# Patient Record
Sex: FEMALE | Race: WHITE | NOT HISPANIC OR LATINO | Employment: OTHER | URBAN - METROPOLITAN AREA
[De-identification: names, ages, dates, MRNs, and addresses within clinical notes are randomized per-mention and may not be internally consistent; named-entity substitution may affect disease eponyms.]

---

## 2017-01-23 DIAGNOSIS — E11.65 TYPE 2 DIABETES MELLITUS WITH HYPERGLYCEMIA, WITH LONG-TERM CURRENT USE OF INSULIN: Primary | ICD-10-CM

## 2017-01-23 DIAGNOSIS — Z79.4 TYPE 2 DIABETES MELLITUS WITH HYPERGLYCEMIA, WITH LONG-TERM CURRENT USE OF INSULIN: Primary | ICD-10-CM

## 2017-01-24 ENCOUNTER — CLINICAL SUPPORT (OUTPATIENT)
Dept: DIABETES | Facility: CLINIC | Age: 82
End: 2017-01-24
Payer: MEDICARE

## 2017-01-24 DIAGNOSIS — Z79.4 TYPE 2 DIABETES MELLITUS WITHOUT COMPLICATION, WITH LONG-TERM CURRENT USE OF INSULIN: ICD-10-CM

## 2017-01-24 DIAGNOSIS — E11.9 TYPE 2 DIABETES MELLITUS WITHOUT COMPLICATION, WITH LONG-TERM CURRENT USE OF INSULIN: ICD-10-CM

## 2017-01-24 PROCEDURE — G0108 DIAB MANAGE TRN  PER INDIV: HCPCS | Mod: S$GLB,,, | Performed by: DIETITIAN, REGISTERED

## 2017-01-24 RX ORDER — INSULIN PUMP SYRINGE, 3 ML
EACH MISCELLANEOUS
Qty: 1 EACH | Refills: 0 | Status: SHIPPED | OUTPATIENT
Start: 2017-01-24 | End: 2018-01-24

## 2017-01-24 RX ORDER — ESCITALOPRAM OXALATE 10 MG/1
TABLET ORAL
Qty: 30 TABLET | Refills: 0 | Status: SHIPPED | OUTPATIENT
Start: 2017-01-24 | End: 2017-01-24 | Stop reason: SDUPTHER

## 2017-01-25 RX ORDER — ESCITALOPRAM OXALATE 10 MG/1
TABLET ORAL
Qty: 90 TABLET | Refills: 0 | Status: SHIPPED | OUTPATIENT
Start: 2017-01-25 | End: 2017-04-02 | Stop reason: SDUPTHER

## 2017-01-25 RX ORDER — BLOOD SUGAR DIAGNOSTIC
STRIP MISCELLANEOUS
Qty: 300 STRIP | Refills: 0 | Status: SHIPPED | OUTPATIENT
Start: 2017-01-25 | End: 2017-11-28 | Stop reason: SDUPTHER

## 2017-01-25 NOTE — PROGRESS NOTES
01/24/17 0000   Diabetes Type   Diabetes Type  Type II   Diabetes History   Diabetes Diagnosis >10 years   Nutrition   Meal Planning skipping meals;diet drinks;water  (2 meals daily, appetite not what it used to be)   Monitoring    Monitoring (One Touch Ultra)   Self Monitoring  SMBG before each meal - reports BG remaining in 200s and sometimes increases into 300s   Blood Glucose Logs No   Exercise    Exercise Type (limited d/t knee)   Current Diabetes Treatment    Current Treatment Insulin  (Novolog 18 units before each meal and Lantus 30 units every evening)   Social History   Preferred Learning Method Face to Face;Reading Materials   Primary Support Self   Smoking Status Never a Smoker   Barriers to Change   Barriers to Change None   Learning Challenges  None   Readiness to Learn    Readiness to Learn  Acceptance   Diabetes Education Visit   Diabetes Education Record Assessment/Progress Initial/Comprehensive   Diabetes Education Assessment/Progress   Acute Complications (preventing, detecting, and treating acute complications) DC;5;W;IS  (Reviewed s/s of hyperglycemia and s/s and appropriate treatment of hypoglycemia. )   Chronic Complications (preventing, detecting, and treating chronic complications) DC;5;W;IS  (Reviewed care schedule.)   Diabetes Disease Process (diabetes disease process and treatment options) DC;5;W;IS   Nutrition (Incorporating nutritional management into one's lifestyle) DC;5;W;IS  (Provided education on sources of CHO, serving sizes, timing of meals, reading labels, and recommended eating pattern with 30-45g CHO/meal, 3 meals daily. )   Physical Activity (incorporating physical activity into one's lifestyle) DC;5;W;IS  (Discussed benefits and goals. Limited d/t knee. Pt also has osteoporosis - discussed benefits of resistance or weight bearing exercises as tolerated to support bone health. )   Medications (states correct name, dose, onset, peak, duration, side effects & timing of meds)  DC;5;W;IS  (Reviewed timing, dosage, site selection and rotation of MDI. Pt verbalized appropriately self-administering doses and reports rarely missing doses. Reports using sliding scale with Novolog - adds 2 units when above 150.    Discussed would benefit from empowerment d/t reported BG readings staying in 200s. Pt verbalized willing to go to appt as long as she does not have to take DM pills. She does not want to take any pills for DM d/t  had a heart attack shortly after starting a glucose lowering pill. Explained there are many DM medications that work differently to lower BG.)   Monitoring (monitoring blood glucose/other parameters &using results) DC;5;W;IS  (Reviewed SMBG 4 times daily AC/HS, BG goals, keeping log and bringing log to appt. Provided logs and discussed importance of log for insulin titration.)   Goal Setting and Problem Solving (verbalizes behavior change strategies & sets realistic goals) DC;IS   Behavior Change (developing personal strategies to health & behavior change) DC;IS   Goals   Other Set  (Keep empowerment appt.)   Start Date 01/24/17   Diabetes Care Plan/Intervention   Education Plan/Intervention Diabetes Empowerment Program  (Scheduled empowerment appt 2/16.)   Diabetes Self-Management Support Plan F/U Prov;F/U DE   Diabetes Meal Plan   Restrictions Restricted Carbohydrate   Carbohydrate Per Meal 30-45g   Education Units of Time    Time Spent 60 min

## 2017-01-26 ENCOUNTER — LAB VISIT (OUTPATIENT)
Dept: LAB | Facility: HOSPITAL | Age: 82
End: 2017-01-26
Attending: INTERNAL MEDICINE
Payer: MEDICARE

## 2017-01-26 LAB
ALBUMIN SERPL BCP-MCNC: 3.3 G/DL
ALP SERPL-CCNC: 81 U/L
ALT SERPL W/O P-5'-P-CCNC: 9 U/L
ANION GAP SERPL CALC-SCNC: 7 MMOL/L
AST SERPL-CCNC: 18 U/L
BILIRUB SERPL-MCNC: 0.4 MG/DL
BUN SERPL-MCNC: 13 MG/DL
CALCIUM SERPL-MCNC: 8.9 MG/DL
CHLORIDE SERPL-SCNC: 103 MMOL/L
CHOLEST/HDLC SERPL: 5.1 {RATIO}
CO2 SERPL-SCNC: 28 MMOL/L
CREAT SERPL-MCNC: 1 MG/DL
EST. GFR  (AFRICAN AMERICAN): >60 ML/MIN/1.73 M^2
EST. GFR  (NON AFRICAN AMERICAN): 53.7 ML/MIN/1.73 M^2
ESTIMATED AVG GLUCOSE: 186 MG/DL
GLUCOSE SERPL-MCNC: 187 MG/DL
HBA1C MFR BLD HPLC: 8.1 %
HDL/CHOLESTEROL RATIO: 19.7 %
HDLC SERPL-MCNC: 259 MG/DL
HDLC SERPL-MCNC: 51 MG/DL
LDLC SERPL CALC-MCNC: 176.2 MG/DL
NONHDLC SERPL-MCNC: 208 MG/DL
POTASSIUM SERPL-SCNC: 4.4 MMOL/L
PROT SERPL-MCNC: 7.2 G/DL
SODIUM SERPL-SCNC: 138 MMOL/L
TRIGL SERPL-MCNC: 159 MG/DL

## 2017-01-26 PROCEDURE — 36415 COLL VENOUS BLD VENIPUNCTURE: CPT

## 2017-01-26 PROCEDURE — 80053 COMPREHEN METABOLIC PANEL: CPT

## 2017-01-26 PROCEDURE — 80061 LIPID PANEL: CPT

## 2017-01-26 PROCEDURE — 83036 HEMOGLOBIN GLYCOSYLATED A1C: CPT

## 2017-02-16 ENCOUNTER — CLINICAL SUPPORT (OUTPATIENT)
Dept: DIABETES | Facility: CLINIC | Age: 82
End: 2017-02-16
Payer: MEDICARE

## 2017-02-16 VITALS
DIASTOLIC BLOOD PRESSURE: 72 MMHG | HEIGHT: 58 IN | HEART RATE: 74 BPM | WEIGHT: 218.25 LBS | BODY MASS INDEX: 45.81 KG/M2 | SYSTOLIC BLOOD PRESSURE: 136 MMHG

## 2017-02-16 DIAGNOSIS — I10 ESSENTIAL HYPERTENSION: Chronic | ICD-10-CM

## 2017-02-16 DIAGNOSIS — N39.46 MIXED STRESS AND URGE URINARY INCONTINENCE: Chronic | ICD-10-CM

## 2017-02-16 DIAGNOSIS — N18.30 CKD STAGE 3 DUE TO TYPE 2 DIABETES MELLITUS: Chronic | ICD-10-CM

## 2017-02-16 DIAGNOSIS — I77.9 BILATERAL CAROTID ARTERY DISEASE: ICD-10-CM

## 2017-02-16 DIAGNOSIS — E78.5 HYPERLIPIDEMIA LDL GOAL <70: Chronic | ICD-10-CM

## 2017-02-16 DIAGNOSIS — E11.65 TYPE 2 DIABETES MELLITUS WITH HYPERGLYCEMIA, WITH LONG-TERM CURRENT USE OF INSULIN: Primary | Chronic | ICD-10-CM

## 2017-02-16 DIAGNOSIS — R13.12 OROPHARYNGEAL DYSPHAGIA: ICD-10-CM

## 2017-02-16 DIAGNOSIS — E11.22 CKD STAGE 3 DUE TO TYPE 2 DIABETES MELLITUS: Chronic | ICD-10-CM

## 2017-02-16 DIAGNOSIS — Z79.4 TYPE 2 DIABETES MELLITUS WITH HYPERGLYCEMIA, WITH LONG-TERM CURRENT USE OF INSULIN: Primary | Chronic | ICD-10-CM

## 2017-02-16 DIAGNOSIS — E66.01 MORBID OBESITY WITH BMI OF 40.0-44.9, ADULT: Chronic | ICD-10-CM

## 2017-02-16 DIAGNOSIS — E03.9 ACQUIRED HYPOTHYROIDISM: Chronic | ICD-10-CM

## 2017-02-16 LAB
CREAT UR-MCNC: 299 MG/DL
MICROALBUMIN UR DL<=1MG/L-MCNC: 27 UG/ML
MICROALBUMIN/CREATININE RATIO: 9 UG/MG

## 2017-02-16 PROCEDURE — 99999 PR PBB SHADOW E&M-EST. PATIENT-LVL V: CPT | Mod: PBBFAC,,,

## 2017-02-16 PROCEDURE — 99204 OFFICE O/P NEW MOD 45 MIN: CPT | Mod: S$GLB,,, | Performed by: NURSE PRACTITIONER

## 2017-02-16 PROCEDURE — 99499 UNLISTED E&M SERVICE: CPT | Mod: S$GLB,,, | Performed by: NURSE PRACTITIONER

## 2017-02-16 PROCEDURE — 82570 ASSAY OF URINE CREATININE: CPT

## 2017-02-16 RX ORDER — ATORVASTATIN CALCIUM 10 MG/1
10 TABLET, FILM COATED ORAL DAILY
Qty: 90 TABLET | Refills: 3 | Status: SHIPPED | OUTPATIENT
Start: 2017-02-16 | End: 2017-04-21

## 2017-02-16 RX ORDER — ASPIRIN 81 MG/1
81 TABLET ORAL DAILY
Qty: 30 TABLET | Refills: 12
Start: 2017-02-16 | End: 2022-01-01

## 2017-02-16 RX ORDER — INSULIN GLARGINE 100 [IU]/ML
30 INJECTION, SOLUTION SUBCUTANEOUS NIGHTLY
Qty: 10 ML | Refills: 12
Start: 2017-02-16 | End: 2018-01-19

## 2017-02-16 RX ORDER — INSULIN ASPART 100 [IU]/ML
INJECTION, SOLUTION INTRAVENOUS; SUBCUTANEOUS
Qty: 2 VIAL | Refills: 3 | Status: SHIPPED | OUTPATIENT
Start: 2017-02-16 | End: 2019-08-15

## 2017-02-16 NOTE — MR AVS SNAPSHOT
[facility]  [address]  [facility] [address]  Phone: [phone]                  Courtney Engle   2017 1:00 PM   Clinical Support    Description:  Female : 1935   Provider:  DIABETES EMPOWERMENT PROGRAM   Department:  [facility]           Reason for Visit     Diabetes           Diagnoses this Visit        Comments    Type 2 diabetes mellitus with hyperglycemia, with long-term current use of insulin    -  Primary     CKD stage 3 due to type 2 diabetes mellitus         Mixed stress and urge urinary incontinence         Bilateral carotid artery disease         Essential hypertension         Morbid obesity with BMI of 40.0-44.9, adult         Acquired hypothyroidism                To Do List           Future Appointments        Provider Department Dept Phone    2017 1:30 PM Vishal Ulloa MD [facility] Internal Medicine [phone]    3/2/2017 2:30 PM Carmina Salmon MD [facility] - Ophthalmology [phone]    4/3/2017 2:40 PM Jessica Rome MD [facility] Urology 4th Floor [phone]    2017 1:00 PM DIABETES EDUCATOR, INT MED 1 [facility] [phone]      Goals (5 Years of Data)     None      Follow-Up and Disposition     Return in about 3 months (around 2017).       These Medications        Disp Refills Start End    aspirin (ECOTRIN) 81 MG EC tablet 30 tablet 12 2017    Take 1 tablet (81 mg total) by mouth once daily. - Oral    Pharmacy: [facility] [address] [facility] [address] AT [facility] Ph #: [phone]       atorvastatin (LIPITOR) 10 MG tablet 90 tablet 3 2017    Take 1 tablet (10 mg total) by mouth once daily. - Oral    [facility] [employer] [facility] [id number] [facility] [place] [facility] [address] [facility] AT [facility] Ph #: [phone]       sub-q insulin device, 20 unit (VGO 20) Harriett 30  Device 3 2017     1 Device by Misc.(Non-Drug; Combo Route) route once daily. - Misc.(Non-Drug; Combo Route)    Pharmacy: [facility] - [place], [place] - [address] [facility] Ph #: [phone]       insulin aspart (NOVOLOG) 100 unit/mL injection 2 vial 3 2017     To use with Vgo 20  with 4 clicks with meals, max TDD 38 units daily    Pharmacy: [facility] - [place] - V[place] [facility] Ph #: [phone]       insulin glargine (LANTUS) 100 unit/mL injection 10 mL 12 2017     Inject 30 Units into the skin every evening. STOP WHEN VGO STARTED - Subcutaneous    Pharmacy: [facility] [place] [place] - [place] [facility] Ph #: [phone]         [facility]     [facility] -  Assistance  Registered nurses in the [facility] provide clinical advisement, health education, appointment booking, and other advisory services.  Call for this free service at [phone].             Medications           Message regarding Medications     Verify the changes and/or additions to your medication regime listed below are the same as discussed with your clinician today.  If any of these changes or additions are incorrect, please notify your healthcare provider.        START taking these NEW medications        Refills    aspirin (ECOTRIN) 81 MG EC tablet 12    Sig: Take 1 tablet (81 mg total) by mouth once daily.    Class: No Print    Route: Oral    atorvastatin (LIPITOR) 10 MG tablet 3    Sig: Take 1 tablet (10 mg total) by mouth once daily.    Class: Normal    Route: Oral    sub-q insulin device, 20 unit (VGO 20) Harriett 3    Si Device by Misc.(Non-Drug; Combo Route) route once daily.    Class: Normal    Route: Misc.(Non-Drug; Combo Route)      CHANGE how you are taking these medications     Start Taking Instead of    insulin aspart (NOVOLOG) 100 unit/mL injection insulin aspart (NOVOLOG) 100 unit/mL injection     "Dosage:  To use with Vgo 20  with 4 clicks with meals, max TDD 38 units daily Dosage:  Inject 18 units under skin before meals.    Reason for Change:  Reorder     insulin glargine (LANTUS) 100 unit/mL injection insulin glargine (LANTUS) 100 unit/mL injection    Dosage:  Inject 30 Units into the skin every evening. STOP WHEN VGO STARTED Dosage:  Inject 30 Units into the skin every evening.    Reason for Change:  Reorder            Verify that the below list of medications is an accurate representation of the medications you are currently taking.  If none reported, the list may be blank. If incorrect, please contact your healthcare provider. Carry this list with you in case of emergency.           Current Medications     alendronate (FOSAMAX) 70 MG tablet Take 1 tablet (70 mg total) by mouth every 7 days.    alprazolam (XANAX) 0.5 MG tablet Take 1 tablet (0.5 mg total) by mouth nightly.    aspirin (ECOTRIN) 81 MG EC tablet Take 1 tablet (81 mg total) by mouth once daily.    atenolol (TENORMIN) 25 MG tablet Take 1 tablet (25 mg total) by mouth once daily.    atorvastatin (LIPITOR) 10 MG tablet Take 1 tablet (10 mg total) by mouth once daily.    BD INSULIN SYRINGE ULT-FINE II 1/2 mL 31 x 5/16" Syrg USE THREE TIMES DAILY AS DIRECTED    benzonatate (TESSALON) 100 MG capsule Take 1 capsule (100 mg total) by mouth 3 (three) times daily as needed for Cough.    blood-glucose meter kit Use as instructed    butalbital-acetaminophen-caffeine -40 mg (FIORICET, ESGIC) -40 mg per tablet Take 1 tablet by mouth every 8 (eight) hours as needed.    escitalopram oxalate (LEXAPRO) 10 MG tablet TAKE 1 TABLET BY MOUTH EVERY DAY    fluticasone (FLONASE) 50 mcg/actuation nasal spray SPRAY TWICE IN EACH NOSTRIL ONCE DAILY    insulin aspart (NOVOLOG) 100 unit/mL injection To use with Vgo 20  with 4 clicks with meals, max TDD 38 units daily    insulin glargine (LANTUS) 100 unit/mL injection Inject 30 Units into the skin every " "evening. STOP WHEN VGO STARTED    insulin syringe-needle U-100 0.5 mL 31 gauge x 5/16 Syrg USE AS DIRECTED FOUR TIMES DAILY    insulin syringe-needle U-100 1/2 mL 31 x 5/16" Syrg USE THREE TIMES DAILY    levothyroxine (SYNTHROID) 75 MCG tablet Take 1 tablet (75 mcg total) by mouth once daily.    lisinopril (PRINIVIL,ZESTRIL) 20 MG tablet Take 1 tablet (20 mg total) by mouth once daily.    omeprazole (PRILOSEC) 40 MG capsule Take 1 capsule (40 mg total) by mouth 2 (two) times daily before meals.    ONETOUCH ULTRA TEST Strp TEST FOUR TIMES DAILY    sub-q insulin device, 20 unit (VGO 20) Harriett 1 Device by Misc.(Non-Drug; Combo Route) route once daily.           Clinical Reference Information           Your Vitals Were     BP Pulse Height Weight BMI    136/72 74 4' 10" (1.473 m) 99 kg (218 lb 4.1 oz) 45.62 kg/m2      Blood Pressure          Most Recent Value    BP  136/72      Allergies as of 2/16/2017     Benadryl [Diphenhydramine Hcl]    Contac 12 Hour Allergy    Ciprofloxacin (Bulk)    Anti-hyst    Antihistamines - Alkylamine    Codeine    Glucotrol [Glipizide]    Hydroxyzine    Iodinated Contrast Media - Iv Dye    Nitrofuran Analogues    Propoxyphene    Doxycycline    Penicillins    Sulfa (Sulfonamide Antibiotics)      Immunizations Administered on Date of Encounter - 2/16/2017     None      Orders Placed During Today's Visit      Normal Orders This Visit    Ambulatory consult to Ophthalmology     Ambulatory consult to Urology     Microalbumin/creatinine urine ratio     Future Labs/Procedures Expected by Expires    Comprehensive metabolic panel  2/16/2017 2/16/2018    Hemoglobin A1c  2/16/2017 2/16/2018    Lipid panel  2/16/2017 2/16/2018    GLUCOSE MONITORING CONTINUOUS MIN 72 HOURS  As directed 2/16/2018      Instructions    Start aspirin 81 mg daily     Start Lipitor 10 mg daily - for high cholesterol            Language Assistance Services     ATTENTION: Language assistance services are available, free of charge. " Please call [phone].      ATENCIÓN: Si habla español, tiene a kilgore disposición servicios gratuitos de asistencia lingüística. Llame al [phone].     CHÚ Ý: N?u b?n nói Ti?ng Vi?t, có các d?ch v? h? tr? ngôn ng? mi?n phí dành cho b?n. G?i s? [phone].         [facility] complies with applicable Federal civil rights laws and does not discriminate on the basis of race, color, national origin, age, disability, or sex.

## 2017-02-16 NOTE — PROGRESS NOTES
CC:  Diabetes.     HPI: Courtney Engle is a 81 y.o. female presents for visit in [facility]ment [facility]. The patient has had diabetes along with associated comorbidities indicated in the Visit Diagnosis section of this encounter. The patient was diagnosed with Type 2 diabetes in ~2003.  Diagnosed based on routine labs, does not recall any symptomatic.     VISIT #: 1    1st visit - Patient presents alone with no logs or meter.  Varies timing of basal insulin and varying dose and timing of mealtime insulin and snacking without administering insulin - also holding Novolog for bG <100, often resulting in markedly elevated BG readings later in the day (300s). Has fear of all potential side effects related to medications, which is the reason that she is not currently on a statin and she thinks it may have caused nausea, only attempted pravastatin in last 5 years per chart review, pt unsure     DIABETES COMPLICATIONS: nephropathy and cerebrovascular disease (bilateral carotid artery stenosis)     HOSPITALIZATIONS FOR DIABETES OR RELATED COMPLICATIONS:  No    CURRENT A1C:    Hemoglobin A1C   Date Value Ref Range Status   01/26/2017 8.1 (H) 4.5 - 6.2 % Final     Comment:     According to ADA guidelines, hemoglobin A1C <7.0% represents  optimal control in non-pregnant diabetic patients.  Different  metrics may apply to specific populations.   Standards of Medical Care in Diabetes - 2016.  For the purpose of screening for the presence of diabetes:  <5.7%     Consistent with the absence of diabetes  5.7-6.4%  Consistent with increasing risk for diabetes   (prediabetes)  >or=6.5%  Consistent with diabetes  Currently no consensus exists for use of hemoglobin A1C  for diagnosis of diabetes for children.     12/08/2016 8.1 (H) 4.5 - 6.2 % Final     Comment:     According to ADA guidelines, hemoglobin A1C <7.0% represents  optimal control in non-pregnant diabetic patients.  Different  metrics may apply to specific  populations.   Standards of Medical Care in Diabetes - 2016.  For the purpose of screening for the presence of diabetes:  <5.7%     Consistent with the absence of diabetes  5.7-6.4%  Consistent with increasing risk for diabetes   (prediabetes)  >or=6.5%  Consistent with diabetes  Currently no consensus exists for use of hemoglobin A1C  for diagnosis of diabetes for children.     03/14/2016 7.8 (H) 4.5 - 6.2 % Final       DM MEDICATIONS USED IN THE PAST: Lantus, Novolog     MEDICATIONS UPON START OF PROGRAM: Lantus 30 units nightly, Novolog 18 units AC + self created correction scale - vials   Skips Lantus intermittently   She self adjusts Novolog AC and creates own sliding scale     CURRENT DIABETES MEDICATIONS: Lantus 30 units nightly, Novolog 18 units AC + self created correction scale - vials   Skips Lantus intermittently   She self adjusts Novolog AC and creates own sliding scale   Holds Novolog if BG <100    ANY DIFFICULTY AFFORDING YOUR CURRENT MEDICATION REGIMEN?  No    CGMS interpretation:  To be performed before next visit     DO YOU USE THE [facility] EMAIL SYSTEM? No      STANDARDS of CARE:  Statin:  No - lipitor 10 mg started   ACEI or ARB:  Yes - lisinopril   ASA:  No - start 81 mg daily   Last eye exam - not UTD  Microalbumin/Creatinine ratio:  Lab Results   Component Value Date    MICALBCREAT 5.1 01/13/2015       BLOOD GLUCOSE MONITORING:  Checking BG 3 times daily, vague reporting of BG, no logs     HYPOGLYCEMIA:  No    CURRENT DAILY ROUTINE (meals/meds):   Eating 2-3 times daily  - sleeps late and wakes up late   Snacks throughout the day on chips    CURRENT EXERCISE:  No    OCCUPATION: none     MEDICATIONS, ALLERGIES, LABS, VS's, MEDICAL, SURGICAL, SOCIAL, AND FAMILY HISTORY reviewed and updated in the appropriate sections during this encounter    ROS:     Constitutional: Negative for weight change  Endocrine:  + polyuria, polydipsia, +2 nocturia.  HENT: Denies neck swelling, lumps, horseness, +  "trouble swallowing pills (x6 months).  Denies any personal or family history of thyroid cancer.    Eyes: Negative for visual disturbance.   Respiratory: Negative for cough or shortness or breath.  Cardiovascular: Negative for chest pain or claudication.   Gastrointestinal: Negative for nausea, diarrhea, vomiting, bloating.  No history of pancreatitis or gastroparesis.  Genitourinary: + incontinence, urgency, denies  frequency, frequent urinary tract infections.  Skin: Denies prolonged wound healing.  Neurological: Negative for syncope, + numbness/burning of extremities.  Psychiatric/Behavioral: Negative for depression or anxiety      All other systems reviewed and are negative.    PE:  Constitutional:   Visit Vitals    /72    Pulse 74    Ht 4' 10" (1.473 m)    Wt 99 kg (218 lb 4.1 oz)    BMI 45.62 kg/m2      Well developed, well nourished, NAD.    HENT:   External ears, nose: no masses. No significant findings.   Hearing: normal     Neck:  No trachial deviation present, No neck masses noticed   Thyroid:  No thyromegaly present.  No thyroid tenderness.      Cardiovascular:    Auscultation:  No murmurs or abnormal sounds   Lower extremities:  No edema or cyanosis.     Respiratory:    Effort:  Normal, no use of accessory muscles.   Auscultation: breath sounds normal, no adventitious sounds.  Abdomen:     Exam:  Soft, non-tender, no masses.      No hernia noted.  Skin:    Inspection: no rashes, lesion or ulcers, + acanthosis nigracans.   Palpation: no induration or nodules.   Insulin injection sites: no lipoatrophy or lipohypertrophy.  Psychiatric:  Judgement and insight good with normal mood and affect.  Musculoskeletal:  Gait steady. No gross abnormalities.      FOOT EXAMINATION:   Protective Sensation (w/ 10 gram monofilament):  Right: Intact  Left: Intact    Visual Inspection:  Callus -  bilateral heels    Pedal Pulses:   Right: Present  Left: Present    Posterior tibialis:   Right:Present  Left: " "Present    Normal vibratory response to 128 Hz tuning fork bilaterally.   ______________________________________________________________________     ASSESSMENT and PLAN:    1- Type 2 diabetes with CKD 3 and hyperglycemia - A1c elevated. High risk of hypo/hyper and variable BG readings due to self adjustment and skipping insulin doses. Reviewed A1c and BG goals.  Discussed diagnosis of DM, progression of disease, long term complications and tx options, including changing to Vgo device. Pt has fear of "all medications that have side effects", so wishes to avoid all PO medications for DM.     Long discussion about need to ELIMINATE daily regular soft drink consumption. Patient does not wish to change to diet soft drinks, so needs to significantly decrease/eliminate soft drinks completely in  Order to improve BG control     For now, continue Lantus and Novolog at current doses, as no logs or information to titration. After diabetes education, start Vgo20 with 4 clicks with meals.        Instructed to monitor BG ac/hs, document on BG logs, and bring complete BG logs to all visits - to evaluate weekly via telephone after Vgo start. Pt given paper logs to mail in 2 weeks after today's appt for titration until Vgo started      Follow up with diabetes education first available for Vgo start   Empowerment in 3 months with CMP, lipid, A1c and CGMS before   GOAL A1C: <8% given age and CKD  optho f/u, saw Dr Salmon in past   Start ASA 81 mg daily     2- CKD stage 3 - discussed presence of CKD 3, need for improved BG control with decrease variability.     3- Hypertension - goal < 140/90 mmHg -  At goal, on ACE-I, continue current medications   BP Readings from Last 3 Encounters:   02/16/17 136/72   12/08/16 138/60   12/08/16 (!) 144/80       4- Hyperlipidemia, bilateral carotid artery disease -  LFT's WNL. Long discussion about markedly elevated LDL and importance of statin medication, has tried Pravastatin in the past with " ""sick feeling", vague symptoms. Will attempt low dose Lipitor 10 mg daily -- needs 40 mg daily per ADA recs but will start with low dose and titrate     Lab Results   Component Value Date    LDLCALC 176.2 (H) 01/26/2017       5- Body mass index is 45.62 kg/(m^2). = Morbid obesity. Modest weight loss (5-10%) may greatly improve BG control. Would benefit from referral to [facility] Cano, bariatric medicine, in the future     6 - Stress and urge Urinary incontinence - urology referral     7 - Difficulty swallowing - message sent to Dr. Ulloa     "

## 2017-02-16 NOTE — Clinical Note
Courtney CARRENO Oniel attended Diabetes Empowerment today and started on Vgo after education visit . Courtney Engle will follow up with a diabetes educator for Vgo start, then 3 months with me with labs and CGMS.  Please let me know if I can be of any assistance. Thank you for allowing me to participate in Courtney Engle 's care.   Thanks ADAM Black Endocrinology Nurse Practitioner

## 2017-02-16 NOTE — PATIENT INSTRUCTIONS
Start aspirin 81 mg daily     Start Lipitor 10 mg daily - for high cholesterol     Until your diabetes education appt for your Vgo start, continue your current Lantus and Novolog doses    The night before your diabetes education visit, do NOT give your lantus    After your diabetes education visit, you will use the Vgo device only for your insulin and will NOT use your lantus or other insulin injections again

## 2017-02-17 ENCOUNTER — TELEPHONE (OUTPATIENT)
Dept: ENDOCRINOLOGY | Facility: CLINIC | Age: 82
End: 2017-02-17

## 2017-02-17 NOTE — TELEPHONE ENCOUNTER
----- Message from Heavenly Erica sent at 2/17/2017  1:36 PM CST -----  Contact: Courtney   [phone]  Asks for the nurse to call her about being changed to V-go.   When is the education for this?   The [facility] filled the perscription.   Does she pick this up or are you getting it cheaper for her.   Has questions.    Pls call.

## 2017-02-17 NOTE — TELEPHONE ENCOUNTER
Please call patient and notify her that tier exception paperwork signed and faxed today to [employer]. Typically takes up to 2 weeks for [employer] to make a decision. After the decision, she can check with [facility] to see what the copay is.     She should bring the Vgo devices and a Novolog bottle to her Vgo start for education in April.    Thanks

## 2017-02-20 NOTE — TELEPHONE ENCOUNTER
Left a message for the  patient and notify her that tier exception paperwork signed and faxed today to [facility]. Typically takes up to 2 weeks for [employer] to make a decision. After the decision, she can check with [facility] pharmacy to see what the copay is.   She should bring the Vgo devices and a Novolog bottle to her Vgo start for education in April. If she has any questions she can give our office a call. Phone number provided.

## 2017-02-21 ENCOUNTER — OFFICE VISIT (OUTPATIENT)
Dept: INTERNAL MEDICINE | Facility: CLINIC | Age: 82
End: 2017-02-21
Payer: MEDICARE

## 2017-02-21 VITALS
BODY MASS INDEX: 46.23 KG/M2 | DIASTOLIC BLOOD PRESSURE: 74 MMHG | HEIGHT: 58 IN | SYSTOLIC BLOOD PRESSURE: 130 MMHG | WEIGHT: 220.25 LBS | HEART RATE: 68 BPM

## 2017-02-21 DIAGNOSIS — E11.65 TYPE 2 DIABETES MELLITUS WITH HYPERGLYCEMIA, WITH LONG-TERM CURRENT USE OF INSULIN: Primary | Chronic | ICD-10-CM

## 2017-02-21 DIAGNOSIS — R07.0 THROAT PAIN: ICD-10-CM

## 2017-02-21 DIAGNOSIS — I10 ESSENTIAL HYPERTENSION: Chronic | ICD-10-CM

## 2017-02-21 DIAGNOSIS — Z79.4 TYPE 2 DIABETES MELLITUS WITH HYPERGLYCEMIA, WITH LONG-TERM CURRENT USE OF INSULIN: Primary | Chronic | ICD-10-CM

## 2017-02-21 DIAGNOSIS — R07.9 CHEST PAIN, UNSPECIFIED TYPE: ICD-10-CM

## 2017-02-21 DIAGNOSIS — R21 RASH: ICD-10-CM

## 2017-02-21 DIAGNOSIS — E66.01 MORBID OBESITY WITH BMI OF 40.0-44.9, ADULT: Chronic | ICD-10-CM

## 2017-02-21 DIAGNOSIS — E03.9 ACQUIRED HYPOTHYROIDISM: Chronic | ICD-10-CM

## 2017-02-21 DIAGNOSIS — R13.10 ODYNOPHAGIA: ICD-10-CM

## 2017-02-21 DIAGNOSIS — F32.5 MAJOR DEPRESSIVE DISORDER WITH SINGLE EPISODE, IN REMISSION: ICD-10-CM

## 2017-02-21 PROCEDURE — 99215 OFFICE O/P EST HI 40 MIN: CPT | Mod: S$GLB,,, | Performed by: INTERNAL MEDICINE

## 2017-02-21 PROCEDURE — 1126F AMNT PAIN NOTED NONE PRSNT: CPT | Mod: S$GLB,,, | Performed by: INTERNAL MEDICINE

## 2017-02-21 PROCEDURE — 93005 ELECTROCARDIOGRAM TRACING: CPT | Mod: S$GLB,,, | Performed by: INTERNAL MEDICINE

## 2017-02-21 PROCEDURE — 1159F MED LIST DOCD IN RCRD: CPT | Mod: S$GLB,,, | Performed by: INTERNAL MEDICINE

## 2017-02-21 PROCEDURE — 93010 ELECTROCARDIOGRAM REPORT: CPT | Mod: S$GLB,,, | Performed by: INTERNAL MEDICINE

## 2017-02-21 PROCEDURE — 99999 PR PBB SHADOW E&M-EST. PATIENT-LVL III: CPT | Mod: PBBFAC,,, | Performed by: INTERNAL MEDICINE

## 2017-02-21 PROCEDURE — 3075F SYST BP GE 130 - 139MM HG: CPT | Mod: S$GLB,,, | Performed by: INTERNAL MEDICINE

## 2017-02-21 PROCEDURE — 1157F ADVNC CARE PLAN IN RCRD: CPT | Mod: S$GLB,,, | Performed by: INTERNAL MEDICINE

## 2017-02-21 PROCEDURE — 3078F DIAST BP <80 MM HG: CPT | Mod: S$GLB,,, | Performed by: INTERNAL MEDICINE

## 2017-02-21 PROCEDURE — 99499 UNLISTED E&M SERVICE: CPT | Mod: S$GLB,,, | Performed by: INTERNAL MEDICINE

## 2017-02-21 PROCEDURE — 1160F RVW MEDS BY RX/DR IN RCRD: CPT | Mod: S$GLB,,, | Performed by: INTERNAL MEDICINE

## 2017-02-21 NOTE — PROGRESS NOTES
Subjective:       Patient ID: Courtney Engle is a 81 y.o. female.    Chief Complaint: Hypertension (f/u)    HPI Comments: History of present illness: Patient comes in to follow-up hypertension.  She has numerous problems including a chronic rash, diabetes, hypertension, statin side effects and incontinence of urine.  She is seeing urology for that and sees endocrine for diabetes and elevated cholesterol.  I strongly urged her to try to continue the statin because it is not musculoskeletal pain but she feels woozy from the statin.  She did have an episode of chest discomfort about a day or 2 ago.  She says it followed a meal but it lasted 30 minutes.  Eventually it went away with an antacid but she was concerned about her heart.  Given her increased weight, hypertension, diabetes poorly controlled and dyslipidemia we decided to do an EKG.  It did show sinus bradycardia but appears similar to many low voltage EKGs in the past.  I will wait and see what cardiology determines on the reading.  The rash is been present she says for years.  Does not itch.  It is primarily on the inner thighs the inner arms and abdomen but she does not believe it relates to heat.  She also states she periodically gets arthritic pain and occasionally she may cough or choke swallowing pills and sometimes food.  She doesn't believe his congestion and she wants her throat checked.    Hypertension   Associated symptoms include chest pain. Pertinent negatives include no headaches, neck pain or shortness of breath.     Review of Systems   Constitutional: Negative for chills, fatigue, fever and unexpected weight change.   HENT: Positive for sore throat.    Eyes: Negative for pain and visual disturbance.   Respiratory: Negative for apnea, cough, chest tightness and shortness of breath.    Cardiovascular: Positive for chest pain. Negative for leg swelling.   Gastrointestinal: Negative for abdominal pain, constipation, diarrhea, nausea and vomiting.    Genitourinary: Positive for urgency. Negative for frequency, hematuria and menstrual problem.   Musculoskeletal: Negative for arthralgias, joint swelling, neck pain and neck stiffness.   Skin: Positive for rash. Negative for wound.   Neurological: Negative for dizziness and headaches.   Hematological: Negative for adenopathy.   Psychiatric/Behavioral: Negative for dysphoric mood. The patient is not nervous/anxious.        Objective:      Physical Exam   Constitutional: She is oriented to person, place, and time. She appears well-developed and well-nourished. No distress.   Overweight female no acute distress   HENT:   Head: Normocephalic and atraumatic.   Right Ear: External ear normal.   Left Ear: External ear normal.   Mouth/Throat: Oropharynx is clear and moist. No oropharyngeal exudate.   Eyes: Conjunctivae are normal. Pupils are equal, round, and reactive to light. No scleral icterus.   Neck: Normal range of motion. Neck supple. No thyromegaly present.   Cardiovascular: Normal rate and regular rhythm.    No murmur heard.  Pulmonary/Chest: Effort normal and breath sounds normal. She has no wheezes.   Abdominal: Soft. Bowel sounds are normal. She exhibits no distension. There is no tenderness.   Musculoskeletal: She exhibits no tenderness.   Lymphadenopathy:     She has no cervical adenopathy.   Neurological: She is alert and oriented to person, place, and time.   Skin: Rash (Diffuse patchy rash.  Mostly involving the abdomen and inner thighs and the inner biceps) noted.   Psychiatric: She has a normal mood and affect.       Assessment:       1. Type 2 diabetes mellitus with hyperglycemia, with long-term current use of insulin    2. Acquired hypothyroidism    3. Major depressive disorder with single episode, in remission    4. Essential hypertension    5. Morbid obesity with BMI of 40.0-44.9, adult    6. Chest pain, unspecified type    7. Rash    8. Throat pain    9. Odynophagia        Plan:       Courtney was  seen today for hypertension.    Diagnoses and all orders for this visit:    Type 2 diabetes mellitus with hyperglycemia, with long-term current use of insulin  -     EKG 12-lead    Acquired hypothyroidism    Major depressive disorder with single episode, in remission    Essential hypertension  -     EKG 12-lead    Morbid obesity with BMI of 40.0-44.9, adult    Chest pain, unspecified type  -     EKG 12-lead    Rash  -     Ambulatory referral to Dermatology    Throat pain  -     Ambulatory referral to ENT    Odynophagia  -     Ambulatory referral to ENT        Labs reviewed.  Lipids and A1c were elevated.  Patient working with endocrine for this.  Office EKG showed sinus bradycardia with low voltage.  It appears similar to old EKGs but I'll review final reading

## 2017-02-21 NOTE — MEDICAL/APP STUDENT
"Subjective:       Patient ID: Courtney Engle is a 81 y.o. female.    Chief Complaint: Hypertension (f/u)    HPI     Ms. Engle is a 82 yo F who presents today with multiple medical issues.     Rash  Patient states that she has had this rash for over 12 years ("as long as I had diabetes"). The rash is located on her proximal anterior thighs, lower anterior abdomen and antecubital fossa. The rash is non-pruritic. No welts or warmth. The rash comes and goes. Patient fears it may be related to her diabetes medications.     Nausea/chest pain  Patient had an episode of substernal chest pain last night which lasted approximately 30 minutes. The pain was non-radiating, no associated diaphoresis. She thinks it may be related to food intake. She ate 1 hour before bed and laid flat when she began to experience the chest pain. The pain was relieved with Zanax.     Urinary incontinence/Increased bowel movements  Patient states that she has had increased bowel movements (~5x a day). The stool is formed most times, non-bloody. Denies fecal incontinence, must increased sensation/need to have a bowel movement when she urinates. Patient suffers from urinary incontinence and wears a diaper 24/7. She has a urology appointment in April.     Sensation that food/pills get stuck in throat  Patient states that she has "thyroid pain." Feels pangs of pain in her neck that last a few seconds. No associated events. Separate from these pain episodes, she has had an increased sensation of pills getting stuck in her throat. She had an EGD in 2015 which mild hiatal hernia. Patient states that this sensation is alleviated with drinking milk.     Diabetes  Patient complains of having to take atorvastatin 10mg because she feels "sicky" and nauseated from them. Her sugars remain uncontrolled and lipids and cholesterol elevated. She fears having a heart attack though. Her diabetes is being managed by Nurse Scroggs in endocrinology who will place her " "on V-Go. Patient does not adhere to a healthy diet and continues to consume sugary drinks and snacks.     Review of Systems   Constitutional: Negative.    HENT: Positive for postnasal drip, rhinorrhea, sinus pressure and trouble swallowing.    Eyes: Negative.    Respiratory: Negative.    Cardiovascular: Positive for chest pain.   Gastrointestinal: Positive for nausea. Negative for abdominal pain, blood in stool, constipation, diarrhea, rectal pain and vomiting.   Endocrine: Negative.    Genitourinary: Positive for enuresis, frequency and urgency.   Musculoskeletal: Negative.    Skin: Positive for rash.   Allergic/Immunologic: Negative.    Neurological: Negative.    Hematological: Negative.    Psychiatric/Behavioral: Negative.        Objective:       Visit Vitals    /74    Pulse 68    Ht 4' 10" (1.473 m)    Wt 99.9 kg (220 lb 3.8 oz)    BMI 46.03 kg/m2         Physical Exam   Constitutional: She is oriented to person, place, and time. She appears well-developed and well-nourished. No distress.   Obese     HENT:   Head: Normocephalic and atraumatic.   Eyes: EOM are normal. Pupils are equal, round, and reactive to light.   Neck: Normal range of motion. Neck supple.   Cardiovascular: Normal rate, regular rhythm, normal heart sounds and intact distal pulses.         Pulmonary/Chest: Effort normal and breath sounds normal. No respiratory distress. She has no wheezes. She has no rales. She exhibits no tenderness.   Abdominal: Soft. Bowel sounds are normal. She exhibits no distension and no mass. There is no tenderness. There is no rebound and no guarding. No hernia.   Musculoskeletal: Normal range of motion.   Lymphadenopathy:     She has no cervical adenopathy.   Neurological: She is alert and oriented to person, place, and time.   Skin: Skin is warm and dry. Rash (maculopapular rash ) noted. She is not diaphoretic.   Psychiatric: She has a normal mood and affect. Her behavior is normal. Judgment and thought " "content normal.       Assessment:       Ms. Engle is a 80 yo F with h/o T2DM, HLD, HTN, and morbid obesity who presents today with multiple complaints.       Plan:       Rash  - Referral to Dermatology     Nausea/chest pain  - ECG, abnormal findings on computer readings, but looks consistent with past ECGs  - Waiting to hear Cardiology's final read   - Follow-up with cardiology if needed   - Lifestyle modifications to control GERD symptoms     Urinary incontinence/Increased bowel movements  - Follow-up with Urology   - Educated the patient on Diabetes control and how it may alleviate some of her issues with incontinence and frequency     Sensation that food/pills get stuck in throat  - Referral to ENT for evaluation of "thyroid pain" and the sensation that food is getting stuck     Diabetes  - V-Go is ok to use   - Stressed the importance of lifestyle modifications in controlling her diabetes, HLD, GERD and HTN     "

## 2017-02-21 NOTE — MR AVS SNAPSHOT
[facility]  [address]  [facility] [address]  Phone: [phone]  Fax: [phone]                  Courtney Engle   2017 1:30 PM   Office Visit    Description:  Female : 1935   Provider:  Vishal Ulloa MD   Department:  [facility]           Reason for Visit     Hypertension           Diagnoses this Visit        Comments    Type 2 diabetes mellitus with hyperglycemia, with long-term current use of insulin    -  Primary     Acquired hypothyroidism         Major depressive disorder with single episode, in remission         Essential hypertension         Morbid obesity with BMI of 40.0-44.9, adult         Chest pain, unspecified type         Rash         Throat pain         Odynophagia                To Do List           Future Appointments        Provider Department Dept Phone    3/2/2017 2:30 PM Carmina Salmon MD [facility] - Ophthalmology [phone]    4/3/2017 2:40 PM Jessica Rome MD [facility] - Urology 4th Floor [phone]    2017 1:00 PM DIABETES EDUCATOR, INT MED 1 [facility] -  Diabetes Program [phone]      Goals (5 Years of Data)     None      [facility]     [facility] -  Assistance  Registered nurses in the [facility] provide clinical advisement, health education, appointment booking, and other advisory services.  Call for this free service at [phone].             Medications           Message regarding Medications     Verify the changes and/or additions to your medication regime listed below are the same as discussed with your clinician today.  If any of these changes or additions are incorrect, please notify your healthcare provider.             Verify that the below list of medications is an accurate representation of the medications you are currently taking.  If none reported, the list may be blank. If incorrect, please contact your healthcare provider. Carry  "this list with you in case of emergency.           Current Medications     alendronate (FOSAMAX) 70 MG tablet Take 1 tablet (70 mg total) by mouth every 7 days.    alprazolam (XANAX) 0.5 MG tablet Take 1 tablet (0.5 mg total) by mouth nightly.    aspirin (ECOTRIN) 81 MG EC tablet Take 1 tablet (81 mg total) by mouth once daily.    atenolol (TENORMIN) 25 MG tablet Take 1 tablet (25 mg total) by mouth once daily.    atorvastatin (LIPITOR) 10 MG tablet Take 1 tablet (10 mg total) by mouth once daily.    BD INSULIN SYRINGE ULT-FINE II 1/2 mL 31 x 5/16" Syrg USE THREE TIMES DAILY AS DIRECTED    benzonatate (TESSALON) 100 MG capsule Take 1 capsule (100 mg total) by mouth 3 (three) times daily as needed for Cough.    blood-glucose meter kit Use as instructed    butalbital-acetaminophen-caffeine -40 mg (FIORICET, ESGIC) -40 mg per tablet Take 1 tablet by mouth every 8 (eight) hours as needed.    escitalopram oxalate (LEXAPRO) 10 MG tablet TAKE 1 TABLET BY MOUTH EVERY DAY    fluticasone (FLONASE) 50 mcg/actuation nasal spray SPRAY TWICE IN EACH NOSTRIL ONCE DAILY    insulin aspart (NOVOLOG) 100 unit/mL injection To use with Vgo 20  with 4 clicks with meals, max TDD 38 units daily    insulin glargine (LANTUS) 100 unit/mL injection Inject 30 Units into the skin every evening. STOP WHEN VGO STARTED    insulin syringe-needle U-100 0.5 mL 31 gauge x 5/16 Syrg USE AS DIRECTED FOUR TIMES DAILY    insulin syringe-needle U-100 1/2 mL 31 x 5/16" Syrg USE THREE TIMES DAILY    levothyroxine (SYNTHROID) 75 MCG tablet Take 1 tablet (75 mcg total) by mouth once daily.    lisinopril (PRINIVIL,ZESTRIL) 20 MG tablet Take 1 tablet (20 mg total) by mouth once daily.    omeprazole (PRILOSEC) 40 MG capsule Take 1 capsule (40 mg total) by mouth 2 (two) times daily before meals.    ONETOUCH ULTRA TEST Strp TEST FOUR TIMES DAILY    sub-q insulin device, 20 unit (VGO 20) Harriett 1 Device by Misc.(Non-Drug; Combo Route) route once daily.    " "       Clinical Reference Information           Your Vitals Were     BP Pulse Height Weight BMI    130/74 68 4' 10" (1.473 m) 99.9 kg (220 lb 3.8 oz) 46.03 kg/m2      Blood Pressure          Most Recent Value    BP  130/74      Allergies as of 2/21/2017     Benadryl [Diphenhydramine Hcl]    Contac 12 Hour Allergy    Ciprofloxacin (Bulk)    Anti-hyst    Antihistamines - Alkylamine    Codeine    Glucotrol [Glipizide]    Hydroxyzine    Iodinated Contrast Media - Iv Dye    Nitrofuran Analogues    Propoxyphene    Doxycycline    Penicillins    Sulfa (Sulfonamide Antibiotics)      Immunizations Administered on Date of Encounter - 2/21/2017     None      Orders Placed During Today's Visit      Normal Orders This Visit    Ambulatory referral to Dermatology     Ambulatory referral to ENT     EKG 12-lead       Language Assistance Services     ATTENTION: Language assistance services are available, free of charge. Please call [phone].      ATENCIÓN: Si habla español, tiene a kilgore disposición servicios gratuitos de asistencia lingüística. Llame al [phone].     JONES Ý: N?u b?n nói Ti?ng Vi?t, có các d?ch v? h? tr? ngôn ng? mi?n phí dành cho b?n. G?i s? [phone].         [facility] complies with applicable Federal civil rights laws and does not discriminate on the basis of race, color, national origin, age, disability, or sex.        "

## 2017-02-22 ENCOUNTER — TELEPHONE (OUTPATIENT)
Dept: INTERNAL MEDICINE | Facility: CLINIC | Age: 82
End: 2017-02-22

## 2017-02-22 ENCOUNTER — TELEPHONE (OUTPATIENT)
Dept: ENDOCRINOLOGY | Facility: CLINIC | Age: 82
End: 2017-02-22

## 2017-02-22 DIAGNOSIS — E11.65 TYPE 2 DIABETES MELLITUS WITH HYPERGLYCEMIA, WITH LONG-TERM CURRENT USE OF INSULIN: Chronic | ICD-10-CM

## 2017-02-22 DIAGNOSIS — R94.31 ABNORMAL EKG: ICD-10-CM

## 2017-02-22 DIAGNOSIS — R07.9 CHEST PAIN, UNSPECIFIED TYPE: Primary | ICD-10-CM

## 2017-02-22 DIAGNOSIS — Z79.4 TYPE 2 DIABETES MELLITUS WITH HYPERGLYCEMIA, WITH LONG-TERM CURRENT USE OF INSULIN: Chronic | ICD-10-CM

## 2017-02-22 NOTE — TELEPHONE ENCOUNTER
Spoke with a Rep from [employer]. She stated that the pt is covered for her Vgo and she will have a $40. 00 co pay. The pt is ready for her Vgo training.

## 2017-02-22 NOTE — TELEPHONE ENCOUNTER
Attempted tiering exception via [employer] for Vgo, request denied, pt aware of copay cost and wishes to proceed, Vgo teaching scheduled 4/4

## 2017-02-22 NOTE — TELEPHONE ENCOUNTER
There are some changes on EKG that appear new and with chest pain symptom I would like for the pt to get a stress test. Can she do a walk test (preferred) or if not we can do the Dobutamine like 3 years ago. Let me know and I can place the correct order.

## 2017-02-22 NOTE — TELEPHONE ENCOUNTER
----- Message from Dorcas Bro sent at 2/22/2017  9:45 AM CST -----  Contact: [place]  [phone]  [place]   -   Rep calling from [facility] Customer care stating that the  patient  V-G disposable insulin delivery device is covered  - call back number [phone]  Thanks,

## 2017-02-24 ENCOUNTER — INITIAL CONSULT (OUTPATIENT)
Dept: DERMATOLOGY | Facility: CLINIC | Age: 82
End: 2017-02-24
Payer: MEDICARE

## 2017-02-24 ENCOUNTER — TELEPHONE (OUTPATIENT)
Dept: INTERNAL MEDICINE | Facility: CLINIC | Age: 82
End: 2017-02-24

## 2017-02-24 DIAGNOSIS — L82.1 SEBORRHEIC KERATOSES: ICD-10-CM

## 2017-02-24 DIAGNOSIS — R21 RASH AND NONSPECIFIC SKIN ERUPTION: Primary | ICD-10-CM

## 2017-02-24 PROCEDURE — 99999 PR PBB SHADOW E&M-EST. PATIENT-LVL III: CPT | Mod: PBBFAC,,, | Performed by: DERMATOLOGY

## 2017-02-24 PROCEDURE — 1159F MED LIST DOCD IN RCRD: CPT | Mod: S$GLB,,, | Performed by: DERMATOLOGY

## 2017-02-24 PROCEDURE — 1157F ADVNC CARE PLAN IN RCRD: CPT | Mod: S$GLB,,, | Performed by: DERMATOLOGY

## 2017-02-24 PROCEDURE — 88305 TISSUE EXAM BY PATHOLOGIST: CPT | Performed by: PATHOLOGY

## 2017-02-24 PROCEDURE — 99203 OFFICE O/P NEW LOW 30 MIN: CPT | Mod: 25,S$GLB,, | Performed by: DERMATOLOGY

## 2017-02-24 PROCEDURE — 1160F RVW MEDS BY RX/DR IN RCRD: CPT | Mod: S$GLB,,, | Performed by: DERMATOLOGY

## 2017-02-24 PROCEDURE — 11100 PR BIOPSY OF SKIN LESION: CPT | Mod: S$GLB,,, | Performed by: DERMATOLOGY

## 2017-02-24 NOTE — PROGRESS NOTES
Subjective:       Patient ID:  Courtney Engle is a 81 y.o. female who presents for   Chief Complaint   Patient presents with    Rash     HPI  82 yo F presents for evaluation of rash on abdomen and legs.  Rash x 12 years, no itching.  Worsening with increasing dose of Novolog.  No prior treatments tried in the past.  No other changes in medications     Denies personal h/o skin cancer.  Family h/o skin cancer    Past Medical History:   Diagnosis Date    Acquired hypothyroidism 4/25/2014    Anxiety     Aortic calcification 12/8/2016    X-Ray Chest-5/08/2014    Arthritis     Bilateral carotid artery disease 12/8/2016    US Carotid Bilateral-1/24/2013    CKD stage 3 due to type 2 diabetes mellitus 2/16/2017    Depression     Diabetes mellitus type II     Essential hypertension     Fever blister     Glaucoma 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 robledo)     Hyperlipidemia LDL goal <70 2/16/2017    Hypertension     Hypothyroidism     Keloid cicatrix     Mixed stress and urge urinary incontinence 2/16/2017    Morbid obesity with BMI of 40.0-44.9, adult 12/8/2016    Obesity     PCO (posterior capsular opacification) - Both Eyes 5/31/2013    Thyroid disease     Type 2 diabetes mellitus     Type 2 diabetes mellitus with hyperglycemia, with long-term current use of insulin      Review of Systems   Constitutional: Negative for fever, chills, fatigue and malaise.   Skin: Positive for activity-related sunscreen use. Negative for daily sunscreen use and recent sunburn.   Hematologic/Lymphatic: Does not bruise/bleed easily.        Objective:    Physical Exam   Constitutional: She appears well-developed and well-nourished. No distress.   Neurological: She is alert and oriented to person, place, and time. She is not disoriented.   Psychiatric: She has a normal mood and affect.   Skin:   Areas Examined (abnormalities noted in diagram):   Head / Face Inspection Performed  Neck Inspection Performed  Chest  / Axilla Inspection Performed  Abdomen Inspection Performed  Genitals / Buttocks / Groin Inspection Performed  Back Inspection Performed  RUE Inspected  LUE Inspection Performed  RLE Inspected  LLE Inspection Performed  Nails and Digits Inspection Performed              Diagram Legend     Erythematous scaling macule/papule c/w actinic keratosis       Vascular papule c/w angioma      Pigmented verrucoid papule/plaque c/w seborrheic keratosis      Yellow umbilicated papule c/w sebaceous hyperplasia      Irregularly shaped tan macule c/w lentigo     1-2 mm smooth white papules consistent with Milia      Movable subcutaneous cyst with punctum c/w epidermal inclusion cyst      Subcutaneous movable cyst c/w pilar cyst      Firm pink to brown papule c/w dermatofibroma      Pedunculated fleshy papule(s) c/w skin tag(s)      Evenly pigmented macule c/w junctional nevus     Mildly variegated pigmented, slightly irregular-bordered macule c/w mildly atypical nevus      Flesh colored to evenly pigmented papule c/w intradermal nevus       Pink pearly papule/plaque c/w basal cell carcinoma      Erythematous hyperkeratotic cursted plaque c/w SCC      Surgical scar with no sign of skin cancer recurrence      Open and closed comedones      Inflammatory papules and pustules      Verrucoid papule consistent consistent with wart     Erythematous eczematous patches and plaques     Dystrophic onycholytic nail with subungual debris c/w onychomycosis     Umbilicated papule    Erythematous-base heme-crusted tan verrucoid plaque consistent with inflamed seborrheic keratosis     Erythematous Silvery Scaling Plaque c/w Psoriasis     See annotation      Assessment / Plan:      Pathology Orders:      Normal Orders This Visit    Tissue Specimen To Pathology, Dermatology     Questions:    Directional Terms:  Other(comment)    Clinical information:  r/o GA vs other    Specific Site:  R thigh        Rash and nonspecific skin eruption  -     Tissue  Specimen To Pathology, Dermatology  Punch biopsy procedure note:  Punch biopsy performed after verbal consent obtained. Area marked and prepped with alcohol. Approximately 1cc of 1% lidocaine with epinephrine injected. 4 mm disposable punch used to remove lesion. Hemostasis obtained and biopsy site closed with 2 Prolene sutures. Wound care instructions reviewed with patient and handout given.    Seborrheic Keratosis  These are benign inherited growths without a malignant potential. Reassurance given to patient. No treatment is necessary.   [employer] brochure provided           Return in about 2 weeks (around 3/10/2017).

## 2017-02-24 NOTE — TELEPHONE ENCOUNTER
----- Message from Kevin Andrade sent at 2/23/2017  2:55 PM CST -----  Contact: self / [phone] home  Type: Returning a call    Who left a message? Eun    When did the practice call? 02/22-2/23    Comments: Pt returned call and is waiting on a call back.  Please call and advise.    Thank you

## 2017-02-27 ENCOUNTER — TELEPHONE (OUTPATIENT)
Dept: INTERNAL MEDICINE | Facility: CLINIC | Age: 82
End: 2017-02-27

## 2017-02-27 DIAGNOSIS — R07.9 CHEST PAIN, UNSPECIFIED TYPE: Primary | ICD-10-CM

## 2017-02-27 DIAGNOSIS — R94.31 ABNORMAL EKG: ICD-10-CM

## 2017-02-27 NOTE — TELEPHONE ENCOUNTER
----- Message from Jr Gilliland MA sent at 2/27/2017 10:01 AM CST -----  Contact: self - [phone]  Type: Returning a call    Who left a message? Eun     When did the practice call? 2/27/17    Comments: patient stated she's upset and need to know what the call is in regard to. Please call. Thanks!

## 2017-03-01 ENCOUNTER — TELEPHONE (OUTPATIENT)
Dept: DIABETES | Facility: CLINIC | Age: 82
End: 2017-03-01

## 2017-03-01 NOTE — TELEPHONE ENCOUNTER
Called and left voicemail for pt regarding VGo is covered for $40/month. Requested call back and left direct phone number.

## 2017-03-10 ENCOUNTER — CLINICAL SUPPORT (OUTPATIENT)
Dept: DERMATOLOGY | Facility: CLINIC | Age: 82
End: 2017-03-10
Payer: MEDICARE

## 2017-03-10 DIAGNOSIS — Z48.02 VISIT FOR SUTURE REMOVAL: Primary | ICD-10-CM

## 2017-03-10 DIAGNOSIS — L92.0 GRANULOMA ANNULARE: Primary | ICD-10-CM

## 2017-03-10 PROCEDURE — 99024 POSTOP FOLLOW-UP VISIT: CPT | Mod: S$GLB,,, | Performed by: DERMATOLOGY

## 2017-03-10 RX ORDER — TRIAMCINOLONE ACETONIDE 1 MG/G
CREAM TOPICAL
Qty: 454 G | Refills: 3 | Status: ON HOLD | OUTPATIENT
Start: 2017-03-10 | End: 2022-01-01 | Stop reason: HOSPADM

## 2017-03-10 NOTE — PROGRESS NOTES
Suture Removal note:  CC: 81 y.o. female patient is here for suture removal.         HPI: Patient is s/p excision of Rule out GA vs. other from the Right thigh on 2/24/2017.  Patient reports no problems.    WOUND PE:  Sutures intact.  Wound healing well.  Good approximation of skin edges.  No signs or symptoms of infection.    IMPRESSION:      FINAL PATHOLOGIC DIAGNOSIS     Skin, right thigh, punch biopsy:  - GRANULOMA ANNULARE.  MICROSCOPIC DESCRIPTION: Sections show foci of histiocytes arranged in a palisaded array within the dermis in  association with focal mucin deposition in the center of the palisades and lymphocytes around blood vessels.    PLAN:  Sutures removed today.  Continue wound care.    RTC: In 6 months or sooner if concern arise.

## 2017-03-16 ENCOUNTER — OFFICE VISIT (OUTPATIENT)
Dept: OPHTHALMOLOGY | Facility: CLINIC | Age: 82
End: 2017-03-16
Payer: MEDICARE

## 2017-03-16 DIAGNOSIS — H01.00B BLEPHARITIS OF UPPER AND LOWER EYELIDS OF BOTH EYES, UNSPECIFIED TYPE: ICD-10-CM

## 2017-03-16 DIAGNOSIS — H40.013 OPEN ANGLE WITH BORDERLINE FINDINGS AND LOW GLAUCOMA RISK IN BOTH EYES: Primary | ICD-10-CM

## 2017-03-16 DIAGNOSIS — G43.109 OCULAR MIGRAINE: ICD-10-CM

## 2017-03-16 DIAGNOSIS — Z86.69 H/O IRITIS: ICD-10-CM

## 2017-03-16 DIAGNOSIS — E11.9 TYPE 2 DIABETES MELLITUS WITHOUT OPHTHALMIC MANIFESTATIONS: ICD-10-CM

## 2017-03-16 DIAGNOSIS — H26.493 PCO (POSTERIOR CAPSULAR OPACIFICATION), BILATERAL: ICD-10-CM

## 2017-03-16 DIAGNOSIS — H01.00A BLEPHARITIS OF UPPER AND LOWER EYELIDS OF BOTH EYES, UNSPECIFIED TYPE: ICD-10-CM

## 2017-03-16 PROCEDURE — 99999 PR PBB SHADOW E&M-EST. PATIENT-LVL III: CPT | Mod: PBBFAC,,, | Performed by: OPHTHALMOLOGY

## 2017-03-16 PROCEDURE — 99499 UNLISTED E&M SERVICE: CPT | Mod: S$GLB,,, | Performed by: OPHTHALMOLOGY

## 2017-03-16 PROCEDURE — 92250 FUNDUS PHOTOGRAPHY W/I&R: CPT | Mod: S$GLB,,, | Performed by: OPHTHALMOLOGY

## 2017-03-16 PROCEDURE — 92014 COMPRE OPH EXAM EST PT 1/>: CPT | Mod: S$GLB,,, | Performed by: OPHTHALMOLOGY

## 2017-03-16 RX ORDER — NEOMYCIN SULFATE, POLYMYXIN B SULFATE, AND DEXAMETHASONE 3.5; 10000; 1 MG/G; [USP'U]/G; MG/G
OINTMENT OPHTHALMIC NIGHTLY
Qty: 1 TUBE | Refills: 3 | Status: SHIPPED | OUTPATIENT
Start: 2017-03-16 | End: 2017-04-21

## 2017-03-16 NOTE — PROGRESS NOTES
HPI     Glaucoma    Additional comments: overdue IOP ck            Eye Problem    Additional comments: Pt c/o bumps on upper lids that are very itchy           Diabetic Eye Exam    Additional comments: Pt also states that her BSL are always high           Comments   DLS: 5/31/13    1. Glaucoma Suspect  2.PCIOL OU  3. IDDM   4. PVD  5. Blepharitis  6. Hx Ocular Migraines   7. Hx TVO OS x 2  8. Hx Iritis OD       Last edited by Princess Joseph MA on 3/16/2017  4:01 PM. (History)            Assessment /Plan     For exam results, see Encounter Report.    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   Glaucoma Suspect - + Fm Hx - low risk   First HVF   2008   First photos   2005   Treatment / Drops started   none           Family history    + father        Glaucoma meds    none        H/O adverse rxn to glaucoma drops    none        LASERS    none        GLAUCOMA SURGERIES    none        OTHER EYE SURGERIES    PC IOL ou - OS 5/26/2010        CDR    0.65/0.55        Tbase    9=16 od / 10-16 os          Tmax    16/16            Ttarget    ?             HVF    3 test 2008 to  2013 - full ou        Gonio    ?        CCT    534/537        OCT    3 test 2006 to 2010 - RNFL - nl od // nl os        HRT    1 test 2014 to 2014  - MR - bord sup OD // nl OS        Disc photos    2005 - slides // 2011 - OIS    - Ttoday    15/15  - Test done today    HRT/gonio     2.    TVO  - Left Eye x 2 (1/2013)   - exam unremarkable except for refractive error  - pt. also with h/o ocular migraines - may be a different manifestation of this  - observe, RD precautions  - no episodes since last visit  -No HA, No diplopia, No jaw claudication, No diplopia, No scalp tenderness  - Sig hx of neck and knee  pain and also occassional weakness when getting up in the AM.  - unremarkable carotid ultrasound  - nl MRI / MRA  - none x > 4 yrs     3.   PCO ou - very mild   Monitor    4.   Posterior vitreous detachment     5.   Ocular migraine    - observe    6.   Eyelid inflammation - Both Eyes    - Lid hygiene WC/LH/AT's    7.   PC IOL OU    8.   H/O iritis - OD - see note 7/6/2012    9. NO BDR     Plan    Glaucoma suspect - low risk  IOP ok  Cont obs off gtts   Disc photos today     No BDR     Blepharitis - symptomatic   - WC / LH / AT's / maxitrol ointment at night prn (EES is on back order)     F/U 9 months with HVF/DFE / OCT

## 2017-03-20 ENCOUNTER — NURSE TRIAGE (OUTPATIENT)
Dept: ADMINISTRATIVE | Facility: CLINIC | Age: 82
End: 2017-03-20

## 2017-03-20 NOTE — TELEPHONE ENCOUNTER
Pt states that she is scheduled for an echo today and is wanting to know if she will be getting dye. States that she is allergic to dye. Dobutamine is to be given and maybe atropine. Pt advised that those are medications not dye. Pt verbalizes concerns that she has a lot of allergies. Advised to request to speak with MD in the dept prior to test

## 2017-03-20 NOTE — TELEPHONE ENCOUNTER
Reason for Disposition   Question about upcoming scheduled test, no triage required and triager able to answer question    Protocols used: ST INFORMATION ONLY CALL-A-AH

## 2017-03-20 NOTE — TELEPHONE ENCOUNTER
Reason for Disposition   Second attempt to contact family AND no contact made.  Answering service notified.    Protocols used: ST NO CONTACT OR DUPLICATE CONTACT CALL-A-AH

## 2017-03-27 DIAGNOSIS — K21.9 LPRD (LARYNGOPHARYNGEAL REFLUX DISEASE): ICD-10-CM

## 2017-03-27 RX ORDER — OMEPRAZOLE 40 MG/1
40 CAPSULE, DELAYED RELEASE ORAL
Qty: 60 CAPSULE | Refills: 6 | Status: SHIPPED | OUTPATIENT
Start: 2017-03-27 | End: 2018-04-30 | Stop reason: SDUPTHER

## 2017-03-27 NOTE — TELEPHONE ENCOUNTER
----- Message from Ewelina Lucia sent at 3/27/2017  3:06 PM CDT -----  Contact: [facility] at [phone]  RX request - refill or new RX.  Is this a refill or new RX:  refill  RX name and strength: Omeprazole 40 mg  Directions: 1 tablet 2x daily  Is this a 30 day or 90 day RX:  30 day  Pharmacy name and phone #: [facility]  [phone] (Phone)  [phone] (Fax)  Comments:

## 2017-03-28 RX ORDER — LEVOTHYROXINE SODIUM 75 UG/1
TABLET ORAL
Qty: 90 TABLET | Refills: 0 | Status: SHIPPED | OUTPATIENT
Start: 2017-03-28 | End: 2017-08-25 | Stop reason: SDUPTHER

## 2017-03-28 RX ORDER — LISINOPRIL 20 MG/1
TABLET ORAL
Qty: 90 TABLET | Refills: 0 | Status: SHIPPED | OUTPATIENT
Start: 2017-03-28 | End: 2017-05-09 | Stop reason: DRUGHIGH

## 2017-03-28 RX ORDER — ATENOLOL 25 MG/1
TABLET ORAL
Qty: 90 TABLET | Refills: 0 | Status: SHIPPED | OUTPATIENT
Start: 2017-03-28 | End: 2017-08-25 | Stop reason: SDUPTHER

## 2017-04-02 RX ORDER — ESCITALOPRAM OXALATE 10 MG/1
TABLET ORAL
Qty: 90 TABLET | Refills: 0 | Status: SHIPPED | OUTPATIENT
Start: 2017-04-02 | End: 2017-08-25 | Stop reason: SDUPTHER

## 2017-04-03 ENCOUNTER — OFFICE VISIT (OUTPATIENT)
Dept: UROLOGY | Facility: CLINIC | Age: 82
End: 2017-04-03
Payer: MEDICARE

## 2017-04-03 VITALS
HEART RATE: 72 BPM | HEIGHT: 58 IN | WEIGHT: 218.25 LBS | DIASTOLIC BLOOD PRESSURE: 77 MMHG | BODY MASS INDEX: 45.81 KG/M2 | SYSTOLIC BLOOD PRESSURE: 132 MMHG

## 2017-04-03 DIAGNOSIS — N39.44 NOCTURNAL ENURESIS: ICD-10-CM

## 2017-04-03 DIAGNOSIS — N39.41 URGE INCONTINENCE: Primary | ICD-10-CM

## 2017-04-03 PROCEDURE — 99215 OFFICE O/P EST HI 40 MIN: CPT | Mod: 25,S$GLB,, | Performed by: UROLOGY

## 2017-04-03 PROCEDURE — 3078F DIAST BP <80 MM HG: CPT | Mod: S$GLB,,, | Performed by: UROLOGY

## 2017-04-03 PROCEDURE — 1160F RVW MEDS BY RX/DR IN RCRD: CPT | Mod: S$GLB,,, | Performed by: UROLOGY

## 2017-04-03 PROCEDURE — 99499 UNLISTED E&M SERVICE: CPT | Mod: S$GLB,,, | Performed by: UROLOGY

## 2017-04-03 PROCEDURE — 87086 URINE CULTURE/COLONY COUNT: CPT

## 2017-04-03 PROCEDURE — 51701 INSERT BLADDER CATHETER: CPT | Mod: S$GLB,,, | Performed by: UROLOGY

## 2017-04-03 PROCEDURE — 99999 PR PBB SHADOW E&M-EST. PATIENT-LVL V: CPT | Mod: PBBFAC,,, | Performed by: UROLOGY

## 2017-04-03 PROCEDURE — 87186 SC STD MICRODIL/AGAR DIL: CPT

## 2017-04-03 PROCEDURE — 87077 CULTURE AEROBIC IDENTIFY: CPT

## 2017-04-03 PROCEDURE — 3075F SYST BP GE 130 - 139MM HG: CPT | Mod: S$GLB,,, | Performed by: UROLOGY

## 2017-04-03 PROCEDURE — 1126F AMNT PAIN NOTED NONE PRSNT: CPT | Mod: S$GLB,,, | Performed by: UROLOGY

## 2017-04-03 PROCEDURE — 1159F MED LIST DOCD IN RCRD: CPT | Mod: S$GLB,,, | Performed by: UROLOGY

## 2017-04-03 PROCEDURE — 87088 URINE BACTERIA CULTURE: CPT

## 2017-04-03 PROCEDURE — 1157F ADVNC CARE PLAN IN RCRD: CPT | Mod: S$GLB,,, | Performed by: UROLOGY

## 2017-04-03 NOTE — PROGRESS NOTES
CHIEF COMPLAINT:    Mrs. Engle is a 82 y.o. female presenting for a follow up on urinary urgency, frequency, urge incontinence.      PRESENTING ILLNESS:    Courtney Engle is a 82 y.o. female who returns for follow up.  She states she has nocturnal enuresis, wears a diaper and sometimes this gets soaked.  She has frequency > 8, nocturia x 2.  She denies a history of gross hematuria or recurrent UTI.  She states that when she was young she had to have her urethra stretched and symptoms oftentimes improved.  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  She states that she was worried at one point because she was told she had stage III CKD.      , hysterectomy for heavy menses, not sexually active as she is a , stools are formed and soft.      REVIEW OF SYSTEMS:    Review of Systems   Constitutional: Negative.    HENT: Negative.    Eyes: Negative.    Respiratory: Negative.    Cardiovascular: Negative.    Gastrointestinal:        Fecal frequency, x 5.  Seeing GI regarding this problem   Genitourinary: Positive for frequency and urgency.        Nocturnal enuresis   Musculoskeletal: Positive for back pain and myalgias.   Skin: Negative.    Neurological: Negative.    Endo/Heme/Allergies:        Diabetic with poor control   Psychiatric/Behavioral: Negative.      PATIENT HISTORY:    Past Medical History:   Diagnosis Date    Acquired hypothyroidism 2014    Anxiety     Aortic calcification 2016    X-Ray Chest-2014    Arthritis     Bilateral carotid artery disease 2016    US Carotid Bilateral-2013    CKD stage 3 due to type 2 diabetes mellitus 2017    Depression     Diabetes mellitus type II     Essential hypertension     Fever blister     Glaucoma 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 robledo)     Hyperlipidemia LDL goal <70 2017    Hypertension     Hypothyroidism     Keloid cicatrix      Mixed stress and urge urinary incontinence 2/16/2017    Morbid obesity with BMI of 40.0-44.9, adult 12/8/2016    Obesity     Thyroid disease     Type 2 diabetes mellitus     Type 2 diabetes mellitus with hyperglycemia, with long-term current use of insulin        Past Surgical History:   Procedure Laterality Date    CATARACT EXTRACTION W/  INTRAOCULAR LENS IMPLANT Right 05/30/2012        CATARACT EXTRACTION W/  INTRAOCULAR LENS IMPLANT Left 05/26/2010        CHOLECYSTECTOMY      COLONOSCOPY W/ POLYPECTOMY  04/20/2015    ESOPHAGOGASTRODUODENOSCOPY  04/20/2015    HYSTERECTOMY      Partial    JOINT REPLACEMENT      knee replacement right      TONSILLECTOMY, ADENOIDECTOMY      tonsillectomy only       Family History   Problem Relation Age of Onset    Glaucoma Father     Stroke Father     Heart attack Father     Nephrolithiasis Father     Colon cancer Mother     Cancer Mother      colon ca    Heart disease Mother     Uterine cancer Daughter      uterine sarcoma    Hematuria Brother     Heart disease Maternal Grandmother     Hypertension Maternal Grandmother     Heart attack Maternal Grandmother     No Known Problems Daughter      Social History    Marital status:      Social History Main Topics    Smoking status: Never Smoker    Smokeless tobacco: Never Used    Alcohol use No    Drug use: No    Sexual activity: No       Allergies:  Benadryl [diphenhydramine hcl]; Contac 12 hour allergy; Ciprofloxacin (bulk); Anti-hyst; Antihistamines - alkylamine; Codeine; Glucotrol [glipizide]; Hydroxyzine; Iodinated contrast media - iv dye; Nitrofuran analogues; Propoxyphene; Doxycycline; Penicillins; and Sulfa (sulfonamide antibiotics)    Medications:  Outpatient Encounter Prescriptions as of 4/3/2017   Medication Sig Dispense Refill    alendronate (FOSAMAX) 70 MG tablet Take 1 tablet (70 mg total) by mouth every 7 days. 4 tablet 11    alprazolam (XANAX) 0.5 MG  "tablet Take 1 tablet (0.5 mg total) by mouth nightly. 30 tablet 5    aspirin (ECOTRIN) 81 MG EC tablet Take 1 tablet (81 mg total) by mouth once daily. 30 tablet 12    atenolol (TENORMIN) 25 MG tablet TAKE 1 TABLET BY MOUTH EVERY DAY 90 tablet 0    atorvastatin (LIPITOR) 10 MG tablet Take 1 tablet (10 mg total) by mouth once daily. 90 tablet 3    BD INSULIN SYRINGE ULT-FINE II 1/2 mL 31 x 5/16" Syrg USE THREE TIMES DAILY AS DIRECTED 100 each 0    blood-glucose meter kit Use as instructed 1 each 0    butalbital-acetaminophen-caffeine -40 mg (FIORICET, ESGIC) -40 mg per tablet Take 1 tablet by mouth every 8 (eight) hours as needed. 50 tablet 2    escitalopram oxalate (LEXAPRO) 10 MG tablet TAKE 1 TABLET BY MOUTH EVERY DAY 90 tablet 0    fluticasone (FLONASE) 50 mcg/actuation nasal spray SPRAY TWICE IN EACH NOSTRIL ONCE DAILY 32 g 6    insulin glargine (LANTUS) 100 unit/mL injection Inject 30 Units into the skin every evening. STOP WHEN VGO STARTED 10 mL 12    insulin syringe-needle U-100 0.5 mL 31 gauge x 5/16 Syrg USE AS DIRECTED FOUR TIMES DAILY 500 each 0    insulin syringe-needle U-100 1/2 mL 31 x 5/16" Syrg USE THREE TIMES DAILY 300 each 0    levothyroxine (SYNTHROID) 75 MCG tablet TAKE 1 TABLET BY MOUTH EVERY DAY 90 tablet 0    lisinopril (PRINIVIL,ZESTRIL) 20 MG tablet TAKE 1 TABLET BY MOUTH EVERY DAY 90 tablet 0    omeprazole (PRILOSEC) 40 MG capsule Take 1 capsule (40 mg total) by mouth 2 (two) times daily before meals. 60 capsule 6    ONETOUCH ULTRA TEST Strp TEST FOUR TIMES DAILY 300 strip 0    sub-q insulin device, 20 unit (VGO 20) Harriett 1 Device by Misc.(Non-Drug; Combo Route) route once daily. 30 Device 3    triamcinolone acetonide 0.1% (KENALOG) 0.1 % cream AAA bid on rash 454 g 3    insulin aspart (NOVOLOG) 100 unit/mL injection To use with Vgo 20  with 4 clicks with meals, max TDD 38 units daily 2 vial 3    neomycin-polymyxin-dexamethasone (MAXITROL) 3.5 mg/g-10,000 " unit/g-0.1 % Oint Place into both eyes every evening. apply to clean lids and lashes both eyes at bedtime  As needed for itchy lids 1 Tube 3    [DISCONTINUED] escitalopram oxalate (LEXAPRO) 10 MG tablet TAKE 1 TABLET BY MOUTH EVERY DAY 90 tablet 0     No facility-administered encounter medications on file as of 4/3/2017.          PHYSICAL EXAMINATION:    The patient generally appears in good health, is appropriately interactive, and is in no apparent distress.    Skin: No lesions.    Mental: Cooperative with normal affect.    Neuro: Grossly intact.    HEENT: Normal. No evidence of lymphadenopathy.    Chest:  normal inspiratory effort.    Abdomen:  Soft, non-tender. No masses or organomegaly. Bladder is not palpable. No evidence of flank discomfort. No evidence of inguinal hernia.    Extremities: No clubbing, cyanosis, or edema    Normal external female genitalia  Urethral meatus is normal  Urethra and bladder are nontender to bimanual exam  Well supported anteriorly and posteriorly   Uterus and cervix are surgically absent  No adnexal masses  PVR by catheterization was 20 ml    IMPRESSION:    Encounter Diagnoses   Name Primary?    Urge incontinence Yes    Nocturnal enuresis        PLAN:    1.  The catheterized specimen was sent for culture  2.  Discussed behavioral changes.  Bladder Matters book provided.  Discussed conservative therapy, especially given the number of medications this patient takes  3.  Follow up in 4-6 weeks.

## 2017-04-03 NOTE — LETTER
April 3, 2017      Jojo Enamorado, NP  [address]  [place] [facility] [address]           [facility] 4th [facility]  [address]  [facility] [address]  Phone: [phone]          Patient: Courtney Engle   MR Number: [id number]   YOB: 1935   Date of Visit: 4/3/2017       Dear Jojo Enamorado:    Thank you for referring Courtney Engle to me for evaluation. Attached you will find relevant portions of my assessment and plan of care.    If you have questions, please do not hesitate to call me. I look forward to following Courtney Engle along with you.    Sincerely,    Jessica Rome MD    Enclosure  CC:  No Recipients    If you would like to receive this communication electronically, please contact [email or web address] or [phone] to request more information on [employer] Link access.    For providers and/or their staff who would like to refer a patient to [facility], please contact us through our one-stop-shop provider referral line, [facility], at [phone].    If you feel you have received this communication in error or would no longer like to receive these types of communications, please e-mail [email or web address]

## 2017-04-05 ENCOUNTER — TELEPHONE (OUTPATIENT)
Dept: INTERNAL MEDICINE | Facility: CLINIC | Age: 82
End: 2017-04-05

## 2017-04-05 NOTE — TELEPHONE ENCOUNTER
----- Message from Samantha Tang sent at 4/4/2017  4:46 PM CDT -----  Contact: Self/[phone]  Pt said that she is calling in regards to needing to speak with the nurse about a test that she was supposed to have. Please call and advise            Thank you

## 2017-04-05 NOTE — TELEPHONE ENCOUNTER
She absolutely received the Dobutamine in 2013 and no problems were listed. The atropine may or may not be needed but I see no obvious problems up front with proceeding. There was no indication she had a problem with this process last time and it will be the same.

## 2017-04-05 NOTE — TELEPHONE ENCOUNTER
Spoke to pt, she states she cancelled her stress test because the Cardio dept is going to have to administer dobutamine and atropine through an IV for the test. She states she is concerned she may be allergic to these medications, I tried to explain to pt that she had the exact same test done in July 2013 and was OK, but pt stated she had never had this done, that this test is different than before. Pt states she wants Dr Ulloa's opinion on what he thinks about the IV administered medications. Please advise.     Pt ask that I call her back tomorrow as she is out of the house for the rest of the day.

## 2017-04-06 LAB — BACTERIA UR CULT: NORMAL

## 2017-04-06 NOTE — TELEPHONE ENCOUNTER
Spoke to pt- adv below mess- pt voiced understanding. She will sched stress test. Connected her with cardio to sched her Stress Test.

## 2017-04-06 NOTE — TELEPHONE ENCOUNTER
----- Message from Jr Gilliland MA sent at 4/6/2017 12:25 PM CDT -----  Contact: self - [phone]  Type: Returning a call    Who left a message? Eun     When did the practice call? 4/6/17     Comments: Please call. Thanks!

## 2017-04-07 ENCOUNTER — TELEPHONE (OUTPATIENT)
Dept: UROLOGY | Facility: CLINIC | Age: 82
End: 2017-04-07

## 2017-04-07 DIAGNOSIS — N30.90 BLADDER INFECTION: Primary | ICD-10-CM

## 2017-04-07 RX ORDER — GRANULES FOR ORAL 3 G/1
3 POWDER ORAL ONCE
Qty: 3 G | Refills: 0 | Status: SHIPPED | OUTPATIENT
Start: 2017-04-07 | End: 2017-04-07

## 2017-04-20 ENCOUNTER — NURSE TRIAGE (OUTPATIENT)
Dept: ADMINISTRATIVE | Facility: CLINIC | Age: 82
End: 2017-04-20

## 2017-04-20 ENCOUNTER — HOSPITAL ENCOUNTER (INPATIENT)
Facility: HOSPITAL | Age: 82
LOS: 5 days | Discharge: HOME | DRG: 291 | End: 2017-04-25
Attending: EMERGENCY MEDICINE | Admitting: INTERNAL MEDICINE
Payer: MEDICARE

## 2017-04-20 ENCOUNTER — TELEPHONE (OUTPATIENT)
Dept: INTERNAL MEDICINE | Facility: CLINIC | Age: 82
End: 2017-04-20

## 2017-04-20 DIAGNOSIS — R06.02 SHORTNESS OF BREATH: ICD-10-CM

## 2017-04-20 DIAGNOSIS — I50.9 ACUTE HEART FAILURE, UNSPECIFIED HEART FAILURE TYPE: Primary | ICD-10-CM

## 2017-04-20 DIAGNOSIS — I16.1 HYPERTENSIVE EMERGENCY: ICD-10-CM

## 2017-04-20 DIAGNOSIS — R06.02 SOB (SHORTNESS OF BREATH): ICD-10-CM

## 2017-04-20 DIAGNOSIS — I35.9 NONRHEUMATIC AORTIC VALVE DISORDER: ICD-10-CM

## 2017-04-20 LAB
ALBUMIN SERPL BCP-MCNC: 3.4 G/DL
ALP SERPL-CCNC: 94 U/L
ALT SERPL W/O P-5'-P-CCNC: 7 U/L
AMORPH CRY UR QL COMP ASSIST: ABNORMAL
ANION GAP SERPL CALC-SCNC: 7 MMOL/L
AST SERPL-CCNC: 14 U/L
BACTERIA #/AREA URNS AUTO: ABNORMAL /HPF
BASOPHILS # BLD AUTO: 0.03 K/UL
BASOPHILS NFR BLD: 0.3 %
BILIRUB SERPL-MCNC: 0.3 MG/DL
BILIRUB UR QL STRIP: NEGATIVE
BNP SERPL-MCNC: 187 PG/ML
BUN SERPL-MCNC: 11 MG/DL
CALCIUM SERPL-MCNC: 9.2 MG/DL
CHLORIDE SERPL-SCNC: 104 MMOL/L
CLARITY UR REFRACT.AUTO: ABNORMAL
CO2 SERPL-SCNC: 29 MMOL/L
COLOR UR AUTO: YELLOW
CREAT SERPL-MCNC: 0.9 MG/DL
DIFFERENTIAL METHOD: ABNORMAL
EOSINOPHIL # BLD AUTO: 0.2 K/UL
EOSINOPHIL NFR BLD: 2.5 %
ERYTHROCYTE [DISTWIDTH] IN BLOOD BY AUTOMATED COUNT: 13.4 %
EST. GFR  (AFRICAN AMERICAN): >60 ML/MIN/1.73 M^2
EST. GFR  (NON AFRICAN AMERICAN): >60 ML/MIN/1.73 M^2
FLUAV AG SPEC QL IA: NEGATIVE
FLUBV AG SPEC QL IA: NEGATIVE
GLUCOSE SERPL-MCNC: 157 MG/DL
GLUCOSE UR QL STRIP: NEGATIVE
HCT VFR BLD AUTO: 36.1 %
HGB BLD-MCNC: 12.1 G/DL
HGB UR QL STRIP: ABNORMAL
KETONES UR QL STRIP: NEGATIVE
LEUKOCYTE ESTERASE UR QL STRIP: NEGATIVE
LYMPHOCYTES # BLD AUTO: 2.2 K/UL
LYMPHOCYTES NFR BLD: 23.1 %
MCH RBC QN AUTO: 29.2 PG
MCHC RBC AUTO-ENTMCNC: 33.5 %
MCV RBC AUTO: 87 FL
MICROSCOPIC COMMENT: ABNORMAL
MONOCYTES # BLD AUTO: 0.6 K/UL
MONOCYTES NFR BLD: 6.1 %
NEUTROPHILS # BLD AUTO: 6.6 K/UL
NEUTROPHILS NFR BLD: 67.9 %
NITRITE UR QL STRIP: POSITIVE
PH UR STRIP: 5 [PH] (ref 5–8)
PLATELET # BLD AUTO: 322 K/UL
PMV BLD AUTO: 9.4 FL
POCT GLUCOSE: 169 MG/DL (ref 70–110)
POTASSIUM SERPL-SCNC: 4.2 MMOL/L
PROT SERPL-MCNC: 7.5 G/DL
PROT UR QL STRIP: NEGATIVE
RBC # BLD AUTO: 4.14 M/UL
RBC #/AREA URNS AUTO: 1 /HPF (ref 0–4)
SODIUM SERPL-SCNC: 140 MMOL/L
SP GR UR STRIP: 1.02 (ref 1–1.03)
SPECIMEN SOURCE: NORMAL
SQUAMOUS #/AREA URNS AUTO: 4 /HPF
TROPONIN I SERPL DL<=0.01 NG/ML-MCNC: 0.01 NG/ML
URN SPEC COLLECT METH UR: ABNORMAL
UROBILINOGEN UR STRIP-ACNC: NEGATIVE EU/DL
WBC # BLD AUTO: 9.71 K/UL
WBC #/AREA URNS AUTO: 3 /HPF (ref 0–5)

## 2017-04-20 PROCEDURE — 99285 EMERGENCY DEPT VISIT HI MDM: CPT | Mod: ,,, | Performed by: EMERGENCY MEDICINE

## 2017-04-20 PROCEDURE — 96374 THER/PROPH/DIAG INJ IV PUSH: CPT

## 2017-04-20 PROCEDURE — 12000002 HC ACUTE/MED SURGE SEMI-PRIVATE ROOM

## 2017-04-20 PROCEDURE — 85025 COMPLETE CBC W/AUTO DIFF WBC: CPT

## 2017-04-20 PROCEDURE — 63600175 PHARM REV CODE 636 W HCPCS: Performed by: STUDENT IN AN ORGANIZED HEALTH CARE EDUCATION/TRAINING PROGRAM

## 2017-04-20 PROCEDURE — 82962 GLUCOSE BLOOD TEST: CPT

## 2017-04-20 PROCEDURE — 93005 ELECTROCARDIOGRAM TRACING: CPT

## 2017-04-20 PROCEDURE — 93010 ELECTROCARDIOGRAM REPORT: CPT | Mod: 76,,, | Performed by: INTERNAL MEDICINE

## 2017-04-20 PROCEDURE — 80061 LIPID PANEL: CPT

## 2017-04-20 PROCEDURE — 87400 INFLUENZA A/B EACH AG IA: CPT

## 2017-04-20 PROCEDURE — 25000003 PHARM REV CODE 250: Performed by: EMERGENCY MEDICINE

## 2017-04-20 PROCEDURE — 80053 COMPREHEN METABOLIC PANEL: CPT

## 2017-04-20 PROCEDURE — 83880 ASSAY OF NATRIURETIC PEPTIDE: CPT

## 2017-04-20 PROCEDURE — 63600175 PHARM REV CODE 636 W HCPCS: Performed by: EMERGENCY MEDICINE

## 2017-04-20 PROCEDURE — 99285 EMERGENCY DEPT VISIT HI MDM: CPT | Mod: 25

## 2017-04-20 PROCEDURE — 84443 ASSAY THYROID STIM HORMONE: CPT

## 2017-04-20 PROCEDURE — 96375 TX/PRO/DX INJ NEW DRUG ADDON: CPT

## 2017-04-20 PROCEDURE — 93010 ELECTROCARDIOGRAM REPORT: CPT | Mod: ,,, | Performed by: INTERNAL MEDICINE

## 2017-04-20 PROCEDURE — 81001 URINALYSIS AUTO W/SCOPE: CPT

## 2017-04-20 PROCEDURE — 94761 N-INVAS EAR/PLS OXIMETRY MLT: CPT

## 2017-04-20 PROCEDURE — 84484 ASSAY OF TROPONIN QUANT: CPT

## 2017-04-20 RX ORDER — FUROSEMIDE 10 MG/ML
60 INJECTION INTRAMUSCULAR; INTRAVENOUS
Status: COMPLETED | OUTPATIENT
Start: 2017-04-20 | End: 2017-04-20

## 2017-04-20 RX ORDER — FAMOTIDINE 20 MG/1
20 TABLET, FILM COATED ORAL 2 TIMES DAILY
Status: DISCONTINUED | OUTPATIENT
Start: 2017-04-20 | End: 2017-04-22

## 2017-04-20 RX ORDER — ASPIRIN 325 MG
325 TABLET ORAL
Status: COMPLETED | OUTPATIENT
Start: 2017-04-20 | End: 2017-04-20

## 2017-04-20 RX ORDER — HYDRALAZINE HYDROCHLORIDE 20 MG/ML
10 INJECTION INTRAMUSCULAR; INTRAVENOUS
Status: COMPLETED | OUTPATIENT
Start: 2017-04-20 | End: 2017-04-20

## 2017-04-20 RX ADMIN — HYDRALAZINE HYDROCHLORIDE 10 MG: 20 INJECTION INTRAMUSCULAR; INTRAVENOUS at 11:04

## 2017-04-20 RX ADMIN — FAMOTIDINE 20 MG: 20 TABLET, FILM COATED ORAL at 09:04

## 2017-04-20 RX ADMIN — ALUMINUM HYDROXIDE, MAGNESIUM HYDROXIDE, AND SIMETHICONE 50 ML: 200; 200; 20 SUSPENSION ORAL at 09:04

## 2017-04-20 RX ADMIN — FUROSEMIDE 60 MG: 10 INJECTION, SOLUTION INTRAMUSCULAR; INTRAVENOUS at 10:04

## 2017-04-20 RX ADMIN — ASPIRIN 325 MG ORAL TABLET 325 MG: 325 PILL ORAL at 09:04

## 2017-04-20 NOTE — IP AVS SNAPSHOT
[facility]  [address]  [facility] [address]  Phone: [phone]           Patient Discharge Instructions   Our goal is to set you up for success. This packet includes information on your condition, medications, and your home care.  It will help you care for yourself to prevent having to return to the hospital.     Please ask your nurse if you have any questions.      There are many details to remember when preparing to leave the hospital. Here is what you will need to do:    1. Take your medicine. If you are prescribed medications, review your Medication List on the following pages. You may have new medications to  at the pharmacy and others that you'll need to stop taking. Review the instructions for how and when to take your medications. Talk with your doctor or nurses if you are unsure of what to do.     2. Go to your follow-up appointments. Specific follow-up information is listed in the following pages. Your may be contacted by a nurse or clinical provider about future appointments. Be sure we have all of the phone numbers to reach you. Please contact your provider's office if you are unable to make an appointment.     3. Watch for warning signs. Your doctor or nurse will give you detailed warning signs to watch for and when to call for assistance. These instructions may also include educational information about your condition. If you experience any of warning signs to your health, call your doctor.           [facility]  Unless otherwise directed by your provider, please   contact [facility], our nurse care line   that is available for 24/7 assistance.     [phone] (toll-free)     Registered nurses in the [facility]   provide: appointment scheduling, clinical advisement, health education, and other advisory services.                  ** Verify the list of medication(s) below is accurate and up to date. Carry this with you in case of  emergency. If your medications have changed, please notify your healthcare provider.             Medication List      CONTINUE taking these medications        Additional Info    Begin Date AM Noon PM Bedtime    alendronate 70 MG tablet   Commonly known as:  FOSAMAX   Quantity:  4 tablet   Refills:  11   Dose:  70 mg    Instructions:  Take 1 tablet (70 mg total) by mouth every 7 days.     Every 7 days                          alprazolam 0.5 MG tablet   Commonly known as:  XANAX   Quantity:  30 tablet   Refills:  5   Dose:  0.5 mg    Last time this was given:  0.25 mg on 4/21/2017  9:00 PM   Instructions:  Take 1 tablet (0.5 mg total) by mouth nightly.                               aspirin 81 MG EC tablet   Commonly known as:  ECOTRIN   Quantity:  30 tablet   Refills:  12   Dose:  81 mg    Last time this was given:  81 mg on 4/25/2017  8:14 AM   Instructions:  Take 1 tablet (81 mg total) by mouth once daily.                               atenolol 25 MG tablet   Commonly known as:  TENORMIN   Quantity:  90 tablet   Refills:  0    Instructions:  TAKE 1 TABLET BY MOUTH EVERY DAY                               * BD INSULIN SYRINGE ULT-FINE II 0.5 mL 31 gauge x 5/16 Syrg   Quantity:  100 each   Refills:  0   Generic drug:  insulin syringe-needle U-100    Instructions:  USE THREE TIMES DAILY AS DIRECTED                            * insulin syringe-needle U-100 0.5 mL 31 gauge x 5/16 Syrg   Quantity:  300 each   Refills:  0    Instructions:  USE THREE TIMES DAILY                            * insulin syringe-needle U-100 0.5 mL 31 gauge x 5/16 Syrg   Quantity:  500 each   Refills:  0    Instructions:  USE AS DIRECTED FOUR TIMES DAILY                            blood-glucose meter kit   Quantity:  1 each   Refills:  0    Instructions:  Use as instructed                            butalbital-acetaminophen-caffeine -40 mg -40 mg per tablet   Commonly known as:  FIORICET, ESGIC   Quantity:  50 tablet   Refills:  2    Dose:  1 tablet    Instructions:  Take 1 tablet by mouth every 8 (eight) hours as needed.     Every 8 hours as needed                       escitalopram oxalate 10 MG tablet   Commonly known as:  LEXAPRO   Quantity:  90 tablet   Refills:  0    Last time this was given:  10 mg on 4/25/2017  8:14 AM   Instructions:  TAKE 1 TABLET BY MOUTH EVERY DAY                               fluticasone 50 mcg/actuation nasal spray   Commonly known as:  FLONASE   Quantity:  32 g   Refills:  6    Last time this was given:  1 spray on 4/24/2017  9:24 AM   Instructions:  SPRAY TWICE IN EACH NOSTRIL ONCE DAILY                               insulin aspart 100 unit/mL injection   Commonly known as:  NOVOLOG   Quantity:  2 vial   Refills:  3    Instructions:  To use with Vgo 20  with 4 clicks with meals, max TDD 38 units daily                            insulin glargine 100 unit/mL injection   Commonly known as:  LANTUS   Quantity:  10 mL   Refills:  12   Dose:  30 Units    Instructions:  Inject 30 Units into the skin every evening. STOP WHEN VGO STARTED                            levothyroxine 75 MCG tablet   Commonly known as:  SYNTHROID   Quantity:  90 tablet   Refills:  0    Last time this was given:  75 mcg on 4/25/2017  6:00 AM   Instructions:  TAKE 1 TABLET BY MOUTH EVERY DAY                               lisinopril 20 MG tablet   Commonly known as:  PRINIVIL,ZESTRIL   Quantity:  90 tablet   Refills:  0    Last time this was given:  10 mg on 4/25/2017  8:14 AM   Instructions:  TAKE 1 TABLET BY MOUTH EVERY DAY                               omeprazole 40 MG capsule   Commonly known as:  PRILOSEC   Quantity:  60 capsule   Refills:  6   Dose:  40 mg    Instructions:  Take 1 capsule (40 mg total) by mouth 2 (two) times daily before meals.     Use before meals                             ONETOUCH ULTRA TEST Strp   Quantity:  300 strip   Refills:  0   Generic drug:  blood sugar diagnostic    Instructions:  TEST FOUR TIMES DAILY                             sub-q insulin device, 20 unit Harriett   Commonly known as:  VGO 20   Quantity:  30 Device   Refills:  3   Dose:  1 Device    Instructions:  1 Device by Misc.(Non-Drug; Combo Route) route once daily.                            triamcinolone acetonide 0.1% 0.1 % cream   Commonly known as:  KENALOG   Quantity:  454 g   Refills:  3    Last time this was given:  4/24/2017 10:11 PM   Instructions:  AAA bid on rash                            * Notice:  This list has 3 medication(s) that are the same as other medications prescribed for you. Read the directions carefully, and ask your doctor or other care provider to review them with you.      STOP taking these medications     atorvastatin 10 MG tablet   Commonly known as:  LIPITOR         ASK your doctor about these medications        Additional Info    Begin Date AM Noon PM Bedtime    neomycin-polymyxin-dexamethasone 3.5 mg/g-10,000 unit/g-0.1 % Oint   Commonly known as:  MAXITROL   Quantity:  1 Tube   Refills:  3    Instructions:  Place into both eyes every evening. apply to clean lids and lashes both eyes at bedtime  As needed for itchy lids                                       Please bring to all follow up appointments:    1. A copy of your discharge instructions.  2. All medicines you are currently taking in their original bottles.  3. Identification and insurance card.    Please arrive 15 minutes ahead of scheduled appointment time.    Please call 24 hours in advance if you must reschedule your appointment and/or time.        Your Scheduled Appointments     May 09, 2017  8:00 AM [facility]   Hospital Follow Up with PHYSICIAN, [facility]   [facility] ([facility])    [address]  [facility] [address]   [phone]            May 15, 2017  1:00 PM [facility]   Established Patient Visit with MD Chaz Mckeon - Urology 4th Floor ([facility] )    [address]  [place] [address]   [phone]              Follow-up Information     Follow up with SNEHA Eduardo On 5/9/2017.    Specialty:  Internal Medicine    Why:  appointmnet [id number]    Contact information:    [address]  [place] [address]  [phone]            Primary Diagnosis     Your primary diagnosis was:  Severe Uncontrolled High Blood Pressure      Admission Information     Date & Time Provider Department CSN    4/20/2017  7:15 PM Ryan Fiore MD [facility] [id number]      Care Providers     Provider Role Specialty Primary office phone    Ryan Fiore MD Attending Provider Hospitalist [phone]    Ryan Fiore MD Team Attending  Hospitalist [phone]      Your Vitals Were     BP Pulse Temp Resp Height Weight    145/77 (BP Method: cNIBP) 60 98.1 °F (36.7 °C) (Oral) 23 4' 10" (1.473 m) 97.4 kg (214 lb 11.7 oz)    SpO2 BMI             94% 44.88 kg/m2         Recent Lab Values        11/5/2012 4/22/2013 4/25/2014 1/15/2015 3/14/2016 12/8/2016 1/26/2017 4/21/2017      1:23 PM 11:30 AM  2:18 PM  7:38 AM  8:24 AM  1:50 PM  8:10 AM  2:05 AM    A1C 7.5 (H) 7.7 (H) 8.1 (H) 7.8 (H) 7.8 (H) 8.1 (H) 8.1 (H) 8.2 (H)    Comment for A1C at  1:50 PM on 12/8/2016:  According to ADA guidelines, hemoglobin A1C <7.0% represents  optimal control in non-pregnant diabetic patients.  Different  metrics may apply to specific populations.   Standards of Medical Care in Diabetes - 2016.  For the purpose of screening for the presence of diabetes:  <5.7%     Consistent with the absence of diabetes  5.7-6.4%  Consistent with increasing risk for diabetes   (prediabetes)  >or=6.5%  Consistent with diabetes  Currently no consensus exists for use of hemoglobin A1C  for diagnosis of diabetes for children.      Comment for A1C at  8:10 AM on 1/26/2017:  According to ADA guidelines, hemoglobin A1C <7.0% represents  optimal control in non-pregnant diabetic patients.  Different  metrics may " apply to specific populations.   Standards of Medical Care in Diabetes - 2016.  For the purpose of screening for the presence of diabetes:  <5.7%     Consistent with the absence of diabetes  5.7-6.4%  Consistent with increasing risk for diabetes   (prediabetes)  >or=6.5%  Consistent with diabetes  Currently no consensus exists for use of hemoglobin A1C  for diagnosis of diabetes for children.      Comment for A1C at  2:05 AM on 4/21/2017:  According to ADA guidelines, hemoglobin A1C <7.0% represents  optimal control in non-pregnant diabetic patients.  Different  metrics may apply to specific populations.   Standards of Medical Care in Diabetes - 2016.  For the purpose of screening for the presence of diabetes:  <5.7%     Consistent with the absence of diabetes  5.7-6.4%  Consistent with increasing risk for diabetes   (prediabetes)  >or=6.5%  Consistent with diabetes  Currently no consensus exists for use of hemoglobin A1C  for diagnosis of diabetes for children.        Allergies as of 4/25/2017        Reactions    Benadryl [Diphenhydramine Hcl] Shortness Of Breath    Contac 12 Hour Allergy Anaphylaxis, Hives    Ciprofloxacin (Bulk) Nausea And Vomiting    Anti-hyst Other (See Comments)    Antihistamines - Alkylamine Hives    Codeine     bad reaction    Glucotrol [Glipizide]     Hydroxyzine     Unknown    Iodinated Contrast Media - Oral And Iv Dye Other (See Comments)    Nitrofuran Analogues     Propoxyphene Itching    Doxycycline Rash    Penicillins Rash    Sulfa (Sulfonamide Antibiotics) Rash      Advance Directives     An advance directive is a document which, in the event you are no longer able to make decisions for yourself, tells your healthcare team what kind of treatment you do or do not want to receive, or who you would like to make those decisions for you.  If you do not currently have an advance directive, [facility] encourages you to create one.  For more information call:  [phone]),  [phone] ([phone]),  or log on to [email or web address].        Language Assistance Services     ATTENTION: Language assistance services are available, free of charge. Please call [phone].      ATENCIÓN: Si jarrod osorio, tiene a kilgore disposición servicios gratuitos de asistencia lingüística. Llame al [phone].     CHÚ Ý: N?u b?n nói Ti?ng Vi?t, có các d?ch v? h? tr? ngôn ng? mi?n phí dành cho b?n. G?i s? [phone].        Heart Failure Education       Heart Failure: Being Active  You have a condition called heart failure. Being active doesnt mean that you have to wear yourself out. Even a little movement each day helps to strengthen your heart. If you cant get out to exercise, you can do simple stretching and strengthening exercises at home. These are good ways to keep you well-conditioned and prevent you and your heart from becoming excessively weak.    Ideas to get you started  · Add a little movement to things you do now. Walk to mail letters. Park your car at the far end of the parking lot and walk to the store. Walk up a flight of stairs instead of taking the elevator.  · Choose activities you enjoy. You might walk, swim, or ride an exercise bike. Things like gardening and washing the car count, too. Other possibilities include: washing dishes, walking the dog, walking around the mall, and doing aerobic activities with friends.  · Join a group exercise program at a [facility], a senior center, or a community center. Or look into a hospital cardiac rehabilitation program. Ask your doctor if you qualify.  Tips to keep you going  · Get up and get dressed each day. Go to a coffee shop and read a newspaper or go somewhere that you'll be in the presence of other active people. Youll feel more like being active.  · Make a plan. Choose one or more activities that you enjoy and that you can easily do. Then plan to do at least one each day. You might write your plan on a  calendar.  · Go with a friend or a group if you like company. This can help you feel supported and stay motivated, too.  · Plan social events that you enjoy. This will keep you mentally engaged as well as physically motivated to do things you find pleasure in.  For your safety  · Talk with your healthcare provider before starting an exercise program.  · Exercise indoors when its too hot or too cold outside, or when the air quality is poor. Try walking at a shopping mall.  · Wear socks and sturdy shoes to maintain your balance and prevent falls.  · Start slowly. Do a few minutes several times a day at first. Increase your time and speed little by little.  · Stop and rest whenever you feel tired or get short of breath.  · Dont push yourself on days when you dont feel well.  Date Last Reviewed: 3/20/2016  © 7928-4365 [employer]. [address] [place] [address]. All rights reserved. This information is not intended as a substitute for professional medical care. Always follow your healthcare professional's instructions.              Heart Failure: Evaluating Your Heart  You have a condition called heart failure. To evaluate your condition, your doctor will examine you, ask questions, and do some tests. Along with looking for signs of heart failure, the doctor looks for any other health problems that may have led to heart failure. The results of your evaluation will help your doctor form a treatment plan.  Health history and physical exam  Your visit will start with a health history. Tell the doctor about any symptoms youve noticed and about all medicines you take. Then youll have a physical exam. This includes listening to your heartbeat and breathing. Youll also be checked for swelling (edema) in your legs and neck. When you have fluid buildup or fluid in the lungs, it may be called congestive heart failure.  Diagnosing heart failure     During an echocardiogram, sound waves bounce off the  heart. These are converted into a picture on the screen.   The following may be done to help your doctor form a diagnosis:  · X-rays show the size and shape of your heart. These pictures can also show fluid in your lungs.  · An electrocardiogram (ECG or EKG) shows the pattern of your heartbeat. Small pads (electrodes) are placed on your chest, arms, and legs. Wires connect the pads to the ECG machine, which records your hearts electrical signals. This can give the doctor information about heart function.  · An echocardiogram uses ultrasound waves to show the structure and movement of your heart muscle. This shows how well the heart pumps. It also shows the thickness of the heart walls, and if the heart is enlarged. It is one of the most useful, non-invasive tests as it provides information about the heart's general function. This helps your doctor make treatment decisions.  · Lab tests evaluate small amounts of blood or urine for signs of problems. A BNP lab test can help diagnose and evaluate heart failure. BNP stands for B-type natriuretic peptide. The ventricles secrete more BNP when heart failure worsens. Lab tests can also provide information about metabolic dysfunction or heart dysfunction.  Your treatment plan  Based on the results of your evaluation and tests, your doctor will develop a treatment plan. This plan is designed to relieve some of your heart failure symptoms and help make you more comfortable. Your treatment plan may include:  · Medicine to help your heart work better and improve your quality of life  · Changes in what you eat and drink to help prevent fluid from backing up in your body  · Daily monitoring of your weight and heart failure symptoms to see how well your treatment plan is working  · Exercise to help you stay healthy  · Help with quitting smoking  · Emotional and psychological support to help adjust to the changes  · Referrals to other specialists to make sure you are being treated  comprehensively  Date Last Reviewed: 3/21/2016  © 6694-2256 [employer]. [address] [place] [address], [place] [address]. All rights reserved. This information is not intended as a substitute for professional medical care. Always follow your healthcare professional's instructions.              Heart Failure: Making Changes to Your Diet  You have a condition called heart failure. When you have heart failure, excess fluid is more likely to build up in your body because your heart isn't working well. This makes the heart work harder to pump blood. Fluid buildup causes symptoms such as shortness of breath and swelling (edema). This is often referred to as congestive heart failure or CHF. Controlling the amount of salt (sodium) you eat may help stop fluid from building up. Your doctor may also tell you to reduce the amount of fluid you drink.  Reading food labels    Your healthcare provider will tell you how much sodium you can eat each day. Read food labels to keep track. Keep in mind that certain foods are high in salt. These include canned, frozen, and processed foods. Check the amount of sodium in each serving. Watch out for high-sodium ingredients. These include MSG (monosodium glutamate), baking soda, and sodium phosphate.   Eating less salt  Give yourself time to get used to eating less salt. It may take a little while. Here are some tips to help:  · Take the saltshaker off the table. Replace it with salt-free herb mixes and spices.  · Eat fresh or plain frozen vegetables. These have much less salt than canned vegetables.  · Choose low-sodium snacks like sodium-free pretzels, crackers, or air-popped popcorn.  · Dont add salt to your food when youre cooking. Instead, season your foods with pepper, lemon, garlic, or onion.  · When you eat out, ask that your food be cooked without added salt.  · Avoid eating fried foods as these often have a great deal of salt.  If youre told to limit fluids  You may need  to limit how much fluid you have to help prevent swelling. This includes anything that is liquid at room temperature, such as ice cream and soup. If your doctor tells you to limit fluid, try these tips:  · Measure drinks in a measuring cup before you drink them. This will help you meet daily goals.  · Chill drinks to make them more refreshing.  · Suck on frozen lemon wedges to quench thirst.  · Only drink when youre thirsty.  · Chew sugarless gum or suck on hard candy to keep your mouth moist.  · Weigh yourself daily to know if your body's fluid content is rising.  My sodium goal  Your healthcare provider may give you a sodium goal to meet each day. This includes sodium found in food as well as salt that you add. My goal is to eat no more than ___________ mg of sodium per day.     When to call your doctor  Call your doctor right away if you have any symptoms of worsening heart failure. These can include:  · Sudden weight gain  · Increased swelling of your legs or ankles  · Trouble breathing when youre resting or at night  · Increase in the number of pillows you have to sleep on  · Chest pain, pressure, discomfort, or pain in the jaw, neck, or back   Date Last Reviewed: 3/21/2016  © 4323-7126 [employer]. [address] [place] [address], [place] [address]. All rights reserved. This information is not intended as a substitute for professional medical care. Always follow your healthcare professional's instructions.              Heart Failure: Medicines to Help Your Heart    You have a condition called heart failure (also known as congestive heart failure, or CHF). Your doctor will likely prescribe medicines for heart failure and any underlying health problems you have. Most heart failure patients take one or more types of medicinen. Your healthcare provider will work to find the combination of medicines that works best for you.  Heart failure medicines  Here are the most common heart failure medicines:  · ACE  inhibitors lower blood pressure and decrease strain on the heart. This makes it easier for the heart to pump. Angiotensin receptor blockers have similar effects. These are prescribed for some patients instead of ACE inhibitors.  · Beta-blockers relieve stress on the heart. They also improve symptoms. They may also improve the heart's pumping action over time.  · Diuretics (also called water pills) help rid your body of excess water. This can help rid your body of swelling (edema). Having less fluid to pump means your heart doesnt have to work as hard. Some diuretics make your body lose a mineral called potassium. Your doctor will tell you if you need to take supplements or eat more foods high in potassium.  · Digoxin helps your heart pump with more strength. This helps your heart pump more blood with each beat. So, more oxygen-rich blood travels to the rest of the body.  · Aldosterone antagonists help alter hormones and decrease strain on the heart.  · Hydralazine and nitrates are two separate medicines used together to treat heart failure. They may come in one combination pill. They lower blood pressure and decrease how hard the heart has to pump.  Medicines for related conditions  Controlling other heart problems helps keep heart failure under control, too. Depending on other heart problems you have, medicines may be prescribed to:  · Lower blood pressure (antihypertensives).  · Lower cholesterol levels (statins).  · Prevent blood clots (anticoagulants or aspirin).  · Keep the heartbeat steady (antiarrhythmics).  Date Last Reviewed: 3/5/2016  © 0272-4648 [employer]. [address] [place] [address], [place] [address]. All rights reserved. This information is not intended as a substitute for professional medical care. Always follow your healthcare professional's instructions.              Heart Failure: Procedures That May Help    The heart is a muscle that pumps oxygen-rich blood to all parts of the  body. When you have heart failure, the heart is not able to pump as well as it should. Blood and fluid may back up into the lungs (congestive heart failure), and some parts of the body dont get enough oxygen-rich blood to work normally. These problems lead to the symptoms of heart failure.     Certain procedures may help the heart pump better in some cases of heart failure. Some procedures are done to treat health problems that may have caused the heart failure such as coronary artery disease or heart rhythm problems. For more serious heart failure, other options are available.  Treating artery and valve problems  If you have coronary artery disease or valve disease, procedures may be done to improve blood flow. This helps the heart pump better, which can improve heart failure symptoms. First, your doctor may do a cardiac catheterization to help detect clogged blood vessels or valve damage. During this procedure, a  thin tube (catheter) in inserted into a blood vessel and guided to the heart. There a dye is injected and a special type of X-ray (angiogram) is taken of the blood vessels. Procedures to open a blocked artery or fix damaged valves can also be done using catheterization.  · Angioplasty uses a balloon-tipped instrument at the end of the catheter. The balloon is inflated to widen the narrowed artery. In many cases, a stent is expanded to further support the narrowed artery. A stent is a metal mesh tube.  · Valve surgery repairs or replacement of faulty valves can also be done during catheterization so blood can flow properly through the chambers of the heart.  Bypass surgery is another option to help treat blocked arteries. It uses a healthy blood vessel from elsewhere in the body. The healthy blood vessel is attached above and below the blocked area so that blood can flow around the blocked artery.  Treating heart rhythm problems  A device may be placed in the chest to help a weak heart maintain a  healthy, heartbeat so the heart can pump more effectively:  · Pacemaker. A pacemaker is an implanted device that regulates your heartbeat electronically. It monitors your heart's rhythm and generates a painless electric impulse that helps the heart beat in a regular rhythm. A pacemaker is programmed to meet your specific heart rhythm needs.  · Biventricular pacing/cardiac resynchronization therapy. A type of pacemaker that paces both pumping chambers of the heart at the same time to coordinate contractions and to improve the heart's function. Some people with heart failure are candidates for this therapy.  · Implantable cardioverter defibrillator. A device similar to a pacemaker that senses when the heart is beating too fast and delivers an electrical shock to convert the fast rhythm to a normal rhythm. This can be a life saving device.  In severe cases  In more serious cases of heart failure when other treatments no longer work, other options may include:  · Ventricular assist devices (VADs). These are mechanical devices used to take over the pumping function for one or both of the heart's ventricles, or pumping chambers. A VAD may be necessary when heart failure progresses to the point that medicines and other treatments no longer help. In some cases, a VAD may be used as a bridge to transplant.  · Heart transplant. This is replacing the diseased heart with a healthy one from a donor. This is an option for a few people who are very sick. A heart transplant is very serious and not an option for all patients. Your doctor can tell you more.  Date Last Reviewed: 3/20/2016  © 6387-8540 [employer]. [address] [place] [address], [place] [address]. All rights reserved. This information is not intended as a substitute for professional medical care. Always follow your healthcare professional's instructions.              Heart Failure: Tracking Your Weight  You have a condition called heart failure. When you have  heart failure, a sudden weight gain or a steady rise in weight is a warning sign that your body is retaining too much water and salt. This could mean your heart failure is getting worse. If left untreated, it can cause problems for your lungs and result in shortness of breath. Weighing yourself each day is the best way to know if youre retaining water. If your weight goes up quickly, call your doctor. You will be given instructions on how to get rid of the excess water. You will likely need medicines and to avoid salt. This will help your heart work better.  Call your doctor if you gain more than 2 pounds in 1 day, more than 5 pounds in 1 week, or whatever weight gain you were told to report by your doctor. This is often a sign of worsening heart failure and needs to be evaluated and treated. Your doctor will tell you what to do next.   Tips for weighing yourself    · Weigh yourself at the same time each morning, wearing the same clothes. Weigh yourself after urinating and before eating.  · Use the same scale each day. Make sure the numbers are easy to read. Put the scale on a flat, hard surface -- not on a rug or carpet.  · Do not stop weighing yourself. If you forget one day, weigh again the next morning.  How to use your weight chart  · Keep your weight chart near the scale. Write your weight on the chart as soon as you get off the scale.  · Fill in the month and the start date on the chart. Then write down your weight each day. Your chart will look like this:    · If you miss a day, leave the space blank. Weigh yourself the next day and write your weight in the next space.  · Take your weight chart with you when you go to see your doctor.  Date Last Reviewed: 3/20/2016  © 5372-4596 [employer]. [address] [place] [address], [place] [address]. All rights reserved. This information is not intended as a substitute for professional medical care. Always follow your healthcare professional's  instructions.              Heart Failure: Warning Signs of a Flare-Up  You have a condition called heart failure. Once you have heart failure, flare-ups can happen. Below are signs that can mean your heart failure is getting worse. If you notice any of these warning signs, call your healthcare provider.  Swelling    · Your feet, ankles, or lower legs get puffier.  · You notice skin changes on your lower legs.  · Your shoes feel too tight.  · Your clothes are tighter in the waist.  · You have trouble getting rings on or off your fingers.  Shortness of breath  · You have to breathe harder even when youre doing your normal activities or when youre resting.  · You are short of breath walking up stairs or even short distances.  · You wake up at night short of breath or coughing.  · You need to use more pillows or sit up to sleep.  · You wake up tired or restless.  Other warning signs  · You feel weaker, dizzy, or more tired.  · You have chest pain or changes in your heartbeat.  · You have a cough that wont go away.  · You cant remember things or dont feel like eating.  Tracking your weight  Gaining weight is often the first warning sign that heart failure is getting worse. Gaining even a few pounds can be a sign that your body is retaining excess water and salt. Weighing yourself each day in the morning after you urinate and before you eat, is the best way to know if you're retaining water. Get a scale that is easy to read and make sure you wear the same clothes and use the same scale every time you weigh. Your healthcare provider will show you how to track your weight. Call your doctor if you gain more than 2 pounds in 1 day, 5 pounds in 1 week, or whatever weight gain you were told to report by your doctor. This is often a sign of worsening heart failure and needs to be evaluated and treated before it compromises your breathing. Your doctor will tell you what to do next.    Date Last Reviewed: 3/15/2016  ©  6930-4483 [employer]. [address] [place] [address], [place] [address]. All rights reserved. This information is not intended as a substitute for professional medical care. Always follow your healthcare professional's instructions.              Chronic Kindey Disease Education             Diabetes Discharge Instructions                                    [facility] complies with applicable Federal civil rights laws and does not discriminate on the basis of race, color, national origin, age, disability, or sex.

## 2017-04-20 NOTE — PROVIDER PROGRESS NOTES - EMERGENCY DEPT.
Encounter Date: 4/20/2017    ED Physician Progress Notes       SCRIBE NOTE: I, Mile Sanchez, am scribing for, and in the presence of,  Dr. Mcmanus.  Physician Statement: I, Dr. Mcmanus, personally performed the services described in this documentation as scribed by Mile Sanchez in my presence, and it is both accurate and complete.      EKG - STEMI Decision  Initial Reading: No STEMI present.

## 2017-04-20 NOTE — TELEPHONE ENCOUNTER
If she is short of breath and legs swelling, this would be an acute change. She needs to get to ER because this could be a very significant medical problem like heart failure or even blood clots. Those can be much more efficiently evaluated in the ER.

## 2017-04-20 NOTE — TELEPHONE ENCOUNTER
Spoke to pt, adv below message. She understood the importance of going to the ED, and agreed to go. She will come to [facility] so Dr Ulloa can view the notes.

## 2017-04-20 NOTE — TELEPHONE ENCOUNTER
----- Message from Jr Gilliland MA sent at 4/20/2017  1:16 PM CDT -----  Contact: self - patient hung up  Patient called requesting an appt with  Dr Ulloa today. Was told by [facility] to go to ED for SOB. Patient refused. Please call. Thanks!

## 2017-04-20 NOTE — TELEPHONE ENCOUNTER
Reason for Disposition   MODERATE difficulty breathing (e.g., speaks in phrases, SOB even at rest, pulse 100-120) of new onset or worse than normal    Protocols used: ST BREATHING DIFFICULTY-A-OH    Pt calling with complaints of trouble breathing and leg swelling.  Pt states it is hard for her to take a few steps due to difficulty breathing.  Advised ED, pt refuses.  Pt wants to see MD.  Advised ED again, pt refuses.  Will message MD.

## 2017-04-21 PROBLEM — I16.1 HYPERTENSIVE EMERGENCY: Status: ACTIVE | Noted: 2017-04-21

## 2017-04-21 LAB
ALBUMIN SERPL BCP-MCNC: 3.2 G/DL
ALP SERPL-CCNC: 86 U/L
ALT SERPL W/O P-5'-P-CCNC: 7 U/L
ANION GAP SERPL CALC-SCNC: 11 MMOL/L
AST SERPL-CCNC: 10 U/L
BASOPHILS # BLD AUTO: 0.03 K/UL
BASOPHILS NFR BLD: 0.3 %
BILIRUB SERPL-MCNC: 0.4 MG/DL
BILIRUB UR QL STRIP: NEGATIVE
BUN SERPL-MCNC: 12 MG/DL
CALCIUM SERPL-MCNC: 8.9 MG/DL
CHLORIDE SERPL-SCNC: 101 MMOL/L
CHOLEST/HDLC SERPL: 5.1 {RATIO}
CLARITY UR REFRACT.AUTO: CLEAR
CO2 SERPL-SCNC: 31 MMOL/L
COLOR UR AUTO: YELLOW
CREAT SERPL-MCNC: 0.9 MG/DL
DIASTOLIC DYSFUNCTION: YES
DIFFERENTIAL METHOD: ABNORMAL
EOSINOPHIL # BLD AUTO: 0.3 K/UL
EOSINOPHIL NFR BLD: 2.7 %
ERYTHROCYTE [DISTWIDTH] IN BLOOD BY AUTOMATED COUNT: 13.4 %
EST. GFR  (AFRICAN AMERICAN): >60 ML/MIN/1.73 M^2
EST. GFR  (NON AFRICAN AMERICAN): >60 ML/MIN/1.73 M^2
ESTIMATED AVG GLUCOSE: 189 MG/DL
GLUCOSE SERPL-MCNC: 240 MG/DL
GLUCOSE UR QL STRIP: NEGATIVE
HBA1C MFR BLD HPLC: 8.2 %
HCT VFR BLD AUTO: 35.7 %
HDL/CHOLESTEROL RATIO: 19.5 %
HDLC SERPL-MCNC: 266 MG/DL
HDLC SERPL-MCNC: 52 MG/DL
HGB BLD-MCNC: 12.1 G/DL
HGB UR QL STRIP: NEGATIVE
KETONES UR QL STRIP: ABNORMAL
LDLC SERPL CALC-MCNC: 175.4 MG/DL
LEUKOCYTE ESTERASE UR QL STRIP: NEGATIVE
LYMPHOCYTES # BLD AUTO: 1.9 K/UL
LYMPHOCYTES NFR BLD: 19.5 %
MAGNESIUM SERPL-MCNC: 1.6 MG/DL
MCH RBC QN AUTO: 29.3 PG
MCHC RBC AUTO-ENTMCNC: 33.9 %
MCV RBC AUTO: 86 FL
MONOCYTES # BLD AUTO: 0.7 K/UL
MONOCYTES NFR BLD: 7.5 %
NEUTROPHILS # BLD AUTO: 6.6 K/UL
NEUTROPHILS NFR BLD: 69.6 %
NITRITE UR QL STRIP: NEGATIVE
NONHDLC SERPL-MCNC: 214 MG/DL
PH UR STRIP: 5 [PH] (ref 5–8)
PLATELET # BLD AUTO: 301 K/UL
PMV BLD AUTO: 9.1 FL
POCT GLUCOSE: 154 MG/DL (ref 70–110)
POCT GLUCOSE: 157 MG/DL (ref 70–110)
POCT GLUCOSE: 217 MG/DL (ref 70–110)
POCT GLUCOSE: 282 MG/DL (ref 70–110)
POCT GLUCOSE: 308 MG/DL (ref 70–110)
POTASSIUM SERPL-SCNC: 3.4 MMOL/L
PROT SERPL-MCNC: 7.1 G/DL
PROT UR QL STRIP: NEGATIVE
RBC # BLD AUTO: 4.13 M/UL
RETIRED EF AND QEF - SEE NOTES: 60 (ref 55–65)
SODIUM SERPL-SCNC: 143 MMOL/L
SP GR UR STRIP: 1.01 (ref 1–1.03)
TRIGL SERPL-MCNC: 193 MG/DL
TSH SERPL DL<=0.005 MIU/L-ACNC: 1.58 UIU/ML
URN SPEC COLLECT METH UR: ABNORMAL
UROBILINOGEN UR STRIP-ACNC: NEGATIVE EU/DL
WBC # BLD AUTO: 9.51 K/UL

## 2017-04-21 PROCEDURE — 63600175 PHARM REV CODE 636 W HCPCS: Performed by: INTERNAL MEDICINE

## 2017-04-21 PROCEDURE — 97535 SELF CARE MNGMENT TRAINING: CPT

## 2017-04-21 PROCEDURE — 82962 GLUCOSE BLOOD TEST: CPT

## 2017-04-21 PROCEDURE — 20600001 HC STEP DOWN PRIVATE ROOM

## 2017-04-21 PROCEDURE — 87088 URINE BACTERIA CULTURE: CPT

## 2017-04-21 PROCEDURE — G8988 SELF CARE GOAL STATUS: HCPCS | Mod: CI

## 2017-04-21 PROCEDURE — 81003 URINALYSIS AUTO W/O SCOPE: CPT

## 2017-04-21 PROCEDURE — 96376 TX/PRO/DX INJ SAME DRUG ADON: CPT

## 2017-04-21 PROCEDURE — 87186 SC STD MICRODIL/AGAR DIL: CPT

## 2017-04-21 PROCEDURE — 97161 PT EVAL LOW COMPLEX 20 MIN: CPT

## 2017-04-21 PROCEDURE — 97165 OT EVAL LOW COMPLEX 30 MIN: CPT

## 2017-04-21 PROCEDURE — 99223 1ST HOSP IP/OBS HIGH 75: CPT | Mod: AI,,, | Performed by: INTERNAL MEDICINE

## 2017-04-21 PROCEDURE — 93306 TTE W/DOPPLER COMPLETE: CPT

## 2017-04-21 PROCEDURE — 87077 CULTURE AEROBIC IDENTIFY: CPT

## 2017-04-21 PROCEDURE — 25000003 PHARM REV CODE 250: Performed by: EMERGENCY MEDICINE

## 2017-04-21 PROCEDURE — 83735 ASSAY OF MAGNESIUM: CPT

## 2017-04-21 PROCEDURE — 83036 HEMOGLOBIN GLYCOSYLATED A1C: CPT

## 2017-04-21 PROCEDURE — 97530 THERAPEUTIC ACTIVITIES: CPT

## 2017-04-21 PROCEDURE — 25000003 PHARM REV CODE 250: Performed by: INTERNAL MEDICINE

## 2017-04-21 PROCEDURE — 87086 URINE CULTURE/COLONY COUNT: CPT

## 2017-04-21 PROCEDURE — 99233 SBSQ HOSP IP/OBS HIGH 50: CPT | Mod: ,,, | Performed by: HOSPITALIST

## 2017-04-21 PROCEDURE — G8987 SELF CARE CURRENT STATUS: HCPCS | Mod: CJ

## 2017-04-21 PROCEDURE — 93306 TTE W/DOPPLER COMPLETE: CPT | Mod: 26,,, | Performed by: INTERNAL MEDICINE

## 2017-04-21 PROCEDURE — 85025 COMPLETE CBC W/AUTO DIFF WBC: CPT

## 2017-04-21 PROCEDURE — 63600175 PHARM REV CODE 636 W HCPCS: Performed by: HOSPITALIST

## 2017-04-21 PROCEDURE — 80053 COMPREHEN METABOLIC PANEL: CPT

## 2017-04-21 PROCEDURE — 25000003 PHARM REV CODE 250: Performed by: HOSPITALIST

## 2017-04-21 PROCEDURE — 96372 THER/PROPH/DIAG INJ SC/IM: CPT

## 2017-04-21 PROCEDURE — 94761 N-INVAS EAR/PLS OXIMETRY MLT: CPT

## 2017-04-21 RX ORDER — SODIUM CHLORIDE 0.9 % (FLUSH) 0.9 %
3 SYRINGE (ML) INJECTION EVERY 8 HOURS
Status: DISCONTINUED | OUTPATIENT
Start: 2017-04-21 | End: 2017-04-25 | Stop reason: HOSPADM

## 2017-04-21 RX ORDER — TRIAMCINOLONE ACETONIDE 1 MG/G
CREAM TOPICAL 2 TIMES DAILY
Status: DISCONTINUED | OUTPATIENT
Start: 2017-04-21 | End: 2017-04-25 | Stop reason: HOSPADM

## 2017-04-21 RX ORDER — ATENOLOL 25 MG/1
25 TABLET ORAL DAILY
Status: DISCONTINUED | OUTPATIENT
Start: 2017-04-21 | End: 2017-04-21

## 2017-04-21 RX ORDER — ASPIRIN 81 MG/1
81 TABLET ORAL DAILY
Status: DISCONTINUED | OUTPATIENT
Start: 2017-04-21 | End: 2017-04-25 | Stop reason: HOSPADM

## 2017-04-21 RX ORDER — HEPARIN SODIUM 5000 [USP'U]/ML
5000 INJECTION, SOLUTION INTRAVENOUS; SUBCUTANEOUS EVERY 8 HOURS
Status: DISCONTINUED | OUTPATIENT
Start: 2017-04-21 | End: 2017-04-25 | Stop reason: HOSPADM

## 2017-04-21 RX ORDER — FLUTICASONE PROPIONATE 50 MCG
1 SPRAY, SUSPENSION (ML) NASAL DAILY
Status: DISCONTINUED | OUTPATIENT
Start: 2017-04-21 | End: 2017-04-25 | Stop reason: HOSPADM

## 2017-04-21 RX ORDER — LISINOPRIL 20 MG/1
40 TABLET ORAL DAILY
Status: DISCONTINUED | OUTPATIENT
Start: 2017-04-21 | End: 2017-04-21

## 2017-04-21 RX ORDER — LISINOPRIL 5 MG/1
5 TABLET ORAL DAILY
Status: DISCONTINUED | OUTPATIENT
Start: 2017-04-22 | End: 2017-04-22

## 2017-04-21 RX ORDER — PANTOPRAZOLE SODIUM 40 MG/1
40 TABLET, DELAYED RELEASE ORAL DAILY
Status: DISCONTINUED | OUTPATIENT
Start: 2017-04-21 | End: 2017-04-25 | Stop reason: HOSPADM

## 2017-04-21 RX ORDER — BUTALBITAL, ACETAMINOPHEN AND CAFFEINE 50; 325; 40 MG/1; MG/1; MG/1
1 TABLET ORAL EVERY 8 HOURS PRN
Status: DISCONTINUED | OUTPATIENT
Start: 2017-04-21 | End: 2017-04-22

## 2017-04-21 RX ORDER — ESCITALOPRAM OXALATE 10 MG/1
10 TABLET ORAL DAILY
Status: DISCONTINUED | OUTPATIENT
Start: 2017-04-21 | End: 2017-04-25 | Stop reason: HOSPADM

## 2017-04-21 RX ORDER — HYDRALAZINE HYDROCHLORIDE 20 MG/ML
15 INJECTION INTRAMUSCULAR; INTRAVENOUS
Status: COMPLETED | OUTPATIENT
Start: 2017-04-21 | End: 2017-04-21

## 2017-04-21 RX ORDER — FUROSEMIDE 10 MG/ML
40 INJECTION INTRAMUSCULAR; INTRAVENOUS 2 TIMES DAILY
Status: DISCONTINUED | OUTPATIENT
Start: 2017-04-21 | End: 2017-04-22

## 2017-04-21 RX ORDER — INSULIN ASPART 100 [IU]/ML
3 INJECTION, SOLUTION INTRAVENOUS; SUBCUTANEOUS
Status: DISCONTINUED | OUTPATIENT
Start: 2017-04-21 | End: 2017-04-22

## 2017-04-21 RX ORDER — FUROSEMIDE 20 MG/1
20 TABLET ORAL DAILY
Status: DISCONTINUED | OUTPATIENT
Start: 2017-04-21 | End: 2017-04-25 | Stop reason: HOSPADM

## 2017-04-21 RX ORDER — IBUPROFEN 200 MG
24 TABLET ORAL
Status: DISCONTINUED | OUTPATIENT
Start: 2017-04-21 | End: 2017-04-25 | Stop reason: HOSPADM

## 2017-04-21 RX ORDER — HYDRALAZINE HYDROCHLORIDE 20 MG/ML
10 INJECTION INTRAMUSCULAR; INTRAVENOUS EVERY 6 HOURS PRN
Status: DISCONTINUED | OUTPATIENT
Start: 2017-04-21 | End: 2017-04-25 | Stop reason: HOSPADM

## 2017-04-21 RX ORDER — INSULIN ASPART 100 [IU]/ML
1-10 INJECTION, SOLUTION INTRAVENOUS; SUBCUTANEOUS
Status: DISCONTINUED | OUTPATIENT
Start: 2017-04-21 | End: 2017-04-25 | Stop reason: HOSPADM

## 2017-04-21 RX ORDER — ALPRAZOLAM 0.5 MG/1
0.5 TABLET ORAL NIGHTLY
Status: DISCONTINUED | OUTPATIENT
Start: 2017-04-21 | End: 2017-04-21

## 2017-04-21 RX ORDER — IBUPROFEN 200 MG
16 TABLET ORAL
Status: DISCONTINUED | OUTPATIENT
Start: 2017-04-21 | End: 2017-04-25 | Stop reason: HOSPADM

## 2017-04-21 RX ORDER — LEVOTHYROXINE SODIUM 75 UG/1
75 TABLET ORAL
Status: DISCONTINUED | OUTPATIENT
Start: 2017-04-21 | End: 2017-04-25 | Stop reason: HOSPADM

## 2017-04-21 RX ORDER — ALPRAZOLAM 0.25 MG/1
0.25 TABLET ORAL NIGHTLY
Status: DISCONTINUED | OUTPATIENT
Start: 2017-04-21 | End: 2017-04-22

## 2017-04-21 RX ORDER — GLUCAGON 1 MG
1 KIT INJECTION
Status: DISCONTINUED | OUTPATIENT
Start: 2017-04-21 | End: 2017-04-25 | Stop reason: HOSPADM

## 2017-04-21 RX ORDER — CARVEDILOL 6.25 MG/1
6.25 TABLET ORAL 2 TIMES DAILY
Status: DISCONTINUED | OUTPATIENT
Start: 2017-04-21 | End: 2017-04-22

## 2017-04-21 RX ADMIN — LEVOTHYROXINE SODIUM 75 MCG: 75 TABLET ORAL at 08:04

## 2017-04-21 RX ADMIN — HEPARIN SODIUM 5000 UNITS: 5000 INJECTION, SOLUTION INTRAVENOUS; SUBCUTANEOUS at 01:04

## 2017-04-21 RX ADMIN — Medication 3 ML: at 10:04

## 2017-04-21 RX ADMIN — LISINOPRIL 40 MG: 20 TABLET ORAL at 08:04

## 2017-04-21 RX ADMIN — PANTOPRAZOLE SODIUM 40 MG: 40 TABLET, DELAYED RELEASE ORAL at 08:04

## 2017-04-21 RX ADMIN — FAMOTIDINE 20 MG: 20 TABLET, FILM COATED ORAL at 09:04

## 2017-04-21 RX ADMIN — FUROSEMIDE 40 MG: 10 INJECTION, SOLUTION INTRAMUSCULAR; INTRAVENOUS at 05:04

## 2017-04-21 RX ADMIN — FUROSEMIDE 40 MG: 10 INJECTION, SOLUTION INTRAMUSCULAR; INTRAVENOUS at 08:04

## 2017-04-21 RX ADMIN — INSULIN ASPART 2 UNITS: 100 INJECTION, SOLUTION INTRAVENOUS; SUBCUTANEOUS at 11:04

## 2017-04-21 RX ADMIN — INSULIN DETEMIR 25 UNITS: 100 INJECTION, SOLUTION SUBCUTANEOUS at 11:04

## 2017-04-21 RX ADMIN — ALPRAZOLAM 0.25 MG: 0.25 TABLET ORAL at 09:04

## 2017-04-21 RX ADMIN — CARVEDILOL 6.25 MG: 6.25 TABLET, FILM COATED ORAL at 09:04

## 2017-04-21 RX ADMIN — ALPRAZOLAM 0.5 MG: 0.5 TABLET ORAL at 01:04

## 2017-04-21 RX ADMIN — ASPIRIN 81 MG: 81 TABLET, COATED ORAL at 08:04

## 2017-04-21 RX ADMIN — FAMOTIDINE 20 MG: 20 TABLET, FILM COATED ORAL at 08:04

## 2017-04-21 RX ADMIN — INSULIN ASPART 3 UNITS: 100 INJECTION, SOLUTION INTRAVENOUS; SUBCUTANEOUS at 03:04

## 2017-04-21 RX ADMIN — INSULIN DETEMIR 25 UNITS: 100 INJECTION, SOLUTION SUBCUTANEOUS at 02:04

## 2017-04-21 RX ADMIN — INSULIN ASPART 8 UNITS: 100 INJECTION, SOLUTION INTRAVENOUS; SUBCUTANEOUS at 09:04

## 2017-04-21 RX ADMIN — HYDRALAZINE HYDROCHLORIDE 15 MG: 20 INJECTION INTRAMUSCULAR; INTRAVENOUS at 01:04

## 2017-04-21 RX ADMIN — INSULIN ASPART 6 UNITS: 100 INJECTION, SOLUTION INTRAVENOUS; SUBCUTANEOUS at 03:04

## 2017-04-21 RX ADMIN — ESCITALOPRAM OXALATE 10 MG: 10 TABLET ORAL at 08:04

## 2017-04-21 RX ADMIN — CARVEDILOL 6.25 MG: 6.25 TABLET, FILM COATED ORAL at 08:04

## 2017-04-21 RX ADMIN — HEPARIN SODIUM 5000 UNITS: 5000 INJECTION, SOLUTION INTRAVENOUS; SUBCUTANEOUS at 08:04

## 2017-04-21 RX ADMIN — Medication 3 ML: at 02:04

## 2017-04-21 RX ADMIN — Medication 3 ML: at 06:04

## 2017-04-21 RX ADMIN — INSULIN ASPART 3 UNITS: 100 INJECTION, SOLUTION INTRAVENOUS; SUBCUTANEOUS at 08:04

## 2017-04-21 RX ADMIN — HEPARIN SODIUM 5000 UNITS: 5000 INJECTION, SOLUTION INTRAVENOUS; SUBCUTANEOUS at 09:04

## 2017-04-21 NOTE — PLAN OF CARE
04/21/17 1057   Discharge Assessment   Assessment Type Discharge Planning Assessment   Confirmed/corrected address and phone number on facesheet? Yes   Assessment information obtained from? Patient   Expected Length of Stay (days) 2   Communicated expected length of stay with patient/caregiver yes   Prior to hospitilization cognitive status: Alert/Oriented   Prior to hospitalization functional status: Independent   Current cognitive status: Alert/Oriented   Current Functional Status: Independent   Arrived From home or self-care   Lives With alone   Able to Return to Prior Arrangements yes   Is patient able to care for self after discharge? Yes   Patient's perception of discharge disposition home or selfcare   Readmission Within The Last 30 Days no previous admission in last 30 days   Patient currently being followed by outpatient case management? No   Patient currently receives home health services? No   Does the patient currently use HME? No   Equipment Currently Used at Home none   Is the patient taking medications as prescribed? yes   Does the patient have transportation to healthcare appointments? Yes   Discharge Plan A Home   Discharge Plan B Home Health   Patient/Family In Agreement With Plan yes   Met with patient and daughter at bedside. Pt lives alone and is independent with ADL's. She is not current with home health and does not feel that she needs services. She drives.

## 2017-04-21 NOTE — ED NOTES
Spoke with cardiology about pt BP change and cause dizziness and MD states OK and pt can go to floor as well as lay down if need be.

## 2017-04-21 NOTE — H&P
History & Physical  Hospital Medicine      SUBJECTIVE:     History of Present Illness:  Patient is a 82 y.o. female presents with 1 week of dyspnea on exertion and leg swelling.  She notes that this past Sunday she went grocery shopping.  She came back home and while taking her groceries into her house she had to stop several times due to shortness of breath.  She states that at the same time she noticed her legs began to swell.  The swelling would recede with leg elevation.  Since Sunday her symptoms have been progressively worsening where now even on walking 20 ft to the bathroom i witnessed her getting SOB.  She had no SOB prior to this.  She does recall some chest pain but it is mostly related to food intake and she states she has a history of hiatal hernia.  She does not check her blood pressure or weight herself daily at home.  She notes no PND/orthopnea.  When she presented to the ED today her bp was around 240 systolic.  She has a very poor understanding of lifestyle effects on her blood pressure and eats a high salt, high sugar diet with no control over her iddm.      Her PMH is sig for htn, hld intolerant to statins due to gi upset, iddm2, morbid obesity, GERD/hiatal hernia, HFpEF 65%      Review of patient's allergies indicates:   Allergen Reactions    Benadryl [diphenhydramine hcl] Shortness Of Breath    Contac 12 hour allergy Anaphylaxis and Hives    Ciprofloxacin (bulk) Nausea And Vomiting    Anti-hyst Other (See Comments)    Antihistamines - alkylamine Hives    Codeine      bad reaction    Glucotrol [glipizide]     Hydroxyzine      Unknown    Iodinated contrast media - oral and iv dye Other (See Comments)    Nitrofuran analogues     Propoxyphene Itching    Doxycycline Rash    Penicillins Rash    Sulfa (sulfonamide antibiotics) Rash       Past Medical History:   Diagnosis Date    Acquired hypothyroidism 4/25/2014    Anxiety     Aortic calcification 12/8/2016    X-Ray Chest-5/08/2014     Arthritis     Bilateral carotid artery disease 12/8/2016     Carotid Bilateral-1/24/2013    CKD stage 3 due to type 2 diabetes mellitus 2/16/2017    Depression     Diabetes mellitus type II     Essential hypertension     Fever blister     Glaucoma 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 robledo)     Hyperlipidemia LDL goal <70 2/16/2017    Hypertension     Hypothyroidism     Keloid cicatrix     Mixed stress and urge urinary incontinence 2/16/2017    Morbid obesity with BMI of 40.0-44.9, adult 12/8/2016    Obesity     Thyroid disease     Type 2 diabetes mellitus     Type 2 diabetes mellitus with hyperglycemia, with long-term current use of insulin      Past Surgical History:   Procedure Laterality Date    CATARACT EXTRACTION W/  INTRAOCULAR LENS IMPLANT Right 05/30/2012        CATARACT EXTRACTION W/  INTRAOCULAR LENS IMPLANT Left 05/26/2010        CHOLECYSTECTOMY      COLONOSCOPY W/ POLYPECTOMY  04/20/2015    ESOPHAGOGASTRODUODENOSCOPY  04/20/2015    HYSTERECTOMY      Partial    JOINT REPLACEMENT      knee replacement right      TONSILLECTOMY, ADENOIDECTOMY      tonsillectomy only     Family History   Problem Relation Age of Onset    Glaucoma Father     Stroke Father     Heart attack Father     Nephrolithiasis Father     Colon cancer Mother     Cancer Mother      colon ca    Heart disease Mother     Uterine cancer Daughter      uterine sarcoma    Hematuria Brother     Heart disease Maternal Grandmother     Hypertension Maternal Grandmother     Heart attack Maternal Grandmother     No Known Problems Daughter     Amblyopia Neg Hx     Blindness Neg Hx     Cataracts Neg Hx     Macular degeneration Neg Hx     Retinal detachment Neg Hx     Strabismus Neg Hx      Social History   Substance Use Topics    Smoking status: Never Smoker    Smokeless tobacco: Never Used    Alcohol use No        Review of Systems:  GENERAL: WNL  HENT: WNL  NEURO:  WNL  CARDIAC: per hpi  PULMONARY: WNL  GI: per hpi  : WNL  MSK: WNL  INTEGUMENTARY: WNL  HEMATOLOGIC: WNL    OBJECTIVE:     Vital Signs (Most Recent)  Temp: 97.9 °F (36.6 °C) (04/20/17 2122)  Pulse: (!) 59 (04/21/17 0101)  Resp: 20 (04/21/17 0101)  BP: (!) 220/90 (04/21/17 0058)  SpO2: 95 % (04/21/17 0101)    Vital Signs Range (Last 24H):  Temp:  [97.9 °F (36.6 °C)-98.2 °F (36.8 °C)]   Pulse:  [58-63]   Resp:  [18-54]   BP: (203-235)/()   SpO2:  [95 %-98 %]     Physical Exam:  General: resting comfortably  HEENT: perrl, eomi  Neck: no jvd  Heart: nl s1/2, no m/h/t, darrell, reg rhythm  Lungs: bibasilar crackles  Abdomen: s/nt/nd, no organomegaly, no fluid wave  Ext: 2+ pitting edema bilateral le, good cap refill, moves all  Pulses: 2+ pulses b/l ue  Skin: no tears, wounds, bleeding, color changes  Neuro: sensations/motor intact b/l    Laboratory:    Recent Labs  Lab 04/20/17 1942   WBC 9.71   RBC 4.14   HGB 12.1   HCT 36.1*      MCV 87   MCH 29.2   MCHC 33.5      Sodium 136 - 145 mmol/L 140 138    Potassium 3.5 - 5.1 mmol/L 4.2 4.4CM    Chloride 95 - 110 mmol/L 104 103    CO2 23 - 29 mmol/L 29 28    Glucose 70 - 110 mg/dL 157 (H) 187 (H)    BUN, Bld 8 - 23 mg/dL 11 13    Creatinine 0.5 - 1.4 mg/dL 0.9 1.0    Calcium 8.7 - 10.5 mg/dL 9.2 8.9    Total Protein 6.0 - 8.4 g/dL 7.5 7.2    Albumin 3.5 - 5.2 g/dL 3.4 (L) 3.3 (L)    Total Bilirubin 0.1 - 1.0 mg/dL 0.3 0.4CM   Comments: For infants and newborns, interpretation of results should be based   on gestational age, weight and in agreement with clinical   observations.   Premature Infant recommended reference ranges:   Up to 24 hours.............<8.0 mg/dL   Up to 48 hours............<12.0 mg/dL   3-5 days..................<15.0 mg/dL   6-29 days.................<15.0 mg/dL       Alkaline Phosphatase 55 - 135 U/L 94 81    AST 10 - 40 U/L 14 18    ALT 10 - 44 U/L 7 (L) 9 (L)    Anion Gap 8 - 16 mmol/L 7 (L) 7 (L)    eGFR if  >60  mL/min/1.73 m^2 >60.0 >60.0    eGFR if non African American >60 mL/min/1.73 m^2 >60.0 53.7 (A)CM         Recent Labs  Lab 04/20/17 1942   TROPONINI 0.011       Diagnostic Results:  Stress echo 2013  CONCLUSIONS     1 - Normal left ventricular function (EF 65%).     2 - Moderate ( grade II) diastolic dysfunction.     3 - Normal right ventricular function .     4 - Mild left atrial enlargement.   No ischemia    CXR: chf  Ecg: sinus with low voltage      ASSESSMENT/PLAN:   82F with PMH sig for htn, hld intolerant to statins due to gi upset, iddm2, morbid obesity, GERD/hiatal hernia, HFpEF 65% presenting with hypertensive emergency with pulmonary edema.    Patient Active Problem List    Diagnosis Date Noted    Hypertensive emergency 04/21/2017    Acute heart failure 04/20/2017    CKD stage 3 due to type 2 diabetes mellitus 02/16/2017    Mixed stress and urge urinary incontinence 02/16/2017    Hyperlipidemia LDL goal <70 02/16/2017    Oropharyngeal dysphagia 02/16/2017    Anxiety 12/08/2016    Depression 12/08/2016    Morbid obesity with BMI of 40.0-44.9, adult 12/08/2016    Aortic calcification 12/08/2016    Bilateral carotid artery disease 12/08/2016    Family history of malignant neoplasm of gastrointestinal tract 04/20/2015    Acquired hypothyroidism 04/25/2014    MGD (meibomian gland dysfunction) - Both Eyes 03/27/2014    Cystitis cystica 11/25/2013    Open angle with borderline findings and low glaucoma risk in both eyes - Both Eyes 05/31/2013    PCO (posterior capsular opacification) - Both Eyes 05/31/2013    Transient vision disturbance - Left Eye 04/22/2013    Pseudophakia - Both Eyes 01/24/2013    Posterior vitreous detachment 01/24/2013    Ocular migraine 01/24/2013    Eyelid inflammation - Both Eyes 01/24/2013    Type 2 diabetes mellitus with hyperglycemia, with long-term current use of insulin     Arthritis     Essential hypertension          Plan:  1) Hypertensive Emergency  with resultant pulmonary edema dn acute on chronic HFpEF  Recheck echo  lasix 40mg iv bid  Increase lisinopril to 40mg po daily  Change atenolol to coreg 6.25mg po bid and assess response  May add ccb if needed for added control  Hydralazine 10mg iv q6h prn sbp > 200  No need for troponin trend as she reocunts no cardic chest pain  I/o/dw/2gm na/1500cc  Lifestyle counseling completed    2) IDDM2  Continue sc insulin  fs ac hs  Hypoglycemic protocol    3) HLD intolerant to statins  Check panel  If elevated she may be a candidate for PCSK-9 inhibitors  assess as o/p    4) Hiatal hernia  Continue pepcid    5) hypothyroid  Check tsh  Continue synthroid    6) dvt gi prophy    Sam Fowler MD  [id number]

## 2017-04-21 NOTE — ED TRIAGE NOTES
Patient reports feeling SOB x 1 week - worse with ambulation.     Reports chronic cough, and patient state's she noticed wheezing since last week.

## 2017-04-21 NOTE — PLAN OF CARE
Problem: Physical Therapy Goal  Goal: Physical Therapy Goal  Goals to be met by: 17     Patient will increase functional independence with mobility by performin. Supine to sit with Supervision  2. Sit to stand transfer with Supervision  3. Bed to chair transfer with Supervision using AD as needed.   4. Gait x 150 feet with Stand-by Assistance using AD as needed.   5. Lower extremity exercise program x15 reps, with assistance as needed, in order to increase LE strength and (I) with functional mobility.   Outcome: Ongoing (interventions implemented as appropriate)  PT evaluation complete and appropriate goals established.     Eunice Mcgrath, PT, DPT   2017  [phone]

## 2017-04-21 NOTE — PT/OT/SLP EVAL
Occupational Therapy  Evaluation    Courtney Engle   MRN: [id number]   Admitting Diagnosis: Hypertensive emergency    OT Date of Treatment: 04/21/17   OT Start Time: 1041  OT Stop Time: 1107  OT Total Time (min): 26 min    Billable Minutes:  Evaluation 16  Self Care/Home Management 10    Diagnosis: Hypertensive emergency     Past Medical History:   Diagnosis Date    Acquired hypothyroidism 4/25/2014    Anxiety     Aortic calcification 12/8/2016    X-Ray Chest-5/08/2014    Arthritis     Bilateral carotid artery disease 12/8/2016    US Carotid Bilateral-1/24/2013    CKD stage 3 due to type 2 diabetes mellitus 2/16/2017    Depression     Diabetes mellitus type II     Essential hypertension     Fever blister     Glaucoma 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 robledo)     Hyperlipidemia LDL goal <70 2/16/2017    Hypertension     Hypothyroidism     Keloid cicatrix     Mixed stress and urge urinary incontinence 2/16/2017    Morbid obesity with BMI of 40.0-44.9, adult 12/8/2016    Obesity     Thyroid disease     Type 2 diabetes mellitus     Type 2 diabetes mellitus with hyperglycemia, with long-term current use of insulin       Past Surgical History:   Procedure Laterality Date    CATARACT EXTRACTION W/  INTRAOCULAR LENS IMPLANT Right 05/30/2012        CATARACT EXTRACTION W/  INTRAOCULAR LENS IMPLANT Left 05/26/2010        CHOLECYSTECTOMY      COLONOSCOPY W/ POLYPECTOMY  04/20/2015    ESOPHAGOGASTRODUODENOSCOPY  04/20/2015    HYSTERECTOMY      Partial    JOINT REPLACEMENT      knee replacement right      TONSILLECTOMY, ADENOIDECTOMY      tonsillectomy only       Referring physician: Dr. Fowler  Date referred to OT: 4/21/17    General Precautions: Standard, fall  Orthopedic Precautions: N/A  Braces: N/A    Do you have any cultural, spiritual, Restoration conflicts, given your current situation?: None     Patient History:  Living Environment  Lives With: alone  Living  "Arrangements: house  Home Layout: Able to live on 1st floor  Living Environment Comment: Pt lives alone in 2-story house with bedroom on 1st floor, prefers to use bathroom on 2nd floor but able to stay downstairs if needed. PTA, pt was (I) with ADLs and using RW or cane as needed. Pt does not have nearby assist upon D/C, but can temporarily stay with daughter if needed.  Equipment Currently Used at Home: cane, straight, shower chair, walker, rolling    Prior level of function:   Bed Mobility/Transfers: independent  Grooming: independent  Bathing: independent  Upper Body Dressing: independent  Lower Body Dressing: independent  Toileting: independent  Home Management Skills: independent      Dominant hand: right    Subjective:  Communicated with RN prior to session.  "I think they need to slow down on the blood pressure medicine. I'm feeling dizzy."    Chief Complaint: Dizziness, SOB with activity  Patient/Family stated goals: Return to PLOF    Pain Ratin/10  Pain Rating Post-Intervention: 0/10    Objective:  Patient found with: telemetry, oxygen, pulse ox (continuous)    Cognitive Exam:  Oriented to: Person, Place, Time and Situation  Follows Commands/attention: Follows multistep commands  Communication: clear/fluent  Memory:  No Deficits noted  Safety awareness/insight to disability: impaired    Visual/perceptual:  Intact    Physical Exam:  Postural examination/scapula alignment: No postural abnormalities identified  Skin integrity: Visible skin intact  Edema: Mild B LE    Sensation:   Intact B UE    Upper Extremity Range of Motion:  Right Upper Extremity: WFL  Left Upper Extremity: WFL    Upper Extremity Strength:  Right Upper Extremity: 3+/5 throughout  Left Upper Extremity: 3+/5 throughout   Strength: WFL    Fine motor coordination:   Intact    Functional Mobility:  Bed Mobility:  Sit to Supine: Supervision    Transfers:  Sit <> Stand Assistance: Stand By Assistance from EOB and toilet  Sit <> Stand " "Assistive Device: No Assistive Device  Toilet Transfer Assistance: Stand By Assistance  Toilet Transfer Assistive Device: No Assistive Device    Functional Ambulation: To and from bathroom with SBA-CGA no AD    Activities of Daily Living:  LE Dressing Level of Assistance: Stand by assistance to don strappy shoes  Grooming Position: Standing at sink  Grooming Level of Assistance: Stand by assistance to wash hands  Toileting Where Assessed: Toilet  Toileting Level of Assistance: Stand by assistance for hygiene and clothing management    Balance:   Static Sit: NORMAL: No deviations seen in posture held statically  Dynamic Sit: NORMAL: No deviations seen in posture held dynamically  Static Stand: GOOD-: Takes MODERATE challenges from all directions inconsistently  Dynamic stand: GOOD-: Needs SUPERVISION only during gait and able to self right with moderate     Therapeutic Activities and Exercises:  OT/PT eval; white board updated; educated on OT role and POC; performed LB dressing EOB, functional mobility to bathroom for toileting/grooming; pt educated on safety with mobility as pt c/o dizziness when initially sitting EOB or standing; bp monitored via Vizi throughout treatment    AM-PAC 6 CLICK ADL  How much help from another person does this patient currently need?  1 = Unable, Total/Dependent Assistance  2 = A lot, Maximum/Moderate Assistance  3 = A little, Minimum/Contact Guard/Supervision  4 = None, Modified [place]/Independent    Putting on and taking off regular lower body clothing? : 3  Bathing (including washing, rinsing, drying)?: 3  Toileting, which includes using toilet, bedpan, or urinal? : 3  Putting on and taking off regular upper body clothing?: 4  Taking care of personal grooming such as brushing teeth?: 4  Eating meals?: 4  Total Score: 21    AM-PAC Raw Score CMS "G-Code Modifier Level of Impairment Assistance   6 % Total / Unable   7 - 9 CM 80 - 100% Maximal Assist   10 - 14 CL 60 - 80% " Moderate Assist   15 - 19 CK 40 - 60% Moderate Assist   20 - 22 CJ 20 - 40% Minimal Assist   23 CI 1-20% SBA / CGA   24 CH 0% Independent/ Mod I       Patient left supine with all lines intact, call button in reach, RN notified and daughter present    Assessment:  Courtney Engle is a 82 y.o. female with a medical diagnosis of Hypertensive emergency and presents with decreased strength, endurance, and safety awareness needed for ADLs and functional mobility. Pt would continue to benefit from OT to increase independence. Recommend HH upon D/C.    Rehab identified problem list/impairments: Rehab identified problem list/impairments: weakness, impaired endurance, impaired self care skills, impaired functional mobilty, gait instability, decreased safety awareness, impaired cardiopulmonary response to activity, edema    Rehab potential is good.    Activity tolerance: Good    Discharge recommendations: Discharge Facility/Level Of Care Needs: home with home health     Barriers to discharge: Barriers to Discharge: Decreased caregiver support (lives alone)    Equipment recommendations: none     GOALS:   Occupational Therapy Goals        Problem: Occupational Therapy Goal    Goal Priority Disciplines Outcome Interventions   Occupational Therapy Goal     OT, PT/OT Ongoing (interventions implemented as appropriate)    Description:  Goals to be met by: 7 days (4/28/17)     Patient will increase functional independence with ADLs by performing:    UE Dressing with Supervision.  LE Dressing with Supervision.  Grooming while standing at sink with Supervision.  Toileting from toilet with Supervision for hygiene and clothing management.   Supine to sit with Supervision.  Toilet transfer to toilet with Supervision.  Increased functional strength to WFL for ADLs.                PLAN:  Patient to be seen 4 x/week to address the above listed problems via self-care/home management, therapeutic activities, therapeutic exercises  Plan of  Care expires: 05/21/17  Plan of Care reviewed with: patient    OT G-codes  Functional Assessment Tool Used: [facility]  Score: 21  Functional Limitation: Self care  Self Care Current Status (): MANASA  Self Care Goal Status (): CANDY Ayala  04/21/2017

## 2017-04-21 NOTE — ASSESSMENT & PLAN NOTE
4/21 - resolved,  With pulmonary edema  Echo - pending  Lasix 20 qd, lisinopri l5 qd, coreg 6.25 qd.  Received iv hydralazine  And iv lasix which worked well  Low salt diet

## 2017-04-21 NOTE — ED NOTES
"RN was attempting to get vitals signs and patient screamed, "TAKE THAT OFF OF ME RIGHT NOW. YOU ARE HURTING ME." Blood pressure reading was 235/100 and was pumping tight on her left arm and wanted me to stop checking her blood pressure.  "

## 2017-04-21 NOTE — ASSESSMENT & PLAN NOTE
Recent Labs      04/20/17   1946  04/20/17 2058  04/21/17   0012  04/21/17   0204  04/21/17   0903   POCTGLUCOSE  169*  154*  157*  217*  308*     A1c 8.2  levemir 25 q hs  aspart 3 tid

## 2017-04-21 NOTE — SUBJECTIVE & OBJECTIVE
Interval History: +VERA, worried about drug allergies    Review of Systems   Constitutional: Positive for activity change and appetite change (poor ). Negative for chills and fatigue.        Obese  VERA with walking to BR   HENT: Negative for congestion, sore throat and trouble swallowing.    Respiratory: Negative for cough, chest tightness and shortness of breath.    Cardiovascular: Negative for chest pain and leg swelling.   Gastrointestinal: Negative for abdominal distention, abdominal pain, constipation, diarrhea, nausea and vomiting.        + GERD and HH   Genitourinary: Positive for frequency (after lasix). Negative for difficulty urinating, dysuria, flank pain, hematuria and pelvic pain.   Musculoskeletal: Negative for arthralgias and back pain.   Skin: Negative for pallor, rash and wound.   Neurological: Negative for speech difficulty and weakness.   Hematological: Does not bruise/bleed easily.   Psychiatric/Behavioral: Negative for agitation, behavioral problems and sleep disturbance.     Objective:     Vital Signs (Most Recent):  Temp: 98.4 °F (36.9 °C) (04/21/17 0800)  Pulse: 61 (04/21/17 1200)  Resp: (!) 24 (04/21/17 1200)  BP: 136/60 (04/21/17 1200)  SpO2: 95 % (04/21/17 1200) Vital Signs (24h Range):  Temp:  [97.9 °F (36.6 °C)-98.4 °F (36.9 °C)] 98.4 °F (36.9 °C)  Pulse:  [58-69] 61  Resp:  [18-54] 24  SpO2:  [93 %-98 %] 95 %  BP: (130-235)/() 136/60     Weight: 98.6 kg (217 lb 6 oz)  Body mass index is 45.43 kg/(m^2).  No intake or output data in the 24 hours ending 04/21/17 1326   Physical Exam   Constitutional: She is oriented to person, place, and time. She appears well-developed and well-nourished.   HENT:   Head: Normocephalic.   Mouth/Throat: Oropharynx is clear and moist.   Eyes: Conjunctivae are normal. Pupils are equal, round, and reactive to light. No scleral icterus.   Neck: Normal range of motion. Neck supple.   Cardiovascular: Normal rate, regular rhythm, normal heart sounds and  intact distal pulses.  Exam reveals no gallop and no friction rub.    No murmur heard.  Pulmonary/Chest: Effort normal and breath sounds normal. No stridor. No respiratory distress. She has no wheezes. She has no rales. She exhibits no tenderness.   Abdominal: Soft. Bowel sounds are normal. She exhibits no distension and no mass. There is no tenderness. There is no rebound and no guarding.   Musculoskeletal: Normal range of motion. She exhibits edema. She exhibits no tenderness.   Lymphadenopathy:     She has no cervical adenopathy.   Neurological: She is alert and oriented to person, place, and time. She has normal strength.   Skin: Skin is warm and dry. No rash noted.   Psychiatric: She has a normal mood and affect. Her speech is normal and behavior is normal. Judgment and thought content normal. Cognition and memory are normal.   Nursing note and vitals reviewed.      Significant Labs:   CBC:   Recent Labs  Lab 04/20/17 1942 04/21/17  0435   WBC 9.71 9.51   HGB 12.1 12.1   HCT 36.1* 35.7*    301     CMP:   Recent Labs  Lab 04/20/17 1942 04/21/17  0435    143   K 4.2 3.4*    101   CO2 29 31*   * 240*   BUN 11 12   CREATININE 0.9 0.9   CALCIUM 9.2 8.9   PROT 7.5 7.1   ALBUMIN 3.4* 3.2*   BILITOT 0.3 0.4   ALKPHOS 94 86   AST 14 10   ALT 7* 7*   ANIONGAP 7* 11   EGFRNONAA >60.0 >60.0     Recent Labs      04/20/17 1946 04/20/17 2058  04/21/17   0012  04/21/17   0204  04/21/17   0903   POCTGLUCOSE  169*  154*  157*  217*  308*         Significant Imaging:  cxr pulm edema

## 2017-04-21 NOTE — ED PROVIDER NOTES
Encounter Date: 4/20/2017    SCRIBE #1 NOTE: I, Gulshan Moyer, am scribing for, and in the presence of,  Dr. Mcgowan. I have scribed the following portions of the note - the Resident attestation and the EKG reading.       History     Chief Complaint   Patient presents with    Shortness of Breath     + Wheezing, BLE swelling.      Review of patient's allergies indicates:   Allergen Reactions    Benadryl [diphenhydramine hcl] Shortness Of Breath    Contac 12 hour allergy Anaphylaxis and Hives    Ciprofloxacin (bulk) Nausea And Vomiting    Anti-hyst Other (See Comments)    Antihistamines - alkylamine Hives    Codeine      bad reaction    Glucotrol [glipizide]     Hydroxyzine      Unknown    Iodinated contrast media - oral and iv dye Other (See Comments)    Nitrofuran analogues     Propoxyphene Itching    Doxycycline Rash    Penicillins Rash    Sulfa (sulfonamide antibiotics) Rash     HPI Comments: Pt is 82F with h/o IDDM, HTN, hypothyroidism, CKD stage 3, HLD who presents with SOB over the past 5 days. Pt states this is exertional in nature. Associated with b/l LE edema. Only able to walk 10 feet without becoming short of breath. SOB not associated with CP but pt does have intermittent burning CP associated with meals. Negative pharmacologic stress test in 2013. No h/o ACS, no stents or bipasses. Pt denies any pleuritic CP, hemoptysis, swelling of unilateral lower extremity, surgery in the past 4 wks, exogenous hormone use, or recent travel. No fevers at home. Pt denies an relieving factors.     Past Medical History:   Diagnosis Date    Acquired hypothyroidism 4/25/2014    Anxiety     Aortic calcification 12/8/2016    X-Ray Chest-5/08/2014    Arthritis     Bilateral carotid artery disease 12/8/2016    US Carotid Bilateral-1/24/2013    CKD stage 3 due to type 2 diabetes mellitus 2/16/2017    Depression     Diabetes mellitus type II     Essential hypertension     Fever blister     Glaucoma suspect  with open angle 2010    [facility] (dr. robledo)     Hyperlipidemia LDL goal <70 2/16/2017    Hypertension     Hypothyroidism     Keloid cicatrix     Mixed stress and urge urinary incontinence 2/16/2017    Morbid obesity with BMI of 40.0-44.9, adult 12/8/2016    Obesity     Thyroid disease     Type 2 diabetes mellitus     Type 2 diabetes mellitus with hyperglycemia, with long-term current use of insulin      Past Surgical History:   Procedure Laterality Date    CATARACT EXTRACTION W/  INTRAOCULAR LENS IMPLANT Right 05/30/2012        CATARACT EXTRACTION W/  INTRAOCULAR LENS IMPLANT Left 05/26/2010        CHOLECYSTECTOMY      COLONOSCOPY W/ POLYPECTOMY  04/20/2015    ESOPHAGOGASTRODUODENOSCOPY  04/20/2015    HYSTERECTOMY      Partial    JOINT REPLACEMENT      knee replacement right      TONSILLECTOMY, ADENOIDECTOMY      tonsillectomy only     Family History   Problem Relation Age of Onset    Glaucoma Father     Stroke Father     Heart attack Father     Nephrolithiasis Father     Colon cancer Mother     Cancer Mother      colon ca    Heart disease Mother     Uterine cancer Daughter      uterine sarcoma    Hematuria Brother     Heart disease Maternal Grandmother     Hypertension Maternal Grandmother     Heart attack Maternal Grandmother     No Known Problems Daughter     Amblyopia Neg Hx     Blindness Neg Hx     Cataracts Neg Hx     Macular degeneration Neg Hx     Retinal detachment Neg Hx     Strabismus Neg Hx      Social History   Substance Use Topics    Smoking status: Never Smoker    Smokeless tobacco: Never Used    Alcohol use No     Review of Systems   Constitutional: Negative for chills and fever.   HENT: Negative for congestion.    Eyes: Negative for visual disturbance.   Respiratory: Positive for shortness of breath. Negative for cough and wheezing.    Cardiovascular: Negative for chest pain.   Gastrointestinal: Negative for abdominal distention,  abdominal pain, blood in stool, constipation, diarrhea, nausea and vomiting.   Genitourinary: Negative for dysuria and hematuria.   Musculoskeletal: Negative for myalgias.   Skin: Negative for rash.   Neurological: Negative for headaches.       Physical Exam   Initial Vitals   BP Pulse Resp Temp SpO2   04/20/17 1626 04/20/17 1626 04/20/17 1626 04/20/17 1626 04/20/17 1626   203/78 63 20 98.2 °F (36.8 °C) 96 %     Physical Exam    Nursing note and vitals reviewed.  Constitutional: She appears well-developed and well-nourished. No distress.   HENT:   Head: Normocephalic and atraumatic.   Eyes: EOM are normal. Pupils are equal, round, and reactive to light.   Neck: Normal range of motion. Neck supple.   Cardiovascular: Normal rate, regular rhythm and normal heart sounds. Exam reveals no gallop and no friction rub.    No murmur heard.  2+ lower extremity edema pitting BTK   Pulmonary/Chest: Breath sounds normal. No respiratory distress. She has no wheezes. She has no rhonchi. She has no rales.   Abdominal: Soft. She exhibits no distension. There is no tenderness. There is no rebound and no guarding.   Musculoskeletal: She exhibits no edema.   Neurological: She is alert and oriented to person, place, and time. No cranial nerve deficit.   Skin: Skin is warm and dry. No rash noted.   Psychiatric: She has a normal mood and affect. Her behavior is normal. Thought content normal.         ED Course   Procedures  Labs Reviewed   CBC W/ AUTO DIFFERENTIAL - Abnormal; Notable for the following:        Result Value    Hematocrit 36.1 (*)     All other components within normal limits   COMPREHENSIVE METABOLIC PANEL - Abnormal; Notable for the following:     Glucose 157 (*)     Albumin 3.4 (*)     ALT 7 (*)     Anion Gap 7 (*)     All other components within normal limits   URINALYSIS - Abnormal; Notable for the following:     Appearance, UA Hazy (*)     Occult Blood UA 1+ (*)     Nitrite, UA Positive (*)     All other components  within normal limits   B-TYPE NATRIURETIC PEPTIDE - Abnormal; Notable for the following:      (*)     All other components within normal limits   URINALYSIS MICROSCOPIC - Abnormal; Notable for the following:     Bacteria, UA Many (*)     All other components within normal limits   CBC W/ AUTO DIFFERENTIAL - Abnormal; Notable for the following:     Hematocrit 35.7 (*)     MPV 9.1 (*)     All other components within normal limits   COMPREHENSIVE METABOLIC PANEL - Abnormal; Notable for the following:     Potassium 3.4 (*)     CO2 31 (*)     Glucose 240 (*)     Albumin 3.2 (*)     ALT 7 (*)     All other components within normal limits   LIPID PANEL - Abnormal; Notable for the following:     Cholesterol 266 (*)     Triglycerides 193 (*)     LDL Cholesterol 175.4 (*)     HDL/Chol Ratio 19.5 (*)     Total Cholesterol/HDL Ratio 5.1 (*)     All other components within normal limits    Narrative:     add on lipid tsh orders [id number] per Dr. Sam Fowler    04/21/2017  02:58    POCT GLUCOSE - Abnormal; Notable for the following:     POCT Glucose 169 (*)     All other components within normal limits   POCT GLUCOSE - Abnormal; Notable for the following:     POCT Glucose 157 (*)     All other components within normal limits   POCT GLUCOSE - Abnormal; Notable for the following:     POCT Glucose 217 (*)     All other components within normal limits   TROPONIN I   INFLUENZA A AND B ANTIGEN   LIPID PANEL   TSH   MAGNESIUM   TSH    Narrative:     add on lipid tsh orders [id number] per Dr. Sam Fowler    04/21/2017  02:58      EKG Readings: (Independently Interpreted)   Sinus bradycardia with normal axis, mild ST segment flattening noted throughout at 51 beats/min     HOII MDM  This is an emergent evaluation of 82 y.o. female who presents with SOB.    VS show hypertension to 203/78. Ddx includes but is not limited to ACS, PE, pneumonia, ptx, CHF exacerbation. CP work up in progress. Will continue to  evaluate as labs and imaging result.    Joaquin Temple MD  PGY-2, [facility]  8:35 PM  4/20/2017     Medical Decision Making:   Independently Interpreted Test(s):   I have ordered and independently interpreted EKG Reading(s) - see prior notes  Clinical Tests:   Lab Tests: Ordered and Reviewed  Medical Tests: Ordered and Reviewed  Other:   I have discussed this case with another health care provider.       APC / Resident Notes:   11:51 PM  Case discussed with internal medicine. Will give 10mg hydralazine and be admitted to internal medicine.    Jeffery Alexander MD  PGY-1         Scribe Attestation:   Scribe #1: I performed the above scribed service and the documentation accurately describes the services I performed. I attest to the accuracy of the note.    Attending Attestation:   Physician Attestation Statement for Resident:  As the supervising MD   Physician Attestation Statement: I have personally seen and examined this patient.   I agree with the above history. -: 82 y.o. woman presents for evaluation of exertional dyspnea and lower extremity swelling noted for a few days duration    As the supervising MD I agree with the above PE.    As the supervising MD I agree with the above treatment, course, plan, and disposition.          Physician Attestation for Scribe:  Physician Attestation Statement for Scribe #1: I, Dr. Mcgowan, reviewed documentation, as scribed by Gulshan Moyer in my presence, and it is both accurate and complete.                 ED Course     Clinical Impression:   The primary encounter diagnosis was Acute heart failure, unspecified heart failure type. Diagnoses of SOB (shortness of breath) and Shortness of breath were also pertinent to this visit.    Disposition:   Disposition: Admitted  Condition: Stable  IM       Jeffery Alexander MD  Resident  04/20/17 8546       Doroteo Mcgowan MD  04/25/17 5127

## 2017-04-21 NOTE — PT/OT/SLP EVAL
Physical Therapy  Evaluation    Courtney Engle   MRN: [id number]   Admitting Diagnosis: Hypertensive emergency    PT Received On: 04/21/17  PT Start Time: 1040     PT Stop Time: 1107    PT Total Time (min): 27 min       Billable Minutes:  Evaluation 17 and Therapeutic Activity 10 (co-tx with OT)    Diagnosis: Hypertensive emergency    Past Medical History:   Diagnosis Date    Acquired hypothyroidism 4/25/2014    Anxiety     Aortic calcification 12/8/2016    X-Ray Chest-5/08/2014    Arthritis     Bilateral carotid artery disease 12/8/2016    US Carotid Bilateral-1/24/2013    CKD stage 3 due to type 2 diabetes mellitus 2/16/2017    Depression     Diabetes mellitus type II     Essential hypertension     Fever blister     Glaucoma 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 robledo)     Hyperlipidemia LDL goal <70 2/16/2017    Hypertension     Hypothyroidism     Keloid cicatrix     Mixed stress and urge urinary incontinence 2/16/2017    Morbid obesity with BMI of 40.0-44.9, adult 12/8/2016    Obesity     Thyroid disease     Type 2 diabetes mellitus     Type 2 diabetes mellitus with hyperglycemia, with long-term current use of insulin       Past Surgical History:   Procedure Laterality Date    CATARACT EXTRACTION W/  INTRAOCULAR LENS IMPLANT Right 05/30/2012        CATARACT EXTRACTION W/  INTRAOCULAR LENS IMPLANT Left 05/26/2010        CHOLECYSTECTOMY      COLONOSCOPY W/ POLYPECTOMY  04/20/2015    ESOPHAGOGASTRODUODENOSCOPY  04/20/2015    HYSTERECTOMY      Partial    JOINT REPLACEMENT      knee replacement right      TONSILLECTOMY, ADENOIDECTOMY      tonsillectomy only       Referring physician: MD Malcolm   Date referred to PT: 4/21/17    General Precautions: Standard, fall  Orthopedic Precautions: N/A   Braces: N/A       Do you have any cultural, spiritual, Advent conflicts, given your current situation?: none noted     Patient History:  Lives With: alone  Living  "Arrangements: house  Home Layout: Able to live on 1st floor  Transportation Available: family or friend will provide  Living Environment Comment: Pt lives alone in 2SH with bedroom on 1st floor; pt prefers to use bathroom on 2nd floor but able to live on 1st floor if needed. PTA, pt was (I) with ADLs and used RW or SPC for mobility. Pt does not have assist nearby but can temporarily stay with her daughter upon d/c if needed.    Equipment Currently Used at Home: cane, straight, walker, rolling, shower chair ([employer])  DME owned (not currently used): none    Previous Level of Function:  Ambulation Skills: needs device  Transfer Skills: independent  ADL Skills: independent    Subjective:  Communicated with RN prior to session.  "I've fallen but I've never hurt myself." re: fall history   Chief Complaint: dizziness  Patient goals: return home     Pain Ratin/10     Objective:   Patient found with: telemetry, pulse ox (continuous), oxygen     Cognitive Exam:  Oriented to: Person, Place, Time and Situation    Follows Commands/attention: Follows two-step commands  Communication: clear/fluent  Safety awareness/insight to disability: impaired    Physical Exam:  Postural examination/scapula alignment: Rounded shoulder and Posterior pelvic tilt    Skin integrity: Visible skin intact  Edema: Mild BLE     Sensation:   Intact    Lower Extremity Range of Motion:  Right Lower Extremity: WFL  Left Lower Extremity: WFL    Lower Extremity Strength:  Right Lower Extremity: WFL  Left Lower Extremity: WFL    Functional Mobility:  Bed Mobility:  Sit to Supine: Stand by Assistance    Transfers:  Sit <> Stand Assistance: Contact Guard Assistance  Sit <> Stand Assistive Device: No Assistive Device  Toilet Transfer Technique: Stand Pivot  Toilet Transfer Assistance: Contact Guard Assistance  Toilet Transfer Assistive Device: No Assistive Device    Gait:   Gait Distance: ~15 ft. + ~15 ft. T/F bathroom   Assistance 1: Contact Guard " Assistance  Gait Assistive Device: Hand held assist  Gait Pattern: 2-point gait  Gait Deviation(s): decreased thaddeus, decreased step length, decreased weight-shifting ability, increased time in double stance, decreased velocity of limb motion    Ambulated without O2. SpO2 89% following. Returned to NC, and SpO2 quickly improved to >90%.     Balance:   Static Sit: supervision  Dynamic Sit: SBA  Static Stand: SBA-CGA  Dynamic stand: CGA    Therapeutic Activities and Exercises:  Pt educated on role of PT and PT POC. Pt verbalized understanding.   Pt educated on d/c recs for using downstairs bathroom and for HH therapy. Pt verbalized understanding. Pt's daughter reports that pt can temporarily live with her if needed.   Increased time required sitting EOB prior to progressing mobility 2* dizziness. BP and SpO2 monitored on Visi throughout session. RN to bedside to assess prior to ambulation. Pt educated on taking increased time with transitional movements 2* blood pressure changes and subsequent dizziness. Pt verbalized understanding.   Pt voided while seated on toilet. CGA for toilet transfer. Performed handwashing following with SBA for standing balance.     AM-PAC 6 CLICK MOBILITY  How much help from another person does this patient currently need?   1 = Unable, Total/Dependent Assistance  2 = A lot, Maximum/Moderate Assistance  3 = A little, Minimum/Contact Guard/Supervision  4 = None, Modified Byars/Independent    Turning over in bed (including adjusting bedclothes, sheets and blankets)?: 3  Sitting down on and standing up from a chair with arms (e.g., wheelchair, bedside commode, etc.): 3  Moving from lying on back to sitting on the side of the bed?: 3  Moving to and from a bed to a chair (including a wheelchair)?: 3  Need to walk in hospital room?: 3  Climbing 3-5 steps with a railing?: 2  Total Score: 17     AM-PAC Raw Score CMS G-Code Modifier Level of Impairment Assistance   6 % Total / Unable    7 - 9 CM 80 - 100% Maximal Assist   10 - 14 CL 60 - 80% Moderate Assist   15 - 19 CK 40 - 60% Moderate Assist   20 - 22 CJ 20 - 40% Minimal Assist   23 CI 1-20% SBA / CGA   24 CH 0% Independent/ Mod I     Patient left supine with all lines intact, call button in reach, RN notified and pt's daughter present.    Assessment:   Courtney Engle is a 82 y.o. female with a medical diagnosis of Hypertensive emergency and presents with limited functional mobility 2* impaired endurance, decreased balance, and generalized weakness. Pt was able to perform functional mobility mostly without physical assist but required CGA for all standing tasks for improved safety and stability. Ambulation limited this session 2* SOB and dizziness. Pt would benefit from skilled acute PT in order to address current deficits and progress functional mobility.     Rehab identified problem list/impairments: Rehab identified problem list/impairments: weakness, impaired self care skills, impaired balance, decreased safety awareness, impaired endurance, impaired functional mobilty, gait instability, impaired cardiopulmonary response to activity, edema    Rehab potential is good.    Activity tolerance: Good    Discharge recommendations: Discharge Facility/Level Of Care Needs: home health PT, home health OT     Barriers to discharge: Barriers to Discharge: Decreased caregiver support (lives alone)    Equipment recommendations: Equipment Needed After Discharge: none     GOALS:   Physical Therapy Goals        Problem: Physical Therapy Goal    Goal Priority Disciplines Outcome Goal Variances Interventions   Physical Therapy Goal     PT/OT, PT Ongoing (interventions implemented as appropriate)     Description:  Goals to be met by: 17     Patient will increase functional independence with mobility by performin. Supine to sit with Supervision  2. Sit to stand transfer with Supervision  3. Bed to chair transfer with Supervision using AD as needed.    4. Gait  x 150 feet with Stand-by Assistance using AD as needed.   5. Lower extremity exercise program x15 reps, with assistance as needed, in order to increase LE strength and (I) with functional mobility.                 PLAN:    Patient to be seen 4 x/week to address the above listed problems via gait training, therapeutic activities, therapeutic exercises  Plan of Care expires: 05/20/17  Plan of Care reviewed with: patient, daughter        Eunice Mcgrath, PT, DPT   4/21/2017  [phone]

## 2017-04-21 NOTE — PROGRESS NOTES
[facility] Heart Failure Program consult complete. DMF program enrollment attempted. Pt asleep in bed, daughter at bedside, pt sister on speaker phone.  Explained program details including home monitoring expectations, scale + router setup, weekly PharmD calls, and follow up appointment with labs. Addressed pt's questions and concerns to his/her satisfaction. Reviewed blue educational folder (Welcome letter, PharmD contact number, Heart Failure Zones, and program booklet). Pt declined enrollment in 30-day monitoring program.     Pt declined program enrollment due to Discussed program enrollment with family.  They do not feel comfortable making a decision on enrolling in home monitoring program at this time as they state that no one has discussed a diagnosis of HF with them.  Program education material given for family to review. Will hold off on enrolling in program at this time.      Removed from suspect list.

## 2017-04-21 NOTE — PLAN OF CARE
Problem: Patient Care Overview  Goal: Plan of Care Review  Outcome: Ongoing (interventions implemented as appropriate)  POC reviewed with patient and family with all verbalizing understanding. VSS and patient denies presence of pain. Patient diuresing well on Lasix. BS checked q4 with sliding insulin administered. Will continue to monitor.

## 2017-04-21 NOTE — PLAN OF CARE
Problem: Occupational Therapy Goal  Goal: Occupational Therapy Goal  Goals to be met by: 7 days (4/28/17)     Patient will increase functional independence with ADLs by performing:    UE Dressing with Supervision.  LE Dressing with Supervision.  Grooming while standing at sink with Supervision.  Toileting from toilet with Supervision for hygiene and clothing management.   Supine to sit with Supervision.  Toilet transfer to toilet with Supervision.  Increased functional strength to WFL for ADLs.  Outcome: Ongoing (interventions implemented as appropriate)  Eval and POC set 4/21/17

## 2017-04-21 NOTE — PROVIDER PROGRESS NOTES - EMERGENCY DEPT.
"Encounter Date: 4/20/2017    ED Physician Progress Notes        Physician Note:   82-year-old white female past medical history of diabetes, hypertension, CKD, hypothyroidism, glaucoma presents to the ED complaining of shortness of breath for the past week.  About 4-5 days before this shortness of breath began, she noticed wheezing which she attributed to bronchitis.  She's been having dyspnea on exertion, lower extremity edema (L>R), and chest "soreness" for the past week.  She reports generalized myalgias, abdominal cramping, cough, sore throat, rhinorrhea, postnasal drip and had one day of emesis and diarrhea.  She denies any sick contacts and she did not had a flu vaccine this year.    Pharmacological stress in 2013 shows ejection fraction of 65%.  She was supposed to have a repeat pharmacological stress in February but canceled the appointment because she was afraid that she was ALLERGIC to the medication.    Lungs are clear to auscultation bilaterally.  There is sternal chest wall tenderness to palpation.  Abdomen is soft and nontender.  Trace lower extremity edema.  Hypertensive at 203/78.    Labs, influenza swab, chest x-ray ordered.  I initially evaluated this patient and ordered workup while in intake.  The patient will receive a full evaluation in an ED pod when space is available.  All results from tests ordered in intake will not be followed by the intake team, including myself. All results will be followed by the ED Pod team.       "

## 2017-04-21 NOTE — PROGRESS NOTES
[facility] Hospital Medicine  Progress Note    Patient Name: Courtney Engle  MRN: [id number]  Patient Class: IP- Inpatient   Admission Date: 4/20/2017  Length of Stay: 1 days  Attending Physician: Agnes Kay MD  Primary Care Provider: Vishal Ulloa MD    [facility] Medicine Team: [facility]OSP MED B Agnes Kay MD    Subjective:     Principal Problem:Hypertensive emergency    HPI:  Patient is a 82 y.o. female with htn, hld intolerant to statins due to gi upset, iddm2, morbid obesity, GERD/hiatal hernia, HFpEF 65% presenting with hypertensive emergency with pulmonary edema, VERA and LE swelling, found to have hypertensive crisis in the ED.     Per ED: presents with 1 week of dyspnea on exertion and leg swelling. She notes that this past Sunday she went grocery shopping. She came back home and while taking her groceries into her house she had to stop several times due to shortness of breath. She states that at the same time she noticed her legs began to swell. The swelling would recede with leg elevation. Since Sunday her symptoms have been progressively worsening where now even on walking 20 ft to the bathroom i witnessed her getting SOB. She had no SOB prior to this. She does recall some chest pain but it is mostly related to food intake and she states she has a history of hiatal hernia. She does not check her blood pressure or weight herself daily at home. She notes no PND/orthopnea. When she presented to the ED today her bp was around 240 systolic. She has a very poor understanding of lifestyle effects on her blood pressure and eats a high salt, high sugar diet with no control over her iddm.      Her PMH is sig for htn, hld intolerant to statins due to gi upset, iddm2, morbid obesity, GERD/hiatal hernia, HFpEF 65%         Hospital Course:  4/21 -   She was gven lasix and diuresed over night. Atenolol changed to coreg.  BP 130s this am and pt feels dizzy,  Held more BP meds this am to  bring BP down slower,          Interval History: +VERA, worried about drug allergies    Review of Systems   Constitutional: Positive for activity change and appetite change (poor ). Negative for chills and fatigue.        Obese  VERA with walking to BR   HENT: Negative for congestion, sore throat and trouble swallowing.    Respiratory: Negative for cough, chest tightness and shortness of breath.    Cardiovascular: Negative for chest pain and leg swelling.   Gastrointestinal: Negative for abdominal distention, abdominal pain, constipation, diarrhea, nausea and vomiting.        + GERD and HH   Genitourinary: Positive for frequency (after lasix). Negative for difficulty urinating, dysuria, flank pain, hematuria and pelvic pain.   Musculoskeletal: Negative for arthralgias and back pain.   Skin: Negative for pallor, rash and wound.   Neurological: Negative for speech difficulty and weakness.   Hematological: Does not bruise/bleed easily.   Psychiatric/Behavioral: Negative for agitation, behavioral problems and sleep disturbance.     Objective:     Vital Signs (Most Recent):  Temp: 98.4 °F (36.9 °C) (04/21/17 0800)  Pulse: 61 (04/21/17 1200)  Resp: (!) 24 (04/21/17 1200)  BP: 136/60 (04/21/17 1200)  SpO2: 95 % (04/21/17 1200) Vital Signs (24h Range):  Temp:  [97.9 °F (36.6 °C)-98.4 °F (36.9 °C)] 98.4 °F (36.9 °C)  Pulse:  [58-69] 61  Resp:  [18-54] 24  SpO2:  [93 %-98 %] 95 %  BP: (130-235)/() 136/60     Weight: 98.6 kg (217 lb 6 oz)  Body mass index is 45.43 kg/(m^2).  No intake or output data in the 24 hours ending 04/21/17 1326   Physical Exam   Constitutional: She is oriented to person, place, and time. She appears well-developed and well-nourished.   HENT:   Head: Normocephalic.   Mouth/Throat: Oropharynx is clear and moist.   Eyes: Conjunctivae are normal. Pupils are equal, round, and reactive to light. No scleral icterus.   Neck: Normal range of motion. Neck supple.   Cardiovascular: Normal rate, regular  rhythm, normal heart sounds and intact distal pulses.  Exam reveals no gallop and no friction rub.    No murmur heard.  Pulmonary/Chest: Effort normal and breath sounds normal. No stridor. No respiratory distress. She has no wheezes. She has no rales. She exhibits no tenderness.   Abdominal: Soft. Bowel sounds are normal. She exhibits no distension and no mass. There is no tenderness. There is no rebound and no guarding.   Musculoskeletal: Normal range of motion. She exhibits edema. She exhibits no tenderness.   Lymphadenopathy:     She has no cervical adenopathy.   Neurological: She is alert and oriented to person, place, and time. She has normal strength.   Skin: Skin is warm and dry. No rash noted.   Psychiatric: She has a normal mood and affect. Her speech is normal and behavior is normal. Judgment and thought content normal. Cognition and memory are normal.   Nursing note and vitals reviewed.      Significant Labs:   CBC:   Recent Labs  Lab 04/20/17 1942 04/21/17  0435   WBC 9.71 9.51   HGB 12.1 12.1   HCT 36.1* 35.7*    301     CMP:   Recent Labs  Lab 04/20/17 1942 04/21/17  0435    143   K 4.2 3.4*    101   CO2 29 31*   * 240*   BUN 11 12   CREATININE 0.9 0.9   CALCIUM 9.2 8.9   PROT 7.5 7.1   ALBUMIN 3.4* 3.2*   BILITOT 0.3 0.4   ALKPHOS 94 86   AST 14 10   ALT 7* 7*   ANIONGAP 7* 11   EGFRNONAA >60.0 >60.0     Recent Labs      04/20/17 1946 04/20/17 2058 04/21/17   0012  04/21/17   0204  04/21/17   0903   POCTGLUCOSE  169*  154*  157*  217*  308*         Significant Imaging:  cxr pulm edema    Assessment/Plan:      * Hypertensive emergency  4/21 - resolved,  With pulmonary edema  Echo - pending  Lasix 20 qd, lisinopri l5 qd, coreg 6.25 qd.  Received iv hydralazine  And iv lasix which worked well  Low salt diet      Type 2 diabetes mellitus with hyperglycemia, with long-term current use of insulin  Recent Labs      04/20/17 1946 04/20/17 2058 04/21/17   0012   04/21/17   0204  04/21/17   0903   POCTGLUCOSE  169*  154*  157*  217*  308*       levemir 25 q hs  aspart 3 tid    CKD stage 3 due to type 2 diabetes mellitus  Cr 0.9    Essential hypertension  Adjust meds  discussed compliance  She does not have regular sleep hours      Acquired hypothyroidism  Continue synthroid  tsh ok    Morbid obesity with BMI of 40.0-44.9, adult        Acute heart failure  Echo pending      VTE Risk Mitigation         Ordered     heparin (porcine) injection 5,000 Units  Every 8 hours     Route:  Subcutaneous        04/21/17 0110     Medium Risk of VTE  Once      04/20/17 7576          Agnes Kay MD  Department of Hospital Medicine   [facility]

## 2017-04-22 LAB
ALBUMIN SERPL BCP-MCNC: 3.3 G/DL
ALP SERPL-CCNC: 86 U/L
ALT SERPL W/O P-5'-P-CCNC: 8 U/L
ANION GAP SERPL CALC-SCNC: 9 MMOL/L
AST SERPL-CCNC: 11 U/L
BASOPHILS # BLD AUTO: 0.03 K/UL
BASOPHILS NFR BLD: 0.4 %
BILIRUB SERPL-MCNC: 0.5 MG/DL
BUN SERPL-MCNC: 23 MG/DL
CALCIUM SERPL-MCNC: 9.2 MG/DL
CHLORIDE SERPL-SCNC: 96 MMOL/L
CO2 SERPL-SCNC: 36 MMOL/L
CREAT SERPL-MCNC: 1.1 MG/DL
DIFFERENTIAL METHOD: ABNORMAL
EOSINOPHIL # BLD AUTO: 0.3 K/UL
EOSINOPHIL NFR BLD: 4.2 %
ERYTHROCYTE [DISTWIDTH] IN BLOOD BY AUTOMATED COUNT: 13.7 %
EST. GFR  (AFRICAN AMERICAN): 54.8 ML/MIN/1.73 M^2
EST. GFR  (NON AFRICAN AMERICAN): 47.5 ML/MIN/1.73 M^2
GLUCOSE SERPL-MCNC: 227 MG/DL
HCT VFR BLD AUTO: 36.9 %
HGB BLD-MCNC: 12.2 G/DL
LYMPHOCYTES # BLD AUTO: 2.3 K/UL
LYMPHOCYTES NFR BLD: 33.6 %
MAGNESIUM SERPL-MCNC: 1.8 MG/DL
MCH RBC QN AUTO: 29.3 PG
MCHC RBC AUTO-ENTMCNC: 33.1 %
MCV RBC AUTO: 89 FL
MONOCYTES # BLD AUTO: 0.6 K/UL
MONOCYTES NFR BLD: 8 %
NEUTROPHILS # BLD AUTO: 3.7 K/UL
NEUTROPHILS NFR BLD: 53.7 %
PLATELET # BLD AUTO: 328 K/UL
PMV BLD AUTO: 9.6 FL
POCT GLUCOSE: 210 MG/DL (ref 70–110)
POCT GLUCOSE: 213 MG/DL (ref 70–110)
POCT GLUCOSE: 240 MG/DL (ref 70–110)
POCT GLUCOSE: 250 MG/DL (ref 70–110)
POCT GLUCOSE: 304 MG/DL (ref 70–110)
POCT GLUCOSE: 356 MG/DL (ref 70–110)
POTASSIUM SERPL-SCNC: 4 MMOL/L
PROT SERPL-MCNC: 7.2 G/DL
RBC # BLD AUTO: 4.17 M/UL
SODIUM SERPL-SCNC: 141 MMOL/L
WBC # BLD AUTO: 6.9 K/UL

## 2017-04-22 PROCEDURE — 99233 SBSQ HOSP IP/OBS HIGH 50: CPT | Mod: ,,, | Performed by: HOSPITALIST

## 2017-04-22 PROCEDURE — 25000003 PHARM REV CODE 250: Performed by: INTERNAL MEDICINE

## 2017-04-22 PROCEDURE — 36415 COLL VENOUS BLD VENIPUNCTURE: CPT

## 2017-04-22 PROCEDURE — 85025 COMPLETE CBC W/AUTO DIFF WBC: CPT

## 2017-04-22 PROCEDURE — 25000003 PHARM REV CODE 250: Performed by: HOSPITALIST

## 2017-04-22 PROCEDURE — 80053 COMPREHEN METABOLIC PANEL: CPT

## 2017-04-22 PROCEDURE — 83735 ASSAY OF MAGNESIUM: CPT

## 2017-04-22 PROCEDURE — 25000003 PHARM REV CODE 250: Performed by: EMERGENCY MEDICINE

## 2017-04-22 PROCEDURE — 20600001 HC STEP DOWN PRIVATE ROOM

## 2017-04-22 RX ORDER — INSULIN ASPART 100 [IU]/ML
6 INJECTION, SOLUTION INTRAVENOUS; SUBCUTANEOUS
Status: DISCONTINUED | OUTPATIENT
Start: 2017-04-22 | End: 2017-04-23

## 2017-04-22 RX ORDER — LISINOPRIL 10 MG/1
10 TABLET ORAL DAILY
Status: DISCONTINUED | OUTPATIENT
Start: 2017-04-22 | End: 2017-04-25 | Stop reason: HOSPADM

## 2017-04-22 RX ORDER — GABAPENTIN 100 MG/1
100 CAPSULE ORAL NIGHTLY
Status: DISCONTINUED | OUTPATIENT
Start: 2017-04-22 | End: 2017-04-25 | Stop reason: HOSPADM

## 2017-04-22 RX ORDER — CARVEDILOL 12.5 MG/1
12.5 TABLET ORAL 2 TIMES DAILY
Status: DISCONTINUED | OUTPATIENT
Start: 2017-04-22 | End: 2017-04-23

## 2017-04-22 RX ORDER — BUTALBITAL, ACETAMINOPHEN AND CAFFEINE 50; 325; 40 MG/1; MG/1; MG/1
1 TABLET ORAL
Status: DISCONTINUED | OUTPATIENT
Start: 2017-04-22 | End: 2017-04-22

## 2017-04-22 RX ADMIN — Medication 3 ML: at 02:04

## 2017-04-22 RX ADMIN — FUROSEMIDE 20 MG: 20 TABLET ORAL at 09:04

## 2017-04-22 RX ADMIN — HEPARIN SODIUM 5000 UNITS: 5000 INJECTION, SOLUTION INTRAVENOUS; SUBCUTANEOUS at 09:04

## 2017-04-22 RX ADMIN — ESCITALOPRAM OXALATE 10 MG: 10 TABLET ORAL at 09:04

## 2017-04-22 RX ADMIN — LEVOTHYROXINE SODIUM 75 MCG: 75 TABLET ORAL at 06:04

## 2017-04-22 RX ADMIN — TRIAMCINOLONE ACETONIDE: 1 CREAM TOPICAL at 09:04

## 2017-04-22 RX ADMIN — CARVEDILOL 12.5 MG: 12.5 TABLET, FILM COATED ORAL at 09:04

## 2017-04-22 RX ADMIN — LISINOPRIL 10 MG: 10 TABLET ORAL at 09:04

## 2017-04-22 RX ADMIN — INSULIN ASPART 4 UNITS: 100 INJECTION, SOLUTION INTRAVENOUS; SUBCUTANEOUS at 09:04

## 2017-04-22 RX ADMIN — INSULIN ASPART 8 UNITS: 100 INJECTION, SOLUTION INTRAVENOUS; SUBCUTANEOUS at 01:04

## 2017-04-22 RX ADMIN — INSULIN ASPART 4 UNITS: 100 INJECTION, SOLUTION INTRAVENOUS; SUBCUTANEOUS at 07:04

## 2017-04-22 RX ADMIN — INSULIN ASPART 3 UNITS: 100 INJECTION, SOLUTION INTRAVENOUS; SUBCUTANEOUS at 01:04

## 2017-04-22 RX ADMIN — ASPIRIN 81 MG: 81 TABLET, COATED ORAL at 09:04

## 2017-04-22 RX ADMIN — HEPARIN SODIUM 5000 UNITS: 5000 INJECTION, SOLUTION INTRAVENOUS; SUBCUTANEOUS at 06:04

## 2017-04-22 RX ADMIN — FAMOTIDINE 20 MG: 20 TABLET, FILM COATED ORAL at 09:04

## 2017-04-22 RX ADMIN — INSULIN DETEMIR 25 UNITS: 100 INJECTION, SOLUTION SUBCUTANEOUS at 09:04

## 2017-04-22 RX ADMIN — GABAPENTIN 100 MG: 100 CAPSULE ORAL at 09:04

## 2017-04-22 RX ADMIN — HEPARIN SODIUM 5000 UNITS: 5000 INJECTION, SOLUTION INTRAVENOUS; SUBCUTANEOUS at 02:04

## 2017-04-22 RX ADMIN — FLUTICASONE PROPIONATE 1 SPRAY: 50 SPRAY, METERED NASAL at 09:04

## 2017-04-22 RX ADMIN — INSULIN ASPART 6 UNITS: 100 INJECTION, SOLUTION INTRAVENOUS; SUBCUTANEOUS at 07:04

## 2017-04-22 RX ADMIN — INSULIN ASPART 3 UNITS: 100 INJECTION, SOLUTION INTRAVENOUS; SUBCUTANEOUS at 09:04

## 2017-04-22 RX ADMIN — PANTOPRAZOLE SODIUM 40 MG: 40 TABLET, DELAYED RELEASE ORAL at 09:04

## 2017-04-22 NOTE — PROGRESS NOTES
[facility] Hospital Medicine  Progress Note    Patient Name: Courtney Engle  MRN: [id number]  Patient Class: IP- Inpatient   Admission Date: 4/20/2017  Length of Stay: 2 days  Attending Physician: Agnes Kay MD  Primary Care Provider: Vishal Ulloa MD    [facility] Medicine Team: [facility] MED B Agnes Kay MD    Subjective:     Principal Problem:Hypertensive emergency    HPI:  Patient is a 82 y.o. female with htn, hld intolerant to statins due to gi upset, iddm2, morbid obesity, GERD/hiatal hernia, HFpEF 65% presenting with hypertensive emergency with pulmonary edema, VERA and LE swelling, found to have hypertensive crisis in the ED.     Per ED: presents with 1 week of dyspnea on exertion and leg swelling. She notes that this past Sunday she went grocery shopping. She came back home and while taking her groceries into her house she had to stop several times due to shortness of breath. She states that at the same time she noticed her legs began to swell. The swelling would recede with leg elevation. Since Sunday her symptoms have been progressively worsening where now even on walking 20 ft to the bathroom i witnessed her getting SOB. She had no SOB prior to this. She does recall some chest pain but it is mostly related to food intake and she states she has a history of hiatal hernia. She does not check her blood pressure or weight herself daily at home. She notes no PND/orthopnea. When she presented to the ED today her bp was around 240 systolic. She has a very poor understanding of lifestyle effects on her blood pressure and eats a high salt, high sugar diet with no control over her iddm.      Her PMH is sig for htn, hld intolerant to statins due to gi upset, iddm2, morbid obesity, GERD/hiatal hernia, HFpEF 65%         Hospital Course:  4/21 -   She was gven lasix and diuresed over night. Atenolol changed to coreg.  BP 130s this am and pt feels dizzy,  Held more BP meds this am to  bring BP down slower,  4/22 - feeling better, dyspnea improved, [id number], discussed polypharmacy- she c/o dry mouth.           No new subjective & objective note has been filed under this hospital service since the last note was generated.    Assessment/Plan:      Acute heart failure  Echo:  + diastolic dysfunction, EF 60%  Consistent with hypertensive heart diseases      VTE Risk Mitigation         Ordered     heparin (porcine) injection 5,000 Units  Every 8 hours     Route:  Subcutaneous        04/21/17 0110     Medium Risk of VTE  Once      04/20/17 2346        Attach this note aand to refer to other note written on 4/22.      Agnes Kay MD  Department of Hospital Medicine   [facility]

## 2017-04-22 NOTE — ASSESSMENT & PLAN NOTE
4/23 - 116/76  4/22 - 166/99  4/21 - resolved,  With pulmonary edema  Echo - pending  Lasix 20 qd, lisinopri l0 qd, coreg 6.25 qd.  Received iv hydralazine  And iv lasix which worked well  Low salt diet

## 2017-04-22 NOTE — ASSESSMENT & PLAN NOTE
Recent Labs      04/21/17   0012  04/21/17   0204  04/21/17   0903  04/21/17   1555  04/21/17   2049  04/21/17   2338   POCTGLUCOSE  157*  217*  308*  282*  210*  250*     A1c 8.2  levemir 25 q hs  Increase aspart 8 tid

## 2017-04-22 NOTE — PROGRESS NOTES
Pt complained of not urinating all morning, bladder scanned pt , pt had 281 mL of urine in bladder. Pt stated she will try to void before RN do any nursing interventions. Pt voided spontaneously 270 mL in toilet. Will continue to monitor.

## 2017-04-22 NOTE — SUBJECTIVE & OBJECTIVE
Interval History: SOB and VERA has improved     Review of Systems   Constitutional: Negative for appetite change and fatigue.        Obese  VERA with walking to BR   HENT: Positive for rhinorrhea. Negative for congestion, sore throat and trouble swallowing.         Dry moth  rhinorhea   Respiratory: Negative for cough and shortness of breath.    Cardiovascular: Negative for chest pain and leg swelling.   Gastrointestinal: Negative for abdominal distention, abdominal pain, constipation, diarrhea, nausea and vomiting.        + GERD and HH   Genitourinary: Negative for difficulty urinating, dysuria, flank pain, frequency (after lasix), hematuria and pelvic pain.   Musculoskeletal: Negative for arthralgias and back pain.   Neurological: Negative for speech difficulty and weakness.   Psychiatric/Behavioral: Negative for agitation, behavioral problems and sleep disturbance.     Objective:     Vital Signs (Most Recent):  Temp: 97.8 °F (36.6 °C) (04/22/17 0800)  Pulse: 66 (04/22/17 1400)  Resp: 19 (04/22/17 1200)  BP: (!) 166/99 (04/22/17 1200)  SpO2: (!) 91 % (04/22/17 1200) Vital Signs (24h Range):  Temp:  [97.8 °F (36.6 °C)-98.8 °F (37.1 °C)] 97.8 °F (36.6 °C)  Pulse:  [56-92] 66  Resp:  [18-24] 19  SpO2:  [90 %-95 %] 91 %  BP: (149-178)/() 166/99     Weight: 96.4 kg (212 lb 8.4 oz)  Body mass index is 44.42 kg/(m^2).    Intake/Output Summary (Last 24 hours) at 04/22/17 1429  Last data filed at 04/22/17 0600   Gross per 24 hour   Intake              280 ml   Output             1100 ml   Net             -820 ml      Physical Exam   Constitutional: She is oriented to person, place, and time. She appears well-developed and well-nourished.   HENT:   Head: Normocephalic.   Mouth/Throat: Oropharynx is clear and moist.   Eyes: Conjunctivae are normal. Pupils are equal, round, and reactive to light. No scleral icterus.   Neck: Normal range of motion. Neck supple.   Cardiovascular: Normal rate, regular rhythm, normal heart  sounds and intact distal pulses.  Exam reveals no gallop and no friction rub.    No murmur heard.  Pulmonary/Chest: Effort normal and breath sounds normal. No stridor.   Abdominal: Soft. Bowel sounds are normal. She exhibits no distension. There is no tenderness.   Musculoskeletal: Normal range of motion. She exhibits no edema or tenderness.   Lymphadenopathy:     She has no cervical adenopathy.   Neurological: She is alert and oriented to person, place, and time. She has normal strength.   Skin: Skin is warm and dry. No rash noted.   Psychiatric: She has a normal mood and affect. Her speech is normal and behavior is normal. Judgment and thought content normal. Cognition and memory are normal.   Nursing note and vitals reviewed.       Significant Labs:   CBC:   Recent Labs  Lab 04/20/17 1942 04/21/17 0435 04/22/17  0646   WBC 9.71 9.51 6.90   HGB 12.1 12.1 12.2   HCT 36.1* 35.7* 36.9*    301 328     CMP:   Recent Labs  Lab 04/20/17 1942 04/21/17 0435 04/22/17  0646    143 141   K 4.2 3.4* 4.0    101 96   CO2 29 31* 36*   * 240* 227*   BUN 11 12 23   CREATININE 0.9 0.9 1.1   CALCIUM 9.2 8.9 9.2   PROT 7.5 7.1 7.2   ALBUMIN 3.4* 3.2* 3.3*   BILITOT 0.3 0.4 0.5   ALKPHOS 94 86 86   AST 14 10 11   ALT 7* 7* 8*   ANIONGAP 7* 11 9   EGFRNONAA >60.0 >60.0 47.5*

## 2017-04-23 PROBLEM — N30.00 ACUTE CYSTITIS WITHOUT HEMATURIA: Status: ACTIVE | Noted: 2017-04-23

## 2017-04-23 LAB
ALBUMIN SERPL BCP-MCNC: 3 G/DL
ALP SERPL-CCNC: 80 U/L
ALT SERPL W/O P-5'-P-CCNC: 8 U/L
ANION GAP SERPL CALC-SCNC: 9 MMOL/L
AST SERPL-CCNC: 11 U/L
BACTERIA UR CULT: NORMAL
BASOPHILS # BLD AUTO: 0.03 K/UL
BASOPHILS NFR BLD: 0.4 %
BILIRUB SERPL-MCNC: 0.4 MG/DL
BUN SERPL-MCNC: 28 MG/DL
CALCIUM SERPL-MCNC: 8.9 MG/DL
CHLORIDE SERPL-SCNC: 96 MMOL/L
CO2 SERPL-SCNC: 31 MMOL/L
CREAT SERPL-MCNC: 1 MG/DL
DIFFERENTIAL METHOD: ABNORMAL
EOSINOPHIL # BLD AUTO: 0.3 K/UL
EOSINOPHIL NFR BLD: 3.5 %
ERYTHROCYTE [DISTWIDTH] IN BLOOD BY AUTOMATED COUNT: 13.4 %
EST. GFR  (AFRICAN AMERICAN): >60 ML/MIN/1.73 M^2
EST. GFR  (NON AFRICAN AMERICAN): 53.3 ML/MIN/1.73 M^2
GENTAMICIN PEAK SERPL-MCNC: 8.4 UG/ML
GLUCOSE SERPL-MCNC: 208 MG/DL
HCT VFR BLD AUTO: 35.6 %
HGB BLD-MCNC: 11.8 G/DL
LYMPHOCYTES # BLD AUTO: 2.3 K/UL
LYMPHOCYTES NFR BLD: 32.1 %
MAGNESIUM SERPL-MCNC: 1.9 MG/DL
MCH RBC QN AUTO: 29.2 PG
MCHC RBC AUTO-ENTMCNC: 33.1 %
MCV RBC AUTO: 88 FL
MONOCYTES # BLD AUTO: 0.7 K/UL
MONOCYTES NFR BLD: 9 %
NEUTROPHILS # BLD AUTO: 4 K/UL
NEUTROPHILS NFR BLD: 54.9 %
PLATELET # BLD AUTO: 295 K/UL
PMV BLD AUTO: 9.7 FL
POTASSIUM SERPL-SCNC: 3.9 MMOL/L
PROT SERPL-MCNC: 6.7 G/DL
RBC # BLD AUTO: 4.04 M/UL
SODIUM SERPL-SCNC: 136 MMOL/L
WBC # BLD AUTO: 7.2 K/UL

## 2017-04-23 PROCEDURE — 36415 COLL VENOUS BLD VENIPUNCTURE: CPT

## 2017-04-23 PROCEDURE — 80053 COMPREHEN METABOLIC PANEL: CPT

## 2017-04-23 PROCEDURE — 63600175 PHARM REV CODE 636 W HCPCS: Performed by: HOSPITALIST

## 2017-04-23 PROCEDURE — 80170 ASSAY OF GENTAMICIN: CPT

## 2017-04-23 PROCEDURE — 83735 ASSAY OF MAGNESIUM: CPT

## 2017-04-23 PROCEDURE — 20600001 HC STEP DOWN PRIVATE ROOM

## 2017-04-23 PROCEDURE — 25000003 PHARM REV CODE 250: Performed by: HOSPITALIST

## 2017-04-23 PROCEDURE — 85025 COMPLETE CBC W/AUTO DIFF WBC: CPT

## 2017-04-23 PROCEDURE — 25000003 PHARM REV CODE 250: Performed by: INTERNAL MEDICINE

## 2017-04-23 PROCEDURE — 99233 SBSQ HOSP IP/OBS HIGH 50: CPT | Mod: ,,, | Performed by: HOSPITALIST

## 2017-04-23 RX ORDER — INSULIN ASPART 100 [IU]/ML
8 INJECTION, SOLUTION INTRAVENOUS; SUBCUTANEOUS
Status: DISCONTINUED | OUTPATIENT
Start: 2017-04-23 | End: 2017-04-24

## 2017-04-23 RX ORDER — CARVEDILOL 6.25 MG/1
6.25 TABLET ORAL 2 TIMES DAILY
Status: DISCONTINUED | OUTPATIENT
Start: 2017-04-23 | End: 2017-04-25 | Stop reason: HOSPADM

## 2017-04-23 RX ADMIN — LEVOTHYROXINE SODIUM 75 MCG: 75 TABLET ORAL at 06:04

## 2017-04-23 RX ADMIN — FUROSEMIDE 20 MG: 20 TABLET ORAL at 09:04

## 2017-04-23 RX ADMIN — INSULIN DETEMIR 25 UNITS: 100 INJECTION, SOLUTION SUBCUTANEOUS at 10:04

## 2017-04-23 RX ADMIN — FLUTICASONE PROPIONATE 1 SPRAY: 50 SPRAY, METERED NASAL at 09:04

## 2017-04-23 RX ADMIN — Medication 3 ML: at 03:04

## 2017-04-23 RX ADMIN — GENTAMICIN SULFATE 192 MG: 40 INJECTION, SOLUTION INTRAMUSCULAR; INTRAVENOUS at 01:04

## 2017-04-23 RX ADMIN — INSULIN ASPART 6 UNITS: 100 INJECTION, SOLUTION INTRAVENOUS; SUBCUTANEOUS at 10:04

## 2017-04-23 RX ADMIN — Medication 3 ML: at 08:04

## 2017-04-23 RX ADMIN — HEPARIN SODIUM 5000 UNITS: 5000 INJECTION, SOLUTION INTRAVENOUS; SUBCUTANEOUS at 03:04

## 2017-04-23 RX ADMIN — INSULIN ASPART 8 UNITS: 100 INJECTION, SOLUTION INTRAVENOUS; SUBCUTANEOUS at 07:04

## 2017-04-23 RX ADMIN — TRIAMCINOLONE ACETONIDE: 1 CREAM TOPICAL at 09:04

## 2017-04-23 RX ADMIN — INSULIN ASPART 6 UNITS: 100 INJECTION, SOLUTION INTRAVENOUS; SUBCUTANEOUS at 11:04

## 2017-04-23 RX ADMIN — INSULIN ASPART 4 UNITS: 100 INJECTION, SOLUTION INTRAVENOUS; SUBCUTANEOUS at 03:04

## 2017-04-23 RX ADMIN — GABAPENTIN 100 MG: 100 CAPSULE ORAL at 08:04

## 2017-04-23 RX ADMIN — INSULIN ASPART 4 UNITS: 100 INJECTION, SOLUTION INTRAVENOUS; SUBCUTANEOUS at 10:04

## 2017-04-23 RX ADMIN — LISINOPRIL 10 MG: 10 TABLET ORAL at 09:04

## 2017-04-23 RX ADMIN — ESCITALOPRAM OXALATE 10 MG: 10 TABLET ORAL at 09:04

## 2017-04-23 RX ADMIN — HEPARIN SODIUM 5000 UNITS: 5000 INJECTION, SOLUTION INTRAVENOUS; SUBCUTANEOUS at 08:04

## 2017-04-23 RX ADMIN — PANTOPRAZOLE SODIUM 40 MG: 40 TABLET, DELAYED RELEASE ORAL at 09:04

## 2017-04-23 RX ADMIN — CARVEDILOL 12.5 MG: 12.5 TABLET, FILM COATED ORAL at 09:04

## 2017-04-23 RX ADMIN — INSULIN ASPART 4 UNITS: 100 INJECTION, SOLUTION INTRAVENOUS; SUBCUTANEOUS at 07:04

## 2017-04-23 RX ADMIN — HEPARIN SODIUM 5000 UNITS: 5000 INJECTION, SOLUTION INTRAVENOUS; SUBCUTANEOUS at 06:04

## 2017-04-23 RX ADMIN — CARVEDILOL 6.25 MG: 6.25 TABLET, FILM COATED ORAL at 08:04

## 2017-04-23 RX ADMIN — ASPIRIN 81 MG: 81 TABLET, COATED ORAL at 09:04

## 2017-04-23 NOTE — PLAN OF CARE
Problem: Patient Care Overview  Goal: Plan of Care Review  Outcome: Ongoing (interventions implemented as appropriate)  Patient remains free of falls or injury. Patient denies pain. Continued on PO lasix. Maintained on 1500 ml fluid restriction. Plan for continued diuresis and monitor BP. Plan of care reviewed with patient and daughter.

## 2017-04-23 NOTE — PROGRESS NOTES
[facility] Hospital Medicine  Progress Note    Patient Name: Courtney Engle  MRN: [id number]  Patient Class: IP- Inpatient   Admission Date: 4/20/2017  Length of Stay: 3 days  Attending Physician: Agnes Kay MD  Primary Care Provider: Vishal Ulloa MD    [facility] Medicine Team: [facility] [place] Agnes Kay MD    Subjective:     Principal Problem:Hypertensive emergency    HPI:  Patient is a 82 y.o. female with htn, hld intolerant to statins due to gi upset, iddm2, morbid obesity, GERD/hiatal hernia, HFpEF 65% presenting with hypertensive emergency with pulmonary edema, VERA and LE swelling, found to have hypertensive crisis in the ED.     Per ED: presents with 1 week of dyspnea on exertion and leg swelling. She notes that this past Sunday she went grocery shopping. She came back home and while taking her groceries into her house she had to stop several times due to shortness of breath. She states that at the same time she noticed her legs began to swell. The swelling would recede with leg elevation. Since Sunday her symptoms have been progressively worsening where now even on walking 20 ft to the bathroom i witnessed her getting SOB. She had no SOB prior to this. She does recall some chest pain but it is mostly related to food intake and she states she has a history of hiatal hernia. She does not check her blood pressure or weight herself daily at home. She notes no PND/orthopnea. When she presented to the ED today her bp was around 240 systolic. She has a very poor understanding of lifestyle effects on her blood pressure and eats a high salt, high sugar diet with no control over her iddm.      Her PMH is sig for htn, hld intolerant to statins due to gi upset, iddm2, morbid obesity, GERD/hiatal hernia, HFpEF 65%         Hospital Course:  4/21 -   She was gven lasix and diuresed over night. Atenolol changed to coreg.  BP 130s this am and pt feels dizzy,  Held more BP meds this am to  bring BP down slower,  4/22 - feeling better, dyspnea improved, [id number], discussed polypharmacy- she c/o dry mouth.   4/23 - GNR growing in urine, Gent x one dose given,  states she cannot feel it when she urinates - will check a post-void residual urine, cr 1.1,           Interval History: SOB and VERA has improved     Review of Systems   Constitutional: Negative for appetite change and fatigue.        Obese  VERA with walking to BR   HENT: Positive for rhinorrhea. Negative for congestion, sore throat and trouble swallowing.         Dry moth  rhinorhea   Respiratory: Negative for cough and shortness of breath.    Cardiovascular: Negative for chest pain and leg swelling.   Gastrointestinal: Negative for abdominal distention, abdominal pain, constipation, diarrhea, nausea and vomiting.        + GERD and HH   Genitourinary: Negative for difficulty urinating, dysuria, flank pain, frequency (after lasix), hematuria and pelvic pain.   Musculoskeletal: Negative for arthralgias and back pain.   Neurological: Negative for speech difficulty and weakness.   Psychiatric/Behavioral: Negative for agitation, behavioral problems and sleep disturbance.     Objective:     Vital Signs (Most Recent):  Temp: 97.8 °F (36.6 °C) (04/22/17 0800)  Pulse: 66 (04/22/17 1400)  Resp: 19 (04/22/17 1200)  BP: (!) 166/99 (04/22/17 1200)  SpO2: (!) 91 % (04/22/17 1200) Vital Signs (24h Range):  Temp:  [97.8 °F (36.6 °C)-98.8 °F (37.1 °C)] 97.8 °F (36.6 °C)  Pulse:  [56-92] 66  Resp:  [18-24] 19  SpO2:  [90 %-95 %] 91 %  BP: (149-178)/() 166/99     Weight: 96.4 kg (212 lb 8.4 oz)  Body mass index is 44.42 kg/(m^2).    Intake/Output Summary (Last 24 hours) at 04/22/17 1429  Last data filed at 04/22/17 0600   Gross per 24 hour   Intake              280 ml   Output             1100 ml   Net             -820 ml      Physical Exam   Constitutional: She is oriented to person, place, and time. She appears well-developed and well-nourished.    HENT:   Head: Normocephalic.   Mouth/Throat: Oropharynx is clear and moist.   Eyes: Conjunctivae are normal. Pupils are equal, round, and reactive to light. No scleral icterus.   Neck: Normal range of motion. Neck supple.   Cardiovascular: Normal rate, regular rhythm, normal heart sounds and intact distal pulses.  Exam reveals no gallop and no friction rub.    No murmur heard.  Pulmonary/Chest: Effort normal and breath sounds normal. No stridor.   Abdominal: Soft. Bowel sounds are normal. She exhibits no distension. There is no tenderness.   Musculoskeletal: Normal range of motion. She exhibits no edema or tenderness.   Lymphadenopathy:     She has no cervical adenopathy.   Neurological: She is alert and oriented to person, place, and time. She has normal strength.   Skin: Skin is warm and dry. No rash noted.   Psychiatric: She has a normal mood and affect. Her speech is normal and behavior is normal. Judgment and thought content normal. Cognition and memory are normal.   Nursing note and vitals reviewed.       Significant Labs:   CBC:   Recent Labs  Lab 04/20/17 1942 04/21/17  0435 04/22/17  0646   WBC 9.71 9.51 6.90   HGB 12.1 12.1 12.2   HCT 36.1* 35.7* 36.9*    301 328     CMP:   Recent Labs  Lab 04/20/17 1942 04/21/17 0435 04/22/17  0646    143 141   K 4.2 3.4* 4.0    101 96   CO2 29 31* 36*   * 240* 227*   BUN 11 12 23   CREATININE 0.9 0.9 1.1   CALCIUM 9.2 8.9 9.2   PROT 7.5 7.1 7.2   ALBUMIN 3.4* 3.2* 3.3*   BILITOT 0.3 0.4 0.5   ALKPHOS 94 86 86   AST 14 10 11   ALT 7* 7* 8*   ANIONGAP 7* 11 9   EGFRNONAA >60.0 >60.0 47.5*         Assessment/Plan:      * Hypertensive emergency  4/23 - 116/76  4/22 - 166/99  4/21 - resolved,  With pulmonary edema  Echo - pending  Lasix 20 qd, lisinopri l0 qd, coreg 6.25 qd.  Received iv hydralazine  And iv lasix which worked well  Low salt diet      Acute heart failure  Echo:  + diastolic dysfunction, EF 60%  Consistent with hypertensive heart  diseases      Acute cystitis without hematuria  Multiple drug allergies  Gent q 24  Culture pending      Type 2 diabetes mellitus with hyperglycemia, with long-term current use of insulin  Recent Labs      04/21/17   0012  04/21/17   0204  04/21/17   0903  04/21/17   1555  04/21/17   2049  04/21/17   2338   POCTGLUCOSE  157*  217*  308*  282*  210*  250*     A1c 8.2  levemir 25 q hs  Increase aspart 8 tid    CKD stage 3 due to type 2 diabetes mellitus  Cr 0.9 -> 1.0  4/23 - good output, check post void residual urine  Reduced urine output on 4/22    Essential hypertension  4/23- Adjust meds  discussed compliance  She does not have regular sleep hours      Acquired hypothyroidism  Continue synthroid  tsh ok    Morbid obesity with BMI of 40.0-44.9, adult        VTE Risk Mitigation         Ordered     heparin (porcine) injection 5,000 Units  Every 8 hours     Route:  Subcutaneous        04/21/17 0110     Medium Risk of VTE  Once      04/20/17 7258          Agnes Kay MD  Department of Hospital Medicine   [facility]

## 2017-04-24 LAB
ALBUMIN SERPL BCP-MCNC: 3 G/DL
ALP SERPL-CCNC: 79 U/L
ALT SERPL W/O P-5'-P-CCNC: 9 U/L
ANION GAP SERPL CALC-SCNC: 8 MMOL/L
AST SERPL-CCNC: 12 U/L
BASOPHILS # BLD AUTO: 0.03 K/UL
BASOPHILS NFR BLD: 0.4 %
BILIRUB SERPL-MCNC: 0.4 MG/DL
BUN SERPL-MCNC: 30 MG/DL
CALCIUM SERPL-MCNC: 8.8 MG/DL
CHLORIDE SERPL-SCNC: 99 MMOL/L
CO2 SERPL-SCNC: 31 MMOL/L
CREAT SERPL-MCNC: 1 MG/DL
DIFFERENTIAL METHOD: ABNORMAL
EOSINOPHIL # BLD AUTO: 0.3 K/UL
EOSINOPHIL NFR BLD: 3.8 %
ERYTHROCYTE [DISTWIDTH] IN BLOOD BY AUTOMATED COUNT: 13.4 %
EST. GFR  (AFRICAN AMERICAN): >60 ML/MIN/1.73 M^2
EST. GFR  (NON AFRICAN AMERICAN): 53.3 ML/MIN/1.73 M^2
GLUCOSE SERPL-MCNC: 168 MG/DL
HCT VFR BLD AUTO: 36.5 %
HGB BLD-MCNC: 11.8 G/DL
LYMPHOCYTES # BLD AUTO: 1.9 K/UL
LYMPHOCYTES NFR BLD: 26 %
MAGNESIUM SERPL-MCNC: 1.7 MG/DL
MCH RBC QN AUTO: 29.4 PG
MCHC RBC AUTO-ENTMCNC: 32.3 %
MCV RBC AUTO: 91 FL
MONOCYTES # BLD AUTO: 0.7 K/UL
MONOCYTES NFR BLD: 9.1 %
NEUTROPHILS # BLD AUTO: 4.5 K/UL
NEUTROPHILS NFR BLD: 60.6 %
PLATELET # BLD AUTO: 293 K/UL
PMV BLD AUTO: 9.6 FL
POCT GLUCOSE: 121 MG/DL (ref 70–110)
POCT GLUCOSE: 132 MG/DL (ref 70–110)
POCT GLUCOSE: 148 MG/DL (ref 70–110)
POCT GLUCOSE: 188 MG/DL (ref 70–110)
POCT GLUCOSE: 198 MG/DL (ref 70–110)
POCT GLUCOSE: 215 MG/DL (ref 70–110)
POCT GLUCOSE: 228 MG/DL (ref 70–110)
POCT GLUCOSE: 232 MG/DL (ref 70–110)
POTASSIUM SERPL-SCNC: 4.4 MMOL/L
PROT SERPL-MCNC: 6.7 G/DL
RBC # BLD AUTO: 4.02 M/UL
SODIUM SERPL-SCNC: 138 MMOL/L
WBC # BLD AUTO: 7.45 K/UL

## 2017-04-24 PROCEDURE — 99232 SBSQ HOSP IP/OBS MODERATE 35: CPT | Mod: ,,, | Performed by: HOSPITALIST

## 2017-04-24 PROCEDURE — 20600001 HC STEP DOWN PRIVATE ROOM

## 2017-04-24 PROCEDURE — 97802 MEDICAL NUTRITION INDIV IN: CPT

## 2017-04-24 PROCEDURE — 83735 ASSAY OF MAGNESIUM: CPT

## 2017-04-24 PROCEDURE — 36415 COLL VENOUS BLD VENIPUNCTURE: CPT

## 2017-04-24 PROCEDURE — 80053 COMPREHEN METABOLIC PANEL: CPT

## 2017-04-24 PROCEDURE — 63600175 PHARM REV CODE 636 W HCPCS: Performed by: HOSPITALIST

## 2017-04-24 PROCEDURE — 25000003 PHARM REV CODE 250: Performed by: HOSPITALIST

## 2017-04-24 PROCEDURE — 85025 COMPLETE CBC W/AUTO DIFF WBC: CPT

## 2017-04-24 PROCEDURE — 25000003 PHARM REV CODE 250: Performed by: INTERNAL MEDICINE

## 2017-04-24 RX ORDER — INSULIN ASPART 100 [IU]/ML
10 INJECTION, SOLUTION INTRAVENOUS; SUBCUTANEOUS
Status: DISCONTINUED | OUTPATIENT
Start: 2017-04-24 | End: 2017-04-25 | Stop reason: HOSPADM

## 2017-04-24 RX ADMIN — HEPARIN SODIUM 5000 UNITS: 5000 INJECTION, SOLUTION INTRAVENOUS; SUBCUTANEOUS at 02:04

## 2017-04-24 RX ADMIN — INSULIN ASPART 2 UNITS: 100 INJECTION, SOLUTION INTRAVENOUS; SUBCUTANEOUS at 09:04

## 2017-04-24 RX ADMIN — CARVEDILOL 6.25 MG: 6.25 TABLET, FILM COATED ORAL at 10:04

## 2017-04-24 RX ADMIN — Medication 3 ML: at 02:04

## 2017-04-24 RX ADMIN — TRIAMCINOLONE ACETONIDE: 1 CREAM TOPICAL at 10:04

## 2017-04-24 RX ADMIN — GENTAMICIN SULFATE 192 MG: 40 INJECTION, SOLUTION INTRAMUSCULAR; INTRAVENOUS at 02:04

## 2017-04-24 RX ADMIN — FUROSEMIDE 20 MG: 20 TABLET ORAL at 09:04

## 2017-04-24 RX ADMIN — LISINOPRIL 10 MG: 10 TABLET ORAL at 09:04

## 2017-04-24 RX ADMIN — HEPARIN SODIUM 5000 UNITS: 5000 INJECTION, SOLUTION INTRAVENOUS; SUBCUTANEOUS at 05:04

## 2017-04-24 RX ADMIN — INSULIN ASPART 10 UNITS: 100 INJECTION, SOLUTION INTRAVENOUS; SUBCUTANEOUS at 02:04

## 2017-04-24 RX ADMIN — INSULIN ASPART 2 UNITS: 100 INJECTION, SOLUTION INTRAVENOUS; SUBCUTANEOUS at 02:04

## 2017-04-24 RX ADMIN — ASPIRIN 81 MG: 81 TABLET, COATED ORAL at 09:04

## 2017-04-24 RX ADMIN — HEPARIN SODIUM 5000 UNITS: 5000 INJECTION, SOLUTION INTRAVENOUS; SUBCUTANEOUS at 10:04

## 2017-04-24 RX ADMIN — ESCITALOPRAM OXALATE 10 MG: 10 TABLET ORAL at 09:04

## 2017-04-24 RX ADMIN — INSULIN ASPART 10 UNITS: 100 INJECTION, SOLUTION INTRAVENOUS; SUBCUTANEOUS at 07:04

## 2017-04-24 RX ADMIN — FLUTICASONE PROPIONATE 1 SPRAY: 50 SPRAY, METERED NASAL at 09:04

## 2017-04-24 RX ADMIN — GABAPENTIN 100 MG: 100 CAPSULE ORAL at 10:04

## 2017-04-24 RX ADMIN — CARVEDILOL 6.25 MG: 6.25 TABLET, FILM COATED ORAL at 09:04

## 2017-04-24 RX ADMIN — LEVOTHYROXINE SODIUM 75 MCG: 75 TABLET ORAL at 05:04

## 2017-04-24 RX ADMIN — INSULIN ASPART 8 UNITS: 100 INJECTION, SOLUTION INTRAVENOUS; SUBCUTANEOUS at 09:04

## 2017-04-24 RX ADMIN — Medication 3 ML: at 10:04

## 2017-04-24 RX ADMIN — PANTOPRAZOLE SODIUM 40 MG: 40 TABLET, DELAYED RELEASE ORAL at 09:04

## 2017-04-24 NOTE — PLAN OF CARE
Problem: Patient Care Overview  Goal: Plan of Care Review  Recommendations     Recommendation/Intervention: 1. schedule out Pt. diabetes education 2. continue current diet 3. RD will continue to follow  Nutrition Goal Status: new  Communication of RD Recs: reviewed with physician     Michael Gordon RD

## 2017-04-24 NOTE — ASSESSMENT & PLAN NOTE
Cr 0.9 -> 1.0  4/24 - good output spontaneously 270 cc, post void residual urine - retained 280 cc,  Monitor urine output - has early neurorgenic bladder and Consider in and out caths to reduce infections if they become recurrent.    Reduced urine output on 4/22

## 2017-04-24 NOTE — ASSESSMENT & PLAN NOTE
4/24 - 124/64  4/23 - 116/76  4/22 - 166/99  4/21 - resolved,  With pulmonary edema  Echo - pending  Lasix 20 qd, lisinopri l0 qd, coreg 6.25 qd.  Received iv hydralazine  And iv lasix which worked well  Low salt diet

## 2017-04-24 NOTE — SUBJECTIVE & OBJECTIVE
Interval History: talking to dietician about diabetic diet now    Review of Systems   Constitutional: Negative for appetite change and fatigue.        Obese  VERA with walking to BR   HENT: Positive for rhinorrhea. Negative for congestion, sore throat and trouble swallowing.         Dry moth  rhinorhea   Respiratory: Negative for cough and shortness of breath.    Cardiovascular: Negative for chest pain and leg swelling.   Gastrointestinal: Negative for abdominal distention, abdominal pain, constipation, diarrhea, nausea and vomiting.        + GERD and HH   Genitourinary: Negative for difficulty urinating, dysuria, flank pain, frequency (after lasix), hematuria and pelvic pain.   Musculoskeletal: Negative for arthralgias and back pain.   Neurological: Negative for speech difficulty and weakness.   Psychiatric/Behavioral: Negative for agitation, behavioral problems and sleep disturbance.     Objective:     Vital Signs (Most Recent):  Temp: 98.1 °F (36.7 °C) (04/24/17 0815)  Pulse: 63 (04/24/17 1100)  Resp: 18 (04/24/17 0815)  BP: 124/64 (04/24/17 0815)  SpO2: (!) 90 % (04/24/17 0815) Vital Signs (24h Range):  Temp:  [98.1 °F (36.7 °C)-99.8 °F (37.7 °C)] 98.1 °F (36.7 °C)  Pulse:  [] 63  Resp:  [18-26] 18  SpO2:  [90 %-96 %] 90 %  BP: (111-136)/(62-89) 124/64     Weight: 97.7 kg (215 lb 6.2 oz)  Body mass index is 45.02 kg/(m^2).    Intake/Output Summary (Last 24 hours) at 04/24/17 1111  Last data filed at 04/24/17 0600   Gross per 24 hour   Intake              125 ml   Output              210 ml   Net              -85 ml      Physical Exam   Constitutional: She is oriented to person, place, and time. She appears well-developed and well-nourished.   HENT:   Head: Normocephalic.   Mouth/Throat: Oropharynx is clear and moist.   Eyes: Conjunctivae are normal. Pupils are equal, round, and reactive to light. No scleral icterus.   Neck: Normal range of motion. Neck supple.   Cardiovascular: Normal rate, regular rhythm,  normal heart sounds and intact distal pulses.  Exam reveals no gallop and no friction rub.    No murmur heard.  Pulmonary/Chest: Effort normal and breath sounds normal. No stridor.   Abdominal: Soft. Bowel sounds are normal. She exhibits no distension. There is no tenderness.   Musculoskeletal: Normal range of motion. She exhibits no edema or tenderness.   Lymphadenopathy:     She has no cervical adenopathy.   Neurological: She is alert and oriented to person, place, and time. She has normal strength.   Skin: Skin is warm and dry. No rash noted.   Psychiatric: She has a normal mood and affect. Her speech is normal and behavior is normal. Judgment and thought content normal. Cognition and memory are normal.   Nursing note and vitals reviewed.      Significant Labs:   CBC:   Recent Labs  Lab 04/23/17  0653 04/24/17  0639   WBC 7.20 7.45   HGB 11.8* 11.8*   HCT 35.6* 36.5*    293     CMP:   Recent Labs  Lab 04/23/17  0653 04/24/17  0639    138   K 3.9 4.4   CL 96 99   CO2 31* 31*   * 168*   BUN 28* 30*   CREATININE 1.0 1.0   CALCIUM 8.9 8.8   PROT 6.7 6.7   ALBUMIN 3.0* 3.0*   BILITOT 0.4 0.4   ALKPHOS 80 79   AST 11 12   ALT 8* 9*   ANIONGAP 9 8   EGFRNONAA 53.3* 53.3*

## 2017-04-24 NOTE — CONSULTS
[facility]  Adult Nutrition  Consult Note    SUMMARY     Recommendations    Recommendation/Intervention: 1. schedule out Pt. diabetes education 2. continue current diet 3. RD will continue to follow  Nutrition Goal Status: new  Communication of RD Recs: reviewed with physician    Continuum of Care Plan    Referral to Outpatient Services: diabetes education, weight management (Pt has been diabetic for 12 years, still does not understand carb counting. reviewed this with her today, but more enforcement is nessessary.)    Reason for Assessment    Reason for Assessment: nurse/nurse practitioner consult  Diagnosis:  (HTN)  Interdisciplinary Rounds: did not attend  Nutrition Discharge Planning: Pt need reinforced education on diabetic diet and was reluctent to accept heart healthy education. Recommend  out pt education    Nutrition Prescription Ordered    Current Diet Order: low sodium 2g cardiac, diabetic      Nutrition Risk Screen     Nutrition Risk Screen: no indicators present    Nutrition/Diet History    Patient Reported Diet/Restrictions/Preferences: diabetic diet     Labs/Tests/Procedures/Meds       Pertinent Labs Reviewed: reviewed  Pertinent Labs Comments: Glu 168  Pertinent Medications Reviewed: reviewed  Pertinent Medications Comments: lasix, heparin, insulin    Physical Findings  Oral/Mouth Cavity: WDL  Skin: intact    Anthropometrics    Height Method: Stated  Height (inches): 57.99 in  Weight Method: Standard Scale  Weight (kg): 97.7 kg  Ideal Body Weight (IBW), Female: 89.95 lb  % Ideal Body Weight, Female (lb): 239.46 lb  BMI (kg/m2): 45.03  BMI Grade: greater than 40 - morbid obesity     Estimated/Assessed Needs    Weight Used For Calorie Calculations: 63.5 kg (140 lb 1.3 oz)   Height (cm): 147.3 cm  Energy Need Method: [place]-St Jeor (x1.25 (PAL) 1670kcal)  RMR ([place]-St. Jeor Equation): 1342.05  Weight Used For Protein Calculations: 40.9 kg (90 lb 2.7 oz)  Protein Requirements:  "41-51g/day (1.0-1.25g/kg IBW)  Fluid Need Method: RDA Method (1ml/kcal or per MD)  CHO Requirement: -     Assessment and Plan    Not ready for diet/lifestyle change r/t unsupported beliefs about food, AEB pt stating food w/o salt is nasty, "15g CHO from cookies and 15g CHO from vegetables are the same"?      Monitor and Evaluation    Food and Nutrient Intake: energy intake, food and beverage intake  Food and Nutrient Adminstration: diet order  Knowledge/Beliefs/Attitudes: food and nutrition knowledge/skill, beliefs and attitudes (Pt states food taste so nasty with no salt. puts salt on everything. Pt usually eats sitting in front of the television)  Anthropometric Measurements: weight, weight change  Biochemical Data, Medical Tests and Procedures: electrolyte and renal panel, gastrointestinal profile, glucose/endocrine profile  Nutrition-Focused Physical Findings: overall appearance, skin  % Intake of Estimated Energy Needs: 100  % Meal Intake: 100    Nutrition Risk    Level of Risk:  (f/u 1x week)    Nutrition Follow-Up    RD Follow-up?: Yes     Michael Gordon RD      "

## 2017-04-24 NOTE — PLAN OF CARE
Problem: Patient Care Overview  Goal: Plan of Care Review  Outcome: Ongoing (interventions implemented as appropriate)  Pt free of falls/traumas/injuries. Skin remains clean, dry, and intact. Pt continued on IV antibiotics. Pt's gentamycin trough due before 3rd dose 4/25/2016 at 1330.  Pt re-educated on importance of measuring accurate intake and out put; pt verbalized and demonstrates understanding. Reviewed plan of care with pt; and pt verbalized understanding.  Pt VSS in no distress will continue to monitor.

## 2017-04-24 NOTE — ASSESSMENT & PLAN NOTE
4/24 - Stable  Echo:  + diastolic dysfunction, EF 60%  Consistent with hypertensive heart diseases

## 2017-04-24 NOTE — ASSESSMENT & PLAN NOTE
Recent Labs      04/22/17   2128  04/23/17   1034  04/23/17   1451  04/23/17   1916  04/23/17   2223  04/24/17   0919   POCTGLUCOSE  356*  215*  228*  232*  148*  188*     A1c 8.2  Adjust : levemir 30q hs  Increase aspart 10 tid  dietary instruction today, seems more motivated, in light of kidney disease

## 2017-04-24 NOTE — ASSESSMENT & PLAN NOTE
Multiple drug allergies   Gent q 24 x 2, tomorrw can go home after dose  Culture - pan sensitive e coli

## 2017-04-24 NOTE — PROGRESS NOTES
[facility] Hospital Medicine  Progress Note    Patient Name: Courtney Engle  MRN: [id number]  Patient Class: IP- Inpatient   Admission Date: 4/20/2017  Length of Stay: 4 days  Attending Physician: Agnes Kay MD  Primary Care Provider: Vishal Ulloa MD    [facility] Medicine Team: [facility] Agnes Kay MD    Subjective:     Principal Problem:Hypertensive emergency    HPI:  Patient is a 82 y.o. female with htn, hld intolerant to statins due to gi upset, iddm2, morbid obesity, GERD/hiatal hernia, HFpEF 65% presenting with hypertensive emergency with pulmonary edema, VERA and LE swelling, found to have hypertensive crisis in the ED.     Per ED: presents with 1 week of dyspnea on exertion and leg swelling. She notes that this past Sunday she went grocery shopping. She came back home and while taking her groceries into her house she had to stop several times due to shortness of breath. She states that at the same time she noticed her legs began to swell. The swelling would recede with leg elevation. Since Sunday her symptoms have been progressively worsening where now even on walking 20 ft to the bathroom i witnessed her getting SOB. She had no SOB prior to this. She does recall some chest pain but it is mostly related to food intake and she states she has a history of hiatal hernia. She does not check her blood pressure or weight herself daily at home. She notes no PND/orthopnea. When she presented to the ED today her bp was around 240 systolic. She has a very poor understanding of lifestyle effects on her blood pressure and eats a high salt, high sugar diet with no control over her iddm.      Her PMH is sig for htn, hld intolerant to statins due to gi upset, iddm2, morbid obesity, GERD/hiatal hernia, HFpEF 65%         Hospital Course:  4/21 -   She was gven lasix and diuresed over night. Atenolol changed to coreg.  BP 130s this am and pt feels dizzy,  Held more BP meds this am to  bring BP down slower,  4/22 - feeling better, dyspnea improved, [id number], discussed polypharmacy- she c/o dry mouth.   4/23 - GNR growing in urine, Gent x one dose given,  states she cannot feel it when she urinates - will check a post-void residual urine, cr 1.1,   4/24 - pt doing well, multilpl drug allergies,  Gent day 2,  BP better          Interval History: talking to dietician about diabetic diet now    Review of Systems   Constitutional: Negative for appetite change and fatigue.        Obese  VERA with walking to BR   HENT: Positive for rhinorrhea. Negative for congestion, sore throat and trouble swallowing.         Dry moth  rhinorhea   Respiratory: Negative for cough and shortness of breath.    Cardiovascular: Negative for chest pain and leg swelling.   Gastrointestinal: Negative for abdominal distention, abdominal pain, constipation, diarrhea, nausea and vomiting.        + GERD and HH   Genitourinary: Negative for difficulty urinating, dysuria, flank pain, frequency (after lasix), hematuria and pelvic pain.   Musculoskeletal: Negative for arthralgias and back pain.   Neurological: Negative for speech difficulty and weakness.   Psychiatric/Behavioral: Negative for agitation, behavioral problems and sleep disturbance.     Objective:     Vital Signs (Most Recent):  Temp: 98.1 °F (36.7 °C) (04/24/17 0815)  Pulse: 63 (04/24/17 1100)  Resp: 18 (04/24/17 0815)  BP: 124/64 (04/24/17 0815)  SpO2: (!) 90 % (04/24/17 0815) Vital Signs (24h Range):  Temp:  [98.1 °F (36.7 °C)-99.8 °F (37.7 °C)] 98.1 °F (36.7 °C)  Pulse:  [] 63  Resp:  [18-26] 18  SpO2:  [90 %-96 %] 90 %  BP: (111-136)/(62-89) 124/64     Weight: 97.7 kg (215 lb 6.2 oz)  Body mass index is 45.02 kg/(m^2).    Intake/Output Summary (Last 24 hours) at 04/24/17 1111  Last data filed at 04/24/17 0600   Gross per 24 hour   Intake              125 ml   Output              210 ml   Net              -85 ml      Physical Exam   Constitutional: She is  oriented to person, place, and time. She appears well-developed and well-nourished.   HENT:   Head: Normocephalic.   Mouth/Throat: Oropharynx is clear and moist.   Eyes: Conjunctivae are normal. Pupils are equal, round, and reactive to light. No scleral icterus.   Neck: Normal range of motion. Neck supple.   Cardiovascular: Normal rate, regular rhythm, normal heart sounds and intact distal pulses.  Exam reveals no gallop and no friction rub.    No murmur heard.  Pulmonary/Chest: Effort normal and breath sounds normal. No stridor.   Abdominal: Soft. Bowel sounds are normal. She exhibits no distension. There is no tenderness.   Musculoskeletal: Normal range of motion. She exhibits no edema or tenderness.   Lymphadenopathy:     She has no cervical adenopathy.   Neurological: She is alert and oriented to person, place, and time. She has normal strength.   Skin: Skin is warm and dry. No rash noted.   Psychiatric: She has a normal mood and affect. Her speech is normal and behavior is normal. Judgment and thought content normal. Cognition and memory are normal.   Nursing note and vitals reviewed.      Significant Labs:   CBC:   Recent Labs  Lab 04/23/17  0653 04/24/17  0639   WBC 7.20 7.45   HGB 11.8* 11.8*   HCT 35.6* 36.5*    293     CMP:   Recent Labs  Lab 04/23/17  0653 04/24/17  0639    138   K 3.9 4.4   CL 96 99   CO2 31* 31*   * 168*   BUN 28* 30*   CREATININE 1.0 1.0   CALCIUM 8.9 8.8   PROT 6.7 6.7   ALBUMIN 3.0* 3.0*   BILITOT 0.4 0.4   ALKPHOS 80 79   AST 11 12   ALT 8* 9*   ANIONGAP 9 8   EGFRNONAA 53.3* 53.3*           Assessment/Plan:      * Hypertensive emergency  4/24 - 124/64  4/23 - 116/76  4/22 - 166/99  4/21 - resolved,  With pulmonary edema  Echo - pending  Lasix 20 qd, lisinopri l0 qd, coreg 6.25 qd.  Received iv hydralazine  And iv lasix which worked well  Low salt diet      Acute heart failure  4/24 - Stable  Echo:  + diastolic dysfunction, EF 60%  Consistent with hypertensive  heart diseases      Acute cystitis without hematuria  Multiple drug allergies   Gent q 24 x 2, tomorrw can go home after dose  Culture - pan sensitive e coli      Type 2 diabetes mellitus with hyperglycemia, with long-term current use of insulin  Recent Labs      04/22/17   2128  04/23/17   1034  04/23/17   1451  04/23/17   1916  04/23/17   2223  04/24/17   0919   POCTGLUCOSE  356*  215*  228*  232*  148*  188*     A1c 8.2  Adjust : levemir 30q hs  Increase aspart 10 tid  dietary instruction today, seems more motivated, in light of kidney disease    CKD stage 3 due to type 2 diabetes mellitus  Cr 0.9 -> 1.0  4/24 - good output spontaneously 270 cc, post void residual urine - retained 280 cc,  Monitor urine output - has early neurorgenic bladder and Consider in and out caths to reduce infections if they become recurrent.    Reduced urine output on 4/22    Essential hypertension  4/24 - stable  4/23- Adjust meds  discussed compliance  She does not have regular sleep hours      Acquired hypothyroidism  Continue synthroid  tsh ok    Morbid obesity with BMI of 40.0-44.9, adult        Mixed stress and urge urinary incontinence        VTE Risk Mitigation         Ordered     heparin (porcine) injection 5,000 Units  Every 8 hours     Route:  Subcutaneous        04/21/17 0110     Medium Risk of VTE  Once      04/20/17 6499          Agnes Kay MD  Department of Hospital Medicine   [facility]

## 2017-04-25 VITALS
OXYGEN SATURATION: 94 % | RESPIRATION RATE: 23 BRPM | TEMPERATURE: 98 F | DIASTOLIC BLOOD PRESSURE: 77 MMHG | HEIGHT: 58 IN | BODY MASS INDEX: 45.08 KG/M2 | HEART RATE: 60 BPM | WEIGHT: 214.75 LBS | SYSTOLIC BLOOD PRESSURE: 145 MMHG

## 2017-04-25 LAB
ALBUMIN SERPL BCP-MCNC: 3 G/DL
ALP SERPL-CCNC: 79 U/L
ALT SERPL W/O P-5'-P-CCNC: 9 U/L
ANION GAP SERPL CALC-SCNC: 11 MMOL/L
AST SERPL-CCNC: 12 U/L
BASOPHILS # BLD AUTO: 0.02 K/UL
BASOPHILS NFR BLD: 0.3 %
BILIRUB SERPL-MCNC: 0.5 MG/DL
BUN SERPL-MCNC: 34 MG/DL
CALCIUM SERPL-MCNC: 9 MG/DL
CHLORIDE SERPL-SCNC: 99 MMOL/L
CO2 SERPL-SCNC: 30 MMOL/L
CREAT SERPL-MCNC: 1.1 MG/DL
DIFFERENTIAL METHOD: ABNORMAL
EOSINOPHIL # BLD AUTO: 0.3 K/UL
EOSINOPHIL NFR BLD: 3.4 %
ERYTHROCYTE [DISTWIDTH] IN BLOOD BY AUTOMATED COUNT: 13.5 %
EST. GFR  (AFRICAN AMERICAN): 54.8 ML/MIN/1.73 M^2
EST. GFR  (NON AFRICAN AMERICAN): 47.5 ML/MIN/1.73 M^2
GENTAMICIN TROUGH SERPL-MCNC: 1.2 UG/ML
GLUCOSE SERPL-MCNC: 158 MG/DL
HCT VFR BLD AUTO: 35 %
HGB BLD-MCNC: 11.5 G/DL
LYMPHOCYTES # BLD AUTO: 2.4 K/UL
LYMPHOCYTES NFR BLD: 30.9 %
MAGNESIUM SERPL-MCNC: 1.7 MG/DL
MCH RBC QN AUTO: 29.4 PG
MCHC RBC AUTO-ENTMCNC: 32.9 %
MCV RBC AUTO: 90 FL
MONOCYTES # BLD AUTO: 0.7 K/UL
MONOCYTES NFR BLD: 9.4 %
NEUTROPHILS # BLD AUTO: 4.3 K/UL
NEUTROPHILS NFR BLD: 55.7 %
PLATELET # BLD AUTO: 299 K/UL
PMV BLD AUTO: 9.6 FL
POCT GLUCOSE: 170 MG/DL (ref 70–110)
POTASSIUM SERPL-SCNC: 4.5 MMOL/L
PROT SERPL-MCNC: 6.8 G/DL
RBC # BLD AUTO: 3.91 M/UL
SODIUM SERPL-SCNC: 140 MMOL/L
WBC # BLD AUTO: 7.67 K/UL

## 2017-04-25 PROCEDURE — 80170 ASSAY OF GENTAMICIN: CPT

## 2017-04-25 PROCEDURE — 80053 COMPREHEN METABOLIC PANEL: CPT

## 2017-04-25 PROCEDURE — 85025 COMPLETE CBC W/AUTO DIFF WBC: CPT

## 2017-04-25 PROCEDURE — 25000003 PHARM REV CODE 250: Performed by: HOSPITALIST

## 2017-04-25 PROCEDURE — 99238 HOSP IP/OBS DSCHRG MGMT 30/<: CPT | Mod: ,,, | Performed by: INTERNAL MEDICINE

## 2017-04-25 PROCEDURE — 63600175 PHARM REV CODE 636 W HCPCS: Performed by: HOSPITALIST

## 2017-04-25 PROCEDURE — 25000003 PHARM REV CODE 250: Performed by: INTERNAL MEDICINE

## 2017-04-25 PROCEDURE — 83735 ASSAY OF MAGNESIUM: CPT

## 2017-04-25 PROCEDURE — 36415 COLL VENOUS BLD VENIPUNCTURE: CPT

## 2017-04-25 RX ADMIN — INSULIN ASPART 10 UNITS: 100 INJECTION, SOLUTION INTRAVENOUS; SUBCUTANEOUS at 08:04

## 2017-04-25 RX ADMIN — FUROSEMIDE 20 MG: 20 TABLET ORAL at 08:04

## 2017-04-25 RX ADMIN — Medication 3 ML: at 06:04

## 2017-04-25 RX ADMIN — PANTOPRAZOLE SODIUM 40 MG: 40 TABLET, DELAYED RELEASE ORAL at 08:04

## 2017-04-25 RX ADMIN — LEVOTHYROXINE SODIUM 75 MCG: 75 TABLET ORAL at 06:04

## 2017-04-25 RX ADMIN — HEPARIN SODIUM 5000 UNITS: 5000 INJECTION, SOLUTION INTRAVENOUS; SUBCUTANEOUS at 05:04

## 2017-04-25 RX ADMIN — LISINOPRIL 10 MG: 10 TABLET ORAL at 08:04

## 2017-04-25 RX ADMIN — GENTAMICIN SULFATE 192 MG: 40 INJECTION, SOLUTION INTRAMUSCULAR; INTRAVENOUS at 12:04

## 2017-04-25 RX ADMIN — ESCITALOPRAM OXALATE 10 MG: 10 TABLET ORAL at 08:04

## 2017-04-25 RX ADMIN — CARVEDILOL 6.25 MG: 6.25 TABLET, FILM COATED ORAL at 08:04

## 2017-04-25 RX ADMIN — ASPIRIN 81 MG: 81 TABLET, COATED ORAL at 08:04

## 2017-04-25 NOTE — PLAN OF CARE
Discharge planning: Discussed home health services with patient. She declines home health at this time.

## 2017-04-25 NOTE — PT/OT/SLP DISCHARGE
Physical Therapy Discharge Summary    Courtney Engle  MRN: [id number]   Hypertensive emergency   Patient Discharged from acute Physical Therapy on 17.  Please refer to prior PT noted date on 17 for functional status.     Assessment:   Patient was discharge unexpectedly.  Information required to complete and accurate discharge summary is unknown.  Refer to therapy initial evaluation and last progress note for initial and most recent functional status and goal achievement.  Recommendations made may be found in medical record.  GOALS:   Physical Therapy Goals     Not on file      Multidisciplinary Problems (Resolved)        Problem: Physical Therapy Goal    Goal Priority Disciplines Outcome Goal Variances Interventions   Physical Therapy Goal   (Resolved)     PT/OT, PT Outcome(s) achieved     Description:  Goals to be met by: 17     Patient will increase functional independence with mobility by performin. Supine to sit with Supervision  2. Sit to stand transfer with Supervision  3. Bed to chair transfer with Supervision using AD as needed.   4. Gait  x 150 feet with Stand-by Assistance using AD as needed.   5. Lower extremity exercise program x15 reps, with assistance as needed, in order to increase LE strength and (I) with functional mobility.               Reasons for Discontinuation of Therapy Services  Transfer to alternate level of care.      Plan:  Patient Discharged to: Home.  PT/OT recommended but pt declined.      Eunice Mcgrath, PT, DPT   2017  [phone]

## 2017-04-25 NOTE — PLAN OF CARE
Problem: Patient Care Overview  Goal: Plan of Care Review  Outcome: Ongoing (interventions implemented as appropriate)  Pt to be D/c'ed after third dose of abx on day shift. VSS. O2 sats stable on 2L NC. Pt denies CP, SOB, palpitations, lightheadedness and dizziness. Fall precautions maintained this shift, pt remains free from falls, trauma and injury. POC reviewed with pt, verbalized understanding. NADN. Will continue to monitor.

## 2017-04-26 ENCOUNTER — PATIENT OUTREACH (OUTPATIENT)
Dept: ADMINISTRATIVE | Facility: CLINIC | Age: 82
End: 2017-04-26
Payer: MEDICARE

## 2017-04-26 ENCOUNTER — TELEPHONE (OUTPATIENT)
Dept: INTERNAL MEDICINE | Facility: CLINIC | Age: 82
End: 2017-04-26

## 2017-04-26 NOTE — TELEPHONE ENCOUNTER
It has been requested that the pt be seen in [facility] by 5/2/17. Pt has an exisiting appt for 5/9/17. Unfortunately, PC is fully booked for the rest of the week and short staffed the week of 5/1/17-5/5/17. Pt is advised to see PCP or keep the existing PC appt.

## 2017-04-26 NOTE — PATIENT INSTRUCTIONS

## 2017-04-28 RX ORDER — LISINOPRIL 10 MG/1
10 TABLET ORAL DAILY
Qty: 90 TABLET | Refills: 3 | Status: SHIPPED | OUTPATIENT
Start: 2017-04-28 | End: 2018-04-28

## 2017-04-28 RX ORDER — CARVEDILOL 6.25 MG/1
6.25 TABLET ORAL 2 TIMES DAILY WITH MEALS
Qty: 60 TABLET | Refills: 11 | Status: SHIPPED | OUTPATIENT
Start: 2017-04-28 | End: 2017-05-09 | Stop reason: SINTOL

## 2017-05-03 ENCOUNTER — NURSE TRIAGE (OUTPATIENT)
Dept: ADMINISTRATIVE | Facility: CLINIC | Age: 82
End: 2017-05-03

## 2017-05-03 NOTE — TELEPHONE ENCOUNTER
"  Reason for Disposition   Caller has NON-URGENT medication question about med that PCP prescribed and triager unable to answer question    Answer Assessment - Initial Assessment Questions  1. SYMPTOMS: "Do you have any symptoms?"      no  2. SEVERITY: If symptoms are present, ask "Are they mild, moderate or severe?"      No on yesterday.    Protocols used: ST MEDICATION QUESTION CALL-A-AH    "
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
## 2017-05-03 NOTE — TELEPHONE ENCOUNTER
Spoke to pt, she states she spoke to Dr Kay in the Hospital. She is a cardiologist who has treated pt a couple different times in the ER. She advised pt to stop coreg. I adv pt to call if her symptoms do not improve or if she has worsening symptoms. She agreed

## 2017-05-05 NOTE — DISCHARGE SUMMARY
[facility] Hospital Medicine  Discharge Summary      Patient Name: Courtney Engle  MRN: [id number]  Admission Date: 4/20/2017  Hospital Length of Stay: 5 days  Discharge Date and Time: 4/25/2017     Attending Physician: No att. providers found   Discharging Provider: Ryan Fiore MD  Primary Care Provider: Vishal Ulloa MD  [facility] Medicine Team: [facility] B Ryan Fiore MD    HPI:   Patient is a 82 y.o. female with htn, hld intolerant to statins due to gi upset, iddm2, morbid obesity, GERD/hiatal hernia, HFpEF 65% presenting with hypertensive emergency with pulmonary edema, VERA and LE swelling, found to have hypertensive crisis in the ED.     Per ED: presents with 1 week of dyspnea on exertion and leg swelling. She notes that this past Sunday she went grocery shopping. She came back home and while taking her groceries into her house she had to stop several times due to shortness of breath. She states that at the same time she noticed her legs began to swell. The swelling would recede with leg elevation. Since Sunday her symptoms have been progressively worsening where now even on walking 20 ft to the bathroom i witnessed her getting SOB. She had no SOB prior to this. She does recall some chest pain but it is mostly related to food intake and she states she has a history of hiatal hernia. She does not check her blood pressure or weight herself daily at home. She notes no PND/orthopnea. When she presented to the ED today her bp was around 240 systolic. She has a very poor understanding of lifestyle effects on her blood pressure and eats a high salt, high sugar diet with no control over her iddm.      Her PMH is sig for htn, hld intolerant to statins due to gi upset, iddm2, morbid obesity, GERD/hiatal hernia, HFpEF 65%         * No surgery found *      Indwelling Lines/Drains at time of discharge:   Lines/Drains/Airways          No matching active lines, drains, or airways         Hospital Course:   4/21 -   She was gven lasix and diuresed over night. Atenolol changed to coreg.  BP 130s this am and pt feels dizzy,  Held more BP meds this am to bring BP down slower,  4/22 - feeling better, dyspnea improved, [id number], discussed polypharmacy- she c/o dry mouth.   4/23 - GNR growing in urine, Gent x one dose given,  states she cannot feel it when she urinates - will check a post-void residual urine, cr 1.1,   4/24 - pt doing well, multilpl drug allergies,  Gent day 2,  BP better  4/25 - completed course of abx, will dc home with med changes including incrasing diuretic.           Consults:   Consults         Status Ordering Provider     Inpatient consult to Dietary  Once     Provider:  (Not yet assigned)    Completed DIAMANTE BONNER     IP consult to dietary  Once     Provider:  (Not yet assigned)    Completed DIAMANTE BONNER          Significant Diagnostic Studies: Labs: BMP: No results for input(s): GLU, NA, K, CL, CO2, BUN, CREATININE, CALCIUM, MG in the last 48 hours.    Pending Diagnostic Studies:     None        Final Active Diagnoses:    Diagnosis Date Noted POA    PRINCIPAL PROBLEM:  Hypertensive emergency [I16.1] 04/21/2017 Yes    Acute cystitis without hematuria [N30.00] 04/23/2017 Yes    Acute heart failure [I50.9] 04/20/2017 Yes    CKD stage 3 due to type 2 diabetes mellitus [E11.22, N18.3] 02/16/2017 Yes     Chronic    Mixed stress and urge urinary incontinence [N39.46] 02/16/2017 Yes     Chronic    Morbid obesity with BMI of 40.0-44.9, adult [E66.01, Z68.41] 12/08/2016 Not Applicable     Chronic    Acquired hypothyroidism [E03.9] 04/25/2014 Yes     Chronic    Type 2 diabetes mellitus with hyperglycemia, with long-term current use of insulin [E11.65, Z79.4]  Not Applicable     Chronic    Essential hypertension [I10]  Yes     Chronic      Problems Resolved During this Admission:    Diagnosis Date Noted Date Resolved POA      No new Assessment & Plan notes have  "been filed under this hospital service since the last note was generated.  Service: Hospital Medicine      Discharged Condition: good    Disposition: Home or Self Care    Follow Up:  Follow-up Information     Follow up with SNEHA Eduardo On 5/9/2017.    Specialty:  Internal Medicine    Why:  appointmnet [id number]    Contact information:    [address]  [place] [address]  [phone]          Patient Instructions:   No discharge procedures on file.  Medications:  Reconciled Home Medications:   Discharge Medication List as of 4/25/2017  2:37 PM      CONTINUE these medications which have NOT CHANGED    Details   alendronate (FOSAMAX) 70 MG tablet Take 1 tablet (70 mg total) by mouth every 7 days., Starting 12/20/2016, Until Wed 12/20/17, Normal      alprazolam (XANAX) 0.5 MG tablet Take 1 tablet (0.5 mg total) by mouth nightly., Starting 12/8/2016, Until Discontinued, Normal      aspirin (ECOTRIN) 81 MG EC tablet Take 1 tablet (81 mg total) by mouth once daily., Starting 2/16/2017, Until Fri 2/16/18, No Print      atenolol (TENORMIN) 25 MG tablet TAKE 1 TABLET BY MOUTH EVERY DAY, Normal      !! BD INSULIN SYRINGE ULT-FINE II 1/2 mL 31 x 5/16" Syrg USE THREE TIMES DAILY AS DIRECTED, Normal      blood-glucose meter kit Use as instructed, Normal      butalbital-acetaminophen-caffeine -40 mg (FIORICET, ESGIC) -40 mg per tablet Take 1 tablet by mouth every 8 (eight) hours as needed., Starting 12/8/2016, Until Discontinued, Normal      escitalopram oxalate (LEXAPRO) 10 MG tablet TAKE 1 TABLET BY MOUTH EVERY DAY, Normal      fluticasone (FLONASE) 50 mcg/actuation nasal spray SPRAY TWICE IN EACH NOSTRIL ONCE DAILY, Normal      insulin aspart (NOVOLOG) 100 unit/mL injection To use with Vgo 20  with 4 clicks with meals, max TDD 38 units daily, Normal      insulin glargine (LANTUS) 100 unit/mL injection Inject 30 Units into the skin every evening. STOP WHEN VGO STARTED, Starting 2/16/2017, Until " "Discontinued, No Print      !! insulin syringe-needle U-100 0.5 mL 31 gauge x 5/16 Syrg USE AS DIRECTED FOUR TIMES DAILY, Normal      !! insulin syringe-needle U-100 1/2 mL 31 x 5/16" Syrg USE THREE TIMES DAILY, Normal      levothyroxine (SYNTHROID) 75 MCG tablet TAKE 1 TABLET BY MOUTH EVERY DAY, Normal      lisinopril (PRINIVIL,ZESTRIL) 20 MG tablet TAKE 1 TABLET BY MOUTH EVERY DAY, Normal      omeprazole (PRILOSEC) 40 MG capsule Take 1 capsule (40 mg total) by mouth 2 (two) times daily before meals., Starting 3/27/2017, Until Tue 3/27/18, Normal      ONETOUCH ULTRA TEST Strp TEST FOUR TIMES DAILY, Normal      triamcinolone acetonide 0.1% (KENALOG) 0.1 % cream AAA bid on rash, Normal      sub-q insulin device, 20 unit (VGO 20) Harriett 1 Device by Misc.(Non-Drug; Combo Route) route once daily., Starting 2/16/2017, Until Discontinued, Normal       !! - Potential duplicate medications found. Please discuss with provider.      STOP taking these medications       atorvastatin (LIPITOR) 10 MG tablet Comments:   Reason for Stopping:         neomycin-polymyxin-dexamethasone (MAXITROL) 3.5 mg/g-10,000 unit/g-0.1 % Oint Comments:   Reason for Stopping:             Time spent on the discharge of patient: 30 minutes    Ryan Fiore MD  Department of Hospital Medicine  [facility]  "

## 2017-05-09 ENCOUNTER — OFFICE VISIT (OUTPATIENT)
Dept: PRIMARY CARE CLINIC | Facility: CLINIC | Age: 82
End: 2017-05-09
Payer: MEDICARE

## 2017-05-09 VITALS
SYSTOLIC BLOOD PRESSURE: 114 MMHG | HEIGHT: 58 IN | HEART RATE: 59 BPM | OXYGEN SATURATION: 96 % | DIASTOLIC BLOOD PRESSURE: 60 MMHG | WEIGHT: 209.69 LBS | BODY MASS INDEX: 44.01 KG/M2

## 2017-05-09 DIAGNOSIS — E11.65 TYPE 2 DIABETES MELLITUS WITH HYPERGLYCEMIA, WITH LONG-TERM CURRENT USE OF INSULIN: Chronic | ICD-10-CM

## 2017-05-09 DIAGNOSIS — E66.01 MORBID OBESITY WITH BMI OF 40.0-44.9, ADULT: Chronic | ICD-10-CM

## 2017-05-09 DIAGNOSIS — Z79.4 TYPE 2 DIABETES MELLITUS WITH HYPERGLYCEMIA, WITH LONG-TERM CURRENT USE OF INSULIN: Chronic | ICD-10-CM

## 2017-05-09 DIAGNOSIS — N18.30 CKD STAGE 3 DUE TO TYPE 2 DIABETES MELLITUS: Chronic | ICD-10-CM

## 2017-05-09 DIAGNOSIS — E78.5 HYPERLIPIDEMIA LDL GOAL <70: Chronic | ICD-10-CM

## 2017-05-09 DIAGNOSIS — I10 ESSENTIAL HYPERTENSION: Primary | Chronic | ICD-10-CM

## 2017-05-09 DIAGNOSIS — G89.29 CHRONIC PAIN OF LEFT KNEE: ICD-10-CM

## 2017-05-09 DIAGNOSIS — E11.22 CKD STAGE 3 DUE TO TYPE 2 DIABETES MELLITUS: Chronic | ICD-10-CM

## 2017-05-09 DIAGNOSIS — M25.562 CHRONIC PAIN OF LEFT KNEE: ICD-10-CM

## 2017-05-09 DIAGNOSIS — G47.01 INSOMNIA DUE TO MEDICAL CONDITION: ICD-10-CM

## 2017-05-09 PROBLEM — I16.1 HYPERTENSIVE EMERGENCY: Status: RESOLVED | Noted: 2017-04-21 | Resolved: 2017-05-09

## 2017-05-09 PROBLEM — I50.9 ACUTE HEART FAILURE: Status: RESOLVED | Noted: 2017-04-20 | Resolved: 2017-05-09

## 2017-05-09 PROBLEM — N30.00 ACUTE CYSTITIS WITHOUT HEMATURIA: Status: RESOLVED | Noted: 2017-04-23 | Resolved: 2017-05-09

## 2017-05-09 PROCEDURE — 99499 UNLISTED E&M SERVICE: CPT | Mod: S$GLB,,, | Performed by: INTERNAL MEDICINE

## 2017-05-09 PROCEDURE — 99999 PR PBB SHADOW E&M-EST. PATIENT-LVL III: CPT | Mod: PBBFAC,,,

## 2017-05-09 RX ORDER — ACETAMINOPHEN 500 MG
500 TABLET ORAL EVERY 6 HOURS PRN
Refills: 0 | COMMUNITY
Start: 2017-05-09

## 2017-05-09 RX ORDER — TALC
3 POWDER (GRAM) TOPICAL NIGHTLY
Refills: 0 | COMMUNITY
Start: 2017-05-09 | End: 2018-01-09

## 2017-05-09 NOTE — MR AVS SNAPSHOT
[facility]  [address]  [facility] [address]  Phone: [phone]                  Courtney Engle   2017 8:00 AM   Office Visit    Description:  Female : 1935   Provider:  [facility]IC[facility]   Department:  [facility]           Reason for Visit     Hospital Follow Up           Diagnoses this Visit        Comments    Essential hypertension    -  Primary     Morbid obesity with BMI of 40.0-44.9, adult         Hyperlipidemia LDL goal <70         Type 2 diabetes mellitus with hyperglycemia, with long-term current use of insulin         CKD stage 3 due to type 2 diabetes mellitus         Insomnia due to medical condition         Chronic pain of left knee                To Do List           Future Appointments        Provider Department Dept Phone    5/15/2017 1:00 PM Jessica Rome MD [facility] Urology 4th Floor [phone]    2017 8:00 AM Vishal Ulloa MD [facility] Internal Medicine [phone]      Goals (5 Years of Data)     None      Follow-Up and Disposition     Discussed this visit Return if symptoms worsen or fail to improve.      PURCHASE these Medications (No prescription required)        Start End    melatonin 3 mg Tab 2017     Sig - Route: Take 1 tablet (3 mg total) by mouth every evening. - Oral    Class: OTC    acetaminophen (TYLENOL) 500 MG tablet 2017     Sig - Route: Take 1 tablet (500 mg total) by mouth every 6 (six) hours as needed for Pain. - Oral    Class: OTC      [facility] On [facility]     [facility] On Call Nurse Care Line -  Assistance  Unless otherwise directed by your provider, please contact [facility], our nurse care line that is available for  assistance.     Registered nurses in the [facility] On Call Center provide: appointment scheduling, clinical advisement, health education, and other advisory services.  Call: [phone] (toll free)               Medications           Message regarding  "Medications     Verify the changes and/or additions to your medication regime listed below are the same as discussed with your clinician today.  If any of these changes or additions are incorrect, please notify your healthcare provider.        START taking these NEW medications        Refills    melatonin 3 mg Tab 0    Sig: Take 1 tablet (3 mg total) by mouth every evening.    Class: OTC    Route: Oral    acetaminophen (TYLENOL) 500 MG tablet 0    Sig: Take 1 tablet (500 mg total) by mouth every 6 (six) hours as needed for Pain.    Class: OTC    Route: Oral      STOP taking these medications     carvedilol (COREG) 6.25 MG tablet Take 1 tablet (6.25 mg total) by mouth 2 (two) times daily with meals.    butalbital-acetaminophen-caffeine -40 mg (FIORICET, ESGIC) -40 mg per tablet Take 1 tablet by mouth every 8 (eight) hours as needed.           Verify that the below list of medications is an accurate representation of the medications you are currently taking.  If none reported, the list may be blank. If incorrect, please contact your healthcare provider. Carry this list with you in case of emergency.           Current Medications     alprazolam (XANAX) 0.5 MG tablet Take 1 tablet (0.5 mg total) by mouth nightly.    aspirin (ECOTRIN) 81 MG EC tablet Take 1 tablet (81 mg total) by mouth once daily.    atenolol (TENORMIN) 25 MG tablet TAKE 1 TABLET BY MOUTH EVERY DAY    BD INSULIN SYRINGE ULT-FINE II 1/2 mL 31 x 5/16" Syrg USE THREE TIMES DAILY AS DIRECTED    blood-glucose meter kit Use as instructed    escitalopram oxalate (LEXAPRO) 10 MG tablet TAKE 1 TABLET BY MOUTH EVERY DAY    fluticasone (FLONASE) 50 mcg/actuation nasal spray SPRAY TWICE IN EACH NOSTRIL ONCE DAILY    insulin aspart (NOVOLOG) 100 unit/mL injection To use with Vgo 20  with 4 clicks with meals, max TDD 38 units daily    insulin glargine (LANTUS) 100 unit/mL injection Inject 30 Units into the skin every evening. STOP WHEN VGO STARTED    " "insulin syringe-needle U-100 0.5 mL 31 gauge x 5/16 Syrg USE AS DIRECTED FOUR TIMES DAILY    insulin syringe-needle U-100 1/2 mL 31 x 5/16" Syrg USE THREE TIMES DAILY    lisinopril 10 MG tablet Take 1 tablet (10 mg total) by mouth once daily.    omeprazole (PRILOSEC) 40 MG capsule Take 1 capsule (40 mg total) by mouth 2 (two) times daily before meals.    ONETOUCH ULTRA TEST Strp TEST FOUR TIMES DAILY    triamcinolone acetonide 0.1% (KENALOG) 0.1 % cream AAA bid on rash    acetaminophen (TYLENOL) 500 MG tablet Take 1 tablet (500 mg total) by mouth every 6 (six) hours as needed for Pain.    alendronate (FOSAMAX) 70 MG tablet Take 1 tablet (70 mg total) by mouth every 7 days.    levothyroxine (SYNTHROID) 75 MCG tablet TAKE 1 TABLET BY MOUTH EVERY DAY    melatonin 3 mg Tab Take 1 tablet (3 mg total) by mouth every evening.           Clinical Reference Information           Your Vitals Were     BP Pulse Height Weight SpO2 BMI    114/60 (BP Location: Right arm, Patient Position: Sitting, BP Method: Manual) 59 4' 10" (1.473 m) 95.1 kg (209 lb 10.5 oz) 96% 43.82 kg/m2      Blood Pressure          Most Recent Value    BP  114/60      Allergies as of 5/9/2017     Benadryl [Diphenhydramine Hcl]    Contac 12 Hour Allergy    Ciprofloxacin (Bulk)    Anti-hyst    Antihistamines - Alkylamine    Codeine    Glucotrol [Glipizide]    Hydroxyzine    Iodinated Contrast Media - Oral And Iv Dye    Nitrofuran Analogues    Propoxyphene    Doxycycline    Penicillins    Sulfa (Sulfonamide Antibiotics)      Immunizations Administered on Date of Encounter - 5/9/2017     None      Orders Placed During Today's Visit      Normal Orders This Visit    Ambulatory Referral to Medical Fitness     Assign [facility] Onboarding Questionnaire Series     Hypertension Digital Medicine (HDMP) Enrollment Order     NURSING COMMUNICATION: Create [facility] Account       Instructions    Dear Courtney Engle,     Thank you for choosing [facility] for your " healthcare needs.     STOP Taking Coreg (Carvedilol), since you did not tolerate this.   Continue taking the Atenolol 25mg daily and the Lisinopril 10mg daily.     Go to the [facility] to enroll in the digital blood pressure monitoring program.     Continue avoiding salt and sugar in your diet.     The [facility] program will call you to schedule an appointment.     Please schedule your stress test. Also follow up with cardiology.     It was a pleasure taking care of you, and we wish you the best health!     Sincerely,     Mali Persaud MD  [facility]  [facility]     Discharge Instructions: Taking Your Blood Pressure  Blood pressure is the force of blood as it moves from the heart through the blood vessels. You can take your own blood pressure reading using a digital monitor. Take readings as often as your healthcare provider instructs. Take your readings each time in the same way, using the same arm. Here are guidelines for taking your blood pressure.  The American Heart Association (AHA) recommends purchasing a blood pressure monitor that is validated and approved by the Association for the Advancement of Medical Instrumentation, the [place] Hypertension Society, and the International Protocol for the Validation of Automated BP Measuring Devices. If the blood pressure monitor is for a senior adult, a pregnant woman, or a child, make certain it is validated for use with such a population. For the most reliable readings, the AHA recommends an automatic, cuff-style, upper arm (bicep) monitor. The readings from finger and wrist monitors are not as reliable as the upper arm monitor.        Step 1. Relax    · Wait at least a half hour after smoking, eating, or exercising. Do not drink coffee, tea, soda, or other caffeinated beverages before checking your blood pressure.   · Sit comfortably at a table. Place the monitor near you.  · Rest for a few minutes before you begin.        Step 2.  Wrap the cuff    · Place your arm on the table, palm up. Put your arm in a position that is level with your heart. Wrap the cuff around your upper arm, about an inch above your elbow. Its best to wrap the cuff on bare skin, not over clothing.  · Make sure your cuff fits. If it doesnt wrap around your upper arm, order a larger cuff. A cuff that is too large or too small can result in an inaccurate blood pressure reading.           Step 3. Inflate the cuff    · Pump the cuff until the scale reads 200. If you have a self-inflating cuff, push the button that starts the pump.  · The cuff will tighten, then loosen.  · The numbers will change. When they stop changing, your blood pressure reading will appear.  · If you get a reading that is too high or too low for you, relax for a few minutes. Then do the test again.    Step 4. Write down the results  · Write down your blood pressure numbers. Nitin the date and time. Keep your results in one place, such as a notebook.  · Remove the cuff from your arm. Turn off the machine.  · Take the readings with you to your medical appointments.  · If you start a new blood pressure medicine, or change a blood pressure medicine dose, note the day you started the new drug or dosage on your blood pressure recording sheet. This will help your healthcare provider monitor the effect of medication changes.     Date Last Reviewed: 4/27/2016  © 4903-3063 [employer]. [address] [place] [address], [place] [address]. All rights reserved. This information is not intended as a substitute for professional medical care. Always follow your healthcare professional's instructions.        Orthostatic Low Blood Pressure (Hypotension)  A blood pressure reading is made up of 2 numbers There is a top number over a bottom number. The top number is the systolic pressure. The bottom number is the diastolic pressure. A normal blood pressure is less than 120 over less than 80. Low blood pressure  (hypotension) is a blood pressure that is less than what is normal for you.  Orthostatic hypotension is a type of low blood pressure that occurs only when you change position from lying to standing. It can cause dizziness, lightheadedness, or fainting.  Medicines can cause orthostatic hypotension. These include:  · High blood pressure medicines  · Water pills (diuretics)  · Some heart medicines  · Some antidepressants  · Pain, anxiety, sedative, and sleeping medicines  Other causes include:  · Dehydration from vomiting, diarrhea, or not getting enough fluids  · Severe infection  · High fever  · Blood loss. This could be bleeding from the stomach or intestines.  Treatment will depend on what is causing your low blood pressure.  Home care  Follow these guidelines when caring for yourself at home:  · Rest until your symptoms get better.  · Change positions slowly from lying to standing. When getting out of bed, sit on the side of the bed with your legs down for at least 30 seconds before standing. This gives your body time to adjust to the position change.  · Follow the treatment plan described by your health care provider.  Follow-up care  Follow up with your health care provider, or as advised.  When to seek medical advice  Call your health care provider right away if any of these occur:  · Dizziness, lightheadedness, or fainting  · Black or red color in your stools or vomit  · Diarrhea or vomiting that doesnt go away  · You arent able to eat or drink  · Fever of 100.4°F (38°C) or higher, or as directed by your health care provider  · Burning when you urinate  · Foul-smelling urine  Date Last Reviewed: 11/25/2014  © 9140-2889 [employer]. [address] [place] [address], [place] [address]. All rights reserved. This information is not intended as a substitute for professional medical care. Always follow your healthcare professional's instructions.        Low-Salt Choices  Eating salt (sodium) can make your body  retain too much water. Excess water makes your heart work harder. Canned, packaged, and frozen foods are easy to prepare, but they are often high in sodium. Here are some ideas for low-salt foods you can easily prepare yourself.    For breakfast  · Fruit or 100% fruit juice  · Whole-wheat bread or an English muffin. Compare sodium content on labels.  · Low-fat milk or yogurt  · Unsalted eggs  · Shredded wheat  · Corn tortillas  · Unsalted steamed rice  · Regular (not instant) hot cereal, made without salt  Stay away from:  · Sausage, lino, and ham  · Flour tortillas  · Packaged muffins, pancakes, and biscuits  · Instant hot cereals  · Cottage cheese  For lunch and dinner  · Fresh fish, chicken, turkey, or meat--baked, broiled, or roasted without salt  · Dry beans, cooked without salt  · Tofu, stir-fried without salt  · Unsalted fresh fruit and vegetables, or frozen or canned fruit and vegetables with no added salt  Stay away from:  · Lunch or deli meat that is cured or smoked  · Cheese  · Tomato juice and catsup  · Canned vegetables, soups, and fish not labeled as no-salt-added or reduced sodium  · Packaged gravies and sauces  · Olives, pickles, and relish  · Bottled salad dressings  For snacks and desserts  · Yogurt  · Unsalted, air popped popcorn  · Unsalted nuts or seeds  Stay away from:  · Pies and cakes  · Packaged dessert mixes  · Pizza  · Canned and packaged puddings  · Pretzels, chips, crackers, and nuts--unless the label says unsalted  Date Last Reviewed: 6/17/2015  © 7627-1533 [employer]. [address] [place] [address], [place] [address]. All rights reserved. This information is not intended as a substitute for professional medical care. Always follow your healthcare professional's instructions.             Hypertension Digital Medicine Program Information              As discussed, you could benefit from enrolling in the Hypertension Digital Medicine Program. The goal of the program is to help you  effectively manage your high blood pressure through an appropriate balance of medication and lifestyle changes, all from the comfort of your own home. Effectively managed blood pressure reduces your risk of having a heart attack or a stroke in the future, so you can enjoy a long and healthy life. We want to make blood pressure control your goal.        What is the Hypertension Digital Medicine Program?  Finding the right balance in managing high blood pressure is different for every person, and we will tailor our treatment approach based on your individual needs using the most current evidence-based guidelines.  As a participant, you will be able to send home blood pressure readings, on your schedule, directly into your medical record at [facility]. I, along with a team of pharmacists, will monitor this data, help you adjust your medication(s) and/or make lifestyle recommendations to better manage your hypertension.       What are the requirements of the program?   Participating in the program is as easy as 1 - 2 - 3.   1. Smartphone - You must have your own smartphone to participate (either an iPhone or an Android phone such as [employer]ering, [employer], [employer], [employer], [employer], or [employer]).    2. [facility] account - [facility] offers a great way to connect through the online patient portal, [facility], which is free and provides you access [facility].  3. Digital blood pressure cuff - Using this blood pressure cuff that hooks up to your smartphone, you will be able to send in your home blood pressure readings to the Hypertension Digital Medicine Team. We have made arrangements to offer blood pressure cuffs at a discount for our programs participants.             What can I do to get started?   Follow the instructions in the [facility] Sign-Up section above for activating your [facility] account. Once your account is active, complete the Hypertension Digital Medicine Patient Consent questionnaire already available  "in your [facility] account. To access and complete this questionnaire, either  - Use the [facility] website on a computer and select My Medical Record, then Questionnaires.  - Use the [employer] kwan on your smartphone and select "Questionnaires".    Once you have given consent, additional onboarding questionnaires will be assigned to you to complete prior to starting the program. You must return to the "Questionnaires" page of your [facility] account to start the additional questionnaires.       How do I purchase a digital blood pressure cuff and obtain a discount?  [facility] has negotiated a discounted price from industry leading healthcare technology companies. While supplies last patients using an Apple iPhone can purchase an [employer] Global Ease Blood Pressure cuff for $31.99 (originally $39.99) and Android users can purchase the [employer] Blood Pressure Monitor for $79.99 (originally $99.95).  Financial assistance plans are also available for increased discounting. Purchase locations will be sent once all onboarding questionnaires have been completed (see above).    If you have any questions regarding this program or would like more information, please visit our Hypertension Digital Medicine website ([email or web address]) or call [facility] Medicine Patient Support at [phone].          Language Assistance Services     ATTENTION: Language assistance services are available, free of charge. Please call [phone].      ATENCIÓN: Si habla español, tiene a kilgore disposición servicios gratuitos de asistencia lingüística. Llame al [phone].     JONES Ý: N?u b?n nói Ti?ng Vi?t, có các d?ch v? h? tr? ngôn ng? mi?n phí dành cho b?n. G?i s? [phone].         [facility] complies with applicable Federal civil rights laws and does not discriminate on the basis of race, color, national origin, age, disability, or sex.        "

## 2017-05-09 NOTE — PROGRESS NOTES
[facility]  New Visit Progress Note   Recent Hospital Discharge     PRESENTING HISTORY     Chief Complaint/Reason for Visit:  Follow up Hospital Discharge   No chief complaint on file.    PCP: Vishal Ulloa MD    History of Present Illness: Ms. Courtney Engle is a 82 y.o. female who was recently admitted to the hospital.    Admission Date: 4/20/2017  Discharge Date and Time: 4/25/2017      HPI: Patient is a 82 y.o. female with htn, hld intolerant to statins due to gi upset, iddm2, morbid obesity, GERD/hiatal hernia, HFpEF 65% presenting with hypertensive emergency with pulmonary edema, VERA and LE swelling, found to have hypertensive crisis in the ED.      Per ED: presents with 1 week of dyspnea on exertion and leg swelling. She notes that this past Sunday she went grocery shopping. She came back home and while taking her groceries into her house she had to stop several times due to shortness of breath. She states that at the same time she noticed her legs began to swell. The swelling would recede with leg elevation. Since Sunday her symptoms have been progressively worsening where now even on walking 20 ft to the bathroom i witnessed her getting SOB. She had no SOB prior to this. She does recall some chest pain but it is mostly related to food intake and she states she has a history of hiatal hernia. She does not check her blood pressure or weight herself daily at home. She notes no PND/orthopnea. When she presented to the ED today her bp was around 240 systolic. She has a very poor understanding of lifestyle effects on her blood pressure and eats a high salt, high sugar diet with no control over her iddm.       Her PMH is sig for htn, hld intolerant to statins due to gi upset, iddm2, morbid obesity, GERD/hiatal hernia, HFpEF 65%      Hospital Course:   4/21 - She was gven lasix and diuresed over night. Atenolol changed to coreg. BP 130s this am and pt feels dizzy, Held more BP meds this am to bring BP  down slower,  4/22 - feeling better, dyspnea improved, [id number], discussed polypharmacy- she c/o dry mouth.   4/23 - GNR growing in urine, Gent x one dose given, states she cannot feel it when she urinates - will check a post-void residual urine, cr 1.1,   4/24 - pt doing well, multilpl drug allergies, Gent day 2, BP better  4/25 - completed course of abx, will dc home with med changes including incrasing diuretic.    ___________________________________________________________________    Today:  Reports feeling tired since hospitalization. When walking short distances, gets VERA, which is improved from previous. Has lost 9 lbs since hospitalization. Denies LE edema.  Reports Coreg from the hospital made her dizzy, with chest pain, and arm pain. Started taking her atenolol again due to this and has stopped coreg.   Avoiding eating salt and sugar. Took menu home from hospital for ideas.   Hasn't been taking BP at home due to not having a machine, just bought one though.   Patient doesn't have a scale yet either.   FSBG has been down trending, under 200s lately. Lowest 95 to 230 this past week. Takes Aspart on a sliding scale before meals. Takes Lantus 30U QHS.  Patient notes fall 9 months ago. Endorses L knee pain, 8/10 pain scale when hurting, and currently it's 0/10. Worse with movement. Improved with OTC pain medications.   Hasn't been taking her synthroid appropriately, usually takes it mid morning, with all of her other medications at the same time.     Review of Systems:  Review of Systems   Constitutional: Positive for fatigue. Negative for fever and unexpected weight change.   HENT: Negative for hearing loss and sore throat.    Respiratory: Positive for shortness of breath (improving) and wheezing (chronic).    Cardiovascular: Negative for chest pain, palpitations and leg swelling.   Gastrointestinal: Negative for abdominal pain, blood in stool, nausea and vomiting.   Genitourinary: Negative for dysuria  and hematuria.   Musculoskeletal: Positive for arthralgias (in knee) and gait problem (due to knee pain).   Skin: Positive for rash (seen by dermatology). Negative for pallor.   Neurological: Positive for dizziness (only with coreg) and numbness (in bilateral feet).   Psychiatric/Behavioral: Positive for sleep disturbance. Negative for confusion.   All other systems reviewed and are negative.    PAST HISTORY:     Past Medical History:   Diagnosis Date    Acquired hypothyroidism 4/25/2014    Anxiety     Aortic calcification 12/8/2016    X-Ray Chest-5/08/2014    Arthritis     Bilateral carotid artery disease 12/8/2016    US Carotid Bilateral-1/24/2013    CKD stage 3 due to type 2 diabetes mellitus 2/16/2017    Depression     Essential hypertension     Fever blister     Glaucoma 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 robledo)     Hyperlipidemia LDL goal <70 2/16/2017    Keloid cicatrix     Mixed stress and urge urinary incontinence 2/16/2017    Morbid obesity with BMI of 40.0-44.9, adult 12/8/2016    Ocular migraine 1/24/2013    Type 2 diabetes mellitus with hyperglycemia, with long-term current use of insulin      Past Surgical History:   Procedure Laterality Date    CATARACT EXTRACTION W/  INTRAOCULAR LENS IMPLANT Right 05/30/2012        CATARACT EXTRACTION W/  INTRAOCULAR LENS IMPLANT Left 05/26/2010        CHOLECYSTECTOMY      COLONOSCOPY W/ POLYPECTOMY  04/20/2015    ESOPHAGOGASTRODUODENOSCOPY  04/20/2015    HYSTERECTOMY      Partial    JOINT REPLACEMENT      knee replacement right      TONSILLECTOMY, ADENOIDECTOMY      tonsillectomy only     Family History   Problem Relation Age of Onset    Glaucoma Father     Stroke Father     Heart attack Father     Nephrolithiasis Father     Colon cancer Mother     Cancer Mother      colon ca    Heart disease Mother     Uterine cancer Daughter      uterine sarcoma    Hematuria Brother     Heart disease Maternal  "Grandmother     Hypertension Maternal Grandmother     Heart attack Maternal Grandmother     No Known Problems Daughter     Amblyopia Neg Hx     Blindness Neg Hx     Cataracts Neg Hx     Macular degeneration Neg Hx     Retinal detachment Neg Hx     Strabismus Neg Hx        Social History     Social History    Marital status:      Spouse name: N/A    Number of children: N/A    Years of education: N/A     Social History Main Topics    Smoking status: Never Smoker    Smokeless tobacco: Never Used    Alcohol use No    Drug use: No    Sexual activity: No     Other Topics Concern    Not on file     Social History Narrative   Lives in [facility], by herself, with her 12 cats. Patient has family in [place], [place], and [facility]. Patient has 2 daughters, and 3 granddaughters, and 4 great grandchildren.     Patient ambulates with wheeled walker.    MEDICATIONS & ALLERGIES:     Current Outpatient Prescriptions on File Prior to Visit   Medication Sig Dispense Refill    alprazolam (XANAX) 0.5 MG tablet Take 1 tablet (0.5 mg total) by mouth nightly. (Patient taking differently: Take 0.25 mg by mouth nightly. ) 30 tablet 5    aspirin (ECOTRIN) 81 MG EC tablet Take 1 tablet (81 mg total) by mouth once daily. 30 tablet 12    atenolol (TENORMIN) 25 MG tablet TAKE 1 TABLET BY MOUTH EVERY DAY 90 tablet 0    BD INSULIN SYRINGE ULT-FINE II 1/2 mL 31 x 5/16" Syrg USE THREE TIMES DAILY AS DIRECTED 100 each 0    blood-glucose meter kit Use as instructed 1 each 0    escitalopram oxalate (LEXAPRO) 10 MG tablet TAKE 1 TABLET BY MOUTH EVERY DAY 90 tablet 0    fluticasone (FLONASE) 50 mcg/actuation nasal spray SPRAY TWICE IN EACH NOSTRIL ONCE DAILY 32 g 6    insulin aspart (NOVOLOG) 100 unit/mL injection To use with Vgo 20  with 4 clicks with meals, max TDD 38 units daily 2 vial 3    insulin glargine (LANTUS) 100 unit/mL injection Inject 30 Units into the skin every evening. STOP WHEN VGO STARTED 10 mL 12    " "insulin syringe-needle U-100 0.5 mL 31 gauge x 5/16 Syrg USE AS DIRECTED FOUR TIMES DAILY 500 each 0    insulin syringe-needle U-100 1/2 mL 31 x 5/16" Syrg USE THREE TIMES DAILY 300 each 0    lisinopril 10 MG tablet Take 1 tablet (10 mg total) by mouth once daily. 90 tablet 3    omeprazole (PRILOSEC) 40 MG capsule Take 1 capsule (40 mg total) by mouth 2 (two) times daily before meals. 60 capsule 6    ONETOUCH ULTRA TEST Strp TEST FOUR TIMES DAILY 300 strip 0    triamcinolone acetonide 0.1% (KENALOG) 0.1 % cream AAA bid on rash 454 g 3    [DISCONTINUED] butalbital-acetaminophen-caffeine -40 mg (FIORICET, ESGIC) -40 mg per tablet Take 1 tablet by mouth every 8 (eight) hours as needed. 50 tablet 2    alendronate (FOSAMAX) 70 MG tablet Take 1 tablet (70 mg total) by mouth every 7 days. 4 tablet 11    levothyroxine (SYNTHROID) 75 MCG tablet TAKE 1 TABLET BY MOUTH EVERY DAY 90 tablet 0    [DISCONTINUED] carvedilol (COREG) 6.25 MG tablet Take 1 tablet (6.25 mg total) by mouth 2 (two) times daily with meals. 60 tablet 11    [DISCONTINUED] lisinopril (PRINIVIL,ZESTRIL) 20 MG tablet TAKE 1 TABLET BY MOUTH EVERY DAY 90 tablet 0     No current facility-administered medications on file prior to visit.       Medications have been reviewed and reconciled.    Review of patient's allergies indicates:   Allergen Reactions    Benadryl [diphenhydramine hcl] Shortness Of Breath    Contac 12 hour allergy Anaphylaxis and Hives    Ciprofloxacin (bulk) Nausea And Vomiting    Anti-hyst Other (See Comments)    Antihistamines - alkylamine Hives    Codeine      bad reaction    Glucotrol [glipizide]     Hydroxyzine      Unknown    Iodinated contrast media - oral and iv dye Other (See Comments)    Nitrofuran analogues     Propoxyphene Itching    Doxycycline Rash    Penicillins Rash    Sulfa (sulfonamide antibiotics) Rash     OBJECTIVE:     Vital Signs:  Vitals:    05/09/17 0810   BP: 114/60   BP Location: Right " "arm   Patient Position: Sitting   BP Method: Manual   Pulse: (!) 59   SpO2: 96%   Weight: 95.1 kg (209 lb 10.5 oz)   Height: 4' 10" (1.473 m)     Wt Readings from Last 1 Encounters:   04/25/17 0500 97.4 kg (214 lb 11.7 oz)   04/24/17 0501 97.7 kg (215 lb 6.2 oz)   04/23/17 1054 97.5 kg (214 lb 15.2 oz)   04/23/17 1053 97.5 kg (214 lb 15.2 oz)   04/23/17 1052 97.5 kg (214 lb 15.2 oz)   04/23/17 1010 97.5 kg (214 lb 15.2 oz)   04/23/17 1006 97.5 kg (214 lb 15.2 oz)   04/23/17 0501 97.5 kg (214 lb 15.2 oz)   04/22/17 0600 96.4 kg (212 lb 8.4 oz)   04/21/17 0700 98.6 kg (217 lb 6 oz)   04/21/17 0123 98.9 kg (218 lb)   04/20/17 1626 98.9 kg (218 lb)     Body mass index is 43.82 kg/(m^2).     Physical Exam:  Physical Exam   Constitutional: She is oriented to person, place, and time. She appears well-developed and well-nourished. No distress.   HENT:   Head: Normocephalic and atraumatic.   Eyes: EOM are normal.   Neck: Neck supple. No JVD present. No thyromegaly present.   Cardiovascular: Normal rate, regular rhythm and normal heart sounds.  Exam reveals no gallop and no friction rub.    No murmur heard.  Pulmonary/Chest: Breath sounds normal. No respiratory distress. She has no wheezes. She has no rales.   Abdominal: Soft. Bowel sounds are normal. There is no tenderness. There is no rebound and no guarding.   Musculoskeletal: She exhibits no edema.   Neurological: She is alert and oriented to person, place, and time.   Skin: No rash noted. No erythema.   Vitals reviewed.    Laboratory  Lab Results   Component Value Date    WBC 7.67 04/25/2017    HGB 11.5 (L) 04/25/2017    HCT 35.0 (L) 04/25/2017    MCV 90 04/25/2017     04/25/2017     BMP  Lab Results   Component Value Date     04/25/2017    K 4.5 04/25/2017    CL 99 04/25/2017    CO2 30 (H) 04/25/2017    BUN 34 (H) 04/25/2017    CREATININE 1.1 04/25/2017    CALCIUM 9.0 04/25/2017    ANIONGAP 11 04/25/2017    ESTGFRAFRICA 54.8 (A) 04/25/2017    EGFRNONAA 47.5 " (A) 04/25/2017     Lab Results   Component Value Date    ALT 9 (L) 04/25/2017    AST 12 04/25/2017    ALKPHOS 79 04/25/2017    BILITOT 0.5 04/25/2017     Lab Results   Component Value Date    INR 1.1 12/10/2009    INR 1.7 (H) 11/21/2009    INR 1.2 11/20/2009     Lab Results   Component Value Date    HGBA1C 8.2 (H) 04/21/2017     Diagnostic Results:  ECHO 4/21/2017    1 - Normal left ventricular systolic function (EF 60-65%).     2 - Normal right ventricular systolic function .     3 - Impaired LV relaxation, elevated LAP (grade 2 diastolic dysfunction).     4 - Mild left atrial enlargement.     CXR 4/20/2017  Bilateral pleural effusions with bilateral dependent edema/atelectasis.  Mildly prominent interstitial attenuation may reflect superimposed minimal interstitial edema.  No large focal consolidation at this time.      TRANSITION OF CARE:     [facility] On Call Contact Note: 4/26/2017    Family and/or Caretaker present at visit?  No.  Diagnostic tests reviewed/disposition: I have reviewed all completed as well as pending diagnostic tests at the time of discharge.  Disease/illness education: Hypertension, Medications, Low Blood Pressure, Diastolic Dysfunction, Heart Disease  Home health/community services discussion/referrals: Patient does not have home health established from hospital visit.  They do need home health.  If needed, we will set up home health for the patient. HH PT/OT recommended, however patient declined.   Establishment or re-establishment of referral orders for community resources: Pharm Stress Test, Cardiology Follow Up.   Discussion with other health care providers: Will route this note to patient's providers. .     Medications Reconciliation:   I have reconciled the patient's home medications and discharge medications with the patient/family. I have updated all changes.  Refer to After-Visit Medication List.    ASSESSMENT & PLAN:     Diastolic CHF - Stable, WT down 9lbs since hospitalization.  Patient not on diuretics.   Essential hypertension - well controlled currently. Patient did not tolerate Coreg.    -     Continue Atenolol 25mg daily, would recommend trial of coreg 3.125mg dosing in future instead of atenolol due to cardioprotective effects, however right now patient is not amenable to this due to her profound response to the 6.25 dose  - Continue lisinopril 10mg QD  -  Continue avoiding added salt to diet, this seems to have helped patient a lot with her hypertension control  -  Encouraged patient to check BP daily and record it in a log to bring with her to her F/U with PCP and cardiology  - Patient needs to get a scale so she can check her WT at home  - Pharm stress test needs to be scheduled  - Enrolled patient in digital hypertension program, to be followed by Dr. Ulloa  - NURSING COMMUNICATION: Create [facility] Account  -     Hypertension Digital Medicine (HDMP) Enrollment Order  -     Assign HDMP Onboarding Questionnaire Series  -     Ambulatory Referral to [facility]    Morbid obesity with BMI of 40.0-44.9, adult - persists.   -     Counseled patient on healthy lifestyle changes  - Ambulatory Referral to [facility]    Hyperlipidemia - persists.         -     Patient does not tolerate statin in past, may benefit from lowest dose pravastatin or rosuvastatin trial as these are typically better tolerated vs new PCSK9 inhibitor in future  - Ambulatory Referral to [facility]    Type 2 diabetes mellitus with hyperglycemia, with long-term current use of insulin  -     Continue Insulin as prescribed  -  Follow up with Endocrine  - Continue DM teaching   - Ambulatory Referral to [facility]    Insomnia due to medical condition  -     melatonin 3 mg Tab; Take 1 tablet (3 mg total) by mouth every evening.; Refill: 0  - Counseled patient on recommendation to taper off of Xanax    Chronic pain of left knee - worsening. 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   -      acetaminophen (TYLENOL) 500 MG tablet; Take 1 tablet (500 mg total) by mouth every 6 (six) hours as needed for Pain.; Refill: 0  -     Ambulatory Referral to [facility]  -  Patient not amenable to PT/OT referral at this time  - Patient may benefit from steroid injection, however likely needs knee replacement  - Patient would like to have a L TKR, will defer ortho referral to PCP     Hypothyroidism - worsening symptoms in setting of taking synthroid with other medications.         - Counseled patient on proper administration of synthroid, first thing in the morning, 30 minutes before meals and other medications        -  Patient expressed understanding    Instructions for the patient:  STOP Taking Coreg (Carvedilol), since you did not tolerate this.   Continue taking the Atenolol 25mg daily and the Lisinopril 10mg daily.   Go to the [facility] to enroll in the digital blood pressure monitoring program.   Continue avoiding salt and sugar in your diet.   The [facility] program will call you to schedule an appointment.   Please schedule your stress test. Also follow up with cardiology depending on the results.   (Please see AVS for complete patient instructions and educational materials provided to patient at this visit)    Scheduled Follow-up :  Future Appointments  Date Time Provider Department Center   5/15/2017 1:00 PM Jessica Rome MD [facility] UROLOGY [facility]   5/23/2017 3:00 PM Vishal Ulloa MD [facility] IM [facility]     After Visit Medication List :     Medication List          This list is accurate as of: 5/9/17  5:55 PM.  Always use your most recent med list.                     acetaminophen 500 MG tablet   Commonly known as:  TYLENOL   Take 1 tablet (500 mg total) by mouth every 6 (six) hours as needed for Pain.       alendronate 70 MG tablet   Commonly known as:  FOSAMAX   Take 1 tablet (70 mg total) by mouth every 7 days.       alprazolam 0.5 MG tablet   Commonly known as:  XANAX   Take 1  tablet (0.5 mg total) by mouth nightly.       aspirin 81 MG EC tablet   Commonly known as:  ECOTRIN   Take 1 tablet (81 mg total) by mouth once daily.       atenolol 25 MG tablet   Commonly known as:  TENORMIN   TAKE 1 TABLET BY MOUTH EVERY DAY       * BD INSULIN SYRINGE ULT-FINE II 0.5 mL 31 gauge x 5/16 Syrg   Generic drug:  insulin syringe-needle U-100   USE THREE TIMES DAILY AS DIRECTED       * insulin syringe-needle U-100 0.5 mL 31 gauge x 5/16 Syrg   USE THREE TIMES DAILY       * insulin syringe-needle U-100 0.5 mL 31 gauge x 5/16 Syrg   USE AS DIRECTED FOUR TIMES DAILY       blood-glucose meter kit   Use as instructed       escitalopram oxalate 10 MG tablet   Commonly known as:  LEXAPRO   TAKE 1 TABLET BY MOUTH EVERY DAY       fluticasone 50 mcg/actuation nasal spray   Commonly known as:  FLONASE   SPRAY TWICE IN EACH NOSTRIL ONCE DAILY       insulin aspart 100 unit/mL injection   Commonly known as:  NOVOLOG   To use with Vgo 20  with 4 clicks with meals, max TDD 38 units daily       insulin glargine 100 unit/mL injection   Commonly known as:  LANTUS   Inject 30 Units into the skin every evening. STOP WHEN VGO STARTED       levothyroxine 75 MCG tablet   Commonly known as:  SYNTHROID   TAKE 1 TABLET BY MOUTH EVERY DAY       lisinopril 10 MG tablet   Take 1 tablet (10 mg total) by mouth once daily.       melatonin 3 mg Tab   Take 1 tablet (3 mg total) by mouth every evening.       omeprazole 40 MG capsule   Commonly known as:  PRILOSEC   Take 1 capsule (40 mg total) by mouth 2 (two) times daily before meals.       ONETOUCH ULTRA TEST Strp   Generic drug:  blood sugar diagnostic   TEST FOUR TIMES DAILY       triamcinolone acetonide 0.1% 0.1 % cream   Commonly known as:  KENALOG   AAA bid on rash       * Notice:  This list has 3 medication(s) that are the same as other medications prescribed for you. Read the directions carefully, and ask your doctor or other care provider to review them with you.         Where  to Get Your Medications      You can get these medications from any pharmacy     You don't need a prescription for these medications     acetaminophen 500 MG tablet    melatonin 3 mg Tab           Signing Physician:  Mali Persaud MD

## 2017-05-09 NOTE — Clinical Note
[facility] Visit (Post Discharge Follow-up) Today:  - Our clinic physicians and nurses plan to follow the patient up for any medical issues in the [facility] for 30 days post discharge. - Please schedule patient for pharmacologic stress test. - Enrolled patient in digital hypertension program, to be followed by Dr. Ulloa  Future Appointments: 5/15/2017  1:00 PM    Jessica Rome MD       [facility] UROLOGY   [facility] 5/23/2017  3:00 PM    Vishal Ulloa MD      [facility]        [facility]

## 2017-05-09 NOTE — PATIENT INSTRUCTIONS
Dear Courtney Engle,     Thank you for choosing [facility] for your healthcare needs.     STOP Taking Coreg (Carvedilol), since you did not tolerate this.   Continue taking the Atenolol 25mg daily and the Lisinopril 10mg daily.     Go to the [facility] to enroll in the digital blood pressure monitoring program.     Continue avoiding salt and sugar in your diet.     The [facility] program will call you to schedule an appointment.     Please schedule your stress test. Also follow up with cardiology depending on the results.     It was a pleasure taking care of you, and we wish you the best health!     Sincerely,     Mali Persaud MD  [facility]  [facility]     Discharge Instructions: Taking Your Blood Pressure  Blood pressure is the force of blood as it moves from the heart through the blood vessels. You can take your own blood pressure reading using a digital monitor. Take readings as often as your healthcare provider instructs. Take your readings each time in the same way, using the same arm. Here are guidelines for taking your blood pressure.  The American Heart Association (AHA) recommends purchasing a blood pressure monitor that is validated and approved by the Association for the Advancement of Medical Instrumentation, the [place] [employer]pert[employer], and the International Protocol for the Validation of Automated BP Measuring Devices. If the blood pressure monitor is for a senior adult, a pregnant woman, or a child, make certain it is validated for use with such a population. For the most reliable readings, the AHA recommends an automatic, cuff-style, upper arm (bicep) monitor. The readings from finger and wrist monitors are not as reliable as the upper arm monitor.        Step 1. Relax    · Wait at least a half hour after smoking, eating, or exercising. Do not drink coffee, tea, soda, or other caffeinated beverages before checking your blood pressure.   · Sit  comfortably at a table. Place the monitor near you.  · Rest for a few minutes before you begin.        Step 2. Wrap the cuff    · Place your arm on the table, palm up. Put your arm in a position that is level with your heart. Wrap the cuff around your upper arm, about an inch above your elbow. Its best to wrap the cuff on bare skin, not over clothing.  · Make sure your cuff fits. If it doesnt wrap around your upper arm, order a larger cuff. A cuff that is too large or too small can result in an inaccurate blood pressure reading.           Step 3. Inflate the cuff    · Pump the cuff until the scale reads 200. If you have a self-inflating cuff, push the button that starts the pump.  · The cuff will tighten, then loosen.  · The numbers will change. When they stop changing, your blood pressure reading will appear.  · If you get a reading that is too high or too low for you, relax for a few minutes. Then do the test again.    Step 4. Write down the results  · Write down your blood pressure numbers. Nitin the date and time. Keep your results in one place, such as a notebook.  · Remove the cuff from your arm. Turn off the machine.  · Take the readings with you to your medical appointments.  · If you start a new blood pressure medicine, or change a blood pressure medicine dose, note the day you started the new drug or dosage on your blood pressure recording sheet. This will help your healthcare provider monitor the effect of medication changes.     Date Last Reviewed: 4/27/2016  © 6555-5347 [employer]. [address] [place] [address], [place] [address]. All rights reserved. This information is not intended as a substitute for professional medical care. Always follow your healthcare professional's instructions.        Orthostatic Low Blood Pressure (Hypotension)  A blood pressure reading is made up of 2 numbers There is a top number over a bottom number. The top number is the systolic pressure. The bottom number is  the diastolic pressure. A normal blood pressure is less than 120 over less than 80. Low blood pressure (hypotension) is a blood pressure that is less than what is normal for you.  Orthostatic hypotension is a type of low blood pressure that occurs only when you change position from lying to standing. It can cause dizziness, lightheadedness, or fainting.  Medicines can cause orthostatic hypotension. These include:  · High blood pressure medicines  · Water pills (diuretics)  · Some heart medicines  · Some antidepressants  · Pain, anxiety, sedative, and sleeping medicines  Other causes include:  · Dehydration from vomiting, diarrhea, or not getting enough fluids  · Severe infection  · High fever  · Blood loss. This could be bleeding from the stomach or intestines.  Treatment will depend on what is causing your low blood pressure.  Home care  Follow these guidelines when caring for yourself at home:  · Rest until your symptoms get better.  · Change positions slowly from lying to standing. When getting out of bed, sit on the side of the bed with your legs down for at least 30 seconds before standing. This gives your body time to adjust to the position change.  · Follow the treatment plan described by your health care provider.  Follow-up care  Follow up with your health care provider, or as advised.  When to seek medical advice  Call your health care provider right away if any of these occur:  · Dizziness, lightheadedness, or fainting  · Black or red color in your stools or vomit  · Diarrhea or vomiting that doesnt go away  · You arent able to eat or drink  · Fever of 100.4°F (38°C) or higher, or as directed by your health care provider  · Burning when you urinate  · Foul-smelling urine  Date Last Reviewed: 11/25/2014  © 0081-5245 [employer]. [address] [place] [address]. All rights reserved. This information is not intended as a substitute for professional medical care. Always follow your  healthcare professional's instructions.        Low-Salt Choices  Eating salt (sodium) can make your body retain too much water. Excess water makes your heart work harder. Canned, packaged, and frozen foods are easy to prepare, but they are often high in sodium. Here are some ideas for low-salt foods you can easily prepare yourself.    For breakfast  · Fruit or 100% fruit juice  · Whole-wheat bread or an English muffin. Compare sodium content on labels.  · Low-fat milk or yogurt  · Unsalted eggs  · Shredded wheat  · Corn tortillas  · Unsalted steamed rice  · Regular (not instant) hot cereal, made without salt  Stay away from:  · Sausage, lino, and ham  · Flour tortillas  · Packaged muffins, pancakes, and biscuits  · Instant hot cereals  · Cottage cheese  For lunch and dinner  · Fresh fish, chicken, turkey, or meat--baked, broiled, or roasted without salt  · Dry beans, cooked without salt  · Tofu, stir-fried without salt  · Unsalted fresh fruit and vegetables, or frozen or canned fruit and vegetables with no added salt  Stay away from:  · Lunch or deli meat that is cured or smoked  · Cheese  · Tomato juice and catsup  · Canned vegetables, soups, and fish not labeled as no-salt-added or reduced sodium  · Packaged gravies and sauces  · Olives, pickles, and relish  · Bottled salad dressings  For snacks and desserts  · Yogurt  · Unsalted, air popped popcorn  · Unsalted nuts or seeds  Stay away from:  · Pies and cakes  · Packaged dessert mixes  · Pizza  · Canned and packaged puddings  · Pretzels, chips, crackers, and nuts--unless the label says unsalted  Date Last Reviewed: 6/17/2015  © 2151-7940 [employer]. [address] [place] [address], [place] [address]. All rights reserved. This information is not intended as a substitute for professional medical care. Always follow your healthcare professional's instructions.

## 2017-05-23 ENCOUNTER — OFFICE VISIT (OUTPATIENT)
Dept: INTERNAL MEDICINE | Facility: CLINIC | Age: 82
End: 2017-05-23
Payer: MEDICARE

## 2017-05-23 ENCOUNTER — HOSPITAL ENCOUNTER (OUTPATIENT)
Dept: RADIOLOGY | Facility: HOSPITAL | Age: 82
Discharge: HOME | End: 2017-05-23
Attending: INTERNAL MEDICINE
Payer: MEDICARE

## 2017-05-23 VITALS
SYSTOLIC BLOOD PRESSURE: 128 MMHG | BODY MASS INDEX: 44.06 KG/M2 | HEART RATE: 52 BPM | WEIGHT: 209.88 LBS | OXYGEN SATURATION: 96 % | HEIGHT: 58 IN | DIASTOLIC BLOOD PRESSURE: 70 MMHG

## 2017-05-23 DIAGNOSIS — I50.9 ACUTE CONGESTIVE HEART FAILURE, UNSPECIFIED CONGESTIVE HEART FAILURE TYPE: ICD-10-CM

## 2017-05-23 DIAGNOSIS — I10 ESSENTIAL HYPERTENSION: Chronic | ICD-10-CM

## 2017-05-23 DIAGNOSIS — I50.30 (HFPEF) HEART FAILURE WITH PRESERVED EJECTION FRACTION: ICD-10-CM

## 2017-05-23 DIAGNOSIS — F32.5 MAJOR DEPRESSIVE DISORDER WITH SINGLE EPISODE, IN REMISSION: ICD-10-CM

## 2017-05-23 DIAGNOSIS — J90 PLEURAL EFFUSION, BILATERAL: ICD-10-CM

## 2017-05-23 DIAGNOSIS — J90 PLEURAL EFFUSION, BILATERAL: Primary | ICD-10-CM

## 2017-05-23 PROCEDURE — 99495 TRANSJ CARE MGMT MOD F2F 14D: CPT | Mod: S$GLB,,, | Performed by: INTERNAL MEDICINE

## 2017-05-23 PROCEDURE — 99499 UNLISTED E&M SERVICE: CPT | Mod: S$GLB,,, | Performed by: INTERNAL MEDICINE

## 2017-05-23 PROCEDURE — 71020 XR CHEST PA AND LATERAL: CPT | Mod: 26,,, | Performed by: RADIOLOGY

## 2017-05-23 PROCEDURE — 1126F AMNT PAIN NOTED NONE PRSNT: CPT | Mod: S$GLB,,, | Performed by: INTERNAL MEDICINE

## 2017-05-23 PROCEDURE — 99214 OFFICE O/P EST MOD 30 MIN: CPT | Mod: S$GLB,,, | Performed by: INTERNAL MEDICINE

## 2017-05-23 PROCEDURE — 71020 XR CHEST PA AND LATERAL: CPT | Mod: TC

## 2017-05-23 PROCEDURE — 3074F SYST BP LT 130 MM HG: CPT | Mod: S$GLB,,, | Performed by: INTERNAL MEDICINE

## 2017-05-23 PROCEDURE — 99999 PR PBB SHADOW E&M-EST. PATIENT-LVL III: CPT | Mod: PBBFAC,,, | Performed by: INTERNAL MEDICINE

## 2017-05-23 PROCEDURE — 1159F MED LIST DOCD IN RCRD: CPT | Mod: S$GLB,,, | Performed by: INTERNAL MEDICINE

## 2017-05-23 PROCEDURE — 3078F DIAST BP <80 MM HG: CPT | Mod: S$GLB,,, | Performed by: INTERNAL MEDICINE

## 2017-05-23 PROCEDURE — 1160F RVW MEDS BY RX/DR IN RCRD: CPT | Mod: S$GLB,,, | Performed by: INTERNAL MEDICINE

## 2017-05-23 PROCEDURE — 1157F ADVNC CARE PLAN IN RCRD: CPT | Mod: S$GLB,,, | Performed by: INTERNAL MEDICINE

## 2017-05-24 NOTE — PROGRESS NOTES
Subjective:       Patient ID: Courtney Engle is a 82 y.o. female.    Chief Complaint: Hospital Follow Up (SOB)    History of present illness: Patient here for hospital follow-up for heart failure with preserved ejection fraction.  She had significantly elevated blood pressure, increased weight with fluid retention and was hospitalized treated and discharged.  There was mention of furosemide in the summary but I do not see where she was sent home with this for discharge.  Her weight is down about 9 pounds since before admit but she says she feels feels like she has some chest congestion.  Mild fatigue bowel shortness of breath.  No lower extremity edema.  She admits overall she is improved and she is trying to limit her water and salt intake.  Blood pressure and vitals are stable.  I would like to reassess labs and chest x-ray.  Chest x-ray on admit showed bilateral pleural effusions.  I reviewed her echo.      Review of Systems   Constitutional: Positive for fatigue. Negative for appetite change, chills and fever.   HENT: Negative for sore throat.    Respiratory: Positive for shortness of breath (mild). Negative for cough and wheezing.    Cardiovascular: Negative for chest pain and leg swelling.   Gastrointestinal: Negative for abdominal pain, constipation and diarrhea.   Genitourinary: Negative for difficulty urinating.   Musculoskeletal: Negative for neck pain and neck stiffness.   Skin: Negative for rash.       Objective:      Physical Exam   Constitutional: She is oriented to person, place, and time. She appears well-developed and well-nourished. No distress.   Overweight female no acute distress   HENT:   Head: Normocephalic and atraumatic.   Right Ear: External ear normal.   Left Ear: External ear normal.   Mouth/Throat: Oropharynx is clear and moist. No oropharyngeal exudate.   Eyes: Conjunctivae are normal. Pupils are equal, round, and reactive to light. No scleral icterus.   Neck: Normal range of motion.  Neck supple. No thyromegaly present.   Cardiovascular: Normal rate and regular rhythm.    No murmur heard.  Pulmonary/Chest: Effort normal and breath sounds normal. She has no wheezes.   Abdominal: Soft. Bowel sounds are normal. She exhibits no distension. There is no tenderness.   Musculoskeletal: She exhibits no tenderness.   Lymphadenopathy:     She has no cervical adenopathy.   Neurological: She is alert and oriented to person, place, and time.   Skin: No rash noted.   Psychiatric: She has a normal mood and affect.       Assessment:       1. Pleural effusion, bilateral    2. Acute congestive heart failure, unspecified congestive heart failure type    3. (HFpEF) heart failure with preserved ejection fraction    4. Essential hypertension    5. Major depressive disorder with single episode, in remission        Plan:       Courtney was seen today for hospital follow up.    Diagnoses and all orders for this visit:    Pleural effusion, bilateral  -     X-Ray Chest PA And Lateral; Future  -     Basic metabolic panel; Future  -     Brain natriuretic peptide; Future    Acute congestive heart failure, unspecified congestive heart failure type  -     Basic metabolic panel; Future  -     Brain natriuretic peptide; Future    (HFpEF) heart failure with preserved ejection fraction  -     Basic metabolic panel; Future  -     Brain natriuretic peptide; Future    Essential hypertension  -     Basic metabolic panel; Future  -     Brain natriuretic peptide; Future    Major depressive disorder with single episode, in remission        Review chest x-ray and labs.  Follow-up with weight and reassessment in a few weeks.  Review studies

## 2017-05-26 ENCOUNTER — TELEPHONE (OUTPATIENT)
Dept: INTERNAL MEDICINE | Facility: CLINIC | Age: 82
End: 2017-05-26

## 2017-05-26 DIAGNOSIS — N28.9 RENAL INSUFFICIENCY: Primary | ICD-10-CM

## 2017-05-26 NOTE — TELEPHONE ENCOUNTER
Xray was clear. THe kidney function is decreased. I will want her to increase water by 1-2 glasses daily and repeat lab in 2-3 weeks.

## 2017-05-26 NOTE — TELEPHONE ENCOUNTER
Spoke to pt- adv below mess- pt voiced understanding. Pt was instructed to watch swelling, weigh herself twice daily and watch her salt intake. sched lab for 6/12/17 2pm. Pt confirmed appt date and time.

## 2017-05-26 NOTE — TELEPHONE ENCOUNTER
----- Message from Antonieta Jauregui sent at 5/26/2017  2:17 PM CDT -----  Contact: self/[phone]  Patient is calling in regards needing to know the results from test had on Tuesday 05/23/17, patient want to know if she still have fluids on her chest. Please call and advise.             Thank you!!!

## 2017-06-12 ENCOUNTER — LAB VISIT (OUTPATIENT)
Dept: LAB | Facility: HOSPITAL | Age: 82
End: 2017-06-12
Attending: INTERNAL MEDICINE
Payer: MEDICARE

## 2017-06-12 DIAGNOSIS — N28.9 RENAL INSUFFICIENCY: ICD-10-CM

## 2017-06-12 LAB
ANION GAP SERPL CALC-SCNC: 9 MMOL/L
BUN SERPL-MCNC: 16 MG/DL
CALCIUM SERPL-MCNC: 9.5 MG/DL
CHLORIDE SERPL-SCNC: 104 MMOL/L
CO2 SERPL-SCNC: 29 MMOL/L
CREAT SERPL-MCNC: 1.1 MG/DL
EST. GFR  (AFRICAN AMERICAN): 55 ML/MIN/1.73 M^2
EST. GFR  (NON AFRICAN AMERICAN): 48 ML/MIN/1.73 M^2
GLUCOSE SERPL-MCNC: 66 MG/DL
POTASSIUM SERPL-SCNC: 4.9 MMOL/L
SODIUM SERPL-SCNC: 142 MMOL/L

## 2017-06-12 PROCEDURE — 36415 COLL VENOUS BLD VENIPUNCTURE: CPT

## 2017-06-12 PROCEDURE — 80048 BASIC METABOLIC PNL TOTAL CA: CPT

## 2017-06-13 ENCOUNTER — OFFICE VISIT (OUTPATIENT)
Dept: INTERNAL MEDICINE | Facility: CLINIC | Age: 82
End: 2017-06-13
Payer: MEDICARE

## 2017-06-13 VITALS
HEART RATE: 53 BPM | SYSTOLIC BLOOD PRESSURE: 112 MMHG | WEIGHT: 213.88 LBS | HEIGHT: 58 IN | DIASTOLIC BLOOD PRESSURE: 76 MMHG | BODY MASS INDEX: 44.89 KG/M2 | OXYGEN SATURATION: 95 %

## 2017-06-13 DIAGNOSIS — E11.22 CKD STAGE 3 DUE TO TYPE 2 DIABETES MELLITUS: Chronic | ICD-10-CM

## 2017-06-13 DIAGNOSIS — N18.30 CKD STAGE 3 DUE TO TYPE 2 DIABETES MELLITUS: Chronic | ICD-10-CM

## 2017-06-13 DIAGNOSIS — E03.9 ACQUIRED HYPOTHYROIDISM: Chronic | ICD-10-CM

## 2017-06-13 DIAGNOSIS — I10 ESSENTIAL HYPERTENSION: Primary | Chronic | ICD-10-CM

## 2017-06-13 DIAGNOSIS — E11.65 TYPE 2 DIABETES MELLITUS WITH HYPERGLYCEMIA, WITH LONG-TERM CURRENT USE OF INSULIN: Chronic | ICD-10-CM

## 2017-06-13 DIAGNOSIS — E66.01 MORBID OBESITY WITH BMI OF 40.0-44.9, ADULT: Chronic | ICD-10-CM

## 2017-06-13 DIAGNOSIS — M17.12 ARTHRITIS OF LEFT KNEE: ICD-10-CM

## 2017-06-13 DIAGNOSIS — I50.9 CONGESTIVE HEART FAILURE, UNSPECIFIED CONGESTIVE HEART FAILURE CHRONICITY, UNSPECIFIED CONGESTIVE HEART FAILURE TYPE: ICD-10-CM

## 2017-06-13 DIAGNOSIS — Z79.4 TYPE 2 DIABETES MELLITUS WITH HYPERGLYCEMIA, WITH LONG-TERM CURRENT USE OF INSULIN: Chronic | ICD-10-CM

## 2017-06-13 DIAGNOSIS — F41.9 ANXIETY: ICD-10-CM

## 2017-06-13 PROCEDURE — 1159F MED LIST DOCD IN RCRD: CPT | Mod: S$GLB,,, | Performed by: INTERNAL MEDICINE

## 2017-06-13 PROCEDURE — 99999 PR PBB SHADOW E&M-EST. PATIENT-LVL V: CPT | Mod: PBBFAC,,, | Performed by: INTERNAL MEDICINE

## 2017-06-13 PROCEDURE — 99214 OFFICE O/P EST MOD 30 MIN: CPT | Mod: S$GLB,,, | Performed by: INTERNAL MEDICINE

## 2017-06-13 PROCEDURE — 99499 UNLISTED E&M SERVICE: CPT | Mod: S$GLB,,, | Performed by: INTERNAL MEDICINE

## 2017-06-13 PROCEDURE — 1125F AMNT PAIN NOTED PAIN PRSNT: CPT | Mod: S$GLB,,, | Performed by: INTERNAL MEDICINE

## 2017-06-13 NOTE — PROGRESS NOTES
Subjective:       Patient ID: Courtney Engle is a 82 y.o. female.    Chief Complaint: Obesity (weight check)    Patient here follow-up hypertension and renal insufficiency and prior episode of CHF with preserved ejection fraction.  She states taking less diuretic watching her diet.  Her edema has improved, her weight and symptoms have remained stable and her labs are improved with improved BUN and creatinine.  She is still having significant knee pain and believes she would like to be reevaluated for left knee surgery.  She is worried that she is 82 years old but figures if she can get it now and will help.  She denies any GI complaints.  No chest pain.  No PND or orthopnea      Review of Systems   Constitutional: Negative for appetite change, chills and fever.   HENT: Negative for sore throat.    Respiratory: Negative for cough, shortness of breath and wheezing.    Cardiovascular: Negative for chest pain and leg swelling.   Gastrointestinal: Negative for abdominal pain, constipation and diarrhea.   Genitourinary: Negative for difficulty urinating.   Musculoskeletal: Positive for arthralgias (left knee) and gait problem. Negative for neck pain and neck stiffness.   Skin: Negative for rash.       Objective:      Physical Exam   Constitutional: She is oriented to person, place, and time. She appears well-developed and well-nourished. No distress.   HENT:   Head: Normocephalic and atraumatic.   Mouth/Throat: Oropharynx is clear and moist. No oropharyngeal exudate.   Eyes: Conjunctivae are normal. Pupils are equal, round, and reactive to light. No scleral icterus.   Neck: Normal range of motion. Neck supple. No thyromegaly present.   Cardiovascular: Normal rate and regular rhythm.    No murmur heard.  Pulmonary/Chest: Effort normal and breath sounds normal. She has no wheezes.   Abdominal: Soft. Bowel sounds are normal. She exhibits no distension. There is no tenderness.   Musculoskeletal: She exhibits tenderness  (left knee).   Lymphadenopathy:     She has no cervical adenopathy.   Neurological: She is alert and oriented to person, place, and time.   Skin: No rash noted.   Psychiatric: She has a normal mood and affect.       Assessment:       1. Essential hypertension    2. Anxiety    3. CKD stage 3 due to type 2 diabetes mellitus    4. Morbid obesity with BMI of 40.0-44.9, adult    5. Type 2 diabetes mellitus with hyperglycemia, with long-term current use of insulin    6. Congestive heart failure, unspecified congestive heart failure chronicity, unspecified congestive heart failure type    7. Acquired hypothyroidism    8. Arthritis of left knee        Plan:       Courtney was seen today for obesity.    Diagnoses and all orders for this visit:    Essential hypertension    Anxiety    CKD stage 3 due to type 2 diabetes mellitus    Morbid obesity with BMI of 40.0-44.9, adult    Type 2 diabetes mellitus with hyperglycemia, with long-term current use of insulin    Congestive heart failure, unspecified congestive heart failure chronicity, unspecified congestive heart failure type    Acquired hypothyroidism    Arthritis of left knee  -     Ambulatory referral to Orthopedics        Continue meds.  Reschedule chemical stress echo.  Continue to monitor blood pressure and work on weight reduction.

## 2017-06-21 ENCOUNTER — HOSPITAL ENCOUNTER (OUTPATIENT)
Dept: CARDIOLOGY | Facility: CLINIC | Age: 82
Discharge: HOME | End: 2017-06-21
Payer: MEDICARE

## 2017-06-21 ENCOUNTER — TELEPHONE (OUTPATIENT)
Dept: INTERNAL MEDICINE | Facility: CLINIC | Age: 82
End: 2017-06-21

## 2017-06-21 DIAGNOSIS — R07.9 CHEST PAIN, UNSPECIFIED TYPE: ICD-10-CM

## 2017-06-21 DIAGNOSIS — R94.31 ABNORMAL EKG: ICD-10-CM

## 2017-06-21 LAB — RETIRED EF AND QEF - SEE NOTES: 65 (ref 55–65)

## 2017-06-21 PROCEDURE — 93321 DOPPLER ECHO F-UP/LMTD STD: CPT | Mod: S$GLB,,, | Performed by: INTERNAL MEDICINE

## 2017-06-21 PROCEDURE — 93351 STRESS TTE COMPLETE: CPT | Mod: S$GLB,,, | Performed by: INTERNAL MEDICINE

## 2017-06-21 PROCEDURE — 93352 ADMIN ECG CONTRAST AGENT: CPT | Mod: S$GLB,,, | Performed by: INTERNAL MEDICINE

## 2017-06-21 PROCEDURE — 93000 ELECTROCARDIOGRAM COMPLETE: CPT | Mod: S$GLB,,, | Performed by: INTERNAL MEDICINE

## 2017-06-22 NOTE — TELEPHONE ENCOUNTER
----- Message from Jignesh Pollack sent at 6/22/2017  2:11 PM CDT -----  Contact: Self   Type: Returning a call    Who left a message? Eun    When did the practice call? Today    Comments: Please advice    Thanks

## 2017-06-22 NOTE — TELEPHONE ENCOUNTER
Please let pt know that her stress test was negative for ischemia changes or signs of blockages. Let me know of any questions.

## 2017-07-24 RX ORDER — INSULIN GLARGINE 100 [IU]/ML
INJECTION, SOLUTION SUBCUTANEOUS
Qty: 10 ML | Refills: 0 | Status: SHIPPED | OUTPATIENT
Start: 2017-07-24 | End: 2017-08-25 | Stop reason: SDUPTHER

## 2017-07-25 ENCOUNTER — OFFICE VISIT (OUTPATIENT)
Dept: ORTHOPEDICS | Facility: CLINIC | Age: 82
End: 2017-07-25
Payer: MEDICARE

## 2017-07-25 ENCOUNTER — HOSPITAL ENCOUNTER (OUTPATIENT)
Dept: RADIOLOGY | Facility: HOSPITAL | Age: 82
Discharge: HOME | End: 2017-07-25
Attending: ORTHOPAEDIC SURGERY
Payer: MEDICARE

## 2017-07-25 VITALS — WEIGHT: 207.88 LBS | BODY MASS INDEX: 43.63 KG/M2 | HEIGHT: 58 IN

## 2017-07-25 DIAGNOSIS — M25.562 CHRONIC PAIN OF LEFT KNEE: Primary | ICD-10-CM

## 2017-07-25 DIAGNOSIS — M25.562 CHRONIC PAIN OF LEFT KNEE: ICD-10-CM

## 2017-07-25 DIAGNOSIS — M17.12 PRIMARY OSTEOARTHRITIS OF LEFT KNEE: ICD-10-CM

## 2017-07-25 DIAGNOSIS — G89.29 CHRONIC PAIN OF LEFT KNEE: ICD-10-CM

## 2017-07-25 DIAGNOSIS — G89.29 CHRONIC PAIN OF LEFT KNEE: Primary | ICD-10-CM

## 2017-07-25 PROCEDURE — 73560 X-RAY EXAM OF KNEE 1 OR 2: CPT | Mod: TC,LT

## 2017-07-25 PROCEDURE — 73565 X-RAY EXAM OF KNEES: CPT | Mod: 26,,, | Performed by: RADIOLOGY

## 2017-07-25 PROCEDURE — 73565 X-RAY EXAM OF KNEES: CPT | Mod: TC

## 2017-07-25 PROCEDURE — 99204 OFFICE O/P NEW MOD 45 MIN: CPT | Mod: S$GLB,,, | Performed by: ORTHOPAEDIC SURGERY

## 2017-07-25 PROCEDURE — 1125F AMNT PAIN NOTED PAIN PRSNT: CPT | Mod: S$GLB,,, | Performed by: ORTHOPAEDIC SURGERY

## 2017-07-25 PROCEDURE — 99999 PR PBB SHADOW E&M-EST. PATIENT-LVL III: CPT | Mod: PBBFAC,,, | Performed by: ORTHOPAEDIC SURGERY

## 2017-07-25 PROCEDURE — 73560 X-RAY EXAM OF KNEE 1 OR 2: CPT | Mod: 26,LT,, | Performed by: RADIOLOGY

## 2017-07-25 PROCEDURE — 1159F MED LIST DOCD IN RCRD: CPT | Mod: S$GLB,,, | Performed by: ORTHOPAEDIC SURGERY

## 2017-07-25 NOTE — LETTER
July 25, 2017      Vishal Ulloa MD  [address]  [facility] [address]           [facility]  [address], [address]  [facility] [address]  Phone: [phone]          Patient: Courtney Engle   MR Number: [id number]   YOB: 1935   Date of Visit: 7/25/2017       Dear Dr. Vishal Ulloa:    Thank you for referring Courtney Engle to me for evaluation. Attached you will find relevant portions of my assessment and plan of care.    If you have questions, please do not hesitate to call me. I look forward to following Courtney Engle along with you.    Sincerely,    [facility]    Enclosure  CC:  No Recipients    If you would like to receive this communication electronically, please contact [email or web address] or [phone] to request more information on [employer] access.    For providers and/or their staff who would like to refer a patient to [facility], please contact us through our one-stop-shop provider referral line, [facility] [place], at [phone].    If you feel you have received this communication in error or would no longer like to receive these types of communications, please e-mail [email or web address]

## 2017-07-25 NOTE — PROGRESS NOTES
HPI:    Courtney Engle is a 82 y.o. female who is here today for   Chief Complaint   Patient presents with    Left Knee - Pain    Right Knee - Pain   .  Patient is ready for a left total knee arthroplasty.    Duration: 9 months  Intensity: severe  Character of pain: sharp  Location: She reports that the pain is predominately  medial  Patient's pain increases with activity.  Pain is increased with weightbearing and interferes with activities of daily living.    She has tried conservative management including NSAIDS, injections, and activity modification without relief.    She has discussed options with her family and wishes to schedule TKA.     PMSFSH reviewed per clinic record       Past Medical History:   Diagnosis Date    Acquired hypothyroidism 4/25/2014    Anxiety     Aortic calcification 12/8/2016    X-Ray Chest-5/08/2014    Arthritis     Bilateral carotid artery disease 12/8/2016    US Carotid Bilateral-1/24/2013    CKD stage 3 due to type 2 diabetes mellitus 2/16/2017    Depression     Essential hypertension     Fever blister     Glaucoma 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 robledo)     Hyperlipidemia LDL goal <70 2/16/2017    Keloid cicatrix     Mixed stress and urge urinary incontinence 2/16/2017    Morbid obesity with BMI of 40.0-44.9, adult 12/8/2016    Ocular migraine 1/24/2013    Type 2 diabetes mellitus with hyperglycemia, with long-term current use of insulin           Current Outpatient Prescriptions:     alendronate (FOSAMAX) 70 MG tablet, Take 1 tablet (70 mg total) by mouth every 7 days., Disp: 4 tablet, Rfl: 11    alprazolam (XANAX) 0.5 MG tablet, Take 1 tablet (0.5 mg total) by mouth nightly. (Patient taking differently: Take 0.25 mg by mouth nightly. ), Disp: 30 tablet, Rfl: 5    aspirin (ECOTRIN) 81 MG EC tablet, Take 1 tablet (81 mg total) by mouth once daily., Disp: 30 tablet, Rfl: 12    atenolol (TENORMIN) 25 MG tablet, TAKE 1 TABLET BY MOUTH EVERY DAY, Disp:  "90 tablet, Rfl: 0    BD INSULIN SYRINGE ULT-FINE II 1/2 mL 31 x 5/16" Syrg, USE THREE TIMES DAILY AS DIRECTED, Disp: 100 each, Rfl: 0    blood-glucose meter kit, Use as instructed, Disp: 1 each, Rfl: 0    butalbital-acetaminophen  mg Tab, Take by mouth., Disp: , Rfl:     escitalopram oxalate (LEXAPRO) 10 MG tablet, TAKE 1 TABLET BY MOUTH EVERY DAY, Disp: 90 tablet, Rfl: 0    fluticasone (FLONASE) 50 mcg/actuation nasal spray, SPRAY TWICE IN EACH NOSTRIL ONCE DAILY, Disp: 32 g, Rfl: 6    insulin aspart (NOVOLOG) 100 unit/mL injection, To use with Vgo 20  with 4 clicks with meals, max TDD 38 units daily, Disp: 2 vial, Rfl: 3    insulin glargine (LANTUS) 100 unit/mL injection, Inject 30 Units into the skin every evening. STOP WHEN VGO STARTED, Disp: 10 mL, Rfl: 12    insulin syringe-needle U-100 0.5 mL 31 gauge x 5/16 Syrg, USE AS DIRECTED FOUR TIMES DAILY, Disp: 500 each, Rfl: 0    insulin syringe-needle U-100 1/2 mL 31 x 5/16" Syrg, USE THREE TIMES DAILY, Disp: 300 each, Rfl: 0    LANTUS 100 unit/mL injection, ADMINISTER 30 UNITS UNDER THE SKIN SKIN EVERY EVENING, Disp: 10 mL, Rfl: 0    levothyroxine (SYNTHROID) 75 MCG tablet, TAKE 1 TABLET BY MOUTH EVERY DAY, Disp: 90 tablet, Rfl: 0    lisinopril 10 MG tablet, Take 1 tablet (10 mg total) by mouth once daily., Disp: 90 tablet, Rfl: 3    melatonin 3 mg Tab, Take 1 tablet (3 mg total) by mouth every evening., Disp: , Rfl: 0    omeprazole (PRILOSEC) 40 MG capsule, Take 1 capsule (40 mg total) by mouth 2 (two) times daily before meals., Disp: 60 capsule, Rfl: 6    ONETOUCH ULTRA TEST Strp, TEST FOUR TIMES DAILY, Disp: 300 strip, Rfl: 0    triamcinolone acetonide 0.1% (KENALOG) 0.1 % cream, AAA bid on rash, Disp: 454 g, Rfl: 3    acetaminophen (TYLENOL) 500 MG tablet, Take 1 tablet (500 mg total) by mouth every 6 (six) hours as needed for Pain., Disp: , Rfl: 0     Review of patient's allergies indicates:   Allergen Reactions    Benadryl " "[diphenhydramine hcl] Shortness Of Breath    Contac 12 hour allergy Anaphylaxis and Hives    Ciprofloxacin (bulk) Nausea And Vomiting    Anti-hyst Other (See Comments)    Antihistamines - alkylamine Hives    Codeine      bad reaction    Glucotrol [glipizide]     Hydroxyzine      Unknown    Iodinated contrast- oral and iv dye Other (See Comments)    Nitrofuran analogues     Propoxyphene Itching    Doxycycline Rash    Penicillins Rash    Sulfa (sulfonamide antibiotics) Rash        ROS  Constitutional: Negative for fever, Negative for weight loss  HENT Negative for congestion  Cardiovascular: Negative chest pain  Respiratory: Negative Shortness of breath  Heme: Negative excessive bleeding  Skin:NegativeItching, Negative breakdown  Musculoskeletal:Negative for back pain, Negative for joint pain, Negative muscle pain, Negative muscle weakness  Neurological: Negative for numbness and paresthesias   Psychiatric/Behavioral: Negative altered mental status, Negative for depression    Physical Exam:   Ht 4' 10" (1.473 m)   Wt 94.3 kg (207 lb 14.3 oz)   BMI 43.45 kg/m²   General appearance: This is a well-developed, Well nourished female No obvious acute distress.  Psychiatric: normal mood and affect;  pleasant and conversant; judgment and thought content normal  Gait is coordinated. Patient has antalgic gait to the left  Cardiovascular: There are no swelling or varicosities present.   Respiratory: normal respiratory effort   Lymphatic: no adenopathy   Neurologic: alert and oriented to person, place, and time   Deep Tendon Reflexes are normal;  Coordination and Balance: Normal   Musculoskeletal   Neck    ROM shows normal flexion and extension and lateral rotation    Palpation: Non-tender    Stability is normal    Strength is normal    Skin is normal without masses and lesions    Sensation is intact to light touch   Back    ROM showsnormal flexion, extension and     rotation    Palpation shows no " masses    Stability is normal    Strength to flexion and extension well maintained    Core strength is diminished    Skin shows no rashes or cafe au lait spots;     Sensation is intact to light touch  Right hip   Range of motion normal    Left Hip  Range of motionnormal    Right Knee  Swelling None  TendernessNone  Range of Motion: 120  Motion is nonpainful  Crepitance presentNo    Right Leg  Neurologic Intact  Pulses Intact    Left Knee: Swelling Mild  TendernessMedial joint line  Range of Motion:    Motion is painful Yes    Left Leg   Neurologic Intact  Pulses Intact    Radiograph: Show severe degenerative arthritis with subchondral sclerosis, periarticular osteophytes and narrowing of joint space.  Angle: significant varus    Physical therapy is contraindicated due to potential bone loss on this severe arthritic joint.    Assessment:  Knee arthritis left      She will need to be cleared in our PreOp center.         .    She  has a past medical history of Acquired hypothyroidism (4/25/2014); Anxiety; Aortic calcification (12/8/2016); Arthritis; Bilateral carotid artery disease (12/8/2016); CKD stage 3 due to type 2 diabetes mellitus (2/16/2017); Depression; Essential hypertension; Fever blister; Glaucoma suspect with open angle (2010); Hyperlipidemia LDL goal <70 (2/16/2017); Keloid cicatrix; Mixed stress and urge urinary incontinence (2/16/2017); Morbid obesity with BMI of 40.0-44.9, adult (12/8/2016); Ocular migraine (1/24/2013); and Type 2 diabetes mellitus with hyperglycemia, with long-term current use of insulin. . We will have to take this into account. She  will be followed by the hospitalist service while in the hospital.       We have gone over the hospitalization and recovery with her as well.  This is typically around 2 weeks on a walker and transition to a cane after that.  She will have home health likely for 3-4 weeks and then transition to outpatient if necessary.  She is agreement with this  plan of care and is ready to proceed.  I will see her back for clinical recheck at the 2-week postop silke.  I will see her back for clinical recheck for any other questions or problems as needed and certainly for any other issues in the interim.    We have discussed risks of total knee replacement which include but are not limited to blood clots in the legs that can travel to the lungs (pulmonary embolism). Pulmonary embolism can cause shortness of breath, chest pain, and even shock. Other risks include urinary tract infection, nausea and vomiting (usually related to pain medication), chronic knee pain and stiffness, bleeding into the knee joint, nerve damage, blood vessel injury, and infection of the knee which can require re-operation. Furthermore, the risks of anesthesia include potential heart, lung, kidney, and liver damage.

## 2017-08-07 DIAGNOSIS — M17.9 OSTEOARTHRITIS OF KNEE, UNSPECIFIED: Primary | ICD-10-CM

## 2017-08-08 RX ORDER — NAPROXEN SODIUM 220 MG
TABLET ORAL
Qty: 500 EACH | Refills: 0 | Status: ON HOLD | OUTPATIENT
Start: 2017-08-08 | End: 2018-01-15 | Stop reason: HOSPADM

## 2017-08-09 ENCOUNTER — NURSE TRIAGE (OUTPATIENT)
Dept: ADMINISTRATIVE | Facility: CLINIC | Age: 82
End: 2017-08-09

## 2017-08-09 NOTE — TELEPHONE ENCOUNTER
Spoke to pt about Raccoon scratch or bite. It did break the skin with some blood. I reviewed literature with her and recommended urgent eval to check wound and eval for need for Post Exposure Prophylaxis Vaccine vs other. May need official ID consult. She will proceed to the ER as originally recommended.

## 2017-08-09 NOTE — TELEPHONE ENCOUNTER
Reason for Disposition   Any break in skin (e.g., cut, puncture, or scratch) and wild animal at RISK FOR RABIES (e.g., bat, raccoon, bojorquez, skunk, coyote, other carnivores)    Protocols used: ST ANIMAL BITE-A-[place]

## 2017-08-09 NOTE — TELEPHONE ENCOUNTER
Spoke to pt states she got scratch by to a raccoon that was fighting her cats on leg skin was broken and bleed out pt is unsure of what her next step should be she has cleaned the area and has put an antibiotic cream pt is afraid of rosi rabies states she does not want to sit in ER but would like to know  PCP opinion

## 2017-08-16 ENCOUNTER — TELEPHONE (OUTPATIENT)
Dept: ORTHOPEDICS | Facility: CLINIC | Age: 82
End: 2017-08-16

## 2017-08-16 DIAGNOSIS — M79.605 PAIN OF LEFT LOWER EXTREMITY: Primary | ICD-10-CM

## 2017-08-16 DIAGNOSIS — E11.9 DM TYPE 2, GOAL A1C TO BE DETERMINED: ICD-10-CM

## 2017-08-17 ENCOUNTER — TELEPHONE (OUTPATIENT)
Dept: ORTHOPEDICS | Facility: CLINIC | Age: 82
End: 2017-08-17

## 2017-08-17 NOTE — TELEPHONE ENCOUNTER
----- Message from Marilynn Steel sent at 8/16/2017  4:39 PM CDT -----  Contact: self   Pt is returning a call from Gabriella and can be reached at [phone]

## 2017-08-17 NOTE — TELEPHONE ENCOUNTER
----- Message from Isamar Degroot MA sent at 8/17/2017 11:37 AM CDT -----  Contact: self   Pt is returning your call you  told her to call in regards to sx. Pt can be reached at [phone]

## 2017-08-22 ENCOUNTER — OFFICE VISIT (OUTPATIENT)
Dept: ORTHOPEDICS | Facility: CLINIC | Age: 82
End: 2017-08-22
Payer: MEDICARE

## 2017-08-22 ENCOUNTER — LAB VISIT (OUTPATIENT)
Dept: LAB | Facility: HOSPITAL | Age: 82
End: 2017-08-22
Attending: ORTHOPAEDIC SURGERY
Payer: MEDICARE

## 2017-08-22 VITALS — WEIGHT: 205 LBS | HEIGHT: 58 IN | BODY MASS INDEX: 43.03 KG/M2

## 2017-08-22 DIAGNOSIS — M17.9 OSTEOARTHRITIS OF KNEE, UNSPECIFIED: ICD-10-CM

## 2017-08-22 DIAGNOSIS — M79.605 PAIN OF LEFT LOWER EXTREMITY: ICD-10-CM

## 2017-08-22 DIAGNOSIS — M17.12 PRIMARY OSTEOARTHRITIS OF LEFT KNEE: Primary | ICD-10-CM

## 2017-08-22 LAB
CRP SERPL-MCNC: 13.2 MG/L
ERYTHROCYTE [DISTWIDTH] IN BLOOD BY AUTOMATED COUNT: 13.7 %
ERYTHROCYTE [SEDIMENTATION RATE] IN BLOOD BY WESTERGREN METHOD: 40 MM/HR
HCT VFR BLD AUTO: 37.6 %
HGB BLD-MCNC: 12.4 G/DL
INR PPP: 1
MCH RBC QN AUTO: 28.9 PG
MCHC RBC AUTO-ENTMCNC: 33 G/DL
MCV RBC AUTO: 88 FL
PLATELET # BLD AUTO: 318 K/UL
PMV BLD AUTO: 9.5 FL
PROTHROMBIN TIME: 10.3 SEC
RBC # BLD AUTO: 4.29 M/UL
WBC # BLD AUTO: 8.21 K/UL

## 2017-08-22 PROCEDURE — 36415 COLL VENOUS BLD VENIPUNCTURE: CPT

## 2017-08-22 PROCEDURE — 85651 RBC SED RATE NONAUTOMATED: CPT

## 2017-08-22 PROCEDURE — 99499 UNLISTED E&M SERVICE: CPT | Mod: S$GLB,,, | Performed by: PHYSICIAN ASSISTANT

## 2017-08-22 PROCEDURE — 83036 HEMOGLOBIN GLYCOSYLATED A1C: CPT

## 2017-08-22 PROCEDURE — 85027 COMPLETE CBC AUTOMATED: CPT

## 2017-08-22 PROCEDURE — 86140 C-REACTIVE PROTEIN: CPT

## 2017-08-22 PROCEDURE — 85610 PROTHROMBIN TIME: CPT

## 2017-08-22 PROCEDURE — 99999 PR PBB SHADOW E&M-EST. PATIENT-LVL IV: CPT | Mod: PBBFAC,,, | Performed by: PHYSICIAN ASSISTANT

## 2017-08-24 ENCOUNTER — TELEPHONE (OUTPATIENT)
Dept: ORTHOPEDICS | Facility: CLINIC | Age: 82
End: 2017-08-24

## 2017-08-24 RX ORDER — FENTANYL CITRATE 50 UG/ML
25 INJECTION, SOLUTION INTRAMUSCULAR; INTRAVENOUS EVERY 5 MIN PRN
Status: CANCELLED | OUTPATIENT
Start: 2017-08-24

## 2017-08-24 RX ORDER — MIDAZOLAM HYDROCHLORIDE 5 MG/ML
1 INJECTION INTRAMUSCULAR; INTRAVENOUS EVERY 5 MIN PRN
Status: CANCELLED | OUTPATIENT
Start: 2017-08-24

## 2017-08-24 RX ORDER — ROPIVACAINE HYDROCHLORIDE 2 MG/ML
8 INJECTION, SOLUTION EPIDURAL; INFILTRATION; PERINEURAL CONTINUOUS
Status: CANCELLED | OUTPATIENT
Start: 2017-08-24

## 2017-08-24 RX ORDER — SODIUM CHLORIDE 0.9 % (FLUSH) 0.9 %
3 SYRINGE (ML) INJECTION EVERY 8 HOURS PRN
Status: CANCELLED | OUTPATIENT
Start: 2017-08-24

## 2017-08-24 RX ORDER — ACETAMINOPHEN 10 MG/ML
1000 INJECTION, SOLUTION INTRAVENOUS ONCE
Status: CANCELLED | OUTPATIENT
Start: 2017-08-24 | End: 2017-08-24

## 2017-08-24 RX ORDER — NALOXONE HCL 0.4 MG/ML
0.02 VIAL (ML) INJECTION
Status: CANCELLED | OUTPATIENT
Start: 2017-08-24

## 2017-08-24 RX ORDER — OXYCODONE HYDROCHLORIDE 5 MG/1
10 TABLET ORAL
Status: CANCELLED | OUTPATIENT
Start: 2017-08-24

## 2017-08-24 RX ORDER — MUPIROCIN 20 MG/G
1 OINTMENT TOPICAL
Status: CANCELLED | OUTPATIENT
Start: 2017-08-24

## 2017-08-24 RX ORDER — RAMELTEON 8 MG/1
8 TABLET ORAL NIGHTLY PRN
Status: CANCELLED | OUTPATIENT
Start: 2017-08-24

## 2017-08-24 RX ORDER — PREGABALIN 25 MG/1
75 CAPSULE ORAL NIGHTLY
Status: CANCELLED | OUTPATIENT
Start: 2017-08-24

## 2017-08-24 RX ORDER — MORPHINE SULFATE 10 MG/ML
2 INJECTION, SOLUTION INTRAMUSCULAR; INTRAVENOUS
Status: CANCELLED | OUTPATIENT
Start: 2017-08-24

## 2017-08-24 RX ORDER — ASPIRIN 325 MG
325 TABLET, DELAYED RELEASE (ENTERIC COATED) ORAL 2 TIMES DAILY
Status: CANCELLED | OUTPATIENT
Start: 2017-08-24

## 2017-08-24 RX ORDER — SODIUM CHLORIDE 9 MG/ML
INJECTION, SOLUTION INTRAVENOUS
Status: CANCELLED | OUTPATIENT
Start: 2017-08-24

## 2017-08-24 RX ORDER — OXYCODONE HYDROCHLORIDE 5 MG/1
15 TABLET ORAL
Status: CANCELLED | OUTPATIENT
Start: 2017-08-24

## 2017-08-24 RX ORDER — OXYCODONE HYDROCHLORIDE 5 MG/1
5 TABLET ORAL
Status: CANCELLED | OUTPATIENT
Start: 2017-08-24

## 2017-08-24 RX ORDER — FAMOTIDINE 20 MG/1
20 TABLET, FILM COATED ORAL DAILY
Status: CANCELLED | OUTPATIENT
Start: 2017-08-24

## 2017-08-24 RX ORDER — POLYETHYLENE GLYCOL 3350 17 G/17G
17 POWDER, FOR SOLUTION ORAL DAILY
Status: CANCELLED | OUTPATIENT
Start: 2017-08-24

## 2017-08-24 RX ORDER — PREGABALIN 25 MG/1
75 CAPSULE ORAL
Status: CANCELLED | OUTPATIENT
Start: 2017-08-24

## 2017-08-24 RX ORDER — ACETAMINOPHEN 500 MG
1000 TABLET ORAL EVERY 6 HOURS
Status: CANCELLED | OUTPATIENT
Start: 2017-08-24 | End: 2017-08-26

## 2017-08-24 RX ORDER — SODIUM CHLORIDE 9 MG/ML
INJECTION, SOLUTION INTRAVENOUS CONTINUOUS
Status: CANCELLED | OUTPATIENT
Start: 2017-08-24 | End: 2017-08-25

## 2017-08-24 RX ORDER — AMOXICILLIN 250 MG
1 CAPSULE ORAL 2 TIMES DAILY
Status: CANCELLED | OUTPATIENT
Start: 2017-08-24

## 2017-08-24 RX ORDER — BISACODYL 10 MG
10 SUPPOSITORY, RECTAL RECTAL EVERY 12 HOURS PRN
Status: CANCELLED | OUTPATIENT
Start: 2017-08-24

## 2017-08-24 RX ORDER — LIDOCAINE HYDROCHLORIDE 10 MG/ML
1 INJECTION, SOLUTION EPIDURAL; INFILTRATION; INTRACAUDAL; PERINEURAL
Status: CANCELLED | OUTPATIENT
Start: 2017-08-24

## 2017-08-24 RX ORDER — ONDANSETRON 2 MG/ML
4 INJECTION INTRAMUSCULAR; INTRAVENOUS EVERY 12 HOURS PRN
Status: CANCELLED | OUTPATIENT
Start: 2017-08-24

## 2017-08-24 NOTE — H&P
CC: Left knee pain    Courtney Engle is a 82 y.o. female with 5 year history of Left knee pain. Pain is worse with activity and weight bearing.  Patient has experienced interference of activities of daily living due to decreased range of motion and an increase in joint pain and swelling.  Patient has failed non-operative treatment including NSAIDs, corticosteroid injections, viscosupplement injections, and activity modification.  Courtney Engle currently ambulates using a walker.     Past Medical History:   Diagnosis Date    Acquired hypothyroidism 4/25/2014    Anxiety     Aortic calcification 12/8/2016    X-Ray Chest-5/08/2014    Arthritis     Bilateral carotid artery disease 12/8/2016    US Carotid Bilateral-1/24/2013    CKD stage 3 due to type 2 diabetes mellitus 2/16/2017    Depression     Essential hypertension     Fever blister     Glaucoma 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 robledo)     Hyperlipidemia LDL goal <70 2/16/2017    Keloid cicatrix     Mixed stress and urge urinary incontinence 2/16/2017    Morbid obesity with BMI of 40.0-44.9, adult 12/8/2016    Ocular migraine 1/24/2013    Type 2 diabetes mellitus with hyperglycemia, with long-term current use of insulin        Past Surgical History:   Procedure Laterality Date    CATARACT EXTRACTION W/  INTRAOCULAR LENS IMPLANT Right 05/30/2012        CATARACT EXTRACTION W/  INTRAOCULAR LENS IMPLANT Left 05/26/2010        CHOLECYSTECTOMY      COLONOSCOPY W/ POLYPECTOMY  04/20/2015    ESOPHAGOGASTRODUODENOSCOPY  04/20/2015    HYSTERECTOMY      Partial    JOINT REPLACEMENT      knee replacement right      TONSILLECTOMY, ADENOIDECTOMY      tonsillectomy only       Family History   Problem Relation Age of Onset    Glaucoma Father     Stroke Father     Heart attack Father     Nephrolithiasis Father     Colon cancer Mother     Cancer Mother      colon ca    Heart disease Mother     Uterine cancer  "Daughter      uterine sarcoma    Hematuria Brother     Heart disease Maternal Grandmother     Hypertension Maternal Grandmother     Heart attack Maternal Grandmother     No Known Problems Daughter     Amblyopia Neg Hx     Blindness Neg Hx     Cataracts Neg Hx     Macular degeneration Neg Hx     Retinal detachment Neg Hx     Strabismus Neg Hx        Review of patient's allergies indicates:   Allergen Reactions    Benadryl [diphenhydramine hcl] Shortness Of Breath    Contac 12 hour allergy Anaphylaxis and Hives    Ciprofloxacin (bulk) Nausea And Vomiting    Anti-hyst Other (See Comments)    Antihistamines - alkylamine Hives    Codeine      bad reaction    Glucotrol [glipizide]     Hydroxyzine      Unknown    Iodinated contrast- oral and iv dye Other (See Comments)    Nitrofuran analogues     Propoxyphene Itching    Doxycycline Rash    Penicillins Rash    Sulfa (sulfonamide antibiotics) Rash         Current Outpatient Prescriptions:     acetaminophen (TYLENOL) 500 MG tablet, Take 1 tablet (500 mg total) by mouth every 6 (six) hours as needed for Pain., Disp: , Rfl: 0    alendronate (FOSAMAX) 70 MG tablet, Take 1 tablet (70 mg total) by mouth every 7 days., Disp: 4 tablet, Rfl: 11    alprazolam (XANAX) 0.5 MG tablet, Take 1 tablet (0.5 mg total) by mouth nightly. (Patient taking differently: Take 0.25 mg by mouth nightly. ), Disp: 30 tablet, Rfl: 5    aspirin (ECOTRIN) 81 MG EC tablet, Take 1 tablet (81 mg total) by mouth once daily., Disp: 30 tablet, Rfl: 12    atenolol (TENORMIN) 25 MG tablet, TAKE 1 TABLET BY MOUTH EVERY DAY, Disp: 90 tablet, Rfl: 0    BD INSULIN SYRINGE ULT-FINE II 1/2 mL 31 x 5/16" Syrg, USE THREE TIMES DAILY AS DIRECTED, Disp: 100 each, Rfl: 0    blood-glucose meter kit, Use as instructed, Disp: 1 each, Rfl: 0    butalbital-acetaminophen  mg Tab, Take by mouth., Disp: , Rfl:     escitalopram oxalate (LEXAPRO) 10 MG tablet, TAKE 1 TABLET BY MOUTH EVERY DAY, " "Disp: 90 tablet, Rfl: 0    fluticasone (FLONASE) 50 mcg/actuation nasal spray, SPRAY TWICE IN EACH NOSTRIL ONCE DAILY, Disp: 32 g, Rfl: 6    insulin aspart (NOVOLOG) 100 unit/mL injection, To use with Vgo 20  with 4 clicks with meals, max TDD 38 units daily, Disp: 2 vial, Rfl: 3    insulin glargine (LANTUS) 100 unit/mL injection, Inject 30 Units into the skin every evening. STOP WHEN VGO STARTED, Disp: 10 mL, Rfl: 12    insulin syringe-needle U-100 0.5 mL 31 gauge x 5/16 Syrg, USE AS DIRECTED FOUR TIMES DAILY, Disp: 500 each, Rfl: 0    insulin syringe-needle U-100 1/2 mL 31 x 5/16" Syrg, USE THREE TIMES DAILY, Disp: 300 each, Rfl: 0    LANTUS 100 unit/mL injection, ADMINISTER 30 UNITS UNDER THE SKIN SKIN EVERY EVENING, Disp: 10 mL, Rfl: 0    levothyroxine (SYNTHROID) 75 MCG tablet, TAKE 1 TABLET BY MOUTH EVERY DAY, Disp: 90 tablet, Rfl: 0    lisinopril 10 MG tablet, Take 1 tablet (10 mg total) by mouth once daily., Disp: 90 tablet, Rfl: 3    melatonin 3 mg Tab, Take 1 tablet (3 mg total) by mouth every evening., Disp: , Rfl: 0    omeprazole (PRILOSEC) 40 MG capsule, Take 1 capsule (40 mg total) by mouth 2 (two) times daily before meals., Disp: 60 capsule, Rfl: 6    ONETOUCH ULTRA TEST Strp, TEST FOUR TIMES DAILY, Disp: 300 strip, Rfl: 0    triamcinolone acetonide 0.1% (KENALOG) 0.1 % cream, AAA bid on rash, Disp: 454 g, Rfl: 3    Review of Systems:  Constitutional: no fever or chills  Eyes: no visual changes  ENT: no nasal congestion or sore throat  Respiratory: no cough or shortness of breath  Cardiovascular: no chest pain or palpitations  Gastrointestinal: no nausea or vomiting, tolerating diet  Genitourinary: no hematuria or dysuria  Integument/Breast: no rash or pruritis  Hematologic/Lymphatic: no easy bruising or lymphadenopathy  Musculoskeletal: positive for severe knee pain  Neurological: no seizures or tremors  Behavioral/Psych: no auditory or visual hallucinations  Endocrine: no heat or cold " "intolerance    PE:  Ht 4' 10" (1.473 m)   Wt 93 kg (205 lb 0.4 oz)   BMI 42.85 kg/m²   General: Pleasant, cooperative, NAD   Gait: antalgic  HEENT: NCAT, sclera nonicteric   Lungs: Respirations clear bilaterally; equal and unlabored.   CV: S1S2; 2+ bilateral upper and lower extremity pulses.   Skin: Intact throughout with no rashes, erythema, or lesions  Extremities: No LE edema,  no erythema or warmth of the skin in either lower extremity.    Left knee exam:  Knee Range of Motion: active, pain with passive range of motion  Effusion:none  Condition of skin:intact  Location of tenderness:Medial joint line   Strength:5 of 5 quadriceps strength and 5 of 5 hamstring strength  Stability: stable to testing    Hip Examination:knee pain    Radiographs: Radiographs reveal advanced degenerative changes including subchondral cyst formation, subchondral sclerosis, osteophyte formation, joint space narrowing.     Knee Alignment:  Moderate varus    Diagnosis: osteoarthritis Left knee    Plan: Left total knee arthroplasty    Due to the serious nature of total joint infection and the high prevalence of community acquired MRSA, vancomycin will be used perioperatively.            "

## 2017-08-24 NOTE — TELEPHONE ENCOUNTER
----- Message from Mi Allen sent at 8/24/2017 11:20 AM CDT -----  Contact: self/home  Pt would like to speak with you regarding her upcoming sx.

## 2017-08-24 NOTE — PROGRESS NOTES
Courtney Engle is a 82 y.o. year old here today for a pre-operative visit in preparation for a Left total knee arthroplasty to be performed by  Dr. [facility] on 8/30/2017.  she was last seen and treated in the clinic on 7/25/2017. she will be medically optimized by the pre op center. There has been no significant change in medical status since last visit. No fever, chills, malaise, or unexplained weight change.      Allergies, Medications, past medical and surgical history reviewed.    Focused examination performed.    Dr. [facility] saw this patient today in clinic. All questions answered. Patient encouraged to call with questions. Contact information given.     Pre, molly, and post operative procedures and expectations discussed. Questions were answered. Courtney Engle has been educated and is ready to proceed with surgery. Approximately 30 minutes was spent discussing surgical outcomes, plans, procedures pre, molly, and post operative expections and care.  Surgical consent signed.    Courtney Engle will contact us if there are any questions, concerns, or changes in medical status prior to surgery.

## 2017-08-25 ENCOUNTER — HOSPITAL ENCOUNTER (OUTPATIENT)
Dept: PREADMISSION TESTING | Facility: HOSPITAL | Age: 82
Discharge: HOME | End: 2017-08-25
Attending: ANESTHESIOLOGY
Payer: MEDICARE

## 2017-08-25 ENCOUNTER — INITIAL CONSULT (OUTPATIENT)
Dept: INTERNAL MEDICINE | Facility: CLINIC | Age: 82
End: 2017-08-25
Payer: MEDICARE

## 2017-08-25 VITALS
BODY MASS INDEX: 43.62 KG/M2 | OXYGEN SATURATION: 95 % | WEIGHT: 207.81 LBS | HEIGHT: 58 IN | DIASTOLIC BLOOD PRESSURE: 72 MMHG | HEART RATE: 61 BPM | TEMPERATURE: 99 F | SYSTOLIC BLOOD PRESSURE: 130 MMHG

## 2017-08-25 DIAGNOSIS — E11.65 TYPE 2 DIABETES MELLITUS WITH HYPERGLYCEMIA, WITH LONG-TERM CURRENT USE OF INSULIN: Chronic | ICD-10-CM

## 2017-08-25 DIAGNOSIS — N18.30 CKD STAGE 3 DUE TO TYPE 2 DIABETES MELLITUS: Chronic | ICD-10-CM

## 2017-08-25 DIAGNOSIS — K21.9 GASTROESOPHAGEAL REFLUX DISEASE, ESOPHAGITIS PRESENCE NOT SPECIFIED: ICD-10-CM

## 2017-08-25 DIAGNOSIS — R13.12 OROPHARYNGEAL DYSPHAGIA: ICD-10-CM

## 2017-08-25 DIAGNOSIS — Z79.4 TYPE 2 DIABETES MELLITUS WITH HYPERGLYCEMIA, WITH LONG-TERM CURRENT USE OF INSULIN: Chronic | ICD-10-CM

## 2017-08-25 DIAGNOSIS — F32.5 MAJOR DEPRESSIVE DISORDER WITH SINGLE EPISODE, IN REMISSION: ICD-10-CM

## 2017-08-25 DIAGNOSIS — Z01.818 PREOP EXAMINATION: Primary | ICD-10-CM

## 2017-08-25 DIAGNOSIS — I10 ESSENTIAL HYPERTENSION: Chronic | ICD-10-CM

## 2017-08-25 DIAGNOSIS — I51.89 DIASTOLIC DYSFUNCTION: ICD-10-CM

## 2017-08-25 DIAGNOSIS — E66.01 MORBID OBESITY WITH BMI OF 40.0-44.9, ADULT: Chronic | ICD-10-CM

## 2017-08-25 DIAGNOSIS — K76.0 FATTY LIVER: ICD-10-CM

## 2017-08-25 DIAGNOSIS — E11.22 CKD STAGE 3 DUE TO TYPE 2 DIABETES MELLITUS: Chronic | ICD-10-CM

## 2017-08-25 DIAGNOSIS — R60.9 EDEMA, UNSPECIFIED TYPE: ICD-10-CM

## 2017-08-25 DIAGNOSIS — E03.9 ACQUIRED HYPOTHYROIDISM: Chronic | ICD-10-CM

## 2017-08-25 DIAGNOSIS — G62.9 NEUROPATHY: ICD-10-CM

## 2017-08-25 DIAGNOSIS — I77.9 BILATERAL CAROTID ARTERY DISEASE: ICD-10-CM

## 2017-08-25 LAB
ESTIMATED AVG GLUCOSE: 174 MG/DL
HBA1C MFR BLD HPLC: 7.7 %

## 2017-08-25 PROCEDURE — 1159F MED LIST DOCD IN RCRD: CPT | Mod: S$GLB,,, | Performed by: HOSPITALIST

## 2017-08-25 PROCEDURE — 3075F SYST BP GE 130 - 139MM HG: CPT | Mod: S$GLB,,, | Performed by: HOSPITALIST

## 2017-08-25 PROCEDURE — 99215 OFFICE O/P EST HI 40 MIN: CPT | Mod: S$GLB,,, | Performed by: HOSPITALIST

## 2017-08-25 PROCEDURE — 3078F DIAST BP <80 MM HG: CPT | Mod: S$GLB,,, | Performed by: HOSPITALIST

## 2017-08-25 PROCEDURE — 1125F AMNT PAIN NOTED PAIN PRSNT: CPT | Mod: S$GLB,,, | Performed by: HOSPITALIST

## 2017-08-25 PROCEDURE — 99999 PR PBB SHADOW E&M-EST. PATIENT-LVL III: CPT | Mod: PBBFAC,,, | Performed by: HOSPITALIST

## 2017-08-25 PROCEDURE — 3008F BODY MASS INDEX DOCD: CPT | Mod: S$GLB,,, | Performed by: HOSPITALIST

## 2017-08-25 PROCEDURE — 99499 UNLISTED E&M SERVICE: CPT | Mod: S$GLB,,, | Performed by: HOSPITALIST

## 2017-08-25 RX ORDER — BENZONATATE 100 MG/1
100 CAPSULE ORAL
COMMUNITY
End: 2018-06-29 | Stop reason: SDUPTHER

## 2017-08-25 RX ORDER — INSULIN GLARGINE 100 [IU]/ML
INJECTION, SOLUTION SUBCUTANEOUS
Qty: 10 ML | Refills: 0 | Status: SHIPPED | OUTPATIENT
Start: 2017-08-25 | End: 2017-08-25 | Stop reason: SDUPTHER

## 2017-08-25 RX ORDER — ATENOLOL 25 MG/1
TABLET ORAL
Qty: 90 TABLET | Refills: 0 | Status: SHIPPED | OUTPATIENT
Start: 2017-08-25 | End: 2017-11-24 | Stop reason: SDUPTHER

## 2017-08-25 RX ORDER — ESCITALOPRAM OXALATE 10 MG/1
TABLET ORAL
Qty: 90 TABLET | Refills: 0 | Status: SHIPPED | OUTPATIENT
Start: 2017-08-25 | End: 2017-11-24 | Stop reason: SDUPTHER

## 2017-08-25 RX ORDER — ALPRAZOLAM 0.5 MG/1
TABLET ORAL
Qty: 30 TABLET | Refills: 0 | Status: SHIPPED | OUTPATIENT
Start: 2017-08-25 | End: 2017-10-22 | Stop reason: SDUPTHER

## 2017-08-25 RX ORDER — LEVOTHYROXINE SODIUM 75 UG/1
TABLET ORAL
Qty: 90 TABLET | Refills: 0 | Status: SHIPPED | OUTPATIENT
Start: 2017-08-25 | End: 2018-01-26 | Stop reason: SDUPTHER

## 2017-08-25 RX ORDER — FLUTICASONE PROPIONATE 50 MCG
SPRAY, SUSPENSION (ML) NASAL
Qty: 16 G | Refills: 3 | Status: SHIPPED | OUTPATIENT
Start: 2017-08-25 | End: 2017-11-28 | Stop reason: SDUPTHER

## 2017-08-25 NOTE — ASSESSMENT & PLAN NOTE
Hypertension-  Blood pressure is acceptable . I suggest continuation of Atenolol  during the entire perioperative period. I suggest holding -Lisinopril - on the morning of the surgery and can continue that  post operatively under blood pressure, electrolyte and renal function monitoring as long as they are acceptable.I suggest addressing pain control as uncontrolled pain can increased blood pressure

## 2017-08-25 NOTE — LETTER
August 25, 2017      Rhonda G Leopold, MD  [address]  [place] [address]           [facility] - Pre Op Consult  [address] [facility] [address]  Phone: [phone]          Patient: Courtney Egnle   MR Number: [id number]   YOB: 1935   Date of Visit: 8/25/2017       Dear Dr. Rhonda G Leopold:    Thank you for referring Courtney Engle to me for evaluation. Attached you will find relevant portions of my assessment and plan of care.    If you have questions, please do not hesitate to call me. I look forward to following Courtney Engle along with you.    Sincerely,    Sandrita Girard MD    Enclosure  CC:  No Recipients    If you would like to receive this communication electronically, please contact [email or web address] or [phone] to request more information on [employer] access.    For providers and/or their staff who would like to refer a patient to [facility], please contact us through our one-stop-shop provider referral line, [facility], at [phone].    If you feel you have received this communication in error or would no longer like to receive these types of communications, please e-mail [email or web address]

## 2017-08-25 NOTE — ASSESSMENT & PLAN NOTE
Diabetes Mellitus-I suggest monitoring the glucose in the perioperative period ( Before meals and bed time,if the patient is on oral feeds or every 6 hourly ,if the patient is NPO )  Blood glucose targets in hospitalized patients are ( Critical care patients 140-180 mg/dl, Med/Surg patients ( non critical care) 100-140 mg/dl, patients on continuous enteral or parenteral nutrition 140-180 mg/dl )  .Oral Hypoglycemic agents are generally avoided during the hospital stay . If glucose is consistently elevated ,I suggest using basal ,prandial Insulin regimen to control the glucose , as elevated glucose can be associated with adverse surgical out comes. Please consider involving Hospital Medicine or Endocrinology ,if any help is needed with Glucose control. Patient will be instructed based on the pre op clinic guidelines  about adjustment of diabetic treatment  considering the NPO status for Surgery     I had educated that uncontrolled DM can cause post op complications,risk of infection, wound healing problem,increased length of stay in hospital and its associated complications.I suggest exercise as much as possible and follow diabetic diet

## 2017-08-25 NOTE — PROGRESS NOTES
"[facility] - Pre Op Consult  Progress Note    Patient Name: Courtney Engle  MRN: [id number]  Date of Evaluation- 08/25/2017  PCP- Vishal Ulloa MD    Future cases for Courtney Engle [[id number]]     Case ID Status Date Time Jourdan Procedure Provider Location    [id number] [facility] 8/30/2017 10:40  REPLACEMENT-KNEE-TOTAL [facility] [[id number]] [facility] OR 2ND FLR      Left     HPI:  History of present illness- I had the pleasure of meeting this pleasant 82 y.o. lady in the pre op clinic prior to her elective Orthopedic surgery. The patient is new to me .   Goes by "Arlene"    I have obtained the history by speaking to the patient and by reviewing the electronic health records.    Events leading up to surgery / History of presenting illness -    She has been troubled with severe  Left knee  pain for 6 months, more so for the past 2 months . Pain increases with walking and activity and relieves with resting.    Relevant health conditions of significance for the perioperative period/ History of presenting illness -    Had contralateral knee replacement in the past and did good    Type 2  Diabetes Mellitus  On treatment with oral Novolog, Lantus Insulin    Hemoglobin A1c- April 2017- 8.2   Capillary glucose check-yes  Pre breakfast -150-200- does not sleep until 5 AM, night time snacking   Pre lunch -150  Pre [id number]  Her bed time 4-5 AM    CKD stage 3 due to type 2 diabetes mellitus   Stages of CKD discussed  Occasional Aleve use- -renal effects discussed    Essential hypertension   Usual BP- 120-130/70's    Morbid obesity with BMI of 40.0-44.9, adult   Suggested weight loss    Bilateral carotid artery disease - 2013 -  1 - 39% stenosis of the right internal carotid artery with homogeneous plaque unchanged since previous examination.  30 to 50% stenosis of the left internal carotid artery with  homogeneous plaque which has slightly increased since previous examination.  No CVA/TIA      Oropharyngeal dysphagia - with " capsules  on regular consistency diet and on thin liquids    History of heart failure , when had sob , edema feet hospitalized for 3 days   Was using more salt back them   Diastolic dysfunction  June 2017- No evidence of stress induced myocardial ischemia  Occasionally feels heart palpitations- no dizziness, passing out , chest pain- deep breathing , relaxation helps   No history of CAD        Subjective/ Objective:          Chief complaint-Preoperative evaluation, Perioperative Medical management, complication reduction plan     Relevant health conditions of significance for the perioperative period/ History of presenting illness -    Active cardiac conditions- none    Revised cardiac risk index predictors- history of heart failure and insulin requiring diabetes mellitus  Base line creat 1.1    Functional capacity -Examples of physical activity, lives in an elevated house , alone  can take a flight of stairs holding on to the railing----- She can undertake all the above activities without  chest pain,chest tightness, Shortness of breath ,dizziness,lightheadedness making her exercise tolerance more, than 4 Mets.       Review of Systems   Constitutional: Negative for chills and fever.        No unusual weight changes   HENT:        STOPBANG score     Snoring - unknown, as lives alone  Witnessed stopping of breathing -unknown, as lives alone  Elevated BP  BMI> 35   Age over 50   Neck size over 40 CM     Eyes:        Occasional  visual changes   Respiratory:        Cough dry   No hemoptysis     Cardiovascular:        As noted   Gastrointestinal:        No overt GI/ blood losses  Bowel movements- Regular- since gall bladder removal, has loose / Soft bm's   Endocrine:        Prednisone use > 20 mg daily for 3 weeks- none   Genitourinary: Negative for dysuria.        No hesitancy    Musculoskeletal:        As above      Skin: Negative for rash.   Neurological: Negative for syncope.        No unilateral weakness    Hematological:        Current use of Anticoagulants  none   Psychiatric/Behavioral:        Some Depression  No SI/HI       Past Medical History:   Diagnosis Date    Acquired hypothyroidism 4/25/2014    Anxiety     Aortic calcification 12/8/2016    X-Ray Chest-5/08/2014    Arthritis     Bilateral carotid artery disease 12/8/2016    US Carotid Bilateral-1/24/2013    CKD stage 3 due to type 2 diabetes mellitus 2/16/2017    Depression     Essential hypertension     Fever blister     Glaucoma 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 robledo)     Hyperlipidemia LDL goal <70 2/16/2017    Keloid cicatrix     Mixed stress and urge urinary incontinence 2/16/2017    Morbid obesity with BMI of 40.0-44.9, adult 12/8/2016    Ocular migraine 1/24/2013    Type 2 diabetes mellitus with hyperglycemia, with long-term current use of insulin    no vascular stenting   Family History   Problem Relation Age of Onset    Glaucoma Father     Stroke Father     Heart attack Father     Nephrolithiasis Father     Colon cancer Mother     Cancer Mother      colon ca    Heart disease Mother     Uterine cancer Daughter      uterine sarcoma    Hematuria Brother     Heart disease Maternal Grandmother     Hypertension Maternal Grandmother     Heart attack Maternal Grandmother     No Known Problems Daughter     Amblyopia Neg Hx     Blindness Neg Hx     Cataracts Neg Hx     Macular degeneration Neg Hx     Retinal detachment Neg Hx     Strabismus Neg Hx      Past Surgical History:   Procedure Laterality Date    CATARACT EXTRACTION W/  INTRAOCULAR LENS IMPLANT Right 05/30/2012        CATARACT EXTRACTION W/  INTRAOCULAR LENS IMPLANT Left 05/26/2010        CHOLECYSTECTOMY      COLONOSCOPY W/ POLYPECTOMY  04/20/2015    ESOPHAGOGASTRODUODENOSCOPY  04/20/2015    HYSTERECTOMY      Partial    JOINT REPLACEMENT      knee replacement right      TONSILLECTOMY, ADENOIDECTOMY      tonsillectomy only    No anesthesia, bleeding , cardiac problems , PONV with previous surgeries/ procedures   Medications and Allergies reviewed in epic.   FH- No anesthesia, thrombosis ,in family   Help available post op  Care with Benzo suggested  ASA daily     Review of Medicine tests    EKG- I had independently reviewed the EKG from--June 2017   It was reported to be showing     Normal sinus rhythm  Low voltage QRS  Borderline Abnormal ECG  When compared with ECG of 20-APR-2017 21:06,  Vent. rate has increased BY  30 BPM  Nonspecific T wave abnormality no longer evident in Lateral leads    Stress test June 2017       1 - Normal left ventricular systolic function (EF 60-65%).     2 - Indeterminate LV diastolic function.     3 - Mild left atrial enlargement.     4 - Normal right ventricular systolic function .     No evidence of stress induced myocardial ischemia.     Review of clinical lab tests-Date--- 6/12/2017 Creatinine-1.1  Date-8/22/2017  Hemoglobin--N  Platelet count--N      Physical Exam   HENT:   Head: Normocephalic.       Physical Exam   HENT:   Head: Normocephalic.     Constitutional- Vitals - Body mass index is 43.43 kg/m².,   Vitals:    08/25/17 1300   BP: 130/72   Pulse: 61   Temp: 98.6 °F (37 °C)     General appearance-Conscious,Coherent  Eyes- No conjunctival icterus,pupils  round , reactive to light  and  bilateral intra ocular lenses  ENT-Oral cavity- moist  , Hearing grossly normal   Neck- No thyromegaly ,Trachea -central, No jugular venous distension,   No Carotid Bruit   Cardiovascular -Heart Sounds- Normal  and  no murmur   , No gallop rhythm   Respiratory - Normal Respiratory Effort, Normal breath sounds and  no wheeze    Peripheral pitting pedal edema-- mild, no calf pain   Gastrointestinal -Soft abdomen, No palpable masses, Non Tender,Liver,Spleen not palpable. No-- free fluid and shifting dullness  Musculoskeletal- No finger Clubbing. Strength grossly normal   Lymphatic-No Palpable cervical,  axillary,Inguinal lymphadenopathy   Psychiatric - normal effect,Orientation  Rt Dorsalis pedis pulses-palpable    Lt Dorsalis pedis pulses- palpable   Rt Posterior tibial pulses -palpable   Left posterior tibial pulses -palpable   Miscellaneous -  no asterixis,  no dupuytren's contracture,  no suggestion of bacterial feet infection and  no renal bruit      Investigations  Lab and Imaging have been reviewed in epic.      Review of old records- Was done and information gathered regards to events leading to surgery and health conditions of significance in the perioperative period.        Assessment/Plan:     Depression  I suggest monitoring the sodium as SIADH from Lexapro   use and hypersecretion of ADH associated with surgery can reduce sodium in the perioperative period    Essential hypertension  Hypertension-  Blood pressure is acceptable . I suggest continuation of Atenolol  during the entire perioperative period. I suggest holding -Lisinopril - on the morning of the surgery and can continue that  post operatively under blood pressure, electrolyte and renal function monitoring as long as they are acceptable.I suggest addressing pain control as uncontrolled pain can increased blood pressure     Bilateral carotid artery disease  Statin intolerance     Type 2 diabetes mellitus with hyperglycemia, with long-term current use of insulin    Diabetes Mellitus-I suggest monitoring the glucose in the perioperative period ( Before meals and bed time,if the patient is on oral feeds or every 6 hourly ,if the patient is NPO )  Blood glucose targets in hospitalized patients are ( Critical care patients 140-180 mg/dl, Med/Surg patients ( non critical care) 100-140 mg/dl, patients on continuous enteral or parenteral nutrition 140-180 mg/dl )  .Oral Hypoglycemic agents are generally avoided during the hospital stay . If glucose is consistently elevated ,I suggest using basal ,prandial Insulin regimen to control the glucose , as  elevated glucose can be associated with adverse surgical out comes. Please consider involving Hospital Medicine or Endocrinology ,if any help is needed with Glucose control. Patient will be instructed based on the pre op clinic guidelines  about adjustment of diabetic treatment  considering the NPO status for Surgery     I had educated that uncontrolled DM can cause post op complications,risk of infection, wound healing problem,increased length of stay in hospital and its associated complications.I suggest exercise as much as possible and follow diabetic diet      Morbid obesity with BMI of 40.0-44.9, adult  Suggested weight loss    CKD stage 3 due to type 2 diabetes mellitus   I  suggest monitoring renal function, in put and out put status molly-operatively. I  suggest avoiding nephrotoxic medication including NSAIDs, COX2 inhibitors, intravenous contrast agent,avoiding hypotension to prevent further renal impairment.     Acquired hypothyroidism  Hypothyroidism- I suggest continuation of synthroid replacement in the perioperative period        Oropharyngeal dysphagia    Dysphagia- I suggest providing soft diet in view of the preexisting dysphagia as medication used in the perioperative period can possibly increase the dysphagia. I suggest aspiration precautions       GERD (gastroesophageal reflux disease)  GERD-  I suggest continuation of the Proton pump inhibitor in the perioperative period . I suggest aspiration precautions    Fatty liver  No suggestion of liver decompensation    Diastolic dysfunction  Diastolic Dysfunction:  I  suggest watching her fluid status perioperatively and to watch out for fluid overload in view of her Diastolic Dysfunction.  I suggest avoidance of the ordering of continuous IV fluids and if IV fluids are required ,to order for a specific duration of time and consider ordering lower IV fluid rate     Neuropathy  Feet care suggested   DM control suggested     Edema  Edema- I suggested  avoidance of added salt,avoidance of NSAID's ( except ASA 81 mg )  and suggested Limb elevation and juan diego hose use      Preoperative cardiac risk assessment-  The patient does not have any active cardiac conditions . Revised cardiac risk index predictors- 2---.Functional capacity is more than 4 Mets. She will be undergoing a Orthopedic procedure that carries a intermediate risk     The estimated risk of the rate of adverse cardiac outcomes  6.6%    No further cardiac work up is indicated prior to proceeding with the surgery        Preventive perioperative care    Thromboembolic prophylaxis:  Her risk factors for thrombosis include morbid obesity, surgical procedure and age.I suggest  thromboembolic prophylaxis ( mechanical/pharmacological, weighing the risk benefits of pharmacological agent use considering molly procedural bleeding )  during the perioperative period.I suggested being active in the post operative period.The patient is a candidate for extended DVT prophylaxis      Postoperative pulmonary complication prophylaxis-Risk factors for post operative pulmonary complications include morbid obesity, possible sleep apnea age over 65 years and ASA class >2- I suggest incentive spirometry use, early ambulation and end tidal carbon dioxide monitoring  , oral care , head end of bed elevation     Renal complication prophylaxis-Risk factors for renal complications include pre-existing renal disease, diabetes mellitus and hypertension . I suggest keeping her well hydrated and avoidance/ minimizing the use of  NSAID's,SHAIKH 2 Inhibitors ,IV contrast if possible in the perioperative period.I suggested drinking 2 litre's of water a day      Surgical site Infection Prophylaxis-I  suggest appropriate antibiotic for Prophylaxis against Surgical site infections     Delirium prophylaxis-Risk factors - benzodiazepine use - I suggest avoidance / minimizing the use of  Benzodiazepines ( unless the patient has been taking it on a  regular basis ),Anticholinergic medication,Antihistamines ( like  Benadryl).I suggest minimizing the use of opioid medication and use of IV tylenol,if it is appropriate. I suggest using the lowest possible dose of opioids for the shortest duration possible in the perioperative period. I suggest to Keep shades/blinds open during the day, lights off and shades closed at night to encourage normal sleep/wake cycle.I encourage the presence of the family member with the patient at all times, if at all possible as mental status changes can be picked up early by the family members and they help with reorientation. I encouraged the presence of family to help with orientation in the perioperative period. Benadryl avoidance suggested      In view of regionala anesthesia, joint replacement  the patient  is at risk of postoperative urinary retention.  I suggest avoidance / minimizing the of  Benzodiazepines,Anticholinergic medication,antihistamines ( Benadryl) , if possible in the perioperative period. I suggest using the minimum possible use of opioids for the minimum period of time in the perioperative period. Benadryl avoidance suggested      This visit was focused on Preoperative evaluation, Perioperative Medical management, complication reduction plans. I suggest that the patient follows up with primary care or relevant sub specialists for ongoing health care.    I appreciate the opportunity to be involved in this patients care. Please feel free to contact me if there were any questions about this consultation.    Patient is optimized    Sandrita Girard MD  Perioperative Medicine  [facility]   Pager [phone]  ---------    8/26--- 18 28     Had symptomatic hypoglycemia from inadequate food intake and with insulin use   Hypoglycemia corrected  A1c 7.7  ----    8/28- 18 44    Called to follow up   Left a message- to call and update the office  -----    12/7- 17 41     Surgery did not take place , as  planned   She has seen her PCP since  Called to follow up   Unable to speak/ leave a message

## 2017-08-25 NOTE — HPI
"History of present illness- I had the pleasure of meeting this pleasant 82 y.o. lady in the pre op clinic prior to her elective Orthopedic surgery. The patient is new to me .   Goes by "Arlene"    I have obtained the history by speaking to the patient and by reviewing the electronic health records.    Events leading up to surgery / History of presenting illness -    She has been troubled with severe  Left knee  pain for 6 months, more so for the past 2 months . Pain increases with walking and activity and relieves with resting.    Relevant health conditions of significance for the perioperative period/ History of presenting illness -    Had contralateral knee replacement in the past and did good    Type 2  Diabetes Mellitus  On treatment with oral Novolog, Lantus Insulin    Hemoglobin A1c- April 2017- 8.2   Capillary glucose check-yes  Pre breakfast -150-200- does not sleep until 5 AM, night time snacking   Pre lunch -150  Pre [id number]  Her bed time 4-5 AM    CKD stage 3 due to type 2 diabetes mellitus   Stages of CKD discussed  Occasional Aleve use- -renal effects discussed    Essential hypertension   Usual BP- 120-130/70's    Morbid obesity with BMI of 40.0-44.9, adult   Suggested weight loss    Bilateral carotid artery disease - 2013 -  1 - 39% stenosis of the right internal carotid artery with homogeneous plaque unchanged since previous examination.  30 to 50% stenosis of the left internal carotid artery with  homogeneous plaque which has slightly increased since previous examination.  No CVA/TIA      Oropharyngeal dysphagia - with capsules  on regular consistency diet and on thin liquids    History of heart failure , when had sob , edema feet hospitalized for 3 days   Was using more salt back them   Diastolic dysfunction  June 2017- No evidence of stress induced myocardial ischemia  Occasionally feels heart palpitations- no dizziness, passing out , chest pain- deep breathing , relaxation helps   No " history of CAD

## 2017-08-25 NOTE — OUTPATIENT SUBJECTIVE & OBJECTIVE
Outpatient Subjective & Objective     Chief complaint-Preoperative evaluation, Perioperative Medical management, complication reduction plan     Relevant health conditions of significance for the perioperative period/ History of presenting illness -    Active cardiac conditions- none    Revised cardiac risk index predictors- history of heart failure and insulin requiring diabetes mellitus  Base line creat 1.1    Functional capacity -Examples of physical activity, lives in an elevated house , alone  can take a flight of stairs holding on to the railing----- She can undertake all the above activities without  chest pain,chest tightness, Shortness of breath ,dizziness,lightheadedness making her exercise tolerance more, than 4 Mets.       Review of Systems   Constitutional: Negative for chills and fever.        No unusual weight changes   HENT:        STOPBANG score     Snoring - unknown, as lives alone  Witnessed stopping of breathing -unknown, as lives alone  Elevated BP  BMI> 35   Age over 50   Neck size over 40 CM     Eyes:        Occasional  visual changes   Respiratory:        Cough dry   No hemoptysis     Cardiovascular:        As noted   Gastrointestinal:        No overt GI/ blood losses  Bowel movements- Regular- since gall bladder removal, has loose / Soft bm's   Endocrine:        Prednisone use > 20 mg daily for 3 weeks- none   Genitourinary: Negative for dysuria.        No hesitancy    Musculoskeletal:        As above      Skin: Negative for rash.   Neurological: Negative for syncope.        No unilateral weakness   Hematological:        Current use of Anticoagulants  none   Psychiatric/Behavioral:        Some Depression  No SI/HI       Past Medical History:   Diagnosis Date    Acquired hypothyroidism 4/25/2014    Anxiety     Aortic calcification 12/8/2016    X-Ray Chest-5/08/2014    Arthritis     Bilateral carotid artery disease 12/8/2016     Carotid Bilateral-1/24/2013    CKD stage 3 due to type  2 diabetes mellitus 2/16/2017    Depression     Essential hypertension     Fever blister     Glaucoma 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 robledo)     Hyperlipidemia LDL goal <70 2/16/2017    Keloid cicatrix     Mixed stress and urge urinary incontinence 2/16/2017    Morbid obesity with BMI of 40.0-44.9, adult 12/8/2016    Ocular migraine 1/24/2013    Type 2 diabetes mellitus with hyperglycemia, with long-term current use of insulin    no vascular stenting   Family History   Problem Relation Age of Onset    Glaucoma Father     Stroke Father     Heart attack Father     Nephrolithiasis Father     Colon cancer Mother     Cancer Mother      colon ca    Heart disease Mother     Uterine cancer Daughter      uterine sarcoma    Hematuria Brother     Heart disease Maternal Grandmother     Hypertension Maternal Grandmother     Heart attack Maternal Grandmother     No Known Problems Daughter     Amblyopia Neg Hx     Blindness Neg Hx     Cataracts Neg Hx     Macular degeneration Neg Hx     Retinal detachment Neg Hx     Strabismus Neg Hx      Past Surgical History:   Procedure Laterality Date    CATARACT EXTRACTION W/  INTRAOCULAR LENS IMPLANT Right 05/30/2012        CATARACT EXTRACTION W/  INTRAOCULAR LENS IMPLANT Left 05/26/2010        CHOLECYSTECTOMY      COLONOSCOPY W/ POLYPECTOMY  04/20/2015    ESOPHAGOGASTRODUODENOSCOPY  04/20/2015    HYSTERECTOMY      Partial    JOINT REPLACEMENT      knee replacement right      TONSILLECTOMY, ADENOIDECTOMY      tonsillectomy only   No anesthesia, bleeding , cardiac problems , PONV with previous surgeries/ procedures   Medications and Allergies reviewed in epic.   FH- No anesthesia, thrombosis ,in family   Help available post op  Care with Benzo suggested  ASA daily     Review of Medicine tests    EKG- I had independently reviewed the EKG from--June 2017   It was reported to be showing     Normal sinus rhythm  Low voltage  QRS  Borderline Abnormal ECG  When compared with ECG of 20-APR-2017 21:06,  Vent. rate has increased BY  30 BPM  Nonspecific T wave abnormality no longer evident in Lateral leads    Stress test June 2017       1 - Normal left ventricular systolic function (EF 60-65%).     2 - Indeterminate LV diastolic function.     3 - Mild left atrial enlargement.     4 - Normal right ventricular systolic function .     No evidence of stress induced myocardial ischemia.     Review of clinical lab tests-Date--- 6/12/2017 Creatinine-1.1  Date-8/22/2017  Hemoglobin--N  Platelet count--N      Physical Exam   HENT:   Head: Normocephalic.       Physical Exam   HENT:   Head: Normocephalic.     Constitutional- Vitals - Body mass index is 43.43 kg/m².,   Vitals:    08/25/17 1300   BP: 130/72   Pulse: 61   Temp: 98.6 °F (37 °C)     General appearance-Conscious,Coherent  Eyes- No conjunctival icterus,pupils  round , reactive to light  and  bilateral intra ocular lenses  ENT-Oral cavity- moist  , Hearing grossly normal   Neck- No thyromegaly ,Trachea -central, No jugular venous distension,   No Carotid Bruit   Cardiovascular -Heart Sounds- Normal  and  no murmur   , No gallop rhythm   Respiratory - Normal Respiratory Effort, Normal breath sounds and  no wheeze    Peripheral pitting pedal edema-- mild, no calf pain   Gastrointestinal -Soft abdomen, No palpable masses, Non Tender,Liver,Spleen not palpable. No-- free fluid and shifting dullness  Musculoskeletal- No finger Clubbing. Strength grossly normal   Lymphatic-No Palpable cervical, axillary,Inguinal lymphadenopathy   Psychiatric - normal effect,Orientation  Rt Dorsalis pedis pulses-palpable    Lt Dorsalis pedis pulses- palpable   Rt Posterior tibial pulses -palpable   Left posterior tibial pulses -palpable   Miscellaneous -  no asterixis,  no dupuytren's contracture,  no suggestion of bacterial feet infection and  no renal bruit      Investigations  Lab and Imaging have been reviewed in  epic.      Review of old records- Was done and information gathered regards to events leading to surgery and health conditions of significance in the perioperative period.    Outpatient Subjective & Objective

## 2017-08-25 NOTE — ASSESSMENT & PLAN NOTE
Edema- I suggested avoidance of added salt,avoidance of NSAID's ( except ASA 81 mg )  and suggested Limb elevation and juan diego hose use

## 2017-08-25 NOTE — ASSESSMENT & PLAN NOTE
I suggest monitoring the sodium as SIADH from Lexapro   use and hypersecretion of ADH associated with surgery can reduce sodium in the perioperative period

## 2017-08-25 NOTE — ASSESSMENT & PLAN NOTE
Diastolic Dysfunction:  I  suggest watching her fluid status perioperatively and to watch out for fluid overload in view of her Diastolic Dysfunction.  I suggest avoidance of the ordering of continuous IV fluids and if IV fluids are required ,to order for a specific duration of time and consider ordering lower IV fluid rate

## 2017-08-25 NOTE — ASSESSMENT & PLAN NOTE
Dysphagia- I suggest providing soft diet in view of the preexisting dysphagia as medication used in the perioperative period can possibly increase the dysphagia. I suggest aspiration precautions

## 2017-08-25 NOTE — ASSESSMENT & PLAN NOTE
I  suggest monitoring renal function, in put and out put status molly-operatively. I  suggest avoiding nephrotoxic medication including NSAIDs, COX2 inhibitors, intravenous contrast agent,avoiding hypotension to prevent further renal impairment.

## 2017-08-25 NOTE — DISCHARGE INSTRUCTIONS
Your surgery has been scheduled for:__________________________________________    You should report to:  ____[facility], located on the [place] side of the first floor of the           [facility] ([phone])  ____[facility], located on the [facility] side of the            Second floor of the [facility] [phone])  ____3rd Floor [facility] located on the [facility] side of the [facility] [phone]  Please Note   - Tell your doctor if you take Aspirin, products containing Aspirin, herbal medications  or blood thinners, such as Coumadin, Ticlid, or Plavix.  (Consult your provider regarding holding or stopping before surgery).  - Arrange for someone to drive you home following surgery.  You will not be allowed to leave the surgical facility alone or drive yourself home following sedation and anesthesia.  Before Surgery  - Stop taking all herbal medications 14days prior to surgery  - No Motrin/Advil (Ibuprofen) 7 days before surgery  - No Aleve (Naproxen) 7 days before surgery  - Stop Taking Asprin, products containing Asprin _____days before surgery  - Stop taking blood thinners_______days before surgery  - Refrain from drinking alcoholic beverages for 24hours before and after surgery  - Stop or limit smoking _________days before surgery  Night before Surgery  - DO NOT EAT OR DRINK ANYTHING AFTER MIDNIGHT, INCLUDING GUM, HARD CANDY, MINTS, OR CHEWING TOBACCO.  - Take a shower or bath (shower is recommended).  Bathe with Hibiclens soap or an antibacterial soap from the neck down.  If not supplied by your surgeon, hibiclens soap will need to be purchased over the counter in pharmacy.  Rinse soap off thoroughly.  - Shampoo your hair with your regular shampoo  The Day of Surgery  - Take another bath or shower with hibiclens or any antibacterial soap, to reduce the chance of infection.  - Take heart and blood  pressure medications with a small sip of water, as advised by the perioperative team.  - Do not take fluid pills  - You may brush your teeth and rinse your mouth, but do not swall any additional water.   - Do not apply perfumes, powder, body lotions or deodorant on the day of surgery.  - Nail polish should be removed.  - Do not wear makeup or moisturizer  - Wear comfortable clothes, such as a button front shirt and loose fitting pants.  - Leave all jewelry, including body piercings, and valuables at home.    - Bring any devices you will neeed after surgery such as crutches or canes.  - If you have sleep apnea, please bring your CPAP machine  In the event that your physical condition changes including the onset of a cold or respiratory illness, or if you have to delay or cancel your surgery, please notify your surgeon.  Anesthesia: Regional Anesthesia  Youre scheduled for surgery. During surgery, youll receive medication called anesthesia to keep you comfortable and pain-free. Your surgeon has decided that youll receive regional anesthesia. This sheet tells you what to expect with this type of anesthesia.  What Is Regional Anesthesia?  Regional anesthesia numbs one region of your body. The anesthesia may be given around nerves or into veins in your arms, neck, or legs (nerve block or [place] block). Or it may be sent into the spinal fluid (spinal anesthesia) or into the space just outside the spinal fluid (epidural anesthesia). Sedatives may also be given to relax you.  Nerve Block or [place] Block  A small area of the body, such as an arm or leg, can be numbed using a nerve block or [place] block.  · Nerve block: During a nerve block, your skin is numbed. A needle is then inserted near nerves that serve the area to be numbed. Anesthetic is sent through the needle.  · IV regional or [place] block: For this type of block, an IV line is put into a vein. The blood flow to the area to be numbed is blocked for a short time.  Anesthetic is sent through the IV.  Spinal Anesthesia  Spinal anesthesia numbs your body from about the waist down.  · Anesthetic is injected into the spinal fluid. This is a substance that surrounds the spinal cord in your spinal column. The anesthetic blocks pain traveling from the body to the brain.  · To receive the anesthetic, your skin is numbed at the injection site.  · A needle is then inserted into the spinal fluid. Anesthetic is sent through the needle.  Anesthesia Tools and Medications  · Local anesthetic given through a needle numbs one region of your body.  · Electrocardiography leads (electrodes) record your heart rate and rhythm.  · A blood pressure cuff monitors your blood pressure.  · A pulse oximeter on the end of the finger measures your blood oxygen level.  · Sedatives may be given through an IV to relax you and keep you comfortable. You may stay awake or sleep slightly.  · Oxygen may be given to you through a facemask.  Risks and Possible Complications  Regional anesthesia carries some risks. These include:  · Nausea and vomiting  · Headache  · Backache  · Decreased blood pressure  · Allergic reaction to the anesthetic  · Ongoing numbness (rare)  · Irregular heartbeat (rare)  · Cardiac arrest (rare)   © 9462-6574 [facility], [address] [place] [address], [place] [address]. All rights reserved. This information is not intended as a substitute for professional medical care. Always follow your healthcare professional's instructions.

## 2017-10-22 RX ORDER — INSULIN GLARGINE 100 [IU]/ML
INJECTION, SOLUTION SUBCUTANEOUS
Qty: 10 ML | Refills: 0 | Status: SHIPPED | OUTPATIENT
Start: 2017-10-22 | End: 2017-11-24 | Stop reason: SDUPTHER

## 2017-10-22 RX ORDER — ALPRAZOLAM 0.5 MG/1
TABLET ORAL
Qty: 30 TABLET | Refills: 0 | Status: SHIPPED | OUTPATIENT
Start: 2017-10-22 | End: 2017-11-24 | Stop reason: SDUPTHER

## 2017-10-24 ENCOUNTER — NURSE TRIAGE (OUTPATIENT)
Dept: ADMINISTRATIVE | Facility: CLINIC | Age: 82
End: 2017-10-24

## 2017-10-24 NOTE — TELEPHONE ENCOUNTER
Reason for Disposition   Caller has NON-URGENT medication question about med that PCP prescribed and triager unable to answer question    Protocols used: ST MEDICATION QUESTION CALL-A-EDUARDO    Courtney is calling to report that her hair is falling.  She thinks it is due to one of the medications she is on.   Feels that she has lost at least 2/3 of her hair.  Please contact Courtney to advise at [phone]

## 2017-11-01 ENCOUNTER — OFFICE VISIT (OUTPATIENT)
Dept: INTERNAL MEDICINE | Facility: CLINIC | Age: 82
End: 2017-11-01
Payer: MEDICARE

## 2017-11-01 ENCOUNTER — LAB VISIT (OUTPATIENT)
Dept: LAB | Facility: HOSPITAL | Age: 82
End: 2017-11-01
Attending: INTERNAL MEDICINE
Payer: MEDICARE

## 2017-11-01 ENCOUNTER — TELEPHONE (OUTPATIENT)
Dept: INTERNAL MEDICINE | Facility: CLINIC | Age: 82
End: 2017-11-01

## 2017-11-01 VITALS
BODY MASS INDEX: 42.85 KG/M2 | SYSTOLIC BLOOD PRESSURE: 122 MMHG | OXYGEN SATURATION: 95 % | HEART RATE: 65 BPM | DIASTOLIC BLOOD PRESSURE: 78 MMHG | WEIGHT: 205 LBS

## 2017-11-01 DIAGNOSIS — M25.562 CHRONIC PAIN OF LEFT KNEE: ICD-10-CM

## 2017-11-01 DIAGNOSIS — E66.01 MORBID OBESITY WITH BMI OF 40.0-44.9, ADULT: Chronic | ICD-10-CM

## 2017-11-01 DIAGNOSIS — G89.29 CHRONIC PAIN OF LEFT KNEE: ICD-10-CM

## 2017-11-01 DIAGNOSIS — R79.89 LOW VITAMIN D LEVEL: ICD-10-CM

## 2017-11-01 DIAGNOSIS — E03.9 HYPOTHYROIDISM, UNSPECIFIED TYPE: ICD-10-CM

## 2017-11-01 DIAGNOSIS — L67.9 HAIR PROBLEM: Primary | ICD-10-CM

## 2017-11-01 DIAGNOSIS — K21.9 GASTROESOPHAGEAL REFLUX DISEASE, ESOPHAGITIS PRESENCE NOT SPECIFIED: ICD-10-CM

## 2017-11-01 DIAGNOSIS — L67.9 HAIR PROBLEM: ICD-10-CM

## 2017-11-01 LAB
25(OH)D3+25(OH)D2 SERPL-MCNC: 27 NG/ML
TSH SERPL DL<=0.005 MIU/L-ACNC: 0.87 UIU/ML

## 2017-11-01 PROCEDURE — 84443 ASSAY THYROID STIM HORMONE: CPT

## 2017-11-01 PROCEDURE — 99999 PR PBB SHADOW E&M-EST. PATIENT-LVL IV: CPT | Mod: PBBFAC,,, | Performed by: INTERNAL MEDICINE

## 2017-11-01 PROCEDURE — 82306 VITAMIN D 25 HYDROXY: CPT

## 2017-11-01 PROCEDURE — 99499 UNLISTED E&M SERVICE: CPT | Mod: S$GLB,,, | Performed by: INTERNAL MEDICINE

## 2017-11-01 PROCEDURE — 36415 COLL VENOUS BLD VENIPUNCTURE: CPT

## 2017-11-01 PROCEDURE — 99214 OFFICE O/P EST MOD 30 MIN: CPT | Mod: S$GLB,,, | Performed by: INTERNAL MEDICINE

## 2017-11-01 RX ORDER — ERGOCALCIFEROL 1.25 MG/1
50000 CAPSULE ORAL WEEKLY
Qty: 4 CAPSULE | Refills: 11
Start: 2017-11-01 | End: 2017-12-01

## 2017-11-01 NOTE — PROGRESS NOTES
Subjective:       Patient ID: Courtney Engle is a 82 y.o. female.    Chief Complaint: Hair/Scalp Problem and Knee Pain    History of present illness: With history of knee arthritis to talk about brittle hair.  She says it's been brittle for months or years and wanted to know if it could be her medication.  She has low vitamin D, nails or sometimes been brittle.  She says the hair breaks rather than falling out by the root.  No new medication changes.  I reviewed medications.  Thyroid list alopecia but it would seem to be more hair falling out rather than breaking.  She did get a chemical treatment or perm to her hair several months ago and may be that he has had some affect on it.  She says she has seen a dermatologist for a special shampoo for scalp irritation but doesn't think that is related.  Does not want to go back to dermatology at this time.  Knee surgery got postponed because she was ill.      Hair/Scalp Problem   Pertinent negatives include no abdominal pain, chest pain, chills, coughing, fever, neck pain, rash or sore throat.   Knee Pain        Review of Systems   Constitutional: Negative for appetite change, chills and fever.   HENT: Negative for sore throat.    Respiratory: Negative for cough, shortness of breath and wheezing.    Cardiovascular: Negative for chest pain and leg swelling.   Gastrointestinal: Negative for abdominal pain, constipation and diarrhea.   Genitourinary: Negative for difficulty urinating.   Musculoskeletal: Positive for back pain and gait problem. Negative for neck pain and neck stiffness.   Skin: Negative for rash.        Hair is breaking         Objective:      Physical Exam   Constitutional: She is oriented to person, place, and time. She appears well-developed and well-nourished. No distress.   Overweight female     HENT:   Head: Normocephalic and atraumatic.   Mouth/Throat: Oropharynx is clear and moist. No oropharyngeal exudate.   Eyes: Conjunctivae are normal. Pupils are  equal, round, and reactive to light. No scleral icterus.   Neck: Normal range of motion. Neck supple. No thyromegaly present.   Cardiovascular: Normal rate and regular rhythm.    No murmur heard.  Pulmonary/Chest: Effort normal and breath sounds normal. She has no wheezes.   Abdominal: Soft. Bowel sounds are normal. She exhibits no distension. There is no tenderness.   Musculoskeletal: She exhibits tenderness (knees).   Lymphadenopathy:     She has no cervical adenopathy.   Neurological: She is alert and oriented to person, place, and time.   Skin: No rash noted.   Few hair strands break with gentle tug of her hair   Psychiatric: She has a normal mood and affect.       Assessment:       1. Hair problem    2. Low vitamin D level    3. Hypothyroidism, unspecified type    4. Gastroesophageal reflux disease, esophagitis presence not specified    5. Chronic pain of left knee    6. Morbid obesity with BMI of 40.0-44.9, adult        Plan:       Courtney was seen today for hair/scalp problem and knee pain.    Diagnoses and all orders for this visit:    Hair problem  -     Vitamin D; Future  -     TSH; Future    Low vitamin D level  -     Vitamin D; Future    Hypothyroidism, unspecified type  -     TSH; Future    Gastroesophageal reflux disease, esophagitis presence not specified    Chronic pain of left knee    Morbid obesity with BMI of 40.0-44.9, adult    Other orders  -     ergocalciferol (ERGOCALCIFEROL) 50,000 unit Cap; Take 1 capsule (50,000 Units total) by mouth once a week.        Trial of Biotin

## 2017-11-02 NOTE — TELEPHONE ENCOUNTER
Please let pt know that her labs show low but improved Vitamin D and thyroid was normal. Make sure she continues the Vitamin D supplement.

## 2017-11-26 RX ORDER — ATENOLOL 25 MG/1
TABLET ORAL
Qty: 90 TABLET | Refills: 0 | Status: SHIPPED | OUTPATIENT
Start: 2017-11-26 | End: 2018-04-05 | Stop reason: SDUPTHER

## 2017-11-26 RX ORDER — ESCITALOPRAM OXALATE 10 MG/1
TABLET ORAL
Qty: 90 TABLET | Refills: 0 | Status: SHIPPED | OUTPATIENT
Start: 2017-11-26 | End: 2018-03-01 | Stop reason: SDUPTHER

## 2017-11-26 RX ORDER — ALPRAZOLAM 0.5 MG/1
TABLET ORAL
Qty: 30 TABLET | Refills: 0 | Status: SHIPPED | OUTPATIENT
Start: 2017-11-26 | End: 2018-01-26 | Stop reason: SDUPTHER

## 2017-11-26 RX ORDER — INSULIN GLARGINE 100 [IU]/ML
INJECTION, SOLUTION SUBCUTANEOUS
Qty: 10 ML | Refills: 0 | Status: ON HOLD | OUTPATIENT
Start: 2017-11-26 | End: 2018-01-15 | Stop reason: HOSPADM

## 2017-11-28 RX ORDER — FLUTICASONE PROPIONATE 50 MCG
SPRAY, SUSPENSION (ML) NASAL
Qty: 16 G | Refills: 0 | Status: SHIPPED | OUTPATIENT
Start: 2017-11-28 | End: 2017-11-29 | Stop reason: SDUPTHER

## 2017-11-28 RX ORDER — BLOOD SUGAR DIAGNOSTIC
STRIP MISCELLANEOUS
Qty: 300 STRIP | Refills: 0 | Status: SHIPPED | OUTPATIENT
Start: 2017-11-28 | End: 2018-02-14 | Stop reason: SDUPTHER

## 2017-11-29 RX ORDER — FLUTICASONE PROPIONATE 50 MCG
SPRAY, SUSPENSION (ML) NASAL
Qty: 48 G | Refills: 0 | Status: SHIPPED | OUTPATIENT
Start: 2017-11-29 | End: 2018-04-05 | Stop reason: SDUPTHER

## 2018-01-09 ENCOUNTER — OFFICE VISIT (OUTPATIENT)
Dept: INTERNAL MEDICINE | Facility: CLINIC | Age: 83
End: 2018-01-09
Payer: MEDICARE

## 2018-01-09 ENCOUNTER — HOSPITAL ENCOUNTER (INPATIENT)
Facility: HOSPITAL | Age: 83
LOS: 4 days | Discharge: HOME HEALTH | DRG: 291 | End: 2018-01-15
Attending: EMERGENCY MEDICINE | Admitting: INTERNAL MEDICINE
Payer: MEDICARE

## 2018-01-09 VITALS
DIASTOLIC BLOOD PRESSURE: 70 MMHG | HEART RATE: 66 BPM | WEIGHT: 207.69 LBS | HEIGHT: 58 IN | SYSTOLIC BLOOD PRESSURE: 162 MMHG | BODY MASS INDEX: 43.6 KG/M2 | TEMPERATURE: 99 F | OXYGEN SATURATION: 95 %

## 2018-01-09 DIAGNOSIS — B34.9 VIRAL ILLNESS: ICD-10-CM

## 2018-01-09 DIAGNOSIS — R06.02 SHORTNESS OF BREATH: ICD-10-CM

## 2018-01-09 DIAGNOSIS — I50.33 ACUTE ON CHRONIC DIASTOLIC HEART FAILURE: ICD-10-CM

## 2018-01-09 DIAGNOSIS — R06.09 DYSPNEA ON EXERTION: Primary | ICD-10-CM

## 2018-01-09 DIAGNOSIS — R09.02 HYPOXIA: ICD-10-CM

## 2018-01-09 DIAGNOSIS — I50.33 ACUTE ON CHRONIC DIASTOLIC CONGESTIVE HEART FAILURE: Primary | ICD-10-CM

## 2018-01-09 DIAGNOSIS — I50.30 DIASTOLIC HEART FAILURE: ICD-10-CM

## 2018-01-09 LAB
ALBUMIN SERPL BCP-MCNC: 3.1 G/DL
ALP SERPL-CCNC: 98 U/L
ALT SERPL W/O P-5'-P-CCNC: 14 U/L
ANION GAP SERPL CALC-SCNC: 7 MMOL/L
AST SERPL-CCNC: 15 U/L
BASOPHILS # BLD AUTO: 0.06 K/UL
BASOPHILS NFR BLD: 0.6 %
BILIRUB SERPL-MCNC: 0.3 MG/DL
BNP SERPL-MCNC: 273 PG/ML
BUN SERPL-MCNC: 11 MG/DL
CALCIUM SERPL-MCNC: 9 MG/DL
CHLORIDE SERPL-SCNC: 106 MMOL/L
CO2 SERPL-SCNC: 28 MMOL/L
CREAT SERPL-MCNC: 0.8 MG/DL
DIFFERENTIAL METHOD: ABNORMAL
EOSINOPHIL # BLD AUTO: 0.3 K/UL
EOSINOPHIL NFR BLD: 2.6 %
ERYTHROCYTE [DISTWIDTH] IN BLOOD BY AUTOMATED COUNT: 13.3 %
EST. GFR  (AFRICAN AMERICAN): >60 ML/MIN/1.73 M^2
EST. GFR  (NON AFRICAN AMERICAN): >60 ML/MIN/1.73 M^2
GLUCOSE SERPL-MCNC: 80 MG/DL
HCT VFR BLD AUTO: 35.4 %
HGB BLD-MCNC: 11.5 G/DL
IMM GRANULOCYTES # BLD AUTO: 0.02 K/UL
IMM GRANULOCYTES NFR BLD AUTO: 0.2 %
INR PPP: 1
LYMPHOCYTES # BLD AUTO: 2.6 K/UL
LYMPHOCYTES NFR BLD: 27 %
MCH RBC QN AUTO: 28.3 PG
MCHC RBC AUTO-ENTMCNC: 32.5 G/DL
MCV RBC AUTO: 87 FL
MONOCYTES # BLD AUTO: 0.7 K/UL
MONOCYTES NFR BLD: 7.2 %
NEUTROPHILS # BLD AUTO: 6 K/UL
NEUTROPHILS NFR BLD: 62.4 %
NRBC BLD-RTO: 0 /100 WBC
PLATELET # BLD AUTO: 301 K/UL
PMV BLD AUTO: 9.3 FL
POCT GLUCOSE: 108 MG/DL (ref 70–110)
POCT GLUCOSE: 81 MG/DL (ref 70–110)
POTASSIUM SERPL-SCNC: 3.8 MMOL/L
PROT SERPL-MCNC: 7.3 G/DL
PROTHROMBIN TIME: 10.5 SEC
RBC # BLD AUTO: 4.07 M/UL
SODIUM SERPL-SCNC: 141 MMOL/L
TROPONIN I SERPL DL<=0.01 NG/ML-MCNC: 0.06 NG/ML
WBC # BLD AUTO: 9.55 K/UL

## 2018-01-09 PROCEDURE — 99285 EMERGENCY DEPT VISIT HI MDM: CPT | Mod: 25

## 2018-01-09 PROCEDURE — 93010 ELECTROCARDIOGRAM REPORT: CPT | Mod: ,,, | Performed by: INTERNAL MEDICINE

## 2018-01-09 PROCEDURE — 93005 ELECTROCARDIOGRAM TRACING: CPT

## 2018-01-09 PROCEDURE — 96372 THER/PROPH/DIAG INJ SC/IM: CPT | Mod: 59

## 2018-01-09 PROCEDURE — 99999 PR PBB SHADOW E&M-EST. PATIENT-LVL IV: CPT | Mod: PBBFAC,GC,, | Performed by: STUDENT IN AN ORGANIZED HEALTH CARE EDUCATION/TRAINING PROGRAM

## 2018-01-09 PROCEDURE — 99285 EMERGENCY DEPT VISIT HI MDM: CPT | Mod: ,,, | Performed by: EMERGENCY MEDICINE

## 2018-01-09 PROCEDURE — 80053 COMPREHEN METABOLIC PANEL: CPT

## 2018-01-09 PROCEDURE — G0378 HOSPITAL OBSERVATION PER HR: HCPCS

## 2018-01-09 PROCEDURE — 96374 THER/PROPH/DIAG INJ IV PUSH: CPT

## 2018-01-09 PROCEDURE — 84484 ASSAY OF TROPONIN QUANT: CPT

## 2018-01-09 PROCEDURE — 85610 PROTHROMBIN TIME: CPT

## 2018-01-09 PROCEDURE — 99499 UNLISTED E&M SERVICE: CPT | Mod: S$GLB,,, | Performed by: INTERNAL MEDICINE

## 2018-01-09 PROCEDURE — 83880 ASSAY OF NATRIURETIC PEPTIDE: CPT

## 2018-01-09 PROCEDURE — 85025 COMPLETE CBC W/AUTO DIFF WBC: CPT

## 2018-01-09 PROCEDURE — 11000001 HC ACUTE MED/SURG PRIVATE ROOM

## 2018-01-09 PROCEDURE — 63600175 PHARM REV CODE 636 W HCPCS: Performed by: EMERGENCY MEDICINE

## 2018-01-09 PROCEDURE — 99215 OFFICE O/P EST HI 40 MIN: CPT | Mod: GC,S$GLB,, | Performed by: STUDENT IN AN ORGANIZED HEALTH CARE EDUCATION/TRAINING PROGRAM

## 2018-01-09 PROCEDURE — 82962 GLUCOSE BLOOD TEST: CPT

## 2018-01-09 RX ORDER — ACETAMINOPHEN 500 MG
500 TABLET ORAL EVERY 6 HOURS PRN
Status: DISCONTINUED | OUTPATIENT
Start: 2018-01-10 | End: 2018-01-15 | Stop reason: HOSPADM

## 2018-01-09 RX ORDER — ENOXAPARIN SODIUM 100 MG/ML
40 INJECTION SUBCUTANEOUS EVERY 24 HOURS
Status: DISCONTINUED | OUTPATIENT
Start: 2018-01-10 | End: 2018-01-15 | Stop reason: HOSPADM

## 2018-01-09 RX ORDER — BENZONATATE 100 MG/1
100 CAPSULE ORAL
Status: DISCONTINUED | OUTPATIENT
Start: 2018-01-10 | End: 2018-01-09

## 2018-01-09 RX ORDER — INSULIN ASPART 100 [IU]/ML
15 INJECTION, SOLUTION INTRAVENOUS; SUBCUTANEOUS
Status: DISCONTINUED | OUTPATIENT
Start: 2018-01-10 | End: 2018-01-15 | Stop reason: HOSPADM

## 2018-01-09 RX ORDER — TRIAMCINOLONE ACETONIDE 1 MG/G
CREAM TOPICAL 2 TIMES DAILY
Status: DISCONTINUED | OUTPATIENT
Start: 2018-01-10 | End: 2018-01-13

## 2018-01-09 RX ORDER — ALPRAZOLAM 0.5 MG/1
0.5 TABLET ORAL NIGHTLY
Status: DISCONTINUED | OUTPATIENT
Start: 2018-01-10 | End: 2018-01-15 | Stop reason: HOSPADM

## 2018-01-09 RX ORDER — INSULIN ASPART 100 [IU]/ML
1-10 INJECTION, SOLUTION INTRAVENOUS; SUBCUTANEOUS
Status: DISCONTINUED | OUTPATIENT
Start: 2018-01-10 | End: 2018-01-15 | Stop reason: HOSPADM

## 2018-01-09 RX ORDER — GLUCAGON 1 MG
1 KIT INJECTION
Status: DISCONTINUED | OUTPATIENT
Start: 2018-01-10 | End: 2018-01-15 | Stop reason: HOSPADM

## 2018-01-09 RX ORDER — FUROSEMIDE 10 MG/ML
40 INJECTION INTRAMUSCULAR; INTRAVENOUS 2 TIMES DAILY
Status: DISCONTINUED | OUTPATIENT
Start: 2018-01-10 | End: 2018-01-12

## 2018-01-09 RX ORDER — FLUTICASONE PROPIONATE 50 MCG
2 SPRAY, SUSPENSION (ML) NASAL DAILY
Status: DISCONTINUED | OUTPATIENT
Start: 2018-01-10 | End: 2018-01-15 | Stop reason: HOSPADM

## 2018-01-09 RX ORDER — IBUPROFEN 200 MG
16 TABLET ORAL
Status: DISCONTINUED | OUTPATIENT
Start: 2018-01-10 | End: 2018-01-15 | Stop reason: HOSPADM

## 2018-01-09 RX ORDER — BENZONATATE 100 MG/1
100 CAPSULE ORAL 3 TIMES DAILY PRN
Status: DISCONTINUED | OUTPATIENT
Start: 2018-01-10 | End: 2018-01-15 | Stop reason: HOSPADM

## 2018-01-09 RX ORDER — LISINOPRIL 20 MG/1
20 TABLET ORAL
Status: DISCONTINUED | OUTPATIENT
Start: 2018-01-09 | End: 2018-01-10

## 2018-01-09 RX ORDER — IBUPROFEN 200 MG
24 TABLET ORAL
Status: DISCONTINUED | OUTPATIENT
Start: 2018-01-10 | End: 2018-01-15 | Stop reason: HOSPADM

## 2018-01-09 RX ORDER — ASPIRIN 81 MG/1
81 TABLET ORAL DAILY
Status: DISCONTINUED | OUTPATIENT
Start: 2018-01-10 | End: 2018-01-15 | Stop reason: HOSPADM

## 2018-01-09 RX ORDER — FUROSEMIDE 10 MG/ML
40 INJECTION INTRAMUSCULAR; INTRAVENOUS
Status: COMPLETED | OUTPATIENT
Start: 2018-01-09 | End: 2018-01-09

## 2018-01-09 RX ORDER — ATENOLOL 25 MG/1
25 TABLET ORAL DAILY
Status: DISCONTINUED | OUTPATIENT
Start: 2018-01-10 | End: 2018-01-15 | Stop reason: HOSPADM

## 2018-01-09 RX ORDER — ESCITALOPRAM OXALATE 10 MG/1
10 TABLET ORAL DAILY
Status: DISCONTINUED | OUTPATIENT
Start: 2018-01-10 | End: 2018-01-15 | Stop reason: HOSPADM

## 2018-01-09 RX ORDER — SODIUM CHLORIDE 0.9 % (FLUSH) 0.9 %
3 SYRINGE (ML) INJECTION EVERY 8 HOURS
Status: DISCONTINUED | OUTPATIENT
Start: 2018-01-10 | End: 2018-01-15 | Stop reason: HOSPADM

## 2018-01-09 RX ORDER — PANTOPRAZOLE SODIUM 40 MG/1
40 TABLET, DELAYED RELEASE ORAL DAILY
Status: DISCONTINUED | OUTPATIENT
Start: 2018-01-10 | End: 2018-01-15 | Stop reason: HOSPADM

## 2018-01-09 RX ORDER — LEVOTHYROXINE SODIUM 75 UG/1
75 TABLET ORAL
Status: DISCONTINUED | OUTPATIENT
Start: 2018-01-10 | End: 2018-01-15 | Stop reason: HOSPADM

## 2018-01-09 RX ADMIN — FUROSEMIDE 40 MG: 10 INJECTION, SOLUTION INTRAMUSCULAR; INTRAVENOUS at 10:01

## 2018-01-09 NOTE — PROGRESS NOTES
I have reviewed and concur with the resident's history, physical, assessment, and plan.  I have personally interviewed and examined the patient  Mrs. Courtney Engle at bedside. See below addendum for my evaluation and additional findings.    Acute worsening dyspnea with hypoxia (SaO2 to mid-80s on exertion, 92-95% at rest, improves on 2L O2). Not on supplemental O2 on home. Reporting symptoms consistent with possible viral illness - congestion, sore throat, myalgias / arthralgias, sweats and subjective fever/chills in past 2 days. Developed chest pain at end of visit with trying to walk in room. Concern for acute on chronic CHF exacerbation; recommend testing for influenza as flu or other viral illness could precipitate a CHF exacerbation. Patient initially reluctant to go and concerned re: cost / insurance coverage, but after discussion with patient and two of her daughters re: risks of going home with this degree of dyspnea and hypoxia she agrees to go via ambulance to ER for further evaluation. Sent via ambulance from clinic - medically necessary given her symptoms especially with chest pain during visit.    >50 minutes spent with patient with >50% of visit spent counseling patient regarding conditions documented above and planning for care coordination. All questions answered.     Delroy Evangelista MD

## 2018-01-09 NOTE — PROGRESS NOTES
Pt c/o SOB SpO2 93%. Pt breathing heavy. Pt placed on 2L O2 via nasal cannula. SpO2 improved to 97%.

## 2018-01-09 NOTE — PROGRESS NOTES
INTERNAL MEDICINE RESIDENT CLINIC  CLINIC NOTE    Patient Name: Courtney Engle  YOB: 1935    PRESENTING HISTORY       History of Present Illness:  Ms. Courtney Engle is a 82 y.o. female w/ significant PMHx of HFpEF grade II DD, HTN, HLD, DM II, hypothyroidism, morbid obesity who presents for urgent care visit for SOB and nasal congestion. Patient states her symptoms began Sunday or Monday with SOB, nasal congestion, and chest heaviness. Since this time she has also has rhinorrhea, sinus congestion, headaches, myalgias, palpitations. She is unsure of sick contacts, and denies orthopnea (sleep on 1 pillow), abd pain, pleurisy, nausea, ?diarrhea.    Patient presented to clinic with O2 sat of 88% and was placed on 2 L O2 with increase O2 sat to 95%. Patient walked on RA then assessed w/ drop to 86% on Ra. Patient then up to 92% on RA after being rested for some time.    Of note patient had stress test performed 6/2017 which demonstrated no evidence of stress induced myocardial ischemia.     Review of Systems:  Constitutional: no fever or chills  ENT: positive for nasal congestion  sore throat  Respiratory: no cough or shortness of breath  Cardiovascular: no chest pain or palpitations  Gastrointestinal: no nausea or vomiting, no abdominal pain, no diarrhea  Genitourinary: no dysuria  Musculoskeletal: positive for arthralgias and myalgias    PAST HISTORY:     Past Medical History:   Diagnosis Date    Acquired hypothyroidism 4/25/2014    Anxiety     Aortic calcification 12/8/2016    X-Ray Chest-5/08/2014    Arthritis     Bilateral carotid artery disease 12/8/2016    US Carotid Bilateral-1/24/2013    CKD stage 3 due to type 2 diabetes mellitus 2/16/2017    Depression     Essential hypertension     Fever blister     GERD (gastroesophageal reflux disease)     Glaucoma 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 robledo)     Hyperlipidemia LDL goal <70 2/16/2017    Keloid cicatrix     Mixed  stress and urge urinary incontinence 2/16/2017    Morbid obesity with BMI of 40.0-44.9, adult 12/8/2016    Ocular migraine 1/24/2013    Type 2 diabetes mellitus with hyperglycemia, with long-term current use of insulin        Past Surgical History:   Procedure Laterality Date    CATARACT EXTRACTION W/  INTRAOCULAR LENS IMPLANT Right 05/30/2012        CATARACT EXTRACTION W/  INTRAOCULAR LENS IMPLANT Left 05/26/2010        CHOLECYSTECTOMY      COLONOSCOPY W/ POLYPECTOMY  04/20/2015    ESOPHAGOGASTRODUODENOSCOPY  04/20/2015    HYSTERECTOMY      Partial    JOINT REPLACEMENT      knee replacement right      TONSILLECTOMY, ADENOIDECTOMY      tonsillectomy only       Family History   Problem Relation Age of Onset    Glaucoma Father     Stroke Father     Heart attack Father 93    Nephrolithiasis Father     Colon cancer Mother     Cancer Mother      colon ca    Heart disease Mother     Uterine cancer Daughter      uterine sarcoma    Hematuria Brother     Heart disease Maternal Grandmother     Hypertension Maternal Grandmother     Heart attack Maternal Grandmother     No Known Problems Daughter     Amblyopia Neg Hx     Blindness Neg Hx     Cataracts Neg Hx     Macular degeneration Neg Hx     Retinal detachment Neg Hx     Strabismus Neg Hx        Social History     Social History    Marital status:      Spouse name: N/A    Number of children: N/A    Years of education: N/A     Social History Main Topics    Smoking status: Never Smoker    Smokeless tobacco: Never Used    Alcohol use No    Drug use: No    Sexual activity: No     Other Topics Concern    Not on file     Social History Narrative    Lives in [place], by herself, with her 7 cats. Patient has family in [place], [place], and [facility]. Patient has 2 daughters, and 3 granddaughters, and 4 great grandchildren. Patient ambulates with wheeled walker.       MEDICATIONS & ALLERGIES:     Current  "Outpatient Prescriptions on File Prior to Visit   Medication Sig    acetaminophen (TYLENOL) 500 MG tablet Take 1 tablet (500 mg total) by mouth every 6 (six) hours as needed for Pain.    alendronate (FOSAMAX) 70 MG tablet Take 1 tablet (70 mg total) by mouth every 7 days.    ALPRAZolam (XANAX) 0.5 MG tablet TAKE 1 TABLET BY MOUTH EVERY NIGHT    aspirin (ECOTRIN) 81 MG EC tablet Take 1 tablet (81 mg total) by mouth once daily.    atenolol (TENORMIN) 25 MG tablet TAKE 1 TABLET BY MOUTH EVERY DAY    BD INSULIN SYRINGE ULT-FINE II 1/2 mL 31 x 5/16" Syrg USE THREE TIMES DAILY AS DIRECTED    benzonatate (TESSALON) 100 MG capsule Take 100 mg by mouth as needed for Cough.    blood-glucose meter kit Use as instructed    butalbital-acetaminophen  mg Tab Take by mouth as needed (pain). caffeine    escitalopram oxalate (LEXAPRO) 10 MG tablet TAKE 1 TABLET BY MOUTH EVERY DAY    fluticasone (FLONASE) 50 mcg/actuation nasal spray SHAKE LIQUID WELL AND SPRAY TWO SPRAYS IN EACH NOSTRIL EVERY DAY    insulin aspart (NOVOLOG) 100 unit/mL injection To use with Vgo 20  with 4 clicks with meals, max TDD 38 units daily    insulin glargine (LANTUS) 100 unit/mL injection Inject 30 Units into the skin every evening. STOP WHEN VGO STARTED (Patient taking differently: Inject 30 Units into the skin once daily. )    insulin syringe-needle U-100 0.5 mL 31 gauge x 5/16 Syrg USE AS DIRECTED FOUR TIMES DAILY    insulin syringe-needle U-100 1/2 mL 31 x 5/16" Syrg USE THREE TIMES DAILY    LANTUS 100 unit/mL injection ADMINISTER 30 UNITS UNDER THE SKIN SKIN EVERY EVENING    levothyroxine (SYNTHROID) 75 MCG tablet TAKE 1 TABLET BY MOUTH EVERY DAY    lisinopril 10 MG tablet Take 1 tablet (10 mg total) by mouth once daily. (Patient taking differently: Take 20 mg by mouth every morning. )    omeprazole (PRILOSEC) 40 MG capsule Take 1 capsule (40 mg total) by mouth 2 (two) times daily before meals.    ONETOUCH ULTRA TEST Strp TEST " "FOUR TIMES DAILY    triamcinolone acetonide 0.1% (KENALOG) 0.1 % cream AAA bid on rash    [DISCONTINUED] melatonin 3 mg Tab Take 1 tablet (3 mg total) by mouth every evening. (Patient taking differently: Take 3 mg by mouth as needed. )     No current facility-administered medications on file prior to visit.        Review of patient's allergies indicates:   Allergen Reactions    Benadryl [diphenhydramine hcl] Shortness Of Breath    Contac 12 hour allergy Anaphylaxis and Hives    Ciprofloxacin (bulk) Nausea And Vomiting    Anti-hyst Other (See Comments)    Antihistamines - alkylamine Hives    Codeine      bad reaction    Glucotrol [glipizide]     Hydroxyzine      Unknown    Iodinated contrast- oral and iv dye Other (See Comments)    Nitrofuran analogues     Propoxyphene Itching    Doxycycline Rash    Penicillins Rash    Sulfa (sulfonamide antibiotics) Rash       OBJECTIVE:   Vital Signs:  Vitals:    01/09/18 1533   BP: (!) 162/70   Pulse: 66   Temp: 99.1 °F (37.3 °C)   TempSrc: Oral   SpO2: 95%   Weight: 94.2 kg (207 lb 10.8 oz)   Height: 4' 10" (1.473 m)       No results found for this or any previous visit (from the past 24 hour(s)).      Physical Exam:   General:  Obese, sitting up in chair, expiratory wheezes heard  HEENT:  Normocephalic, atraumatic, EOMI, clear sclera, no sinus tenderness  CVS:  RRR, S1 and S2 normal, no murmurs  Resp:  Lungs clear to auscultation, no crackles  GI:  Abdomen soft, non-tender, non-distended, normoactive bowel sounds  MSK:  + 1 LE edema b/l  Skin:  No rashes  Neuro:  CNII-XII grossly intact  Psych:  Alert and oriented to person, place, and time    ASSESSMENT & PLAN:     Courtney was seen today for shortness of breath.    Diagnoses and all orders for this visit:    Shortness of breath  Patient states she has difficulty breathing  Patient w/ symptoms of rhinorrhea, sinus congestion, headaches, myalgias  In addition states she broke out in sweat when at the grocery store " either yesterday or Sunday; has not checked her temp but likely had fever at that time  Symptoms concerning for flu which may be cause of SOB  Patient also w/ hx of HFpEF not on diuretics; patient w/o crackles on exam, has +1 b/l LE edema; unlikely cause of symptoms however may be contributing  Advised patient to go to ED, checked wait times for her as she was concerned about going to ED and having to deal with a long wait; patient adamantly against going   Team spoke with patient's daughter at bedside and patient's daughter over phone to speak with daughter   Patient finally agreeable to go to ED via ambulance as she will be transported w/ O2 via nasal cannula

## 2018-01-09 NOTE — Clinical Note
I saw Courtney H Oniel today in resident clinic for urgent visit for dyspnea and URI symptoms; patient sent to ER for dyspnea with hypoxia as well as chest pain during visit - see note for details, please contact me if any questions.  Thanks, Delroy Evangelista MD

## 2018-01-10 LAB
ANION GAP SERPL CALC-SCNC: 10 MMOL/L
BASOPHILS # BLD AUTO: 0.05 K/UL
BASOPHILS NFR BLD: 0.5 %
BUN SERPL-MCNC: 12 MG/DL
CALCIUM SERPL-MCNC: 9.1 MG/DL
CHLORIDE SERPL-SCNC: 102 MMOL/L
CO2 SERPL-SCNC: 29 MMOL/L
CREAT SERPL-MCNC: 0.9 MG/DL
DIASTOLIC DYSFUNCTION: NO
DIFFERENTIAL METHOD: ABNORMAL
EOSINOPHIL # BLD AUTO: 0.3 K/UL
EOSINOPHIL NFR BLD: 3.2 %
ERYTHROCYTE [DISTWIDTH] IN BLOOD BY AUTOMATED COUNT: 13.3 %
EST. GFR  (AFRICAN AMERICAN): >60 ML/MIN/1.73 M^2
EST. GFR  (NON AFRICAN AMERICAN): >60 ML/MIN/1.73 M^2
ESTIMATED AVG GLUCOSE: 174 MG/DL
ESTIMATED PA SYSTOLIC PRESSURE: 28.2
FLUAV AG SPEC QL IA: NEGATIVE
FLUBV AG SPEC QL IA: NEGATIVE
GLUCOSE SERPL-MCNC: 136 MG/DL
HBA1C MFR BLD HPLC: 7.7 %
HCT VFR BLD AUTO: 35.6 %
HGB BLD-MCNC: 11.6 G/DL
IMM GRANULOCYTES # BLD AUTO: 0.04 K/UL
IMM GRANULOCYTES NFR BLD AUTO: 0.4 %
LYMPHOCYTES # BLD AUTO: 2.5 K/UL
LYMPHOCYTES NFR BLD: 25.8 %
MCH RBC QN AUTO: 28.6 PG
MCHC RBC AUTO-ENTMCNC: 32.6 G/DL
MCV RBC AUTO: 88 FL
MONOCYTES # BLD AUTO: 0.8 K/UL
MONOCYTES NFR BLD: 8 %
NEUTROPHILS # BLD AUTO: 6 K/UL
NEUTROPHILS NFR BLD: 62.1 %
NRBC BLD-RTO: 0 /100 WBC
PLATELET # BLD AUTO: 306 K/UL
PMV BLD AUTO: 9.5 FL
POCT GLUCOSE: 113 MG/DL (ref 70–110)
POCT GLUCOSE: 138 MG/DL (ref 70–110)
POCT GLUCOSE: 159 MG/DL (ref 70–110)
POCT GLUCOSE: 234 MG/DL (ref 70–110)
POCT GLUCOSE: 312 MG/DL (ref 70–110)
POCT GLUCOSE: 323 MG/DL (ref 70–110)
POCT GLUCOSE: 66 MG/DL (ref 70–110)
POTASSIUM SERPL-SCNC: 3.6 MMOL/L
RBC # BLD AUTO: 4.06 M/UL
RETIRED EF AND QEF - SEE NOTES: 60 (ref 55–65)
SODIUM SERPL-SCNC: 141 MMOL/L
SPECIMEN SOURCE: NORMAL
TRICUSPID VALVE REGURGITATION: NORMAL
TROPONIN I SERPL DL<=0.01 NG/ML-MCNC: 0.04 NG/ML
TSH SERPL DL<=0.005 MIU/L-ACNC: 1.38 UIU/ML
WBC # BLD AUTO: 9.7 K/UL

## 2018-01-10 PROCEDURE — 63600175 PHARM REV CODE 636 W HCPCS: Performed by: INTERNAL MEDICINE

## 2018-01-10 PROCEDURE — G0378 HOSPITAL OBSERVATION PER HR: HCPCS

## 2018-01-10 PROCEDURE — 84484 ASSAY OF TROPONIN QUANT: CPT

## 2018-01-10 PROCEDURE — 84443 ASSAY THYROID STIM HORMONE: CPT

## 2018-01-10 PROCEDURE — 99223 1ST HOSP IP/OBS HIGH 75: CPT | Mod: AI,,, | Performed by: INTERNAL MEDICINE

## 2018-01-10 PROCEDURE — 83036 HEMOGLOBIN GLYCOSYLATED A1C: CPT

## 2018-01-10 PROCEDURE — 85025 COMPLETE CBC W/AUTO DIFF WBC: CPT

## 2018-01-10 PROCEDURE — 11000001 HC ACUTE MED/SURG PRIVATE ROOM

## 2018-01-10 PROCEDURE — 80048 BASIC METABOLIC PNL TOTAL CA: CPT

## 2018-01-10 PROCEDURE — 82962 GLUCOSE BLOOD TEST: CPT

## 2018-01-10 PROCEDURE — 93306 TTE W/DOPPLER COMPLETE: CPT | Mod: 26,,, | Performed by: INTERNAL MEDICINE

## 2018-01-10 PROCEDURE — A4216 STERILE WATER/SALINE, 10 ML: HCPCS | Performed by: INTERNAL MEDICINE

## 2018-01-10 PROCEDURE — 87400 INFLUENZA A/B EACH AG IA: CPT | Mod: 59

## 2018-01-10 PROCEDURE — 36415 COLL VENOUS BLD VENIPUNCTURE: CPT

## 2018-01-10 PROCEDURE — 25000003 PHARM REV CODE 250: Performed by: INTERNAL MEDICINE

## 2018-01-10 PROCEDURE — 93306 TTE W/DOPPLER COMPLETE: CPT

## 2018-01-10 PROCEDURE — 25000003 PHARM REV CODE 250: Performed by: PHYSICIAN ASSISTANT

## 2018-01-10 PROCEDURE — 25000242 PHARM REV CODE 250 ALT 637 W/ HCPCS: Performed by: INTERNAL MEDICINE

## 2018-01-10 RX ORDER — LISINOPRIL 20 MG/1
20 TABLET ORAL DAILY
Status: DISCONTINUED | OUTPATIENT
Start: 2018-01-10 | End: 2018-01-15 | Stop reason: HOSPADM

## 2018-01-10 RX ADMIN — INSULIN DETEMIR 25 UNITS: 100 INJECTION, SOLUTION SUBCUTANEOUS at 09:01

## 2018-01-10 RX ADMIN — LEVOTHYROXINE SODIUM 75 MCG: 75 TABLET ORAL at 05:01

## 2018-01-10 RX ADMIN — Medication 16 G: at 12:01

## 2018-01-10 RX ADMIN — ALPRAZOLAM 0.5 MG: 0.5 TABLET ORAL at 09:01

## 2018-01-10 RX ADMIN — ESCITALOPRAM OXALATE 10 MG: 10 TABLET ORAL at 09:01

## 2018-01-10 RX ADMIN — FUROSEMIDE 40 MG: 10 INJECTION, SOLUTION INTRAMUSCULAR; INTRAVENOUS at 09:01

## 2018-01-10 RX ADMIN — LISINOPRIL 20 MG: 20 TABLET ORAL at 09:01

## 2018-01-10 RX ADMIN — Medication 10 ML: at 02:01

## 2018-01-10 RX ADMIN — INSULIN ASPART 15 UNITS: 100 INJECTION, SOLUTION INTRAVENOUS; SUBCUTANEOUS at 09:01

## 2018-01-10 RX ADMIN — INSULIN ASPART 4 UNITS: 100 INJECTION, SOLUTION INTRAVENOUS; SUBCUTANEOUS at 09:01

## 2018-01-10 RX ADMIN — ATENOLOL 25 MG: 25 TABLET ORAL at 09:01

## 2018-01-10 RX ADMIN — FUROSEMIDE 40 MG: 10 INJECTION, SOLUTION INTRAMUSCULAR; INTRAVENOUS at 05:01

## 2018-01-10 RX ADMIN — FLUTICASONE PROPIONATE 100 MCG: 50 SPRAY, METERED NASAL at 11:01

## 2018-01-10 RX ADMIN — PANTOPRAZOLE SODIUM 40 MG: 40 TABLET, DELAYED RELEASE ORAL at 09:01

## 2018-01-10 RX ADMIN — INSULIN ASPART 15 UNITS: 100 INJECTION, SOLUTION INTRAVENOUS; SUBCUTANEOUS at 05:01

## 2018-01-10 RX ADMIN — ENOXAPARIN SODIUM 40 MG: 100 INJECTION SUBCUTANEOUS at 05:01

## 2018-01-10 RX ADMIN — Medication 3 ML: at 05:01

## 2018-01-10 RX ADMIN — Medication 3 ML: at 09:01

## 2018-01-10 RX ADMIN — INSULIN DETEMIR 25 UNITS: 100 INJECTION, SOLUTION SUBCUTANEOUS at 12:01

## 2018-01-10 RX ADMIN — ASPIRIN 81 MG: 81 TABLET, COATED ORAL at 09:01

## 2018-01-10 NOTE — PLAN OF CARE
Problem: Patient Care Overview  Goal: Plan of Care Review  Outcome: Ongoing (interventions implemented as appropriate)  Plan of care reviewed, pt verbalizes understanding. AAOx4. VSS. Questions and concerns addressed. Pt c/o SOB with simply sitting up in bed. 2L O2 continuous, sats 96%. Denies chest pain, diaphoresis, chills, LOC. Afebrile. See flowsheet for assessment and I/Os. Will continue to monitor and reassess.     Problem: Fall Risk (Adult)  Goal: Identify Related Risk Factors and Signs and Symptoms  Related risk factors and signs and symptoms are identified upon initiation of Human Response Clinical Practice Guideline (CPG)   Outcome: Ongoing (interventions implemented as appropriate)  Pt remains free of injury/falls/trauma. Bed lowest to floor, call bell and personal belongings within reach.

## 2018-01-10 NOTE — NURSING
AM assessment complete. Denies complaints. No SOB noted. O2 in place @ 2L/NC. 2+ edema noted on bilateral extremities. Bedside commode in place. Safety precautions in place. CB within reach. Denies needs at this time.

## 2018-01-10 NOTE — ED NOTES
Patient identifiers verified and correct for Courtney  Oniel.    LOC: The patient is awake, alert and aware of environment with an appropriate affect, the patient is oriented x 3 and speaking appropriately.  APPEARANCE: Patient resting comfortably and in no acute distress, patient is clean and well groomed, patient's clothing is properly fastened.  SKIN: The skin is warm and dry, color consistent with ethnicity, patient has normal skin turgor and moist mucus membranes, skin intact, no breakdown or bruising noted.  MUSCULOSKELETAL: Patient moving all extremities spontaneously, no obvious swelling or deformities noted.  RESPIRATORY: Airway is open and patent, respirations are spontaneous, patient has a normal effort and rate, no accessory muscle use noted  CARDIAC: Patient has a normal rate and regular rhythm, capillary refill < 3 seconds. +1 peripheral edema BLE.   ABDOMEN: Soft and non tender to palpation, no distention noted, normoactive bowel sounds present in all four quadrants.  NEUROLOGIC: PERRL, 3mm bilaterally, eyes open spontaneously, behavior appropriate to situation, follows commands, facial expression symmetrical, bilateral hand grasp equal and even, purposeful motor response noted, normal sensation in all extremities when touched with a finger.

## 2018-01-10 NOTE — ASSESSMENT & PLAN NOTE
Patient presenting with dyspnea on exertion, orthopnea, LE swelling, elevated BNP - most likely acute on chronic diastolic CHF 2/2 dietary non-compliance  -Lasix IV 40mg BID  -Strict I/O  -Continue atenolol 25mg daily, ASA, lisinopril  -TTE  -Low salt / cardiac diet  -1.5L fluid restriction

## 2018-01-10 NOTE — PLAN OF CARE
Vishal Ulloa MD     Payor: [employer] MEDICARE / Plan: [employer] MEDICARE HMO / Product Type: Capitation /      Extended Emergency Contact Information  Primary Emergency Contact: Patito Britton  Address: [address] [place] [address]  Home Phone: [phone]  Relation: Daughter        01/10/18 1735   Discharge Assessment   Assessment Type Discharge Planning Assessment   Confirmed/corrected address and phone number on facesheet? Yes   Assessment information obtained from? Patient   Expected Length of Stay (days) 2   Communicated expected length of stay with patient/caregiver yes   Prior to hospitilization cognitive status: Alert/Oriented   Prior to hospitalization functional status: Independent   Current cognitive status: Alert/Oriented   Current Functional Status: Needs Assistance   Lives With alone   Able to Return to Prior Arrangements unable to determine at this time (comments)   Is patient able to care for self after discharge? Unable to determine at this time (comments)   Patient's perception of discharge disposition home or selfcare;home health   Readmission Within The Last 30 Days no previous admission in last 30 days   Patient currently being followed by outpatient case management? No   Patient currently receives any other outside agency services? No   Equipment Currently Used at Home walker, rolling;cane, straight;shower chair   Do you have any problems affording any of your prescribed medications? No   Is the patient taking medications as prescribed? yes   Does the patient have transportation home? Yes   Transportation Available family or friend will provide   Discharge Plan A Home   Discharge Plan B Home with family;Home Health   Patient/Family In Agreement With Plan yes

## 2018-01-10 NOTE — SUBJECTIVE & OBJECTIVE
Past Medical History:   Diagnosis Date    Acquired hypothyroidism 4/25/2014    Anxiety     Aortic calcification 12/8/2016    X-Ray Chest-5/08/2014    Arthritis     Bilateral carotid artery disease 12/8/2016    US Carotid Bilateral-1/24/2013    CKD stage 3 due to type 2 diabetes mellitus 2/16/2017    Depression     Essential hypertension     Fever blister     GERD (gastroesophageal reflux disease)     Glaucoma 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 robledo)     Hyperlipidemia LDL goal <70 2/16/2017    Keloid cicatrix     Mixed stress and urge urinary incontinence 2/16/2017    Morbid obesity with BMI of 40.0-44.9, adult 12/8/2016    Ocular migraine 1/24/2013    Type 2 diabetes mellitus with hyperglycemia, with long-term current use of insulin        Past Surgical History:   Procedure Laterality Date    CATARACT EXTRACTION W/  INTRAOCULAR LENS IMPLANT Right 05/30/2012        CATARACT EXTRACTION W/  INTRAOCULAR LENS IMPLANT Left 05/26/2010        CHOLECYSTECTOMY      COLONOSCOPY W/ POLYPECTOMY  04/20/2015    ESOPHAGOGASTRODUODENOSCOPY  04/20/2015    HYSTERECTOMY      Partial    JOINT REPLACEMENT      knee replacement right      TONSILLECTOMY, ADENOIDECTOMY      tonsillectomy only       Review of patient's allergies indicates:   Allergen Reactions    Benadryl [diphenhydramine hcl] Shortness Of Breath    Contac 12 hour allergy Anaphylaxis and Hives    Ciprofloxacin (bulk) Nausea And Vomiting    Anti-hyst Other (See Comments)    Antihistamines - alkylamine Hives    Codeine      bad reaction    Glucotrol [glipizide]     Hydroxyzine      Unknown    Iodinated contrast- oral and iv dye Other (See Comments)    Nitrofuran analogues     Propoxyphene Itching    Doxycycline Rash    Penicillins Rash    Sulfa (sulfonamide antibiotics) Rash       No current facility-administered medications on file prior to encounter.      Current Outpatient Prescriptions on File Prior  "to Encounter   Medication Sig    acetaminophen (TYLENOL) 500 MG tablet Take 1 tablet (500 mg total) by mouth every 6 (six) hours as needed for Pain.    alendronate (FOSAMAX) 70 MG tablet Take 1 tablet (70 mg total) by mouth every 7 days.    ALPRAZolam (XANAX) 0.5 MG tablet TAKE 1 TABLET BY MOUTH EVERY NIGHT    aspirin (ECOTRIN) 81 MG EC tablet Take 1 tablet (81 mg total) by mouth once daily.    atenolol (TENORMIN) 25 MG tablet TAKE 1 TABLET BY MOUTH EVERY DAY    BD INSULIN SYRINGE ULT-FINE II 1/2 mL 31 x 5/16" Syrg USE THREE TIMES DAILY AS DIRECTED    benzonatate (TESSALON) 100 MG capsule Take 100 mg by mouth as needed for Cough.    blood-glucose meter kit Use as instructed    butalbital-acetaminophen  mg Tab Take by mouth as needed (pain). caffeine    escitalopram oxalate (LEXAPRO) 10 MG tablet TAKE 1 TABLET BY MOUTH EVERY DAY    fluticasone (FLONASE) 50 mcg/actuation nasal spray SHAKE LIQUID WELL AND SPRAY TWO SPRAYS IN EACH NOSTRIL EVERY DAY    insulin aspart (NOVOLOG) 100 unit/mL injection To use with Vgo 20  with 4 clicks with meals, max TDD 38 units daily    insulin glargine (LANTUS) 100 unit/mL injection Inject 30 Units into the skin every evening. STOP WHEN VGO STARTED (Patient taking differently: Inject 30 Units into the skin once daily. )    insulin syringe-needle U-100 0.5 mL 31 gauge x 5/16 Syrg USE AS DIRECTED FOUR TIMES DAILY    insulin syringe-needle U-100 1/2 mL 31 x 5/16" Syrg USE THREE TIMES DAILY    LANTUS 100 unit/mL injection ADMINISTER 30 UNITS UNDER THE SKIN SKIN EVERY EVENING    levothyroxine (SYNTHROID) 75 MCG tablet TAKE 1 TABLET BY MOUTH EVERY DAY    lisinopril 10 MG tablet Take 1 tablet (10 mg total) by mouth once daily. (Patient taking differently: Take 20 mg by mouth every morning. )    omeprazole (PRILOSEC) 40 MG capsule Take 1 capsule (40 mg total) by mouth 2 (two) times daily before meals.    ONETOUCH ULTRA TEST Strp TEST FOUR TIMES DAILY    triamcinolone " acetonide 0.1% (KENALOG) 0.1 % cream AAA bid on rash    [DISCONTINUED] melatonin 3 mg Tab Take 1 tablet (3 mg total) by mouth every evening. (Patient taking differently: Take 3 mg by mouth as needed. )     Family History     Problem Relation (Age of Onset)    Cancer Mother    Colon cancer Mother    Glaucoma Father    Heart attack Father (93), Maternal Grandmother    Heart disease Mother, Maternal Grandmother    Hematuria Brother    Hypertension Maternal Grandmother    Nephrolithiasis Father    No Known Problems Daughter    Stroke Father    Uterine cancer Daughter        Social History Main Topics    Smoking status: Never Smoker    Smokeless tobacco: Never Used    Alcohol use No    Drug use: No    Sexual activity: No     Review of Systems   Constitutional: Positive for fatigue and fever. Negative for appetite change and chills.   HENT: Positive for congestion. Negative for hearing loss and trouble swallowing.    Respiratory: Positive for shortness of breath. Negative for chest tightness and wheezing.    Cardiovascular: Negative for chest pain, palpitations and leg swelling.   Gastrointestinal: Negative for abdominal distention, abdominal pain, constipation and diarrhea.   Genitourinary: Negative for difficulty urinating and dysuria.   Musculoskeletal: Negative for arthralgias and myalgias.   Skin: Negative for rash.   Neurological: Negative for dizziness and headaches.   Psychiatric/Behavioral: Negative for agitation and behavioral problems.     Objective:     Vital Signs (Most Recent):  Temp: 98 °F (36.7 °C) (01/09/18 1815)  Pulse: (!) 53 (01/09/18 2233)  Resp: 20 (01/09/18 2233)  BP: (!) 170/71 (01/09/18 2233)  SpO2: 96 % (01/09/18 2233) Vital Signs (24h Range):  Temp:  [98 °F (36.7 °C)-99.1 °F (37.3 °C)] 98 °F (36.7 °C)  Pulse:  [50-66] 53  Resp:  [16-20] 20  SpO2:  [95 %-98 %] 96 %  BP: (162-204)/(70-90) 170/71     Weight: 93.9 kg (207 lb)  Body mass index is 43.26 kg/m².    Physical Exam        Significant  Labs:   CBC:   Recent Labs  Lab 01/09/18 2129   WBC 9.55   HGB 11.5*   HCT 35.4*        CMP:   Recent Labs  Lab 01/09/18 2129      K 3.8      CO2 28   GLU 80   BUN 11   CREATININE 0.8   CALCIUM 9.0   PROT 7.3   ALBUMIN 3.1*   BILITOT 0.3   ALKPHOS 98   AST 15   ALT 14   ANIONGAP 7*   EGFRNONAA >60.0     Cardiac Markers:   Recent Labs  Lab 01/09/18 2129   *     Troponin:   Recent Labs  Lab 01/09/18 2129   TROPONINI 0.059*       Significant Imaging: I have reviewed all pertinent imaging results/findings within the past 24 hours.

## 2018-01-10 NOTE — ED NOTES
Pt updated on plan of care, pt aware moving to semi-private room and waiting for transport.    Patient is a 29y old  Male who presents with a chief complaint of Nausea, Vomiting and Diarrhea on 06 Mar 2017 23:40      HPI:  29 years old male with PMH of IBS comes with complaints of nausea, vomiting and diarrhea since Thursday. As per patient, he was diagnosed with IBS 2 years ago after he had EGD and Colonoscopy with biopsies (which were negative). Since then he gets episodes of vomiting and diarrhea - mild and self resolving. But this time, he had multiple episodes of vomiting (non bloody and non bilious) and diarrhea (non bloody and non mucoid). Denies any fever, change in diet or sick contacts. Denies any recent travel. He is also complaining of mild abdominal cramping. Denies any urinary symptoms.  Last episode of vomiting and diarrhea were in ER yesterday morning. He was unable to eat anything for last few days.     Currently pt. denies any c/o at present.    Vital Signs Last 24 Hrs  T(C): 36.6, Max: 36.8 (03-07 @ 03:00)  T(F): 97.8, Max: 98.3 (03-07 @ 07:00)  HR: 50 (47 - 102)  BP: 104/56 (95/53 - 119/59)  BP(mean): --  RR: 16 (14 - 18)  SpO2: 99% (96% - 100%)    ROS: negative for all systems    PE: pleasant, non-labored breathing, smiling  PHYSICAL EXAM:    GENERAL: NAD, well-groomed, well-developed  SKIN: No rashes or lesions  HEAD:  Atraumatic, Normocephalic  EYES: EOMI, PERRLA, conjunctiva and sclera clear  ENMT: No tonsillar erythema, exudates, or enlargement; Moist mucous membranes, Good dentition, No lesions  NECK: Supple, No JVD, Normal thyroid  LYMPH: No lymphadenopathy noted  CHEST/LUNG: Clear to percussion bilaterally; No rales, rhonchi, wheezing, or rubs  HEART: Regular rate and rhythm; No murmurs, rubs, or gallops  ABDOMEN: Soft, Nontender, Nondistended; Bowel sounds present  EXTREMITIES:  2+ Peripheral Pulses, No clubbing, cyanosis, or edema  NERVOUS SYSTEM:  Alert & Oriented X3, Good concentration; Motor Strength 5/5 B/L upper and lower extremities; DTRs 2+ intact and symmetric      Phosphorus Level, Serum: 2.9 mg/dL    Magnesium, Serum: 1.8 mg/dL    Basic Metabolic Panel in AM (03.07.17 @ 06:34)    Sodium, Serum: 141 mmol/L    Potassium, Serum: 3.6 mmol/L    Chloride, Serum: 106 mmol/L    Carbon Dioxide, Serum: 24.0 mmol/L    Anion Gap, Serum: 11 mmol/L    Blood Urea Nitrogen, Serum: 11.0 mg/dL    Creatinine, Serum: 0.85 mg/dL    Glucose, Serum: 94 mg/dL    Calcium, Total Serum: 8.4 mg/dL     Complete Blood Count + Automated Diff in AM (03.07.17 @ 06:34)    WBC Count: 4.5 K/uL    RBC Count: 4.24 M/uL    Hemoglobin: 12.9 g/dL    Hematocrit: 36.4 %      Platelet Count - Automated: 138 K/uL       Pt. was given K-phos supplements and Phosphorous normalized.

## 2018-01-10 NOTE — PLAN OF CARE
Problem: Patient Care Overview  Goal: Plan of Care Review  Outcome: Ongoing (interventions implemented as appropriate)  Gained consent from patient for nursing care.  On O2 2 LPM/NC. On telemetry. No complaint of pain. Afebrile. Using commode on bedside. Put bed on lowest position. Will continue to monitor.

## 2018-01-10 NOTE — H&P
"[facility] Hospital Medicine  History & Physical    Patient Name: Courtney Engle  MRN: [id number]  Admission Date: 1/9/2018  Attending Physician: Reagan Marin MD   Primary Care Provider: Vishal Ulloa MD    [facility] Medicine Team: [facility] Reagan Marin MD     Patient information was obtained from patient, relative(s) and ER records.     Subjective:     Principal Problem:Acute on chronic diastolic congestive heart failure    Chief Complaint:   Chief Complaint   Patient presents with    Shortness of Breath     +fever x several days.         HPI: Ms. Engle is a 82F h/o HFpEF, DM, Hypothyroidism, HLD, presenting with subacute worsening dyspnea on exertion, sent over from clinic today. Patient reports Sunday started having worsening dyspnea on exertion, has also endorsed worsening orthopnea and LE swelling over last few weeks. Patient has been non-compliant with low salt diet as daughter reports, not currently on any lasix. Pt denies chest pain, reports occasional chest heaviness, occasional "skipped beats" but no palpitations. Endorsing some upper respiratory congestion for last few days, subjective fever, cough.     Past Medical History:   Diagnosis Date    Acquired hypothyroidism 4/25/2014    Anxiety     Aortic calcification 12/8/2016    X-Ray Chest-5/08/2014    Arthritis     Bilateral carotid artery disease 12/8/2016    US Carotid Bilateral-1/24/2013    CKD stage 3 due to type 2 diabetes mellitus 2/16/2017    Depression     Essential hypertension     Fever blister     GERD (gastroesophageal reflux disease)     Glaucoma 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 robledo)     Hyperlipidemia LDL goal <70 2/16/2017    Keloid cicatrix     Mixed stress and urge urinary incontinence 2/16/2017    Morbid obesity with BMI of 40.0-44.9, adult 12/8/2016    Ocular migraine 1/24/2013    Type 2 diabetes mellitus with hyperglycemia, with long-term current use of insulin  " "      Past Surgical History:   Procedure Laterality Date    CATARACT EXTRACTION W/  INTRAOCULAR LENS IMPLANT Right 05/30/2012        CATARACT EXTRACTION W/  INTRAOCULAR LENS IMPLANT Left 05/26/2010        CHOLECYSTECTOMY      COLONOSCOPY W/ POLYPECTOMY  04/20/2015    ESOPHAGOGASTRODUODENOSCOPY  04/20/2015    HYSTERECTOMY      Partial    JOINT REPLACEMENT      knee replacement right      TONSILLECTOMY, ADENOIDECTOMY      tonsillectomy only       Review of patient's allergies indicates:   Allergen Reactions    Benadryl [diphenhydramine hcl] Shortness Of Breath    Contac 12 hour allergy Anaphylaxis and Hives    Ciprofloxacin (bulk) Nausea And Vomiting    Anti-hyst Other (See Comments)    Antihistamines - alkylamine Hives    Codeine      bad reaction    Glucotrol [glipizide]     Hydroxyzine      Unknown    Iodinated contrast- oral and iv dye Other (See Comments)    Nitrofuran analogues     Propoxyphene Itching    Doxycycline Rash    Penicillins Rash    Sulfa (sulfonamide antibiotics) Rash       No current facility-administered medications on file prior to encounter.      Current Outpatient Prescriptions on File Prior to Encounter   Medication Sig    acetaminophen (TYLENOL) 500 MG tablet Take 1 tablet (500 mg total) by mouth every 6 (six) hours as needed for Pain.    alendronate (FOSAMAX) 70 MG tablet Take 1 tablet (70 mg total) by mouth every 7 days.    ALPRAZolam (XANAX) 0.5 MG tablet TAKE 1 TABLET BY MOUTH EVERY NIGHT    aspirin (ECOTRIN) 81 MG EC tablet Take 1 tablet (81 mg total) by mouth once daily.    atenolol (TENORMIN) 25 MG tablet TAKE 1 TABLET BY MOUTH EVERY DAY    BD INSULIN SYRINGE ULT-FINE II 1/2 mL 31 x 5/16" Syrg USE THREE TIMES DAILY AS DIRECTED    benzonatate (TESSALON) 100 MG capsule Take 100 mg by mouth as needed for Cough.    blood-glucose meter kit Use as instructed    butalbital-acetaminophen  mg Tab Take by mouth as needed (pain). " "caffeine    escitalopram oxalate (LEXAPRO) 10 MG tablet TAKE 1 TABLET BY MOUTH EVERY DAY    fluticasone (FLONASE) 50 mcg/actuation nasal spray SHAKE LIQUID WELL AND SPRAY TWO SPRAYS IN EACH NOSTRIL EVERY DAY    insulin aspart (NOVOLOG) 100 unit/mL injection To use with Vgo 20  with 4 clicks with meals, max TDD 38 units daily    insulin glargine (LANTUS) 100 unit/mL injection Inject 30 Units into the skin every evening. STOP WHEN VGO STARTED (Patient taking differently: Inject 30 Units into the skin once daily. )    insulin syringe-needle U-100 0.5 mL 31 gauge x 5/16 Syrg USE AS DIRECTED FOUR TIMES DAILY    insulin syringe-needle U-100 1/2 mL 31 x 5/16" Syrg USE THREE TIMES DAILY    LANTUS 100 unit/mL injection ADMINISTER 30 UNITS UNDER THE SKIN SKIN EVERY EVENING    levothyroxine (SYNTHROID) 75 MCG tablet TAKE 1 TABLET BY MOUTH EVERY DAY    lisinopril 10 MG tablet Take 1 tablet (10 mg total) by mouth once daily. (Patient taking differently: Take 20 mg by mouth every morning. )    omeprazole (PRILOSEC) 40 MG capsule Take 1 capsule (40 mg total) by mouth 2 (two) times daily before meals.    ONETOUCH ULTRA TEST Strp TEST FOUR TIMES DAILY    triamcinolone acetonide 0.1% (KENALOG) 0.1 % cream AAA bid on rash    [DISCONTINUED] melatonin 3 mg Tab Take 1 tablet (3 mg total) by mouth every evening. (Patient taking differently: Take 3 mg by mouth as needed. )     Family History     Problem Relation (Age of Onset)    Cancer Mother    Colon cancer Mother    Glaucoma Father    Heart attack Father (93), Maternal Grandmother    Heart disease Mother, Maternal Grandmother    Hematuria Brother    Hypertension Maternal Grandmother    Nephrolithiasis Father    No Known Problems Daughter    Stroke Father    Uterine cancer Daughter        Social History Main Topics    Smoking status: Never Smoker    Smokeless tobacco: Never Used    Alcohol use No    Drug use: No    Sexual activity: No     Review of Systems "   Constitutional: Positive for fatigue and fever. Negative for appetite change and chills.   HENT: Positive for congestion. Negative for hearing loss and trouble swallowing.    Respiratory: Positive for shortness of breath. Negative for chest tightness and wheezing.    Cardiovascular: Negative for chest pain, palpitations and leg swelling.   Gastrointestinal: Negative for abdominal distention, abdominal pain, constipation and diarrhea.   Genitourinary: Negative for difficulty urinating and dysuria.   Musculoskeletal: Negative for arthralgias and myalgias.   Skin: Negative for rash.   Neurological: Negative for dizziness and headaches.   Psychiatric/Behavioral: Negative for agitation and behavioral problems.     Objective:     Vital Signs (Most Recent):  Temp: 98 °F (36.7 °C) (01/09/18 1815)  Pulse: (!) 53 (01/09/18 2233)  Resp: 20 (01/09/18 2233)  BP: (!) 170/71 (01/09/18 2233)  SpO2: 96 % (01/09/18 2233) Vital Signs (24h Range):  Temp:  [98 °F (36.7 °C)-99.1 °F (37.3 °C)] 98 °F (36.7 °C)  Pulse:  [50-66] 53  Resp:  [16-20] 20  SpO2:  [95 %-98 %] 96 %  BP: (162-204)/(70-90) 170/71     Weight: 93.9 kg (207 lb)  Body mass index is 43.26 kg/m².    Physical Exam        Significant Labs:   CBC:   Recent Labs  Lab 01/09/18 2129   WBC 9.55   HGB 11.5*   HCT 35.4*        CMP:   Recent Labs  Lab 01/09/18 2129      K 3.8      CO2 28   GLU 80   BUN 11   CREATININE 0.8   CALCIUM 9.0   PROT 7.3   ALBUMIN 3.1*   BILITOT 0.3   ALKPHOS 98   AST 15   ALT 14   ANIONGAP 7*   EGFRNONAA >60.0     Cardiac Markers:   Recent Labs  Lab 01/09/18 2129   *     Troponin:   Recent Labs  Lab 01/09/18 2129   TROPONINI 0.059*       Significant Imaging: I have reviewed all pertinent imaging results/findings within the past 24 hours.    Assessment/Plan:     * Acute on chronic diastolic congestive heart failure    Patient presenting with dyspnea on exertion, orthopnea, LE swelling, elevated BNP - most likely acute on  chronic diastolic CHF 2/2 dietary non-compliance  -Lasix IV 40mg BID  -Strict I/O  -Continue atenolol 25mg daily, ASA, lisinopril  -TTE  -Low salt / cardiac diet  -1.5L fluid restriction          GERD (gastroesophageal reflux disease)    Stable substitute protonix for omeprazole        Depression    Stable continue escitalopram           Anxiety    Stable, continue xanex 0.5mg QHS PRN          Acquired hypothyroidism    Stable, check TSH, continue synthroid 75mcg daily          Essential hypertension    Stable, continue lisinopril, atenolol          Arthritis    Stable, continue tylenol PRN        Type 2 diabetes mellitus with hyperglycemia, with long-term current use of insulin    Patient type 2 DM  -Glargine 25u QHS  -Aspart 15u TID AC  -SSI  -Accuchecks ACHS          VTE Risk Mitigation         Ordered     enoxaparin injection 40 mg  Daily     Route:  Subcutaneous        01/09/18 2347     Medium Risk of VTE  Once      01/09/18 2347     Place sequential compression device  Until discontinued      01/09/18 2347     Place LUCY hose  Until discontinued      01/09/18 2347     Medium Risk of VTE  Once      01/09/18 2347             Reagan Marin MD  Department of Hospital Medicine   [facility]

## 2018-01-10 NOTE — ED NOTES
Pt placed tissue in bedside commode. Urine unable to be measured. Pt reminded not to put tissue in commode so it can be measured.

## 2018-01-10 NOTE — ED NOTES
"Pt arrives via EMS on 2L NC. Pt was sent from clinic today. Pt's visitor states "every time they took the oxygen off her oxygen sat drop to 83". Pt states "every time I walk around my oxygen drops but if I am sitting here I am fine". Pt states "i think I had fever because yesterday I got home and my clothes were soaking wet". Pt reports "not feeling good on Monday". Pt denies O2 use at home.   "

## 2018-01-10 NOTE — HPI
"Ms. Engle is a 82F h/o HFpEF, DM, Hypothyroidism, HLD, presenting with subacute worsening dyspnea on exertion, sent over from clinic today. Patient reports Sunday started having worsening dyspnea on exertion, has also endorsed worsening orthopnea and LE swelling over last few weeks. Patient has been non-compliant with low salt diet as daughter reports, not currently on any lasix. Pt denies chest pain, reports occasional chest heaviness, occasional "skipped beats" but no palpitations. Endorsing some upper respiratory congestion for last few days, subjective fever, cough.   "

## 2018-01-11 PROBLEM — R53.1 WEAKNESS: Status: ACTIVE | Noted: 2018-01-11

## 2018-01-11 LAB
ANION GAP SERPL CALC-SCNC: 10 MMOL/L
BASOPHILS # BLD AUTO: 0.05 K/UL
BASOPHILS NFR BLD: 0.6 %
BUN SERPL-MCNC: 22 MG/DL
CALCIUM SERPL-MCNC: 9 MG/DL
CHLORIDE SERPL-SCNC: 98 MMOL/L
CO2 SERPL-SCNC: 33 MMOL/L
CREAT SERPL-MCNC: 1 MG/DL
DIFFERENTIAL METHOD: ABNORMAL
EOSINOPHIL # BLD AUTO: 0.2 K/UL
EOSINOPHIL NFR BLD: 2.6 %
ERYTHROCYTE [DISTWIDTH] IN BLOOD BY AUTOMATED COUNT: 13.3 %
EST. GFR  (AFRICAN AMERICAN): >60 ML/MIN/1.73 M^2
EST. GFR  (NON AFRICAN AMERICAN): 53.3 ML/MIN/1.73 M^2
GLUCOSE SERPL-MCNC: 145 MG/DL
HCT VFR BLD AUTO: 36.4 %
HGB BLD-MCNC: 11.7 G/DL
IMM GRANULOCYTES # BLD AUTO: 0.02 K/UL
IMM GRANULOCYTES NFR BLD AUTO: 0.2 %
LYMPHOCYTES # BLD AUTO: 2.4 K/UL
LYMPHOCYTES NFR BLD: 27.6 %
MCH RBC QN AUTO: 27.9 PG
MCHC RBC AUTO-ENTMCNC: 32.1 G/DL
MCV RBC AUTO: 87 FL
MONOCYTES # BLD AUTO: 0.8 K/UL
MONOCYTES NFR BLD: 9.7 %
NEUTROPHILS # BLD AUTO: 5.1 K/UL
NEUTROPHILS NFR BLD: 59.3 %
NRBC BLD-RTO: 0 /100 WBC
PLATELET # BLD AUTO: 310 K/UL
PMV BLD AUTO: 9.5 FL
POCT GLUCOSE: 106 MG/DL (ref 70–110)
POCT GLUCOSE: 163 MG/DL (ref 70–110)
POCT GLUCOSE: 169 MG/DL (ref 70–110)
POCT GLUCOSE: 173 MG/DL (ref 70–110)
POCT GLUCOSE: 95 MG/DL (ref 70–110)
POTASSIUM SERPL-SCNC: 3.6 MMOL/L
RBC # BLD AUTO: 4.19 M/UL
SODIUM SERPL-SCNC: 141 MMOL/L
WBC # BLD AUTO: 8.59 K/UL

## 2018-01-11 PROCEDURE — 36415 COLL VENOUS BLD VENIPUNCTURE: CPT

## 2018-01-11 PROCEDURE — 25000003 PHARM REV CODE 250: Performed by: INTERNAL MEDICINE

## 2018-01-11 PROCEDURE — A4216 STERILE WATER/SALINE, 10 ML: HCPCS | Performed by: INTERNAL MEDICINE

## 2018-01-11 PROCEDURE — 63600175 PHARM REV CODE 636 W HCPCS: Performed by: INTERNAL MEDICINE

## 2018-01-11 PROCEDURE — 25000003 PHARM REV CODE 250: Performed by: PHYSICIAN ASSISTANT

## 2018-01-11 PROCEDURE — 85025 COMPLETE CBC W/AUTO DIFF WBC: CPT

## 2018-01-11 PROCEDURE — 99232 SBSQ HOSP IP/OBS MODERATE 35: CPT | Mod: ,,, | Performed by: INTERNAL MEDICINE

## 2018-01-11 PROCEDURE — 63600175 PHARM REV CODE 636 W HCPCS: Performed by: PHYSICIAN ASSISTANT

## 2018-01-11 PROCEDURE — 11000001 HC ACUTE MED/SURG PRIVATE ROOM

## 2018-01-11 PROCEDURE — 80048 BASIC METABOLIC PNL TOTAL CA: CPT

## 2018-01-11 RX ORDER — ALUMINUM HYDROXIDE, MAGNESIUM HYDROXIDE, AND SIMETHICONE 2400; 240; 2400 MG/30ML; MG/30ML; MG/30ML
15 SUSPENSION ORAL EVERY 6 HOURS PRN
Status: DISCONTINUED | OUTPATIENT
Start: 2018-01-11 | End: 2018-01-15 | Stop reason: HOSPADM

## 2018-01-11 RX ADMIN — FLUTICASONE PROPIONATE 100 MCG: 50 SPRAY, METERED NASAL at 08:01

## 2018-01-11 RX ADMIN — Medication 3 ML: at 08:01

## 2018-01-11 RX ADMIN — TRIAMCINOLONE ACETONIDE: 1 CREAM TOPICAL at 08:01

## 2018-01-11 RX ADMIN — FUROSEMIDE 40 MG: 10 INJECTION, SOLUTION INTRAMUSCULAR; INTRAVENOUS at 08:01

## 2018-01-11 RX ADMIN — ASPIRIN 81 MG: 81 TABLET, COATED ORAL at 08:01

## 2018-01-11 RX ADMIN — ESCITALOPRAM OXALATE 10 MG: 10 TABLET ORAL at 08:01

## 2018-01-11 RX ADMIN — ATENOLOL 25 MG: 25 TABLET ORAL at 08:01

## 2018-01-11 RX ADMIN — LEVOTHYROXINE SODIUM 75 MCG: 75 TABLET ORAL at 05:01

## 2018-01-11 RX ADMIN — Medication 3 ML: at 01:01

## 2018-01-11 RX ADMIN — INSULIN ASPART 15 UNITS: 100 INJECTION, SOLUTION INTRAVENOUS; SUBCUTANEOUS at 01:01

## 2018-01-11 RX ADMIN — INSULIN ASPART 15 UNITS: 100 INJECTION, SOLUTION INTRAVENOUS; SUBCUTANEOUS at 09:01

## 2018-01-11 RX ADMIN — ALPRAZOLAM 0.5 MG: 0.5 TABLET ORAL at 11:01

## 2018-01-11 RX ADMIN — INSULIN ASPART 2 UNITS: 100 INJECTION, SOLUTION INTRAVENOUS; SUBCUTANEOUS at 01:01

## 2018-01-11 RX ADMIN — FUROSEMIDE 40 MG: 10 INJECTION, SOLUTION INTRAMUSCULAR; INTRAVENOUS at 05:01

## 2018-01-11 RX ADMIN — LISINOPRIL 20 MG: 20 TABLET ORAL at 08:01

## 2018-01-11 RX ADMIN — PANTOPRAZOLE SODIUM 40 MG: 40 TABLET, DELAYED RELEASE ORAL at 08:01

## 2018-01-11 RX ADMIN — ENOXAPARIN SODIUM 40 MG: 100 INJECTION SUBCUTANEOUS at 05:01

## 2018-01-11 RX ADMIN — INSULIN DETEMIR 12 UNITS: 100 INJECTION, SOLUTION SUBCUTANEOUS at 08:01

## 2018-01-11 RX ADMIN — INSULIN ASPART 15 UNITS: 100 INJECTION, SOLUTION INTRAVENOUS; SUBCUTANEOUS at 06:01

## 2018-01-11 RX ADMIN — Medication 3 ML: at 05:01

## 2018-01-11 NOTE — HOSPITAL COURSE
1/11: Overnight mild heartburn relieved by home antacid, this morning reports shortness of breath improved but remains dyspneic with minimal exertion, also reporting weakness / fatigue.  1/12: Reports shortness of breath with exertion unchanged vs slightly worse, reporting some chest tightness at times, otherwise no F/C/N/V  1/13: Unchanged SOB, patient reports continued fatigue, still with O2 requirements, reports edema is improving at this time, no F/C, reporting some congestion  1/14: Weaned off oxygen, tolerated walking without desaturation, patient with concern re transportation this evening, will plan for AM discharge 1/15  1/15: Patient remains off oxygen, is ambulatory, no events overnight

## 2018-01-11 NOTE — SUBJECTIVE & OBJECTIVE
Interval History: See above    Review of Systems  Objective:     Vital Signs (Most Recent):  Temp: 98.6 °F (37 °C) (01/11/18 0723)  Pulse: (!) 56 (01/11/18 0724)  Resp: 16 (01/11/18 0723)  BP: (!) 140/63 (01/11/18 0723)  SpO2: 96 % (01/11/18 0723) Vital Signs (24h Range):  Temp:  [96.8 °F (36 °C)-98.6 °F (37 °C)] 98.6 °F (37 °C)  Pulse:  [50-74] 56  Resp:  [16-20] 16  SpO2:  [94 %-96 %] 96 %  BP: (134-178)/(61-82) 140/63     Weight: 93.4 kg (205 lb 12.8 oz)  Body mass index is 43.01 kg/m².    Intake/Output Summary (Last 24 hours) at 01/11/18 1103  Last data filed at 01/11/18 0503   Gross per 24 hour   Intake              509 ml   Output             1680 ml   Net            -1171 ml      Physical Exam   Constitutional: She is oriented to person, place, and time. She appears well-developed and well-nourished. No distress.   HENT:   Head: Normocephalic and atraumatic.   Nose: Nose normal.   Mouth/Throat: No oropharyngeal exudate.   Eyes: Conjunctivae and EOM are normal. Pupils are equal, round, and reactive to light. Right eye exhibits no discharge. Left eye exhibits no discharge. No scleral icterus.   Neck: Normal range of motion. Neck supple. No JVD present.   Cardiovascular: Normal rate, regular rhythm, normal heart sounds and intact distal pulses.  Exam reveals no friction rub.    No murmur heard.  Pulmonary/Chest: Effort normal and breath sounds normal. No respiratory distress. She has no wheezes. She has no rales. She exhibits no tenderness.   Abdominal: Soft. Bowel sounds are normal. She exhibits no distension and no mass. There is no tenderness. There is no rebound and no guarding.   Musculoskeletal: Normal range of motion. She exhibits edema. She exhibits no tenderness or deformity.   Lymphadenopathy:     She has no cervical adenopathy.   Neurological: She is alert and oriented to person, place, and time. She has normal reflexes. No cranial nerve deficit.   Skin: Skin is warm and dry. No rash noted. She is not  diaphoretic. No erythema.   Psychiatric: She has a normal mood and affect. Her behavior is normal.       Significant Labs:   BMP:   Recent Labs  Lab 01/11/18  0357   *      K 3.6   CL 98   CO2 33*   BUN 22   CREATININE 1.0   CALCIUM 9.0     CBC:   Recent Labs  Lab 01/09/18 2129 01/10/18  0621 01/11/18  0358   WBC 9.55 9.70 8.59   HGB 11.5* 11.6* 11.7*   HCT 35.4* 35.6* 36.4*    306 310       Significant Imaging: I have reviewed all pertinent imaging results/findings within the past 24 hours.

## 2018-01-11 NOTE — PROGRESS NOTES
"[facility] Hospital Medicine  Progress Note    Patient Name: Courtney Engle  MRN: [id number]  Patient Class: OP- Observation   Admission Date: 1/9/2018  Length of Stay: 0 days  Attending Physician: Reagan Marin MD  Primary Care Provider: Vishal Ulloa MD    [facility] Medicine Team: [facility] [place] Reagan Marin MD    Subjective:     Principal Problem:Acute on chronic diastolic congestive heart failure    HPI:  Ms. Engle is a 82F h/o HFpEF, DM, Hypothyroidism, HLD, presenting with subacute worsening dyspnea on exertion, sent over from clinic today. Patient reports Sunday started having worsening dyspnea on exertion, has also endorsed worsening orthopnea and LE swelling over last few weeks. Patient has been non-compliant with low salt diet as daughter reports, not currently on any lasix. Pt denies chest pain, reports occasional chest heaviness, occasional "skipped beats" but no palpitations. Endorsing some upper respiratory congestion for last few days, subjective fever, cough.     Hospital Course:  1/11: Overnight mild heartburn relieved by home antacid, this morning reports shortness of breath improved but remains dyspneic with minimal exertion, also reporting weakness / fatigue.    Interval History: See above    Review of Systems  Objective:     Vital Signs (Most Recent):  Temp: 98.6 °F (37 °C) (01/11/18 0723)  Pulse: (!) 56 (01/11/18 0724)  Resp: 16 (01/11/18 0723)  BP: (!) 140/63 (01/11/18 0723)  SpO2: 96 % (01/11/18 0723) Vital Signs (24h Range):  Temp:  [96.8 °F (36 °C)-98.6 °F (37 °C)] 98.6 °F (37 °C)  Pulse:  [50-74] 56  Resp:  [16-20] 16  SpO2:  [94 %-96 %] 96 %  BP: (134-178)/(61-82) 140/63     Weight: 93.4 kg (205 lb 12.8 oz)  Body mass index is 43.01 kg/m².    Intake/Output Summary (Last 24 hours) at 01/11/18 1103  Last data filed at 01/11/18 0503   Gross per 24 hour   Intake              509 ml   Output             1680 ml   Net            -1171 ml      Physical Exam "   Constitutional: She is oriented to person, place, and time. She appears well-developed and well-nourished. No distress.   HENT:   Head: Normocephalic and atraumatic.   Nose: Nose normal.   Mouth/Throat: No oropharyngeal exudate.   Eyes: Conjunctivae and EOM are normal. Pupils are equal, round, and reactive to light. Right eye exhibits no discharge. Left eye exhibits no discharge. No scleral icterus.   Neck: Normal range of motion. Neck supple. No JVD present.   Cardiovascular: Normal rate, regular rhythm, normal heart sounds and intact distal pulses.  Exam reveals no friction rub.    No murmur heard.  Pulmonary/Chest: Effort normal and breath sounds normal. No respiratory distress. She has no wheezes. She has no rales. She exhibits no tenderness.   Abdominal: Soft. Bowel sounds are normal. She exhibits no distension and no mass. There is no tenderness. There is no rebound and no guarding.   Musculoskeletal: Normal range of motion. She exhibits edema. She exhibits no tenderness or deformity.   Lymphadenopathy:     She has no cervical adenopathy.   Neurological: She is alert and oriented to person, place, and time. She has normal reflexes. No cranial nerve deficit.   Skin: Skin is warm and dry. No rash noted. She is not diaphoretic. No erythema.   Psychiatric: She has a normal mood and affect. Her behavior is normal.       Significant Labs:   BMP:   Recent Labs  Lab 01/11/18  0357   *      K 3.6   CL 98   CO2 33*   BUN 22   CREATININE 1.0   CALCIUM 9.0     CBC:   Recent Labs  Lab 01/09/18  2129 01/10/18  0621 01/11/18  0358   WBC 9.55 9.70 8.59   HGB 11.5* 11.6* 11.7*   HCT 35.4* 35.6* 36.4*    306 310       Significant Imaging: I have reviewed all pertinent imaging results/findings within the past 24 hours.    Assessment/Plan:      * Acute on chronic diastolic congestive heart failure    Patient presenting with dyspnea on exertion, orthopnea, LE swelling, elevated BNP - most likely acute on  chronic diastolic CHF 2/2 dietary non-compliance  1/10: Improvement in symptoms however remains SOB with minimal exertion, not yet at baseline, will reassess in PM  -Lasix IV 40mg BID  -Strict I/O  -Continue atenolol 25mg daily, ASA, lisinopril  -TTE  -Low salt / cardiac diet  -1.5L fluid restriction          Weakness    Patient reporting mild weakness, likely related to deconditioning, will consult PT for assessment        GERD (gastroesophageal reflux disease)    Stable substitute protonix for omeprazole  -Adding mylantia for breakthrough symptoms        Depression    Stable continue escitalopram           Anxiety    Stable, continue xanex 0.5mg QHS PRN          Acquired hypothyroidism    Stable, check TSH, continue synthroid 75mcg daily          Essential hypertension    Stable, continue lisinopril, atenolol          Arthritis    Stable, continue tylenol PRN        Type 2 diabetes mellitus with hyperglycemia, with long-term current use of insulin    Patient type 2 DM  -Glargine 25u QHS  -Aspart 15u TID AC  -SSI  -Accuchecks ACHS          VTE Risk Mitigation         Ordered     enoxaparin injection 40 mg  Daily     Route:  Subcutaneous        01/09/18 2347     Medium Risk of VTE  Once      01/09/18 2347     Place sequential compression device  Until discontinued      01/09/18 2347     Place LUCY hose  Until discontinued      01/09/18 2347     Medium Risk of VTE  Once      01/09/18 2347              Reagan Marin MD  Department of Hospital Medicine   [facility]

## 2018-01-11 NOTE — PLAN OF CARE
Problem: Patient Care Overview  Goal: Plan of Care Review  Outcome: Ongoing (interventions implemented as appropriate)  Greeted patient and gained consent for nursing care. Commode on bedside. Bed on lowest position, locked. Regularly checked blood sugar. Assisted in patient's needs. Will continue to monitor.

## 2018-01-11 NOTE — ASSESSMENT & PLAN NOTE
Patient presenting with dyspnea on exertion, orthopnea, LE swelling, elevated BNP - most likely acute on chronic diastolic CHF 2/2 dietary non-compliance  1/10: Improvement in symptoms however remains SOB with minimal exertion, not yet at baseline, will reassess in PM  -Lasix IV 40mg BID  -Strict I/O  -Continue atenolol 25mg daily, ASA, lisinopril  -TTE  -Low salt / cardiac diet  -1.5L fluid restriction

## 2018-01-11 NOTE — NURSING
Patient on metoprolol. HR 57, /63 mm Hg. Verified with IMS Doctor if metroprolol can be given, doctor gave phone order to give metoprolol.

## 2018-01-11 NOTE — NURSING
Pt c/o of heartburn, nausea. MD notified GI cocktail ordered, pt refused. Pt administered own OTC med Gaviscon, which I firmly told her not to do. Alert, stable. Will continue to monitor and reassess.

## 2018-01-11 NOTE — NURSING
Pt BP increased 178/73 (automatic), 170/82 (manual). IM S physician Vishal notified of change. No distress noted. No new orders, pt has scheduled morning BP medication . Will continue to monitor and reassess.

## 2018-01-11 NOTE — PLAN OF CARE
Problem: Patient Care Overview  Goal: Plan of Care Review  Outcome: Ongoing (interventions implemented as appropriate)  Gained consent for nursing care. Checked on patient regularly. Bed on lowest position, locked. Commode on bed side.

## 2018-01-12 LAB
ANION GAP SERPL CALC-SCNC: 10 MMOL/L
BASOPHILS # BLD AUTO: 0.04 K/UL
BASOPHILS NFR BLD: 0.5 %
BUN SERPL-MCNC: 31 MG/DL
CALCIUM SERPL-MCNC: 8.5 MG/DL
CHLORIDE SERPL-SCNC: 98 MMOL/L
CO2 SERPL-SCNC: 32 MMOL/L
CREAT SERPL-MCNC: 1 MG/DL
DIFFERENTIAL METHOD: ABNORMAL
EOSINOPHIL # BLD AUTO: 0.3 K/UL
EOSINOPHIL NFR BLD: 3.3 %
ERYTHROCYTE [DISTWIDTH] IN BLOOD BY AUTOMATED COUNT: 13.5 %
EST. GFR  (AFRICAN AMERICAN): >60 ML/MIN/1.73 M^2
EST. GFR  (NON AFRICAN AMERICAN): 53.3 ML/MIN/1.73 M^2
GLUCOSE SERPL-MCNC: 123 MG/DL
HCT VFR BLD AUTO: 35.9 %
HGB BLD-MCNC: 11.6 G/DL
IMM GRANULOCYTES # BLD AUTO: 0.01 K/UL
IMM GRANULOCYTES NFR BLD AUTO: 0.1 %
LYMPHOCYTES # BLD AUTO: 2.4 K/UL
LYMPHOCYTES NFR BLD: 28.2 %
MCH RBC QN AUTO: 28.6 PG
MCHC RBC AUTO-ENTMCNC: 32.3 G/DL
MCV RBC AUTO: 88 FL
MONOCYTES # BLD AUTO: 0.8 K/UL
MONOCYTES NFR BLD: 9.2 %
NEUTROPHILS # BLD AUTO: 5 K/UL
NEUTROPHILS NFR BLD: 58.7 %
NRBC BLD-RTO: 0 /100 WBC
PLATELET # BLD AUTO: 286 K/UL
PMV BLD AUTO: 9.5 FL
POCT GLUCOSE: 166 MG/DL (ref 70–110)
POCT GLUCOSE: 188 MG/DL (ref 70–110)
POCT GLUCOSE: 274 MG/DL (ref 70–110)
POTASSIUM SERPL-SCNC: 3.5 MMOL/L
RBC # BLD AUTO: 4.06 M/UL
SODIUM SERPL-SCNC: 140 MMOL/L
WBC # BLD AUTO: 8.56 K/UL

## 2018-01-12 PROCEDURE — 11000001 HC ACUTE MED/SURG PRIVATE ROOM

## 2018-01-12 PROCEDURE — 85025 COMPLETE CBC W/AUTO DIFF WBC: CPT

## 2018-01-12 PROCEDURE — 80048 BASIC METABOLIC PNL TOTAL CA: CPT

## 2018-01-12 PROCEDURE — 93010 ELECTROCARDIOGRAM REPORT: CPT | Mod: ,,, | Performed by: INTERNAL MEDICINE

## 2018-01-12 PROCEDURE — 25000242 PHARM REV CODE 250 ALT 637 W/ HCPCS: Performed by: INTERNAL MEDICINE

## 2018-01-12 PROCEDURE — 94761 N-INVAS EAR/PLS OXIMETRY MLT: CPT

## 2018-01-12 PROCEDURE — 97530 THERAPEUTIC ACTIVITIES: CPT

## 2018-01-12 PROCEDURE — 25000003 PHARM REV CODE 250: Performed by: INTERNAL MEDICINE

## 2018-01-12 PROCEDURE — 97161 PT EVAL LOW COMPLEX 20 MIN: CPT

## 2018-01-12 PROCEDURE — 93005 ELECTROCARDIOGRAM TRACING: CPT

## 2018-01-12 PROCEDURE — 27000221 HC OXYGEN, UP TO 24 HOURS

## 2018-01-12 PROCEDURE — 36415 COLL VENOUS BLD VENIPUNCTURE: CPT

## 2018-01-12 PROCEDURE — A4216 STERILE WATER/SALINE, 10 ML: HCPCS | Performed by: INTERNAL MEDICINE

## 2018-01-12 PROCEDURE — 99232 SBSQ HOSP IP/OBS MODERATE 35: CPT | Mod: ,,, | Performed by: INTERNAL MEDICINE

## 2018-01-12 PROCEDURE — 25000003 PHARM REV CODE 250: Performed by: PHYSICIAN ASSISTANT

## 2018-01-12 PROCEDURE — 94640 AIRWAY INHALATION TREATMENT: CPT

## 2018-01-12 PROCEDURE — 63600175 PHARM REV CODE 636 W HCPCS: Performed by: INTERNAL MEDICINE

## 2018-01-12 RX ORDER — FUROSEMIDE 10 MG/ML
60 INJECTION INTRAMUSCULAR; INTRAVENOUS 2 TIMES DAILY
Status: DISCONTINUED | OUTPATIENT
Start: 2018-01-12 | End: 2018-01-14

## 2018-01-12 RX ORDER — IPRATROPIUM BROMIDE AND ALBUTEROL SULFATE 2.5; .5 MG/3ML; MG/3ML
3 SOLUTION RESPIRATORY (INHALATION)
Status: DISCONTINUED | OUTPATIENT
Start: 2018-01-12 | End: 2018-01-13

## 2018-01-12 RX ADMIN — INSULIN DETEMIR 25 UNITS: 100 INJECTION, SOLUTION SUBCUTANEOUS at 08:01

## 2018-01-12 RX ADMIN — ALPRAZOLAM 0.5 MG: 0.5 TABLET ORAL at 08:01

## 2018-01-12 RX ADMIN — INSULIN ASPART 15 UNITS: 100 INJECTION, SOLUTION INTRAVENOUS; SUBCUTANEOUS at 08:01

## 2018-01-12 RX ADMIN — IPRATROPIUM BROMIDE AND ALBUTEROL SULFATE 3 ML: .5; 3 SOLUTION RESPIRATORY (INHALATION) at 08:01

## 2018-01-12 RX ADMIN — FUROSEMIDE 40 MG: 10 INJECTION, SOLUTION INTRAMUSCULAR; INTRAVENOUS at 08:01

## 2018-01-12 RX ADMIN — ASPIRIN 81 MG: 81 TABLET, COATED ORAL at 08:01

## 2018-01-12 RX ADMIN — IPRATROPIUM BROMIDE AND ALBUTEROL SULFATE 3 ML: .5; 3 SOLUTION RESPIRATORY (INHALATION) at 02:01

## 2018-01-12 RX ADMIN — INSULIN ASPART 6 UNITS: 100 INJECTION, SOLUTION INTRAVENOUS; SUBCUTANEOUS at 01:01

## 2018-01-12 RX ADMIN — ESCITALOPRAM OXALATE 10 MG: 10 TABLET ORAL at 08:01

## 2018-01-12 RX ADMIN — ENOXAPARIN SODIUM 40 MG: 100 INJECTION SUBCUTANEOUS at 05:01

## 2018-01-12 RX ADMIN — INSULIN ASPART 15 UNITS: 100 INJECTION, SOLUTION INTRAVENOUS; SUBCUTANEOUS at 05:01

## 2018-01-12 RX ADMIN — FLUTICASONE PROPIONATE 100 MCG: 50 SPRAY, METERED NASAL at 08:01

## 2018-01-12 RX ADMIN — Medication 3 ML: at 01:01

## 2018-01-12 RX ADMIN — PANTOPRAZOLE SODIUM 40 MG: 40 TABLET, DELAYED RELEASE ORAL at 08:01

## 2018-01-12 RX ADMIN — FUROSEMIDE 60 MG: 10 INJECTION, SOLUTION INTRAVENOUS at 05:01

## 2018-01-12 RX ADMIN — INSULIN ASPART 2 UNITS: 100 INJECTION, SOLUTION INTRAVENOUS; SUBCUTANEOUS at 08:01

## 2018-01-12 RX ADMIN — INSULIN ASPART 2 UNITS: 100 INJECTION, SOLUTION INTRAVENOUS; SUBCUTANEOUS at 05:01

## 2018-01-12 RX ADMIN — LISINOPRIL 20 MG: 20 TABLET ORAL at 08:01

## 2018-01-12 RX ADMIN — LEVOTHYROXINE SODIUM 75 MCG: 75 TABLET ORAL at 05:01

## 2018-01-12 RX ADMIN — INSULIN ASPART 15 UNITS: 100 INJECTION, SOLUTION INTRAVENOUS; SUBCUTANEOUS at 01:01

## 2018-01-12 RX ADMIN — Medication 3 ML: at 05:01

## 2018-01-12 NOTE — PROGRESS NOTES
"[facility] Hospital Medicine  Progress Note    Patient Name: Courtney Engle  MRN: [id number]  Patient Class: IP- Inpatient   Admission Date: 1/9/2018  Length of Stay: 1 days  Attending Physician: Reagan Marin MD  Primary Care Provider: Vishal Ulloa MD    [facility] Medicine Team: [facility] [place] [facility] Reagan Marin MD    Subjective:     Principal Problem:Acute on chronic diastolic congestive heart failure    HPI:  Ms. Engle is a 82F h/o HFpEF, DM, Hypothyroidism, HLD, presenting with subacute worsening dyspnea on exertion, sent over from clinic today. Patient reports Sunday started having worsening dyspnea on exertion, has also endorsed worsening orthopnea and LE swelling over last few weeks. Patient has been non-compliant with low salt diet as daughter reports, not currently on any lasix. Pt denies chest pain, reports occasional chest heaviness, occasional "skipped beats" but no palpitations. Endorsing some upper respiratory congestion for last few days, subjective fever, cough.     Hospital Course:  1/11: Overnight mild heartburn relieved by home antacid, this morning reports shortness of breath improved but remains dyspneic with minimal exertion, also reporting weakness / fatigue.  1/12: Reports shortness of breath with exertion unchanged vs slightly worse, reporting some chest tightness at times, otherwise no F/C/N/V    Interval History: See above    Review of Systems  Objective:     Vital Signs (Most Recent):  Temp: 98.3 °F (36.8 °C) (01/12/18 0717)  Pulse: 66 (01/12/18 1048)  Resp: 17 (01/12/18 0717)  BP: (!) 100/50 (01/12/18 0717)  SpO2: (!) 94 % (01/12/18 0717) Vital Signs (24h Range):  Temp:  [97.8 °F (36.6 °C)-98.5 °F (36.9 °C)] 98.3 °F (36.8 °C)  Pulse:  [46-70] 66  Resp:  [17-20] 17  SpO2:  [80 %-96 %] 94 %  BP: (100-133)/(50-62) 100/50     Weight: 93.9 kg (207 lb)  Body mass index is 43.26 kg/m².    Intake/Output Summary (Last 24 hours) at 01/12/18 1129  Last data filed at " 01/12/18 0429   Gross per 24 hour   Intake              620 ml   Output             1450 ml   Net             -830 ml      Physical Exam   Constitutional: She is oriented to person, place, and time. She appears well-developed and well-nourished. No distress.   HENT:   Head: Normocephalic and atraumatic.   Nose: Nose normal.   Mouth/Throat: No oropharyngeal exudate.   Eyes: Conjunctivae and EOM are normal. Pupils are equal, round, and reactive to light. Right eye exhibits no discharge. Left eye exhibits no discharge. No scleral icterus.   Neck: Normal range of motion. Neck supple. No JVD present.   Cardiovascular: Normal rate, regular rhythm, normal heart sounds and intact distal pulses.  Exam reveals no friction rub.    No murmur heard.  Pulmonary/Chest: Effort normal and breath sounds normal. No respiratory distress. She has no wheezes. She has no rales. She exhibits no tenderness.   Abdominal: Soft. Bowel sounds are normal. She exhibits no distension and no mass. There is no tenderness. There is no rebound and no guarding.   Musculoskeletal: Normal range of motion. She exhibits edema. She exhibits no tenderness or deformity.   Lymphadenopathy:     She has no cervical adenopathy.   Neurological: She is alert and oriented to person, place, and time. She has normal reflexes. No cranial nerve deficit.   Skin: Skin is warm and dry. No rash noted. She is not diaphoretic. No erythema.   Psychiatric: She has a normal mood and affect. Her behavior is normal.       Significant Labs:   BMP:   Recent Labs  Lab 01/12/18  0541   *      K 3.5   CL 98   CO2 32*   BUN 31*   CREATININE 1.0   CALCIUM 8.5*     CBC:   Recent Labs  Lab 01/11/18  0358 01/12/18  0541   WBC 8.59 8.56   HGB 11.7* 11.6*   HCT 36.4* 35.9*    286       Significant Imaging: I have reviewed all pertinent imaging results/findings within the past 24 hours.    Assessment/Plan:      * Acute on chronic diastolic congestive heart failure     Patient presenting with dyspnea on exertion, orthopnea, LE swelling, elevated BNP - most likely acute on chronic diastolic CHF 2/2 dietary non-compliance  1/10: Improvement in symptoms however remains SOB with minimal exertion  1/12: Patient somewhat worse this Am, on 2L oxygen, reports dyspnea with mild exertion, rechecking CXR, increase diuretics, wean O2 as tolerated  -Lasix IV 40mg BID  -Strict I/O  -Continue atenolol 25mg daily, ASA, lisinopril  -TTE  -Low salt / cardiac diet  -1.5L fluid restriction          Weakness    Patient reporting mild weakness, likely related to deconditioning, will consult PT for assessment        GERD (gastroesophageal reflux disease)    Stable substitute protonix for omeprazole  -Adding mylantia for breakthrough symptoms        Depression    Stable continue escitalopram           Anxiety    Stable, continue xanex 0.5mg QHS PRN          Acquired hypothyroidism    Stable, check TSH, continue synthroid 75mcg daily          Essential hypertension    Stable, continue lisinopril, held atenolol 1/12 for low/nl BP          Arthritis    Stable, continue tylenol PRN  -PT assessment        Type 2 diabetes mellitus with hyperglycemia, with long-term current use of insulin    Patient type 2 DM  -Glargine 25u QHS  -Aspart 15u TID AC  -SSI  -Accuchecks ACHS          VTE Risk Mitigation         Ordered     enoxaparin injection 40 mg  Daily     Route:  Subcutaneous        01/09/18 2347     Medium Risk of VTE  Once      01/09/18 2347     Place sequential compression device  Until discontinued      01/09/18 2347     Place LUCY hose  Until discontinued      01/09/18 2347     Medium Risk of VTE  Once      01/09/18 2347              Reagan Marin MD  Department of Hospital Medicine   [facility]

## 2018-01-12 NOTE — SUBJECTIVE & OBJECTIVE
Interval History: See above    Review of Systems  Objective:     Vital Signs (Most Recent):  Temp: 98.3 °F (36.8 °C) (01/12/18 0717)  Pulse: 66 (01/12/18 1048)  Resp: 17 (01/12/18 0717)  BP: (!) 100/50 (01/12/18 0717)  SpO2: (!) 94 % (01/12/18 0717) Vital Signs (24h Range):  Temp:  [97.8 °F (36.6 °C)-98.5 °F (36.9 °C)] 98.3 °F (36.8 °C)  Pulse:  [46-70] 66  Resp:  [17-20] 17  SpO2:  [80 %-96 %] 94 %  BP: (100-133)/(50-62) 100/50     Weight: 93.9 kg (207 lb)  Body mass index is 43.26 kg/m².    Intake/Output Summary (Last 24 hours) at 01/12/18 1123  Last data filed at 01/12/18 0429   Gross per 24 hour   Intake              620 ml   Output             1450 ml   Net             -830 ml      Physical Exam   Constitutional: She is oriented to person, place, and time. She appears well-developed and well-nourished. No distress.   HENT:   Head: Normocephalic and atraumatic.   Nose: Nose normal.   Mouth/Throat: No oropharyngeal exudate.   Eyes: Conjunctivae and EOM are normal. Pupils are equal, round, and reactive to light. Right eye exhibits no discharge. Left eye exhibits no discharge. No scleral icterus.   Neck: Normal range of motion. Neck supple. No JVD present.   Cardiovascular: Normal rate, regular rhythm, normal heart sounds and intact distal pulses.  Exam reveals no friction rub.    No murmur heard.  Pulmonary/Chest: Effort normal and breath sounds normal. No respiratory distress. She has no wheezes. She has no rales. She exhibits no tenderness.   Abdominal: Soft. Bowel sounds are normal. She exhibits no distension and no mass. There is no tenderness. There is no rebound and no guarding.   Musculoskeletal: Normal range of motion. She exhibits edema. She exhibits no tenderness or deformity.   Lymphadenopathy:     She has no cervical adenopathy.   Neurological: She is alert and oriented to person, place, and time. She has normal reflexes. No cranial nerve deficit.   Skin: Skin is warm and dry. No rash noted. She is not  diaphoretic. No erythema.   Psychiatric: She has a normal mood and affect. Her behavior is normal.       Significant Labs:   BMP:   Recent Labs  Lab 01/12/18  0541   *      K 3.5   CL 98   CO2 32*   BUN 31*   CREATININE 1.0   CALCIUM 8.5*     CBC:   Recent Labs  Lab 01/11/18  0358 01/12/18  0541   WBC 8.59 8.56   HGB 11.7* 11.6*   HCT 36.4* 35.9*    286       Significant Imaging: I have reviewed all pertinent imaging results/findings within the past 24 hours.

## 2018-01-12 NOTE — PLAN OF CARE
Problem: Physical Therapy Goal  Goal: Physical Therapy Goal  Goals to be met by: 18     Patient will increase functional independence with mobility by performin. Supine to sit with Modified [place]  2. Sit to supine with Modified [place]  3. Sit to stand transfer with Supervision  4. Gait  x 70 feet with Supervision using Rolling Walker.   5. Ascend/descend 4 stair with right/left Handrails Contact Guard Assistance using most appropriate and safe technique.           PT evaluation completed. POC and goals established.    Marilynn Mello, PT, DPT  2018

## 2018-01-12 NOTE — PLAN OF CARE
Problem: Patient Care Overview  Goal: Plan of Care Review  Outcome: Ongoing (interventions implemented as appropriate)  Greeted patient and gained consent for nursing care. On O2 2 LPM/NC. No shortness of breath. Patient denies pain. Sleepy in the morning. Complains of using the commode often. Explained the reason for her using the commode more often, she is on diuretic. Assisted on her needs. Safety of patient maintained. Regularly checked on her. Will continue to mnitor.

## 2018-01-12 NOTE — PLAN OF CARE
Problem: Patient Care Overview  Goal: Plan of Care Review  Outcome: Ongoing (interventions implemented as appropriate)  POC reviewed,answered questions,no fall or injury when oob to bsc,urine measured and recorded,received half of levemir dosage at hs blood sugar 95,no cp or sob reported.

## 2018-01-12 NOTE — PROGRESS NOTES
[facility] Heart Failure Program consult complete. Pt alone in room sitting up in bed. Pleasant, cooperative, had many questions specifically regarding diet.  Explained program details including home monitoring expectations, scale + router setup, weekly PharmD calls, and follow up appointment with labs. Addressed pt's questions and concerns to his/her satisfaction. Reviewed blue educational folder (Welcome letter, PharmD contact number, Heart Failure Zones, and program booklet). Pt declined enrollment in 30-day monitoring program.     Pt declined program enrollment due to pt does not feel that she needs / Is able to do program at this time. This is the 2nd time in 1 year she has declined enrollment in DMHF program.  Reviewed HF education (low sodium diet, HF zones, 7 day sample menu, etc.) at length and patient still had many dietary concerns. Advised her that I would place dietary consult for further educational information.  Pt does state that she is concerned about not having follow up in place with Cardiology or Nephrology for after she leaves.  She will address this with her MD or CM next time they see her.     Removed from Heart Failure list.

## 2018-01-12 NOTE — PT/OT/SLP EVAL
Physical Therapy Evaluation    Patient Name:  Courtney Engle   MRN:  [id number]    Recommendations:     Discharge Recommendations:  home health PT   Discharge Equipment Recommendations: none   Barriers to discharge: Decreased caregiver support lives alone; 4 ROSALIO    Assessment:     Courtney Engle is a 82 y.o. female admitted with a medical diagnosis of Acute on chronic diastolic congestive heart failure.  She presents with the following impairments/functional limitations:  weakness, impaired endurance, pain, decreased lower extremity function, gait instability, impaired balance, impaired cardiopulmonary response to activity.  Pt tolerated session well reporting less difficulty with breathing compared to initially upon admission. Pt demonstrated good mobility and balance with gait this visit.  O2 sats decreased with ambulation, but improved with rest and pursed lip breathing. Pt's overall gait distance limited due to L knee pain/discomfort which was present prior to admit.   Pt would benefit from continued skilled physical therapy for the listed impairments to improve functional independence and overall safety with mobility prior to d/c. PT recommends d/c disposition to home with HHPT for continued strengthening and endurance upon d/c.       Education:  Education provided to pt regarding: PT role/POC; pursed lip breathing; safe stairs technique. Verbalized understanding.     Whiteboard updated with correct mobility information. RN/PCT notified.  Appropriate to walk with 1 person assist using Rolling Walker.       Please encourage pt to sit in bedside chair throughout the day to promote OOB mobility and decrease risk of deconditioning while in acute care.       Rehab Prognosis:  GOOD; patient would benefit from acute skilled PT services to address these deficits and reach maximum level of function.      Recent Surgery: * No surgery found *      Plan:     During this hospitalization, patient to be seen 3 x/week to  "address the above listed problems via gait training, therapeutic activities, therapeutic exercises  · Plan of Care Expires:  02/11/18   Plan of Care Reviewed with: patient    Subjective     Communicated with RN prior to session.  Patient found supine, upon PT entry to room, agreeable to evaluation.      "Oh good. I'm suppose to walk with you without the oxygen to see how I do."   Patient comments/goals: To go home and return to PLOF  Pain/Comfort:  No pain reported    Patients cultural, spiritual, Denominational conflicts given the current situation: none stated    Living Environment:  Lives with: alone  Lives in: 2SH; 4 ROSALIO with B HR. Bed/bath located on 1st floor.   PLOF/Level of assist: Mod (I) using SPC mostly, but has RW for community/long distance ambulation. (I) with all ADLs.   Bath: tub/shower with shower chair  DME: RW; SPC; shower chair    Hobbies/Interests: watching tv    Assist available upon d/c: no assist      Objective:     Patient found with: telemetry, oxygen     General Precautions: Standard, fall   Orthopedic Precautions:N/A   Braces: N/A     Exams:  Pt oriented x Person, Place, Time .     Communication: clear/fluent    Follows Commands: Follows multistep  commands    Posture: Rounded shoulder, Head forward and Posterior pelvic tilt  Skin Integrity: Visible skin intact  Edema:  None noted      Range of Motion and Strength Examination    Right Upper Extremity: WFL as demonstrated with functional mobility    Left Upper Extremity: WFL as demonstrated with functional mobility    Right Lower Extremity: WFL; demonstrated at least 3+/5 with functional mobility    Left Lower Extremity: WFL; demonstrated at least 3+/5 with functional mobility      Fine Motor Coordination:   intact      Gross Motor Coordination:  WFL      Functional Mobility:  Bed Mobility:  Supine to Sit:  Stand-by Assistance HOB elevated  Sit to supine: Activity did not occur remained sitting EOB  Scooting: Independent     Transfers:   Sit " to stand:Stand-by Assistance with No Assistive Device from EOB         Gait:   Patient ambulated ~45 feet with Rolling Walker with Stand-by Assistance.  Pt demonstrated step-through, antalgic gait with decrease stance time on L LE due to L knee pain/discomfort.  No LOB or instability noted. Distance limited by tolerance.     Pt ambulated on RA. O2 sats prior to mobility measured ~93%. 1/2 way through gait trial O2 measured 90%. Upon return to room, O2 sats decreased to 86%, then immediately increased to 88-89% with rest break. Pt educated on pursed lip breathing and O2 sats rapidly increased to 94-95% after a minute while on RA.  Pt denied any difficulty breathing; just SOB and fatigue.       Stairs:   Not assessed this visit due to L knee pain/discomfort and fatigue      Balance:  Static Sitting: Supervision or Set-up Assistance   Dynamic Sitting: Supervision or Set-up Assistance   Static Standing: Supervision or Set-up Assistance   Dynamic Standing: Supervision or Set-up Assistance       Therapeutic Activities/Exercises:  Educated pt on pursed lip breathing and benefits to improving endurance and recovery with exertion. Pt verbalized understanding.       AM-PAC 6 CLICK MOBILITY  Total Score:23       Patient left sitting on EOB with all lines intact and call button in reach.    GOALS:    Physical Therapy Goals        Problem: Physical Therapy Goal    Goal Priority Disciplines Outcome Goal Variances Interventions   Physical Therapy Goal     PT/OT, PT      Description:  Goals to be met by: 18     Patient will increase functional independence with mobility by performin. Supine to sit with Modified Nelson  2. Sit to supine with Modified Nelson  3. Sit to stand transfer with Supervision  4. Gait  x 70 feet with Supervision using Rolling Walker.   5. Ascend/descend 4 stair with right/left Handrails Contact Guard Assistance using most appropriate and safe technique.                       History:      Past Medical History:   Diagnosis Date    Acquired hypothyroidism 4/25/2014    Anxiety     Aortic calcification 12/8/2016    X-Ray Chest-5/08/2014    Arthritis     Bilateral carotid artery disease 12/8/2016    US Carotid Bilateral-1/24/2013    CKD stage 3 due to type 2 diabetes mellitus 2/16/2017    Depression     Essential hypertension     Fever blister     GERD (gastroesophageal reflux disease)     Glaucoma 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 robledo)     Hyperlipidemia LDL goal <70 2/16/2017    Keloid cicatrix     Mixed stress and urge urinary incontinence 2/16/2017    Morbid obesity with BMI of 40.0-44.9, adult 12/8/2016    Ocular migraine 1/24/2013    Type 2 diabetes mellitus with hyperglycemia, with long-term current use of insulin        Past Surgical History:   Procedure Laterality Date    CATARACT EXTRACTION W/  INTRAOCULAR LENS IMPLANT Right 05/30/2012        CATARACT EXTRACTION W/  INTRAOCULAR LENS IMPLANT Left 05/26/2010        CHOLECYSTECTOMY      COLONOSCOPY W/ POLYPECTOMY  04/20/2015    ESOPHAGOGASTRODUODENOSCOPY  04/20/2015    HYSTERECTOMY      Partial    JOINT REPLACEMENT      knee replacement right      TONSILLECTOMY, ADENOIDECTOMY      tonsillectomy only       Clinical Decision Making:     Personal factors/comorbidities: DM2; HLD; arthritis; CAD; CKD. The listed co-morbidities and personal factors impact pt's current level and progress with functional mobility and independence.   Body systems elements affected: lower extremities, musculoskeletal system, cardiovascular, pulmonary system  Impairments: see assessment below  Clinical Presentation: stable  Functional Outcome Tools: AMPAC, MMT, ROM  Evaluation Complexity Level: low    Time Tracking:     PT Received On: 01/12/18  PT Start Time: 1318     PT Stop Time: 1343  PT Total Time (min): 25 min     Billable Minutes: Evaluation 15 and Therapeutic Activity 8      Marilynn Mello, PT,  ALLIE  Pager: [phone]  1/12/2018

## 2018-01-12 NOTE — ASSESSMENT & PLAN NOTE
Patient presenting with dyspnea on exertion, orthopnea, LE swelling, elevated BNP - most likely acute on chronic diastolic CHF 2/2 dietary non-compliance  1/10: Improvement in symptoms however remains SOB with minimal exertion  1/12: Patient somewhat worse this Am, on 2L oxygen, reports dyspnea with mild exertion, rechecking CXR, increase diuretics, wean O2 as tolerated  -Lasix IV 40mg BID  -Strict I/O  -Continue atenolol 25mg daily, ASA, lisinopril  -TTE  -Low salt / cardiac diet  -1.5L fluid restriction

## 2018-01-12 NOTE — NURSING
HR 55, /50, doctor informed. Patient is due Atenolol, Lasix, Lisinopril. Doctor advised to administer Lasix and lisinopril but withold atenolol.

## 2018-01-13 LAB
ANION GAP SERPL CALC-SCNC: 11 MMOL/L
BASOPHILS # BLD AUTO: 0.05 K/UL
BASOPHILS NFR BLD: 0.5 %
BUN SERPL-MCNC: 36 MG/DL
CALCIUM SERPL-MCNC: 8.7 MG/DL
CHLORIDE SERPL-SCNC: 97 MMOL/L
CO2 SERPL-SCNC: 32 MMOL/L
CREAT SERPL-MCNC: 1.1 MG/DL
DIFFERENTIAL METHOD: ABNORMAL
EOSINOPHIL # BLD AUTO: 0.3 K/UL
EOSINOPHIL NFR BLD: 2.7 %
ERYTHROCYTE [DISTWIDTH] IN BLOOD BY AUTOMATED COUNT: 13.3 %
EST. GFR  (AFRICAN AMERICAN): 54.8 ML/MIN/1.73 M^2
EST. GFR  (NON AFRICAN AMERICAN): 47.5 ML/MIN/1.73 M^2
GLUCOSE SERPL-MCNC: 130 MG/DL
HCT VFR BLD AUTO: 36.1 %
HGB BLD-MCNC: 11.6 G/DL
IMM GRANULOCYTES # BLD AUTO: 0.04 K/UL
IMM GRANULOCYTES NFR BLD AUTO: 0.4 %
LYMPHOCYTES # BLD AUTO: 2.7 K/UL
LYMPHOCYTES NFR BLD: 29.2 %
MCH RBC QN AUTO: 28.6 PG
MCHC RBC AUTO-ENTMCNC: 32.1 G/DL
MCV RBC AUTO: 89 FL
MONOCYTES # BLD AUTO: 0.8 K/UL
MONOCYTES NFR BLD: 8.7 %
NEUTROPHILS # BLD AUTO: 5.4 K/UL
NEUTROPHILS NFR BLD: 58.5 %
NRBC BLD-RTO: 0 /100 WBC
PLATELET # BLD AUTO: 315 K/UL
PMV BLD AUTO: 9.7 FL
POCT GLUCOSE: 153 MG/DL (ref 70–110)
POCT GLUCOSE: 153 MG/DL (ref 70–110)
POCT GLUCOSE: 168 MG/DL (ref 70–110)
POCT GLUCOSE: 215 MG/DL (ref 70–110)
POTASSIUM SERPL-SCNC: 3.5 MMOL/L
RBC # BLD AUTO: 4.06 M/UL
SODIUM SERPL-SCNC: 140 MMOL/L
WBC # BLD AUTO: 9.21 K/UL

## 2018-01-13 PROCEDURE — 25000003 PHARM REV CODE 250: Performed by: INTERNAL MEDICINE

## 2018-01-13 PROCEDURE — 25000003 PHARM REV CODE 250: Performed by: PHYSICIAN ASSISTANT

## 2018-01-13 PROCEDURE — 80048 BASIC METABOLIC PNL TOTAL CA: CPT

## 2018-01-13 PROCEDURE — 36415 COLL VENOUS BLD VENIPUNCTURE: CPT

## 2018-01-13 PROCEDURE — 63600175 PHARM REV CODE 636 W HCPCS: Performed by: INTERNAL MEDICINE

## 2018-01-13 PROCEDURE — 97803 MED NUTRITION INDIV SUBSEQ: CPT

## 2018-01-13 PROCEDURE — 11000001 HC ACUTE MED/SURG PRIVATE ROOM

## 2018-01-13 PROCEDURE — 85025 COMPLETE CBC W/AUTO DIFF WBC: CPT

## 2018-01-13 PROCEDURE — 99232 SBSQ HOSP IP/OBS MODERATE 35: CPT | Mod: ,,, | Performed by: INTERNAL MEDICINE

## 2018-01-13 RX ORDER — POTASSIUM CHLORIDE 20 MEQ/1
40 TABLET, EXTENDED RELEASE ORAL ONCE
Status: COMPLETED | OUTPATIENT
Start: 2018-01-13 | End: 2018-01-13

## 2018-01-13 RX ORDER — IPRATROPIUM BROMIDE AND ALBUTEROL SULFATE 2.5; .5 MG/3ML; MG/3ML
3 SOLUTION RESPIRATORY (INHALATION) EVERY 6 HOURS PRN
Status: DISCONTINUED | OUTPATIENT
Start: 2018-01-13 | End: 2018-01-15 | Stop reason: HOSPADM

## 2018-01-13 RX ADMIN — INSULIN ASPART 2 UNITS: 100 INJECTION, SOLUTION INTRAVENOUS; SUBCUTANEOUS at 10:01

## 2018-01-13 RX ADMIN — ALPRAZOLAM 0.5 MG: 0.5 TABLET ORAL at 10:01

## 2018-01-13 RX ADMIN — ATENOLOL 25 MG: 25 TABLET ORAL at 09:01

## 2018-01-13 RX ADMIN — INSULIN ASPART 2 UNITS: 100 INJECTION, SOLUTION INTRAVENOUS; SUBCUTANEOUS at 09:01

## 2018-01-13 RX ADMIN — POTASSIUM CHLORIDE 40 MEQ: 1500 TABLET, EXTENDED RELEASE ORAL at 12:01

## 2018-01-13 RX ADMIN — FLUTICASONE PROPIONATE 100 MCG: 50 SPRAY, METERED NASAL at 09:01

## 2018-01-13 RX ADMIN — ESCITALOPRAM OXALATE 10 MG: 10 TABLET ORAL at 09:01

## 2018-01-13 RX ADMIN — FUROSEMIDE 60 MG: 10 INJECTION, SOLUTION INTRAVENOUS at 05:01

## 2018-01-13 RX ADMIN — INSULIN ASPART 15 UNITS: 100 INJECTION, SOLUTION INTRAVENOUS; SUBCUTANEOUS at 06:01

## 2018-01-13 RX ADMIN — INSULIN DETEMIR 25 UNITS: 100 INJECTION, SOLUTION SUBCUTANEOUS at 10:01

## 2018-01-13 RX ADMIN — ENOXAPARIN SODIUM 40 MG: 100 INJECTION SUBCUTANEOUS at 05:01

## 2018-01-13 RX ADMIN — LEVOTHYROXINE SODIUM 75 MCG: 75 TABLET ORAL at 05:01

## 2018-01-13 RX ADMIN — PANTOPRAZOLE SODIUM 40 MG: 40 TABLET, DELAYED RELEASE ORAL at 09:01

## 2018-01-13 RX ADMIN — LISINOPRIL 20 MG: 20 TABLET ORAL at 09:01

## 2018-01-13 RX ADMIN — INSULIN ASPART 15 UNITS: 100 INJECTION, SOLUTION INTRAVENOUS; SUBCUTANEOUS at 12:01

## 2018-01-13 RX ADMIN — INSULIN ASPART 15 UNITS: 100 INJECTION, SOLUTION INTRAVENOUS; SUBCUTANEOUS at 09:01

## 2018-01-13 RX ADMIN — FUROSEMIDE 60 MG: 10 INJECTION, SOLUTION INTRAVENOUS at 09:01

## 2018-01-13 RX ADMIN — ASPIRIN 81 MG: 81 TABLET, COATED ORAL at 09:01

## 2018-01-13 NOTE — PLAN OF CARE
[facility]    HOME HEALTH ORDERS  FACE TO FACE ENCOUNTER    Patient Name: Courtney Engle  YOB: 1935    PCP: Vishal Ulloa MD   PCP Address: [address] / [place] [address]  PCP Phone Number: [phone]  PCP Fax: [phone]    Encounter Date: 01/12/2018    Admit to Home Health    Diagnoses:  Active Hospital Problems    Diagnosis  POA    *Acute on chronic diastolic congestive heart failure [I50.33]  Yes     Priority: 1 - High    Weakness [R53.1]  Yes    GERD (gastroesophageal reflux disease) [K21.9]  Yes    Anxiety [F41.9]  Yes    Depression [F32.9]  Yes    Acquired hypothyroidism [E03.9]  Yes     Chronic    Type 2 diabetes mellitus with hyperglycemia, with long-term current use of insulin [E11.65, Z79.4]  Not Applicable     Chronic    Essential hypertension [I10]  Yes     Chronic    Arthritis [M19.90]  Yes      Resolved Hospital Problems    Diagnosis Date Resolved POA   No resolved problems to display.       Future Appointments  Date Time Provider Department Center   1/22/2018 2:30 PM Vishal Ulloa MD [facility] PC[facility]           I have seen and examined this patient face to face today. My clinical findings that support the need for the home health skilled services and home bound status are the following:  Weakness/numbness causing balance and gait disturbance due to Weakness/Debility making it taxing to leave home. As well as L knee pain / discomfort.    Allergies:  Review of patient's allergies indicates:   Allergen Reactions    Benadryl [diphenhydramine hcl] Shortness Of Breath    Contac 12 hour allergy Anaphylaxis and Hives    Ciprofloxacin (bulk) Nausea And Vomiting    Anti-hyst Other (See Comments)    Antihistamines - alkylamine Hives    Codeine      bad reaction    Glucotrol [glipizide]     Hydroxyzine      Unknown    Iodinated contrast- oral and iv dye Other (See Comments)    Nitrofuran analogues     Propoxyphene Itching     "Doxycycline Rash    Penicillins Rash    Sulfa (sulfonamide antibiotics) Rash       Diet: cardiac diet and diabetic diet: 2000 calorie    Activities: activity as tolerated    Nursing:   SN to complete comprehensive assessment including routine vital signs. Instruct on disease process and s/s of complications to report to MD. Review/verify medication list sent home with the patient at time of discharge  and instruct patient/caregiver as needed. Frequency may be adjusted depending on start of care date.    Notify MD if SBP > 160 or < 90; DBP > 90 or < 50; HR > 120 or < 50; Temp > 101; Other:         CONSULTS:    Physical Therapy to evaluate and treat. Evaluate for home safety and equipment needs; Establish/upgrade home exercise program. Perform / instruct on therapeutic exercises, gait training, transfer training, and Range of Motion.    MISCELLANEOUS CARE:  N/A    WOUND CARE ORDERS  n/a      Medications: Review discharge medications with patient and family and provide education.      Current Discharge Medication List      CONTINUE these medications which have NOT CHANGED    Details   acetaminophen (TYLENOL) 500 MG tablet Take 1 tablet (500 mg total) by mouth every 6 (six) hours as needed for Pain.  Refills: 0    Associated Diagnoses: Chronic pain of left knee      alendronate (FOSAMAX) 70 MG tablet Take 1 tablet (70 mg total) by mouth every 7 days.  Qty: 4 tablet, Refills: 11      ALPRAZolam (XANAX) 0.5 MG tablet TAKE 1 TABLET BY MOUTH EVERY NIGHT  Qty: 30 tablet, Refills: 0      aspirin (ECOTRIN) 81 MG EC tablet Take 1 tablet (81 mg total) by mouth once daily.  Qty: 30 tablet, Refills: 12    Associated Diagnoses: Type 2 diabetes mellitus with hyperglycemia, with long-term current use of insulin      atenolol (TENORMIN) 25 MG tablet TAKE 1 TABLET BY MOUTH EVERY DAY  Qty: 90 tablet, Refills: 0      !! BD INSULIN SYRINGE ULT-FINE II 1/2 mL 31 x 5/16" Syrg USE THREE TIMES DAILY AS DIRECTED  Qty: 100 each, Refills: 0    " "  benzonatate (TESSALON) 100 MG capsule Take 100 mg by mouth as needed for Cough.      blood-glucose meter kit Use as instructed  Qty: 1 each, Refills: 0    Associated Diagnoses: Type 2 diabetes mellitus with hyperglycemia, with long-term current use of insulin      butalbital-acetaminophen  mg Tab Take by mouth as needed (pain). caffeine      escitalopram oxalate (LEXAPRO) 10 MG tablet TAKE 1 TABLET BY MOUTH EVERY DAY  Qty: 90 tablet, Refills: 0      fluticasone (FLONASE) 50 mcg/actuation nasal spray SHAKE LIQUID WELL AND SPRAY TWO SPRAYS IN EACH NOSTRIL EVERY DAY  Qty: 48 g, Refills: 0    Comments: **Patient requests 90 days supply**      insulin aspart (NOVOLOG) 100 unit/mL injection To use with Vgo 20  with 4 clicks with meals, max TDD 38 units daily  Qty: 2 vial, Refills: 3    Associated Diagnoses: Type 2 diabetes mellitus with hyperglycemia, with long-term current use of insulin      !! insulin glargine (LANTUS) 100 unit/mL injection Inject 30 Units into the skin every evening. STOP WHEN VGO STARTED  Qty: 10 mL, Refills: 12      !! insulin syringe-needle U-100 0.5 mL 31 gauge x 5/16 Syrg USE AS DIRECTED FOUR TIMES DAILY  Qty: 500 each, Refills: 0      !! insulin syringe-needle U-100 1/2 mL 31 x 5/16" Syrg USE THREE TIMES DAILY  Qty: 300 each, Refills: 0      !! LANTUS 100 unit/mL injection ADMINISTER 30 UNITS UNDER THE SKIN SKIN EVERY EVENING  Qty: 10 mL, Refills: 0      levothyroxine (SYNTHROID) 75 MCG tablet TAKE 1 TABLET BY MOUTH EVERY DAY  Qty: 90 tablet, Refills: 0      lisinopril 10 MG tablet Take 1 tablet (10 mg total) by mouth once daily.  Qty: 90 tablet, Refills: 3      omeprazole (PRILOSEC) 40 MG capsule Take 1 capsule (40 mg total) by mouth 2 (two) times daily before meals.  Qty: 60 capsule, Refills: 6    Associated Diagnoses: LPRD (laryngopharyngeal reflux disease)      ONETOUCH ULTRA TEST Strp TEST FOUR TIMES DAILY  Qty: 300 strip, Refills: 0      triamcinolone acetonide 0.1% (KENALOG) 0.1 % " cream AAA bid on rash  Qty: 454 g, Refills: 3    Associated Diagnoses: Granuloma annulare       !! - Potential duplicate medications found. Please discuss with provider.          I certify that this patient is confined to her home and needs physical therapy.

## 2018-01-13 NOTE — PROGRESS NOTES
"[facility] Hospital Medicine  Progress Note    Patient Name: Courtney Engle  MRN: [id number]  Patient Class: IP- Inpatient   Admission Date: 1/9/2018  Length of Stay: 2 days  Attending Physician: Reagan Marin MD  Primary Care Provider: Vishal Ulloa MD    [facility] Medicine Team: [facility] Reagan Marin MD    Subjective:     Principal Problem:Acute on chronic diastolic congestive heart failure    HPI:  Ms. Engle is a 82F h/o HFpEF, DM, Hypothyroidism, HLD, presenting with subacute worsening dyspnea on exertion, sent over from clinic today. Patient reports Sunday started having worsening dyspnea on exertion, has also endorsed worsening orthopnea and LE swelling over last few weeks. Patient has been non-compliant with low salt diet as daughter reports, not currently on any lasix. Pt denies chest pain, reports occasional chest heaviness, occasional "skipped beats" but no palpitations. Endorsing some upper respiratory congestion for last few days, subjective fever, cough.     Hospital Course:  1/11: Overnight mild heartburn relieved by home antacid, this morning reports shortness of breath improved but remains dyspneic with minimal exertion, also reporting weakness / fatigue.  1/12: Reports shortness of breath with exertion unchanged vs slightly worse, reporting some chest tightness at times, otherwise no F/C/N/V  1/13: Unchanged SOB, patient reports continued fatigue, still with O2 requirements, reports edema is improving at this time, no F/C, reporting some congestion    Interval History: see above    Review of Systems  Objective:     Vital Signs (Most Recent):  Temp: 97.2 °F (36.2 °C) (01/13/18 1234)  Pulse: (!) 57 (01/13/18 1244)  Resp: 18 (01/13/18 1234)  BP: (!) 114/52 (01/13/18 1234)  SpO2: 98 % (01/13/18 1244) Vital Signs (24h Range):  Temp:  [97.2 °F (36.2 °C)-98.5 °F (36.9 °C)] 97.2 °F (36.2 °C)  Pulse:  [54-89] 57  Resp:  [12-18] 18  SpO2:  [91 %-98 %] 98 %  BP: " (114-147)/(52-73) 114/52     Weight: 93.9 kg (207 lb)  Body mass index is 43.26 kg/m².    Intake/Output Summary (Last 24 hours) at 01/13/18 1253  Last data filed at 01/13/18 0854   Gross per 24 hour   Intake              500 ml   Output             1700 ml   Net            -1200 ml      Physical Exam   Constitutional: She is oriented to person, place, and time. She appears well-developed and well-nourished. No distress.   HENT:   Head: Normocephalic and atraumatic.   Nose: Nose normal.   Mouth/Throat: No oropharyngeal exudate.   Eyes: Conjunctivae and EOM are normal. Pupils are equal, round, and reactive to light. Right eye exhibits no discharge. Left eye exhibits no discharge. No scleral icterus.   Neck: Normal range of motion. Neck supple. No JVD present.   Cardiovascular: Normal rate, regular rhythm, normal heart sounds and intact distal pulses.  Exam reveals no friction rub.    No murmur heard.  Pulmonary/Chest: Effort normal and breath sounds normal. No respiratory distress. She has no wheezes. She has no rales. She exhibits no tenderness.   Abdominal: Soft. Bowel sounds are normal. She exhibits no distension and no mass. There is no tenderness. There is no rebound and no guarding.   Musculoskeletal: Normal range of motion. She exhibits edema. She exhibits no tenderness or deformity.   Lymphadenopathy:     She has no cervical adenopathy.   Neurological: She is alert and oriented to person, place, and time. She has normal reflexes. No cranial nerve deficit.   Skin: Skin is warm and dry. No rash noted. She is not diaphoretic. No erythema.   Psychiatric: She has a normal mood and affect. Her behavior is normal.       Significant Labs:   BMP:   Recent Labs  Lab 01/13/18  0608   *      K 3.5   CL 97   CO2 32*   BUN 36*   CREATININE 1.1   CALCIUM 8.7     CBC:   Recent Labs  Lab 01/12/18  0541 01/13/18  0608   WBC 8.56 9.21   HGB 11.6* 11.6*   HCT 35.9* 36.1*    315       Significant Imaging: I  have reviewed all pertinent imaging results/findings within the past 24 hours.    Assessment/Plan:      * Acute on chronic diastolic congestive heart failure    Patient presenting with dyspnea on exertion, orthopnea, LE swelling, elevated BNP - most likely acute on chronic diastolic CHF 2/2 dietary non-compliance  1/10: Improvement in symptoms however remains SOB with minimal exertion  1/12: Patient somewhat worse this Am, on 2L oxygen, reports dyspnea with mild exertion, rechecking CXR, increase diuretics, wean O2 as tolerated  1/13: stable, LE edema improved, also reporting some congestion, will attempt to wean O2, continue lasix  -Lasix IV 40mg BID  -Strict I/O  -Continue atenolol 25mg daily, ASA, lisinopril  -TTE  -Low salt / cardiac diet  -1.5L fluid restriction          Weakness    Patient reporting mild weakness, likely related to deconditioning, plan for home health PT        GERD (gastroesophageal reflux disease)    Stable substitute protonix for omeprazole  -Adding mylantia for breakthrough symptoms        Depression    Stable continue escitalopram           Anxiety    Stable, continue xanex 0.5mg QHS PRN          Acquired hypothyroidism    Stable, check TSH, continue synthroid 75mcg daily          Essential hypertension    Stable, continue lisinopril, held atenolol 1/12 for low/nl BP          Arthritis    Stable, continue tylenol PRN  -PT assessment, home health PT ordered        Type 2 diabetes mellitus with hyperglycemia, with long-term current use of insulin    Patient type 2 DM  -Glargine 25u QHS  -Aspart 15u TID AC  -SSI  -Accuchecks ACHS          VTE Risk Mitigation         Ordered     enoxaparin injection 40 mg  Daily     Route:  Subcutaneous        01/09/18 2347     Medium Risk of VTE  Once      01/09/18 2347     Place sequential compression device  Until discontinued      01/09/18 2347     Place LUCY hose  Until discontinued      01/09/18 2347     Medium Risk of VTE  Once      01/09/18 2347               Reagan Marin MD  Department of Hospital Medicine   [facility]

## 2018-01-13 NOTE — SUBJECTIVE & OBJECTIVE
Interval History: see above    Review of Systems  Objective:     Vital Signs (Most Recent):  Temp: 97.2 °F (36.2 °C) (01/13/18 1234)  Pulse: (!) 57 (01/13/18 1244)  Resp: 18 (01/13/18 1234)  BP: (!) 114/52 (01/13/18 1234)  SpO2: 98 % (01/13/18 1244) Vital Signs (24h Range):  Temp:  [97.2 °F (36.2 °C)-98.5 °F (36.9 °C)] 97.2 °F (36.2 °C)  Pulse:  [54-89] 57  Resp:  [12-18] 18  SpO2:  [91 %-98 %] 98 %  BP: (114-147)/(52-73) 114/52     Weight: 93.9 kg (207 lb)  Body mass index is 43.26 kg/m².    Intake/Output Summary (Last 24 hours) at 01/13/18 1253  Last data filed at 01/13/18 0854   Gross per 24 hour   Intake              500 ml   Output             1700 ml   Net            -1200 ml      Physical Exam   Constitutional: She is oriented to person, place, and time. She appears well-developed and well-nourished. No distress.   HENT:   Head: Normocephalic and atraumatic.   Nose: Nose normal.   Mouth/Throat: No oropharyngeal exudate.   Eyes: Conjunctivae and EOM are normal. Pupils are equal, round, and reactive to light. Right eye exhibits no discharge. Left eye exhibits no discharge. No scleral icterus.   Neck: Normal range of motion. Neck supple. No JVD present.   Cardiovascular: Normal rate, regular rhythm, normal heart sounds and intact distal pulses.  Exam reveals no friction rub.    No murmur heard.  Pulmonary/Chest: Effort normal and breath sounds normal. No respiratory distress. She has no wheezes. She has no rales. She exhibits no tenderness.   Abdominal: Soft. Bowel sounds are normal. She exhibits no distension and no mass. There is no tenderness. There is no rebound and no guarding.   Musculoskeletal: Normal range of motion. She exhibits edema. She exhibits no tenderness or deformity.   Lymphadenopathy:     She has no cervical adenopathy.   Neurological: She is alert and oriented to person, place, and time. She has normal reflexes. No cranial nerve deficit.   Skin: Skin is warm and dry. No rash noted. She is not  diaphoretic. No erythema.   Psychiatric: She has a normal mood and affect. Her behavior is normal.       Significant Labs:   BMP:   Recent Labs  Lab 01/13/18  0608   *      K 3.5   CL 97   CO2 32*   BUN 36*   CREATININE 1.1   CALCIUM 8.7     CBC:   Recent Labs  Lab 01/12/18  0541 01/13/18  0608   WBC 8.56 9.21   HGB 11.6* 11.6*   HCT 35.9* 36.1*    315       Significant Imaging: I have reviewed all pertinent imaging results/findings within the past 24 hours.

## 2018-01-13 NOTE — ASSESSMENT & PLAN NOTE
Patient presenting with dyspnea on exertion, orthopnea, LE swelling, elevated BNP - most likely acute on chronic diastolic CHF 2/2 dietary non-compliance  1/10: Improvement in symptoms however remains SOB with minimal exertion  1/12: Patient somewhat worse this Am, on 2L oxygen, reports dyspnea with mild exertion, rechecking CXR, increase diuretics, wean O2 as tolerated  1/13: stable, LE edema improved, also reporting some congestion, will attempt to wean O2, continue lasix  -Lasix IV 40mg BID  -Strict I/O  -Continue atenolol 25mg daily, ASA, lisinopril  -TTE  -Low salt / cardiac diet  -1.5L fluid restriction

## 2018-01-13 NOTE — CONSULTS
Food & Nutrition  Education    Diet Education: low NA+  Time Spent: 10min  Learners:Pt      Nutrition Education provided with handouts: low sodium diet education      Comments:Provided Edu on Low Na+ Diet and how it could help w/ SOB and Edema patient verbalized understanding, RD notes edu in past admissions, pt non- compliant to low Na+ diet in the past        All questions and concerns answered. Dietitian's contact information provided.       Follow-Up: at LOS day 10    Please Re-consult as needed        Thanks!

## 2018-01-14 LAB
ANION GAP SERPL CALC-SCNC: 10 MMOL/L
BASOPHILS # BLD AUTO: 0.05 K/UL
BASOPHILS NFR BLD: 0.6 %
BUN SERPL-MCNC: 40 MG/DL
CALCIUM SERPL-MCNC: 8.5 MG/DL
CHLORIDE SERPL-SCNC: 97 MMOL/L
CO2 SERPL-SCNC: 32 MMOL/L
CREAT SERPL-MCNC: 1.2 MG/DL
DIFFERENTIAL METHOD: ABNORMAL
EOSINOPHIL # BLD AUTO: 0.3 K/UL
EOSINOPHIL NFR BLD: 2.9 %
ERYTHROCYTE [DISTWIDTH] IN BLOOD BY AUTOMATED COUNT: 13.3 %
EST. GFR  (AFRICAN AMERICAN): 49.3 ML/MIN/1.73 M^2
EST. GFR  (NON AFRICAN AMERICAN): 42.8 ML/MIN/1.73 M^2
GLUCOSE SERPL-MCNC: 192 MG/DL
HCT VFR BLD AUTO: 33.7 %
HGB BLD-MCNC: 10.7 G/DL
IMM GRANULOCYTES # BLD AUTO: 0.03 K/UL
IMM GRANULOCYTES NFR BLD AUTO: 0.4 %
LYMPHOCYTES # BLD AUTO: 2.7 K/UL
LYMPHOCYTES NFR BLD: 31.2 %
MCH RBC QN AUTO: 28.2 PG
MCHC RBC AUTO-ENTMCNC: 31.8 G/DL
MCV RBC AUTO: 89 FL
MONOCYTES # BLD AUTO: 0.8 K/UL
MONOCYTES NFR BLD: 9 %
NEUTROPHILS # BLD AUTO: 4.8 K/UL
NEUTROPHILS NFR BLD: 55.9 %
NRBC BLD-RTO: 0 /100 WBC
PLATELET # BLD AUTO: 299 K/UL
PMV BLD AUTO: 10.1 FL
POCT GLUCOSE: 122 MG/DL (ref 70–110)
POCT GLUCOSE: 185 MG/DL (ref 70–110)
POCT GLUCOSE: 242 MG/DL (ref 70–110)
POTASSIUM SERPL-SCNC: 4.1 MMOL/L
RBC # BLD AUTO: 3.79 M/UL
SODIUM SERPL-SCNC: 139 MMOL/L
WBC # BLD AUTO: 8.57 K/UL

## 2018-01-14 PROCEDURE — 25000003 PHARM REV CODE 250: Performed by: PHYSICIAN ASSISTANT

## 2018-01-14 PROCEDURE — 99231 SBSQ HOSP IP/OBS SF/LOW 25: CPT | Mod: GC,,, | Performed by: INTERNAL MEDICINE

## 2018-01-14 PROCEDURE — 36415 COLL VENOUS BLD VENIPUNCTURE: CPT

## 2018-01-14 PROCEDURE — A4216 STERILE WATER/SALINE, 10 ML: HCPCS | Performed by: INTERNAL MEDICINE

## 2018-01-14 PROCEDURE — 80048 BASIC METABOLIC PNL TOTAL CA: CPT

## 2018-01-14 PROCEDURE — 25000003 PHARM REV CODE 250: Performed by: INTERNAL MEDICINE

## 2018-01-14 PROCEDURE — 63600175 PHARM REV CODE 636 W HCPCS: Performed by: INTERNAL MEDICINE

## 2018-01-14 PROCEDURE — 85025 COMPLETE CBC W/AUTO DIFF WBC: CPT

## 2018-01-14 PROCEDURE — 11000001 HC ACUTE MED/SURG PRIVATE ROOM

## 2018-01-14 RX ORDER — GUAIFENESIN 100 MG/5ML
200 SOLUTION ORAL ONCE
Status: DISCONTINUED | OUTPATIENT
Start: 2018-01-14 | End: 2018-01-15 | Stop reason: HOSPADM

## 2018-01-14 RX ORDER — GUAIFENESIN 100 MG/5ML
200 SOLUTION ORAL EVERY 4 HOURS PRN
Status: DISCONTINUED | OUTPATIENT
Start: 2018-01-14 | End: 2018-01-15 | Stop reason: HOSPADM

## 2018-01-14 RX ORDER — FUROSEMIDE 20 MG/1
20 TABLET ORAL DAILY
Status: DISCONTINUED | OUTPATIENT
Start: 2018-01-15 | End: 2018-01-15 | Stop reason: HOSPADM

## 2018-01-14 RX ADMIN — PANTOPRAZOLE SODIUM 40 MG: 40 TABLET, DELAYED RELEASE ORAL at 09:01

## 2018-01-14 RX ADMIN — ATENOLOL 25 MG: 25 TABLET ORAL at 09:01

## 2018-01-14 RX ADMIN — INSULIN ASPART 15 UNITS: 100 INJECTION, SOLUTION INTRAVENOUS; SUBCUTANEOUS at 01:01

## 2018-01-14 RX ADMIN — INSULIN DETEMIR 25 UNITS: 100 INJECTION, SOLUTION SUBCUTANEOUS at 09:01

## 2018-01-14 RX ADMIN — ENOXAPARIN SODIUM 40 MG: 100 INJECTION SUBCUTANEOUS at 05:01

## 2018-01-14 RX ADMIN — INSULIN ASPART 15 UNITS: 100 INJECTION, SOLUTION INTRAVENOUS; SUBCUTANEOUS at 06:01

## 2018-01-14 RX ADMIN — Medication 3 ML: at 10:01

## 2018-01-14 RX ADMIN — ESCITALOPRAM OXALATE 10 MG: 10 TABLET ORAL at 09:01

## 2018-01-14 RX ADMIN — LISINOPRIL 20 MG: 20 TABLET ORAL at 09:01

## 2018-01-14 RX ADMIN — ALPRAZOLAM 0.5 MG: 0.5 TABLET ORAL at 09:01

## 2018-01-14 RX ADMIN — INSULIN ASPART 4 UNITS: 100 INJECTION, SOLUTION INTRAVENOUS; SUBCUTANEOUS at 01:01

## 2018-01-14 RX ADMIN — INSULIN ASPART 2 UNITS: 100 INJECTION, SOLUTION INTRAVENOUS; SUBCUTANEOUS at 11:01

## 2018-01-14 RX ADMIN — BENZONATATE 100 MG: 100 CAPSULE ORAL at 01:01

## 2018-01-14 RX ADMIN — FLUTICASONE PROPIONATE 100 MCG: 50 SPRAY, METERED NASAL at 09:01

## 2018-01-14 RX ADMIN — INSULIN ASPART 15 UNITS: 100 INJECTION, SOLUTION INTRAVENOUS; SUBCUTANEOUS at 09:01

## 2018-01-14 RX ADMIN — ASPIRIN 81 MG: 81 TABLET, COATED ORAL at 09:01

## 2018-01-14 RX ADMIN — FUROSEMIDE 60 MG: 10 INJECTION, SOLUTION INTRAVENOUS at 09:01

## 2018-01-14 RX ADMIN — INSULIN ASPART 2 UNITS: 100 INJECTION, SOLUTION INTRAVENOUS; SUBCUTANEOUS at 09:01

## 2018-01-14 RX ADMIN — LEVOTHYROXINE SODIUM 75 MCG: 75 TABLET ORAL at 05:01

## 2018-01-14 NOTE — ASSESSMENT & PLAN NOTE
Patient presenting with dyspnea on exertion, orthopnea, LE swelling, elevated BNP - most likely acute on chronic diastolic CHF 2/2 dietary non-compliance  1/10: Improvement in symptoms however remains SOB with minimal exertion  1/12: Patient somewhat worse this Am, on 2L oxygen, reports dyspnea with mild exertion, rechecking CXR, increase diuretics, wean O2 as tolerated  1/13: stable, LE edema improved, also reporting some congestion, will attempt to wean O2, continue lasix  1/14: Weaned off oxygen in evening, ambulating, due to transportation issues will plan on AM discharge  -Lasix IV 40mg BID stopped, continue lasix 20mg daily  -Strict I/O  -Continue atenolol 25mg daily, ASA, lisinopril  -TTE  -Low salt / cardiac diet  -1.5L fluid restriction

## 2018-01-14 NOTE — SUBJECTIVE & OBJECTIVE
Interval History: see above    Review of Systems  Objective:     Vital Signs (Most Recent):  Temp: 98.2 °F (36.8 °C) (01/14/18 1639)  Pulse: 68 (01/14/18 1648)  Resp: 18 (01/14/18 1639)  BP: 128/60 (01/14/18 1639)  SpO2: 95 % (01/14/18 1648) Vital Signs (24h Range):  Temp:  [98.1 °F (36.7 °C)-99 °F (37.2 °C)] 98.2 °F (36.8 °C)  Pulse:  [58-82] 68  Resp:  [16-18] 18  SpO2:  [89 %-96 %] 95 %  BP: (111-153)/(56-70) 128/60     Weight: 93.9 kg (207 lb)  Body mass index is 43.26 kg/m².    Intake/Output Summary (Last 24 hours) at 01/14/18 1707  Last data filed at 01/14/18 1000   Gross per 24 hour   Intake              400 ml   Output              900 ml   Net             -500 ml      Physical Exam   Constitutional: She is oriented to person, place, and time. She appears well-developed and well-nourished. No distress.   HENT:   Head: Normocephalic and atraumatic.   Nose: Nose normal.   Mouth/Throat: No oropharyngeal exudate.   Eyes: Conjunctivae and EOM are normal. Pupils are equal, round, and reactive to light. Right eye exhibits no discharge. Left eye exhibits no discharge. No scleral icterus.   Neck: Normal range of motion. Neck supple. No JVD present.   Cardiovascular: Normal rate, regular rhythm, normal heart sounds and intact distal pulses.  Exam reveals no friction rub.    No murmur heard.  Pulmonary/Chest: Effort normal and breath sounds normal. No respiratory distress. She has no wheezes. She has no rales. She exhibits no tenderness.   Abdominal: Soft. Bowel sounds are normal. She exhibits no distension and no mass. There is no tenderness. There is no rebound and no guarding.   Musculoskeletal: Normal range of motion. She exhibits edema. She exhibits no tenderness or deformity.   Lymphadenopathy:     She has no cervical adenopathy.   Neurological: She is alert and oriented to person, place, and time. She has normal reflexes. No cranial nerve deficit.   Skin: Skin is warm and dry. No rash noted. She is not  diaphoretic. No erythema.   Psychiatric: She has a normal mood and affect. Her behavior is normal.       Significant Labs:   BMP:   Recent Labs  Lab 01/14/18  0346   *      K 4.1   CL 97   CO2 32*   BUN 40*   CREATININE 1.2   CALCIUM 8.5*     CBC:   Recent Labs  Lab 01/13/18  0608 01/14/18  0346   WBC 9.21 8.57   HGB 11.6* 10.7*   HCT 36.1* 33.7*    299       Significant Imaging: I have reviewed all pertinent imaging results/findings within the past 24 hours.

## 2018-01-14 NOTE — PROGRESS NOTES
"[facility] Hospital Medicine  Progress Note    Patient Name: Courtney Engle  MRN: [id number]  Patient Class: IP- Inpatient   Admission Date: 1/9/2018  Length of Stay: 3 days  Attending Physician: Reagan Marin MD  Primary Care Provider: Vishal Ulloa MD    [facility] Medicine Team: [facility] Reagan Marin MD    Subjective:     Principal Problem:Acute on chronic diastolic congestive heart failure    HPI:  Ms. Engle is a 82F h/o HFpEF, DM, Hypothyroidism, HLD, presenting with subacute worsening dyspnea on exertion, sent over from clinic today. Patient reports Sunday started having worsening dyspnea on exertion, has also endorsed worsening orthopnea and LE swelling over last few weeks. Patient has been non-compliant with low salt diet as daughter reports, not currently on any lasix. Pt denies chest pain, reports occasional chest heaviness, occasional "skipped beats" but no palpitations. Endorsing some upper respiratory congestion for last few days, subjective fever, cough.     Hospital Course:  1/11: Overnight mild heartburn relieved by home antacid, this morning reports shortness of breath improved but remains dyspneic with minimal exertion, also reporting weakness / fatigue.  1/12: Reports shortness of breath with exertion unchanged vs slightly worse, reporting some chest tightness at times, otherwise no F/C/N/V  1/13: Unchanged SOB, patient reports continued fatigue, still with O2 requirements, reports edema is improving at this time, no F/C, reporting some congestion  1/14: Weaned off oxygen, tolerated walking without desaturation, patient with concern re transportation this evening, will plan for AM discharge 1/15    Interval History: see above    Review of Systems  Objective:     Vital Signs (Most Recent):  Temp: 98.2 °F (36.8 °C) (01/14/18 1639)  Pulse: 68 (01/14/18 1648)  Resp: 18 (01/14/18 1639)  BP: 128/60 (01/14/18 1639)  SpO2: 95 % (01/14/18 1648) Vital Signs (24h " Range):  Temp:  [98.1 °F (36.7 °C)-99 °F (37.2 °C)] 98.2 °F (36.8 °C)  Pulse:  [58-82] 68  Resp:  [16-18] 18  SpO2:  [89 %-96 %] 95 %  BP: (111-153)/(56-70) 128/60     Weight: 93.9 kg (207 lb)  Body mass index is 43.26 kg/m².    Intake/Output Summary (Last 24 hours) at 01/14/18 1707  Last data filed at 01/14/18 1000   Gross per 24 hour   Intake              400 ml   Output              900 ml   Net             -500 ml      Physical Exam   Constitutional: She is oriented to person, place, and time. She appears well-developed and well-nourished. No distress.   HENT:   Head: Normocephalic and atraumatic.   Nose: Nose normal.   Mouth/Throat: No oropharyngeal exudate.   Eyes: Conjunctivae and EOM are normal. Pupils are equal, round, and reactive to light. Right eye exhibits no discharge. Left eye exhibits no discharge. No scleral icterus.   Neck: Normal range of motion. Neck supple. No JVD present.   Cardiovascular: Normal rate, regular rhythm, normal heart sounds and intact distal pulses.  Exam reveals no friction rub.    No murmur heard.  Pulmonary/Chest: Effort normal and breath sounds normal. No respiratory distress. She has no wheezes. She has no rales. She exhibits no tenderness.   Abdominal: Soft. Bowel sounds are normal. She exhibits no distension and no mass. There is no tenderness. There is no rebound and no guarding.   Musculoskeletal: Normal range of motion. She exhibits edema. She exhibits no tenderness or deformity.   Lymphadenopathy:     She has no cervical adenopathy.   Neurological: She is alert and oriented to person, place, and time. She has normal reflexes. No cranial nerve deficit.   Skin: Skin is warm and dry. No rash noted. She is not diaphoretic. No erythema.   Psychiatric: She has a normal mood and affect. Her behavior is normal.       Significant Labs:   BMP:   Recent Labs  Lab 01/14/18  0346   *      K 4.1   CL 97   CO2 32*   BUN 40*   CREATININE 1.2   CALCIUM 8.5*     CBC:    Recent Labs  Lab 01/13/18  0608 01/14/18  0346   WBC 9.21 8.57   HGB 11.6* 10.7*   HCT 36.1* 33.7*    299       Significant Imaging: I have reviewed all pertinent imaging results/findings within the past 24 hours.    Assessment/Plan:      * Acute on chronic diastolic congestive heart failure    Patient presenting with dyspnea on exertion, orthopnea, LE swelling, elevated BNP - most likely acute on chronic diastolic CHF 2/2 dietary non-compliance  1/10: Improvement in symptoms however remains SOB with minimal exertion  1/12: Patient somewhat worse this Am, on 2L oxygen, reports dyspnea with mild exertion, rechecking CXR, increase diuretics, wean O2 as tolerated  1/13: stable, LE edema improved, also reporting some congestion, will attempt to wean O2, continue lasix  1/14: Weaned off oxygen in evening, ambulating, due to transportation issues will plan on AM discharge  -Lasix IV 40mg BID stopped, continue lasix 20mg daily  -Strict I/O  -Continue atenolol 25mg daily, ASA, lisinopril  -TTE  -Low salt / cardiac diet  -1.5L fluid restriction          Weakness    Patient reporting mild weakness, likely related to deconditioning, plan for home health PT        GERD (gastroesophageal reflux disease)    Stable substitute protonix for omeprazole  -Adding mylantia for breakthrough symptoms        Depression    Stable continue escitalopram           Anxiety    Stable, continue xanex 0.5mg QHS PRN          Acquired hypothyroidism    Stable, check TSH, continue synthroid 75mcg daily          Essential hypertension    Stable, continue lisinopril, held atenolol 1/12 for low/nl BP          Arthritis    Stable, continue tylenol PRN  -PT assessment, home health PT ordered        Type 2 diabetes mellitus with hyperglycemia, with long-term current use of insulin    Patient type 2 DM  -Glargine 25u QHS  -Aspart 15u TID AC  -SSI  -Accuchecks ACHS          VTE Risk Mitigation         Ordered     enoxaparin injection 40 mg  Daily      Route:  Subcutaneous        01/09/18 2347     Medium Risk of VTE  Once      01/09/18 2347     Place sequential compression device  Until discontinued      01/09/18 2347     Place LUCY hose  Until discontinued      01/09/18 2347     Medium Risk of VTE  Once      01/09/18 2347              Reagan Marin MD  Department of Hospital Medicine   [facility]

## 2018-01-15 VITALS
HEART RATE: 56 BPM | RESPIRATION RATE: 18 BRPM | OXYGEN SATURATION: 93 % | WEIGHT: 207 LBS | HEIGHT: 58 IN | DIASTOLIC BLOOD PRESSURE: 60 MMHG | BODY MASS INDEX: 43.45 KG/M2 | SYSTOLIC BLOOD PRESSURE: 133 MMHG | TEMPERATURE: 98 F

## 2018-01-15 LAB
ANION GAP SERPL CALC-SCNC: 12 MMOL/L
BASOPHILS # BLD AUTO: 0.04 K/UL
BASOPHILS NFR BLD: 0.6 %
BUN SERPL-MCNC: 38 MG/DL
CALCIUM SERPL-MCNC: 9.1 MG/DL
CHLORIDE SERPL-SCNC: 100 MMOL/L
CO2 SERPL-SCNC: 27 MMOL/L
CREAT SERPL-MCNC: 1.2 MG/DL
DIFFERENTIAL METHOD: ABNORMAL
EOSINOPHIL # BLD AUTO: 0.3 K/UL
EOSINOPHIL NFR BLD: 4.1 %
ERYTHROCYTE [DISTWIDTH] IN BLOOD BY AUTOMATED COUNT: 13.1 %
EST. GFR  (AFRICAN AMERICAN): 49.3 ML/MIN/1.73 M^2
EST. GFR  (NON AFRICAN AMERICAN): 42.8 ML/MIN/1.73 M^2
GLUCOSE SERPL-MCNC: 110 MG/DL
HCT VFR BLD AUTO: 35.8 %
HGB BLD-MCNC: 11.4 G/DL
IMM GRANULOCYTES # BLD AUTO: 0.02 K/UL
IMM GRANULOCYTES NFR BLD AUTO: 0.3 %
LYMPHOCYTES # BLD AUTO: 2.7 K/UL
LYMPHOCYTES NFR BLD: 37.2 %
MCH RBC QN AUTO: 28.6 PG
MCHC RBC AUTO-ENTMCNC: 31.8 G/DL
MCV RBC AUTO: 90 FL
MONOCYTES # BLD AUTO: 0.6 K/UL
MONOCYTES NFR BLD: 8.8 %
NEUTROPHILS # BLD AUTO: 3.5 K/UL
NEUTROPHILS NFR BLD: 49 %
NRBC BLD-RTO: 0 /100 WBC
PLATELET # BLD AUTO: 260 K/UL
PMV BLD AUTO: 10.1 FL
POCT GLUCOSE: 145 MG/DL (ref 70–110)
POCT GLUCOSE: 231 MG/DL (ref 70–110)
POTASSIUM SERPL-SCNC: 4.3 MMOL/L
RBC # BLD AUTO: 3.98 M/UL
SODIUM SERPL-SCNC: 139 MMOL/L
WBC # BLD AUTO: 7.12 K/UL

## 2018-01-15 PROCEDURE — 36415 COLL VENOUS BLD VENIPUNCTURE: CPT

## 2018-01-15 PROCEDURE — 99239 HOSP IP/OBS DSCHRG MGMT >30: CPT | Mod: ,,, | Performed by: INTERNAL MEDICINE

## 2018-01-15 PROCEDURE — 25000003 PHARM REV CODE 250: Performed by: PHYSICIAN ASSISTANT

## 2018-01-15 PROCEDURE — 85025 COMPLETE CBC W/AUTO DIFF WBC: CPT

## 2018-01-15 PROCEDURE — A4216 STERILE WATER/SALINE, 10 ML: HCPCS | Performed by: INTERNAL MEDICINE

## 2018-01-15 PROCEDURE — 25000003 PHARM REV CODE 250: Performed by: INTERNAL MEDICINE

## 2018-01-15 PROCEDURE — 80048 BASIC METABOLIC PNL TOTAL CA: CPT

## 2018-01-15 RX ORDER — FUROSEMIDE 20 MG/1
20 TABLET ORAL DAILY
Qty: 30 TABLET | Refills: 11 | Status: SHIPPED | OUTPATIENT
Start: 2018-01-16 | End: 2018-01-29 | Stop reason: SDUPTHER

## 2018-01-15 RX ADMIN — INSULIN ASPART 15 UNITS: 100 INJECTION, SOLUTION INTRAVENOUS; SUBCUTANEOUS at 09:01

## 2018-01-15 RX ADMIN — PANTOPRAZOLE SODIUM 40 MG: 40 TABLET, DELAYED RELEASE ORAL at 09:01

## 2018-01-15 RX ADMIN — LEVOTHYROXINE SODIUM 75 MCG: 75 TABLET ORAL at 05:01

## 2018-01-15 RX ADMIN — LISINOPRIL 20 MG: 20 TABLET ORAL at 09:01

## 2018-01-15 RX ADMIN — FUROSEMIDE 20 MG: 20 TABLET ORAL at 09:01

## 2018-01-15 RX ADMIN — ATENOLOL 25 MG: 25 TABLET ORAL at 09:01

## 2018-01-15 RX ADMIN — ESCITALOPRAM OXALATE 10 MG: 10 TABLET ORAL at 09:01

## 2018-01-15 RX ADMIN — Medication 3 ML: at 06:01

## 2018-01-15 RX ADMIN — ASPIRIN 81 MG: 81 TABLET, COATED ORAL at 09:01

## 2018-01-15 NOTE — NURSING
Patient being discharged home per Dr's orders. IV removed and site is clear. No monitor in place. Patient drove herself to the clinic and was brought to the hospital via ambulance. She plans to drive herself home. Edema in legs and feet has gone down and she has trace edema left. She is alert and oriented and wants to leave. Vitals are stable and patient's oxygen sat is 95% on room air.

## 2018-01-15 NOTE — DISCHARGE SUMMARY
"[facility] Hospital Medicine  Discharge Summary      Patient Name: Courtney Engle  MRN: [id number]  Admission Date: 1/9/2018  Hospital Length of Stay: 4 days  Discharge Date and Time:  01/15/2018 10:58 AM  Attending Physician: Josef Blum MD   Discharging Provider: Josef Blum MD  Primary Care Provider: Vishal Ulloa MD  [facility] Medicine [facility]: [facility] Josef Blum MD    HPI:   Ms. Engle is a 82F h/o HFpEF, DM, Hypothyroidism, HLD, presenting with subacute worsening dyspnea on exertion, sent over from clinic today. Patient reports Sunday started having worsening dyspnea on exertion, has also endorsed worsening orthopnea and LE swelling over last few weeks. Patient has been non-compliant with low salt diet as daughter reports, not currently on any lasix. Pt denies chest pain, reports occasional chest heaviness, occasional "skipped beats" but no palpitations. Endorsing some upper respiratory congestion for last few days, subjective fever, cough.     * No surgery found *      Hospital Course:   1/11: Overnight mild heartburn relieved by home antacid, this morning reports shortness of breath improved but remains dyspneic with minimal exertion, also reporting weakness / fatigue.  1/12: Reports shortness of breath with exertion unchanged vs slightly worse, reporting some chest tightness at times, otherwise no F/C/N/V  1/13: Unchanged SOB, patient reports continued fatigue, still with O2 requirements, reports edema is improving at this time, no F/C, reporting some congestion  1/14: Weaned off oxygen, tolerated walking without desaturation, patient with concern re transportation this evening, will plan for AM discharge 1/15  1/15: Patient remains off oxygen, is ambulatory, no events overnight     Consults:   Consults         Status Ordering Provider     Inpatient consult to Registered Dietitian/Nutritionist  Once     Provider:  (Not yet assigned)    Completed JOSEF BLUM "          * Acute on chronic diastolic congestive heart failure    Patient presenting with dyspnea on exertion, orthopnea, LE swelling, elevated BNP - most likely acute on chronic diastolic CHF 2/2 dietary non-compliance  1/10: Improvement in symptoms however remains SOB with minimal exertion  1/12: Patient somewhat worse this Am, on 2L oxygen, reports dyspnea with mild exertion, rechecking CXR, increase diuretics, wean O2 as tolerated  1/13: stable, LE edema improved, also reporting some congestion, will attempt to wean O2, continue lasix  1/14: Weaned off oxygen in evening, ambulating, due to transportation issues will plan on AM discharge  -Lasix IV 40mg BID stopped, continue lasix 20mg daily  -Strict I/O  -Continue atenolol 25mg daily, ASA, lisinopril  -TTE  -Low salt / cardiac diet  -1.5L fluid restriction          Weakness    Patient reporting mild weakness, likely related to deconditioning, plan for home health PT        GERD (gastroesophageal reflux disease)    Stable substitute protonix for omeprazole  -Adding mylantia for breakthrough symptoms        Depression    Stable continue escitalopram           Anxiety    Stable, continue xanex 0.5mg QHS PRN          Acquired hypothyroidism    Stable, check TSH, continue synthroid 75mcg daily          Essential hypertension    Stable, continue lisinopril, held atenolol 1/12 for low/nl BP          Arthritis    Stable, continue tylenol PRN  -PT assessment, home health PT ordered        Type 2 diabetes mellitus with hyperglycemia, with long-term current use of insulin    Patient type 2 DM  -Glargine 25u QHS  -Aspart 15u TID AC  -SSI  -Accuchecks ACHS          Final Active Diagnoses:    Diagnosis Date Noted POA    PRINCIPAL PROBLEM:  Acute on chronic diastolic congestive heart failure [I50.33] 01/09/2018 Yes    Weakness [R53.1] 01/11/2018 Yes    GERD (gastroesophageal reflux disease) [K21.9] 08/25/2017 Yes    Anxiety [F41.9] 12/08/2016 Yes    Depression [F32.9]  12/08/2016 Yes    Acquired hypothyroidism [E03.9] 04/25/2014 Yes     Chronic    Type 2 diabetes mellitus with hyperglycemia, with long-term current use of insulin [E11.65, Z79.4]  Not Applicable     Chronic    Essential hypertension [I10]  Yes     Chronic    Arthritis [M19.90]  Yes      Problems Resolved During this Admission:    Diagnosis Date Noted Date Resolved POA       Discharged Condition: good    Disposition: Home-Health Care [facility]    Follow Up:  Follow-up Information     Vishal Ulloa MD In 1 week.    Specialty:  Internal Medicine  Contact information:  [address]  [facility] [id number]  [phone]                 Patient Instructions:     Ambulatory referral to Outpatient Case Management   Referral Priority: Routine Referral Type: Consultation   Referral Reason: Specialty Services Required    Number of Visits Requested: 1      Ambulatory consult to Cardiology   Referral Priority: Routine Referral Type: Consultation   Referral Reason: Specialty Services Required    Requested Specialty: Cardiology    Number of Visits Requested: 1      Diet Cardiac     Diet diabetic     Activity as tolerated         Significant Diagnostic Studies: Labs:   BMP:   Recent Labs  Lab 01/14/18  0346 01/15/18  0406   * 110    139   K 4.1 4.3   CL 97 100   CO2 32* 27   BUN 40* 38*   CREATININE 1.2 1.2   CALCIUM 8.5* 9.1    and CMP   Recent Labs  Lab 01/14/18  0346 01/15/18  0406    139   K 4.1 4.3   CL 97 100   CO2 32* 27   * 110   BUN 40* 38*   CREATININE 1.2 1.2   CALCIUM 8.5* 9.1   ANIONGAP 10 12   ESTGFRAFRICA 49.3* 49.3*   EGFRNONAA 42.8* 42.8*       Pending Diagnostic Studies:     None         Medications:  Reconciled Home Medications:   Current Discharge Medication List      START taking these medications    Details   furosemide (LASIX) 20 MG tablet Take 1 tablet (20 mg total) by mouth once daily.  Qty: 30 tablet, Refills: 11         CONTINUE these medications which have NOT CHANGED     "Details   acetaminophen (TYLENOL) 500 MG tablet Take 1 tablet (500 mg total) by mouth every 6 (six) hours as needed for Pain.  Refills: 0    Associated Diagnoses: Chronic pain of left knee      alendronate (FOSAMAX) 70 MG tablet Take 1 tablet (70 mg total) by mouth every 7 days.  Qty: 4 tablet, Refills: 11      ALPRAZolam (XANAX) 0.5 MG tablet TAKE 1 TABLET BY MOUTH EVERY NIGHT  Qty: 30 tablet, Refills: 0      aspirin (ECOTRIN) 81 MG EC tablet Take 1 tablet (81 mg total) by mouth once daily.  Qty: 30 tablet, Refills: 12    Associated Diagnoses: Type 2 diabetes mellitus with hyperglycemia, with long-term current use of insulin      atenolol (TENORMIN) 25 MG tablet TAKE 1 TABLET BY MOUTH EVERY DAY  Qty: 90 tablet, Refills: 0      BD INSULIN SYRINGE ULT-FINE II 1/2 mL 31 x 5/16" Syrg USE THREE TIMES DAILY AS DIRECTED  Qty: 100 each, Refills: 0      benzonatate (TESSALON) 100 MG capsule Take 100 mg by mouth as needed for Cough.      blood-glucose meter kit Use as instructed  Qty: 1 each, Refills: 0    Associated Diagnoses: Type 2 diabetes mellitus with hyperglycemia, with long-term current use of insulin      butalbital-acetaminophen  mg Tab Take by mouth as needed (pain). caffeine      escitalopram oxalate (LEXAPRO) 10 MG tablet TAKE 1 TABLET BY MOUTH EVERY DAY  Qty: 90 tablet, Refills: 0      fluticasone (FLONASE) 50 mcg/actuation nasal spray SHAKE LIQUID WELL AND SPRAY TWO SPRAYS IN EACH NOSTRIL EVERY DAY  Qty: 48 g, Refills: 0    Comments: **Patient requests 90 days supply**      insulin aspart (NOVOLOG) 100 unit/mL injection To use with Vgo 20  with 4 clicks with meals, max TDD 38 units daily  Qty: 2 vial, Refills: 3    Associated Diagnoses: Type 2 diabetes mellitus with hyperglycemia, with long-term current use of insulin      insulin glargine (LANTUS) 100 unit/mL injection Inject 30 Units into the skin every evening. STOP WHEN VGO STARTED  Qty: 10 mL, Refills: 12      levothyroxine (SYNTHROID) 75 MCG tablet " TAKE 1 TABLET BY MOUTH EVERY DAY  Qty: 90 tablet, Refills: 0      lisinopril 10 MG tablet Take 1 tablet (10 mg total) by mouth once daily.  Qty: 90 tablet, Refills: 3      omeprazole (PRILOSEC) 40 MG capsule Take 1 capsule (40 mg total) by mouth 2 (two) times daily before meals.  Qty: 60 capsule, Refills: 6    Associated Diagnoses: LPRD (laryngopharyngeal reflux disease)      ONETOUCH ULTRA TEST Strp TEST FOUR TIMES DAILY  Qty: 300 strip, Refills: 0      triamcinolone acetonide 0.1% (KENALOG) 0.1 % cream AAA bid on rash  Qty: 454 g, Refills: 3    Associated Diagnoses: Granuloma annulare             Indwelling Lines/Drains at time of discharge:   Lines/Drains/Airways          No matching active lines, drains, or airways          Time spent on the discharge of patient: 45 minutes  Patient was seen and examined on the date of discharge and determined to be suitable for discharge.         Reagan Marin MD  Department of Hospital Medicine  [facility]

## 2018-01-15 NOTE — PLAN OF CARE
12:49 PM  SW spoke with patient regarding discharge plan for home health. Patient states that she does not have a preference for home health. SW sent referral to [facility] for home health needs upon discharge.     Chely Harris LMSW  [facility]  Ext. [phone]

## 2018-01-15 NOTE — PHYSICIAN QUERY
"PT Name: Courtney Engle  MR #: [id number]    Physician Query Form - Respiratory Condition Clarification      CDS/: Janice Santizo               Contact information: [email or web address]     This form is a permanent document in the medical record.    Query Date: January 15, 2018    By submitting this query, we are merely seeking further clarification of documentation. Please utilize your independent clinical judgment when addressing the question(s) below.    The Medical record contains the following   Indicators   Supporting Clinical Findings Location in Medical Record   X   SOB, VERA, Wheezing, Productive Cough, Use of Accessory Muscles, etc. worsening dyspnea on exertion     Positive for shortness of breath. H&P 1/10    H&P 1/10     X   Acute/Chronic Illness Acute on chronic diastolic congestive heart failure   Hospital medicine PN 1/14   X   Radiology Findings Cardiomegaly with small bilateral pleural effusions. CXR 1/9      Respiratory Distress or Failure        Hypoxia or Hypercapnia     X   RR         ABGs         O2 sat Pt was sent from clinic today. Pt's visitor states "every time they took the oxygen off her oxygen sat drop to 83". ED note 1/9@910      BiPAP/Intubation     X   Supplemental O2 Unchanged SOB, patient reports continued fatigue, still with O2 requirements, reports edema is improving at this time,   Hospital medicine PN 1/14   X   Home O2, Oxygen Dependence  Pt denies O2 use at home.  ED note 1/9@910      Treatment        Other       Provider, please specify diagnosis or diagnoses associated with above clinical findings.    [x ] Acute Respiratory Failure with Hypoxia  [  ] Acute Respiratory Failure with Hypercapnia  [  ] Acute Respiratory Failure with Hypoxia and Hypercapnia  [  ] Other Acute Respiratory Failure  [  ] Other Respiratory Diagnosis (please specify): _______________________________  [  ] Clinically Undetermined    Please document in your progress notes daily for the duration of " treatment until resolved and include in your discharge summary.

## 2018-01-15 NOTE — PLAN OF CARE
01/15/18 1121   Final Note   Assessment Type Final Discharge Note   Discharge Disposition Home-Health   Hospital Follow Up  Appt(s) scheduled? Yes   [facility] Referral Info   Post Acute Recommendation Home-care   Spoke to ANGEL Mo, advised her HH orders are in and pt needs this arrnaged for D/C today. She stated she would take care of getting choices and arranging HH.

## 2018-01-16 ENCOUNTER — OUTPATIENT CASE MANAGEMENT (OUTPATIENT)
Dept: ADMINISTRATIVE | Facility: OTHER | Age: 83
End: 2018-01-16

## 2018-01-16 NOTE — PROGRESS NOTES
01/16/18- Called and spoke to Courtney Engle, 82 year old female. Patient lives alone. Patient has life alert. Patient has two daughters- Janice lives in [place] and the other daughter lives in [place]. Patient was in the hospital from 01/09/18 to 01/15/18 with a Dx of Acute on chronic diastolic congestive heart failure.[facility] will admit patient tomorrow,  Went over hospital discharge with patient. Patient got her lasix today but has not started taking it. Went over with patient the best time to take lasix . Went over signs and symptoms of herart failure with the patient encouraged patient to weigh every morning and log results.patient is going to make sure that her scale does work. Patient does not have a cardiologist. Patient has an appt with her PCP on 01/22/18 and she is going  to ask him for a referral for a cardiologist. Patient does not check her blood pressure but has a blood pressure cuff. encouraged patient to check her blood pressure daily and keep a log. Patient checks her blood glucose three times a day. Patient's blood glucose was 300 this morning. Patient's A1C was 7.7 on 01/09/18. Patient admits that she has trouble following a low sodium, cardiac, diabetic diet. Patient would like any assistance she can get to follow her diet. Patient uses a cane and walker when she goes out of her house. Patient  with five falls in the past two years. Patient would like a knee replacement. Patient states that she has no cartilage in her knee. Patient wants to do Med Rec next phone call. Nurse will follow up with patient in one week and go over educational material mailed to her. Patient transferred to Effie Petersen RN.OPCM.     Clinical Reference Documents Added to Patient Instructions       Document    DIABETES AND HEART DISEASE (ENGLISH)    DIABETES, LONG-TERM COMPLICATIONS (ENGLISH)    FALLS, PREVENTING, MAKING CHANGES IN YOUR LIVING SPACE (ENGLISH)    HEART FAILURE: TRACKING YOUR WEIGHT (ENGLISH)     HEART FAILURE: WARNING SIGNS OF A FLARE-UP (ENGLISH)  Diabetes Grocery List

## 2018-01-16 NOTE — PROGRESS NOTES
Thank you for the referral. The following patient has been assigned to Katlyn Urban RN with Outpatient Complex Care Management for high risk screening.    Katlyn - please transfer case to Effie Petersen RN when assessment is complete.     Reason for referral: Acute on chronic diastolic congestive heart failure    Please contact [facility] at [phone] with any questions.    Thank you,    Sarai De Los Santos

## 2018-01-17 ENCOUNTER — PATIENT OUTREACH (OUTPATIENT)
Dept: ADMINISTRATIVE | Facility: CLINIC | Age: 83
End: 2018-01-17

## 2018-01-17 NOTE — PATIENT INSTRUCTIONS
Discharge Instructions for Heart Failure  The heart is a muscle that pumps oxygen-rich blood to all parts of the body. When you have heart failure, the heart is not able to pump as well as it should. Blood and fluid may back up into the lungs (congestive heart failure), and some parts of the body dont get enough oxygen-rich blood to work normally. These problems lead to the symptoms of heart failure. Heart failure can occur due to an injury to the heart or from natural processes.  You can control symptoms of heart failure with some lifestyle changes and by following your doctor's advice.  Home care  Activity  Ask your healthcare provider about an exercise program. You can benefit from simple activities such as walking or gardening. Exercising most days of the week can make you feel better. Don't be discouraged if your progress is slow at first. Rest as needed. Stop activity if you develop symptoms such as chest pain, lightheadedness, or significant shortness of breath. Find activities that you enjoy, such as brisk walking, dancing, swimming, or gardening. These will help you stay active and strengthen your heart.  Diet  Follow a heart healthy diet. And make sure to limit the salt (sodium) in your diet. Salt causes your body to hold water. This makes your heart work harder as there is more fluid for the heart to pump. Limit your salt by doing the following:  · Limit canned, dried, packaged, and fast foods.  · Don't add salt to your food.  · Season foods with herbs instead of salt.  · Watch how much liquids you drink. Drinking too much can make heart failure worse. Talk with your health care provider about how much you should drink each day.  · Limit the amount of alcohol you drink. It may harm your heart. Women should have no more than 1 drink a day and men should have no more than 2.  · When you eat out, request that your meals have no added salt.  Tobacco  If you smoke, it's very important to quit. Smoking  increases your chances of having a heart attack by harming the blood vessels that provide oxygen to your heart. This makes heart failure worse. Quitting smoking is the number one thing you can do to improve your health. Enroll in a stop-smoking program to improve your chances of success. Talk with your healthcare provider about medicines or nicotine replacement therapy to help you quit smoking. Ask your healthcare provider about smoking cessation support groups.  Medicine  Take your medicines exactly as prescribed. Learn the names and purpose of each of your medicines. Keep an accurate medicine list and current dosages with you at all times. Don't skip doses. If you miss a dose of your medicine, take it as soon as you remember. If you miss a dose and it's almost time for your next dose, just wait and take your next dose at the normal time. Don't take a double dose. If you are unsure, call your doctor's office. Make sure not to mix up your medicines or forget what you've taken the same day.  Weight monitoring  Weigh yourself every day. A sudden weight gain can mean your heart failure is getting worse. Weigh yourself at the same time of day and in the same kind of clothes. Ideally, weigh yourself first thing in the morning after you empty your bladder, but before you eat breakfast. Your healthcare provider will show you how to track your weight. He or she will also discuss with you when you should call if you have a sudden, unexpected increase in your weight.  In general, your healthcare provider may ask you to report if your weight goes up by more than 2 pounds in 1 day,  5 pounds in 1 week, or whatever weight gain you were told by your doctor. This is a sign that you are retaining more fluid than you should be. Clues to weight gain include checking your ankles for swelling, or noticing you are short of breath when you lie down.  Follow-up care  Make a follow-up appointment as directed. Depending on the type and  severity of heart failure you have, you may need follow-up as early as 7 days from hospital discharge. Keep appointments for checkups and lab tests that are needed to check your medicines and condition.  Recognize that your health and even survival depend on your following medical recommendations.  Symptoms  Heart failure can cause a variety of symptoms, including:  · Shortness of breath  · Trouble breathing at night, especially when you lie down  · Swelling in the legs and feet or in the belly (abdomen)  · Becoming easily fatigued  · Irregular or rapid heartbeat  · Weakness or lightheadedness  · Swelling of the neck veins  It is important to know what to do if symptoms get worse or if you develop signs of worsening heart failure.     When to see your healthcare provider  Call your doctor right away if you have any of these signs of worsening heart failure:  · Sudden weight gain (more than 2 pounds in 1 day or 5 pounds in 1 week, or whatever weight gain you were told to report by your doctor)  · Trouble breathing not related to being active  · New or increased swelling of your legs or ankles  · Swelling or pain in your abdomen  · Breathing trouble at night (waking up short of breath, needing more pillows to breathe)  · Frequent coughing that doesn't go away  · Feeling much more tired than usual  Call 911  Call 911 right away if you have:  · Severe shortness of breath, such that you can't catch your breath even while resting  · Severe chest pain that does not resolve with rest or nitroglycerin  · Pink, foamy mucus with cough and shortness of breath  · A continuous rapid or irregular heartbeat  · Passing out or fainting  · Stroke symptoms such as sudden numbness or weakness on one side of your face, arm, or leg or sudden confusion, trouble speaking or vision changes   Date Last Reviewed: 3/21/2016  © 3687-5435 [employer]. [address] [place] [address], [place] [address]. All rights reserved. This information  is not intended as a substitute for professional medical care. Always follow your healthcare professional's instructions.        Heart Failure: Making Changes to Your Diet  You have a condition called heart failure. When you have heart failure, excess fluid is more likely to build up in your body because your heart isn't working well. This makes the heart work harder to pump blood. Fluid buildup causes symptoms such as shortness of breath and swelling (edema). This is often referred to as congestive heart failure or CHF. Controlling the amount of salt (sodium) you eat may help stop fluid from building up. Your doctor may also tell you to reduce the amount of fluid you drink.  Reading food labels    Your healthcare provider will tell you how much sodium you can eat each day. Read food labels to keep track. Keep in mind that certain foods are high in salt. These include canned, frozen, and processed foods. Check the amount of sodium in each serving. Watch out for high-sodium ingredients. These include MSG (monosodium glutamate), baking soda, and sodium phosphate.   Eating less salt  Give yourself time to get used to eating less salt. It may take a little while. Here are some tips to help:  · Take the saltshaker off the table. Replace it with salt-free herb mixes and spices.  · Eat fresh or plain frozen vegetables. These have much less salt than canned vegetables.  · Choose low-sodium snacks like sodium-free pretzels, crackers, or air-popped popcorn.  · Dont add salt to your food when youre cooking. Instead, season your foods with pepper, lemon, garlic, or onion.  · When you eat out, ask that your food be cooked without added salt.  · Avoid eating fried foods as these often have a great deal of salt.  If youre told to limit fluids  You may need to limit how much fluid you have to help prevent swelling. This includes anything that is liquid at room temperature, such as ice cream and soup. If your doctor tells you to  limit fluid, try these tips:  · Measure drinks in a measuring cup before you drink them. This will help you meet daily goals.  · Chill drinks to make them more refreshing.  · Suck on frozen lemon wedges to quench thirst.  · Only drink when youre thirsty.  · Chew sugarless gum or suck on hard candy to keep your mouth moist.  · Weigh yourself daily to know if your body's fluid content is rising.  My sodium goal  Your healthcare provider may give you a sodium goal to meet each day. This includes sodium found in food as well as salt that you add. My goal is to eat no more than ___________ mg of sodium per day.     When to call your doctor  Call your doctor right away if you have any symptoms of worsening heart failure. These can include:  · Sudden weight gain  · Increased swelling of your legs or ankles  · Trouble breathing when youre resting or at night  · Increase in the number of pillows you have to sleep on  · Chest pain, pressure, discomfort, or pain in the jaw, neck, or back   Date Last Reviewed: 3/21/2016  © 6117-5808 [employer]. [address] [place] [address], [place] [address]. All rights reserved. This information is not intended as a substitute for professional medical care. Always follow your healthcare professional's instructions.

## 2018-01-19 RX ORDER — INSULIN GLARGINE 100 [IU]/ML
INJECTION, SOLUTION SUBCUTANEOUS
Qty: 10 ML | Refills: 4 | Status: SHIPPED | OUTPATIENT
Start: 2018-01-19 | End: 2018-02-14 | Stop reason: SDUPTHER

## 2018-01-19 RX ORDER — INSULIN GLARGINE 100 [IU]/ML
INJECTION, SOLUTION SUBCUTANEOUS
Qty: 10 ML | Refills: 0 | Status: SHIPPED | OUTPATIENT
Start: 2018-01-19 | End: 2018-01-19 | Stop reason: SDUPTHER

## 2018-01-19 NOTE — TELEPHONE ENCOUNTER
----- Message from Nicolle Vaca sent at 1/19/2018 12:22 PM CST -----  Contact: Hyacinth [place] [facility] [phone]  Type: Home Health (orders, updates, clarifications, etc.)    Home Health Agency/Nurse: Hyacinth    Phone number:[phone]    Reason for call: blood sugar this am was 400 now read 330 and she's out of the lancet, needs refill for lancets to be sent over to [facility] [id number]     Comments: Please call Hyacinth back and advise    Thank you

## 2018-01-19 NOTE — TELEPHONE ENCOUNTER
Spoke to Hyacinth [facility]ey's home health physical therapist. She states pt blood sugar was 400 this morning. At lunch time it was 330.  Pt is out of Lantus. Hyacinth would like to know if you can order a home health nurse.She thinks pt needs help monitoring her blood sugar and could use diabetic education.

## 2018-01-21 NOTE — PROGRESS NOTES
theSubjective:       Patient ID: Courtney Engle is a 82 y.o. female.    Chief Complaint: Follow-up    Transitional Care Note    Family and/or Caretaker present at visit?  No.  Diagnostic tests reviewed/disposition: No diagnosic tests pending after this hospitalization.  Disease/illness education: discussed CHF  Home health/community services discussion/referrals: Patient has home health established at home.   Establishment or re-establishment of referral orders for community resources: No other necessary community resources.   Discussion with other health care providers: No discussion with other health care providers necessary.          Summary of recent hospitalization:    Patient presenting with dyspnea on exertion, orthopnea, LE swelling, elevated BNP - most likely acute on chronic diastolic CHF 2/2 dietary non-compliance  1/10: Improvement in symptoms however remains SOB with minimal exertion  1/12: Patient somewhat worse this Am, on 2L oxygen, reports dyspnea with mild exertion, rechecking CXR, increase diuretics, wean O2 as tolerated  1/13: stable, LE edema improved, also reporting some congestion, will attempt to wean O2, continue lasix  1/14: Weaned off oxygen in evening, ambulating, due to transportation issues will plan on AM discharge  -Lasix IV 40mg BID stopped, continue lasix 20mg daily  -Strict I/O  -Continue atenolol 25mg daily, ASA, lisinopril  -TTE  -Low salt / cardiac diet  -1.5L fluid restriction     she is feeling better.  No fever.  No cough.  Still some fatigue and mild shortness of breath but much improved.  I would like to update BNP and BMP and she did not get her cardiology follow-up appointments so I will assist with that as well.       Review of Systems   Constitutional: Positive for fatigue. Negative for appetite change, chills and fever.   HENT: Negative for nosebleeds, sinus pain and sore throat.    Eyes: Negative for visual disturbance.   Respiratory: Positive for shortness of  breath ( much improved). Negative for cough and wheezing.    Cardiovascular: Negative for chest pain and leg swelling.   Gastrointestinal: Negative for abdominal pain, constipation and diarrhea.   Genitourinary: Negative for difficulty urinating and hematuria.   Musculoskeletal: Negative for neck pain and neck stiffness.   Skin: Negative for pallor and rash.   Neurological: Negative for headaches.   Psychiatric/Behavioral: Negative for dysphoric mood and suicidal ideas. The patient is not nervous/anxious.        Objective:      Physical Exam   Constitutional: She is oriented to person, place, and time. She appears well-developed and well-nourished. No distress.   Obese female no acute distress   HENT:   Head: Normocephalic and atraumatic.   Right Ear: External ear normal.   Left Ear: External ear normal.   Mouth/Throat: Oropharynx is clear and moist. No oropharyngeal exudate.   Eyes: Conjunctivae are normal. Pupils are equal, round, and reactive to light. No scleral icterus.   Neck: Normal range of motion. Neck supple. No thyromegaly present.   Cardiovascular: Normal rate and regular rhythm.    No murmur heard.  Pulmonary/Chest: Effort normal and breath sounds normal. She has no wheezes.   Abdominal: Soft. Bowel sounds are normal. She exhibits no distension. There is no tenderness.   Musculoskeletal: She exhibits no edema or tenderness.   Lymphadenopathy:     She has no cervical adenopathy.   Neurological: She is alert and oriented to person, place, and time.   Skin: No rash noted.   Psychiatric: She has a normal mood and affect.       Assessment:       1. Acute on chronic diastolic congestive heart failure    2. Type 2 diabetes mellitus with hyperglycemia, with long-term current use of insulin    3. Morbid obesity with BMI of 40.0-44.9, adult    4. CKD stage 3 due to type 2 diabetes mellitus    5. Aortic calcification    6. Bilateral carotid artery disease        Plan:       Courtney was seen today for  follow-up.    Diagnoses and all orders for this visit:    Acute on chronic diastolic congestive heart failure  -     Basic metabolic panel; Future  -     Brain natriuretic peptide; Future  -     Ambulatory referral to Cardiology    Type 2 diabetes mellitus with hyperglycemia, with long-term current use of insulin    Morbid obesity with BMI of 40.0-44.9, adult    CKD stage 3 due to type 2 diabetes mellitus    Aortic calcification  Comments:  Clinically stable. Continue meds/Cardiology eval.     Bilateral carotid artery disease  Comments:  Clinically stable. Continue meds/Cardiology eval.

## 2018-01-22 ENCOUNTER — OFFICE VISIT (OUTPATIENT)
Dept: INTERNAL MEDICINE | Facility: CLINIC | Age: 83
End: 2018-01-22
Payer: MEDICARE

## 2018-01-22 ENCOUNTER — LAB VISIT (OUTPATIENT)
Dept: LAB | Facility: HOSPITAL | Age: 83
End: 2018-01-22
Attending: INTERNAL MEDICINE
Payer: MEDICARE

## 2018-01-22 ENCOUNTER — TELEPHONE (OUTPATIENT)
Dept: INTERNAL MEDICINE | Facility: CLINIC | Age: 83
End: 2018-01-22

## 2018-01-22 VITALS
SYSTOLIC BLOOD PRESSURE: 154 MMHG | DIASTOLIC BLOOD PRESSURE: 78 MMHG | BODY MASS INDEX: 43.26 KG/M2 | HEART RATE: 84 BPM | WEIGHT: 207 LBS | OXYGEN SATURATION: 91 %

## 2018-01-22 DIAGNOSIS — E11.22 CKD STAGE 3 DUE TO TYPE 2 DIABETES MELLITUS: Chronic | ICD-10-CM

## 2018-01-22 DIAGNOSIS — E11.65 TYPE 2 DIABETES MELLITUS WITH HYPERGLYCEMIA, WITH LONG-TERM CURRENT USE OF INSULIN: Chronic | ICD-10-CM

## 2018-01-22 DIAGNOSIS — I50.33 ACUTE ON CHRONIC DIASTOLIC CONGESTIVE HEART FAILURE: Primary | ICD-10-CM

## 2018-01-22 DIAGNOSIS — Z79.4 TYPE 2 DIABETES MELLITUS WITH HYPERGLYCEMIA, WITH LONG-TERM CURRENT USE OF INSULIN: Chronic | ICD-10-CM

## 2018-01-22 DIAGNOSIS — N18.30 CKD STAGE 3 DUE TO TYPE 2 DIABETES MELLITUS: Chronic | ICD-10-CM

## 2018-01-22 DIAGNOSIS — I70.0 AORTIC CALCIFICATION: ICD-10-CM

## 2018-01-22 DIAGNOSIS — I50.33 ACUTE ON CHRONIC DIASTOLIC CONGESTIVE HEART FAILURE: ICD-10-CM

## 2018-01-22 DIAGNOSIS — I77.9 BILATERAL CAROTID ARTERY DISEASE: ICD-10-CM

## 2018-01-22 DIAGNOSIS — E66.01 MORBID OBESITY WITH BMI OF 40.0-44.9, ADULT: Chronic | ICD-10-CM

## 2018-01-22 LAB
ANION GAP SERPL CALC-SCNC: 11 MMOL/L
BNP SERPL-MCNC: 152 PG/ML
BUN SERPL-MCNC: 22 MG/DL
CALCIUM SERPL-MCNC: 9.2 MG/DL
CHLORIDE SERPL-SCNC: 105 MMOL/L
CO2 SERPL-SCNC: 26 MMOL/L
CREAT SERPL-MCNC: 1 MG/DL
EST. GFR  (AFRICAN AMERICAN): >60 ML/MIN/1.73 M^2
EST. GFR  (NON AFRICAN AMERICAN): 53.3 ML/MIN/1.73 M^2
GLUCOSE SERPL-MCNC: 101 MG/DL
POTASSIUM SERPL-SCNC: 3.8 MMOL/L
SODIUM SERPL-SCNC: 142 MMOL/L

## 2018-01-22 PROCEDURE — 36415 COLL VENOUS BLD VENIPUNCTURE: CPT

## 2018-01-22 PROCEDURE — 83880 ASSAY OF NATRIURETIC PEPTIDE: CPT

## 2018-01-22 PROCEDURE — 99499 UNLISTED E&M SERVICE: CPT | Mod: S$GLB,,, | Performed by: INTERNAL MEDICINE

## 2018-01-22 PROCEDURE — 99999 PR PBB SHADOW E&M-EST. PATIENT-LVL V: CPT | Mod: PBBFAC,,, | Performed by: INTERNAL MEDICINE

## 2018-01-22 PROCEDURE — 80048 BASIC METABOLIC PNL TOTAL CA: CPT

## 2018-01-22 PROCEDURE — 99495 TRANSJ CARE MGMT MOD F2F 14D: CPT | Mod: S$GLB,,, | Performed by: INTERNAL MEDICINE

## 2018-01-22 NOTE — TELEPHONE ENCOUNTER
----- Message from Sridevi Ortega sent at 1/20/2018 12:37 PM CST -----  Contact: Angela with [facility] [phone]  Angela Berman called to ask for a message to be sent to the PCP that patient is refusing nursing care.  Angela stated that the patient said she was just trying to get her prescription refilled and didn't need home care. Patient will continue her physical therapy.    If there are any questions Angela may be reached at [phone].    Thank you.  LACEY

## 2018-01-23 ENCOUNTER — OUTPATIENT CASE MANAGEMENT (OUTPATIENT)
Dept: ADMINISTRATIVE | Facility: OTHER | Age: 83
End: 2018-01-23

## 2018-01-23 NOTE — PROGRESS NOTES
Attempted to update plan of care for OPCM.  Patient asked that this RN call patient back at a later date and time.  NATALY Petersen, OPCM-RN

## 2018-01-25 ENCOUNTER — OUTPATIENT CASE MANAGEMENT (OUTPATIENT)
Dept: ADMINISTRATIVE | Facility: OTHER | Age: 83
End: 2018-01-25

## 2018-01-25 NOTE — PROGRESS NOTES
Please note an Outpatient Complex Care Management welcome packet and consent form was created and mailed to the patient on 1/25/18.    Please contact [facility] at ext. [phone] with any questions.    Thank you,    Marycarmen Pedraza, [facility]  Outpatient Complex Care Mgmt  Ext [phone]

## 2018-01-25 NOTE — LETTER
January 25, 2018    Courtney Engle  [address] [place] [address]             Outpatient Case Management  [facility] Reece francis  [facility] [address] Dear Courtney Engle:    We understand that receiving many services from different doctors and healthcare providers is overwhelming. There are appointments to make, transportation to arrange, dietary instructions to understand, and new medications to obtain.    This is where [facility] can help.     You are eligible to receive Outpatient Case Management services when you have healthcare needs that require the coordination of many providers, treatments, and services. You also qualify if you need assistance with a new treatment plan.     There is no charge for this support. You may have been referred to this program from your doctor(s), hospital staff member(s), or insurance company but you always have a choice to participate or not participate. To participate, you must give us your permission to be enrolled.     When you are enrolled in the [facility] program, the  who is assigned to you is    Effie Petersen RN  [phone]    Depending on your needs and wishes, your  may speak with you by phone, visit you at your place of living (for example your home, skilled nursing facility, or rehabilitation facility), or meet you at your doctors office.     Your  will tell you why you have been selected to participate in the program and will complete an assessment of your needs. Then a personalized plan of care will be developed with you and or your caregiver.             Here are examples of the services your [facility]  provides.     Coordinate communication among multiple providers.   Arrange for transportation, doctors visits, durable medical equipment, home care services, and special clinics.    Provide coaching on how to manage your health condition.    Answer  questions about your health condition.   Help you understand your doctors treatment plan.    Provide additional instruction about your health condition, treatments, and medications.    Help you obtain information about your insurance coverage.    Advocate for your individual needs.    Request a Licensed Clinical  (LCSW) to visit you if you need their services. LCSWs help with long term planning (discussing placement options, advanced planning directives), financial planning, and assistance (for example rent, utilities, medication funding).     Your  will coordinate their activities with other outpatient services you are receiving. All services provided by [facility]  are coordinated with and communicated to your primary care physician.    Our goal is to help you manage your health condition(s) safely within your living environment, whether that is your home or a medical facility. We want to help you function at the healthiest and highest level possible.     Sincerely,      Elia Zacarias MD  Medical Director    Enclosures:    Frequently Asked Questions  Patient Rights and Responsibilities   Reporting a Grievance or Complaint  Consent/Release of Information  Stamped Addressed Envelope                  Frequently Asked Questions about [facility]    What is [facility]?  Outpatient Case management is not Home healthcare services. [facility]  do not provide hands-on care. [facility]  will work with your doctor to arrange for home health services, if needed. Home health services have a limited duration and there are some restrictions as to who can get these services. There is no prescribed limit to the amount of time you receive [facility] services. [facility]  are not agents of your insurance company. However, [facility] Case  Managers can help you obtain information from your insurance company.     Who are the [facility] ?  [facility]  are Registered Nurses and Social Workers. It is important to remember that you and your  are a team that works together with your primary care physician to create your individualized plan of care. The ultimate goal of your care plan is to help you implement your doctors treatment plan and to help you function at the highest level of health possible.     What are my rights as a patient?  It is important for you to know and understand your rights and responsibilities while receiving services from the [facility] program. We have enclosed a complete description of your rights and responsibilities. You can help to make your care more effective when you understand your right and responsibilities.     What is needed to be enrolled in the program?  You are only enrolled in the [facility] Program when you give us your consent to participate. You will find enclosed a consent form. You are receiving this letter because you or your caregivers have given us a verbal consent to enroll you in [facility] Management Program. We ask that you sign and return the enclosed written consent in the stamped self-addressed envelope.                           Patient Rights and Responsibilities    We consider you a partner in your care. When you are well informed, participate in treatment decisions and communicate openly with your doctor and other healthcare professionals, you help make your care more effective.     While you are in the Outpatient Case Management Program, your rights include the following:     You have a right to be provided services in a non-discriminatory manner in accordance with the provisions of Title VI of the Civil Rights Act of 1964, Section 504 of the Rehabilitation Act of 1973, the Age  Discrimination Act of 1975, the Americans with Disabilities Act as well as any other applicable Federal and State laws and regulations.     You have the right to a reasonable, timely response to your request or need for care, as well as the right to considerate and respectful care including an environment that preserves dignity and contributes to a positive self-image. You are responsible for being considerate and respectful of our staff.     You have a right to information regarding patient rights, advocacy services and complaint mechanisms, and the right to prompt resolution of any complaint. You or a designee has the right to participate in the resolution of ethical issues surrounding your care. You have a right to file a complaint if you feel that your rights have been infringed, without fear or penalty from [facility] or the federal government. You may file a complaint with the Director of Outpatient Case Management by calling [phone]. At any time, you may lodge a grievance with the [facility] by calling [phone], or the Joint Commission on Accreditation of Healthcare Organizations at [phone].     You, or someone acting on your behalf, have the right to understandable information on your health status, treatment and progress in order to make decisions. You have the right to know the nature, risks and alternatives to treatment. You have the right to be informed, when appropriate, regarding the outcome of the care that has been provided. You have the right to refuse treatment to the extent permitted by law, and the right to be informed of the alternatives and consequences of refusing treatment.     You, in collaboration with your physician, have the right to make decisions regarding care and the right to participate in the development and implementation of the plan of care and effective pain management. You have the right to know the name and professional status of  those responsible for the delivery of your care and treatment.       You have a right, within legal guidelines, to have a guardian, next-of-kin or legal designee exercises your patient rights when you are unable to do so. You have the right for your wishes regarding end-of-life decisions to be addressed by the healthcare team through advance directives. You have the right to personal privacy and confidentiality and to expect confidentiality of all records and communications pertaining to your care.      You have the right to receive communications about your health information confidentially. You have the right to request restrictions on the uses and disclosures of your health information. You have the right to inspect, copy, request amendments and receive an accounting of to whom we have disclosed your health information.     You have the right to be provided with interpretation services if you do not speak English; to alternative communication techniques if you are hearing or vision impaired; and to have any other resources needed on your behalf to ensure effective communication. These services are provided free of charge.     You have a right to personal safety (free from mental, physical, sexual and verbal abuse, neglect and exploitation). You have the right to access protective and advocacy services.     Advance Directives  A Patient Advocate is available to meet with patients to answer questions regarding advance directives.    Living Will  A document that outlines what medical treatment the patient does or does not want in the event the patient becomes unable to make those decisions at the appropriate time.    Durable Medical Power of   A document by which the patient designates an individual to be responsible for making medical decisions in the event the patient becomes unable to do so.    HIPAA Notice of Privacy Practices  Your medical information is governed by federal privacy laws. HIPAA  protects private medical information and how that information is disclosed. If you have a question regarding the HIPAA Notice of Privacy Practices, or if you believe your privacy rights have been violated, you may call our designated hotline at [phone].            Quality Improvement  Because we consistently strive to improve the care and service provided to our patients, we welcome your feedback. Your comments are an important part of our quality improvement process, as we like to know what we are doing right and which areas are in need of improvement. Our policy is to listen, be responsive and provide you with an appropriate and timely follow-up to your questions or concerns. Our goal is active patient and family involvement in all aspects of the care process.          Reporting a Grievance or Complaint    During your time with the [facility] team you may have a grievance or complaint with our services. Your Patients Bill of Rights gives patients, families, and caregivers the right to express concerns and grievances and the right to expect a reasonable and timely response.     Your presentation of your concerns is not viewed negatively. It is an opportunity for us to improve the quality of our care and services we provide to you.     You may report your concerns directly to your , or you can phone in a complaint to:     Director of Outpatient Case Management  [phone]    You may also send a complaint letter to:    Director of Outpatient Case Management Services  [address] [place] [id number]    Tell us the details of your complaint and provide us with a contact phone number so we can contact you to obtain additional information. We will return a call to you within two business days of our receipt of your complaint, and to request additional information as needed. If you choose to mail a letter, your complaint may take a few days longer to reach us.      All grievances will be addressed as quickly as possible. A grievance or complaint that involves situations or practices which place patients in immediate danger will be addressed as an urgent matter. We will work to resolve all other complaints within seven days of receipt. By that time, you will receive a phone call with either the resolution of your complaint, or a plan for corrective action. A formal written response will be sent to you within 30 days of receipt of your grievance.     If a resolution cannot be completed within 30 days, a letter will be sent to you or your family member with an estimated time for the final response.    Additionally, all patients have the right to file complaints with external agencies, without exception. Complaints/grievances can be addressed to the following agencies:            Patient Safety or Quality of Care Concerns  Office of Quality Monitoring   [facility] [place] [address]  [phone] Toll Free    HIPPA Privacy/Security Concerns  Office for Civil Rights Region IV  [place] Department of Health & Human Services  [address], [address]  [place] [address] [phone] Phone  [phone] TDD  [phone] Toll Free    Medicare/Medicaid Billing Concerns  [place] for Medicare & Medicaid Services  Region 6  [address], [address]  [place] [address] [phone] Phone  [phone] Toll Free    General Concerns  [place] [facility] ([facility])  [phone] Toll Free Complaint Hotline                                        Consent Form/Release of Information    By signing--     (1) I agree I have read the Outpatient Case Management information provided to me;     (2) I agree to voluntarily participate in the Outpatient Case Management program;     (3) I understand I must consent to participation in the Outpatient Case Management program during my first interview with my Case  Manager;    (4) I consent to the discussion and release of my personal health information to my healthcare team (including my personal physician, my medical home care team, any specialty physician(s), and my [facility] team);     (5) I agree my consent is valid for the length of time I am receiving Outpatient Case Management;    (6) I agree to referrals to community resources which my Case Management team recommends for me. I agree to the release of my personal information and personal health information as necessary to referral sources.    ___________________________________________________________________  Patients Printed Name     ___________________________________________________________________  Patients Signature       Date    If patient is in being cared for, please complete this section:     ___________________________________________________________________  Printed Name of Person Caring For Patient   Relationship To Patient    ___________________________________________________________________   Signature of Person Caring For Patient     Date    PLEASE SIGN AND RETURN IN THE ENCLOSED PRE-ADDRESSED ENVELOPE.

## 2018-01-26 RX ORDER — LEVOTHYROXINE SODIUM 75 UG/1
TABLET ORAL
Qty: 90 TABLET | Refills: 0 | Status: SHIPPED | OUTPATIENT
Start: 2018-01-26 | End: 2018-05-07 | Stop reason: SDUPTHER

## 2018-01-26 RX ORDER — ALENDRONATE SODIUM 70 MG/1
TABLET ORAL
Qty: 4 TABLET | Refills: 0 | Status: SHIPPED | OUTPATIENT
Start: 2018-01-26 | End: 2018-03-01 | Stop reason: SDUPTHER

## 2018-01-26 RX ORDER — ALPRAZOLAM 0.5 MG/1
TABLET ORAL
Qty: 30 TABLET | Refills: 0 | Status: SHIPPED | OUTPATIENT
Start: 2018-01-26 | End: 2018-03-01 | Stop reason: SDUPTHER

## 2018-01-29 ENCOUNTER — OFFICE VISIT (OUTPATIENT)
Dept: CARDIOLOGY | Facility: CLINIC | Age: 83
End: 2018-01-29
Payer: MEDICARE

## 2018-01-29 VITALS
SYSTOLIC BLOOD PRESSURE: 110 MMHG | HEIGHT: 58 IN | WEIGHT: 204.81 LBS | HEART RATE: 66 BPM | BODY MASS INDEX: 42.99 KG/M2 | DIASTOLIC BLOOD PRESSURE: 78 MMHG

## 2018-01-29 DIAGNOSIS — I10 ESSENTIAL HYPERTENSION: ICD-10-CM

## 2018-01-29 DIAGNOSIS — E78.00 PURE HYPERCHOLESTEROLEMIA: ICD-10-CM

## 2018-01-29 DIAGNOSIS — Z79.4 TYPE 2 DIABETES MELLITUS WITHOUT COMPLICATION, WITH LONG-TERM CURRENT USE OF INSULIN: ICD-10-CM

## 2018-01-29 DIAGNOSIS — I50.32 CHRONIC DIASTOLIC CONGESTIVE HEART FAILURE: Primary | ICD-10-CM

## 2018-01-29 DIAGNOSIS — E11.9 TYPE 2 DIABETES MELLITUS WITHOUT COMPLICATION, WITH LONG-TERM CURRENT USE OF INSULIN: ICD-10-CM

## 2018-01-29 PROCEDURE — 99499 UNLISTED E&M SERVICE: CPT | Mod: S$GLB,,, | Performed by: INTERNAL MEDICINE

## 2018-01-29 PROCEDURE — 99999 PR PBB SHADOW E&M-EST. PATIENT-LVL III: CPT | Mod: PBBFAC,,, | Performed by: INTERNAL MEDICINE

## 2018-01-29 PROCEDURE — 99214 OFFICE O/P EST MOD 30 MIN: CPT | Mod: S$GLB,,, | Performed by: INTERNAL MEDICINE

## 2018-01-29 RX ORDER — FUROSEMIDE 20 MG/1
20 TABLET ORAL 2 TIMES DAILY
Qty: 60 TABLET | Refills: 11 | Status: SHIPPED | OUTPATIENT
Start: 2018-01-29 | End: 2019-09-03 | Stop reason: SDUPTHER

## 2018-01-29 NOTE — PROGRESS NOTES
Subjective:    Patient ID:  Courtney Engle is a 82 y.o. female who presents for evaluation of No chief complaint on file.      HPI     The patient is a 82 year old female referred by Dr Ulloa for assistance in managing her HFpEF. She was admitted 1/9-15/18. BNP was 273 [morbid obese] and CXR revealed only small bilateral effusions. While she is much improved and is able to do daily life activity, but continues with VERA. She is not adherent to a low salt diet and not weighing .           CONCLUSIONS     1 - Normal left ventricular systolic function (EF 60-65%).     2 - No wall motion abnormalities.     3 - Normal left ventricular diastolic function.     4 - Normal right ventricular systolic function .     5 - Trivial tricuspid regurgitation.     6 - The estimated PA systolic pressure is 28 mmHg.             This document has been electronically    SIGNED BY: Gayla Mejia MD On: 01/10/2018 12:25      CONCLUSIONS     1 - Normal left ventricular systolic function (EF 60-65%).     2 - Indeterminate LV diastolic function.     3 - Mild left atrial enlargement.     4 - Normal right ventricular systolic function .     No evidence of stress induced myocardial ischemia.         This document has been electronically    SIGNED BY: Ruth Kinney MD On: 06/21/2017 10:20  Lab Results   Component Value Date     01/22/2018    K 3.8 01/22/2018     01/22/2018    CO2 26 01/22/2018    BUN 22 01/22/2018    CREATININE 1.0 01/22/2018     01/22/2018    HGBA1C 7.7 (H) 01/10/2018    MG 1.7 04/25/2017    AST 15 01/09/2018    ALT 14 01/09/2018    ALBUMIN 3.1 (L) 01/09/2018    PROT 7.3 01/09/2018    BILITOT 0.3 01/09/2018    WBC 7.12 01/15/2018    HGB 11.4 (L) 01/15/2018    HCT 35.8 (L) 01/15/2018    MCV 90 01/15/2018     01/15/2018    INR 1.0 01/09/2018    INR 1.1 12/10/2009    TSH 1.375 01/10/2018         Lab Results   Component Value Date    CHOL 266 (H) 04/20/2017    HDL 52 04/20/2017    TRIG 193 (H)  "04/20/2017       Lab Results   Component Value Date    LDLCALC 175.4 (H) 04/20/2017       Past Medical History:   Diagnosis Date    Acquired hypothyroidism 4/25/2014    Anxiety     Aortic calcification 12/8/2016    X-Ray Chest-5/08/2014    Arthritis     Bilateral carotid artery disease 12/8/2016    US Carotid Bilateral-1/24/2013    CKD stage 3 due to type 2 diabetes mellitus 2/16/2017    Depression     Essential hypertension     Fever blister     GERD (gastroesophageal reflux disease)     Glaucoma 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 robledo)     Hyperlipidemia LDL goal <70 2/16/2017    Keloid cicatrix     Mixed stress and urge urinary incontinence 2/16/2017    Morbid obesity with BMI of 40.0-44.9, adult 12/8/2016    Ocular migraine 1/24/2013    Type 2 diabetes mellitus with hyperglycemia, with long-term current use of insulin        Current Outpatient Prescriptions:     acetaminophen (TYLENOL) 500 MG tablet, Take 1 tablet (500 mg total) by mouth every 6 (six) hours as needed for Pain., Disp: , Rfl: 0    alendronate (FOSAMAX) 70 MG tablet, TAKE 1 TABLET BY MOUTH EVERY 7 DAYS, Disp: 4 tablet, Rfl: 0    ALPRAZolam (XANAX) 0.5 MG tablet, TAKE 1 TABLET BY MOUTH EVERY NIGHT, Disp: 30 tablet, Rfl: 0    aspirin (ECOTRIN) 81 MG EC tablet, Take 1 tablet (81 mg total) by mouth once daily., Disp: 30 tablet, Rfl: 12    atenolol (TENORMIN) 25 MG tablet, TAKE 1 TABLET BY MOUTH EVERY DAY, Disp: 90 tablet, Rfl: 0    BD INSULIN SYRINGE ULT-FINE II 1/2 mL 31 x 5/16" Syrg, USE THREE TIMES DAILY AS DIRECTED, Disp: 100 each, Rfl: 0    benzonatate (TESSALON) 100 MG capsule, Take 100 mg by mouth as needed for Cough., Disp: , Rfl:     blood-glucose meter kit, Use as instructed, Disp: 1 each, Rfl: 0    butalbital-acetaminophen  mg Tab, Take by mouth as needed (pain). caffeine, Disp: , Rfl:     escitalopram oxalate (LEXAPRO) 10 MG tablet, TAKE 1 TABLET BY MOUTH EVERY DAY, Disp: 90 tablet, Rfl: 0    " fluticasone (FLONASE) 50 mcg/actuation nasal spray, SHAKE LIQUID WELL AND SPRAY TWO SPRAYS IN EACH NOSTRIL EVERY DAY, Disp: 48 g, Rfl: 0    furosemide (LASIX) 20 MG tablet, Take 1 tablet (20 mg total) by mouth once daily., Disp: 30 tablet, Rfl: 11    insulin aspart (NOVOLOG) 100 unit/mL injection, To use with Vgo 20  with 4 clicks with meals, max TDD 38 units daily, Disp: 2 vial, Rfl: 3    insulin glargine (LANTUS) 100 unit/mL injection, ADMINISTER 30 UNITS UNDER THE SKIN SKIN EVERY EVENING, Disp: 10 mL, Rfl: 4    levothyroxine (SYNTHROID) 75 MCG tablet, TAKE 1 TABLET BY MOUTH EVERY DAY, Disp: 90 tablet, Rfl: 0    lisinopril 10 MG tablet, Take 1 tablet (10 mg total) by mouth once daily. (Patient taking differently: Take 20 mg by mouth every morning. ), Disp: 90 tablet, Rfl: 3    omeprazole (PRILOSEC) 40 MG capsule, Take 1 capsule (40 mg total) by mouth 2 (two) times daily before meals., Disp: 60 capsule, Rfl: 6    ONETOUCH ULTRA TEST Strp, TEST FOUR TIMES DAILY, Disp: 300 strip, Rfl: 0    triamcinolone acetonide 0.1% (KENALOG) 0.1 % cream, AAA bid on rash, Disp: 454 g, Rfl: 3        Review of Systems   Constitution: Negative for decreased appetite, diaphoresis, fever, weakness, malaise/fatigue, weight gain and weight loss.   HENT: Negative for congestion, ear discharge, ear pain and nosebleeds.    Eyes: Negative for blurred vision, double vision and visual disturbance.   Cardiovascular: Positive for dyspnea on exertion. Negative for chest pain, claudication, cyanosis, irregular heartbeat, leg swelling, near-syncope, orthopnea, palpitations, paroxysmal nocturnal dyspnea and syncope.   Respiratory: Negative for cough, hemoptysis, shortness of breath, sleep disturbances due to breathing, snoring, sputum production and wheezing.    Endocrine: Negative for polydipsia, polyphagia and polyuria.   Hematologic/Lymphatic: Negative for adenopathy and bleeding problem. Does not bruise/bleed easily.   Skin: Negative for  "color change, nail changes, poor wound healing and rash.   Musculoskeletal: Negative for muscle cramps and muscle weakness.   Gastrointestinal: Negative for abdominal pain, anorexia, change in bowel habit, hematochezia, nausea and vomiting.   Genitourinary: Positive for frequency. Negative for dysuria and hematuria.   Neurological: Negative for brief paralysis, difficulty with concentration, excessive daytime sleepiness, dizziness, focal weakness, headaches, light-headedness, seizures and vertigo.   Psychiatric/Behavioral: Negative for altered mental status and depression.   Allergic/Immunologic: Negative for persistent infections.        Objective:/78   Pulse 66   Ht 4' 10" (1.473 m)   Wt 92.9 kg (204 lb 12.9 oz)   BMI 42.80 kg/m²         Physical Exam   Constitutional: She is oriented to person, place, and time. She appears well-developed and well-nourished.   Morbid obese   HENT:   Head: Normocephalic.   Right Ear: External ear normal.   Left Ear: External ear normal.   Nose: Nose normal.   Inspection of lips, teeth and gums normal   Eyes: Conjunctivae and EOM are normal. Pupils are equal, round, and reactive to light. No scleral icterus.   Neck: Normal range of motion. No JVD present. No tracheal deviation present. No thyromegaly present.   Cardiovascular: Normal rate, regular rhythm, normal heart sounds and intact distal pulses.  Exam reveals no gallop and no friction rub.    No murmur heard.  Pulses:       Dorsalis pedis pulses are 0 on the right side, and 2+ on the left side.        Posterior tibial pulses are 2+ on the right side, and 0 on the left side.   Pulmonary/Chest: Effort normal and breath sounds normal. No respiratory distress. She has no wheezes. She has no rales. She exhibits no tenderness.   Abdominal: Soft. Bowel sounds are normal. She exhibits no distension. There is no hepatosplenomegaly. There is no tenderness. There is no guarding.   Musculoskeletal: Normal range of motion. She " exhibits no edema or tenderness.   Lymphadenopathy:   Palpation of lymph nodes of neck and groin normal   Neurological: She is oriented to person, place, and time. No cranial nerve deficit. She exhibits normal muscle tone. Coordination normal.   Skin: Skin is warm and dry. No rash noted. No erythema. No pallor.   Palpation of skin normal   Psychiatric: She has a normal mood and affect. Her behavior is normal. Judgment and thought content normal.         Assessment:       No diagnosis found.     Plan:       There are no diagnoses linked to this encounter.

## 2018-01-29 NOTE — LETTER
January 29, 2018      Vishal Ulloa MD  [address]  [facility] [address]           [facility]  [address]  [facility] [address]  Phone: [phone]          Patient: Courtney Engle   MR Number: [id number]   YOB: 1935   Date of Visit: 1/29/2018       Dear Dr. Vishal Ulloa:    Thank you for referring Courtney Engle to me for evaluation. Attached you will find relevant portions of my assessment and plan of care.    If you have questions, please do not hesitate to call me. I look forward to following Courtney Engle along with you.    Sincerely,    Jim Saldaña MD    Enclosure  CC:  No Recipients    If you would like to receive this communication electronically, please contact [email or web address] or [phone] to request more information on [employer] Link access.    For providers and/or their staff who would like to refer a patient to [facility], please contact us through our one-stop-shop provider referral line, [facility] , at [phone].    If you feel you have received this communication in error or would no longer like to receive these types of communications, please e-mail [email or web address]

## 2018-01-30 ENCOUNTER — OUTPATIENT CASE MANAGEMENT (OUTPATIENT)
Dept: ADMINISTRATIVE | Facility: OTHER | Age: 83
End: 2018-01-30

## 2018-01-30 NOTE — PROGRESS NOTES
Attempted to update plan of care for OPCM; left voice message to return call.  NATALY Petersen, OPCM-RN

## 2018-02-08 RX ORDER — INSULIN ASPART 100 [IU]/ML
INJECTION, SOLUTION INTRAVENOUS; SUBCUTANEOUS
Qty: 20 ML | Refills: 0 | Status: SHIPPED | OUTPATIENT
Start: 2018-02-08 | End: 2018-04-23 | Stop reason: SDUPTHER

## 2018-02-09 ENCOUNTER — OUTPATIENT CASE MANAGEMENT (OUTPATIENT)
Dept: ADMINISTRATIVE | Facility: OTHER | Age: 83
End: 2018-02-09

## 2018-02-09 NOTE — PROGRESS NOTES
2/9/18  Call to pt and message left requesting call back.              Call to pt emergency contact daughter joan and left message requesting call back              Daughter joan returned call and informed me that pt is nocturnal and will stay up all night go to bed at 5 am and sleep until 4 pm.  Daughter states that her mom has been troubled by multiple people calling her from [facility] and asking her to update her medical record and that is why she is not returning calls from [facility].  I advised daughter that we do not call for update to medical information.  That I am a registered nurse who works with dr. muniz and I am checking on her to see how she is progressing since discharge.  She states she will call her mother and advise and have her call me at [phone]

## 2018-02-12 ENCOUNTER — TELEPHONE (OUTPATIENT)
Dept: INTERNAL MEDICINE | Facility: CLINIC | Age: 83
End: 2018-02-12

## 2018-02-12 NOTE — TELEPHONE ENCOUNTER
----- Message from Jacy Hazel sent at 2/9/2018  2:46 PM CST -----  Contact: Patient [phone]  Requesting a call from you.    Please call and advise.    Thank You

## 2018-02-14 ENCOUNTER — TELEPHONE (OUTPATIENT)
Dept: INTERNAL MEDICINE | Facility: CLINIC | Age: 83
End: 2018-02-14

## 2018-02-14 RX ORDER — INSULIN GLARGINE 100 [IU]/ML
INJECTION, SOLUTION SUBCUTANEOUS
Qty: 20 ML | Refills: 4 | Status: SHIPPED | OUTPATIENT
Start: 2018-02-14 | End: 2018-09-27

## 2018-02-14 RX ORDER — BLOOD SUGAR DIAGNOSTIC
STRIP MISCELLANEOUS
Qty: 300 STRIP | Refills: 0 | Status: SHIPPED | OUTPATIENT
Start: 2018-02-14 | End: 2018-04-11 | Stop reason: SDUPTHER

## 2018-02-14 NOTE — TELEPHONE ENCOUNTER
Pt states her Lantuss is not lasting her long enough. She ran out last month and blood sugar went up to 400. She would like to know if you can prescribe her two bottles because it does not last long enough. She states she only has enough for 3 days and [employer] is giving her a lot of trouble. They will not allow her to fill it early.

## 2018-02-14 NOTE — TELEPHONE ENCOUNTER
They should not deny it if she is taking the dose correctly and she CANNOT run out and allow the sugar to run that high. We can get her to diabetes group if needed to help with these logistics.

## 2018-02-14 NOTE — TELEPHONE ENCOUNTER
Informed pt medication was sent over to her pharmacy. Also informed pt that is she has any problems we can get her into a diabetes group

## 2018-02-16 ENCOUNTER — OUTPATIENT CASE MANAGEMENT (OUTPATIENT)
Dept: ADMINISTRATIVE | Facility: OTHER | Age: 83
End: 2018-02-16

## 2018-02-16 NOTE — PROGRESS NOTES
Attempted to update plan of care for OPCM; unable to reach and leave voice message.  NATALY Petersen, OPCM-RN

## 2018-02-21 RX ORDER — BUTALBITAL, ACETAMINOPHEN AND CAFFEINE 50; 325; 40 MG/1; MG/1; MG/1
TABLET ORAL
Qty: 50 TABLET | Refills: 2 | Status: SHIPPED | OUTPATIENT
Start: 2018-02-21 | End: 2018-10-02 | Stop reason: SDUPTHER

## 2018-02-23 ENCOUNTER — OUTPATIENT CASE MANAGEMENT (OUTPATIENT)
Dept: ADMINISTRATIVE | Facility: OTHER | Age: 83
End: 2018-02-23

## 2018-02-23 NOTE — PROGRESS NOTES
2 nd attempt to update plan of care for OPCM; left message requesting a return call.  As this is my second unsuccessful attempt to complete follow-up for OPCM, I will mail a letter with my contact information.  NATALY Petersen, OPCM-RN

## 2018-02-23 NOTE — LETTER
February 23, 2018    Courtney Engle  [address] [place] [address]             [facility] [address] [place] [address] Dear Courtney,     I work with [facility]'s Outpatient Case Management Department. I have been unsuccessful at reaching you to follow-up to see how you have been doing. If you require any future assistance or if any new concerns or problems arise, please do not hesitate to call.     The Outpatient Case Management Department can be reached at [phone] from 8:00AM to 4:30 PM on Monday thru Friday. [facility] also has a program where a nurse is available 24/7 to answer questions or provide medical advice, their number is [phone].    Thanks,      Effie Petersen,  RN  Outpatient Case Management

## 2018-02-27 ENCOUNTER — TELEPHONE (OUTPATIENT)
Dept: INTERNAL MEDICINE | Facility: CLINIC | Age: 83
End: 2018-02-27

## 2018-02-27 NOTE — TELEPHONE ENCOUNTER
----- Message from Marisa Ramon sent at 2/23/2018  4:22 PM CST -----  Contact: pharmacy/deidre/[phone]  Pharmacy called in regards to checking on the status of the pt cmn form for pt Rx for ONETOUCH ULTRA TEST Strp.      [facility]  Please advise

## 2018-03-01 RX ORDER — ALENDRONATE SODIUM 70 MG/1
TABLET ORAL
Qty: 4 TABLET | Refills: 0 | Status: SHIPPED | OUTPATIENT
Start: 2018-03-01 | End: 2018-04-23 | Stop reason: SDUPTHER

## 2018-03-01 RX ORDER — ESCITALOPRAM OXALATE 10 MG/1
TABLET ORAL
Qty: 90 TABLET | Refills: 0 | Status: SHIPPED | OUTPATIENT
Start: 2018-03-01 | End: 2018-05-30 | Stop reason: SDUPTHER

## 2018-03-01 RX ORDER — ALPRAZOLAM 0.5 MG/1
TABLET ORAL
Qty: 30 TABLET | Refills: 0 | Status: SHIPPED | OUTPATIENT
Start: 2018-03-01 | End: 2018-04-05 | Stop reason: SDUPTHER

## 2018-03-02 ENCOUNTER — OUTPATIENT CASE MANAGEMENT (OUTPATIENT)
Dept: ADMINISTRATIVE | Facility: OTHER | Age: 83
End: 2018-03-02

## 2018-03-02 RX ORDER — NAPROXEN SODIUM 220 MG
TABLET ORAL
Qty: 500 EACH | Refills: 0 | Status: SHIPPED | OUTPATIENT
Start: 2018-03-02 | End: 2018-08-28 | Stop reason: SDUPTHER

## 2018-03-02 NOTE — PROGRESS NOTES
Attempted to update plan of care for OPCM; left voice message to return call.   Unable to reach x 3 and letter was sent; case closed.   NATALY Petersen, OPCM-RN

## 2018-04-05 RX ORDER — FLUTICASONE PROPIONATE 50 MCG
SPRAY, SUSPENSION (ML) NASAL
Qty: 48 G | Refills: 1 | Status: SHIPPED | OUTPATIENT
Start: 2018-04-05 | End: 2019-01-02 | Stop reason: SDUPTHER

## 2018-04-05 RX ORDER — ATENOLOL 25 MG/1
TABLET ORAL
Qty: 90 TABLET | Refills: 0 | Status: SHIPPED | OUTPATIENT
Start: 2018-04-05 | End: 2018-07-06 | Stop reason: SDUPTHER

## 2018-04-05 RX ORDER — ALPRAZOLAM 0.5 MG/1
TABLET ORAL
Qty: 30 TABLET | Refills: 0 | Status: SHIPPED | OUTPATIENT
Start: 2018-04-05 | End: 2018-05-07 | Stop reason: SDUPTHER

## 2018-04-09 ENCOUNTER — TELEPHONE (OUTPATIENT)
Dept: INTERNAL MEDICINE | Facility: CLINIC | Age: 83
End: 2018-04-09

## 2018-04-09 NOTE — TELEPHONE ENCOUNTER
----- Message from Alton Bates sent at 4/9/2018 11:14 AM CDT -----  Contact: Patient [phone]  She needs a new prescription for Accu Chek glucose monitor with solution and supplies sent to [employer] at Fax # [phone], because they won't cover the original monitor anymore.    Thank you

## 2018-04-11 ENCOUNTER — TELEPHONE (OUTPATIENT)
Dept: INTERNAL MEDICINE | Facility: CLINIC | Age: 83
End: 2018-04-11

## 2018-04-11 RX ORDER — LANCETS
EACH MISCELLANEOUS
Qty: 100 EACH | Refills: 6 | Status: SHIPPED | OUTPATIENT
Start: 2018-04-11 | End: 2020-10-23 | Stop reason: SDUPTHER

## 2018-04-11 RX ORDER — INSULIN PUMP SYRINGE, 3 ML
EACH MISCELLANEOUS
Qty: 1 EACH | Refills: 0 | Status: SHIPPED | OUTPATIENT
Start: 2018-04-11 | End: 2019-04-11

## 2018-04-11 NOTE — TELEPHONE ENCOUNTER
----- Message from Alton Bates sent at 4/11/2018  2:53 PM CDT -----  Contact: Patient [phone]  She said she is running out of test strips so can you please call her whenever it's sent to [employer] because, she called [employer] today and the haven't received the prescription yet.    Thank you

## 2018-04-11 NOTE — TELEPHONE ENCOUNTER
Spoke to pt and she states [employer] no longer covers strips and glucometer she was using previously. Needs Rx for accu chek meter and strips and in paper form faxed over to [employer] at

## 2018-04-12 ENCOUNTER — TELEPHONE (OUTPATIENT)
Dept: INTERNAL MEDICINE | Facility: CLINIC | Age: 83
End: 2018-04-12

## 2018-04-12 RX ORDER — ISOPROPYL ALCOHOL 70 ML/100ML
1 SWAB TOPICAL
Qty: 100 EACH | Refills: 3 | Status: SHIPPED | OUTPATIENT
Start: 2018-04-12

## 2018-04-12 NOTE — TELEPHONE ENCOUNTER
----- Message from Tequila Lima sent at 4/11/2018  3:16 PM CDT -----  Contact: [employer]  Prescription Request:     Name of medication:   Accu-Chek JOSH Smartview Meter Qty 1  Accu-Chek Smartview Test Strips Qty 100 3 Refills  Accu-Chek Fastclix Lancets Qty 100 3 Refills  BD Single Use Swab Qty 100 3 Refills  Accu-Chek Smartview Control Solution Qty 100 3 Refills    Reason for request: New    Pharmacy: [facility] - [facility] [address]    Please advise.    Thank You

## 2018-04-12 NOTE — TELEPHONE ENCOUNTER
[facility] is requesting a 90 day supply of  BD swabs,  Accu Check Smart view control solution.

## 2018-04-23 RX ORDER — LISINOPRIL 20 MG/1
TABLET ORAL
Qty: 90 TABLET | Refills: 0 | Status: SHIPPED | OUTPATIENT
Start: 2018-04-23 | End: 2018-07-21 | Stop reason: SDUPTHER

## 2018-04-23 RX ORDER — INSULIN ASPART 100 [IU]/ML
INJECTION, SOLUTION INTRAVENOUS; SUBCUTANEOUS
Qty: 20 ML | Refills: 0 | Status: SHIPPED | OUTPATIENT
Start: 2018-04-23 | End: 2018-07-06 | Stop reason: SDUPTHER

## 2018-04-23 RX ORDER — ALENDRONATE SODIUM 70 MG/1
TABLET ORAL
Qty: 4 TABLET | Refills: 0 | Status: SHIPPED | OUTPATIENT
Start: 2018-04-23 | End: 2018-05-30 | Stop reason: SDUPTHER

## 2018-04-27 ENCOUNTER — PES CALL (OUTPATIENT)
Dept: ADMINISTRATIVE | Facility: CLINIC | Age: 83
End: 2018-04-27

## 2018-04-30 DIAGNOSIS — K21.9 LPRD (LARYNGOPHARYNGEAL REFLUX DISEASE): ICD-10-CM

## 2018-04-30 RX ORDER — OMEPRAZOLE 40 MG/1
CAPSULE, DELAYED RELEASE ORAL
Qty: 60 CAPSULE | Refills: 6 | Status: SHIPPED | OUTPATIENT
Start: 2018-04-30 | End: 2018-08-07 | Stop reason: SDUPTHER

## 2018-05-07 RX ORDER — LEVOTHYROXINE SODIUM 75 UG/1
TABLET ORAL
Qty: 90 TABLET | Refills: 0 | Status: SHIPPED | OUTPATIENT
Start: 2018-05-07 | End: 2018-08-03 | Stop reason: SDUPTHER

## 2018-05-07 RX ORDER — ALPRAZOLAM 0.5 MG/1
TABLET ORAL
Qty: 30 TABLET | Refills: 0 | Status: SHIPPED | OUTPATIENT
Start: 2018-05-07 | End: 2018-06-08 | Stop reason: SDUPTHER

## 2018-05-30 RX ORDER — ALENDRONATE SODIUM 70 MG/1
TABLET ORAL
Qty: 4 TABLET | Refills: 0 | Status: SHIPPED | OUTPATIENT
Start: 2018-05-30 | End: 2018-06-27 | Stop reason: SDUPTHER

## 2018-05-30 RX ORDER — ESCITALOPRAM OXALATE 10 MG/1
TABLET ORAL
Qty: 90 TABLET | Refills: 0 | Status: SHIPPED | OUTPATIENT
Start: 2018-05-30 | End: 2018-08-27 | Stop reason: SDUPTHER

## 2018-05-30 RX ORDER — ALPRAZOLAM 0.5 MG/1
TABLET ORAL
Qty: 30 TABLET | Refills: 0 | OUTPATIENT
Start: 2018-05-30

## 2018-06-08 RX ORDER — ALPRAZOLAM 0.5 MG/1
TABLET ORAL
Qty: 30 TABLET | Refills: 0 | Status: SHIPPED | OUTPATIENT
Start: 2018-06-08 | End: 2018-07-09 | Stop reason: SDUPTHER

## 2018-06-18 ENCOUNTER — LAB VISIT (OUTPATIENT)
Dept: LAB | Facility: HOSPITAL | Age: 83
End: 2018-06-18
Attending: INTERNAL MEDICINE
Payer: MEDICARE

## 2018-06-18 DIAGNOSIS — Z79.4 TYPE 2 DIABETES MELLITUS WITHOUT COMPLICATION, WITH LONG-TERM CURRENT USE OF INSULIN: ICD-10-CM

## 2018-06-18 DIAGNOSIS — I50.32 CHRONIC DIASTOLIC CONGESTIVE HEART FAILURE: ICD-10-CM

## 2018-06-18 DIAGNOSIS — I10 ESSENTIAL HYPERTENSION: ICD-10-CM

## 2018-06-18 DIAGNOSIS — E11.9 TYPE 2 DIABETES MELLITUS WITHOUT COMPLICATION, WITH LONG-TERM CURRENT USE OF INSULIN: ICD-10-CM

## 2018-06-18 LAB
ANION GAP SERPL CALC-SCNC: 8 MMOL/L
BUN SERPL-MCNC: 21 MG/DL
CALCIUM SERPL-MCNC: 9 MG/DL
CHLORIDE SERPL-SCNC: 105 MMOL/L
CO2 SERPL-SCNC: 27 MMOL/L
CREAT SERPL-MCNC: 1 MG/DL
EST. GFR  (AFRICAN AMERICAN): >60 ML/MIN/1.73 M^2
EST. GFR  (NON AFRICAN AMERICAN): 53 ML/MIN/1.73 M^2
GLUCOSE SERPL-MCNC: 68 MG/DL
POTASSIUM SERPL-SCNC: 4.5 MMOL/L
SODIUM SERPL-SCNC: 140 MMOL/L

## 2018-06-18 PROCEDURE — 36415 COLL VENOUS BLD VENIPUNCTURE: CPT

## 2018-06-18 PROCEDURE — 80048 BASIC METABOLIC PNL TOTAL CA: CPT

## 2018-06-27 RX ORDER — ALENDRONATE SODIUM 70 MG/1
TABLET ORAL
Qty: 4 TABLET | Refills: 0 | Status: SHIPPED | OUTPATIENT
Start: 2018-06-27 | End: 2018-07-24 | Stop reason: SDUPTHER

## 2018-06-29 RX ORDER — BENZONATATE 100 MG/1
100 CAPSULE ORAL
Qty: 30 CAPSULE | Refills: 1 | Status: SHIPPED | OUTPATIENT
Start: 2018-06-29 | End: 2020-04-03 | Stop reason: SDUPTHER

## 2018-06-29 NOTE — TELEPHONE ENCOUNTER
"----- Message from Esha Vargas sent at 6/29/2018  3:29 PM CDT -----  Contact: self/[phone]  RX request - refill or new RX.  Is this a refill or new RX:  refill  RX name and strength: benzonatate (TESSALON) 100 MG capsule  Directions: Take 100 mg by mouth as needed for Cough  Is this a 30 day or 90 day RX:    Local pharmacy or mail order pharmacy:  local  Pharmacy name and phone # (DON'T enter "on file" or "in chart"): [facility] [phone] (Phone) [phone] (Fax)  Comments:        "

## 2018-07-02 ENCOUNTER — OFFICE VISIT (OUTPATIENT)
Dept: CARDIOLOGY | Facility: CLINIC | Age: 83
End: 2018-07-02
Payer: MEDICARE

## 2018-07-02 VITALS
WEIGHT: 210.56 LBS | HEART RATE: 63 BPM | DIASTOLIC BLOOD PRESSURE: 62 MMHG | SYSTOLIC BLOOD PRESSURE: 139 MMHG | BODY MASS INDEX: 44.2 KG/M2 | HEIGHT: 58 IN

## 2018-07-02 DIAGNOSIS — I10 ESSENTIAL HYPERTENSION: ICD-10-CM

## 2018-07-02 DIAGNOSIS — M25.562 LEFT KNEE PAIN, UNSPECIFIED CHRONICITY: ICD-10-CM

## 2018-07-02 DIAGNOSIS — R06.02 SHORTNESS OF BREATH: ICD-10-CM

## 2018-07-02 DIAGNOSIS — Z96.651 HISTORY OF RIGHT KNEE JOINT REPLACEMENT: ICD-10-CM

## 2018-07-02 DIAGNOSIS — E78.00 PURE HYPERCHOLESTEROLEMIA: ICD-10-CM

## 2018-07-02 DIAGNOSIS — I50.32 CHRONIC DIASTOLIC CONGESTIVE HEART FAILURE: Primary | ICD-10-CM

## 2018-07-02 PROCEDURE — 3078F DIAST BP <80 MM HG: CPT | Mod: CPTII,S$GLB,, | Performed by: INTERNAL MEDICINE

## 2018-07-02 PROCEDURE — 99999 PR PBB SHADOW E&M-EST. PATIENT-LVL V: CPT | Mod: PBBFAC,,, | Performed by: INTERNAL MEDICINE

## 2018-07-02 PROCEDURE — 99214 OFFICE O/P EST MOD 30 MIN: CPT | Mod: S$GLB,,, | Performed by: INTERNAL MEDICINE

## 2018-07-02 PROCEDURE — 3075F SYST BP GE 130 - 139MM HG: CPT | Mod: CPTII,S$GLB,, | Performed by: INTERNAL MEDICINE

## 2018-07-02 NOTE — PROGRESS NOTES
Subjective:    Patient ID:  Courtney Engle is a 83 y.o. female who presents for follow-up of Congestive Heart Failure (6 month f/u ) and Diarrhea      HPI   The patient is a 83 year old female referred by Dr Ulloa for assistance in managing her HFpEF. She was admitted 1/9-15/18. BNP was 273 [morbid obese] and CXR revealed only small bilateral effusions. When last seen 1/29/18 she was much improved and is able to do daily life activity limited by knee arthritis, and denies SOB. She would like to proceed with knee surgery RCRI surgery risk is moderate at 6.6%      CONCLUSIONS     1 - Normal left ventricular systolic function (EF 60-65%).     2 - Indeterminate LV diastolic function.     3 - Mild left atrial enlargement.     4 - Normal right ventricular systolic function .     No evidence of stress induced myocardial ischemia.         This document has been electronically    SIGNED BY: Ruth Kinney MD On: 06/21/2017 10:20  Lab Results   Component Value Date     06/18/2018    K 4.5 06/18/2018     06/18/2018    CO2 27 06/18/2018    BUN 21 06/18/2018    CREATININE 1.0 06/18/2018    GLU 68 (L) 06/18/2018    HGBA1C 7.7 (H) 01/10/2018    MG 1.7 04/25/2017    AST 15 01/09/2018    ALT 14 01/09/2018    ALBUMIN 3.1 (L) 01/09/2018    PROT 7.3 01/09/2018    BILITOT 0.3 01/09/2018    WBC 7.12 01/15/2018    HGB 11.4 (L) 01/15/2018    HCT 35.8 (L) 01/15/2018    MCV 90 01/15/2018     01/15/2018    INR 1.0 01/09/2018    INR 1.1 12/10/2009    TSH 1.375 01/10/2018         Lab Results   Component Value Date    CHOL 266 (H) 04/20/2017    HDL 52 04/20/2017    TRIG 193 (H) 04/20/2017       Lab Results   Component Value Date    LDLCALC 175.4 (H) 04/20/2017       Past Medical History:   Diagnosis Date    Acquired hypothyroidism 4/25/2014    Anxiety     Aortic calcification 12/8/2016    X-Ray Chest-5/08/2014    Arthritis     Bilateral carotid artery disease 12/8/2016    US Carotid Bilateral-1/24/2013    CKD  "stage 3 due to type 2 diabetes mellitus 2/16/2017    Depression     Essential hypertension     Fever blister     GERD (gastroesophageal reflux disease)     Glaucoma 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 robledo)     Hyperlipidemia LDL goal <70 2/16/2017    Keloid cicatrix     Mixed stress and urge urinary incontinence 2/16/2017    Morbid obesity with BMI of 40.0-44.9, adult 12/8/2016    Ocular migraine 1/24/2013    Type 2 diabetes mellitus with hyperglycemia, with long-term current use of insulin        Current Outpatient Prescriptions:     acetaminophen (TYLENOL) 500 MG tablet, Take 1 tablet (500 mg total) by mouth every 6 (six) hours as needed for Pain., Disp: , Rfl: 0    alcohol swabs (BD ALCOHOL SWABS) PadM, Apply 1 each topically as needed., Disp: 100 each, Rfl: 3    alendronate (FOSAMAX) 70 MG tablet, TAKE 1 TABLET BY MOUTH EVERY 7 DAYS, Disp: 4 tablet, Rfl: 0    ALPRAZolam (XANAX) 0.5 MG tablet, TAKE 1 TABLET BY MOUTH EVERY NIGHT AT BEDTIME, Disp: 30 tablet, Rfl: 0    atenolol (TENORMIN) 25 MG tablet, TAKE 1 TABLET BY MOUTH EVERY DAY, Disp: 90 tablet, Rfl: 0    BD INSULIN SYRINGE ULT-FINE II 1/2 mL 31 x 5/16" Syrg, USE THREE TIMES DAILY AS DIRECTED, Disp: 100 each, Rfl: 0    benzonatate (TESSALON) 100 MG capsule, Take 1 capsule (100 mg total) by mouth as needed for Cough., Disp: 30 capsule, Rfl: 1    blood glucose control, low Soln, Test 4 times daily, Disp: 100 each, Rfl: e    blood sugar diagnostic Strp, To check BG 4 times daily, to use with insurance preferred meter, Disp: 100 each, Rfl: 6    blood-glucose meter kit, To check BG 4 times daily, to use with insurance preferred meter, Disp: 1 each, Rfl: 0    butalbital-acetaminophen  mg Tab, Take by mouth as needed (pain). caffeine, Disp: , Rfl:     butalbital-acetaminophen-caffeine -40 mg (FIORICET, ESGIC) -40 mg per tablet, TAKE ONE TABLET BY MOUTH EVERY 8 HOURS AS NEEDED, Disp: 50 tablet, Rfl: 2    " escitalopram oxalate (LEXAPRO) 10 MG tablet, TAKE 1 TABLET BY MOUTH EVERY DAY, Disp: 90 tablet, Rfl: 0    furosemide (LASIX) 20 MG tablet, Take 1 tablet (20 mg total) by mouth 2 (two) times daily., Disp: 60 tablet, Rfl: 11    insulin aspart (NOVOLOG) 100 unit/mL injection, To use with Vgo 20  with 4 clicks with meals, max TDD 38 units daily, Disp: 2 vial, Rfl: 3    insulin glargine (LANTUS) 100 unit/mL injection, ADMINISTER 30 UNITS UNDER THE SKIN SKIN EVERY EVENING, Disp: 20 mL, Rfl: 4    insulin syringe-needle U-100 0.5 mL 31 gauge x 5/16 Syrg, USE AS DIRECTED FOUR TIMES DAILY, Disp: 500 each, Rfl: 0    lancets Misc, To check BG 4 times daily, to use with insurance preferred meter, Disp: 100 each, Rfl: 6    levothyroxine (SYNTHROID) 75 MCG tablet, TAKE 1 TABLET BY MOUTH EVERY DAY, Disp: 90 tablet, Rfl: 0    lisinopril (PRINIVIL,ZESTRIL) 20 MG tablet, TAKE 1 TABLET BY MOUTH EVERY DAY, Disp: 90 tablet, Rfl: 0    NOVOLOG U-100 INSULIN ASPART 100 unit/mL injection, INJECT 15 UNITS UNDER THE SKIN BEFORE MEALS, Disp: 20 mL, Rfl: 0    omeprazole (PRILOSEC) 40 MG capsule, TAKE ONE CAPSULE BY MOUTH TWICE A DAY BEFORE A MEAL, Disp: 60 capsule, Rfl: 6    triamcinolone acetonide 0.1% (KENALOG) 0.1 % cream, AAA bid on rash, Disp: 454 g, Rfl: 3    aspirin (ECOTRIN) 81 MG EC tablet, Take 1 tablet (81 mg total) by mouth once daily., Disp: 30 tablet, Rfl: 12    fluticasone (FLONASE) 50 mcg/actuation nasal spray, SHAKE LIQUID WELL AND SPRAY 2 SPRAYS IN EACH NOSTRIL EVERY DAY, Disp: 48 g, Rfl: 1        Review of Systems   Constitution: Negative for decreased appetite, diaphoresis, fever, weakness, malaise/fatigue, weight gain and weight loss.   HENT: Negative for congestion, ear discharge, ear pain and nosebleeds.    Eyes: Negative for blurred vision, double vision and visual disturbance.   Cardiovascular: Negative for chest pain, claudication, cyanosis, dyspnea on exertion, irregular heartbeat, leg swelling,  "near-syncope, orthopnea, palpitations, paroxysmal nocturnal dyspnea and syncope.   Respiratory: Negative for cough, hemoptysis, shortness of breath, sleep disturbances due to breathing, snoring, sputum production and wheezing.    Endocrine: Negative for polydipsia, polyphagia and polyuria.   Hematologic/Lymphatic: Negative for adenopathy and bleeding problem. Does not bruise/bleed easily.   Skin: Negative for color change, nail changes, poor wound healing and rash.   Musculoskeletal: Positive for back pain and joint pain (right shoulder, and knees). Negative for muscle cramps and muscle weakness.   Gastrointestinal: Negative for abdominal pain, anorexia, change in bowel habit, hematochezia, nausea and vomiting.   Genitourinary: Negative for dysuria, frequency and hematuria.   Neurological: Negative for brief paralysis, difficulty with concentration, excessive daytime sleepiness, dizziness, focal weakness, headaches, light-headedness, seizures and vertigo.   Psychiatric/Behavioral: Negative for altered mental status and depression.   Allergic/Immunologic: Negative for persistent infections.        Objective:/62 (BP Location: Left arm, Patient Position: Sitting, BP Method: Large (Automatic))   Pulse 63   Ht 4' 10" (1.473 m)   Wt 95.5 kg (210 lb 8.6 oz)   BMI 44.00 kg/m²           Physical Exam   Constitutional: She is oriented to person, place, and time. She appears well-developed and well-nourished.   Morbid obese   HENT:   Head: Normocephalic.   Right Ear: External ear normal.   Left Ear: External ear normal.   Nose: Nose normal.   Inspection of lips, teeth and gums normal   Eyes: Conjunctivae and EOM are normal. Pupils are equal, round, and reactive to light. No scleral icterus.   Neck: Normal range of motion. No JVD present. No tracheal deviation present. No thyromegaly present.   Cardiovascular: Normal rate, regular rhythm, normal heart sounds and intact distal pulses.  Exam reveals no gallop and no " friction rub.    No murmur heard.  Pulses:       Dorsalis pedis pulses are 2+ on the right side, and 2+ on the left side.        Posterior tibial pulses are 2+ on the right side, and 2+ on the left side.   Pulmonary/Chest: Effort normal and breath sounds normal. No respiratory distress. She has no wheezes. She has no rales. She exhibits no tenderness.   Abdominal: Soft. Bowel sounds are normal. She exhibits no distension. There is no hepatosplenomegaly. There is no tenderness. There is no guarding.   Musculoskeletal: Normal range of motion. She exhibits no edema or tenderness.   Lymphadenopathy:   Palpation of lymph nodes of neck and groin normal   Neurological: She is oriented to person, place, and time. No cranial nerve deficit. She exhibits normal muscle tone. Coordination normal.   Skin: Skin is warm and dry. No rash noted. No erythema. No pallor.   Palpation of skin normal   Psychiatric: She has a normal mood and affect. Her behavior is normal. Judgment and thought content normal.         Assessment:       1. Chronic diastolic congestive heart failure    2. Essential hypertension    3. Pure hypercholesterolemia    4. Shortness of breath    5. Left knee pain, unspecified chronicity    6. History of right knee joint replacement         Plan:       Courtney was seen today for congestive heart failure and diarrhea.    Diagnoses and all orders for this visit:    Chronic diastolic congestive heart failure  -     Basic metabolic panel; Future; Expected date: 01/01/2019    Essential hypertension  -     Basic metabolic panel; Future; Expected date: 01/01/2019    Pure hypercholesterolemia    Shortness of breath    Left knee pain, unspecified chronicity    History of right knee joint replacement

## 2018-07-06 RX ORDER — ALPRAZOLAM 0.5 MG/1
TABLET ORAL
Qty: 30 TABLET | Refills: 0 | OUTPATIENT
Start: 2018-07-06

## 2018-07-06 RX ORDER — INSULIN ASPART 100 [IU]/ML
INJECTION, SOLUTION INTRAVENOUS; SUBCUTANEOUS
Qty: 20 ML | Refills: 0 | Status: SHIPPED | OUTPATIENT
Start: 2018-07-06 | End: 2018-08-20 | Stop reason: SDUPTHER

## 2018-07-06 RX ORDER — ATENOLOL 25 MG/1
TABLET ORAL
Qty: 90 TABLET | Refills: 0 | Status: SHIPPED | OUTPATIENT
Start: 2018-07-06 | End: 2018-10-04 | Stop reason: SDUPTHER

## 2018-07-06 NOTE — TELEPHONE ENCOUNTER
I have mentioned to the pt that she MUST come in every 6 months to get Xanax. Is she out? I am getting pharmacy requests and see no visit in 6 months .

## 2018-07-09 ENCOUNTER — TELEPHONE (OUTPATIENT)
Dept: INTERNAL MEDICINE | Facility: CLINIC | Age: 83
End: 2018-07-09

## 2018-07-09 RX ORDER — ALPRAZOLAM 0.5 MG/1
0.5 TABLET ORAL NIGHTLY
Qty: 30 TABLET | Refills: 0 | Status: SHIPPED | OUTPATIENT
Start: 2018-07-09 | End: 2018-07-13 | Stop reason: SDUPTHER

## 2018-07-09 NOTE — TELEPHONE ENCOUNTER
Pt did not realize she was due for a visit.Pt made an appointment for 7/13/18. Pt states she only has two alprazolam's left. She would like to know if you can fill rx before appt

## 2018-07-13 ENCOUNTER — OFFICE VISIT (OUTPATIENT)
Dept: INTERNAL MEDICINE | Facility: CLINIC | Age: 83
End: 2018-07-13
Payer: MEDICARE

## 2018-07-13 VITALS
WEIGHT: 207.44 LBS | OXYGEN SATURATION: 95 % | SYSTOLIC BLOOD PRESSURE: 122 MMHG | BODY MASS INDEX: 43.36 KG/M2 | DIASTOLIC BLOOD PRESSURE: 60 MMHG | HEART RATE: 60 BPM

## 2018-07-13 DIAGNOSIS — I10 ESSENTIAL HYPERTENSION: Chronic | ICD-10-CM

## 2018-07-13 DIAGNOSIS — M54.32 SCIATICA OF LEFT SIDE: ICD-10-CM

## 2018-07-13 DIAGNOSIS — I50.32 CHRONIC DIASTOLIC CONGESTIVE HEART FAILURE: Primary | ICD-10-CM

## 2018-07-13 DIAGNOSIS — N18.30 CKD STAGE 3 DUE TO TYPE 2 DIABETES MELLITUS: Chronic | ICD-10-CM

## 2018-07-13 DIAGNOSIS — E03.9 ACQUIRED HYPOTHYROIDISM: Chronic | ICD-10-CM

## 2018-07-13 DIAGNOSIS — G62.9 NEUROPATHY: ICD-10-CM

## 2018-07-13 DIAGNOSIS — E66.01 MORBID OBESITY WITH BMI OF 40.0-44.9, ADULT: Chronic | ICD-10-CM

## 2018-07-13 DIAGNOSIS — F41.9 ANXIETY: ICD-10-CM

## 2018-07-13 DIAGNOSIS — E11.22 CKD STAGE 3 DUE TO TYPE 2 DIABETES MELLITUS: Chronic | ICD-10-CM

## 2018-07-13 PROCEDURE — 3074F SYST BP LT 130 MM HG: CPT | Mod: CPTII,S$GLB,, | Performed by: INTERNAL MEDICINE

## 2018-07-13 PROCEDURE — 99214 OFFICE O/P EST MOD 30 MIN: CPT | Mod: S$GLB,,, | Performed by: INTERNAL MEDICINE

## 2018-07-13 PROCEDURE — 3078F DIAST BP <80 MM HG: CPT | Mod: CPTII,S$GLB,, | Performed by: INTERNAL MEDICINE

## 2018-07-13 PROCEDURE — 99999 PR PBB SHADOW E&M-EST. PATIENT-LVL III: CPT | Mod: PBBFAC,,, | Performed by: INTERNAL MEDICINE

## 2018-07-13 RX ORDER — GABAPENTIN 100 MG/1
100 CAPSULE ORAL 2 TIMES DAILY
Qty: 60 CAPSULE | Refills: 3 | Status: SHIPPED | OUTPATIENT
Start: 2018-07-13 | End: 2018-11-05 | Stop reason: SDUPTHER

## 2018-07-13 RX ORDER — ALPRAZOLAM 0.5 MG/1
0.5 TABLET ORAL NIGHTLY
Qty: 30 TABLET | Refills: 5 | Status: SHIPPED | OUTPATIENT
Start: 2018-08-07 | End: 2019-01-30 | Stop reason: SDUPTHER

## 2018-07-13 NOTE — PROGRESS NOTES
Subjective:       Patient ID: Courtney Engle is a 83 y.o. female.    Chief Complaint: Medication Refill    83-year-old here for follow-up.  She primarily wants talk about left low back pain and sciatica as well as refill of her alprazolam.  She says she continues to be very anxious.  She is worried about her health.  She was about to have knee surgery when she got the fluid had to be postponed and that was nearly 10 months ago.  She walks with a walker but feels like it is aggravating her back and now has pain in the left low back radiating down the leg.  She has knee pain and now right shoulder pain. We did not want to start on narcotic but we talked about trying a medication like gabapentin.  She has had success with that in the past.  We talked about fact that this may help her sciatica but may not particularly help her knee pain or shoulder pain but she is willing to give it a try.  Talked about the importance of maintaining a good weight and the importance of diet.  She has been unable to exercise      Review of Systems   Constitutional: Negative for appetite change, chills and fever.   HENT: Negative for nosebleeds, sinus pain and sore throat.    Eyes: Negative for visual disturbance.   Respiratory: Negative for cough, shortness of breath and wheezing.    Cardiovascular: Negative for chest pain and leg swelling.   Gastrointestinal: Negative for abdominal pain, constipation and diarrhea.   Genitourinary: Negative for difficulty urinating and hematuria.   Musculoskeletal: Positive for arthralgias, back pain, gait problem and joint swelling. Negative for neck pain and neck stiffness.   Skin: Negative for pallor and rash.   Neurological: Negative for headaches.   Psychiatric/Behavioral: Positive for sleep disturbance. Negative for dysphoric mood and suicidal ideas. The patient is nervous/anxious.        Objective:      Physical Exam   Constitutional: She is oriented to person, place, and time. She appears  well-developed and well-nourished. No distress.   Morbidly obese female   HENT:   Head: Normocephalic and atraumatic.   Right Ear: External ear normal.   Left Ear: External ear normal.   Mouth/Throat: Oropharynx is clear and moist. No oropharyngeal exudate.   Eyes: Conjunctivae are normal. Pupils are equal, round, and reactive to light. No scleral icterus.   Neck: Normal range of motion. Neck supple. No thyromegaly present.   Cardiovascular: Normal rate and regular rhythm.    No murmur heard.  Pulmonary/Chest: Effort normal and breath sounds normal. She has no wheezes.   Abdominal: Soft. Bowel sounds are normal. She exhibits no distension. There is no tenderness.   Musculoskeletal: She exhibits tenderness (Left low, positive straight leg raise.  Left knee and left shoulder).   Lymphadenopathy:     She has no cervical adenopathy.   Neurological: She is alert and oriented to person, place, and time.   Skin: No rash noted.   Psychiatric: She has a normal mood and affect.   Vitals reviewed.      Assessment:       1. Chronic diastolic congestive heart failure    2. Essential hypertension    3. Neuropathy    4. Anxiety    5. CKD stage 3 due to type 2 diabetes mellitus    6. Acquired hypothyroidism    7. Sciatica of left side    8. Morbid obesity with BMI of 40.0-44.9, adult        Plan:       Courtney was seen today for medication refill.    Diagnoses and all orders for this visit:    Chronic diastolic congestive heart failure    Essential hypertension    Neuropathy    Anxiety    CKD stage 3 due to type 2 diabetes mellitus    Acquired hypothyroidism    Sciatica of left side    Morbid obesity with BMI of 40.0-44.9, adult    Other orders  -     ALPRAZolam (XANAX) 0.5 MG tablet; Take 1 tablet (0.5 mg total) by mouth nightly.  -     gabapentin (NEURONTIN) 100 MG capsule; Take 1 capsule (100 mg total) by mouth 2 (two) times daily.

## 2018-07-22 RX ORDER — LISINOPRIL 20 MG/1
TABLET ORAL
Qty: 90 TABLET | Refills: 0 | Status: SHIPPED | OUTPATIENT
Start: 2018-07-22 | End: 2018-10-19 | Stop reason: SDUPTHER

## 2018-07-24 RX ORDER — ALENDRONATE SODIUM 70 MG/1
TABLET ORAL
Qty: 12 TABLET | Refills: 0 | Status: SHIPPED | OUTPATIENT
Start: 2018-07-24 | End: 2018-10-13 | Stop reason: SDUPTHER

## 2018-08-03 RX ORDER — LEVOTHYROXINE SODIUM 75 UG/1
TABLET ORAL
Qty: 90 TABLET | Refills: 0 | Status: SHIPPED | OUTPATIENT
Start: 2018-08-03 | End: 2018-11-04 | Stop reason: SDUPTHER

## 2018-08-07 DIAGNOSIS — K21.9 LPRD (LARYNGOPHARYNGEAL REFLUX DISEASE): ICD-10-CM

## 2018-08-07 RX ORDER — OMEPRAZOLE 40 MG/1
CAPSULE, DELAYED RELEASE ORAL
Qty: 60 CAPSULE | Refills: 6 | Status: SHIPPED | OUTPATIENT
Start: 2018-08-07 | End: 2019-03-01 | Stop reason: SDUPTHER

## 2018-08-15 RX ORDER — INSULIN GLARGINE 100 [IU]/ML
INJECTION, SOLUTION SUBCUTANEOUS
Qty: 10 ML | Refills: 0 | Status: SHIPPED | OUTPATIENT
Start: 2018-08-15 | End: 2018-09-27 | Stop reason: SDUPTHER

## 2018-08-20 RX ORDER — INSULIN ASPART 100 [IU]/ML
INJECTION, SOLUTION INTRAVENOUS; SUBCUTANEOUS
Qty: 20 ML | Refills: 0 | Status: SHIPPED | OUTPATIENT
Start: 2018-08-20 | End: 2018-10-08 | Stop reason: SDUPTHER

## 2018-08-27 RX ORDER — ESCITALOPRAM OXALATE 10 MG/1
TABLET ORAL
Qty: 90 TABLET | Refills: 0 | Status: SHIPPED | OUTPATIENT
Start: 2018-08-27 | End: 2018-11-23 | Stop reason: SDUPTHER

## 2018-08-29 RX ORDER — NAPROXEN SODIUM 220 MG
TABLET ORAL
Qty: 500 EACH | Refills: 0 | Status: SHIPPED | OUTPATIENT
Start: 2018-08-29 | End: 2019-05-08 | Stop reason: SDUPTHER

## 2018-09-27 RX ORDER — INSULIN GLARGINE 100 [IU]/ML
INJECTION, SOLUTION SUBCUTANEOUS
Qty: 10 ML | Refills: 0 | Status: SHIPPED | OUTPATIENT
Start: 2018-09-27 | End: 2018-10-26 | Stop reason: SDUPTHER

## 2018-10-02 RX ORDER — BUTALBITAL, ACETAMINOPHEN AND CAFFEINE 50; 325; 40 MG/1; MG/1; MG/1
TABLET ORAL
Qty: 50 TABLET | Refills: 2 | Status: SHIPPED | OUTPATIENT
Start: 2018-10-02 | End: 2019-08-01

## 2018-10-04 RX ORDER — ATENOLOL 25 MG/1
TABLET ORAL
Qty: 90 TABLET | Refills: 0 | Status: SHIPPED | OUTPATIENT
Start: 2018-10-04 | End: 2018-12-30 | Stop reason: SDUPTHER

## 2018-10-08 RX ORDER — INSULIN ASPART 100 [IU]/ML
INJECTION, SOLUTION INTRAVENOUS; SUBCUTANEOUS
Qty: 20 ML | Refills: 0 | Status: SHIPPED | OUTPATIENT
Start: 2018-10-08 | End: 2019-05-16 | Stop reason: SDUPTHER

## 2018-10-13 RX ORDER — ALENDRONATE SODIUM 70 MG/1
TABLET ORAL
Qty: 12 TABLET | Refills: 0 | Status: SHIPPED | OUTPATIENT
Start: 2018-10-13 | End: 2019-01-05 | Stop reason: SDUPTHER

## 2018-10-19 RX ORDER — LISINOPRIL 20 MG/1
TABLET ORAL
Qty: 90 TABLET | Refills: 0 | Status: SHIPPED | OUTPATIENT
Start: 2018-10-19 | End: 2019-01-17 | Stop reason: SDUPTHER

## 2018-10-26 RX ORDER — INSULIN GLARGINE 100 [IU]/ML
INJECTION, SOLUTION SUBCUTANEOUS
Qty: 10 ML | Refills: 0 | Status: SHIPPED | OUTPATIENT
Start: 2018-10-26 | End: 2018-11-27 | Stop reason: SDUPTHER

## 2018-11-04 RX ORDER — LEVOTHYROXINE SODIUM 75 UG/1
TABLET ORAL
Qty: 90 TABLET | Refills: 0 | Status: SHIPPED | OUTPATIENT
Start: 2018-11-04 | End: 2019-02-01 | Stop reason: SDUPTHER

## 2018-11-05 RX ORDER — GABAPENTIN 100 MG/1
CAPSULE ORAL
Qty: 60 CAPSULE | Refills: 0 | Status: SHIPPED | OUTPATIENT
Start: 2018-11-05 | End: 2019-07-21 | Stop reason: SDUPTHER

## 2018-11-23 RX ORDER — ESCITALOPRAM OXALATE 10 MG/1
TABLET ORAL
Qty: 90 TABLET | Refills: 0 | Status: SHIPPED | OUTPATIENT
Start: 2018-11-23 | End: 2019-02-24 | Stop reason: SDUPTHER

## 2018-11-27 RX ORDER — INSULIN GLARGINE 100 [IU]/ML
INJECTION, SOLUTION SUBCUTANEOUS
Qty: 10 ML | Refills: 3 | Status: SHIPPED | OUTPATIENT
Start: 2018-11-27 | End: 2019-04-13 | Stop reason: SDUPTHER

## 2018-12-30 RX ORDER — ATENOLOL 25 MG/1
TABLET ORAL
Qty: 90 TABLET | Refills: 0 | Status: SHIPPED | OUTPATIENT
Start: 2018-12-30 | End: 2019-03-31 | Stop reason: SDUPTHER

## 2019-01-02 RX ORDER — BUTALBITAL, ACETAMINOPHEN AND CAFFEINE 50; 325; 40 MG/1; MG/1; MG/1
TABLET ORAL
Qty: 50 TABLET | Refills: 2 | Status: SHIPPED | OUTPATIENT
Start: 2019-01-02 | End: 2019-08-01

## 2019-01-02 RX ORDER — FLUTICASONE PROPIONATE 50 MCG
SPRAY, SUSPENSION (ML) NASAL
Qty: 48 ML | Refills: 0 | Status: SHIPPED | OUTPATIENT
Start: 2019-01-02 | End: 2019-04-13 | Stop reason: SDUPTHER

## 2019-01-07 RX ORDER — ALENDRONATE SODIUM 70 MG/1
TABLET ORAL
Qty: 12 TABLET | Refills: 0 | Status: SHIPPED | OUTPATIENT
Start: 2019-01-07 | End: 2019-03-20 | Stop reason: SDUPTHER

## 2019-01-12 ENCOUNTER — NURSE TRIAGE (OUTPATIENT)
Dept: ADMINISTRATIVE | Facility: CLINIC | Age: 84
End: 2019-01-12

## 2019-01-12 NOTE — TELEPHONE ENCOUNTER
"incontinent and wear diapers.   has been having vaginal itching for about 1 week.  Cannot tell with any discharge because on diapers.  Itching inside.  Requesting medication for yeast infections.  Advised need to be seen within 24 hours.  Advised can go to urgent care.  Gave location.  Pt agreed with plan.     Reason for Disposition   MODERATE-SEVERE itching (i.e., interferes with school, work, or sleep)    Answer Assessment - Initial Assessment Questions  1. SYMPTOM: "What's the main symptom you're concerned about?" (e.g., pain, itching, dryness)      itching about 1 week.   2. LOCATION: "Where is the  _______ located?" (e.g., inside/outside, left/right)    inside  3. ONSET: "When did the  ________  start?"    Itching about 1 week  4. PAIN: "Is there any pain?" If so, ask: "How bad is it?" (Scale: 1-10; mild, moderate, severe)   no  5. ITCHING: "Is there any itching?" If so, ask: "How bad is it?" (Scale: 1-10; mild, moderate, severe)  severe  6. CAUSE: "What do you think is causing the symptoms?"    Yeast infection  7. OTHER SYMPTOMS: "Do you have any other symptoms?" (e.g., vaginal bleeding, pain with urination)    no  8. PREGNANCY: "Is there any chance you are pregnant?" "When was your last menstrual period?"  no    Protocols used: ST VAGINAL SYMPTOMS-A-      "

## 2019-01-17 RX ORDER — LISINOPRIL 20 MG/1
TABLET ORAL
Qty: 90 TABLET | Refills: 0 | Status: SHIPPED | OUTPATIENT
Start: 2019-01-17 | End: 2019-04-16 | Stop reason: SDUPTHER

## 2019-01-30 RX ORDER — ALPRAZOLAM 0.5 MG/1
TABLET ORAL
Qty: 30 TABLET | Refills: 0 | Status: SHIPPED | OUTPATIENT
Start: 2019-01-30 | End: 2019-08-01

## 2019-02-01 RX ORDER — LEVOTHYROXINE SODIUM 75 UG/1
TABLET ORAL
Qty: 90 TABLET | Refills: 0 | Status: SHIPPED | OUTPATIENT
Start: 2019-02-01 | End: 2019-05-07 | Stop reason: SDUPTHER

## 2019-02-24 RX ORDER — ESCITALOPRAM OXALATE 10 MG/1
TABLET ORAL
Qty: 90 TABLET | Refills: 0 | Status: SHIPPED | OUTPATIENT
Start: 2019-02-24 | End: 2019-05-23 | Stop reason: SDUPTHER

## 2019-03-01 DIAGNOSIS — K21.9 LPRD (LARYNGOPHARYNGEAL REFLUX DISEASE): ICD-10-CM

## 2019-03-01 RX ORDER — OMEPRAZOLE 40 MG/1
CAPSULE, DELAYED RELEASE ORAL
Qty: 60 CAPSULE | Refills: 0 | Status: SHIPPED | OUTPATIENT
Start: 2019-03-01 | End: 2019-04-29 | Stop reason: SDUPTHER

## 2019-03-01 RX ORDER — ALPRAZOLAM 0.5 MG/1
TABLET ORAL
Qty: 30 TABLET | Refills: 0 | OUTPATIENT
Start: 2019-03-01

## 2019-03-01 NOTE — TELEPHONE ENCOUNTER
I refilled omeprazole but I need an appointment at least scheduled before I can refill. She is supposed to see us every 6 months for this medication.

## 2019-03-02 NOTE — TELEPHONE ENCOUNTER
It has been nearly 8 months since her last visit. She must at least make an appointment before I can refill this controlled medication.

## 2019-03-04 RX ORDER — ALPRAZOLAM 0.5 MG/1
TABLET ORAL
Qty: 30 TABLET | Refills: 0 | OUTPATIENT
Start: 2019-03-04

## 2019-03-04 NOTE — TELEPHONE ENCOUNTER
Please see my previous messages from the last few days. Did patient make an office visit appointment? If she does I will refill, but she must have a visit every 6 months .

## 2019-03-20 RX ORDER — ALENDRONATE SODIUM 70 MG/1
TABLET ORAL
Qty: 12 TABLET | Refills: 0 | Status: SHIPPED | OUTPATIENT
Start: 2019-03-20 | End: 2019-06-15 | Stop reason: SDUPTHER

## 2019-03-31 RX ORDER — ATENOLOL 25 MG/1
TABLET ORAL
Qty: 90 TABLET | Refills: 0 | Status: SHIPPED | OUTPATIENT
Start: 2019-03-31 | End: 2019-07-21 | Stop reason: SDUPTHER

## 2019-04-13 RX ORDER — FLUTICASONE PROPIONATE 50 MCG
SPRAY, SUSPENSION (ML) NASAL
Qty: 48 ML | Refills: 0 | Status: SHIPPED | OUTPATIENT
Start: 2019-04-13 | End: 2019-10-24 | Stop reason: SDUPTHER

## 2019-04-13 RX ORDER — INSULIN GLARGINE 100 [IU]/ML
INJECTION, SOLUTION SUBCUTANEOUS
Qty: 10 ML | Refills: 0 | Status: SHIPPED | OUTPATIENT
Start: 2019-04-13 | End: 2019-05-23 | Stop reason: SDUPTHER

## 2019-04-16 RX ORDER — LISINOPRIL 20 MG/1
TABLET ORAL
Qty: 90 TABLET | Refills: 0 | Status: SHIPPED | OUTPATIENT
Start: 2019-04-16 | End: 2019-07-14 | Stop reason: SDUPTHER

## 2019-04-29 DIAGNOSIS — K21.9 LPRD (LARYNGOPHARYNGEAL REFLUX DISEASE): ICD-10-CM

## 2019-04-30 RX ORDER — OMEPRAZOLE 40 MG/1
CAPSULE, DELAYED RELEASE ORAL
Qty: 60 CAPSULE | Refills: 0 | Status: SHIPPED | OUTPATIENT
Start: 2019-04-30 | End: 2019-06-04 | Stop reason: SDUPTHER

## 2019-05-07 RX ORDER — LEVOTHYROXINE SODIUM 75 UG/1
TABLET ORAL
Qty: 90 TABLET | Refills: 0 | Status: SHIPPED | OUTPATIENT
Start: 2019-05-07 | End: 2019-08-04 | Stop reason: SDUPTHER

## 2019-05-08 ENCOUNTER — TELEPHONE (OUTPATIENT)
Dept: INTERNAL MEDICINE | Facility: CLINIC | Age: 84
End: 2019-05-08

## 2019-05-08 RX ORDER — NAPROXEN SODIUM 220 MG
TABLET ORAL
Qty: 500 EACH | Refills: 0 | Status: SHIPPED | OUTPATIENT
Start: 2019-05-08 | End: 2020-02-28

## 2019-05-08 NOTE — TELEPHONE ENCOUNTER
"----- Message from Shaina Souza sent at 5/8/2019  4:46 PM CDT -----      ----- Message -----  From: Petrona Brown  Sent: 5/8/2019   4:03 PM  To: [facility]ERRITT Staff    Prior Authorization Needed    Medication: insulin syringe-needle U-100 0.5 mL 31 gauge x 5/16" [facility]    Pharmacy Info: [facility] [id number] [facility] - [address] [facility] does not cover this medication. Please call plan at   [phone]    to initiate prior authorization or call/fax pharmacy to change medication. Patient ID#  [id number]    Note chart when prior authorization has been submitted.    Please notify pharmacy when prior authorization has been approved.    Thank You    "

## 2019-05-08 NOTE — TELEPHONE ENCOUNTER
Submitted PA-----indicated no PA was needed----informed pharmacy stated medication still was not cover and will call [employer].

## 2019-05-16 RX ORDER — INSULIN ASPART 100 [IU]/ML
INJECTION, SOLUTION INTRAVENOUS; SUBCUTANEOUS
Qty: 20 ML | Refills: 0 | Status: SHIPPED | OUTPATIENT
Start: 2019-05-16 | End: 2019-08-20 | Stop reason: SDUPTHER

## 2019-05-23 RX ORDER — INSULIN GLARGINE 100 [IU]/ML
INJECTION, SOLUTION SUBCUTANEOUS
Qty: 10 ML | Refills: 0 | Status: SHIPPED | OUTPATIENT
Start: 2019-05-23 | End: 2019-06-26 | Stop reason: SDUPTHER

## 2019-05-23 RX ORDER — ESCITALOPRAM OXALATE 10 MG/1
TABLET ORAL
Qty: 90 TABLET | Refills: 0 | Status: SHIPPED | OUTPATIENT
Start: 2019-05-23 | End: 2019-08-04 | Stop reason: SDUPTHER

## 2019-06-04 DIAGNOSIS — K21.9 LPRD (LARYNGOPHARYNGEAL REFLUX DISEASE): ICD-10-CM

## 2019-06-04 RX ORDER — OMEPRAZOLE 40 MG/1
CAPSULE, DELAYED RELEASE ORAL
Qty: 60 CAPSULE | Refills: 0 | Status: SHIPPED | OUTPATIENT
Start: 2019-06-04 | End: 2019-07-21 | Stop reason: SDUPTHER

## 2019-06-07 ENCOUNTER — PES CALL (OUTPATIENT)
Dept: ADMINISTRATIVE | Facility: CLINIC | Age: 84
End: 2019-06-07

## 2019-06-15 RX ORDER — ALENDRONATE SODIUM 70 MG/1
TABLET ORAL
Qty: 12 TABLET | Refills: 0 | Status: SHIPPED | OUTPATIENT
Start: 2019-06-15 | End: 2020-01-08 | Stop reason: SDUPTHER

## 2019-06-26 RX ORDER — INSULIN GLARGINE 100 [IU]/ML
INJECTION, SOLUTION SUBCUTANEOUS
Qty: 10 ML | Refills: 3 | Status: SHIPPED | OUTPATIENT
Start: 2019-06-26 | End: 2019-09-04

## 2019-07-14 RX ORDER — LISINOPRIL 20 MG/1
TABLET ORAL
Qty: 90 TABLET | Refills: 0 | Status: SHIPPED | OUTPATIENT
Start: 2019-07-14 | End: 2019-10-02 | Stop reason: SDUPTHER

## 2019-07-20 DIAGNOSIS — K21.9 LPRD (LARYNGOPHARYNGEAL REFLUX DISEASE): ICD-10-CM

## 2019-07-21 RX ORDER — GABAPENTIN 100 MG/1
CAPSULE ORAL
Qty: 60 CAPSULE | Refills: 0 | Status: SHIPPED | OUTPATIENT
Start: 2019-07-21 | End: 2019-08-04 | Stop reason: SDUPTHER

## 2019-07-21 RX ORDER — ATENOLOL 25 MG/1
TABLET ORAL
Qty: 90 TABLET | Refills: 0 | Status: SHIPPED | OUTPATIENT
Start: 2019-07-21 | End: 2019-08-04 | Stop reason: SDUPTHER

## 2019-07-21 RX ORDER — OMEPRAZOLE 40 MG/1
CAPSULE, DELAYED RELEASE ORAL
Qty: 60 CAPSULE | Refills: 0 | Status: SHIPPED | OUTPATIENT
Start: 2019-07-21 | End: 2019-08-04 | Stop reason: SDUPTHER

## 2019-07-23 ENCOUNTER — TELEPHONE (OUTPATIENT)
Dept: INTERNAL MEDICINE | Facility: CLINIC | Age: 84
End: 2019-07-23

## 2019-07-23 NOTE — TELEPHONE ENCOUNTER
----- Message from Wanda Gonsalez sent at 7/23/2019 12:19 PM CDT -----  Contact: self/ [phone]  Patient would like a call back from he nurse, she did not say what it is.

## 2019-07-31 ENCOUNTER — OFFICE VISIT (OUTPATIENT)
Dept: INTERNAL MEDICINE | Facility: CLINIC | Age: 84
End: 2019-07-31
Payer: MEDICARE

## 2019-07-31 VITALS
HEART RATE: 66 BPM | WEIGHT: 205.25 LBS | SYSTOLIC BLOOD PRESSURE: 136 MMHG | OXYGEN SATURATION: 95 % | BODY MASS INDEX: 42.9 KG/M2 | DIASTOLIC BLOOD PRESSURE: 78 MMHG

## 2019-07-31 DIAGNOSIS — N18.30 CKD STAGE 3 DUE TO TYPE 2 DIABETES MELLITUS: ICD-10-CM

## 2019-07-31 DIAGNOSIS — N39.46 MIXED STRESS AND URGE URINARY INCONTINENCE: Chronic | ICD-10-CM

## 2019-07-31 DIAGNOSIS — Z79.4 TYPE 2 DIABETES MELLITUS WITH HYPERGLYCEMIA, WITH LONG-TERM CURRENT USE OF INSULIN: ICD-10-CM

## 2019-07-31 DIAGNOSIS — K62.89 RECTAL IRRITATION: ICD-10-CM

## 2019-07-31 DIAGNOSIS — E11.22 CKD STAGE 3 DUE TO TYPE 2 DIABETES MELLITUS: ICD-10-CM

## 2019-07-31 DIAGNOSIS — E11.65 TYPE 2 DIABETES MELLITUS WITH HYPERGLYCEMIA, WITH LONG-TERM CURRENT USE OF INSULIN: ICD-10-CM

## 2019-07-31 DIAGNOSIS — E66.01 MORBID OBESITY WITH BMI OF 40.0-44.9, ADULT: ICD-10-CM

## 2019-07-31 DIAGNOSIS — E03.9 ACQUIRED HYPOTHYROIDISM: Chronic | ICD-10-CM

## 2019-07-31 DIAGNOSIS — R19.5 DARK STOOLS: ICD-10-CM

## 2019-07-31 DIAGNOSIS — N89.8 VAGINAL ITCHING: Primary | ICD-10-CM

## 2019-07-31 DIAGNOSIS — I50.32 CHRONIC DIASTOLIC CONGESTIVE HEART FAILURE: ICD-10-CM

## 2019-07-31 DIAGNOSIS — I10 ESSENTIAL HYPERTENSION: ICD-10-CM

## 2019-07-31 DIAGNOSIS — I70.0 AORTIC CALCIFICATION: ICD-10-CM

## 2019-07-31 PROCEDURE — 3075F SYST BP GE 130 - 139MM HG: CPT | Mod: HCNC,CPTII,S$GLB, | Performed by: PHYSICIAN ASSISTANT

## 2019-07-31 PROCEDURE — 1101F PR PT FALLS ASSESS DOC 0-1 FALLS W/OUT INJ PAST YR: ICD-10-PCS | Mod: HCNC,CPTII,S$GLB, | Performed by: PHYSICIAN ASSISTANT

## 2019-07-31 PROCEDURE — 3075F PR MOST RECENT SYSTOLIC BLOOD PRESS GE 130-139MM HG: ICD-10-PCS | Mod: HCNC,CPTII,S$GLB, | Performed by: PHYSICIAN ASSISTANT

## 2019-07-31 PROCEDURE — 99999 PR PBB SHADOW E&M-EST. PATIENT-LVL III: CPT | Mod: PBBFAC,HCNC,, | Performed by: PHYSICIAN ASSISTANT

## 2019-07-31 PROCEDURE — 99214 PR OFFICE/OUTPT VISIT, EST, LEVL IV, 30-39 MIN: ICD-10-PCS | Mod: HCNC,S$GLB,, | Performed by: PHYSICIAN ASSISTANT

## 2019-07-31 PROCEDURE — 99499 RISK ADDL DX/OHS AUDIT: ICD-10-PCS | Mod: HCNC,S$GLB,, | Performed by: PHYSICIAN ASSISTANT

## 2019-07-31 PROCEDURE — 3078F DIAST BP <80 MM HG: CPT | Mod: HCNC,CPTII,S$GLB, | Performed by: PHYSICIAN ASSISTANT

## 2019-07-31 PROCEDURE — 87801 DETECT AGNT MULT DNA AMPLI: CPT | Mod: HCNC

## 2019-07-31 PROCEDURE — 99214 OFFICE O/P EST MOD 30 MIN: CPT | Mod: HCNC,S$GLB,, | Performed by: PHYSICIAN ASSISTANT

## 2019-07-31 PROCEDURE — 87481 CANDIDA DNA AMP PROBE: CPT | Mod: 59,HCNC

## 2019-07-31 PROCEDURE — 99999 PR PBB SHADOW E&M-EST. PATIENT-LVL III: ICD-10-PCS | Mod: PBBFAC,HCNC,, | Performed by: PHYSICIAN ASSISTANT

## 2019-07-31 PROCEDURE — 1101F PT FALLS ASSESS-DOCD LE1/YR: CPT | Mod: HCNC,CPTII,S$GLB, | Performed by: PHYSICIAN ASSISTANT

## 2019-07-31 PROCEDURE — 3078F PR MOST RECENT DIASTOLIC BLOOD PRESSURE < 80 MM HG: ICD-10-PCS | Mod: HCNC,CPTII,S$GLB, | Performed by: PHYSICIAN ASSISTANT

## 2019-07-31 PROCEDURE — 99499 UNLISTED E&M SERVICE: CPT | Mod: HCNC,S$GLB,, | Performed by: PHYSICIAN ASSISTANT

## 2019-07-31 RX ORDER — FLUCONAZOLE 150 MG/1
150 TABLET ORAL DAILY
Qty: 1 TABLET | Refills: 0 | Status: SHIPPED | OUTPATIENT
Start: 2019-07-31 | End: 2019-08-01

## 2019-07-31 RX ORDER — NYSTATIN 100000 [USP'U]/G
POWDER TOPICAL 4 TIMES DAILY
Qty: 60 G | Refills: 0 | Status: SHIPPED | OUTPATIENT
Start: 2019-07-31 | End: 2020-03-18

## 2019-07-31 NOTE — PROGRESS NOTES
Subjective:       Patient ID: Courtney Engle is a 84 y.o. female.    Chief Complaint: Rectal Bleeding; Vaginitis; Rectal Pain; and Dehydration    HPI     C/o vaginal itching/irritation for the past 3 months. Symptoms are typical to prior yeast infection. Relates to her hx of incontinence and wearing diapers. She tried some premarin cream without much improvement    C/o rectal irritation/burning as well. She also reports episode about 3 weeks ago of ?blood specks on her stool. Notes dark stools on and off. Last colonoscopy 2015. Hx of hemorrhoids    Request handicap sticker form. Ambulates with a walker 2/2 CHF, arthritis and obesity.     Had appt scheduled for today with Dr. Ulloa as well for annual, she will reschedule        Past Medical History:   Diagnosis Date    Acquired hypothyroidism 4/25/2014    Anxiety     Aortic calcification 12/8/2016    X-Ray Chest-5/08/2014    Arthritis     Bilateral carotid artery disease 12/8/2016    US Carotid Bilateral-1/24/2013    CKD stage 3 due to type 2 diabetes mellitus 2/16/2017    Depression     Essential hypertension     Fever blister     GERD (gastroesophageal reflux disease)     Glaucoma 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 robledo)     Hyperlipidemia LDL goal <70 2/16/2017    Keloid cicatrix     Mixed stress and urge urinary incontinence 2/16/2017    Morbid obesity with BMI of 40.0-44.9, adult 12/8/2016    Ocular migraine 1/24/2013    Type 2 diabetes mellitus with hyperglycemia, with long-term current use of insulin      Social History     Tobacco Use    Smoking status: Never Smoker    Smokeless tobacco: Never Used   Substance Use Topics    Alcohol use: No    Drug use: No           Review of Systems   Constitutional: Negative for chills, fever and unexpected weight change.   Respiratory: Negative for cough and shortness of breath.    Cardiovascular: Negative for chest pain and leg swelling.   Gastrointestinal: Negative for abdominal pain,  nausea and vomiting.        Dark stools, rectal irritation/burning/itching   Genitourinary: Positive for vaginal discharge. Negative for dysuria and hematuria.        Vaginal itching     Musculoskeletal: Positive for arthralgias.   Skin: Negative for rash.   Neurological: Negative for weakness and headaches.       Objective: /78 (BP Location: Right arm, Patient Position: Sitting, BP Method: Large (Manual))   Pulse 66   Wt 93.1 kg (205 lb 4 oz)   SpO2 95%   BMI 42.90 kg/m²         Physical Exam   Constitutional: She appears well-developed and well-nourished. No distress.   HENT:   Head: Normocephalic and atraumatic.   Mouth/Throat: Oropharynx is clear and moist.   Cardiovascular: Normal rate and regular rhythm. Exam reveals no friction rub.   No murmur heard.  Pulmonary/Chest: Effort normal and breath sounds normal. She has no wheezes. She has no rales.   Abdominal: Soft. Bowel sounds are normal. There is no tenderness.   Genitourinary: Rectal exam shows no external hemorrhoid, no fissure, no mass, no tenderness, anal tone normal and guaiac negative stool. Vaginal discharge found.   Genitourinary Comments: intertriginous erythema to groin   Musculoskeletal:   Ambulating with walker   Neurological: She is alert.   Skin: Skin is warm and dry. No rash noted.   Vitals reviewed.      Assessment:       1. Vaginal itching    2. Dark stools    3. Rectal irritation    4. Morbid obesity with BMI of 40.0-44.9, adult    5. CKD stage 3 due to type 2 diabetes mellitus    6. Essential hypertension    7. Chronic diastolic congestive heart failure    8. Type 2 diabetes mellitus with hyperglycemia, with long-term current use of insulin    9. Aortic calcification    10. Mixed stress and urge urinary incontinence    11. Acquired hypothyroidism        Plan:         Courtney was seen today for rectal bleeding, vaginitis, rectal pain and dehydration.    Diagnoses and all orders for this visit:    Vaginal itching  -     fluconazole  (DIFLUCAN) 150 MG Tab; Take 1 tablet (150 mg total) by mouth once daily. for 1 day  -     nystatin (MYCOSTATIN) powder; Apply topically 4 (four) times daily.  -     VAGINOSIS SCREEN BY DNA PROBE  - Advised GYN follow up if symptoms worsen or do not improve    Dark stools  Rectal irritation  -Pt concerned as her mother had hx of colon cancer  -Occult stool negative today  -Will have follow up in Colorectal  -     Ambulatory referral to Colorectal Surgery    Morbid obesity with BMI of 40.0-44.9, adult  BMI reviewed  Lifestyle mods and diet changes discussed    CKD stage 3 due to type 2 diabetes mellitus  -     Comprehensive metabolic panel; Future    CHF  HFpEF  Stable  Followed by Cardiology    Essential hypertension  Well controlled  -     Comprehensive metabolic panel; Future  -     CBC auto differential; Future  -     TSH; Future    Type 2 diabetes mellitus with hyperglycemia, with long-term current use of insulin  Lab Results   Component Value Date    HGBA1C 7.7 (H) 01/10/2018   -     Hemoglobin A1c; Future  -     Lipid panel; Future      Mixed stress and urge urinary incontinence  Stable  Discussed hygiene to prevent yeast/vaginal irriatation or infectoin    Acquired hypothyroidism  Stable  Lab Results   Component Value Date    TSH 1.375 01/10/2018   -     TSH; Future    Aortic Calcification  Noted on prior imaging  Continue to monitor    Rachelle Oliver PA-C

## 2019-08-01 ENCOUNTER — TELEPHONE (OUTPATIENT)
Dept: ENDOSCOPY | Facility: HOSPITAL | Age: 84
End: 2019-08-01

## 2019-08-01 ENCOUNTER — OFFICE VISIT (OUTPATIENT)
Dept: SURGERY | Facility: CLINIC | Age: 84
End: 2019-08-01
Payer: MEDICARE

## 2019-08-01 VITALS
WEIGHT: 206.81 LBS | HEIGHT: 58 IN | BODY MASS INDEX: 43.41 KG/M2 | SYSTOLIC BLOOD PRESSURE: 140 MMHG | DIASTOLIC BLOOD PRESSURE: 61 MMHG | HEART RATE: 58 BPM

## 2019-08-01 DIAGNOSIS — K62.5 RECTAL BLEEDING: Primary | ICD-10-CM

## 2019-08-01 LAB
BACTERIAL VAGINOSIS DNA: NEGATIVE
CANDIDA GLABRATA DNA: NEGATIVE
CANDIDA KRUSEI DNA: NEGATIVE
CANDIDA RRNA VAG QL PROBE: POSITIVE
T VAGINALIS RRNA GENITAL QL PROBE: NEGATIVE

## 2019-08-01 PROCEDURE — 1101F PR PT FALLS ASSESS DOC 0-1 FALLS W/OUT INJ PAST YR: ICD-10-PCS | Mod: HCNC,CPTII,S$GLB, | Performed by: COLON & RECTAL SURGERY

## 2019-08-01 PROCEDURE — 1101F PT FALLS ASSESS-DOCD LE1/YR: CPT | Mod: HCNC,CPTII,S$GLB, | Performed by: COLON & RECTAL SURGERY

## 2019-08-01 PROCEDURE — 99203 OFFICE O/P NEW LOW 30 MIN: CPT | Mod: HCNC,S$GLB,, | Performed by: COLON & RECTAL SURGERY

## 2019-08-01 PROCEDURE — 3077F SYST BP >= 140 MM HG: CPT | Mod: HCNC,CPTII,S$GLB, | Performed by: COLON & RECTAL SURGERY

## 2019-08-01 PROCEDURE — 99999 PR PBB SHADOW E&M-EST. PATIENT-LVL III: ICD-10-PCS | Mod: PBBFAC,HCNC,, | Performed by: COLON & RECTAL SURGERY

## 2019-08-01 PROCEDURE — 3077F PR MOST RECENT SYSTOLIC BLOOD PRESSURE >= 140 MM HG: ICD-10-PCS | Mod: HCNC,CPTII,S$GLB, | Performed by: COLON & RECTAL SURGERY

## 2019-08-01 PROCEDURE — 3078F DIAST BP <80 MM HG: CPT | Mod: HCNC,CPTII,S$GLB, | Performed by: COLON & RECTAL SURGERY

## 2019-08-01 PROCEDURE — 99999 PR PBB SHADOW E&M-EST. PATIENT-LVL III: CPT | Mod: PBBFAC,HCNC,, | Performed by: COLON & RECTAL SURGERY

## 2019-08-01 PROCEDURE — 99203 PR OFFICE/OUTPT VISIT, NEW, LEVL III, 30-44 MIN: ICD-10-PCS | Mod: HCNC,S$GLB,, | Performed by: COLON & RECTAL SURGERY

## 2019-08-01 PROCEDURE — 3078F PR MOST RECENT DIASTOLIC BLOOD PRESSURE < 80 MM HG: ICD-10-PCS | Mod: HCNC,CPTII,S$GLB, | Performed by: COLON & RECTAL SURGERY

## 2019-08-01 NOTE — TELEPHONE ENCOUNTER
Patient in office to schedule Colonoscopy.  While reviewing patient's chart it was noted that patient has a history of CHF and has not followed up with Dr. Saldaña, her Cardiologist.  Explained to patient that Cardiac Clearance would need to be requested and received from Dr. Saldaña prior to scheduling procedure.   Informed patient that once she is cleared from a Cardiology standpoint, she will be contacted to schedule procedure.  Patient verbalized understanding.

## 2019-08-01 NOTE — TELEPHONE ENCOUNTER
Dr. Saldaña,    Patient needs to be scheduled for Colonoscopy.  Is patient cleared from a Cardiology standpoint?  Please advise.    Thank you,  Hilary

## 2019-08-01 NOTE — PROGRESS NOTES
HPI:  Courtney Engle is a 84 y.o. female with history of DM, CKD stage 3 and family history of CRC.   Now in clinic for 3 episodes of rectal bleeding, found clots in stool, no blood when she wiped.   No abdominal pain, loss of weight, rectal pain or fever.   Having 3-4 bowel movements daily, not formed, every time she urinate she has a BM.   Not taking any fiber supplement     Last colonoscopy 2015.   Impression:           - Diverticulosis in the sigmoid colon.                        - No specimens collected.                        - incomplete examination to sigmoid colon.    Mother suffered from colorectal cancer aged 40.     Past Medical History:   Diagnosis Date    Acquired hypothyroidism 4/25/2014    Anxiety     Aortic calcification 12/8/2016    X-Ray Chest-5/08/2014    Arthritis     Bilateral carotid artery disease 12/8/2016    US Carotid Bilateral-1/24/2013    CKD stage 3 due to type 2 diabetes mellitus 2/16/2017    Depression     Essential hypertension     Fever blister     GERD (gastroesophageal reflux disease)     Glaucoma 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 robledo)     Hyperlipidemia LDL goal <70 2/16/2017    Keloid cicatrix     Mixed stress and urge urinary incontinence 2/16/2017    Morbid obesity with BMI of 40.0-44.9, adult 12/8/2016    Ocular migraine 1/24/2013    Type 2 diabetes mellitus with hyperglycemia, with long-term current use of insulin         Past Surgical History:   Procedure Laterality Date    CATARACT EXTRACTION W/  INTRAOCULAR LENS IMPLANT Right 05/30/2012        CATARACT EXTRACTION W/  INTRAOCULAR LENS IMPLANT Left 05/26/2010        CHOLECYSTECTOMY      COLONOSCOPY N/A 4/20/2015    [facility] F[facility]    COLONOSCOPY W/ POLYPECTOMY  04/20/2015    ESOPHAGEAL BRAVO PH OFF PPI N/A 4/20/2015    [facility] (4TH FLR)    ESOPHAGOGASTRODUODENOSCOPY  04/20/2015     ESOPHAGOGASTRODUODENOSCOPY (EGD) N/A 4/20/2015    [facility] ([facility]    HYSTERECTOMY      Partial    JOINT REPLACEMENT      knee replacement right      TONSILLECTOMY, ADENOIDECTOMY      tonsillectomy only       Review of patient's allergies indicates:   Allergen Reactions    Contac 12 hour allergy Anaphylaxis and Hives    Diphenhydramine hcl Shortness Of Breath     Other reaction(s): Shortness of breath    Ciprofloxacin (bulk) Nausea And Vomiting    (d)-limonene flavor Hives     Other reaction(s): Shortness of breath    Anti-hyst Other (See Comments)    Antihistamines - alkylamine Hives    Ciprocinonide      Other reaction(s): Stomach upset    Codeine      bad reaction    Contact metal agent      Other reaction(s): Hives    Glipizide Hives    Hydroxyzine      Unknown    Iodinated contrast- oral and iv dye Other (See Comments)    Nitrofuran analogues     Penicillin      Other reaction(s): Rash    Propoxyphene Itching    Statins-hmg-coa reductase inhibitors      Nausea to all statins    Doxycycline Rash    Penicillins Rash    Sulfa (sulfonamide antibiotics) Rash    Sulfacetamide sodium-urea Rash       Family History   Problem Relation Age of Onset    Glaucoma Father     Stroke Father     Heart attack Father 93    Nephrolithiasis Father     Colon cancer Mother     Cancer Mother         colon ca    Heart disease Mother     Uterine cancer Daughter         uterine sarcoma    Hematuria Brother     Heart disease Maternal Grandmother     Hypertension Maternal Grandmother     Heart attack Maternal Grandmother     No Known Problems Daughter     Amblyopia Neg Hx     Blindness Neg Hx     Cataracts Neg Hx     Macular degeneration Neg Hx     Retinal detachment Neg Hx     Strabismus Neg Hx        Social History     Socioeconomic History    Marital status:      Spouse name: Not on file    Number of children: Not on file    Years of education: Not on  "file    Highest education level: Not on file   Occupational History    Not on file   Social Needs    Financial resource strain: Not on file    Food insecurity:     Worry: Not on file     Inability: Not on file    Transportation needs:     Medical: Not on file     Non-medical: Not on file   Tobacco Use    Smoking status: Never Smoker    Smokeless tobacco: Never Used   Substance and Sexual Activity    Alcohol use: No    Drug use: No    Sexual activity: Never   Lifestyle    Physical activity:     Days per week: Not on file     Minutes per session: Not on file    Stress: Not on file   Relationships    Social connections:     Talks on phone: Not on file     Gets together: Not on file     Attends Church service: Not on file     Active member of club or organization: Not on file     Attends meetings of clubs or organizations: Not on file     Relationship status: Not on file   Other Topics Concern    Are you pregnant or think you may be? Not Asked    Breast-feeding Not Asked   Social History Narrative    Lives in [place], by herself, with her 7 cats. Patient has family in [place], [place], and [place]. Patient has 2 daughters, and 3 granddaughters, and 4 great grandchildren. Patient ambulates with wheeled walker.       ROS:  GENERAL: No fever, chills, fatigability or weight loss.  Integument: No rashes, redness, icterus  CHEST: Denies VERA, cyanosis, wheezing, cough and sputum production.  CARDIOVASCULAR: Denies chest pain, PND, orthopnea or reduced exercise tolerance.  GI: Denies abd pain, dysphagia, nausea, vomiting, no hematemesis   : Denies burning on urination, no hematuria, no bacteriuria  MSK: No deformities, swelling, joint pain swelling  Neurologic: No HAs, seizures, weakness, paresthesias, gait problems    PE:  General appearance healthy  BP (!) 140/61 (BP Location: Left arm, Patient Position: Sitting, BP Method: Large (Automatic))   Pulse (!) 58   Ht 4' 10" (1.473 m)   Wt 93.8 kg (206 lb " 12.7 oz)   BMI 43.22 kg/m²   Sclera/ Skin mpm icteric  LN non palpable  AT NC EOMI  Neck supple trachea midline   Chest symmetric, nl excursion, no retractions, breathing comfortably  Abdomen  ND soft NT.  no masses, no organomegaly  EXT - no CCE  Neuro:  Mood/ affect nl, alert and oriented x 3, moves all ext's, gait nl      Assessment:  Rectal bleeding, change in frequency and consistency of bowel movements  Family history of CRC cancer  Last colonoscopy 4 years ago.     Plan:  Colonoscopy.

## 2019-08-01 NOTE — LETTER
August 1, 2019      Rachelle Oliver PA-C  [address]  [place] [address]           [facility]  [address]  [facility] [address]  Phone: [phone]          Patient: Courtney Engle   MR Number: [id number]   YOB: 1935   Date of Visit: 8/1/2019       Dear Rachelle Oliver:    Thank you for referring Courtney Engle to me for evaluation. Attached you will find relevant portions of my assessment and plan of care.    If you have questions, please do not hesitate to call me. I look forward to following Courtney Engle along with you.    Sincerely,    Silas Navas MD    Enclosure  CC:  No Recipients    If you would like to receive this communication electronically, please contact [email or web address] or [phone] to request more information on [employer] Link access.    For providers and/or their staff who would like to refer a patient to [facility], please contact us through our one-stop-shop provider referral line, [facility], at [phone].    If you feel you have received this communication in error or would no longer like to receive these types of communications, please e-mail [email or web address]

## 2019-08-02 NOTE — TELEPHONE ENCOUNTER
MD Gabriella Nelson MA 14 hours ago (5:18 PM)      I have not seen her in over a year. Pat    Routing comment

## 2019-08-03 DIAGNOSIS — K21.9 LPRD (LARYNGOPHARYNGEAL REFLUX DISEASE): ICD-10-CM

## 2019-08-04 RX ORDER — ATENOLOL 25 MG/1
TABLET ORAL
Qty: 90 TABLET | Refills: 0 | Status: SHIPPED | OUTPATIENT
Start: 2019-08-04 | End: 2020-01-30

## 2019-08-04 RX ORDER — GABAPENTIN 100 MG/1
CAPSULE ORAL
Qty: 60 CAPSULE | Refills: 0 | Status: ON HOLD | OUTPATIENT
Start: 2019-08-04 | End: 2022-01-01 | Stop reason: HOSPADM

## 2019-08-04 RX ORDER — LEVOTHYROXINE SODIUM 75 UG/1
TABLET ORAL
Qty: 90 TABLET | Refills: 0 | Status: SHIPPED | OUTPATIENT
Start: 2019-08-04 | End: 2019-11-02 | Stop reason: SDUPTHER

## 2019-08-04 RX ORDER — OMEPRAZOLE 40 MG/1
CAPSULE, DELAYED RELEASE ORAL
Qty: 60 CAPSULE | Refills: 0 | Status: SHIPPED | OUTPATIENT
Start: 2019-08-04 | End: 2019-09-02 | Stop reason: SDUPTHER

## 2019-08-04 RX ORDER — ESCITALOPRAM OXALATE 10 MG/1
TABLET ORAL
Qty: 90 TABLET | Refills: 0 | Status: SHIPPED | OUTPATIENT
Start: 2019-08-04 | End: 2019-11-02 | Stop reason: SDUPTHER

## 2019-08-09 ENCOUNTER — LAB VISIT (OUTPATIENT)
Dept: LAB | Facility: HOSPITAL | Age: 84
End: 2019-08-09
Payer: MEDICARE

## 2019-08-09 DIAGNOSIS — E03.9 ACQUIRED HYPOTHYROIDISM: Chronic | ICD-10-CM

## 2019-08-09 DIAGNOSIS — I10 ESSENTIAL HYPERTENSION: ICD-10-CM

## 2019-08-09 DIAGNOSIS — Z79.4 TYPE 2 DIABETES MELLITUS WITH HYPERGLYCEMIA, WITH LONG-TERM CURRENT USE OF INSULIN: ICD-10-CM

## 2019-08-09 DIAGNOSIS — E11.65 TYPE 2 DIABETES MELLITUS WITH HYPERGLYCEMIA, WITH LONG-TERM CURRENT USE OF INSULIN: ICD-10-CM

## 2019-08-09 DIAGNOSIS — E11.22 CKD STAGE 3 DUE TO TYPE 2 DIABETES MELLITUS: ICD-10-CM

## 2019-08-09 DIAGNOSIS — N18.30 CKD STAGE 3 DUE TO TYPE 2 DIABETES MELLITUS: ICD-10-CM

## 2019-08-09 LAB
ALBUMIN SERPL BCP-MCNC: 3.5 G/DL (ref 3.5–5.2)
ALP SERPL-CCNC: 78 U/L (ref 55–135)
ALT SERPL W/O P-5'-P-CCNC: 7 U/L (ref 10–44)
ANION GAP SERPL CALC-SCNC: 12 MMOL/L (ref 8–16)
AST SERPL-CCNC: 14 U/L (ref 10–40)
BASOPHILS # BLD AUTO: 0.04 K/UL (ref 0–0.2)
BASOPHILS NFR BLD: 0.4 % (ref 0–1.9)
BILIRUB SERPL-MCNC: 0.2 MG/DL (ref 0.1–1)
BUN SERPL-MCNC: 20 MG/DL (ref 8–23)
CALCIUM SERPL-MCNC: 9.2 MG/DL (ref 8.7–10.5)
CHLORIDE SERPL-SCNC: 102 MMOL/L (ref 95–110)
CHOLEST SERPL-MCNC: 281 MG/DL (ref 120–199)
CHOLEST/HDLC SERPL: 7.2 {RATIO} (ref 2–5)
CO2 SERPL-SCNC: 25 MMOL/L (ref 23–29)
CREAT SERPL-MCNC: 1.2 MG/DL (ref 0.5–1.4)
DIFFERENTIAL METHOD: ABNORMAL
EOSINOPHIL # BLD AUTO: 0.4 K/UL (ref 0–0.5)
EOSINOPHIL NFR BLD: 3.8 % (ref 0–8)
ERYTHROCYTE [DISTWIDTH] IN BLOOD BY AUTOMATED COUNT: 13.2 % (ref 11.5–14.5)
EST. GFR  (AFRICAN AMERICAN): 49 ML/MIN/1.73 M^2
EST. GFR  (NON AFRICAN AMERICAN): 42 ML/MIN/1.73 M^2
ESTIMATED AVG GLUCOSE: 226 MG/DL (ref 68–131)
GLUCOSE SERPL-MCNC: 190 MG/DL (ref 70–110)
HBA1C MFR BLD HPLC: 9.5 % (ref 4–5.6)
HCT VFR BLD AUTO: 39.1 % (ref 37–48.5)
HDLC SERPL-MCNC: 39 MG/DL (ref 40–75)
HDLC SERPL: 13.9 % (ref 20–50)
HGB BLD-MCNC: 12.6 G/DL (ref 12–16)
LDLC SERPL CALC-MCNC: 169 MG/DL (ref 63–159)
LYMPHOCYTES # BLD AUTO: 2.8 K/UL (ref 1–4.8)
LYMPHOCYTES NFR BLD: 26.9 % (ref 18–48)
MCH RBC QN AUTO: 29.2 PG (ref 27–31)
MCHC RBC AUTO-ENTMCNC: 32.2 G/DL (ref 32–36)
MCV RBC AUTO: 91 FL (ref 82–98)
MONOCYTES # BLD AUTO: 0.8 K/UL (ref 0.3–1)
MONOCYTES NFR BLD: 7.2 % (ref 4–15)
NEUTROPHILS # BLD AUTO: 6.4 K/UL (ref 1.8–7.7)
NEUTROPHILS NFR BLD: 61.7 % (ref 38–73)
NONHDLC SERPL-MCNC: 242 MG/DL
PLATELET # BLD AUTO: 359 K/UL (ref 150–350)
PMV BLD AUTO: 9.8 FL (ref 9.2–12.9)
POTASSIUM SERPL-SCNC: 4.8 MMOL/L (ref 3.5–5.1)
PROT SERPL-MCNC: 7.5 G/DL (ref 6–8.4)
RBC # BLD AUTO: 4.32 M/UL (ref 4–5.4)
SODIUM SERPL-SCNC: 139 MMOL/L (ref 136–145)
TRIGL SERPL-MCNC: 365 MG/DL (ref 30–150)
TSH SERPL DL<=0.005 MIU/L-ACNC: 1.87 UIU/ML (ref 0.4–4)
WBC # BLD AUTO: 10.44 K/UL (ref 3.9–12.7)

## 2019-08-09 PROCEDURE — 80053 COMPREHEN METABOLIC PANEL: CPT | Mod: HCNC

## 2019-08-09 PROCEDURE — 83036 HEMOGLOBIN GLYCOSYLATED A1C: CPT | Mod: HCNC

## 2019-08-09 PROCEDURE — 36415 COLL VENOUS BLD VENIPUNCTURE: CPT | Mod: HCNC

## 2019-08-09 PROCEDURE — 85025 COMPLETE CBC W/AUTO DIFF WBC: CPT | Mod: HCNC

## 2019-08-09 PROCEDURE — 80061 LIPID PANEL: CPT | Mod: HCNC

## 2019-08-09 PROCEDURE — 84443 ASSAY THYROID STIM HORMONE: CPT | Mod: HCNC

## 2019-08-13 ENCOUNTER — TELEPHONE (OUTPATIENT)
Dept: CARDIOLOGY | Facility: CLINIC | Age: 84
End: 2019-08-13

## 2019-08-15 ENCOUNTER — OFFICE VISIT (OUTPATIENT)
Dept: INTERNAL MEDICINE | Facility: CLINIC | Age: 84
End: 2019-08-15
Payer: MEDICARE

## 2019-08-15 ENCOUNTER — LAB VISIT (OUTPATIENT)
Dept: LAB | Facility: HOSPITAL | Age: 84
End: 2019-08-15
Attending: INTERNAL MEDICINE
Payer: MEDICARE

## 2019-08-15 VITALS
SYSTOLIC BLOOD PRESSURE: 130 MMHG | HEIGHT: 58 IN | BODY MASS INDEX: 43.03 KG/M2 | DIASTOLIC BLOOD PRESSURE: 72 MMHG | OXYGEN SATURATION: 97 % | HEART RATE: 59 BPM | WEIGHT: 205 LBS

## 2019-08-15 DIAGNOSIS — E11.9 TYPE 2 DIABETES MELLITUS WITHOUT COMPLICATION, WITH LONG-TERM CURRENT USE OF INSULIN: Chronic | ICD-10-CM

## 2019-08-15 DIAGNOSIS — Z00.00 ROUTINE PHYSICAL EXAMINATION: Primary | ICD-10-CM

## 2019-08-15 DIAGNOSIS — Z78.9 STATIN INTOLERANCE: ICD-10-CM

## 2019-08-15 DIAGNOSIS — R53.83 FATIGUE, UNSPECIFIED TYPE: ICD-10-CM

## 2019-08-15 DIAGNOSIS — M79.671 INTRACTABLE RIGHT HEEL PAIN: ICD-10-CM

## 2019-08-15 DIAGNOSIS — Z79.4 TYPE 2 DIABETES MELLITUS WITHOUT COMPLICATION, WITH LONG-TERM CURRENT USE OF INSULIN: Chronic | ICD-10-CM

## 2019-08-15 DIAGNOSIS — B37.9 YEAST INFECTION: ICD-10-CM

## 2019-08-15 DIAGNOSIS — M25.50 ARTHRALGIA, UNSPECIFIED JOINT: ICD-10-CM

## 2019-08-15 DIAGNOSIS — M79.671 RIGHT FOOT PAIN: ICD-10-CM

## 2019-08-15 DIAGNOSIS — I50.32 CHRONIC DIASTOLIC CONGESTIVE HEART FAILURE: ICD-10-CM

## 2019-08-15 PROCEDURE — 3078F DIAST BP <80 MM HG: CPT | Mod: HCNC,CPTII,S$GLB, | Performed by: INTERNAL MEDICINE

## 2019-08-15 PROCEDURE — 3078F PR MOST RECENT DIASTOLIC BLOOD PRESSURE < 80 MM HG: ICD-10-PCS | Mod: HCNC,CPTII,S$GLB, | Performed by: INTERNAL MEDICINE

## 2019-08-15 PROCEDURE — 99397 PR PREVENTIVE VISIT,EST,65 & OVER: ICD-10-PCS | Mod: HCNC,S$GLB,, | Performed by: INTERNAL MEDICINE

## 2019-08-15 PROCEDURE — 99999 PR PBB SHADOW E&M-EST. PATIENT-LVL V: CPT | Mod: PBBFAC,HCNC,, | Performed by: INTERNAL MEDICINE

## 2019-08-15 PROCEDURE — 86038 ANTINUCLEAR ANTIBODIES: CPT | Mod: HCNC

## 2019-08-15 PROCEDURE — 36415 COLL VENOUS BLD VENIPUNCTURE: CPT | Mod: HCNC

## 2019-08-15 PROCEDURE — 99999 PR PBB SHADOW E&M-EST. PATIENT-LVL V: ICD-10-PCS | Mod: PBBFAC,HCNC,, | Performed by: INTERNAL MEDICINE

## 2019-08-15 PROCEDURE — 99397 PER PM REEVAL EST PAT 65+ YR: CPT | Mod: HCNC,S$GLB,, | Performed by: INTERNAL MEDICINE

## 2019-08-15 PROCEDURE — 3075F SYST BP GE 130 - 139MM HG: CPT | Mod: HCNC,CPTII,S$GLB, | Performed by: INTERNAL MEDICINE

## 2019-08-15 PROCEDURE — 3075F PR MOST RECENT SYSTOLIC BLOOD PRESS GE 130-139MM HG: ICD-10-PCS | Mod: HCNC,CPTII,S$GLB, | Performed by: INTERNAL MEDICINE

## 2019-08-15 RX ORDER — FLUCONAZOLE 150 MG/1
150 TABLET ORAL DAILY
Qty: 1 TABLET | Refills: 0 | Status: SHIPPED | OUTPATIENT
Start: 2019-08-15 | End: 2019-08-16

## 2019-08-15 NOTE — LETTER
August 15, 2019    Courtney Engle  [address] [facility] [address]             [facility]  [address]  [facility] [address]  Phone: [phone]  Fax: [phone] [place]:    Patient has a [employer] Traveler and the batteries are dead. Please assist her with covering and replacing the batteries for this medically necessary device.       If you have any questions or concerns, please don't hesitate to call.    Sincerely,         Vishal Ulloa MD

## 2019-08-15 NOTE — PROGRESS NOTES
Subjective:       Patient ID: Courtney Engle is a 84 y.o. female.    Chief Complaint: Rectal Bleeding; Medication Refill; and Vaginitis    Patient here for annual exam but had numerous problems to discuss.  I will list those here.  Basically the patient is having some rectal bleeding but colon and rectal wants her to see Cardiology for history of CHF prior to getting clear to get the scope.  Patient also had a tremendous worsening in her A1c now above 9%.  We will  have her see NP Yan for assistance with this.  She says she has not been following her diet.  She claims she is taking her insulin but I showed her the jump in the values.  She said she is not getting high readings on her machine off in but she is not checking regularly.  She says she has tried multiple statins and cannot tolerate them.  She is having right heel and foot pain so we will help with Podiatry.  She needs a battery repair for her motorized scooter.  I will write a letter.  She is having recurrent yeast infections which I suspect are from the poorly controlled diabetes.        Review of Systems   Constitutional: Positive for fatigue. Negative for appetite change, chills and fever.   HENT: Negative for nosebleeds, sinus pain and sore throat.    Eyes: Negative for visual disturbance.   Respiratory: Negative for cough, shortness of breath and wheezing.    Cardiovascular: Negative for chest pain and leg swelling.   Gastrointestinal: Positive for anal bleeding and blood in stool. Negative for abdominal pain, constipation and diarrhea.   Genitourinary: Positive for vaginal discharge and vaginal pain (Discomfort from yeast infection). Negative for difficulty urinating and hematuria.   Musculoskeletal: Positive for arthralgias, gait problem and joint swelling ( Right heel). Negative for neck pain and neck stiffness.   Skin: Negative for pallor and rash.   Neurological: Negative for headaches.   Psychiatric/Behavioral: Negative for dysphoric mood  and suicidal ideas. The patient is not nervous/anxious.        Objective:      Physical Exam   Constitutional: She is oriented to person, place, and time. She appears well-developed and well-nourished. No distress.   Morbidly obese female BMI 42   HENT:   Head: Normocephalic and atraumatic.   Right Ear: External ear normal.   Left Ear: External ear normal.   Mouth/Throat: Oropharynx is clear and moist. No oropharyngeal exudate.   Eyes: Pupils are equal, round, and reactive to light. Conjunctivae are normal. No scleral icterus.   Neck: Normal range of motion. Neck supple. No thyromegaly present.   Cardiovascular: Normal rate and regular rhythm.   Pulmonary/Chest: Effort normal and breath sounds normal. She has no wheezes.   Abdominal: Soft. Bowel sounds are normal. She exhibits no distension. There is no tenderness.   Musculoskeletal: She exhibits no tenderness.   Tender right Achilles with probable calcification at the insertion along with heel pain.   Lymphadenopathy:     She has no cervical adenopathy.   Neurological: She is alert and oriented to person, place, and time.   Skin: No rash noted.   Psychiatric: She has a normal mood and affect. Her behavior is normal.   Vitals reviewed.      Assessment:       1. Routine physical examination    2. Type 2 diabetes mellitus without complication, with long-term current use of insulin    3. Chronic diastolic congestive heart failure    4. Yeast infection    5. Right foot pain    6. Intractable right heel pain    7. Arthralgia, unspecified joint    8. Fatigue, unspecified type    9. Statin intolerance        Plan:       Courtney was seen today for rectal bleeding, medication refill and vaginitis.    Diagnoses and all orders for this visit:    Routine physical examination    Type 2 diabetes mellitus without complication, with long-term current use of insulin    Chronic diastolic congestive heart failure    Yeast infection    Right foot pain  -     Ambulatory referral to  Podiatry    Intractable right heel pain  -     Ambulatory referral to Podiatry    Arthralgia, unspecified joint  -     ESVIN Screen w/Reflex; Future    Fatigue, unspecified type  -     ESVIN Screen w/Reflex; Future    Statin intolerance    Other orders  -     fluconazole (DIFLUCAN) 150 MG Tab; Take 1 tablet (150 mg total) by mouth once daily. for 1 day

## 2019-08-16 LAB — ANA SER QL IF: NORMAL

## 2019-08-18 ENCOUNTER — TELEPHONE (OUTPATIENT)
Dept: INTERNAL MEDICINE | Facility: CLINIC | Age: 84
End: 2019-08-18

## 2019-08-19 NOTE — TELEPHONE ENCOUNTER
----- Message from Rachelle Oliver PA-C sent at 8/19/2019 11:17 AM CDT -----  Assist with scheduling appt with DAVIN Heredia to follow up uncontrolled diabetes.

## 2019-08-20 RX ORDER — INSULIN ASPART 100 [IU]/ML
INJECTION, SOLUTION INTRAVENOUS; SUBCUTANEOUS
Qty: 20 ML | Refills: 0 | Status: SHIPPED | OUTPATIENT
Start: 2019-08-20 | End: 2019-09-04

## 2019-08-20 NOTE — TELEPHONE ENCOUNTER
Gabriella Oakley MA   to Me      9:51 AM    Patient need to be seen, scheduled appointment , np answer at home.

## 2019-08-23 ENCOUNTER — TELEPHONE (OUTPATIENT)
Dept: CARDIOLOGY | Facility: CLINIC | Age: 84
End: 2019-08-23

## 2019-08-23 NOTE — TELEPHONE ENCOUNTER
----- Message from Serena Powell MA sent at 8/23/2019  1:04 PM CDT -----  Contact: patient called  Please call the patient at [phone] the patient would like to talk to you about her surgery and her appointment . Thank you.

## 2019-08-29 ENCOUNTER — OFFICE VISIT (OUTPATIENT)
Dept: PODIATRY | Facility: CLINIC | Age: 84
End: 2019-08-29
Payer: MEDICARE

## 2019-08-29 ENCOUNTER — PATIENT OUTREACH (OUTPATIENT)
Dept: ADMINISTRATIVE | Facility: OTHER | Age: 84
End: 2019-08-29

## 2019-08-29 VITALS
WEIGHT: 205 LBS | DIASTOLIC BLOOD PRESSURE: 79 MMHG | HEART RATE: 62 BPM | SYSTOLIC BLOOD PRESSURE: 178 MMHG | BODY MASS INDEX: 47.44 KG/M2 | HEIGHT: 55 IN

## 2019-08-29 DIAGNOSIS — M76.62 ACHILLES TENDINITIS OF BOTH LOWER EXTREMITIES: Primary | ICD-10-CM

## 2019-08-29 DIAGNOSIS — M76.61 ACHILLES TENDINITIS OF BOTH LOWER EXTREMITIES: Primary | ICD-10-CM

## 2019-08-29 PROCEDURE — 99999 PR PBB SHADOW E&M-EST. PATIENT-LVL IV: ICD-10-PCS | Mod: PBBFAC,HCNC,, | Performed by: PODIATRIST

## 2019-08-29 PROCEDURE — 1101F PR PT FALLS ASSESS DOC 0-1 FALLS W/OUT INJ PAST YR: ICD-10-PCS | Mod: HCNC,CPTII,S$GLB, | Performed by: PODIATRIST

## 2019-08-29 PROCEDURE — 99203 OFFICE O/P NEW LOW 30 MIN: CPT | Mod: HCNC,S$GLB,, | Performed by: PODIATRIST

## 2019-08-29 PROCEDURE — 99203 PR OFFICE/OUTPT VISIT, NEW, LEVL III, 30-44 MIN: ICD-10-PCS | Mod: HCNC,S$GLB,, | Performed by: PODIATRIST

## 2019-08-29 PROCEDURE — 1101F PT FALLS ASSESS-DOCD LE1/YR: CPT | Mod: HCNC,CPTII,S$GLB, | Performed by: PODIATRIST

## 2019-08-29 PROCEDURE — 3077F SYST BP >= 140 MM HG: CPT | Mod: HCNC,CPTII,S$GLB, | Performed by: PODIATRIST

## 2019-08-29 PROCEDURE — 3077F PR MOST RECENT SYSTOLIC BLOOD PRESSURE >= 140 MM HG: ICD-10-PCS | Mod: HCNC,CPTII,S$GLB, | Performed by: PODIATRIST

## 2019-08-29 PROCEDURE — 3078F PR MOST RECENT DIASTOLIC BLOOD PRESSURE < 80 MM HG: ICD-10-PCS | Mod: HCNC,CPTII,S$GLB, | Performed by: PODIATRIST

## 2019-08-29 PROCEDURE — 99999 PR PBB SHADOW E&M-EST. PATIENT-LVL IV: CPT | Mod: PBBFAC,HCNC,, | Performed by: PODIATRIST

## 2019-08-29 PROCEDURE — 3078F DIAST BP <80 MM HG: CPT | Mod: HCNC,CPTII,S$GLB, | Performed by: PODIATRIST

## 2019-08-29 RX ORDER — DICLOFENAC SODIUM 10 MG/G
2 GEL TOPICAL DAILY
Qty: 1 TUBE | Refills: 3 | Status: SHIPPED | OUTPATIENT
Start: 2019-08-29 | End: 2020-08-06 | Stop reason: SDUPTHER

## 2019-08-29 NOTE — LETTER
September 4, 2019      Vishal Ulloa MD  [address]  [place] [address]           [facility]  [address]  [facility] [address]  Phone: [phone]          Patient: Courtney Engle   MR Number: [id number]   YOB: 1935   Date of Visit: 8/29/2019       Dear Dr. Vishal Ulloa:    Thank you for referring Courtney Engle to me for evaluation. Attached you will find relevant portions of my assessment and plan of care.    If you have questions, please do not hesitate to call me. I look forward to following Courtney Engle along with you.    Sincerely,    Shelly Ashley, MAIKEL    Enclosure  CC:  No Recipients    If you would like to receive this communication electronically, please contact [email or web address] or [phone] to request more information on [employer] Link access.    For providers and/or their staff who would like to refer a patient to [facility], please contact us through our one-stop-shop provider referral line, [facility], at [phone].    If you feel you have received this communication in error or would no longer like to receive these types of communications, please e-mail [email or web address]

## 2019-08-30 ENCOUNTER — OFFICE VISIT (OUTPATIENT)
Dept: CARDIOLOGY | Facility: CLINIC | Age: 84
End: 2019-08-30
Payer: MEDICARE

## 2019-08-30 VITALS
HEART RATE: 62 BPM | SYSTOLIC BLOOD PRESSURE: 160 MMHG | WEIGHT: 208.5 LBS | HEIGHT: 58 IN | BODY MASS INDEX: 43.76 KG/M2 | OXYGEN SATURATION: 95 % | DIASTOLIC BLOOD PRESSURE: 80 MMHG

## 2019-08-30 DIAGNOSIS — N18.30 CKD STAGE 3 DUE TO TYPE 2 DIABETES MELLITUS: Chronic | ICD-10-CM

## 2019-08-30 DIAGNOSIS — R06.02 SHORTNESS OF BREATH: ICD-10-CM

## 2019-08-30 DIAGNOSIS — Z78.9 STATIN INTOLERANCE: ICD-10-CM

## 2019-08-30 DIAGNOSIS — K92.1 HEMATOCHEZIA: ICD-10-CM

## 2019-08-30 DIAGNOSIS — Z80.0 FAMILY HISTORY OF MALIGNANT NEOPLASM OF GASTROINTESTINAL TRACT: ICD-10-CM

## 2019-08-30 DIAGNOSIS — I10 ESSENTIAL HYPERTENSION: Chronic | ICD-10-CM

## 2019-08-30 DIAGNOSIS — E66.01 MORBID OBESITY WITH BMI OF 40.0-44.9, ADULT: Chronic | ICD-10-CM

## 2019-08-30 DIAGNOSIS — E78.5 HYPERLIPIDEMIA LDL GOAL <70: Chronic | ICD-10-CM

## 2019-08-30 DIAGNOSIS — E11.9 TYPE 2 DIABETES MELLITUS WITHOUT COMPLICATION, WITH LONG-TERM CURRENT USE OF INSULIN: Chronic | ICD-10-CM

## 2019-08-30 DIAGNOSIS — I51.89 DIASTOLIC DYSFUNCTION: Primary | ICD-10-CM

## 2019-08-30 DIAGNOSIS — E11.22 CKD STAGE 3 DUE TO TYPE 2 DIABETES MELLITUS: Chronic | ICD-10-CM

## 2019-08-30 DIAGNOSIS — Z79.4 TYPE 2 DIABETES MELLITUS WITHOUT COMPLICATION, WITH LONG-TERM CURRENT USE OF INSULIN: Chronic | ICD-10-CM

## 2019-08-30 PROBLEM — I50.32 CHRONIC DIASTOLIC CONGESTIVE HEART FAILURE: Status: RESOLVED | Noted: 2018-01-09 | Resolved: 2019-08-30

## 2019-08-30 PROCEDURE — 1101F PT FALLS ASSESS-DOCD LE1/YR: CPT | Mod: HCNC,CPTII,S$GLB, | Performed by: INTERNAL MEDICINE

## 2019-08-30 PROCEDURE — 3077F PR MOST RECENT SYSTOLIC BLOOD PRESSURE >= 140 MM HG: ICD-10-PCS | Mod: HCNC,CPTII,S$GLB, | Performed by: INTERNAL MEDICINE

## 2019-08-30 PROCEDURE — 3077F SYST BP >= 140 MM HG: CPT | Mod: HCNC,CPTII,S$GLB, | Performed by: INTERNAL MEDICINE

## 2019-08-30 PROCEDURE — 99999 PR PBB SHADOW E&M-EST. PATIENT-LVL V: CPT | Mod: PBBFAC,HCNC,, | Performed by: INTERNAL MEDICINE

## 2019-08-30 PROCEDURE — 1101F PR PT FALLS ASSESS DOC 0-1 FALLS W/OUT INJ PAST YR: ICD-10-PCS | Mod: HCNC,CPTII,S$GLB, | Performed by: INTERNAL MEDICINE

## 2019-08-30 PROCEDURE — 3079F DIAST BP 80-89 MM HG: CPT | Mod: HCNC,CPTII,S$GLB, | Performed by: INTERNAL MEDICINE

## 2019-08-30 PROCEDURE — 99214 OFFICE O/P EST MOD 30 MIN: CPT | Mod: HCNC,S$GLB,, | Performed by: INTERNAL MEDICINE

## 2019-08-30 PROCEDURE — 3079F PR MOST RECENT DIASTOLIC BLOOD PRESSURE 80-89 MM HG: ICD-10-PCS | Mod: HCNC,CPTII,S$GLB, | Performed by: INTERNAL MEDICINE

## 2019-08-30 PROCEDURE — 99214 PR OFFICE/OUTPT VISIT, EST, LEVL IV, 30-39 MIN: ICD-10-PCS | Mod: HCNC,S$GLB,, | Performed by: INTERNAL MEDICINE

## 2019-08-30 PROCEDURE — 99999 PR PBB SHADOW E&M-EST. PATIENT-LVL V: ICD-10-PCS | Mod: PBBFAC,HCNC,, | Performed by: INTERNAL MEDICINE

## 2019-08-30 NOTE — PROGRESS NOTES
"Subjective:    Patient ID:  Courtney Engle is a 84 y.o. female who presents for evaluation of Pre-op Exam      HPI     The patient is a 84 year old female seen 7/2/18 for management of hypertension. She presents for pre-op evaluation for colonoscopy. She was admitted for "congestive heart failure"  1/15/18 [] and 5/17 [].  She has chronic VERA but has morbid obesity and is very inactive.        CONCLUSIONS     1 - Normal left ventricular systolic function (EF 60-65%).     2 - No wall motion abnormalities.     3 - Normal left ventricular diastolic function.     4 - Normal right ventricular systolic function .     5 - Trivial tricuspid regurgitation.     6 - The estimated PA systolic pressure is 28 mmHg.             This document has been electronically    SIGNED BY: Gayla Mejia MD On: 01/10/2018 12:25  Lab Results   Component Value Date     08/09/2019    K 4.8 08/09/2019     08/09/2019    CO2 25 08/09/2019    BUN 20 08/09/2019    CREATININE 1.2 08/09/2019     (H) 08/09/2019    HGBA1C 9.5 (H) 08/09/2019    MG 1.7 04/25/2017    AST 14 08/09/2019    ALT 7 (L) 08/09/2019    ALBUMIN 3.5 08/09/2019    PROT 7.5 08/09/2019    BILITOT 0.2 08/09/2019    WBC 10.44 08/09/2019    HGB 12.6 08/09/2019    HCT 39.1 08/09/2019    MCV 91 08/09/2019     (H) 08/09/2019    INR 1.0 01/09/2018    INR 1.1 12/10/2009    TSH 1.872 08/09/2019         Lab Results   Component Value Date    CHOL 281 (H) 08/09/2019    HDL 39 (L) 08/09/2019    TRIG 365 (H) 08/09/2019       Lab Results   Component Value Date    LDLCALC 169.0 (H) 08/09/2019       Past Medical History:   Diagnosis Date    Acquired hypothyroidism 4/25/2014    Anxiety     Aortic calcification 12/8/2016    X-Ray Chest-5/08/2014    Arthritis     Bilateral carotid artery disease 12/8/2016    US Carotid Bilateral-1/24/2013    CKD stage 3 due to type 2 diabetes mellitus 2/16/2017    Depression     Essential hypertension     Fever blister  " "   GERD (gastroesophageal reflux disease)     Glaucoma 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 robledo)     Hyperlipidemia LDL goal <70 2/16/2017    Keloid cicatrix     Mixed stress and urge urinary incontinence 2/16/2017    Morbid obesity with BMI of 40.0-44.9, adult 12/8/2016    Ocular migraine 1/24/2013    Type 2 diabetes mellitus with hyperglycemia, with long-term current use of insulin        Current Outpatient Medications:     acetaminophen (TYLENOL) 500 MG tablet, Take 1 tablet (500 mg total) by mouth every 6 (six) hours as needed for Pain., Disp: , Rfl: 0    alcohol swabs (BD ALCOHOL SWABS) PadM, Apply 1 each topically as needed., Disp: 100 each, Rfl: 3    alendronate (FOSAMAX) 70 MG tablet, TAKE 1 TABLET BY MOUTH ONCE A WEEK, AS DIRECTED, Disp: 12 tablet, Rfl: 0    atenolol (TENORMIN) 25 MG tablet, TAKE 1 TABLET BY MOUTH EVERY DAY, Disp: 90 tablet, Rfl: 0    BD INSULIN SYRINGE ULT-FINE II 1/2 mL 31 x 5/16" Syrg, USE THREE TIMES DAILY AS DIRECTED, Disp: 100 each, Rfl: 0    benzonatate (TESSALON) 100 MG capsule, Take 1 capsule (100 mg total) by mouth as needed for Cough., Disp: 30 capsule, Rfl: 1    blood glucose control, low Soln, Test 4 times daily, Disp: 100 each, Rfl: e    blood sugar diagnostic Strp, To check BG 4 times daily, to use with insurance preferred meter, Disp: 100 each, Rfl: 6    diclofenac sodium (VOLTAREN) 1 % Gel, Apply 2 g topically once daily., Disp: 1 Tube, Rfl: 3    escitalopram oxalate (LEXAPRO) 10 MG tablet, TAKE 1 TABLET BY MOUTH EVERY DAY, Disp: 90 tablet, Rfl: 0    fluticasone (FLONASE) 50 mcg/actuation nasal spray, SHAKE WELL AND INSTILL 2 SPRAYS IN EACH NOSTRIL EVERY DAY, Disp: 48 mL, Rfl: 0    gabapentin (NEURONTIN) 100 MG capsule, TAKE 1 CAPSULE(100 MG) BY MOUTH TWICE DAILY, Disp: 60 capsule, Rfl: 0    insulin glargine (LANTUS U-100 INSULIN) 100 unit/mL injection, ADMINISTER 30 UNITS UNDER THE SKIN EVERY EVENING, Disp: 10 mL, Rfl: 3    insulin " "syringe-needle U-100 0.5 mL 31 gauge x 5/16" Syrg, USE AS DIRECTED FOUR TIMES DAILY, Disp: 500 each, Rfl: 0    lancets Misc, To check BG 4 times daily, to use with insurance preferred meter, Disp: 100 each, Rfl: 6    levothyroxine (SYNTHROID) 75 MCG tablet, TAKE 1 TABLET BY MOUTH EVERY DAY, Disp: 90 tablet, Rfl: 0    lisinopril (PRINIVIL,ZESTRIL) 20 MG tablet, TAKE 1 TABLET BY MOUTH EVERY DAY, Disp: 90 tablet, Rfl: 0    NOVOLOG U-100 INSULIN ASPART 100 unit/mL injection, INJECT 15 UNITS UNDER THE SKIN BEFORE MEALS, Disp: 20 mL, Rfl: 0    nystatin (MYCOSTATIN) powder, Apply topically 4 (four) times daily., Disp: 60 g, Rfl: 0    omeprazole (PRILOSEC) 40 MG capsule, TAKE 1 CAPSULE BY MOUTH TWICE DAILY BEFORE MEALS, Disp: 60 capsule, Rfl: 0    triamcinolone acetonide 0.1% (KENALOG) 0.1 % cream, AAA bid on rash, Disp: 454 g, Rfl: 3    aspirin (ECOTRIN) 81 MG EC tablet, Take 1 tablet (81 mg total) by mouth once daily., Disp: 30 tablet, Rfl: 12    blood-glucose meter kit, To check BG 4 times daily, to use with insurance preferred meter, Disp: 1 each, Rfl: 0    furosemide (LASIX) 20 MG tablet, Take 1 tablet (20 mg total) by mouth 2 (two) times daily., Disp: 60 tablet, Rfl: 11          Review of Systems   Constitution: Negative for decreased appetite, diaphoresis, fever, malaise/fatigue, weight gain and weight loss.   HENT: Negative for congestion, ear discharge, ear pain and nosebleeds.    Eyes: Negative for blurred vision, double vision and visual disturbance.   Cardiovascular: Positive for dyspnea on exertion. Negative for chest pain, claudication, cyanosis, irregular heartbeat, leg swelling, near-syncope, orthopnea, palpitations, paroxysmal nocturnal dyspnea and syncope.   Respiratory: Negative for cough, hemoptysis, shortness of breath, sleep disturbances due to breathing, snoring, sputum production and wheezing.    Endocrine: Negative for polydipsia, polyphagia and polyuria.   Hematologic/Lymphatic: Negative " "for adenopathy and bleeding problem. Does not bruise/bleed easily.   Skin: Negative for color change, nail changes, poor wound healing and rash.   Musculoskeletal: Positive for joint pain (left knee). Negative for muscle cramps and muscle weakness.   Gastrointestinal: Negative for abdominal pain, anorexia, change in bowel habit, hematochezia, nausea and vomiting.   Genitourinary: Negative for dysuria, frequency and hematuria.   Neurological: Negative for brief paralysis, difficulty with concentration, excessive daytime sleepiness, dizziness, focal weakness, headaches, light-headedness, seizures, vertigo and weakness.   Psychiatric/Behavioral: Negative for altered mental status and depression.   Allergic/Immunologic: Negative for persistent infections.        Objective:BP (!) 160/80   Pulse 62   Ht 4' 10" (1.473 m)   Wt 94.6 kg (208 lb 8 oz)   SpO2 95%   BMI 43.58 kg/m²             Physical Exam   Constitutional: She is oriented to person, place, and time. She appears well-developed and well-nourished.   Morbid obese   HENT:   Head: Normocephalic.   Right Ear: External ear normal.   Left Ear: External ear normal.   Nose: Nose normal.   Inspection of lips, teeth and gums normal   Eyes: Pupils are equal, round, and reactive to light. Conjunctivae and EOM are normal. No scleral icterus.   Neck: Normal range of motion. No JVD present. No tracheal deviation present. No thyromegaly present.   Cardiovascular: Normal rate, regular rhythm, normal heart sounds and intact distal pulses. Exam reveals no gallop and no friction rub.   No murmur heard.  Pulmonary/Chest: Effort normal and breath sounds normal. No respiratory distress. She has no wheezes. She has no rales. She exhibits no tenderness.   Abdominal: Soft. Bowel sounds are normal. She exhibits no distension. There is no hepatosplenomegaly. There is no tenderness. There is no guarding.   Musculoskeletal: Normal range of motion. She exhibits edema (trace pedal edema). " She exhibits no tenderness.   Lymphadenopathy:   Palpation of lymph nodes of neck and groin normal   Neurological: She is oriented to person, place, and time. No cranial nerve deficit. She exhibits normal muscle tone. Coordination normal.   Skin: Skin is warm and dry. No rash noted. No erythema. No pallor.   Palpation of skin normal   Psychiatric: She has a normal mood and affect. Her behavior is normal. Judgment and thought content normal.         Assessment:       1. Diastolic dysfunction    2. Essential hypertension    3. Hyperlipidemia LDL goal <70    4. CKD stage 3 due to type 2 diabetes mellitus    5. Morbid obesity with BMI of 40.0-44.9, adult    6. Type 2 diabetes mellitus without complication, with long-term current use of insulin    7. Family history of malignant neoplasm of gastrointestinal tract    8. Shortness of breath    9. Statin intolerance    10. Hematochezia         Plan:       Courtney was seen today for pre-op exam.    Diagnoses and all orders for this visit:    Diastolic dysfunction    Essential hypertension    Hyperlipidemia LDL goal <70    CKD stage 3 due to type 2 diabetes mellitus    Morbid obesity with BMI of 40.0-44.9, adult    Type 2 diabetes mellitus without complication, with long-term current use of insulin    Family history of malignant neoplasm of gastrointestinal tract    Shortness of breath    Statin intolerance    Hematochezia    Low CV risk for colonospcopy

## 2019-09-02 DIAGNOSIS — K21.9 LPRD (LARYNGOPHARYNGEAL REFLUX DISEASE): ICD-10-CM

## 2019-09-02 RX ORDER — OMEPRAZOLE 40 MG/1
CAPSULE, DELAYED RELEASE ORAL
Qty: 60 CAPSULE | Refills: 0 | Status: SHIPPED | OUTPATIENT
Start: 2019-09-02 | End: 2019-11-02 | Stop reason: SDUPTHER

## 2019-09-03 DIAGNOSIS — I50.32 CHRONIC DIASTOLIC CONGESTIVE HEART FAILURE: ICD-10-CM

## 2019-09-03 RX ORDER — FUROSEMIDE 20 MG/1
20 TABLET ORAL 2 TIMES DAILY
Qty: 60 TABLET | Refills: 11 | Status: ON HOLD | OUTPATIENT
Start: 2019-09-03 | End: 2022-01-01 | Stop reason: HOSPADM

## 2019-09-04 ENCOUNTER — OFFICE VISIT (OUTPATIENT)
Dept: INTERNAL MEDICINE | Facility: CLINIC | Age: 84
End: 2019-09-04
Payer: MEDICARE

## 2019-09-04 VITALS
SYSTOLIC BLOOD PRESSURE: 125 MMHG | WEIGHT: 205 LBS | DIASTOLIC BLOOD PRESSURE: 64 MMHG | HEIGHT: 58 IN | BODY MASS INDEX: 43.03 KG/M2

## 2019-09-04 DIAGNOSIS — E78.00 PURE HYPERCHOLESTEROLEMIA: ICD-10-CM

## 2019-09-04 DIAGNOSIS — E11.9 TYPE 2 DIABETES MELLITUS WITHOUT COMPLICATION, WITH LONG-TERM CURRENT USE OF INSULIN: Primary | Chronic | ICD-10-CM

## 2019-09-04 DIAGNOSIS — F32.5 MAJOR DEPRESSIVE DISORDER WITH SINGLE EPISODE, IN REMISSION: ICD-10-CM

## 2019-09-04 DIAGNOSIS — K76.0 FATTY LIVER: ICD-10-CM

## 2019-09-04 DIAGNOSIS — Z78.9 STATIN INTOLERANCE: ICD-10-CM

## 2019-09-04 DIAGNOSIS — E03.9 ACQUIRED HYPOTHYROIDISM: Chronic | ICD-10-CM

## 2019-09-04 DIAGNOSIS — Z79.4 TYPE 2 DIABETES MELLITUS WITHOUT COMPLICATION, WITH LONG-TERM CURRENT USE OF INSULIN: Primary | Chronic | ICD-10-CM

## 2019-09-04 DIAGNOSIS — E78.5 HYPERLIPIDEMIA LDL GOAL <70: Chronic | ICD-10-CM

## 2019-09-04 DIAGNOSIS — E11.22 CKD STAGE 3 DUE TO TYPE 2 DIABETES MELLITUS: Chronic | ICD-10-CM

## 2019-09-04 DIAGNOSIS — N18.30 CKD STAGE 3 DUE TO TYPE 2 DIABETES MELLITUS: Chronic | ICD-10-CM

## 2019-09-04 DIAGNOSIS — E66.01 MORBID OBESITY WITH BMI OF 40.0-44.9, ADULT: Chronic | ICD-10-CM

## 2019-09-04 DIAGNOSIS — I10 ESSENTIAL HYPERTENSION: Chronic | ICD-10-CM

## 2019-09-04 PROCEDURE — 3074F SYST BP LT 130 MM HG: CPT | Mod: HCNC,CPTII,S$GLB, | Performed by: NURSE PRACTITIONER

## 2019-09-04 PROCEDURE — 99499 RISK ADDL DX/OHS AUDIT: ICD-10-PCS | Mod: HCNC,S$GLB,, | Performed by: NURSE PRACTITIONER

## 2019-09-04 PROCEDURE — 1101F PR PT FALLS ASSESS DOC 0-1 FALLS W/OUT INJ PAST YR: ICD-10-PCS | Mod: HCNC,CPTII,S$GLB, | Performed by: NURSE PRACTITIONER

## 2019-09-04 PROCEDURE — 3078F DIAST BP <80 MM HG: CPT | Mod: HCNC,CPTII,S$GLB, | Performed by: NURSE PRACTITIONER

## 2019-09-04 PROCEDURE — 1101F PT FALLS ASSESS-DOCD LE1/YR: CPT | Mod: HCNC,CPTII,S$GLB, | Performed by: NURSE PRACTITIONER

## 2019-09-04 PROCEDURE — 99499 UNLISTED E&M SERVICE: CPT | Mod: HCNC,S$GLB,, | Performed by: NURSE PRACTITIONER

## 2019-09-04 PROCEDURE — 3074F PR MOST RECENT SYSTOLIC BLOOD PRESSURE < 130 MM HG: ICD-10-PCS | Mod: HCNC,CPTII,S$GLB, | Performed by: NURSE PRACTITIONER

## 2019-09-04 PROCEDURE — 3078F PR MOST RECENT DIASTOLIC BLOOD PRESSURE < 80 MM HG: ICD-10-PCS | Mod: HCNC,CPTII,S$GLB, | Performed by: NURSE PRACTITIONER

## 2019-09-04 PROCEDURE — 99999 PR PBB SHADOW E&M-EST. PATIENT-LVL IV: CPT | Mod: PBBFAC,HCNC,, | Performed by: NURSE PRACTITIONER

## 2019-09-04 PROCEDURE — 99214 OFFICE O/P EST MOD 30 MIN: CPT | Mod: HCNC,S$GLB,, | Performed by: NURSE PRACTITIONER

## 2019-09-04 PROCEDURE — 99214 PR OFFICE/OUTPT VISIT, EST, LEVL IV, 30-39 MIN: ICD-10-PCS | Mod: HCNC,S$GLB,, | Performed by: NURSE PRACTITIONER

## 2019-09-04 PROCEDURE — 99999 PR PBB SHADOW E&M-EST. PATIENT-LVL IV: ICD-10-PCS | Mod: PBBFAC,HCNC,, | Performed by: NURSE PRACTITIONER

## 2019-09-04 RX ORDER — INSULIN ASPART 100 [IU]/ML
INJECTION, SOLUTION INTRAVENOUS; SUBCUTANEOUS
Qty: 30 ML | Refills: 6
Start: 2019-09-04 | End: 2020-07-08 | Stop reason: SDUPTHER

## 2019-09-04 RX ORDER — INSULIN GLARGINE 100 [IU]/ML
INJECTION, SOLUTION SUBCUTANEOUS
Qty: 10 ML | Refills: 3
Start: 2019-09-04 | End: 2019-09-04

## 2019-09-04 RX ORDER — INSULIN GLARGINE 100 [IU]/ML
INJECTION, SOLUTION SUBCUTANEOUS
Qty: 20 ML | Refills: 6
Start: 2019-09-04 | End: 2019-11-25 | Stop reason: SDUPTHER

## 2019-09-04 NOTE — PROGRESS NOTES
CHIEF COMPLAINT: Type 2 Diabetes     HPI: Mrs. Courtney Engle is a 84 y.o. female who was diagnosed with Type 2 DM x 15 years ago.  H/o osteoporosis, hypothyroidism, acute on chronic diastolic heart failure, morbid obesity.  Started insulin x 15 years ago, upon initial diagnosis.   Pt reports that dietary habits have not been the best.  Pt has an extensive allergic reactions to medications, >15 meds, allergic to sulfa, vitamins.     Pt is being seen by me for the first time.  a1c is elevated, from 7.7% to 9.5%.    In the past, vgo was discussed, pt is not interested at this time    On 8/1/19 rectal bleeding x 3 consecutive days(stool), h/o mother (colon ca), daughter (has rare CA)     Clearance for colonoscopy from cardiologist Dr. Saldaña---seen for CHF     1/2019 -CHF-ER/hospitalization  (L) knee pain uses walker, h/o knee replacement     Social hx: , has 2 daughters.     Lab Results   Component Value Date    HGBA1C 9.5 (H) 08/09/2019     PREVIOUS DIABETES MEDICATIONS TRIED  lantus   novolog    States that she thinks that she has a rash to trunk and legs (upper) r/t insulin           CURRENT DIABETIC MEDS: lantus 30 units at night  novolog 15 units w/ meals      Pt is monitoring blood glucose readings 4 times a day.  Needs >100 strips per month related to fluctuations with blood glucose reading, a1c trends, and activity level.    Diabetes Management Status    Statin: Not taking  ACE/ARB: Taking    Screening or Prevention Patient's value Goal Complete/Controlled?   HgA1C Testing and Control   Lab Results   Component Value Date    HGBA1C 9.5 (H) 08/09/2019      Annually/Less than 8% No   Lipid profile : 08/09/2019 Annually Yes   LDL control Lab Results   Component Value Date    LDLCALC 169.0 (H) 08/09/2019    Annually/Less than 100 mg/dl  No   Nephropathy screening Lab Results   Component Value Date    LABMICR 27.0 02/16/2017     Lab Results   Component Value Date    PROTEINUA Negative 04/21/2017    Annually  "No   Blood pressure BP Readings from Last 1 Encounters:   09/04/19 125/64    Less than 140/90 Yes   Dilated retinal exam : 03/16/2017 Annually No   Foot exam   : 02/16/2017 Annually No       REVIEW OF SYSTEMS  General: no weakness, fatigue, weight stable   Eyes: no double or blurred vision, eye pain, or redness  Cardiovascular: no chest pain, palpitations, edema, or murmurs.   Respiratory: +cough, some dyspnea. +sinus issues--on omeprazole   GI: no heartburn, nausea, or changes in bowel patterns; good appetite.   Skin: no rashes, dryness, itching, or reactions at insulin injection sites.  Neuro: no numbness, tingling, tremors, or vertigo.   Endocrine: no polyuria, polydipsia, polyphagia, heat or cold intolerance.   +insomnia, 3-5 hours of sleep -tylenol, escitalopram, +short term memory loss (some)-previously on xanax -0.5 mg qhs prn  (R) achilles tendonitis       Vital Signs  /64 (BP Location: Left arm, Patient Position: Sitting, BP Method: Large (Manual))   Ht 4' 10" (1.473 m)   Wt 93 kg (205 lb)   BMI 42.85 kg/m²     Hemoglobin A1C   Date Value Ref Range Status   08/09/2019 9.5 (H) 4.0 - 5.6 % Final     Comment:     ADA Screening Guidelines:  5.7-6.4%  Consistent with prediabetes  >or=6.5%  Consistent with diabetes  High levels of fetal hemoglobin interfere with the HbA1C  assay. Heterozygous hemoglobin variants (HbS, HgC, etc)do  not significantly interfere with this assay.   However, presence of multiple variants may affect accuracy.     01/10/2018 7.7 (H) 4.0 - 5.6 % Final     Comment:     According to ADA guidelines, hemoglobin A1c <7.0% represents  optimal control in non-pregnant diabetic patients. Different  metrics may apply to specific patient populations.   Standards of Medical Care in Diabetes-2016.  For the purpose of screening for the presence of diabetes:  <5.7%     Consistent with the absence of diabetes  5.7-6.4%  Consistent with increasing risk for diabetes   (prediabetes)  >or=6.5%  " Consistent with diabetes  Currently, no consensus exists for use of hemoglobin A1c  for diagnosis of diabetes for children.  This Hemoglobin A1c assay has significant interference with fetal   hemoglobin   (HbF). The results are invalid for patients with abnormal amounts of   HbF,   including those with known Hereditary Persistence   of Fetal Hemoglobin. Heterozygous hemoglobin variants (HbAS, HbAC,   HbAD, HbAE, HbA2) do not significantly interfere with this assay;   however, presence of multiple variants in a sample may impact the %   interference.     08/25/2017 7.7 (H) 4.0 - 5.6 % Final     Comment:     According to ADA guidelines, hemoglobin A1c <7.0% represents  optimal control in non-pregnant diabetic patients. Different  metrics may apply to specific patient populations.   Standards of Medical Care in Diabetes-2016.  For the purpose of screening for the presence of diabetes:  <5.7%     Consistent with the absence of diabetes  5.7-6.4%  Consistent with increasing risk for diabetes   (prediabetes)  >or=6.5%  Consistent with diabetes  Currently, no consensus exists for use of hemoglobin A1c  for diagnosis of diabetes for children.  This Hemoglobin A1c assay has significant interference with fetal   hemoglobin   (HbF). The results are invalid for patients with abnormal amounts of   HbF,   including those with known Hereditary Persistence   of Fetal Hemoglobin. Heterozygous hemoglobin variants (HbAS, HbAC,   HbAD, HbAE, HbA2) do not significantly interfere with this assay;   however, presence of multiple variants in a sample may impact the %   interference.          Chemistry        Component Value Date/Time     08/09/2019 0813    K 4.8 08/09/2019 0813     08/09/2019 0813    CO2 25 08/09/2019 0813    BUN 20 08/09/2019 0813    CREATININE 1.2 08/09/2019 0813     (H) 08/09/2019 0813        Component Value Date/Time    CALCIUM 9.2 08/09/2019 0813    ALKPHOS 78 08/09/2019 0813    AST 14 08/09/2019  0813    ALT 7 (L) 08/09/2019 0813    BILITOT 0.2 08/09/2019 0813    ESTGFRAFRICA 49 (A) 08/09/2019 0813    EGFRNONAA 42 (A) 08/09/2019 0813           Lab Results   Component Value Date    TSH 1.872 08/09/2019      Lab Results   Component Value Date    CHOL 281 (H) 08/09/2019    CHOL 266 (H) 04/20/2017    CHOL 259 (H) 01/26/2017     Lab Results   Component Value Date    HDL 39 (L) 08/09/2019    HDL 52 04/20/2017    HDL 51 01/26/2017     Lab Results   Component Value Date    LDLCALC 169.0 (H) 08/09/2019    LDLCALC 175.4 (H) 04/20/2017    LDLCALC 176.2 (H) 01/26/2017     Lab Results   Component Value Date    TRIG 365 (H) 08/09/2019    TRIG 193 (H) 04/20/2017    TRIG 159 (H) 01/26/2017     Lab Results   Component Value Date    CHOLHDL 13.9 (L) 08/09/2019    CHOLHDL 19.5 (L) 04/20/2017    CHOLHDL 19.7 (L) 01/26/2017       PHYSICAL EXAMINATION  Constitutional: Appears fair, no distress  Neck: Supple, trachea midline.   Respiratory: No wheezes, even and unlabored.  Cardiovascular: RRR; trace generalized edema  Lymph: deferred   Skin: warm and dry; no injection site reactions, no acanthosis nigracans observed.  Neuro:patient alert and cooperative, normal affect; steady gait-uses walker.    Diabetes Foot Exam:   Deferred     Assessment/Plan  1. Type 2 diabetes mellitus without complication, with long-term current use of insulin  Hemoglobin A1c    Ambulatory Referral to Diabetes Education   2. Morbid obesity with BMI of 40.0-44.9, adult     3. Statin intolerance     4. Pure hypercholesterolemia     5. Hyperlipidemia LDL goal <70     6. Fatty liver     7. CKD stage 3 due to type 2 diabetes mellitus     8. Essential hypertension     9. Major depressive disorder with single episode, in remission     10. Acquired hypothyroidism         1. F/u in 3 mos w/ me  DE needed in 4 weeks -nutrition/log review  Logs given  Contact info given  a1c goal less than 8% r/t co-morbidities  Discussed dietary habits    On gabapentin    Change  lantus 36 units at night and novolog 18 units w/ 2 meals, 6 units w/ snack    DE will determine  rx not sent yet-pt is on the fence   Consider vgo 20, 5 clicks with meals, 2 clicks w/ snack   Additional click if sugar is >200          2. Body mass index is 42.85 kg/m². may increase insulin resistance.  3. See allergy list  4.   Lab Results   Component Value Date    LDLCALC 169.0 (H) 08/09/2019     Above goal   5. See above  6. Managed per pcp  7. Avoid hypoglycemia  8. Controlled, continue med(s)  9. On lexapro generic  10.   Lab Results   Component Value Date    TSH 1.872 08/09/2019     At goal   On lt4 75 mcg daily, managed per pcp      FOLLOW UP  Follow up in about 3 months (around 12/4/2019).

## 2019-09-04 NOTE — PROGRESS NOTES
Subjective:      Patient ID: Courtney Engle is a 84 y.o. female.    Chief Complaint: Diabetes Mellitus (08/15/2019 dr rony muniz) and Diabetic Foot Exam    Pt presents today c/o pain in right heel around the achilles tendon area. Pt denies any trauma. Pt states the pain is worse when standing/weight bearing.       Review of Systems   Constitution: Negative for chills, fever and malaise/fatigue.   HENT: Negative for hearing loss.    Cardiovascular: Positive for leg swelling. Negative for claudication.   Respiratory: Negative for shortness of breath.    Skin: Negative for flushing and rash.   Musculoskeletal: Negative for joint pain and myalgias.   Neurological: Negative for loss of balance, numbness, paresthesias and sensory change.   Psychiatric/Behavioral: Negative for altered mental status.           Objective:      Physical Exam   Cardiovascular:   Pulses:       Dorsalis pedis pulses are 2+ on the right side, and 2+ on the left side.        Posterior tibial pulses are 2+ on the right side, and 2+ on the left side.   No edema noted b/L   Musculoskeletal:   Decreased right ankle joint ROM, pain with palpation of posterior 1/3 calcaneus at region of Achilles tendon insertion. right pain with ankle joint ROM          Feet:   Right Foot:   Protective Sensation: 5 sites tested. 5 sites sensed.   Left Foot:   Protective Sensation: 5 sites tested. 5 sites sensed.   Neurological: She has normal strength.   Gross sensation intact b/L   Skin:   Normal skin tugor noted.   No open lesion noted b/L  Skin temp is warm to warm from proximal to distal b/L.  Webspaces clean, dry, and intact     Psychiatric: She has a normal mood and affect.             Assessment:       Encounter Diagnosis   Name Primary?    Achilles tendinitis of both lower extremities Yes         Plan:       Courtney was seen today for diabetes mellitus and diabetic foot exam.    Diagnoses and all orders for this visit:    Achilles tendinitis of both lower  extremities    Other orders  -     diclofenac sodium (VOLTAREN) 1 % Gel; Apply 2 g topically once daily.      I counseled the patient on her conditions, their implications and medical management.    Pt was advised on the etiologies and issues associated with achilles tendinitis.  Rx voltaren gel  Pt dispensed tubi   Pt will RTC in 2 weeks or sooner if any new pedal problems should arise.      .

## 2019-09-04 NOTE — PATIENT INSTRUCTIONS
Snacks can be an important part of a balanced, healthy meal plan. They allow you to eat more frequently, feeling full and satisfied throughout the day. Also, they allow you to spread carbohydrates evenly, which may stabilize blood sugars.  Plus, snacks are enjoyable!     The amount of carbohydrate needed at snacks varies. Generally, about 15-30 grams of carbohydrate per snack is recommended.  Below you will find some tasty treats.       0-5 gm carb   Crystal Light   Vitamin Water Zero   Herbal tea, unsweetened   2 tsp peanut butter on celery   1./2 cup sugar-free jell-o   1 sugar-free popsicle   ¼ cup blueberries   8oz Blue Jennifer unsweetened almond milk   5 baby carrots & celery sticks, cucumbers, bell peppers dipped in ¼ cup salsa, 2Tbsp light ranch dressing or 2Tbsp plain Greek yogurt   10 Goldfish crackers   ½ oz low-fat cheese or string cheese   1 closed handful of nuts, unsalted   1 Tbsp of sunflower seeds, unsalted   1 cup Smart Pop popcorn   1 whole grain brown rice cake        15 gm carb   1 small piece of fruit or ½ banana or 1/2 cup lite canned fruit   3 kiet cracker squares   3 cups Smart Pop popcorn, top spray butter, Jean lite salt or cinnamon and Truvia   5 Vanilla Wafers   ½ cup low fat, no added sugar ice cream or frozen yogurt (Blue bell, Blue Bunny, Weight Watchers, Skinny Cow)   ½ turkey, ham, or chicken sandwich   ½ c fruit with ½ c Cottage cheese   4-6 unsalted wheat crackers with 1 oz low fat cheese or 1 tbsp peanut butter    30-45 goldfish crackers (depending on flavor)    7-8 Zoroastrianism mini brown rice cakes (caramel, apple cinnamon, chocolate)    12 Zoroastrianism mini brown rice cakes (cheddar, bbq, ranch)    1/3 cup hummus dip with raw veg   1/2 whole wheat sindhu, 1Tbsp hummus   Mini Pizza (1/2 whole wheat English muffin, low-fat  cheese, tomato sauce)   100 calorie snack pack (Oreo, Chips Ahoy, Ritz Mix, Baked Cheetos)   4-6 oz. light or Greek Style yogurt  (Azeb, Roselyn, Travis, [facility])   ½ cup sugar-free pudding     6 in. wheat tortilla or sindhu oven toasted chips (topped with spray butter flavoring, cinnamon, Truvia OR spray butter, garlic powder, chili powder)    18 BBQ Popchips (available at [employer], [employer] Foods, Fresh Market)                   Diabetes Support Group Meetings         Date: Topic:   February 14 Eat Fit JAZMINE/Health Promotion   March 14 Taking Care of Your Smile   April 11 Spring into Healthy Eating/Cooking Demo   May 9 Ease Your Mind with Diabetes   Katherine 13 Summer Treats/Cooking Demo   July 11 Eat Fit JAZMINE/Super Market Sweep   August 8 Taking Care of Your Eyes and Feet   Sept 12 Technology/ADA updates   October 10 Recipes & Treats/Cooking Demo   November 14 Heart Health/Pump it up!   December 12 Year-End Close Out        Meetings are held in the [facility] (A) of the [facility] located at [address] [place] [address]. Please call [phone] for additional information.    Free service, offered every 2nd Thursday of every month! Family members and/or friends are welcome as well!  Support group is for patients with type 1 or type 2 diabetes.    From 3:30p to 4:30p

## 2019-09-09 ENCOUNTER — TELEPHONE (OUTPATIENT)
Dept: INTERNAL MEDICINE | Facility: CLINIC | Age: 84
End: 2019-09-09

## 2019-09-09 NOTE — TELEPHONE ENCOUNTER
----- Message from Marisa Ramon sent at 9/9/2019  1:22 PM CDT -----  Contact: self/[phone]  Pt called in regards to a missed call form the MA about batteries for her riding machine.      Please advise

## 2019-09-18 ENCOUNTER — TELEPHONE (OUTPATIENT)
Dept: INTERNAL MEDICINE | Facility: CLINIC | Age: 84
End: 2019-09-18

## 2019-09-18 NOTE — TELEPHONE ENCOUNTER
Is the form for her artificial knee? I want to make sure it is not for something else?     Usually once you have the knee joint, nothing else is needed .

## 2019-09-20 ENCOUNTER — PATIENT OUTREACH (OUTPATIENT)
Dept: ADMINISTRATIVE | Facility: OTHER | Age: 84
End: 2019-09-20

## 2019-09-20 NOTE — TELEPHONE ENCOUNTER
Tried to call Nica from [employer]. No answer. Left message to return call

## 2019-10-02 RX ORDER — LISINOPRIL 20 MG/1
TABLET ORAL
Qty: 90 TABLET | Refills: 0 | Status: SHIPPED | OUTPATIENT
Start: 2019-10-02 | End: 2019-12-31

## 2019-10-10 ENCOUNTER — PES CALL (OUTPATIENT)
Dept: ADMINISTRATIVE | Facility: CLINIC | Age: 84
End: 2019-10-10

## 2019-10-16 ENCOUNTER — TELEPHONE (OUTPATIENT)
Dept: INTERNAL MEDICINE | Facility: CLINIC | Age: 84
End: 2019-10-16

## 2019-10-16 NOTE — TELEPHONE ENCOUNTER
Any idea what the requested device would be? I may be missing it, but do not see what is actually requested on the form.

## 2019-10-24 RX ORDER — FLUTICASONE PROPIONATE 50 MCG
SPRAY, SUSPENSION (ML) NASAL
Qty: 48 ML | Refills: 0 | Status: SHIPPED | OUTPATIENT
Start: 2019-10-24 | End: 2020-02-28

## 2019-11-02 DIAGNOSIS — K21.9 LPRD (LARYNGOPHARYNGEAL REFLUX DISEASE): ICD-10-CM

## 2019-11-02 RX ORDER — OMEPRAZOLE 40 MG/1
CAPSULE, DELAYED RELEASE ORAL
Qty: 60 CAPSULE | Refills: 0 | Status: SHIPPED | OUTPATIENT
Start: 2019-11-02 | End: 2020-04-03 | Stop reason: SDUPTHER

## 2019-11-02 RX ORDER — ESCITALOPRAM OXALATE 10 MG/1
TABLET ORAL
Qty: 90 TABLET | Refills: 0 | Status: SHIPPED | OUTPATIENT
Start: 2019-11-02 | End: 2020-01-30

## 2019-11-02 RX ORDER — LEVOTHYROXINE SODIUM 75 UG/1
TABLET ORAL
Qty: 90 TABLET | Refills: 0 | Status: SHIPPED | OUTPATIENT
Start: 2019-11-02 | End: 2020-01-30

## 2019-11-04 ENCOUNTER — TELEPHONE (OUTPATIENT)
Dept: INTERNAL MEDICINE | Facility: CLINIC | Age: 84
End: 2019-11-04

## 2019-11-08 RX ORDER — BUTALBITAL, ACETAMINOPHEN AND CAFFEINE 50; 325; 40 MG/1; MG/1; MG/1
TABLET ORAL
Qty: 50 TABLET | Refills: 2 | OUTPATIENT
Start: 2019-11-08

## 2019-11-15 ENCOUNTER — TELEPHONE (OUTPATIENT)
Dept: INTERNAL MEDICINE | Facility: CLINIC | Age: 84
End: 2019-11-15

## 2019-11-15 NOTE — TELEPHONE ENCOUNTER
"Called pt back to explain why her medication got refused, and informed her In order for it to be refilled she needs to be seen by Dr. Ulloa. She said she doesn't have time to be coming for a visit every month when she needs a refill.. I informed her that it is a controlled substance and that is what Dr. Ulloa required. She stated she will be switching PCPs because it is "ridiculous".       She denied a visit because she is sick  and  is requesting a non controlled substance other than tylenol for her headaches be sent to her pharmacy until she can come in   "

## 2019-11-15 NOTE — TELEPHONE ENCOUNTER
----- Message from Estela Recinos sent at 11/15/2019  2:53 PM CST -----  Contact: [phone]  Patient is requesting a call from the office today regarding the refusal of her refill medication  butalbital-acetaminophen-caffeine -40 .      Please advise, thank you.

## 2019-11-22 RX ORDER — INSULIN ASPART 100 [IU]/ML
INJECTION, SOLUTION INTRAVENOUS; SUBCUTANEOUS
Qty: 20 ML | Refills: 0 | Status: SHIPPED | OUTPATIENT
Start: 2019-11-22 | End: 2020-03-17

## 2019-11-25 RX ORDER — INSULIN GLARGINE 100 [IU]/ML
INJECTION, SOLUTION SUBCUTANEOUS
Qty: 10 ML | Refills: 0 | Status: SHIPPED | OUTPATIENT
Start: 2019-11-25 | End: 2020-01-08 | Stop reason: SDUPTHER

## 2019-11-25 RX ORDER — INSULIN GLARGINE 100 [IU]/ML
INJECTION, SOLUTION SUBCUTANEOUS
Qty: 10 ML | Refills: 0 | Status: SHIPPED | OUTPATIENT
Start: 2019-11-25 | End: 2019-11-25 | Stop reason: SDUPTHER

## 2019-12-03 ENCOUNTER — TELEPHONE (OUTPATIENT)
Dept: ADMINISTRATIVE | Facility: HOSPITAL | Age: 84
End: 2019-12-03

## 2019-12-03 DIAGNOSIS — Z79.4 TYPE 2 DIABETES MELLITUS WITHOUT COMPLICATION, WITH LONG-TERM CURRENT USE OF INSULIN: Primary | Chronic | ICD-10-CM

## 2019-12-03 DIAGNOSIS — M89.9 DISORDER OF BONE AND ARTICULAR CARTILAGE: Primary | ICD-10-CM

## 2019-12-03 DIAGNOSIS — M94.9 DISORDER OF BONE AND ARTICULAR CARTILAGE: Primary | ICD-10-CM

## 2019-12-03 DIAGNOSIS — E11.9 TYPE 2 DIABETES MELLITUS WITHOUT COMPLICATION, WITH LONG-TERM CURRENT USE OF INSULIN: Primary | Chronic | ICD-10-CM

## 2019-12-03 NOTE — TELEPHONE ENCOUNTER
Good afternoon, please sign pended Optometry and Ophthalmology referrals. Both orders had       Thanks,  Angela

## 2019-12-09 ENCOUNTER — LAB VISIT (OUTPATIENT)
Dept: LAB | Facility: HOSPITAL | Age: 84
End: 2019-12-09
Payer: MEDICARE

## 2019-12-09 DIAGNOSIS — E11.9 TYPE 2 DIABETES MELLITUS WITHOUT COMPLICATION, WITH LONG-TERM CURRENT USE OF INSULIN: Chronic | ICD-10-CM

## 2019-12-09 DIAGNOSIS — Z79.4 TYPE 2 DIABETES MELLITUS WITHOUT COMPLICATION, WITH LONG-TERM CURRENT USE OF INSULIN: Chronic | ICD-10-CM

## 2019-12-09 LAB
ESTIMATED AVG GLUCOSE: 217 MG/DL (ref 68–131)
HBA1C MFR BLD HPLC: 9.2 % (ref 4–5.6)

## 2019-12-09 PROCEDURE — 83036 HEMOGLOBIN GLYCOSYLATED A1C: CPT | Mod: HCNC

## 2019-12-09 PROCEDURE — 36415 COLL VENOUS BLD VENIPUNCTURE: CPT | Mod: HCNC

## 2019-12-11 ENCOUNTER — LAB VISIT (OUTPATIENT)
Dept: LAB | Facility: HOSPITAL | Age: 84
End: 2019-12-11
Attending: INTERNAL MEDICINE
Payer: MEDICARE

## 2019-12-11 ENCOUNTER — OFFICE VISIT (OUTPATIENT)
Dept: INTERNAL MEDICINE | Facility: CLINIC | Age: 84
End: 2019-12-11
Payer: MEDICARE

## 2019-12-11 VITALS
SYSTOLIC BLOOD PRESSURE: 134 MMHG | DIASTOLIC BLOOD PRESSURE: 78 MMHG | WEIGHT: 203.25 LBS | HEART RATE: 66 BPM | OXYGEN SATURATION: 96 % | BODY MASS INDEX: 42.48 KG/M2

## 2019-12-11 DIAGNOSIS — K21.9 GASTROESOPHAGEAL REFLUX DISEASE, ESOPHAGITIS PRESENCE NOT SPECIFIED: ICD-10-CM

## 2019-12-11 DIAGNOSIS — E11.9 TYPE 2 DIABETES MELLITUS WITHOUT COMPLICATION, WITH LONG-TERM CURRENT USE OF INSULIN: Chronic | ICD-10-CM

## 2019-12-11 DIAGNOSIS — E03.9 HYPOTHYROIDISM, UNSPECIFIED TYPE: ICD-10-CM

## 2019-12-11 DIAGNOSIS — R19.7 DIARRHEA, UNSPECIFIED TYPE: Primary | ICD-10-CM

## 2019-12-11 DIAGNOSIS — E03.9 ACQUIRED HYPOTHYROIDISM: Chronic | ICD-10-CM

## 2019-12-11 DIAGNOSIS — Z79.4 TYPE 2 DIABETES MELLITUS WITHOUT COMPLICATION, WITH LONG-TERM CURRENT USE OF INSULIN: Chronic | ICD-10-CM

## 2019-12-11 DIAGNOSIS — R19.7 DIARRHEA, UNSPECIFIED TYPE: ICD-10-CM

## 2019-12-11 DIAGNOSIS — M17.10 ARTHRITIS OF KNEE: ICD-10-CM

## 2019-12-11 DIAGNOSIS — R11.2 NAUSEA AND VOMITING, INTRACTABILITY OF VOMITING NOT SPECIFIED, UNSPECIFIED VOMITING TYPE: ICD-10-CM

## 2019-12-11 LAB
ALBUMIN SERPL BCP-MCNC: 3.5 G/DL (ref 3.5–5.2)
ALP SERPL-CCNC: 80 U/L (ref 55–135)
ALT SERPL W/O P-5'-P-CCNC: 9 U/L (ref 10–44)
ANION GAP SERPL CALC-SCNC: 10 MMOL/L (ref 8–16)
AST SERPL-CCNC: 10 U/L (ref 10–40)
BASOPHILS # BLD AUTO: 0.04 K/UL (ref 0–0.2)
BASOPHILS NFR BLD: 0.5 % (ref 0–1.9)
BILIRUB SERPL-MCNC: 0.4 MG/DL (ref 0.1–1)
BUN SERPL-MCNC: 16 MG/DL (ref 8–23)
CALCIUM SERPL-MCNC: 9.2 MG/DL (ref 8.7–10.5)
CHLORIDE SERPL-SCNC: 103 MMOL/L (ref 95–110)
CO2 SERPL-SCNC: 28 MMOL/L (ref 23–29)
CREAT SERPL-MCNC: 0.8 MG/DL (ref 0.5–1.4)
DIFFERENTIAL METHOD: ABNORMAL
EOSINOPHIL # BLD AUTO: 0.5 K/UL (ref 0–0.5)
EOSINOPHIL NFR BLD: 5.6 % (ref 0–8)
ERYTHROCYTE [DISTWIDTH] IN BLOOD BY AUTOMATED COUNT: 13.3 % (ref 11.5–14.5)
EST. GFR  (AFRICAN AMERICAN): >60 ML/MIN/1.73 M^2
EST. GFR  (NON AFRICAN AMERICAN): >60 ML/MIN/1.73 M^2
GLUCOSE SERPL-MCNC: 94 MG/DL (ref 70–110)
HCT VFR BLD AUTO: 37.3 % (ref 37–48.5)
HGB BLD-MCNC: 12.2 G/DL (ref 12–16)
LYMPHOCYTES # BLD AUTO: 2.6 K/UL (ref 1–4.8)
LYMPHOCYTES NFR BLD: 29.9 % (ref 18–48)
MCH RBC QN AUTO: 29.1 PG (ref 27–31)
MCHC RBC AUTO-ENTMCNC: 32.7 G/DL (ref 32–36)
MCV RBC AUTO: 89 FL (ref 82–98)
MONOCYTES # BLD AUTO: 0.8 K/UL (ref 0.3–1)
MONOCYTES NFR BLD: 9.2 % (ref 4–15)
NEUTROPHILS # BLD AUTO: 4.7 K/UL (ref 1.8–7.7)
NEUTROPHILS NFR BLD: 54.8 % (ref 38–73)
PLATELET # BLD AUTO: 358 K/UL (ref 150–350)
PMV BLD AUTO: 9.5 FL (ref 9.2–12.9)
POTASSIUM SERPL-SCNC: 4 MMOL/L (ref 3.5–5.1)
PROT SERPL-MCNC: 7.5 G/DL (ref 6–8.4)
RBC # BLD AUTO: 4.19 M/UL (ref 4–5.4)
SODIUM SERPL-SCNC: 141 MMOL/L (ref 136–145)
WBC # BLD AUTO: 8.55 K/UL (ref 3.9–12.7)

## 2019-12-11 PROCEDURE — 1101F PT FALLS ASSESS-DOCD LE1/YR: CPT | Mod: HCNC,CPTII,S$GLB, | Performed by: INTERNAL MEDICINE

## 2019-12-11 PROCEDURE — 3078F DIAST BP <80 MM HG: CPT | Mod: HCNC,CPTII,S$GLB, | Performed by: INTERNAL MEDICINE

## 2019-12-11 PROCEDURE — 1159F MED LIST DOCD IN RCRD: CPT | Mod: HCNC,S$GLB,, | Performed by: INTERNAL MEDICINE

## 2019-12-11 PROCEDURE — 1159F PR MEDICATION LIST DOCUMENTED IN MEDICAL RECORD: ICD-10-PCS | Mod: HCNC,S$GLB,, | Performed by: INTERNAL MEDICINE

## 2019-12-11 PROCEDURE — 1125F PR PAIN SEVERITY QUANTIFIED, PAIN PRESENT: ICD-10-PCS | Mod: HCNC,S$GLB,, | Performed by: INTERNAL MEDICINE

## 2019-12-11 PROCEDURE — 99214 PR OFFICE/OUTPT VISIT, EST, LEVL IV, 30-39 MIN: ICD-10-PCS | Mod: HCNC,S$GLB,, | Performed by: INTERNAL MEDICINE

## 2019-12-11 PROCEDURE — 99999 PR PBB SHADOW E&M-EST. PATIENT-LVL V: ICD-10-PCS | Mod: PBBFAC,HCNC,, | Performed by: INTERNAL MEDICINE

## 2019-12-11 PROCEDURE — 99999 PR PBB SHADOW E&M-EST. PATIENT-LVL V: CPT | Mod: PBBFAC,HCNC,, | Performed by: INTERNAL MEDICINE

## 2019-12-11 PROCEDURE — 85025 COMPLETE CBC W/AUTO DIFF WBC: CPT | Mod: HCNC

## 2019-12-11 PROCEDURE — 84443 ASSAY THYROID STIM HORMONE: CPT | Mod: HCNC

## 2019-12-11 PROCEDURE — 3075F SYST BP GE 130 - 139MM HG: CPT | Mod: HCNC,CPTII,S$GLB, | Performed by: INTERNAL MEDICINE

## 2019-12-11 PROCEDURE — 99214 OFFICE O/P EST MOD 30 MIN: CPT | Mod: HCNC,S$GLB,, | Performed by: INTERNAL MEDICINE

## 2019-12-11 PROCEDURE — 3078F PR MOST RECENT DIASTOLIC BLOOD PRESSURE < 80 MM HG: ICD-10-PCS | Mod: HCNC,CPTII,S$GLB, | Performed by: INTERNAL MEDICINE

## 2019-12-11 PROCEDURE — 80053 COMPREHEN METABOLIC PANEL: CPT | Mod: HCNC

## 2019-12-11 PROCEDURE — 3075F PR MOST RECENT SYSTOLIC BLOOD PRESS GE 130-139MM HG: ICD-10-PCS | Mod: HCNC,CPTII,S$GLB, | Performed by: INTERNAL MEDICINE

## 2019-12-11 PROCEDURE — 1101F PR PT FALLS ASSESS DOC 0-1 FALLS W/OUT INJ PAST YR: ICD-10-PCS | Mod: HCNC,CPTII,S$GLB, | Performed by: INTERNAL MEDICINE

## 2019-12-11 PROCEDURE — 1125F AMNT PAIN NOTED PAIN PRSNT: CPT | Mod: HCNC,S$GLB,, | Performed by: INTERNAL MEDICINE

## 2019-12-11 PROCEDURE — 36415 COLL VENOUS BLD VENIPUNCTURE: CPT | Mod: HCNC

## 2019-12-11 RX ORDER — BUTALBITAL, ACETAMINOPHEN AND CAFFEINE 50; 325; 40 MG/1; MG/1; MG/1
1 TABLET ORAL 2 TIMES DAILY PRN
Qty: 60 TABLET | Refills: 0 | Status: SHIPPED | OUTPATIENT
Start: 2019-12-11 | End: 2020-02-28

## 2019-12-11 RX ORDER — FAMOTIDINE 40 MG/1
40 TABLET, FILM COATED ORAL DAILY
Qty: 30 TABLET | Refills: 11 | Status: SHIPPED | OUTPATIENT
Start: 2019-12-11 | End: 2021-03-08 | Stop reason: SDUPTHER

## 2019-12-11 NOTE — PROGRESS NOTES
Subjective:       Patient ID: Courtney Engle is a 84 y.o. female.    Chief Complaint: Emesis and Diarrhea    HPI:  Patient complains of vomiting and diarrhea but it does not sound like an acute short-term illness.  The patient states 4 times in the last 3 months she will get an acute abdominal cramps then have vomiting then have diarrhea.  The 1st time were 2 it lasted an hour or 2, the last 2 times it lasted longer.  Going back 3-4 months she had been passing blood in her stool.  [facility] and was supposed to get a colonoscopy.  Initially they wanted her to check with her cardiologist and she said she did so and was cleared to get the scope however her daughter could not arrange to be off to bring her so the patient postponed this and has not had happen again but now the spells are occurring.  She does have a family history of colon cancer.  She does state she sometimes feels bloated.  She has had intermittent diarrhea off and on for most of her life and says that has not changed but this is different.  No association with meals or activities.  She says it can be during the day or night.  She does have severe arthritis and was told she could not have knee surgery because of her heart.  She periodically would take butalbital for relief but has not had that in a while.  She also stopped taking Zantac and Prilosec when it was mentioned that ranitidine had an ingredient that could be associated with cancer.  She says her acid reflux has been worse lately as well that also was about 3-4 months ago and may coincide time wise.    Review of Systems   Constitutional: Negative for appetite change, chills and fever.   HENT: Negative for nosebleeds, sinus pain and sore throat.    Eyes: Negative for visual disturbance.   Respiratory: Negative for cough, shortness of breath and wheezing.    Cardiovascular: Negative for chest pain and leg swelling.   Gastrointestinal: Positive for abdominal  distention, abdominal pain, diarrhea, nausea (See HPI) and vomiting ( see HPI). Negative for constipation.   Genitourinary: Negative for difficulty urinating and hematuria.   Musculoskeletal: Positive for arthralgias ( severe knee arthritis) and gait problem. Negative for neck pain and neck stiffness.   Skin: Negative for pallor and rash.   Neurological: Negative for headaches.   Psychiatric/Behavioral: Negative for dysphoric mood and suicidal ideas. The patient is not nervous/anxious.        Objective:      Physical Exam   Constitutional: She is oriented to person, place, and time. She appears well-developed and well-nourished. No distress.   HENT:   Head: Normocephalic and atraumatic.   Right Ear: External ear normal.   Left Ear: External ear normal.   Mouth/Throat: Oropharynx is clear and moist. No oropharyngeal exudate.   Eyes: Pupils are equal, round, and reactive to light. Conjunctivae are normal. No scleral icterus.   Neck: Normal range of motion. Neck supple. No thyromegaly present.   Cardiovascular: Normal rate and regular rhythm.   No murmur heard.  Pulmonary/Chest: Effort normal and breath sounds normal. She has no wheezes.   Abdominal: Soft. She exhibits distension. She exhibits no mass. There is no tenderness.   Bowel sounds seem hypoactive but patient states she has been having bowel movements the last several days that are normal for her   Musculoskeletal: She exhibits no tenderness.   Lymphadenopathy:     She has no cervical adenopathy.   Neurological: She is alert and oriented to person, place, and time.   Skin: No rash noted.   Psychiatric: She has a normal mood and affect.       Assessment:       1. Diarrhea, unspecified type    2. Acquired hypothyroidism    3. Type 2 diabetes mellitus without complication, with long-term current use of insulin    4. Gastroesophageal reflux disease, esophagitis presence not specified    5. Hypothyroidism, unspecified type        Plan:       Courtney was seen today  for emesis and diarrhea.    Diagnoses and all orders for this visit:    Diarrhea, unspecified type  -     CBC auto differential; Future  -     Comprehensive metabolic panel; Future  -     TSH; Future    Acquired hypothyroidism  -     CBC auto differential; Future  -     Comprehensive metabolic panel; Future  -     TSH; Future    Type 2 diabetes mellitus without complication, with long-term current use of insulin  -     CBC auto differential; Future  -     Comprehensive metabolic panel; Future  -     TSH; Future    Gastroesophageal reflux disease, esophagitis presence not specified  -     CBC auto differential; Future  -     Comprehensive metabolic panel; Future  -     TSH; Future    Hypothyroidism, unspecified type  -     CBC auto differential; Future  -     Comprehensive metabolic panel; Future  -     TSH; Future            Was supposed to have C-scope for blood in the stool and family history of colon cancer but pt never got it done due to various issues.

## 2019-12-12 ENCOUNTER — TELEPHONE (OUTPATIENT)
Dept: INTERNAL MEDICINE | Facility: CLINIC | Age: 84
End: 2019-12-12

## 2019-12-12 LAB — TSH SERPL DL<=0.005 MIU/L-ACNC: 0.43 UIU/ML (ref 0.4–4)

## 2019-12-12 NOTE — TELEPHONE ENCOUNTER
"----- Message from Petrona Brown sent at 12/12/2019  9:50 AM CST -----  Prior Authorization Needed    Rx: butalbital-acetaminophen-caffeine -40 mg (FIORICET, ESGIC) -40 mg per tablet    To submit the PA:    1: Go to " [email or web address] " and click "Enter a Key"    2. Enter the patient's last name and date of birth and the key.      KEY: [id number]    3. Complete the forms and click "send to Plan"    Note chart when prior authorization has been submitted.    Please notify pharmacy when prior authorization has been approved.    Thank You    "

## 2019-12-16 ENCOUNTER — TELEPHONE (OUTPATIENT)
Dept: ENDOSCOPY | Facility: HOSPITAL | Age: 84
End: 2019-12-16

## 2019-12-16 ENCOUNTER — NURSE TRIAGE (OUTPATIENT)
Dept: ADMINISTRATIVE | Facility: CLINIC | Age: 84
End: 2019-12-16

## 2019-12-16 ENCOUNTER — TELEPHONE (OUTPATIENT)
Dept: SURGERY | Facility: CLINIC | Age: 84
End: 2019-12-16

## 2019-12-16 DIAGNOSIS — Z12.11 SPECIAL SCREENING FOR MALIGNANT NEOPLASMS, COLON: Primary | ICD-10-CM

## 2019-12-16 RX ORDER — POLYETHYLENE GLYCOL 3350, SODIUM SULFATE ANHYDROUS, SODIUM BICARBONATE, SODIUM CHLORIDE, POTASSIUM CHLORIDE 236; 22.74; 6.74; 5.86; 2.97 G/4L; G/4L; G/4L; G/4L; G/4L
4 POWDER, FOR SOLUTION ORAL ONCE
Qty: 4000 ML | Refills: 0 | Status: SHIPPED | OUTPATIENT
Start: 2019-12-16 | End: 2019-12-16

## 2019-12-16 NOTE — TELEPHONE ENCOUNTER
Reason for Disposition   MILD rectal bleeding (more than just a few drops or streaks)    Additional Information   Negative: Passed out (i.e., fainted, collapsed and was not responding)   Negative: Shock suspected (e.g., cold/pale/clammy skin, too weak to stand, low BP, rapid pulse)   Negative: Vomiting red blood or black (coffee ground) material   Negative: Sounds like a life-threatening emergency to the triager   Negative: Diarrhea is the main symptom   Negative: Rectal symptoms   Negative: SEVERE rectal bleeding (large blood clots; on and off, or constant bleeding)   Negative: SEVERE dizziness (e.g., unable to stand, requires support to walk, feels like passing out now)   Negative: MODERATE rectal bleeding (small blood clots, passing blood without stool, or toilet water turns red) more than once a day   Negative: Bloody, black, or tarry bowel movements   Negative: High-risk adult (e.g., prior surgery on aorta, abdominal aortic aneurysm)   Negative: Rectal foreign body (inserted or swallowed)   Negative: SEVERE abdominal pain (e.g., excruciating)   Negative: Constant abdominal pain lasting > 2 hours   Negative: Pale skin (pallor) of new onset or worsening   Negative: Patient sounds very sick or weak to the triager   Negative: MODERATE rectal bleeding (small blood clots, passing blood without stool, or toilet water turns red)   Negative: Taking Coumadin (warfarin) or other strong blood thinner, or known bleeding disorder (e.g., thrombocytopenia)   Negative: Colonoscopy in past 72 hours   Negative: Known cirrhosis of the liver (or history of liver failure or ascites)   Negative: Patient wants to be seen    Protocols used: RECTAL BLEEDING-A-OH    Pt called to report she was supposed to have a colonoscopy a month ago with Dr. Navas. Pt stated she had mild rectal bleeding when she has a bowel movement. Per triage protocol, advised to see a Physician within 3 days, warm transfer to appt desk  for assistance.

## 2019-12-16 NOTE — TELEPHONE ENCOUNTER
Golytely prep instructions and diabetes medication instructions faxed to [facility] [phone] for patient when she picks up her bowel prep.

## 2019-12-18 ENCOUNTER — TELEPHONE (OUTPATIENT)
Dept: INTERNAL MEDICINE | Facility: CLINIC | Age: 84
End: 2019-12-18

## 2019-12-20 ENCOUNTER — HOSPITAL ENCOUNTER (OUTPATIENT)
Facility: HOSPITAL | Age: 84
Discharge: HOME | End: 2019-12-20
Attending: COLON & RECTAL SURGERY | Admitting: COLON & RECTAL SURGERY
Payer: MEDICARE

## 2019-12-20 ENCOUNTER — ANESTHESIA EVENT (OUTPATIENT)
Dept: ENDOSCOPY | Facility: HOSPITAL | Age: 84
End: 2019-12-20
Payer: MEDICARE

## 2019-12-20 ENCOUNTER — ANESTHESIA (OUTPATIENT)
Dept: ENDOSCOPY | Facility: HOSPITAL | Age: 84
End: 2019-12-20
Payer: MEDICARE

## 2019-12-20 ENCOUNTER — HOSPITAL ENCOUNTER (OUTPATIENT)
Dept: RADIOLOGY | Facility: HOSPITAL | Age: 84
Discharge: HOME | End: 2019-12-20
Attending: COLON & RECTAL SURGERY
Payer: MEDICARE

## 2019-12-20 VITALS
HEIGHT: 58 IN | HEART RATE: 55 BPM | RESPIRATION RATE: 16 BRPM | DIASTOLIC BLOOD PRESSURE: 56 MMHG | BODY MASS INDEX: 42.4 KG/M2 | OXYGEN SATURATION: 96 % | WEIGHT: 202 LBS | SYSTOLIC BLOOD PRESSURE: 156 MMHG | TEMPERATURE: 98 F

## 2019-12-20 DIAGNOSIS — Z80.0 FAMILY HISTORY OF COLON CANCER REQUIRING SCREENING COLONOSCOPY: ICD-10-CM

## 2019-12-20 DIAGNOSIS — Z80.0 FAMILY HISTORY OF COLON CANCER: ICD-10-CM

## 2019-12-20 DIAGNOSIS — Z80.0 FAMILY HISTORY OF COLON CANCER REQUIRING SCREENING COLONOSCOPY: Primary | ICD-10-CM

## 2019-12-20 DIAGNOSIS — K57.90 DIVERTICULOSIS: Primary | ICD-10-CM

## 2019-12-20 PROCEDURE — E9220 PRA ENDO ANESTHESIA: ICD-10-PCS | Mod: HCNC,,, | Performed by: NURSE ANESTHETIST, CERTIFIED REGISTERED

## 2019-12-20 PROCEDURE — 45378 PR COLONOSCOPY,DIAGNOSTIC: ICD-10-PCS | Mod: 53,HCNC,, | Performed by: COLON & RECTAL SURGERY

## 2019-12-20 PROCEDURE — 37000008 HC ANESTHESIA 1ST 15 MINUTES: Mod: HCNC | Performed by: COLON & RECTAL SURGERY

## 2019-12-20 PROCEDURE — 74270 X-RAY XM COLON 1CNTRST STD: CPT | Mod: 26,HCNC,, | Performed by: RADIOLOGY

## 2019-12-20 PROCEDURE — 25000003 PHARM REV CODE 250: Mod: HCNC | Performed by: COLON & RECTAL SURGERY

## 2019-12-20 PROCEDURE — 74270 X-RAY XM COLON 1CNTRST STD: CPT | Mod: TC,HCNC

## 2019-12-20 PROCEDURE — 63600175 PHARM REV CODE 636 W HCPCS: Mod: HCNC | Performed by: NURSE ANESTHETIST, CERTIFIED REGISTERED

## 2019-12-20 PROCEDURE — E9220 PRA ENDO ANESTHESIA: HCPCS | Mod: HCNC,,, | Performed by: NURSE ANESTHETIST, CERTIFIED REGISTERED

## 2019-12-20 PROCEDURE — 63600175 PHARM REV CODE 636 W HCPCS: Mod: HCNC | Performed by: ANESTHESIOLOGY

## 2019-12-20 PROCEDURE — 63600175 PHARM REV CODE 636 W HCPCS: Mod: HCNC | Performed by: COLON & RECTAL SURGERY

## 2019-12-20 PROCEDURE — 37000009 HC ANESTHESIA EA ADD 15 MINS: Mod: HCNC | Performed by: COLON & RECTAL SURGERY

## 2019-12-20 PROCEDURE — 74270 FL BARIUM ENEMA: ICD-10-PCS | Mod: 26,HCNC,, | Performed by: RADIOLOGY

## 2019-12-20 PROCEDURE — 45378 DIAGNOSTIC COLONOSCOPY: CPT | Mod: 53,HCNC,, | Performed by: COLON & RECTAL SURGERY

## 2019-12-20 PROCEDURE — 45378 DIAGNOSTIC COLONOSCOPY: CPT | Mod: HCNC | Performed by: COLON & RECTAL SURGERY

## 2019-12-20 RX ORDER — SODIUM CHLORIDE 9 MG/ML
INJECTION, SOLUTION INTRAVENOUS CONTINUOUS
Status: DISCONTINUED | OUTPATIENT
Start: 2019-12-20 | End: 2019-12-20 | Stop reason: HOSPADM

## 2019-12-20 RX ORDER — ONDANSETRON 2 MG/ML
4 INJECTION INTRAMUSCULAR; INTRAVENOUS ONCE
Status: COMPLETED | OUTPATIENT
Start: 2019-12-20 | End: 2019-12-20

## 2019-12-20 RX ORDER — FENTANYL CITRATE 50 UG/ML
25 INJECTION, SOLUTION INTRAMUSCULAR; INTRAVENOUS EVERY 5 MIN PRN
Status: DISCONTINUED | OUTPATIENT
Start: 2019-12-20 | End: 2019-12-20 | Stop reason: HOSPADM

## 2019-12-20 RX ORDER — LIDOCAINE HCL/PF 100 MG/5ML
SYRINGE (ML) INTRAVENOUS
Status: DISCONTINUED | OUTPATIENT
Start: 2019-12-20 | End: 2019-12-20

## 2019-12-20 RX ORDER — CHLORHEXIDINE/GLYCERIN/HE-CELL
JELLY (GRAM) TOPICAL ONCE
Status: COMPLETED | OUTPATIENT
Start: 2019-12-20 | End: 2019-12-20

## 2019-12-20 RX ORDER — SODIUM CHLORIDE 0.9 % (FLUSH) 0.9 %
10 SYRINGE (ML) INJECTION
Status: DISCONTINUED | OUTPATIENT
Start: 2019-12-20 | End: 2019-12-20 | Stop reason: HOSPADM

## 2019-12-20 RX ORDER — PROPOFOL 10 MG/ML
VIAL (ML) INTRAVENOUS
Status: DISCONTINUED | OUTPATIENT
Start: 2019-12-20 | End: 2019-12-20

## 2019-12-20 RX ORDER — PROPOFOL 10 MG/ML
VIAL (ML) INTRAVENOUS CONTINUOUS PRN
Status: DISCONTINUED | OUTPATIENT
Start: 2019-12-20 | End: 2019-12-20

## 2019-12-20 RX ADMIN — Medication: at 03:12

## 2019-12-20 RX ADMIN — ONDANSETRON 4 MG: 2 INJECTION INTRAMUSCULAR; INTRAVENOUS at 10:12

## 2019-12-20 RX ADMIN — FENTANYL CITRATE 25 MCG: 50 INJECTION INTRAMUSCULAR; INTRAVENOUS at 12:12

## 2019-12-20 RX ADMIN — LIDOCAINE HYDROCHLORIDE 100 MG: 20 INJECTION, SOLUTION INTRAVENOUS at 11:12

## 2019-12-20 RX ADMIN — PROPOFOL 100 MCG/KG/MIN: 10 INJECTION, EMULSION INTRAVENOUS at 11:12

## 2019-12-20 RX ADMIN — PROPOFOL 40 MG: 10 INJECTION, EMULSION INTRAVENOUS at 11:12

## 2019-12-20 RX ADMIN — PROPOFOL 30 MG: 10 INJECTION, EMULSION INTRAVENOUS at 11:12

## 2019-12-20 RX ADMIN — SODIUM CHLORIDE: 0.9 INJECTION, SOLUTION INTRAVENOUS at 10:12

## 2019-12-20 RX ADMIN — PROPOFOL 20 MG: 10 INJECTION, EMULSION INTRAVENOUS at 11:12

## 2019-12-20 NOTE — TRANSFER OF CARE
"Anesthesia Transfer of Care Note    Patient: Courtney Engle    Procedure(s) Performed: Procedure(s) (LRB):  COLONOSCOPY (N/A)    Patient location: PACU    Anesthesia Type: general    Transport from OR: Transported from OR on room air with adequate spontaneous ventilation    Post pain: adequate analgesia    Post assessment: no apparent anesthetic complications and tolerated procedure well    Post vital signs: stable    Level of consciousness: awake    Nausea/Vomiting: no nausea/vomiting    Complications: none    Transfer of care protocol was followed      Last vitals:   Visit Vitals  BP (!) 163/95 (BP Location: Left arm, Patient Position: Lying)   Pulse 79   Temp 36.8 °C (98.2 °F) (Temporal)   Resp 16   Ht 4' 10" (1.473 m)   Wt 91.6 kg (202 lb)   SpO2 96%   BMI 42.22 kg/m²     "

## 2019-12-20 NOTE — DISCHARGE INSTRUCTIONS
Colonoscopy     A camera attached to a flexible tube with a viewing lens is used to take video pictures.     Colonoscopy is a test to view the inside of your lower digestive tract (colon and rectum). Sometimes it can show the last part of the small intestine (ileum). During the test, small pieces of tissue may be removed for testing. This is called a biopsy. Small growths, such as polyps, may also be removed.   Why is colonoscopy done?  The test is done to help look for colon cancer. And it can help find the source of abdominal pain, bleeding, and changes in bowel habits. It may be needed once a year, depending on factors such as your:  · Age  · Health history  · Family health history  · Symptoms  · Results from any prior colonoscopy  Risks and possible complications  These include:  · Bleeding               · A puncture or tear in the colon   · Risks of anesthesia  · A cancer lesion not being seen  Getting ready   To prepare for the test:  · Talk with your healthcare provider about the risks of the test (see below). Also ask your healthcare provider about alternatives to the test.  · Tell your healthcare provider about any medicines you take. Also tell him or her about any health conditions you may have.  · Make sure your rectum and colon are empty for the test. Follow the diet and bowel prep instructions exactly. If you dont, the test may need to be rescheduled.  · Plan for a friend or family member to drive you home after the test.     Colonoscopy provides an inside view of the entire colon.     You may discuss the results with your doctor right away or at a future visit.  During the test   The test is usually done in the hospital on an outpatient basis. This means you go home the same day. The procedure takes about 30 minutes. During that time:  · You are given relaxing (sedating) medicine through an IV line. You may be drowsy, or fully asleep.  · The healthcare provider will first give you a physical exam to  check for anal and rectal problems.  · Then the anus is lubricated and the scope inserted.  · If you are awake, you may have a feeling similar to needing to have a bowel movement. You may also feel pressure as air is pumped into the colon. Its OK to pass gas during the procedure.  · Biopsy, polyp removal, or other treatments may be done during the test.  After the test   You may have gas right after the test. It can help to try to pass it to help prevent later bloating. Your healthcare provider may discuss the results with you right away. Or you may need to schedule a follow-up visit to talk about the results. After the test, you can go back to your normal eating and other activities. You may be tired from the sedation and need to rest for a few hours.  Date Last Reviewed: 11/1/2016 © 2000-2017 [employer]. [address] [place] [address], [place] [address]. All rights reserved. This information is not intended as a substitute for professional medical care. Always follow your healthcare professional's instructions.

## 2019-12-20 NOTE — H&P
COLONOSCOPY HISTORY AND PHYSICAL EXAM    Procedure : Colonoscopy      INDICATIONS: melena    Family Hx of CRC: no    Last Colonoscopy:  2008- normal.  2015- aborted  Findings: above       Past Medical History:   Diagnosis Date    Acquired hypothyroidism 4/25/2014    Anxiety     Aortic calcification 12/8/2016    X-Ray Chest-5/08/2014    Arthritis     Bilateral carotid artery disease 12/8/2016    US Carotid Bilateral-1/24/2013    CKD stage 3 due to type 2 diabetes mellitus 2/16/2017    Depression     Essential hypertension     Fever blister     GERD (gastroesophageal reflux disease)     Glaucoma 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 robledo)     Hyperlipidemia LDL goal <70 2/16/2017    Keloid cicatrix     Mixed stress and urge urinary incontinence 2/16/2017    Morbid obesity with BMI of 40.0-44.9, adult 12/8/2016    Ocular migraine 1/24/2013    Type 2 diabetes mellitus with hyperglycemia, with long-term current use of insulin      Sedation Problems: NO  Family History   Problem Relation Age of Onset    Glaucoma Father     Stroke Father     Heart attack Father 93    Nephrolithiasis Father     Colon cancer Mother     Cancer Mother         colon ca    Heart disease Mother     Uterine cancer Daughter         uterine sarcoma    Hematuria Brother     Heart disease Maternal Grandmother     Hypertension Maternal Grandmother     Heart attack Maternal Grandmother     No Known Problems Daughter     Amblyopia Neg Hx     Blindness Neg Hx     Cataracts Neg Hx     Macular degeneration Neg Hx     Retinal detachment Neg Hx     Strabismus Neg Hx      Fam Hx of Sedation Problems: NO  Social History     Socioeconomic History    Marital status:      Spouse name: Not on file    Number of children: Not on file    Years of education: Not on file    Highest education level: Not on file   Occupational History    Not on file   Social Needs    Financial resource strain: Not on file    Food  "insecurity:     Worry: Not on file     Inability: Not on file    Transportation needs:     Medical: Not on file     Non-medical: Not on file   Tobacco Use    Smoking status: Never Smoker    Smokeless tobacco: Never Used   Substance and Sexual Activity    Alcohol use: No    Drug use: No    Sexual activity: Never   Lifestyle    Physical activity:     Days per week: Not on file     Minutes per session: Not on file    Stress: Not on file   Relationships    Social connections:     Talks on phone: Not on file     Gets together: Not on file     Attends Congregational service: Not on file     Active member of club or organization: Not on file     Attends meetings of clubs or organizations: Not on file     Relationship status: Not on file   Other Topics Concern    Are you pregnant or think you may be? Not Asked    Breast-feeding Not Asked   Social History Narrative    Lives in [facility], by herself, with her 7 cats. Patient has family in [place], [place], and [facility]. Patient has 2 daughters, and 3 granddaughters, and 4 great grandchildren. Patient ambulates with wheeled walker.       Review of Systems - Negative except   Respiratory ROS: no dyspnea  Cardiovascular ROS: no exertional chest pain  Gastrointestinal ROS: NO abdominal discomfort,  YES frequent dark rectal bleeding  Musculoskeletal ROS: no muscular pain  Neurological ROS: no recent stroke    Physical Exam:  BP (!) 163/95 (BP Location: Left arm, Patient Position: Lying)   Pulse 79   Temp 98.2 °F (36.8 °C) (Temporal)   Resp 16   Ht 4' 10" (1.473 m)   Wt 91.6 kg (202 lb)   SpO2 96%   BMI 42.22 kg/m²   General: no distress  Head: normocephalic  Mallampati Score   Neck: supple, symmetrical, trachea midline  Lungs:  clear to auscultation bilaterally and normal respiratory effort  Heart: regular rate and rhythm and no murmur  Abdomen: soft, non-tender non-distented; bowel sounds normal; no masses,  no organomegaly  Extremities: no cyanosis or edema, or " clubbing    ASA:  III    PLAN  COLONOSCOPY.    SedationPlan :MAC    The details of the procedure, the possible need for biopsy or polypectomy and the potential risks including bleeding, perforation, missed polyps were discussed in detail.

## 2019-12-20 NOTE — ANESTHESIA PREPROCEDURE EVALUATION
12/20/2019  Courtney Engle is a 84 y.o., female.      Patient Active Problem List   Diagnosis    Type 2 diabetes mellitus without complication, with long-term current use of insulin    Arthritis    Essential hypertension    Pseudophakia - Both Eyes    Posterior vitreous detachment    Eyelid inflammation - Both Eyes    Transient vision disturbance - Left Eye    Open angle with borderline findings and low glaucoma risk in both eyes - Both Eyes    PCO (posterior capsular opacification) - Both Eyes    Cystitis cystica    MGD (meibomian gland dysfunction) - Both Eyes    Acquired hypothyroidism    Family history of malignant neoplasm of gastrointestinal tract    Anxiety    Depression    Morbid obesity with BMI of 40.0-44.9, adult    Aortic calcification    Bilateral carotid artery disease    CKD stage 3 due to type 2 diabetes mellitus    Mixed stress and urge urinary incontinence    Hyperlipidemia LDL goal <70    Oropharyngeal dysphagia    Insomnia due to medical condition    Left knee pain    GERD (gastroesophageal reflux disease)    Fatty liver    Diastolic dysfunction    Neuropathy    Edema    Weakness    Pure hypercholesterolemia    Shortness of breath    History of right knee joint replacement    Statin intolerance    Hematochezia       Echo 2018    CONCLUSIONS     1 - Normal left ventricular systolic function (EF 60-65%).     2 - No wall motion abnormalities.     3 - Normal left ventricular diastolic function.     4 - Normal right ventricular systolic function .     5 - Trivial tricuspid regurgitation.     6 - The estimated PA systolic pressure is 28 mmHg.       Past Surgical History:   Procedure Laterality Date    CATARACT EXTRACTION W/  INTRAOCULAR LENS IMPLANT Right 05/30/2012        CATARACT EXTRACTION W/  INTRAOCULAR LENS IMPLANT Left 05/26/2010         CHOLECYSTECTOMY      COLONOSCOPY W/ POLYPECTOMY  04/20/2015    ESOPHAGOGASTRODUODENOSCOPY  04/20/2015    HYSTERECTOMY      Partial    JOINT REPLACEMENT      knee replacement right      TONSILLECTOMY, ADENOIDECTOMY      tonsillectomy only       Anesthesia Evaluation    I have reviewed the Patient Summary Reports.    I have reviewed the Nursing Notes.   I have reviewed the Medications.     Review of Systems      Physical Exam  General:  Well nourished    Airway/Jaw/Neck:  Airway Findings: Mouth Opening: Normal General Airway Assessment: Adult  Mallampati: II  Improves to II with phonation.  Jaw/Neck Findings:  Limited Ability to Prognath  Neck ROM: Normal ROM     Eyes/Ears/Nose:  Eyes/Ears/Nose Findings:    Dental:  Dental Findings: In tact   Chest/Lungs:  Chest/Lungs Findings: Clear to auscultation, Normal Respiratory Rate     Heart/Vascular:  Heart Findings: Rate: Normal  Rhythm: Regular Rhythm  Sounds: Normal     Abdomen:  Abdomen Findings:  Normal     Musculoskeletal:  Musculoskeletal Findings:    Skin:  Skin Findings:     Mental Status:  Mental Status Findings:  Cooperative, Alert and Oriented         Anesthesia Plan  Type of Anesthesia, risks & benefits discussed:  Anesthesia Type:  general, MAC  Patient's Preference:   Intra-op Monitoring Plan:   Intra-op Monitoring Plan Comments:   Post Op Pain Control Plan:   Post Op Pain Control Plan Comments:   Induction:   IV  Beta Blocker:  Patient is not currently on a Beta-Blocker (No further documentation required).       Informed Consent: Patient understands risks and agrees with Anesthesia plan.  Questions answered. Anesthesia consent signed with patient.  ASA Score: 2     Day of Surgery Review of History & Physical:    H&P update referred to the surgeon.         Ready For Surgery From Anesthesia Perspective.

## 2019-12-20 NOTE — ANESTHESIA POSTPROCEDURE EVALUATION
Anesthesia Post Evaluation    Patient: Courtney Engle    Procedure(s) Performed: Procedure(s) (LRB):  COLONOSCOPY (N/A)    Final Anesthesia Type: general    Patient location during evaluation: PACU  Patient participation: Yes- Able to Participate  Level of consciousness: awake and alert and oriented  Pain management: adequate  Airway patency: patent    PONV status at discharge: No PONV  Anesthetic complications: no      Cardiovascular status: blood pressure returned to baseline and hemodynamically stable  Respiratory status: unassisted  Hydration status: euvolemic  Follow-up not needed.      Cscope was abandoned 2/2 to difficult colon.  Patient reporting continued abdominal pain of the same quality as on admit, chronic abdominal pain.  Primary team is aware and managing her post procedure abdominal pain.      Vitals Value Taken Time   /57 12/20/2019 11:48 AM   Temp 36.6 °C (97.8 °F) 12/20/2019 11:48 AM   Pulse 66 12/20/2019 11:48 AM   Resp 18 12/20/2019 11:48 AM   SpO2 98 % 12/20/2019 11:48 AM         No case tracking events are documented in the log.      Pain/Yumi Score: No data recorded

## 2019-12-20 NOTE — PROVATION PATIENT INSTRUCTIONS
Discharge Summary/Instructions after an Endoscopic Procedure  Patient Name: Courtney Engle  Patient MRN: [id number]  Patient YOB: 1935  Friday, December 20, 2019  Higinio Gilbert MD  RESTRICTIONS:  During your procedure today, you received medications for sedation.  These   medications may affect your judgment, balance and coordination.  Therefore,   for 24 hours, you have the following restrictions:   - DO NOT drive a car, operate machinery, make legal/financial decisions,   sign important papers or drink alcohol.    ACTIVITY:  Today: no heavy lifting, straining or running due to procedural   sedation/anesthesia.  The following day: return to full activity including work.  DIET:  Eat and drink normally unless instructed otherwise.     TREATMENT FOR COMMON SIDE EFFECTS:  - Mild abdominal pain, nausea, belching, bloating or excessive gas:  rest,   eat lightly and use a heating pad.  - Sore Throat: treat with throat lozenges and/or gargle with warm salt   water.  - Because air was used during the procedure, expelling large amounts of air   from your rectum or belching is normal.  - If a bowel prep was taken, you may not have a bowel movement for 1-3 days.    This is normal.  SYMPTOMS TO WATCH FOR AND REPORT TO YOUR PHYSICIAN:  1. Abdominal pain or bloating, other than gas cramps.  2. Chest pain.  3. Back pain.  4. Signs of infection such as: chills or fever occurring within 24 hours   after the procedure.  5. Rectal bleeding, which would show as bright red, maroon, or black stools.   (A tablespoon of blood from the rectum is not serious, especially if   hemorrhoids are present.)  6. Vomiting.  7. Weakness or dizziness.  GO DIRECTLY TO THE NEAREST EMERGENCY ROOM IF YOU HAVE ANY OF THE FOLLOWING:      Difficulty breathing              Chills and/or fever over 101 F   Persistent vomiting and/or vomiting blood   Severe abdominal pain   Severe chest pain   Black, tarry stools   Bleeding- more than one  tablespoon   Any other symptom or condition that you feel may need urgent attention  Your doctor recommends these additional instructions:  If any biopsies were taken, your doctors clinic will contact you in 1 to 2   weeks with any results.  - Discharge patient to home (ambulatory).   - Resume previous diet.   - Continue present medications.   - Perform an air contrast barium enema today.   - Repeat colonoscopy [day] never due to restricted mobility.  For questions, problems or results please call your physician - Higinio Gilbert MD at Work:  [phone].  [facility], EMERGENCY ROOM PHONE NUMBER: [phone]  IF A COMPLICATION OR EMERGENCY SITUATION ARISES AND YOU ARE UNABLE TO REACH   YOUR PHYSICIAN - GO DIRECTLY TO THE EMERGENCY ROOM.  Higinio Gilbert MD  12/20/2019 12:02:43 PM  This report has been verified and signed electronically.  [employer]ATION

## 2019-12-20 NOTE — OR NURSING
Patient procedure aborted due to lack of advancement of scope.  As per Dr. Gilbert will order Barium enema test to be done today.

## 2019-12-20 NOTE — OR NURSING
Patient complains of some itching.  No hives noted.  Dr. Elliott notified and to bedside for patient evaluation. Okay to dc per Dr. Elliott.

## 2019-12-30 ENCOUNTER — TELEPHONE (OUTPATIENT)
Dept: ENDOSCOPY | Facility: HOSPITAL | Age: 84
End: 2019-12-30

## 2019-12-30 NOTE — TELEPHONE ENCOUNTER
Patient states that she is still having rectal bleeding and wants to know what can be done about it.  I instructed her to call Dr. Gilbert in am and speak with him about plan of care. Patient verbalized that she has his number and will call him tomorrow.

## 2019-12-31 ENCOUNTER — TELEPHONE (OUTPATIENT)
Dept: SURGERY | Facility: CLINIC | Age: 84
End: 2019-12-31

## 2019-12-31 DIAGNOSIS — K57.90 DIVERTICULOSIS: Primary | ICD-10-CM

## 2019-12-31 DIAGNOSIS — Z91.041 CONTRAST MEDIA ALLERGY: ICD-10-CM

## 2019-12-31 RX ORDER — LISINOPRIL 20 MG/1
TABLET ORAL
Qty: 90 TABLET | Refills: 0 | Status: SHIPPED | OUTPATIENT
Start: 2019-12-31 | End: 2020-03-30

## 2019-12-31 RX ORDER — PREDNISONE 50 MG/1
50 TABLET ORAL 3 TIMES DAILY
Qty: 3 TABLET | Refills: 0 | Status: ON HOLD | OUTPATIENT
Start: 2019-12-31 | End: 2022-01-01 | Stop reason: HOSPADM

## 2019-12-31 RX ORDER — DIPHENHYDRAMINE HCL 50 MG
50 CAPSULE ORAL ONCE
Qty: 1 CAPSULE | Refills: 0 | Status: SHIPPED | OUTPATIENT
Start: 2019-12-31 | End: 2019-12-31

## 2019-12-31 NOTE — PROGRESS NOTES
Left message for pt informing her of CT scheduled for Wed 1/8 at 0900 with arrival time of 800am, pre procedure meds of benadryl and prednisone to be sent to [facility], nothing to eat or drink after midnight.   Also informed of appt to follow up with DR Gilbert on Tuesday 1/14.  Call back number given.  Appt letters mailed.

## 2019-12-31 NOTE — TELEPHONE ENCOUNTER
----- Message from Katie Cristobal sent at 12/31/2019 12:34 PM CST -----  Contact: pt   Pt is returning your call.  Pt has some questions.  Pt would like to talk to Hyacinth.

## 2019-12-31 NOTE — TELEPHONE ENCOUNTER
Pt reports being allergic to Benadryl, informed Dr Gilbert, instructed to have pt take only prednisone for CT. Pt verbalized understanding

## 2020-01-07 ENCOUNTER — TELEPHONE (OUTPATIENT)
Dept: SURGERY | Facility: CLINIC | Age: 85
End: 2020-01-07

## 2020-01-07 NOTE — TELEPHONE ENCOUNTER
Spoke with patient.  She had questions regarding the prednisone prep before her ct scan.  Instructed her to take steroid at 8pm, 2am and 8am. Also, instructed her to stop eating and drinking at 5am on 1/8.

## 2020-01-08 ENCOUNTER — HOSPITAL ENCOUNTER (OUTPATIENT)
Dept: RADIOLOGY | Facility: HOSPITAL | Age: 85
Discharge: HOME | End: 2020-01-08
Attending: COLON & RECTAL SURGERY
Payer: MEDICARE

## 2020-01-08 DIAGNOSIS — K57.90 DIVERTICULOSIS: ICD-10-CM

## 2020-01-08 PROCEDURE — 25500020 PHARM REV CODE 255: Mod: HCNC | Performed by: COLON & RECTAL SURGERY

## 2020-01-08 PROCEDURE — 74177 CT ABDOMEN PELVIS WITH CONTRAST: ICD-10-PCS | Mod: 26,HCNC,, | Performed by: RADIOLOGY

## 2020-01-08 PROCEDURE — 74177 CT ABD & PELVIS W/CONTRAST: CPT | Mod: TC,HCNC

## 2020-01-08 PROCEDURE — 74177 CT ABD & PELVIS W/CONTRAST: CPT | Mod: 26,HCNC,, | Performed by: RADIOLOGY

## 2020-01-08 RX ORDER — ALENDRONATE SODIUM 70 MG/1
70 TABLET ORAL
Qty: 12 TABLET | Refills: 0 | Status: SHIPPED | OUTPATIENT
Start: 2020-01-08 | End: 2020-03-17

## 2020-01-08 RX ORDER — INSULIN GLARGINE 100 [IU]/ML
INJECTION, SOLUTION SUBCUTANEOUS
Qty: 10 ML | Refills: 0 | Status: SHIPPED | OUTPATIENT
Start: 2020-01-08 | End: 2020-02-03

## 2020-01-08 RX ADMIN — IOHEXOL 5 ML: 350 INJECTION, SOLUTION INTRAVENOUS at 08:01

## 2020-01-08 RX ADMIN — IOHEXOL 100 ML: 350 INJECTION, SOLUTION INTRAVENOUS at 09:01

## 2020-01-08 NOTE — TELEPHONE ENCOUNTER
----- Message from Julia Jorge sent at 1/8/2020  4:16 PM CST -----  Patient is calling for an RX refill or new RX.  Is this a refill or new RX:  Refill  RX name and strength: insulin glargine (LANTUS U-100 INSULIN) 100 unit/mL injection 10 mL   Directions ADMINISTER 36 UNITS UNDER THE SKIN EVERY EVENING  Is this a 30 day or 90 day RX:    Local pharmacy or mail order pharmacy:  local  Pharmacy name and phone # [facility]   [phone] (Phone)  [phone] (Fax   Comments: PATIENT COMPLETELY OUT OF LANTUS    Patient is calling for an RX refill or new RX.  Is this a refill or new RX: Refill   RX name and strength: alendronate (FOSAMAX) 70 MG tablet  Directions (  TAKE 1 TABLET BY MOUTH ONCE A WEEK, AS DIRECTED  Is this a 30 day or 90 day RX:    Local pharmacy or mail order pharmacy:  local  Pharmacy name and phone # [facility]   [phone] (Phone)[phone] (Fax )    Comments:

## 2020-01-12 ENCOUNTER — PATIENT OUTREACH (OUTPATIENT)
Dept: ADMINISTRATIVE | Facility: OTHER | Age: 85
End: 2020-01-12

## 2020-01-13 NOTE — PROGRESS NOTES
CRS Office Visit Follow-up  Referring Md:   No referring provider defined for this encounter.    SUBJECTIVE:     Chief Complaint:  Diverticulosis with stricture    History of Present Illness:  Patient is a 84 y.o. female presents with diverticulosis with colon stricture. The patient is a established patient to this practice.     Course is as follows:  12/20/19:  Attempted colonoscopy for workup melena.  Scope unable to be traversed past the sigmoid colon.  Prior colonoscopy from 2015 also unable to be traversed past the sigmoid colon.   - barium enema then performed. Unable to move Cintia and passed sigmoid colon stricture.  CT abdomen and pelvis performed.   Extensive diverticulosis seen.  No obvious mass lesion.    Last ECHO 1/2018:   Normal EF.  No wall motion abnormalities.      She presents today for evaluation.  She states that she has intermittent abdominal pain associated with nausea and vomiting and diarrhea.  Abdominal pain is self limited.  No fevers or chills.  Her bowel movements recently have been mostly diarrhea.  No fecal incontinence.   Prior to the colonoscopy, her bowel movements were deformed and have since improved in caliber following her colonoscopy.  She has intermittent melanotic stool.  Her weight has been stable.  Functionally, her activities are limited secondary to knee pain in her left knee.  She has had 2 episodes of prior congestive heart failure.  She takes intermittent Lasix.  She is ambulatory with a walker.    Her daughter is also a patient of mine who has been seen in the past for diverticulitis.    Her past abdominal surgeries include open cholecystectomy as well as partial hysterectomy.    Last Colonoscopy: above    Review of Systems:  Review of Systems   Constitutional: Negative for chills, diaphoresis, fever, malaise/fatigue and weight loss.   HENT: Negative for congestion.    Respiratory: Negative for shortness of breath.    Cardiovascular: Positive for leg swelling. Negative  "for chest pain.   Gastrointestinal: Positive for abdominal pain, diarrhea and vomiting. Negative for blood in stool, constipation and nausea.   Genitourinary: Negative for dysuria.   Musculoskeletal: Positive for joint pain. Negative for back pain and myalgias.   Skin: Negative for rash.   Neurological: Negative for dizziness and weakness.   Endo/Heme/Allergies: Does not bruise/bleed easily.   Psychiatric/Behavioral: Negative for depression.       OBJECTIVE:     Vital Signs (Most Recent)  Ht 4' 10" (1.473 m)   Wt 89.9 kg (198 lb 3.1 oz)   BMI 41.42 kg/m²     Physical Exam:  General: White female in no distress   Neuro: alert and oriented x 4.  Moves all extremities.     HEENT: no icterus.  Trachea midline  Respiratory: respirations are even and unlabored  Cardiac: regular rate  Abdomen:   Right subcostal incision is healed well.  Large abdominal pannus.  No lower abdominal incisions seen.  No hernia.  Nontender.  Extremities: Warm dry and intact  Skin: no rashes  Anorectal:  Deferred    Labs:  H&H 12 and 37.  Albumin 3.5.  Normal renal function.    Imaging:  CT of the abdomen pelvis from 1/8/20 personally reviewed and demonstrates extensive diverticulosis      ASSESSMENT/PLAN:     Courtney was seen today for follow-up.    Diagnoses and all orders for this visit:    Diverticulosis         84-year-old woman with extensive diverticulosis and diverticular stricture of her sigmoid colon with intermittent melena.  We discussed that the melena is likely related to her area of extensive  Diverticulosis.  Extensive workup including failed colonoscopy, failed Gastrografin enema, and CT of the abdomen pelvis does not demonstrate any obvious malignancy.  We discussed that there is a risk of underlying malignancy that is around 2-3%.    Surgical resection was discussed with her.  We discussed the surgical resection could benefit her in terms of removing the area of obvious abnormality in her sigmoid colon, which may result in " decreased intermittent abdominal pain, normalizing her stool caliber, and decreasing her risk of recurrent GI bleeding.  We discussed the risks to include the risks of general anesthesia an 84-year-old woman with intermittent heart failure, anastomotic leak, prolonged hospitalization, 6 week recovery.  She wishes to avoid surgery if at all possible.  I recommended to start taking a fiber supplement to help bulk her stools  As well as increase her water intake.    I will see her back in 3 months for interval evaluation.    RACHEAL Gilbert MD, FACS  Staff Surgeon  Colon & Rectal Surgery

## 2020-01-14 ENCOUNTER — OFFICE VISIT (OUTPATIENT)
Dept: SURGERY | Facility: CLINIC | Age: 85
End: 2020-01-14
Payer: MEDICARE

## 2020-01-14 VITALS — WEIGHT: 198.19 LBS | HEIGHT: 58 IN | BODY MASS INDEX: 41.6 KG/M2

## 2020-01-14 DIAGNOSIS — K57.90 DIVERTICULOSIS: Primary | ICD-10-CM

## 2020-01-14 PROCEDURE — 99499 RISK ADDL DX/OHS AUDIT: ICD-10-PCS | Mod: S$GLB,,, | Performed by: COLON & RECTAL SURGERY

## 2020-01-14 PROCEDURE — 99999 PR PBB SHADOW E&M-EST. PATIENT-LVL III: ICD-10-PCS | Mod: PBBFAC,HCNC,, | Performed by: COLON & RECTAL SURGERY

## 2020-01-14 PROCEDURE — 99214 PR OFFICE/OUTPT VISIT, EST, LEVL IV, 30-39 MIN: ICD-10-PCS | Mod: HCNC,S$GLB,, | Performed by: COLON & RECTAL SURGERY

## 2020-01-14 PROCEDURE — 99499 UNLISTED E&M SERVICE: CPT | Mod: S$GLB,,, | Performed by: COLON & RECTAL SURGERY

## 2020-01-14 PROCEDURE — 1125F PR PAIN SEVERITY QUANTIFIED, PAIN PRESENT: ICD-10-PCS | Mod: HCNC,S$GLB,, | Performed by: COLON & RECTAL SURGERY

## 2020-01-14 PROCEDURE — 99999 PR PBB SHADOW E&M-EST. PATIENT-LVL III: CPT | Mod: PBBFAC,HCNC,, | Performed by: COLON & RECTAL SURGERY

## 2020-01-14 PROCEDURE — 1101F PR PT FALLS ASSESS DOC 0-1 FALLS W/OUT INJ PAST YR: ICD-10-PCS | Mod: HCNC,CPTII,S$GLB, | Performed by: COLON & RECTAL SURGERY

## 2020-01-14 PROCEDURE — 99214 OFFICE O/P EST MOD 30 MIN: CPT | Mod: HCNC,S$GLB,, | Performed by: COLON & RECTAL SURGERY

## 2020-01-14 PROCEDURE — 1159F MED LIST DOCD IN RCRD: CPT | Mod: HCNC,S$GLB,, | Performed by: COLON & RECTAL SURGERY

## 2020-01-14 PROCEDURE — 1159F PR MEDICATION LIST DOCUMENTED IN MEDICAL RECORD: ICD-10-PCS | Mod: HCNC,S$GLB,, | Performed by: COLON & RECTAL SURGERY

## 2020-01-14 PROCEDURE — 1101F PT FALLS ASSESS-DOCD LE1/YR: CPT | Mod: HCNC,CPTII,S$GLB, | Performed by: COLON & RECTAL SURGERY

## 2020-01-14 PROCEDURE — 1125F AMNT PAIN NOTED PAIN PRSNT: CPT | Mod: HCNC,S$GLB,, | Performed by: COLON & RECTAL SURGERY

## 2020-01-31 ENCOUNTER — PATIENT OUTREACH (OUTPATIENT)
Dept: ADMINISTRATIVE | Facility: OTHER | Age: 85
End: 2020-01-31

## 2020-02-03 ENCOUNTER — OFFICE VISIT (OUTPATIENT)
Dept: OPHTHALMOLOGY | Facility: CLINIC | Age: 85
End: 2020-02-03
Payer: MEDICARE

## 2020-02-03 DIAGNOSIS — H40.013 OPEN ANGLE WITH BORDERLINE FINDINGS AND LOW GLAUCOMA RISK IN BOTH EYES: ICD-10-CM

## 2020-02-03 DIAGNOSIS — H40.013 OPEN ANGLE WITH BORDERLINE FINDINGS AND LOW GLAUCOMA RISK IN BOTH EYES: Primary | ICD-10-CM

## 2020-02-03 DIAGNOSIS — H43.819 POSTERIOR VITREOUS DETACHMENT, UNSPECIFIED LATERALITY: ICD-10-CM

## 2020-02-03 DIAGNOSIS — Z96.1 PSEUDOPHAKIA: ICD-10-CM

## 2020-02-03 DIAGNOSIS — H01.009 BLEPHARITIS, UNSPECIFIED LATERALITY, UNSPECIFIED TYPE: ICD-10-CM

## 2020-02-03 PROCEDURE — 99999 PR PBB SHADOW E&M-EST. PATIENT-LVL II: ICD-10-PCS | Mod: PBBFAC,HCNC,, | Performed by: OPHTHALMOLOGY

## 2020-02-03 PROCEDURE — 99499 UNLISTED E&M SERVICE: CPT | Mod: S$GLB,,, | Performed by: OPHTHALMOLOGY

## 2020-02-03 PROCEDURE — 99499 RISK ADDL DX/OHS AUDIT: ICD-10-PCS | Mod: S$GLB,,, | Performed by: OPHTHALMOLOGY

## 2020-02-03 PROCEDURE — 99999 PR PBB SHADOW E&M-EST. PATIENT-LVL II: CPT | Mod: PBBFAC,HCNC,, | Performed by: OPHTHALMOLOGY

## 2020-02-03 PROCEDURE — 92014 PR EYE EXAM, EST PATIENT,COMPREHESV: ICD-10-PCS | Mod: HCNC,S$GLB,, | Performed by: OPHTHALMOLOGY

## 2020-02-03 PROCEDURE — 92250 FUNDUS PHOTOGRAPHY W/I&R: CPT | Mod: HCNC,S$GLB,, | Performed by: OPHTHALMOLOGY

## 2020-02-03 PROCEDURE — 92250 COLOR FUNDUS PHOTOGRAPHY - OU - BOTH EYES: ICD-10-PCS | Mod: HCNC,S$GLB,, | Performed by: OPHTHALMOLOGY

## 2020-02-03 PROCEDURE — 92014 COMPRE OPH EXAM EST PT 1/>: CPT | Mod: HCNC,S$GLB,, | Performed by: OPHTHALMOLOGY

## 2020-02-03 RX ORDER — ERYTHROMYCIN 5 MG/G
OINTMENT OPHTHALMIC NIGHTLY
Qty: 1 TUBE | Refills: 12 | Status: SHIPPED | OUTPATIENT
Start: 2020-02-03

## 2020-02-03 RX ORDER — INSULIN GLARGINE 100 [IU]/ML
INJECTION, SOLUTION SUBCUTANEOUS
Qty: 10 ML | Refills: 0 | Status: SHIPPED | OUTPATIENT
Start: 2020-02-03 | End: 2020-03-17

## 2020-02-03 NOTE — PROGRESS NOTES
HPI     DLS: 3/16/17    Pt here for Overdue Glaucoma Check;    Meds; Systane prn ou     1. Glaucoma Suspect   2.PCIOL OU   3. IDDM   4. PVD   5. Blepharitis   6. Hx Ocular Migraines   7. Hx TVO OS x 2   8. Hx Iritis OD     Last edited by Mimi Lopez on 2/3/2020  1:37 PM. (History)            Assessment /Plan     For exam results, see Encounter Report.    Open angle with borderline findings and low glaucoma risk in both eyes - Both Eyes    Posterior vitreous detachment, unspecified laterality    Blepharitis, unspecified laterality, unspecified type    Pseudophakia - Both Eyes    Other orders  -     erythromycin (ROMYCIN) ophthalmic ointment; Place into both eyes every evening. Apply to itchy eye lid at night as needed  Dispense: 1 Tube; Refill: 12      I use to see her father and her  - both have passed     1.   Glaucoma Suspect - + Fm Hx - low risk              First HVF   2008              First photos   2005              Treatment / Drops started   none           Family history    + father        Glaucoma meds    none        H/O adverse rxn to glaucoma drops    none        LASERS    none        GLAUCOMA SURGERIES    none        OTHER EYE SURGERIES    PC IOL ou - OS 5/26/2010        CDR    0.65/0.55        Tbase    9-16 od / 10-16 os          Tmax    16/16            Ttarget    ?             HVF    3 test 2008 to  2013 - full ou        Gonio    ?        CCT    534/537        OCT    3 test 2006 to 2010 - RNFL - nl od // nl os        HRT    1 test 2014 to 2014  - MR - bord sup OD // nl OS        Disc photos    2005 - slides // 2011, 2020  - OIS       LOST TO FOLLOW UP    - Ttoday    14/14  - Test done today    HRT/gonio      2.    TVO  - Left Eye x 2 (1/2013)   - exam unremarkable except for refractive error  - pt. also with h/o ocular migraines - may be a different manifestation of this  - observe, RD precautions  - no episodes since last visit  -No HA, No diplopia, No jaw claudication, No diplopia, No scalp  tenderness  - Sig hx of neck and knee pain and also occassional weakness when getting up in the AM.  - unremarkable carotid ultrasound  - nl MRI / MRA  - none x > 4 yrs      3.   PCO ou - very mild              Monitor     4.   Posterior vitreous detachment     5.   Ocular migraine               - observe     6.   Eyelid inflammation - Both Eyes               - Lid hygiene WC/LH/AT's   EES oint prn      7.   PC IOL OU     8.   H/O iritis - OD - see note 7/6/2012     9. NO BDR      Plan      Glaucoma suspect - low risk  IOP ok  Cont obs off gtts   Disc photos today      No BDR     Pt C/O blurry vision os - this is when she does NOT wear her glasses      Blepharitis - symptomatic   - WC / LH / AT's / maxitrol ointment at night prn (EES is on back order)     Photos today      F/U 4 months with HVF / OCT - if ok can decrease to yearly exams again

## 2020-02-28 DIAGNOSIS — M17.10 ARTHRITIS OF KNEE: ICD-10-CM

## 2020-02-28 RX ORDER — FLUTICASONE PROPIONATE 50 MCG
SPRAY, SUSPENSION (ML) NASAL
Qty: 48 G | Refills: 4 | Status: SHIPPED | OUTPATIENT
Start: 2020-02-28 | End: 2021-03-08 | Stop reason: SDUPTHER

## 2020-02-28 RX ORDER — BUTALBITAL, ACETAMINOPHEN AND CAFFEINE 50; 325; 40 MG/1; MG/1; MG/1
TABLET ORAL
Qty: 60 TABLET | Refills: 0 | Status: SHIPPED | OUTPATIENT
Start: 2020-02-28 | End: 2020-10-27 | Stop reason: SDUPTHER

## 2020-02-28 RX ORDER — PEN NEEDLE, DIABETIC 29 G X1/2"
NEEDLE, DISPOSABLE MISCELLANEOUS
Qty: 500 EACH | Refills: 0 | Status: SHIPPED | OUTPATIENT
Start: 2020-02-28 | End: 2021-01-15 | Stop reason: SDUPTHER

## 2020-03-17 RX ORDER — INSULIN GLARGINE 100 [IU]/ML
INJECTION, SOLUTION SUBCUTANEOUS
Qty: 10 ML | Refills: 0 | Status: SHIPPED | OUTPATIENT
Start: 2020-03-17 | End: 2020-04-23

## 2020-03-17 RX ORDER — ALENDRONATE SODIUM 70 MG/1
TABLET ORAL
Qty: 12 TABLET | Refills: 0 | Status: SHIPPED | OUTPATIENT
Start: 2020-03-17 | End: 2020-05-28

## 2020-03-17 RX ORDER — INSULIN ASPART 100 [IU]/ML
INJECTION, SOLUTION INTRAVENOUS; SUBCUTANEOUS
Qty: 20 ML | Refills: 0 | Status: SHIPPED | OUTPATIENT
Start: 2020-03-17 | End: 2020-07-08

## 2020-03-18 DIAGNOSIS — N89.8 VAGINAL ITCHING: ICD-10-CM

## 2020-03-18 RX ORDER — NYSTATIN 100000 [USP'U]/G
POWDER TOPICAL
Qty: 60 G | Refills: 0 | Status: SHIPPED | OUTPATIENT
Start: 2020-03-18 | End: 2021-06-25 | Stop reason: SDUPTHER

## 2020-03-27 ENCOUNTER — TELEPHONE (OUTPATIENT)
Dept: SURGERY | Facility: CLINIC | Age: 85
End: 2020-03-27

## 2020-03-27 NOTE — TELEPHONE ENCOUNTER
Called pt to discuss options for OV's being altered from COVID19 concern. I did not get an answer on her number or her daughter's number. Pt is not active on pt portal to do VV.

## 2020-03-30 RX ORDER — LISINOPRIL 20 MG/1
TABLET ORAL
Qty: 90 TABLET | Refills: 0 | Status: SHIPPED | OUTPATIENT
Start: 2020-03-30 | End: 2020-08-24

## 2020-03-31 ENCOUNTER — TELEPHONE (OUTPATIENT)
Dept: SURGERY | Facility: CLINIC | Age: 85
End: 2020-03-31

## 2020-04-03 DIAGNOSIS — K21.9 LPRD (LARYNGOPHARYNGEAL REFLUX DISEASE): ICD-10-CM

## 2020-04-03 RX ORDER — OMEPRAZOLE 40 MG/1
CAPSULE, DELAYED RELEASE ORAL
Qty: 180 CAPSULE | Refills: 2 | Status: SHIPPED | OUTPATIENT
Start: 2020-04-03 | End: 2021-03-08 | Stop reason: SDUPTHER

## 2020-04-03 RX ORDER — BENZONATATE 100 MG/1
CAPSULE ORAL
Qty: 30 CAPSULE | Refills: 1 | Status: SHIPPED | OUTPATIENT
Start: 2020-04-03 | End: 2020-08-06 | Stop reason: SDUPTHER

## 2020-04-03 RX ORDER — OMEPRAZOLE 40 MG/1
CAPSULE, DELAYED RELEASE ORAL
Qty: 60 CAPSULE | Refills: 0 | Status: SHIPPED | OUTPATIENT
Start: 2020-04-03 | End: 2020-04-03

## 2020-04-23 RX ORDER — INSULIN GLARGINE 100 [IU]/ML
INJECTION, SOLUTION SUBCUTANEOUS
Qty: 10 ML | Refills: 0 | Status: SHIPPED | OUTPATIENT
Start: 2020-04-23 | End: 2020-05-28

## 2020-04-29 RX ORDER — LEVOTHYROXINE SODIUM 75 UG/1
TABLET ORAL
Qty: 90 TABLET | Refills: 0 | Status: SHIPPED | OUTPATIENT
Start: 2020-04-29 | End: 2020-07-24

## 2020-04-29 RX ORDER — ATENOLOL 25 MG/1
TABLET ORAL
Qty: 90 TABLET | Refills: 0 | Status: SHIPPED | OUTPATIENT
Start: 2020-04-29 | End: 2020-07-24

## 2020-04-29 RX ORDER — ESCITALOPRAM OXALATE 10 MG/1
TABLET ORAL
Qty: 90 TABLET | Refills: 0 | Status: SHIPPED | OUTPATIENT
Start: 2020-04-29 | End: 2020-07-24

## 2020-05-28 RX ORDER — ALENDRONATE SODIUM 70 MG/1
TABLET ORAL
Qty: 12 TABLET | Refills: 0 | Status: SHIPPED | OUTPATIENT
Start: 2020-05-28 | End: 2020-09-21

## 2020-05-28 RX ORDER — INSULIN GLARGINE 100 [IU]/ML
INJECTION, SOLUTION SUBCUTANEOUS
Qty: 10 ML | Refills: 0 | Status: SHIPPED | OUTPATIENT
Start: 2020-05-28 | End: 2020-07-08

## 2020-06-04 ENCOUNTER — PATIENT OUTREACH (OUTPATIENT)
Dept: ADMINISTRATIVE | Facility: OTHER | Age: 85
End: 2020-06-04

## 2020-06-04 DIAGNOSIS — Z79.4 TYPE 2 DIABETES MELLITUS WITHOUT COMPLICATION, WITH LONG-TERM CURRENT USE OF INSULIN: Primary | Chronic | ICD-10-CM

## 2020-06-04 DIAGNOSIS — E11.9 TYPE 2 DIABETES MELLITUS WITHOUT COMPLICATION, WITH LONG-TERM CURRENT USE OF INSULIN: Primary | Chronic | ICD-10-CM

## 2020-06-04 NOTE — PROGRESS NOTES
Chart reviewed.   Immunizations: n/a  Orders placed: Hemoglobin A1c  Upcoming appts to satisfy CHIP topics: n/a

## 2020-06-16 ENCOUNTER — TELEPHONE (OUTPATIENT)
Dept: INTERNAL MEDICINE | Facility: CLINIC | Age: 85
End: 2020-06-16

## 2020-06-16 NOTE — TELEPHONE ENCOUNTER
----- Message from Jacy Ilir sent at 6/16/2020  3:02 PM CDT -----  Contact: Patient [phone]  Requesting an RX refill or new RX.  Is this a refill or new RX:  refill  RX name and strength: acucheck test stripes  Directions (copy/paste from chart):    Is this a 30 day or 90 day RX:    Local pharmacy or mail order pharmacy:  local  Pharmacy name and phone # (copy/paste from chart):   [id number] - [place] - [address] [phone] (Phone)  [phone] (Fax)      Comments:

## 2020-06-17 NOTE — LETTER
February 26, 2017      Vishal Ulloa MD  [address]  [place] [address]           [facility] - Dermatology  [address]  [place] [address]  Phone: [phone]  Fax: [phone]          Patient: Courtney Engle   MR Number: [id number]   YOB: 1935   Date of Visit: 2/24/2017       Dear Dr. Vishal Ulloa:    Thank you for referring Courtney Engle to me for evaluation. Attached you will find relevant portions of my assessment and plan of care.    If you have questions, please do not hesitate to call me. I look forward to following Courtney Engle along with you.    Sincerely,    Lyly Urban MD    Enclosure  CC:  No Recipients    If you would like to receive this communication electronically, please contact [email or web address] or [phone] to request more information on [facility] Link access.    For providers and/or their staff who would like to refer a patient to [facility], please contact us through our one-stop-shop provider referral line, [facility], at [phone].    If you feel you have received this communication in error or would no longer like to receive these types of communications, please e-mail [email or web address]          No - the patient is unable to be screened due to medical condition

## 2020-08-06 ENCOUNTER — HOSPITAL ENCOUNTER (OUTPATIENT)
Dept: RADIOLOGY | Facility: HOSPITAL | Age: 85
Discharge: HOME | End: 2020-08-06
Attending: PHYSICIAN ASSISTANT
Payer: MEDICARE

## 2020-08-06 ENCOUNTER — OFFICE VISIT (OUTPATIENT)
Dept: INTERNAL MEDICINE | Facility: CLINIC | Age: 85
End: 2020-08-06
Payer: MEDICARE

## 2020-08-06 ENCOUNTER — NURSE TRIAGE (OUTPATIENT)
Dept: ADMINISTRATIVE | Facility: CLINIC | Age: 85
End: 2020-08-06

## 2020-08-06 VITALS — HEART RATE: 74 BPM | DIASTOLIC BLOOD PRESSURE: 80 MMHG | SYSTOLIC BLOOD PRESSURE: 126 MMHG | OXYGEN SATURATION: 96 %

## 2020-08-06 DIAGNOSIS — M54.9 UPPER BACK PAIN ON RIGHT SIDE: ICD-10-CM

## 2020-08-06 DIAGNOSIS — M54.9 UPPER BACK PAIN: ICD-10-CM

## 2020-08-06 DIAGNOSIS — R05.9 COUGH: ICD-10-CM

## 2020-08-06 DIAGNOSIS — M54.9 UPPER BACK PAIN ON RIGHT SIDE: Primary | ICD-10-CM

## 2020-08-06 DIAGNOSIS — M79.10 MUSCLE PAIN: ICD-10-CM

## 2020-08-06 DIAGNOSIS — R05.9 COUGH: Primary | ICD-10-CM

## 2020-08-06 PROCEDURE — 99214 OFFICE O/P EST MOD 30 MIN: CPT | Mod: HCNC,S$GLB,, | Performed by: PHYSICIAN ASSISTANT

## 2020-08-06 PROCEDURE — 99499 UNLISTED E&M SERVICE: CPT | Mod: S$GLB,,, | Performed by: PHYSICIAN ASSISTANT

## 2020-08-06 PROCEDURE — 1159F MED LIST DOCD IN RCRD: CPT | Mod: HCNC,S$GLB,, | Performed by: PHYSICIAN ASSISTANT

## 2020-08-06 PROCEDURE — 99214 PR OFFICE/OUTPT VISIT, EST, LEVL IV, 30-39 MIN: ICD-10-PCS | Mod: HCNC,S$GLB,, | Performed by: PHYSICIAN ASSISTANT

## 2020-08-06 PROCEDURE — 1159F PR MEDICATION LIST DOCUMENTED IN MEDICAL RECORD: ICD-10-PCS | Mod: HCNC,S$GLB,, | Performed by: PHYSICIAN ASSISTANT

## 2020-08-06 PROCEDURE — 1125F AMNT PAIN NOTED PAIN PRSNT: CPT | Mod: HCNC,S$GLB,, | Performed by: PHYSICIAN ASSISTANT

## 2020-08-06 PROCEDURE — 93010 ELECTROCARDIOGRAM REPORT: CPT | Mod: HCNC,S$GLB,, | Performed by: INTERNAL MEDICINE

## 2020-08-06 PROCEDURE — 72070 XR THORACIC SPINE AP LATERAL: ICD-10-PCS | Mod: 26,HCNC,, | Performed by: RADIOLOGY

## 2020-08-06 PROCEDURE — 3074F SYST BP LT 130 MM HG: CPT | Mod: HCNC,CPTII,S$GLB, | Performed by: PHYSICIAN ASSISTANT

## 2020-08-06 PROCEDURE — 99999 PR PBB SHADOW E&M-EST. PATIENT-LVL IV: ICD-10-PCS | Mod: PBBFAC,HCNC,, | Performed by: PHYSICIAN ASSISTANT

## 2020-08-06 PROCEDURE — 93005 ELECTROCARDIOGRAM TRACING: CPT | Mod: HCNC,S$GLB,, | Performed by: PHYSICIAN ASSISTANT

## 2020-08-06 PROCEDURE — 1125F PR PAIN SEVERITY QUANTIFIED, PAIN PRESENT: ICD-10-PCS | Mod: HCNC,S$GLB,, | Performed by: PHYSICIAN ASSISTANT

## 2020-08-06 PROCEDURE — 72070 X-RAY EXAM THORAC SPINE 2VWS: CPT | Mod: 26,HCNC,, | Performed by: RADIOLOGY

## 2020-08-06 PROCEDURE — 71046 XR CHEST PA AND LATERAL: ICD-10-PCS | Mod: 26,HCNC,, | Performed by: RADIOLOGY

## 2020-08-06 PROCEDURE — 99999 PR PBB SHADOW E&M-EST. PATIENT-LVL IV: CPT | Mod: PBBFAC,HCNC,, | Performed by: PHYSICIAN ASSISTANT

## 2020-08-06 PROCEDURE — 3079F PR MOST RECENT DIASTOLIC BLOOD PRESSURE 80-89 MM HG: ICD-10-PCS | Mod: HCNC,CPTII,S$GLB, | Performed by: PHYSICIAN ASSISTANT

## 2020-08-06 PROCEDURE — 99499 RISK ADDL DX/OHS AUDIT: ICD-10-PCS | Mod: S$GLB,,, | Performed by: PHYSICIAN ASSISTANT

## 2020-08-06 PROCEDURE — 3079F DIAST BP 80-89 MM HG: CPT | Mod: HCNC,CPTII,S$GLB, | Performed by: PHYSICIAN ASSISTANT

## 2020-08-06 PROCEDURE — U0003 INFECTIOUS AGENT DETECTION BY NUCLEIC ACID (DNA OR RNA); SEVERE ACUTE RESPIRATORY SYNDROME CORONAVIRUS 2 (SARS-COV-2) (CORONAVIRUS DISEASE [COVID-19]), AMPLIFIED PROBE TECHNIQUE, MAKING USE OF HIGH THROUGHPUT TECHNOLOGIES AS DESCRIBED BY CMS-2020-01-R: HCPCS | Mod: HCNC

## 2020-08-06 PROCEDURE — 71046 X-RAY EXAM CHEST 2 VIEWS: CPT | Mod: 26,HCNC,, | Performed by: RADIOLOGY

## 2020-08-06 PROCEDURE — 1101F PR PT FALLS ASSESS DOC 0-1 FALLS W/OUT INJ PAST YR: ICD-10-PCS | Mod: HCNC,CPTII,S$GLB, | Performed by: PHYSICIAN ASSISTANT

## 2020-08-06 PROCEDURE — 3074F PR MOST RECENT SYSTOLIC BLOOD PRESSURE < 130 MM HG: ICD-10-PCS | Mod: HCNC,CPTII,S$GLB, | Performed by: PHYSICIAN ASSISTANT

## 2020-08-06 PROCEDURE — 93005 EKG 12-LEAD: ICD-10-PCS | Mod: HCNC,S$GLB,, | Performed by: PHYSICIAN ASSISTANT

## 2020-08-06 PROCEDURE — 71046 X-RAY EXAM CHEST 2 VIEWS: CPT | Mod: TC,HCNC

## 2020-08-06 PROCEDURE — 1101F PT FALLS ASSESS-DOCD LE1/YR: CPT | Mod: HCNC,CPTII,S$GLB, | Performed by: PHYSICIAN ASSISTANT

## 2020-08-06 PROCEDURE — 93010 EKG 12-LEAD: ICD-10-PCS | Mod: HCNC,S$GLB,, | Performed by: INTERNAL MEDICINE

## 2020-08-06 PROCEDURE — 72070 X-RAY EXAM THORAC SPINE 2VWS: CPT | Mod: TC,HCNC

## 2020-08-06 RX ORDER — LIDOCAINE 50 MG/G
1 PATCH TOPICAL DAILY
Qty: 5 PATCH | Refills: 0 | Status: ON HOLD | OUTPATIENT
Start: 2020-08-06 | End: 2022-01-01 | Stop reason: HOSPADM

## 2020-08-06 RX ORDER — BENZONATATE 100 MG/1
CAPSULE ORAL
Qty: 30 CAPSULE | Refills: 1 | Status: SHIPPED | OUTPATIENT
Start: 2020-08-06 | End: 2021-03-08 | Stop reason: SDUPTHER

## 2020-08-06 RX ORDER — DICLOFENAC SODIUM 10 MG/G
2 GEL TOPICAL DAILY
Qty: 1 TUBE | Refills: 3 | Status: ON HOLD | OUTPATIENT
Start: 2020-08-06 | End: 2022-01-01 | Stop reason: HOSPADM

## 2020-08-06 NOTE — TELEPHONE ENCOUNTER
"Pt states upper back pain with productive cough x 1 week. Severe back pain started yesterday, pt states "I can hardly move pain is so bad". Denies SOB or chest pain. Advised pt per protocol, needs to be evaluation urgently at ED or C, pt refuses to go. Requesting appt with Dr. Ulloa. Scheduled appt with Benito AHMADI today at 1630. Again ER urged.       Reason for Disposition   SEVERE back pain of sudden onset and age > 60    Additional Information   Negative: Passed out (i.e., fainted, collapsed and was not responding)   Negative: Shock suspected (e.g., cold/pale/clammy skin, too weak to stand, low BP, rapid pulse)   Negative: Sounds like a life-threatening emergency to the triager   Negative: Major injury to the back (e.g., MVA, fall > 10 feet or 3 meters, penetrating injury, etc.)   Negative: Pain in the upper back over the ribs (rib cage) that radiates (travels) into the chest   Negative: Pain in the upper back over the ribs (rib cage) and worsened by coughing (or clearly increases with breathing)    Protocols used: BACK PAIN-A-OH      "

## 2020-08-06 NOTE — PROGRESS NOTES
Subjective:       Patient ID: Courtney Engle is a 85 y.o. female.    Chief Complaint: Back Pain and Cough    HPI     Established pt of Vishal Ulloa MD (new to me)      C/o cough for the past 2 weeks, occ phlegm. +Runny nose. +Sore throat. Also notes upper back pack that started 2 days ago. Worse with certain movements like twist, bending,. Described pain as sharp and soreness. Notes chest wall pain as well. No sob, fevers, or hemoptysis. Using cough drops, throat spray and tylenol with mild improvement. She is worried she has pneumonia as she notes similar symptoms in the past many years ago.       Past Medical History:   Diagnosis Date    Acquired hypothyroidism 4/25/2014    Anxiety     Aortic calcification 12/8/2016    X-Ray Chest-5/08/2014    Arthritis     Bilateral carotid artery disease 12/8/2016    US Carotid Bilateral-1/24/2013    CKD stage 3 due to type 2 diabetes mellitus 2/16/2017    Depression     Essential hypertension     Fever blister     GERD (gastroesophageal reflux disease)     Glaucoma 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 robledo)     Hyperlipidemia LDL goal <70 2/16/2017    Keloid cicatrix     Mixed stress and urge urinary incontinence 2/16/2017    Morbid obesity with BMI of 40.0-44.9, adult 12/8/2016    Ocular migraine 1/24/2013    Type 2 diabetes mellitus with hyperglycemia, with long-term current use of insulin      Social History     Tobacco Use    Smoking status: Never Smoker    Smokeless tobacco: Never Used   Substance Use Topics    Alcohol use: No    Drug use: No     Review of patient's allergies indicates:   Allergen Reactions    Contac 12 hour allergy Anaphylaxis and Hives    Diphenhydramine hcl Shortness Of Breath     Other reaction(s): Shortness of breath    Ciprofloxacin (bulk) Nausea And Vomiting    (d)-limonene flavor Hives     Other reaction(s): Shortness of breath    Anti-hyst Other (See Comments)    Antihistamines - alkylamine Hives     Ciprocinonide      Other reaction(s): Stomach upset    Codeine      bad reaction    Contact metal agent      Other reaction(s): Hives    Glipizide Hives    Hydroxyzine      Unknown    Iodinated contrast media Other (See Comments)    Nitrofuran analogues     Penicillin      Other reaction(s): Rash    Propoxyphene Itching    Statins-hmg-coa reductase inhibitors      Nausea to all statins    Doxycycline Rash    Penicillins Rash    Sulfa (sulfonamide antibiotics) Rash    Sulfacetamide sodium-urea Rash                 Review of Systems   Constitutional: Negative for chills, diaphoresis and fever.   Respiratory: Positive for cough. Negative for shortness of breath and wheezing.    Cardiovascular: Negative for chest pain and leg swelling.   Gastrointestinal: Negative for abdominal pain.   Genitourinary: Negative for dysuria and hematuria.   Musculoskeletal: Positive for arthralgias, back pain and myalgias.         Objective: /80   Pulse 74   SpO2 96%         Physical Exam  Vitals signs reviewed.   Constitutional:       General: She is not in acute distress.     Appearance: Normal appearance. She is well-developed. She is obese. She is not toxic-appearing or diaphoretic.   HENT:      Head: Normocephalic and atraumatic.   Cardiovascular:      Rate and Rhythm: Normal rate and regular rhythm.      Heart sounds: No murmur. No friction rub.   Pulmonary:      Effort: Pulmonary effort is normal.      Breath sounds: Normal breath sounds. No wheezing or rales.   Chest:      Chest wall: Tenderness (over mid chest) present.   Abdominal:      General: Bowel sounds are normal.      Palpations: Abdomen is soft.      Tenderness: There is no abdominal tenderness.   Musculoskeletal:        Back:       Right lower leg: No edema.      Left lower leg: No edema.   Lymphadenopathy:      Cervical: No cervical adenopathy.   Skin:     General: Skin is warm and dry.      Findings: No rash.   Neurological:      Mental Status: She  is alert and oriented to person, place, and time.   Psychiatric:         Mood and Affect: Mood normal.           X-Ray Chest PA And Lateral  Narrative: EXAMINATION:  XR CHEST PA AND LATERAL    CLINICAL HISTORY:  cough, right sided upper back pain; Dorsalgia, unspecified    TECHNIQUE:  PA and lateral views of the chest were performed.    COMPARISON:  01/12/2018.  01/09/2018.  05/23/2017.    FINDINGS:  X-ray beam attenuation and scatter occur in generous overlying soft tissues.  Soft tissues of the patient's arms project over lateral view obscuring some detail of the retrosternal airspace and mediastinal margins.    Mediastinal structures are midline. Cardiac silhouette is mildly enlarged but stable.  Pulmonary vascular distribution is normal arguing for normal left heart pressures.  Thoracic aorta is atherosclerotic..    Lung volumes are normal and symmetric. I detect no pulmonary disease, pleural fluid, lymph node enlargement, cardiac decompensation, pneumothorax, pneumomediastinum, pneumoperitoneum or significant osseous abnormality.  Impression: No acute disease identified in this patient with cough and right upper back pain.    Electronically signed by: Sirena Hunter MD  Date:    08/06/2020  Time:    15:30  X-Ray Thoracic Spine AP Lateral  Narrative: EXAMINATION:  XR THORACIC SPINE AP LATERAL    CLINICAL HISTORY:  Dorsalgia, unspecified    FINDINGS:  Heart size is normal.  There is aortic plaque.  There is moderate DJD and DISH.  No fracture dislocation bone destruction seen.  Impression: No acute process seen.    Electronically signed by: Jorge Luis Villasenor MD  Date:    08/06/2020  Time:    15:29      Assessment:       1. Cough    2. Upper back pain    3. Muscle pain        Plan:         Courtney was seen today for back pain and cough.    Diagnoses and all orders for this visit:    Cough  -     COVID-19 Routine Screening; Future  -     COVID-19 Routine Screening    Upper back pain  -     diclofenac sodium (VOLTAREN)  1 % Gel; Apply 2 g topically once daily.  -     lidocaine (LIDODERM) 5 %; Place 1 patch onto the skin once daily. Remove & Discard patch within 12 hours or as directed by MD  -     IN OFFICE EKG 12-LEAD (to Muse)    Muscle pain  -     COVID-19 Routine Screening    Other orders  -     benzonatate (TESSALON) 100 MG capsule; TAKE 1 CAPSULE BY MOUTH THREE TIMES DAILY( EVERY EIGHT HOURS)      CXR and Thoracic Xray negative for any acute process  EKG sinus bradycardia, no acute ST changes, similar to prior EKG  Suspect upper back, chest wall tenderness from excessive coughing  Trial of tessalon perles  Voltaren gel/Lidocaine patch prn  Rule out COVID  Home isolation guidelines discussed.   Strict ED precautions discussed for any worsening symptoms    Rachelle Oliver PA-C

## 2020-08-07 LAB — SARS-COV-2 RNA RESP QL NAA+PROBE: NOT DETECTED

## 2020-08-10 ENCOUNTER — TELEPHONE (OUTPATIENT)
Dept: INTERNAL MEDICINE | Facility: CLINIC | Age: 85
End: 2020-08-10

## 2020-08-11 ENCOUNTER — TELEPHONE (OUTPATIENT)
Dept: INTERNAL MEDICINE | Facility: CLINIC | Age: 85
End: 2020-08-11

## 2020-08-12 ENCOUNTER — TELEPHONE (OUTPATIENT)
Dept: INTERNAL MEDICINE | Facility: CLINIC | Age: 85
End: 2020-08-12

## 2020-08-12 RX ORDER — AZITHROMYCIN 250 MG/1
TABLET, FILM COATED ORAL
Qty: 6 TABLET | Refills: 0 | Status: SHIPPED | OUTPATIENT
Start: 2020-08-12 | End: 2020-08-17

## 2020-08-12 NOTE — TELEPHONE ENCOUNTER
Spoke to pt. She said she is not getting better. Throat hurts, very congested, coughing a lot, nasal drainage, no fever, no sob and no chest pain.    She is taking the meds that were prescribed but it does not seem to help.

## 2020-08-12 NOTE — TELEPHONE ENCOUNTER
Received PA for lidocaine patch.   Submitted PA. Can take 72 hours for response.    Pt is notified.

## 2020-10-27 DIAGNOSIS — M17.10 ARTHRITIS OF KNEE: ICD-10-CM

## 2020-10-27 RX ORDER — INSULIN GLARGINE 100 [IU]/ML
INJECTION, SOLUTION SUBCUTANEOUS
Qty: 10 ML | Refills: 0 | Status: SHIPPED | OUTPATIENT
Start: 2020-10-27 | End: 2020-12-07 | Stop reason: SDUPTHER

## 2020-10-27 RX ORDER — BUTALBITAL, ACETAMINOPHEN AND CAFFEINE 50; 325; 40 MG/1; MG/1; MG/1
1 TABLET ORAL 2 TIMES DAILY PRN
Qty: 60 TABLET | Refills: 0 | Status: SHIPPED | OUTPATIENT
Start: 2020-10-27 | End: 2021-05-20 | Stop reason: SDUPTHER

## 2020-12-07 DIAGNOSIS — I10 ESSENTIAL HYPERTENSION: Primary | Chronic | ICD-10-CM

## 2020-12-07 RX ORDER — LISINOPRIL 20 MG/1
20 TABLET ORAL DAILY
Qty: 90 TABLET | Refills: 0 | Status: SHIPPED | OUTPATIENT
Start: 2020-12-07 | End: 2021-02-26 | Stop reason: SDUPTHER

## 2020-12-07 RX ORDER — INSULIN GLARGINE 100 [IU]/ML
INJECTION, SOLUTION SUBCUTANEOUS
Qty: 45 ML | Refills: 0 | Status: SHIPPED | OUTPATIENT
Start: 2020-12-07 | End: 2021-04-05 | Stop reason: SDUPTHER

## 2020-12-07 NOTE — TELEPHONE ENCOUNTER
----- Message from Cely S Murray sent at 12/7/2020 12:59 PM CST -----  Regarding: Pt self Mobile/Home [phone]  Requesting an RX refill or new RX.  Is this a refill or new RX: Refill  RX name and strength:lisinopriL (PRINIVIL,ZESTRIL) 20 MG tablet  Is this a 30 day or 90 day RX: 30  Pharmacy name and phone # [facility] #[id number] - [place] - [address] [facility] AT [facility]  [phone] Fax# [phone]   Comments: Patient said that she is out of medication.               Requesting an RX refill or new RX.  Is this a refill or new RX: Refill  RX name and strength:insulin glargine (LANTUS U-100 INSULIN) 100 unit/mL injection  Is this a 30 day or 90 day RX: N/A  Pharmacy name and phone # [facility] #[id number] [place] - [address] [facility] AT [facility]  [phone] Fax# [phone]   Comments: Patient said that she usually get one box.

## 2020-12-07 NOTE — TELEPHONE ENCOUNTER
Care Due:                  Date            Visit Type   Department     Provider  --------------------------------------------------------------------------------                                ESTABLISHED   [facility]  Last Visit: 12-      PATIENT      MEDICINE       Vishal Ulloa  Next Visit: None Scheduled  None         None Found                                                            Last  Test          Frequency    Reason                     Performed    Due Date  --------------------------------------------------------------------------------    Office Visit  12 months..  escitalopram, famotidine,   12- 12-                             insulin, lisinopriL,                             omeprazole...............    CMP.........  12 months..  famotidine, insulin,       12- 12-                             lisinopriL...............    HBA1C.......  6 months...  insulin..................  12- 06-    Powered by [employer]. Reference number: [id number]. 12/07/2020 2:23:25 PM   CST

## 2020-12-07 NOTE — TELEPHONE ENCOUNTER
Provider Staff:     Action is required for this patient.     Please schedule patient for Annual and Labs (CMP, Lipid, A1c)    Thanks!  [facility]     Appointments  past 12m or future 3m with PCP    Date Provider   Last Visit   12/11/2019 Vishal Ulloa MD   Next Visit   Visit date not found Vishal Ulloa MD     Note composed:2:59 PM 12/07/2020

## 2020-12-07 NOTE — PROGRESS NOTES
Refill Authorization Note   Courtney Engle  is requesting a refill authorization.  Brief Assessment and Rationale for Refill:  Approve    -Medication-Related Problems Identified:   Non-adherence (knowledge deficit) non-intentional  Requires labs  Requires appointment  Medication Therapy Plan:  CDMR; Appt (ANNUAL); Labs (CMP, Lipid, A1c); Adherence < 80%; Approved request    Medication Reconciliation Completed: No   Comments:   Orders Placed This Encounter    insulin glargine (LANTUS U-100 INSULIN) 100 unit/mL injection    lisinopriL (PRINIVIL,ZESTRIL) 20 MG tablet      Requested Prescriptions   Signed Prescriptions Disp Refills    insulin glargine (LANTUS U-100 INSULIN) 100 unit/mL injection 45 mL 0     Sig: ADMINISTER 36 UNITS UNDER THE SKIN EVERY EVENING       Endocrinology:  Diabetes - Insulins Failed - 12/7/2020  2:44 PM        Failed - HBA1C is 8 or below and within 180 days     Hemoglobin A1C   Date Value Ref Range Status   12/09/2019 9.2 (H) 4.0 - 5.6 % Final     Comment:     ADA Screening Guidelines:  5.7-6.4%  Consistent with prediabetes  >or=6.5%  Consistent with diabetes  High levels of fetal hemoglobin interfere with the HbA1C  assay. Heterozygous hemoglobin variants (HbS, HgC, etc)do  not significantly interfere with this assay.   However, presence of multiple variants may affect accuracy.     08/09/2019 9.5 (H) 4.0 - 5.6 % Final     Comment:     ADA Screening Guidelines:  5.7-6.4%  Consistent with prediabetes  >or=6.5%  Consistent with diabetes  High levels of fetal hemoglobin interfere with the HbA1C  assay. Heterozygous hemoglobin variants (HbS, HgC, etc)do  not significantly interfere with this assay.   However, presence of multiple variants may affect accuracy.     01/10/2018 7.7 (H) 4.0 - 5.6 % Final     Comment:     According to ADA guidelines, hemoglobin A1c <7.0% represents  optimal control in non-pregnant diabetic patients. Different  metrics may apply to specific patient populations.    Standards of Medical Care in Diabetes-2016.  For the purpose of screening for the presence of diabetes:  <5.7%     Consistent with the absence of diabetes  5.7-6.4%  Consistent with increasing risk for diabetes   (prediabetes)  >or=6.5%  Consistent with diabetes  Currently, no consensus exists for use of hemoglobin A1c  for diagnosis of diabetes for children.  This Hemoglobin A1c assay has significant interference with fetal   hemoglobin   (HbF). The results are invalid for patients with abnormal amounts of   HbF,   including those with known Hereditary Persistence   of Fetal Hemoglobin. Heterozygous hemoglobin variants (HbAS, HbAC,   HbAD, HbAE, HbA2) do not significantly interfere with this assay;   however, presence of multiple variants in a sample may impact the %   interference.                Failed - eGFR is 30 or above and within 360 days     eGFR if non    Date Value Ref Range Status   12/11/2019 >60 >60 mL/min/1.73 m^2 Final     Comment:     Calculation used to obtain the estimated glomerular filtration  rate (eGFR) is the CKD-EPI equation.      08/09/2019 42 (A) >60 mL/min/1.73 m^2 Final     Comment:     Calculation used to obtain the estimated glomerular filtration  rate (eGFR) is the CKD-EPI equation.      06/18/2018 53.0 (A) >60 mL/min/1.73 m^2 Final     Comment:     Calculation used to obtain the estimated glomerular filtration  rate (eGFR) is the CKD-EPI equation.        eGFR if    Date Value Ref Range Status   12/11/2019 >60 >60 mL/min/1.73 m^2 Final   08/09/2019 49 (A) >60 mL/min/1.73 m^2 Final   06/18/2018 >60.0 >60 mL/min/1.73 m^2 Final              Failed - Cr is 1.4 or below and within 360 days     Creatinine   Date Value Ref Range Status   12/11/2019 0.8 0.5 - 1.4 mg/dL Final   08/09/2019 1.2 0.5 - 1.4 mg/dL Final   06/18/2018 1.0 0.5 - 1.4 mg/dL Final              Passed - Patient is at least 18 years old        Passed - Office visit in past 12 months or  future 90 days     Recent Outpatient Visits            4 months ago Cough    [facility] Rachelle Oliver PA-C    10 months ago Open angle with borderline findings and low glaucoma risk in both eyes - Both Eyes    [facility] 1st Fl Carmina Salmon MD    10 months ago Diverticulosis    [facility] 4th Fl RACHEAL Gilbert MD    12 months ago Diarrhea, unspecified type    [facility] Vishal Ulloa MD    1 year ago Type 2 diabetes mellitus without complication, with long-term current use of insulin    [facility] Abi Heredia, APRN, FNP                      lisinopriL (PRINIVIL,ZESTRIL) 20 MG tablet 90 tablet 0     Sig: Take 1 tablet (20 mg total) by mouth once daily.       Cardiovascular:  ACE Inhibitors Failed - 12/7/2020  2:44 PM        Failed - Cr is 1.4 or below and within 360 days     Creatinine   Date Value Ref Range Status   12/11/2019 0.8 0.5 - 1.4 mg/dL Final   08/09/2019 1.2 0.5 - 1.4 mg/dL Final   06/18/2018 1.0 0.5 - 1.4 mg/dL Final              Failed - K in normal range and within 360 days     Potassium   Date Value Ref Range Status   12/11/2019 4.0 3.5 - 5.1 mmol/L Final   08/09/2019 4.8 3.5 - 5.1 mmol/L Final   06/18/2018 4.5 3.5 - 5.1 mmol/L Final              Failed - eGFR within 360 days     eGFR if non    Date Value Ref Range Status   12/11/2019 >60 >60 mL/min/1.73 m^2 Final     Comment:     Calculation used to obtain the estimated glomerular filtration  rate (eGFR) is the CKD-EPI equation.      08/09/2019 42 (A) >60 mL/min/1.73 m^2 Final     Comment:     Calculation used to obtain the estimated glomerular filtration  rate (eGFR) is the CKD-EPI equation.      06/18/2018 53.0 (A) >60 mL/min/1.73 m^2 Final     Comment:     Calculation used to obtain the estimated glomerular filtration  rate (eGFR) is the CKD-EPI equation.        eGFR if    Date Value Ref  Range Status   12/11/2019 >60 >60 mL/min/1.73 m^2 Final   08/09/2019 49 (A) >60 mL/min/1.73 m^2 Final   06/18/2018 >60.0 >60 mL/min/1.73 m^2 Final              Passed - Patient is at least 18 years old        Passed - Last BP in normal range within 360 days.     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    [facility] - [facility] 1st Fl Carmina Salmon MD    10 months ago Diverticulosis    [facility] 4th Fl RACHEAL Gilbert MD    12 months ago Diarrhea, unspecified type    [facility] Vishal Ulloa MD    1 year ago Type 2 diabetes mellitus without complication, with long-term current use of insulin    [facility] Abi Heredia, APRN, FNP                        Appointments  past 12m or future 3m with PCP    Date Provider   Last Visit   12/11/2019 Vishal Ulloa MD   Next Visit   Visit date not found Vishal Ulloa MD   ED visits in past 90 days: 0     Note composed:2:59 PM 12/07/2020

## 2021-01-25 RX ORDER — ALENDRONATE SODIUM 70 MG/1
70 TABLET ORAL
Qty: 12 TABLET | Refills: 3 | Status: SHIPPED | OUTPATIENT
Start: 2021-01-25 | End: 2022-01-01 | Stop reason: SDUPTHER

## 2021-02-25 ENCOUNTER — PES CALL (OUTPATIENT)
Dept: ADMINISTRATIVE | Facility: CLINIC | Age: 86
End: 2021-02-25

## 2021-02-26 RX ORDER — ATENOLOL 25 MG/1
25 TABLET ORAL DAILY
Qty: 90 TABLET | Refills: 0 | Status: SHIPPED | OUTPATIENT
Start: 2021-02-26 | End: 2021-05-27 | Stop reason: SDUPTHER

## 2021-02-26 RX ORDER — LEVOTHYROXINE SODIUM 75 UG/1
75 TABLET ORAL DAILY
Qty: 90 TABLET | Refills: 0 | Status: SHIPPED | OUTPATIENT
Start: 2021-02-26 | End: 2021-05-27 | Stop reason: SDUPTHER

## 2021-02-26 RX ORDER — LISINOPRIL 20 MG/1
20 TABLET ORAL DAILY
Qty: 90 TABLET | Refills: 0 | Status: SHIPPED | OUTPATIENT
Start: 2021-02-26 | End: 2021-05-27 | Stop reason: SDUPTHER

## 2021-03-08 DIAGNOSIS — K21.9 LPRD (LARYNGOPHARYNGEAL REFLUX DISEASE): ICD-10-CM

## 2021-03-08 RX ORDER — INSULIN ASPART 100 [IU]/ML
INJECTION, SOLUTION INTRAVENOUS; SUBCUTANEOUS
Qty: 20 ML | Refills: 3 | Status: SHIPPED | OUTPATIENT
Start: 2021-03-08 | End: 2021-11-02

## 2021-03-08 RX ORDER — BENZONATATE 100 MG/1
CAPSULE ORAL
Qty: 30 CAPSULE | Refills: 1 | Status: SHIPPED | OUTPATIENT
Start: 2021-03-08 | End: 2022-01-01 | Stop reason: SDUPTHER

## 2021-03-08 RX ORDER — OMEPRAZOLE 40 MG/1
40 CAPSULE, DELAYED RELEASE ORAL
Qty: 180 CAPSULE | Refills: 2 | Status: SHIPPED | OUTPATIENT
Start: 2021-03-08 | End: 2021-11-19

## 2021-03-08 RX ORDER — FAMOTIDINE 40 MG/1
40 TABLET, FILM COATED ORAL DAILY
Qty: 30 TABLET | Refills: 11 | Status: SHIPPED | OUTPATIENT
Start: 2021-03-08 | End: 2022-01-01

## 2021-03-08 RX ORDER — FLUTICASONE PROPIONATE 50 MCG
SPRAY, SUSPENSION (ML) NASAL
Qty: 48 G | Refills: 4 | Status: SHIPPED | OUTPATIENT
Start: 2021-03-08 | End: 2021-09-23 | Stop reason: SDUPTHER

## 2021-04-05 ENCOUNTER — NURSE TRIAGE (OUTPATIENT)
Dept: ADMINISTRATIVE | Facility: CLINIC | Age: 86
End: 2021-04-05

## 2021-04-05 ENCOUNTER — TELEPHONE (OUTPATIENT)
Dept: INTERNAL MEDICINE | Facility: CLINIC | Age: 86
End: 2021-04-05

## 2021-04-05 RX ORDER — INSULIN GLARGINE 100 [IU]/ML
INJECTION, SOLUTION SUBCUTANEOUS
Qty: 45 ML | Refills: 5 | Status: SHIPPED | OUTPATIENT
Start: 2021-04-05 | End: 2021-12-07 | Stop reason: SDUPTHER

## 2021-05-04 ENCOUNTER — PES CALL (OUTPATIENT)
Dept: ADMINISTRATIVE | Facility: CLINIC | Age: 86
End: 2021-05-04

## 2021-05-20 DIAGNOSIS — M17.10 ARTHRITIS OF KNEE: ICD-10-CM

## 2021-05-23 RX ORDER — BUTALBITAL, ACETAMINOPHEN AND CAFFEINE 50; 325; 40 MG/1; MG/1; MG/1
1 TABLET ORAL 2 TIMES DAILY PRN
Qty: 60 TABLET | Refills: 0 | Status: ON HOLD | OUTPATIENT
Start: 2021-05-23 | End: 2022-01-01 | Stop reason: HOSPADM

## 2021-05-27 RX ORDER — LEVOTHYROXINE SODIUM 75 UG/1
75 TABLET ORAL DAILY
Qty: 90 TABLET | Refills: 0 | Status: SHIPPED | OUTPATIENT
Start: 2021-05-27 | End: 2021-08-25 | Stop reason: SDUPTHER

## 2021-05-27 RX ORDER — LISINOPRIL 20 MG/1
20 TABLET ORAL DAILY
Qty: 90 TABLET | Refills: 0 | Status: SHIPPED | OUTPATIENT
Start: 2021-05-27 | End: 2021-08-25 | Stop reason: SDUPTHER

## 2021-05-27 RX ORDER — ATENOLOL 25 MG/1
25 TABLET ORAL DAILY
Qty: 90 TABLET | Refills: 0 | Status: SHIPPED | OUTPATIENT
Start: 2021-05-27 | End: 2021-08-24 | Stop reason: SDUPTHER

## 2021-06-07 RX ORDER — ESCITALOPRAM OXALATE 10 MG/1
10 TABLET ORAL DAILY
Qty: 90 TABLET | Refills: 0 | Status: SHIPPED | OUTPATIENT
Start: 2021-06-07 | End: 2021-09-23 | Stop reason: SDUPTHER

## 2021-06-21 ENCOUNTER — TELEPHONE (OUTPATIENT)
Dept: INTERNAL MEDICINE | Facility: CLINIC | Age: 86
End: 2021-06-21

## 2021-06-25 ENCOUNTER — OFFICE VISIT (OUTPATIENT)
Dept: INTERNAL MEDICINE | Facility: CLINIC | Age: 86
End: 2021-06-25
Payer: MEDICARE

## 2021-06-25 VITALS
OXYGEN SATURATION: 98 % | DIASTOLIC BLOOD PRESSURE: 64 MMHG | SYSTOLIC BLOOD PRESSURE: 136 MMHG | BODY MASS INDEX: 40.58 KG/M2 | WEIGHT: 193.31 LBS | HEIGHT: 58 IN | HEART RATE: 73 BPM

## 2021-06-25 DIAGNOSIS — N89.8 VAGINAL ITCHING: ICD-10-CM

## 2021-06-25 DIAGNOSIS — K21.9 GASTROESOPHAGEAL REFLUX DISEASE, UNSPECIFIED WHETHER ESOPHAGITIS PRESENT: ICD-10-CM

## 2021-06-25 DIAGNOSIS — E03.9 ACQUIRED HYPOTHYROIDISM: ICD-10-CM

## 2021-06-25 DIAGNOSIS — R21 RASH: Primary | ICD-10-CM

## 2021-06-25 DIAGNOSIS — E78.5 HYPERLIPIDEMIA LDL GOAL <70: ICD-10-CM

## 2021-06-25 PROCEDURE — 99499 RISK ADDL DX/OHS AUDIT: ICD-10-PCS | Mod: S$GLB,,, | Performed by: INTERNAL MEDICINE

## 2021-06-25 PROCEDURE — 99999 PR PBB SHADOW E&M-EST. PATIENT-LVL V: ICD-10-PCS | Mod: PBBFAC,HCNC,, | Performed by: INTERNAL MEDICINE

## 2021-06-25 PROCEDURE — 1126F AMNT PAIN NOTED NONE PRSNT: CPT | Mod: HCNC,S$GLB,, | Performed by: INTERNAL MEDICINE

## 2021-06-25 PROCEDURE — 99214 OFFICE O/P EST MOD 30 MIN: CPT | Mod: HCNC,S$GLB,, | Performed by: INTERNAL MEDICINE

## 2021-06-25 PROCEDURE — 3288F FALL RISK ASSESSMENT DOCD: CPT | Mod: HCNC,CPTII,S$GLB, | Performed by: INTERNAL MEDICINE

## 2021-06-25 PROCEDURE — 1101F PR PT FALLS ASSESS DOC 0-1 FALLS W/OUT INJ PAST YR: ICD-10-PCS | Mod: HCNC,CPTII,S$GLB, | Performed by: INTERNAL MEDICINE

## 2021-06-25 PROCEDURE — 99499 UNLISTED E&M SERVICE: CPT | Mod: S$GLB,,, | Performed by: INTERNAL MEDICINE

## 2021-06-25 PROCEDURE — 1159F MED LIST DOCD IN RCRD: CPT | Mod: HCNC,S$GLB,, | Performed by: INTERNAL MEDICINE

## 2021-06-25 PROCEDURE — 99214 PR OFFICE/OUTPT VISIT, EST, LEVL IV, 30-39 MIN: ICD-10-PCS | Mod: HCNC,S$GLB,, | Performed by: INTERNAL MEDICINE

## 2021-06-25 PROCEDURE — 1126F PR PAIN SEVERITY QUANTIFIED, NO PAIN PRESENT: ICD-10-PCS | Mod: HCNC,S$GLB,, | Performed by: INTERNAL MEDICINE

## 2021-06-25 PROCEDURE — 3288F PR FALLS RISK ASSESSMENT DOCUMENTED: ICD-10-PCS | Mod: HCNC,CPTII,S$GLB, | Performed by: INTERNAL MEDICINE

## 2021-06-25 PROCEDURE — 99999 PR PBB SHADOW E&M-EST. PATIENT-LVL V: CPT | Mod: PBBFAC,HCNC,, | Performed by: INTERNAL MEDICINE

## 2021-06-25 PROCEDURE — 1159F PR MEDICATION LIST DOCUMENTED IN MEDICAL RECORD: ICD-10-PCS | Mod: HCNC,S$GLB,, | Performed by: INTERNAL MEDICINE

## 2021-06-25 PROCEDURE — 1101F PT FALLS ASSESS-DOCD LE1/YR: CPT | Mod: HCNC,CPTII,S$GLB, | Performed by: INTERNAL MEDICINE

## 2021-06-25 RX ORDER — NYSTATIN 100000 [USP'U]/G
POWDER TOPICAL
Qty: 60 G | Refills: 0 | Status: SHIPPED | OUTPATIENT
Start: 2021-06-25 | End: 2022-01-01 | Stop reason: SDUPTHER

## 2021-06-25 RX ORDER — CLINDAMYCIN HYDROCHLORIDE 150 MG/1
150 CAPSULE ORAL 3 TIMES DAILY
Qty: 21 CAPSULE | Refills: 0 | Status: ON HOLD | OUTPATIENT
Start: 2021-06-25 | End: 2022-01-01 | Stop reason: HOSPADM

## 2021-09-23 RX ORDER — ESCITALOPRAM OXALATE 10 MG/1
10 TABLET ORAL DAILY
Qty: 90 TABLET | Refills: 0 | Status: SHIPPED | OUTPATIENT
Start: 2021-09-23 | End: 2021-01-01 | Stop reason: SDUPTHER

## 2021-09-23 RX ORDER — FLUTICASONE PROPIONATE 50 MCG
SPRAY, SUSPENSION (ML) NASAL
Qty: 48 G | Refills: 4 | Status: SHIPPED | OUTPATIENT
Start: 2021-09-23 | End: 2022-01-01 | Stop reason: SDUPTHER

## 2021-09-30 RX ORDER — BLOOD SUGAR DIAGNOSTIC
STRIP MISCELLANEOUS
Qty: 400 STRIP | Refills: 3 | Status: SHIPPED | OUTPATIENT
Start: 2021-09-30 | End: 2022-01-01

## 2021-10-04 DIAGNOSIS — Z79.4 TYPE 2 DIABETES MELLITUS WITHOUT COMPLICATION, WITH LONG-TERM CURRENT USE OF INSULIN: Chronic | ICD-10-CM

## 2021-10-04 DIAGNOSIS — E11.9 TYPE 2 DIABETES MELLITUS WITHOUT COMPLICATION, WITH LONG-TERM CURRENT USE OF INSULIN: Chronic | ICD-10-CM

## 2021-10-05 RX ORDER — PEN NEEDLE, DIABETIC 29 G X1/2"
NEEDLE, DISPOSABLE MISCELLANEOUS
Qty: 400 EACH | Refills: 3 | Status: SHIPPED | OUTPATIENT
Start: 2021-10-05 | End: 2022-01-01

## 2021-11-02 RX ORDER — INSULIN ASPART 100 [IU]/ML
INJECTION, SOLUTION INTRAVENOUS; SUBCUTANEOUS
Qty: 50 ML | Refills: 0 | Status: ON HOLD | OUTPATIENT
Start: 2021-11-02 | End: 2022-01-01 | Stop reason: HOSPADM

## 2021-11-19 DIAGNOSIS — K21.9 LPRD (LARYNGOPHARYNGEAL REFLUX DISEASE): ICD-10-CM

## 2021-11-19 RX ORDER — LISINOPRIL 20 MG/1
TABLET ORAL
Qty: 90 TABLET | Refills: 0 | Status: SHIPPED | OUTPATIENT
Start: 2021-11-19 | End: 2022-01-01

## 2021-11-19 RX ORDER — LEVOTHYROXINE SODIUM 75 UG/1
TABLET ORAL
Qty: 90 TABLET | Refills: 0 | Status: SHIPPED | OUTPATIENT
Start: 2021-11-19 | End: 2022-01-01

## 2021-11-19 RX ORDER — OMEPRAZOLE 40 MG/1
CAPSULE, DELAYED RELEASE ORAL
Qty: 180 CAPSULE | Refills: 2 | Status: ON HOLD | OUTPATIENT
Start: 2021-11-19 | End: 2022-01-01 | Stop reason: HOSPADM

## 2021-12-07 ENCOUNTER — TELEPHONE (OUTPATIENT)
Dept: INTERNAL MEDICINE | Facility: CLINIC | Age: 86
End: 2021-12-07
Payer: MEDICARE

## 2021-12-07 RX ORDER — INSULIN GLARGINE 100 [IU]/ML
INJECTION, SOLUTION SUBCUTANEOUS
Qty: 45 ML | Refills: 5 | Status: ON HOLD | OUTPATIENT
Start: 2021-12-07 | End: 2022-01-01 | Stop reason: SDUPTHER

## 2021-12-17 ENCOUNTER — PES CALL (OUTPATIENT)
Dept: ADMINISTRATIVE | Facility: CLINIC | Age: 86
End: 2021-12-17
Payer: MEDICARE

## 2022-01-01 ENCOUNTER — HOSPITAL ENCOUNTER (INPATIENT)
Facility: HOSPITAL | Age: 87
LOS: 19 days | Discharge: HOME HEALTH | DRG: 638 | End: 2022-04-11
Attending: HOSPITALIST | Admitting: HOSPITALIST
Payer: MEDICARE

## 2022-01-01 ENCOUNTER — PATIENT OUTREACH (OUTPATIENT)
Dept: DIABETES | Facility: CLINIC | Age: 87
End: 2022-01-01
Payer: MEDICARE

## 2022-01-01 ENCOUNTER — TELEPHONE (OUTPATIENT)
Dept: ENDOCRINOLOGY | Facility: CLINIC | Age: 87
End: 2022-01-01
Payer: MEDICARE

## 2022-01-01 ENCOUNTER — OFFICE VISIT (OUTPATIENT)
Dept: INTERNAL MEDICINE | Facility: CLINIC | Age: 87
End: 2022-01-01
Payer: MEDICARE

## 2022-01-01 ENCOUNTER — CLINICAL SUPPORT (OUTPATIENT)
Dept: DIABETES | Facility: CLINIC | Age: 87
End: 2022-01-01
Payer: MEDICARE

## 2022-01-01 ENCOUNTER — TELEPHONE (OUTPATIENT)
Dept: DIABETES | Facility: CLINIC | Age: 87
End: 2022-01-01
Payer: MEDICARE

## 2022-01-01 ENCOUNTER — TELEPHONE (OUTPATIENT)
Dept: ADMINISTRATIVE | Facility: HOSPITAL | Age: 87
End: 2022-01-01
Payer: MEDICARE

## 2022-01-01 ENCOUNTER — PATIENT OUTREACH (OUTPATIENT)
Dept: ADMINISTRATIVE | Facility: OTHER | Age: 87
End: 2022-01-01
Payer: MEDICARE

## 2022-01-01 ENCOUNTER — LAB VISIT (OUTPATIENT)
Dept: LAB | Facility: HOSPITAL | Age: 87
End: 2022-01-01
Attending: INTERNAL MEDICINE
Payer: MEDICARE

## 2022-01-01 ENCOUNTER — TELEPHONE (OUTPATIENT)
Dept: INTERNAL MEDICINE | Facility: CLINIC | Age: 87
End: 2022-01-01
Payer: MEDICARE

## 2022-01-01 ENCOUNTER — OFFICE VISIT (OUTPATIENT)
Dept: ENDOCRINOLOGY | Facility: CLINIC | Age: 87
End: 2022-01-01
Payer: MEDICARE

## 2022-01-01 ENCOUNTER — NURSE TRIAGE (OUTPATIENT)
Dept: ADMINISTRATIVE | Facility: CLINIC | Age: 87
End: 2022-01-01
Payer: MEDICARE

## 2022-01-01 ENCOUNTER — HOSPITAL ENCOUNTER (EMERGENCY)
Facility: HOSPITAL | Age: 87
Discharge: HOME | End: 2022-04-27
Attending: EMERGENCY MEDICINE
Payer: MEDICARE

## 2022-01-01 ENCOUNTER — OFFICE VISIT (OUTPATIENT)
Dept: CARDIOLOGY | Facility: CLINIC | Age: 87
DRG: 638 | End: 2022-01-01
Attending: HOSPITALIST
Payer: MEDICARE

## 2022-01-01 ENCOUNTER — HOSPITAL ENCOUNTER (INPATIENT)
Facility: HOSPITAL | Age: 87
LOS: 6 days | Discharge: NURSING, REHAB OR LONG-TERM CARE | DRG: 637 | End: 2022-03-23
Attending: EMERGENCY MEDICINE | Admitting: INTERNAL MEDICINE
Payer: MEDICARE

## 2022-01-01 VITALS
DIASTOLIC BLOOD PRESSURE: 66 MMHG | WEIGHT: 180.31 LBS | OXYGEN SATURATION: 95 % | HEIGHT: 58 IN | SYSTOLIC BLOOD PRESSURE: 118 MMHG | HEART RATE: 82 BPM | BODY MASS INDEX: 37.85 KG/M2

## 2022-01-01 VITALS
OXYGEN SATURATION: 95 % | HEIGHT: 58 IN | SYSTOLIC BLOOD PRESSURE: 120 MMHG | DIASTOLIC BLOOD PRESSURE: 72 MMHG | HEART RATE: 66 BPM | WEIGHT: 187.38 LBS | RESPIRATION RATE: 18 BRPM | BODY MASS INDEX: 39.33 KG/M2

## 2022-01-01 VITALS
TEMPERATURE: 98 F | RESPIRATION RATE: 21 BRPM | OXYGEN SATURATION: 95 % | HEART RATE: 95 BPM | HEIGHT: 60 IN | WEIGHT: 194 LBS | SYSTOLIC BLOOD PRESSURE: 138 MMHG | DIASTOLIC BLOOD PRESSURE: 65 MMHG | BODY MASS INDEX: 38.09 KG/M2

## 2022-01-01 VITALS
OXYGEN SATURATION: 97 % | RESPIRATION RATE: 18 BRPM | TEMPERATURE: 98 F | DIASTOLIC BLOOD PRESSURE: 58 MMHG | WEIGHT: 177 LBS | BODY MASS INDEX: 37.16 KG/M2 | HEIGHT: 58 IN | SYSTOLIC BLOOD PRESSURE: 125 MMHG | HEART RATE: 76 BPM

## 2022-01-01 VITALS
DIASTOLIC BLOOD PRESSURE: 40 MMHG | WEIGHT: 177.25 LBS | OXYGEN SATURATION: 96 % | RESPIRATION RATE: 16 BRPM | HEIGHT: 58 IN | HEART RATE: 80 BPM | BODY MASS INDEX: 37.21 KG/M2 | TEMPERATURE: 97 F | SYSTOLIC BLOOD PRESSURE: 98 MMHG

## 2022-01-01 VITALS
OXYGEN SATURATION: 95 % | BODY MASS INDEX: 38.41 KG/M2 | HEIGHT: 58 IN | WEIGHT: 183 LBS | HEART RATE: 63 BPM | DIASTOLIC BLOOD PRESSURE: 60 MMHG | SYSTOLIC BLOOD PRESSURE: 120 MMHG

## 2022-01-01 VITALS
SYSTOLIC BLOOD PRESSURE: 186 MMHG | DIASTOLIC BLOOD PRESSURE: 86 MMHG | HEART RATE: 78 BPM | HEIGHT: 58 IN | TEMPERATURE: 98 F | WEIGHT: 180.31 LBS | BODY MASS INDEX: 37.85 KG/M2 | RESPIRATION RATE: 18 BRPM | OXYGEN SATURATION: 98 %

## 2022-01-01 DIAGNOSIS — I50.32 CHRONIC DIASTOLIC CONGESTIVE HEART FAILURE: ICD-10-CM

## 2022-01-01 DIAGNOSIS — Z79.4 TYPE 2 DIABETES MELLITUS WITH HYPERGLYCEMIA, WITH LONG-TERM CURRENT USE OF INSULIN: Primary | ICD-10-CM

## 2022-01-01 DIAGNOSIS — E66.01 MORBID OBESITY WITH BMI OF 40.0-44.9, ADULT: Chronic | ICD-10-CM

## 2022-01-01 DIAGNOSIS — R10.84 GENERALIZED ABDOMINAL PAIN: ICD-10-CM

## 2022-01-01 DIAGNOSIS — R73.9 HYPERGLYCEMIA: ICD-10-CM

## 2022-01-01 DIAGNOSIS — E03.9 ACQUIRED HYPOTHYROIDISM: Chronic | ICD-10-CM

## 2022-01-01 DIAGNOSIS — E11.65 TYPE 2 DIABETES MELLITUS WITH HYPERGLYCEMIA, WITH LONG-TERM CURRENT USE OF INSULIN: ICD-10-CM

## 2022-01-01 DIAGNOSIS — M19.90 ARTHRITIS: ICD-10-CM

## 2022-01-01 DIAGNOSIS — N18.30 CKD STAGE 3 DUE TO TYPE 2 DIABETES MELLITUS: ICD-10-CM

## 2022-01-01 DIAGNOSIS — I10 ESSENTIAL HYPERTENSION: ICD-10-CM

## 2022-01-01 DIAGNOSIS — E11.22 CKD STAGE 3 DUE TO TYPE 2 DIABETES MELLITUS: Chronic | ICD-10-CM

## 2022-01-01 DIAGNOSIS — I50.32 CHRONIC DIASTOLIC HEART FAILURE: Primary | ICD-10-CM

## 2022-01-01 DIAGNOSIS — M25.562 CHRONIC PAIN OF LEFT KNEE: ICD-10-CM

## 2022-01-01 DIAGNOSIS — I48.0 PAF (PAROXYSMAL ATRIAL FIBRILLATION): Primary | ICD-10-CM

## 2022-01-01 DIAGNOSIS — I50.32 CHRONIC DIASTOLIC HEART FAILURE: ICD-10-CM

## 2022-01-01 DIAGNOSIS — E11.65 TYPE 2 DIABETES MELLITUS WITH HYPERGLYCEMIA, WITH LONG-TERM CURRENT USE OF INSULIN: Primary | ICD-10-CM

## 2022-01-01 DIAGNOSIS — Z79.4 TYPE 2 DIABETES MELLITUS WITH HYPERGLYCEMIA, WITH LONG-TERM CURRENT USE OF INSULIN: ICD-10-CM

## 2022-01-01 DIAGNOSIS — Z78.9 STATIN INTOLERANCE: ICD-10-CM

## 2022-01-01 DIAGNOSIS — N89.8 VAGINAL ITCHING: ICD-10-CM

## 2022-01-01 DIAGNOSIS — E11.10: Primary | ICD-10-CM

## 2022-01-01 DIAGNOSIS — Z79.4 TYPE 2 DIABETES MELLITUS WITH HYPERGLYCEMIA, WITH LONG-TERM CURRENT USE OF INSULIN: Chronic | ICD-10-CM

## 2022-01-01 DIAGNOSIS — E78.00 PURE HYPERCHOLESTEROLEMIA: ICD-10-CM

## 2022-01-01 DIAGNOSIS — K21.9 GASTROESOPHAGEAL REFLUX DISEASE, UNSPECIFIED WHETHER ESOPHAGITIS PRESENT: ICD-10-CM

## 2022-01-01 DIAGNOSIS — E83.39 HYPERPHOSPHATEMIA: ICD-10-CM

## 2022-01-01 DIAGNOSIS — I48.91 ATRIAL FIBRILLATION WITH RVR: ICD-10-CM

## 2022-01-01 DIAGNOSIS — E11.9 TYPE 2 DIABETES MELLITUS WITHOUT COMPLICATION, WITH LONG-TERM CURRENT USE OF INSULIN: Chronic | ICD-10-CM

## 2022-01-01 DIAGNOSIS — I51.89 DIASTOLIC DYSFUNCTION: ICD-10-CM

## 2022-01-01 DIAGNOSIS — E11.10 DIABETIC ACIDOSIS WITHOUT COMA: ICD-10-CM

## 2022-01-01 DIAGNOSIS — G89.29 CHRONIC PAIN OF LEFT KNEE: ICD-10-CM

## 2022-01-01 DIAGNOSIS — I10 ESSENTIAL HYPERTENSION: Chronic | ICD-10-CM

## 2022-01-01 DIAGNOSIS — N17.9 ACUTE RENAL FAILURE SUPERIMPOSED ON STAGE 3 CHRONIC KIDNEY DISEASE, UNSPECIFIED ACUTE RENAL FAILURE TYPE, UNSPECIFIED WHETHER STAGE 3A OR 3B CKD: ICD-10-CM

## 2022-01-01 DIAGNOSIS — N17.9 AKI (ACUTE KIDNEY INJURY): ICD-10-CM

## 2022-01-01 DIAGNOSIS — E11.42 DIABETIC POLYNEUROPATHY ASSOCIATED WITH TYPE 2 DIABETES MELLITUS: ICD-10-CM

## 2022-01-01 DIAGNOSIS — E11.22 CKD STAGE 3 DUE TO TYPE 2 DIABETES MELLITUS: ICD-10-CM

## 2022-01-01 DIAGNOSIS — E11.65 TYPE 2 DIABETES MELLITUS WITH HYPERGLYCEMIA, WITH LONG-TERM CURRENT USE OF INSULIN: Chronic | ICD-10-CM

## 2022-01-01 DIAGNOSIS — E08.10 DIABETES MELLITUS DUE TO UNDERLYING CONDITION WITH KETOACIDOSIS WITHOUT COMA, WITH LONG-TERM CURRENT USE OF INSULIN: ICD-10-CM

## 2022-01-01 DIAGNOSIS — D72.829 LEUKOCYTOSIS, UNSPECIFIED TYPE: ICD-10-CM

## 2022-01-01 DIAGNOSIS — E03.9 HYPOTHYROIDISM, UNSPECIFIED TYPE: ICD-10-CM

## 2022-01-01 DIAGNOSIS — N18.30 CKD STAGE 3 DUE TO TYPE 2 DIABETES MELLITUS: Chronic | ICD-10-CM

## 2022-01-01 DIAGNOSIS — Z79.4 TYPE 2 DIABETES MELLITUS WITHOUT COMPLICATION, WITH LONG-TERM CURRENT USE OF INSULIN: Chronic | ICD-10-CM

## 2022-01-01 DIAGNOSIS — Z91.119 DIETARY NONCOMPLIANCE: ICD-10-CM

## 2022-01-01 DIAGNOSIS — R11.2 NAUSEA AND VOMITING, INTRACTABILITY OF VOMITING NOT SPECIFIED, UNSPECIFIED VOMITING TYPE: ICD-10-CM

## 2022-01-01 DIAGNOSIS — E11.8 TYPE 2 DIABETES MELLITUS WITH UNSPECIFIED COMPLICATIONS: ICD-10-CM

## 2022-01-01 DIAGNOSIS — Z79.4 TYPE 2 DIABETES MELLITUS WITH HYPERGLYCEMIA, WITH LONG-TERM CURRENT USE OF INSULIN: Primary | Chronic | ICD-10-CM

## 2022-01-01 DIAGNOSIS — Z79.4 DIABETES MELLITUS DUE TO UNDERLYING CONDITION WITH KETOACIDOSIS WITHOUT COMA, WITH LONG-TERM CURRENT USE OF INSULIN: ICD-10-CM

## 2022-01-01 DIAGNOSIS — N18.30 ACUTE RENAL FAILURE SUPERIMPOSED ON STAGE 3 CHRONIC KIDNEY DISEASE, UNSPECIFIED ACUTE RENAL FAILURE TYPE, UNSPECIFIED WHETHER STAGE 3A OR 3B CKD: ICD-10-CM

## 2022-01-01 DIAGNOSIS — I48.0 PAROXYSMAL ATRIAL FIBRILLATION: Primary | ICD-10-CM

## 2022-01-01 DIAGNOSIS — R00.0 TACHYCARDIA: ICD-10-CM

## 2022-01-01 DIAGNOSIS — E11.65 TYPE 2 DIABETES MELLITUS WITH HYPERGLYCEMIA, WITH LONG-TERM CURRENT USE OF INSULIN: Primary | Chronic | ICD-10-CM

## 2022-01-01 DIAGNOSIS — Z78.0 MENOPAUSE: ICD-10-CM

## 2022-01-01 LAB
ALBUMIN SERPL BCP-MCNC: 2.3 G/DL (ref 3.5–5.2)
ALBUMIN SERPL BCP-MCNC: 2.4 G/DL (ref 3.5–5.2)
ALBUMIN SERPL BCP-MCNC: 2.5 G/DL (ref 3.5–5.2)
ALBUMIN SERPL BCP-MCNC: 2.6 G/DL (ref 3.5–5.2)
ALBUMIN SERPL BCP-MCNC: 2.9 G/DL (ref 3.5–5.2)
ALBUMIN SERPL BCP-MCNC: 3.1 G/DL (ref 3.5–5.2)
ALBUMIN SERPL BCP-MCNC: 3.2 G/DL (ref 3.5–5.2)
ALBUMIN SERPL BCP-MCNC: 3.2 G/DL (ref 3.5–5.2)
ALBUMIN SERPL BCP-MCNC: 3.3 G/DL (ref 3.5–5.2)
ALBUMIN SERPL BCP-MCNC: 3.5 G/DL (ref 3.5–5.2)
ALLENS TEST: ABNORMAL
ALP SERPL-CCNC: 64 U/L (ref 55–135)
ALP SERPL-CCNC: 70 U/L (ref 55–135)
ALP SERPL-CCNC: 70 U/L (ref 55–135)
ALP SERPL-CCNC: 71 U/L (ref 55–135)
ALP SERPL-CCNC: 73 U/L (ref 55–135)
ALP SERPL-CCNC: 73 U/L (ref 55–135)
ALP SERPL-CCNC: 75 U/L (ref 55–135)
ALP SERPL-CCNC: 80 U/L (ref 55–135)
ALP SERPL-CCNC: 86 U/L (ref 55–135)
ALP SERPL-CCNC: 90 U/L (ref 55–135)
ALT SERPL W/O P-5'-P-CCNC: 10 U/L (ref 10–44)
ALT SERPL W/O P-5'-P-CCNC: 10 U/L (ref 10–44)
ALT SERPL W/O P-5'-P-CCNC: 11 U/L (ref 10–44)
ALT SERPL W/O P-5'-P-CCNC: 11 U/L (ref 10–44)
ALT SERPL W/O P-5'-P-CCNC: 6 U/L (ref 10–44)
ALT SERPL W/O P-5'-P-CCNC: 7 U/L (ref 10–44)
ALT SERPL W/O P-5'-P-CCNC: 8 U/L (ref 10–44)
ALT SERPL W/O P-5'-P-CCNC: 9 U/L (ref 10–44)
ANION GAP SERPL CALC-SCNC: 10 MMOL/L (ref 8–16)
ANION GAP SERPL CALC-SCNC: 11 MMOL/L (ref 8–16)
ANION GAP SERPL CALC-SCNC: 12 MMOL/L (ref 8–16)
ANION GAP SERPL CALC-SCNC: 13 MMOL/L (ref 8–16)
ANION GAP SERPL CALC-SCNC: 14 MMOL/L (ref 8–16)
ANION GAP SERPL CALC-SCNC: 17 MMOL/L (ref 8–16)
ANION GAP SERPL CALC-SCNC: 20 MMOL/L (ref 8–16)
ANION GAP SERPL CALC-SCNC: 23 MMOL/L (ref 8–16)
ANION GAP SERPL CALC-SCNC: 25 MMOL/L (ref 8–16)
ANION GAP SERPL CALC-SCNC: 25 MMOL/L (ref 8–16)
ANION GAP SERPL CALC-SCNC: 28 MMOL/L (ref 8–16)
ANION GAP SERPL CALC-SCNC: 7 MMOL/L (ref 8–16)
ANION GAP SERPL CALC-SCNC: 7 MMOL/L (ref 8–16)
ANION GAP SERPL CALC-SCNC: 8 MMOL/L (ref 8–16)
ANION GAP SERPL CALC-SCNC: 8 MMOL/L (ref 8–16)
ANION GAP SERPL CALC-SCNC: 9 MMOL/L (ref 8–16)
ASCENDING AORTA: 2.95 CM
AST SERPL-CCNC: 10 U/L (ref 10–40)
AST SERPL-CCNC: 11 U/L (ref 10–40)
AST SERPL-CCNC: 12 U/L (ref 10–40)
AST SERPL-CCNC: 13 U/L (ref 10–40)
AST SERPL-CCNC: 14 U/L (ref 10–40)
AST SERPL-CCNC: 20 U/L (ref 10–40)
AST SERPL-CCNC: 22 U/L (ref 10–40)
AV INDEX (PROSTH): 0.52
AV MEAN GRADIENT: 4 MMHG
AV PEAK GRADIENT: 7 MMHG
AV VALVE AREA: 1.87 CM2
AV VELOCITY RATIO: 0.53
B-OH-BUTYR BLD STRIP-SCNC: 0.1 MMOL/L (ref 0–0.5)
B-OH-BUTYR BLD STRIP-SCNC: 5.3 MMOL/L (ref 0–0.5)
BACTERIA #/AREA URNS AUTO: NORMAL /HPF
BACTERIA #/AREA URNS AUTO: NORMAL /HPF
BACTERIA BLD CULT: NORMAL
BACTERIA BLD CULT: NORMAL
BASOPHILS # BLD AUTO: 0.02 K/UL (ref 0–0.2)
BASOPHILS # BLD AUTO: 0.02 K/UL (ref 0–0.2)
BASOPHILS # BLD AUTO: 0.03 K/UL (ref 0–0.2)
BASOPHILS # BLD AUTO: 0.03 K/UL (ref 0–0.2)
BASOPHILS # BLD AUTO: 0.05 K/UL (ref 0–0.2)
BASOPHILS # BLD AUTO: 0.07 K/UL (ref 0–0.2)
BASOPHILS # BLD AUTO: 0.08 K/UL (ref 0–0.2)
BASOPHILS # BLD AUTO: 0.09 K/UL (ref 0–0.2)
BASOPHILS # BLD AUTO: 0.1 K/UL (ref 0–0.2)
BASOPHILS NFR BLD: 0.1 % (ref 0–1.9)
BASOPHILS NFR BLD: 0.1 % (ref 0–1.9)
BASOPHILS NFR BLD: 0.2 % (ref 0–1.9)
BASOPHILS NFR BLD: 0.3 % (ref 0–1.9)
BASOPHILS NFR BLD: 0.5 % (ref 0–1.9)
BASOPHILS NFR BLD: 0.6 % (ref 0–1.9)
BASOPHILS NFR BLD: 0.7 % (ref 0–1.9)
BASOPHILS NFR BLD: 0.7 % (ref 0–1.9)
BASOPHILS NFR BLD: 0.9 % (ref 0–1.9)
BASOPHILS NFR BLD: 1 % (ref 0–1.9)
BASOPHILS NFR BLD: 1.1 % (ref 0–1.9)
BASOPHILS NFR BLD: 1.2 % (ref 0–1.9)
BASOPHILS NFR BLD: 1.3 % (ref 0–1.9)
BILIRUB DIRECT SERPL-MCNC: 0.3 MG/DL (ref 0.1–0.3)
BILIRUB SERPL-MCNC: 0.2 MG/DL (ref 0.1–1)
BILIRUB SERPL-MCNC: 0.4 MG/DL (ref 0.1–1)
BILIRUB SERPL-MCNC: 0.4 MG/DL (ref 0.1–1)
BILIRUB SERPL-MCNC: 0.5 MG/DL (ref 0.1–1)
BILIRUB SERPL-MCNC: 0.5 MG/DL (ref 0.1–1)
BILIRUB SERPL-MCNC: 0.6 MG/DL (ref 0.1–1)
BILIRUB SERPL-MCNC: 0.6 MG/DL (ref 0.1–1)
BILIRUB SERPL-MCNC: 0.7 MG/DL (ref 0.1–1)
BILIRUB SERPL-MCNC: 0.7 MG/DL (ref 0.1–1)
BILIRUB SERPL-MCNC: 0.9 MG/DL (ref 0.1–1)
BILIRUB UR QL STRIP: NEGATIVE
BILIRUB UR QL STRIP: NEGATIVE
BNP SERPL-MCNC: 135 PG/ML (ref 0–99)
BSA FOR ECHO PROCEDURE: 1.93 M2
BUN SERPL-MCNC: 10 MG/DL (ref 8–23)
BUN SERPL-MCNC: 10 MG/DL (ref 8–23)
BUN SERPL-MCNC: 11 MG/DL (ref 8–23)
BUN SERPL-MCNC: 12 MG/DL (ref 8–23)
BUN SERPL-MCNC: 14 MG/DL (ref 8–23)
BUN SERPL-MCNC: 16 MG/DL (ref 8–23)
BUN SERPL-MCNC: 19 MG/DL (ref 8–23)
BUN SERPL-MCNC: 24 MG/DL (ref 8–23)
BUN SERPL-MCNC: 24 MG/DL (ref 8–23)
BUN SERPL-MCNC: 25 MG/DL (ref 8–23)
BUN SERPL-MCNC: 27 MG/DL (ref 8–23)
BUN SERPL-MCNC: 28 MG/DL (ref 8–23)
BUN SERPL-MCNC: 29 MG/DL (ref 8–23)
BUN SERPL-MCNC: 31 MG/DL (ref 8–23)
BUN SERPL-MCNC: 32 MG/DL (ref 8–23)
BUN SERPL-MCNC: 33 MG/DL (ref 6–30)
BUN SERPL-MCNC: 33 MG/DL (ref 8–23)
BUN SERPL-MCNC: 36 MG/DL (ref 8–23)
BUN SERPL-MCNC: 38 MG/DL (ref 8–23)
BUN SERPL-MCNC: 40 MG/DL (ref 8–23)
CALCIUM SERPL-MCNC: 7.7 MG/DL (ref 8.7–10.5)
CALCIUM SERPL-MCNC: 7.7 MG/DL (ref 8.7–10.5)
CALCIUM SERPL-MCNC: 7.8 MG/DL (ref 8.7–10.5)
CALCIUM SERPL-MCNC: 8.1 MG/DL (ref 8.7–10.5)
CALCIUM SERPL-MCNC: 8.2 MG/DL (ref 8.7–10.5)
CALCIUM SERPL-MCNC: 8.2 MG/DL (ref 8.7–10.5)
CALCIUM SERPL-MCNC: 8.3 MG/DL (ref 8.7–10.5)
CALCIUM SERPL-MCNC: 8.4 MG/DL (ref 8.7–10.5)
CALCIUM SERPL-MCNC: 8.5 MG/DL (ref 8.7–10.5)
CALCIUM SERPL-MCNC: 8.6 MG/DL (ref 8.7–10.5)
CALCIUM SERPL-MCNC: 8.6 MG/DL (ref 8.7–10.5)
CALCIUM SERPL-MCNC: 8.7 MG/DL (ref 8.7–10.5)
CALCIUM SERPL-MCNC: 8.7 MG/DL (ref 8.7–10.5)
CALCIUM SERPL-MCNC: 8.8 MG/DL (ref 8.7–10.5)
CALCIUM SERPL-MCNC: 8.9 MG/DL (ref 8.7–10.5)
CALCIUM SERPL-MCNC: 9 MG/DL (ref 8.7–10.5)
CALCIUM SERPL-MCNC: 9.1 MG/DL (ref 8.7–10.5)
CALCIUM SERPL-MCNC: 9.3 MG/DL (ref 8.7–10.5)
CALCIUM SERPL-MCNC: 9.3 MG/DL (ref 8.7–10.5)
CALCIUM SERPL-MCNC: 9.4 MG/DL (ref 8.7–10.5)
CALCIUM SERPL-MCNC: 9.7 MG/DL (ref 8.7–10.5)
CHLORIDE SERPL-SCNC: 101 MMOL/L (ref 95–110)
CHLORIDE SERPL-SCNC: 101 MMOL/L (ref 95–110)
CHLORIDE SERPL-SCNC: 102 MMOL/L (ref 95–110)
CHLORIDE SERPL-SCNC: 103 MMOL/L (ref 95–110)
CHLORIDE SERPL-SCNC: 104 MMOL/L (ref 95–110)
CHLORIDE SERPL-SCNC: 105 MMOL/L (ref 95–110)
CHLORIDE SERPL-SCNC: 106 MMOL/L (ref 95–110)
CHLORIDE SERPL-SCNC: 107 MMOL/L (ref 95–110)
CHLORIDE SERPL-SCNC: 107 MMOL/L (ref 95–110)
CHLORIDE SERPL-SCNC: 91 MMOL/L (ref 95–110)
CHLORIDE SERPL-SCNC: 93 MMOL/L (ref 95–110)
CHLORIDE SERPL-SCNC: 94 MMOL/L (ref 95–110)
CHLORIDE SERPL-SCNC: 98 MMOL/L (ref 95–110)
CHLORIDE SERPL-SCNC: 99 MMOL/L (ref 95–110)
CHLORIDE SERPL-SCNC: 99 MMOL/L (ref 95–110)
CHOLEST SERPL-MCNC: 252 MG/DL (ref 120–199)
CHOLEST/HDLC SERPL: 4.9 {RATIO} (ref 2–5)
CLARITY UR REFRACT.AUTO: CLEAR
CLARITY UR REFRACT.AUTO: CLEAR
CO2 SERPL-SCNC: 13 MMOL/L (ref 23–29)
CO2 SERPL-SCNC: 17 MMOL/L (ref 23–29)
CO2 SERPL-SCNC: 19 MMOL/L (ref 23–29)
CO2 SERPL-SCNC: 20 MMOL/L (ref 23–29)
CO2 SERPL-SCNC: 22 MMOL/L (ref 23–29)
CO2 SERPL-SCNC: 24 MMOL/L (ref 23–29)
CO2 SERPL-SCNC: 25 MMOL/L (ref 23–29)
CO2 SERPL-SCNC: 26 MMOL/L (ref 23–29)
CO2 SERPL-SCNC: 27 MMOL/L (ref 23–29)
CO2 SERPL-SCNC: 28 MMOL/L (ref 23–29)
CO2 SERPL-SCNC: 7 MMOL/L (ref 23–29)
CO2 SERPL-SCNC: 7 MMOL/L (ref 23–29)
CO2 SERPL-SCNC: 8 MMOL/L (ref 23–29)
CO2 SERPL-SCNC: 9 MMOL/L (ref 23–29)
CO2 SERPL-SCNC: 9 MMOL/L (ref 23–29)
COLOR UR AUTO: ABNORMAL
COLOR UR AUTO: YELLOW
CREAT SERPL-MCNC: 0.7 MG/DL (ref 0.5–1.4)
CREAT SERPL-MCNC: 0.8 MG/DL (ref 0.5–1.4)
CREAT SERPL-MCNC: 0.9 MG/DL (ref 0.5–1.4)
CREAT SERPL-MCNC: 0.9 MG/DL (ref 0.5–1.4)
CREAT SERPL-MCNC: 1 MG/DL (ref 0.5–1.4)
CREAT SERPL-MCNC: 1.2 MG/DL (ref 0.5–1.4)
CREAT SERPL-MCNC: 1.2 MG/DL (ref 0.5–1.4)
CREAT SERPL-MCNC: 1.3 MG/DL (ref 0.5–1.4)
CREAT SERPL-MCNC: 1.3 MG/DL (ref 0.5–1.4)
CREAT SERPL-MCNC: 1.4 MG/DL (ref 0.5–1.4)
CREAT SERPL-MCNC: 1.5 MG/DL (ref 0.5–1.4)
CREAT SERPL-MCNC: 1.6 MG/DL (ref 0.5–1.4)
CREAT SERPL-MCNC: 1.9 MG/DL (ref 0.5–1.4)
CREAT SERPL-MCNC: 1.9 MG/DL (ref 0.5–1.4)
CREAT SERPL-MCNC: 2 MG/DL (ref 0.5–1.4)
CREAT SERPL-MCNC: 2.3 MG/DL (ref 0.5–1.4)
CTP QC/QA: YES
CV ECHO LV RWT: 0.37 CM
DELSYS: ABNORMAL
DIFFERENTIAL METHOD: ABNORMAL
DOP CALC AO PEAK VEL: 1.31 M/S
DOP CALC AO VTI: 24.94 CM
DOP CALC LVOT AREA: 3.6 CM2
DOP CALC LVOT DIAMETER: 2.15 CM
DOP CALC LVOT PEAK VEL: 0.7 M/S
DOP CALC LVOT STROKE VOLUME: 46.66 CM3
DOP CALCLVOT PEAK VEL VTI: 12.86 CM
E/E' RATIO: 13.88 M/S
ECHO LV POSTERIOR WALL: 0.89 CM (ref 0.6–1.1)
EJECTION FRACTION: 65 %
EOSINOPHIL # BLD AUTO: 0 K/UL (ref 0–0.5)
EOSINOPHIL # BLD AUTO: 0.1 K/UL (ref 0–0.5)
EOSINOPHIL # BLD AUTO: 0.1 K/UL (ref 0–0.5)
EOSINOPHIL # BLD AUTO: 0.3 K/UL (ref 0–0.5)
EOSINOPHIL # BLD AUTO: 0.4 K/UL (ref 0–0.5)
EOSINOPHIL NFR BLD: 0 % (ref 0–8)
EOSINOPHIL NFR BLD: 0.1 % (ref 0–8)
EOSINOPHIL NFR BLD: 0.5 % (ref 0–8)
EOSINOPHIL NFR BLD: 1 % (ref 0–8)
EOSINOPHIL NFR BLD: 2.8 % (ref 0–8)
EOSINOPHIL NFR BLD: 3.1 % (ref 0–8)
EOSINOPHIL NFR BLD: 3.8 % (ref 0–8)
EOSINOPHIL NFR BLD: 4.2 % (ref 0–8)
EOSINOPHIL NFR BLD: 4.6 % (ref 0–8)
EOSINOPHIL NFR BLD: 5.3 % (ref 0–8)
EOSINOPHIL NFR BLD: 7.3 % (ref 0–8)
ERYTHROCYTE [DISTWIDTH] IN BLOOD BY AUTOMATED COUNT: 13.7 % (ref 11.5–14.5)
ERYTHROCYTE [DISTWIDTH] IN BLOOD BY AUTOMATED COUNT: 13.7 % (ref 11.5–14.5)
ERYTHROCYTE [DISTWIDTH] IN BLOOD BY AUTOMATED COUNT: 14 % (ref 11.5–14.5)
ERYTHROCYTE [DISTWIDTH] IN BLOOD BY AUTOMATED COUNT: 14.3 % (ref 11.5–14.5)
ERYTHROCYTE [DISTWIDTH] IN BLOOD BY AUTOMATED COUNT: 14.5 % (ref 11.5–14.5)
ERYTHROCYTE [DISTWIDTH] IN BLOOD BY AUTOMATED COUNT: 14.6 % (ref 11.5–14.5)
ERYTHROCYTE [DISTWIDTH] IN BLOOD BY AUTOMATED COUNT: 14.7 % (ref 11.5–14.5)
ERYTHROCYTE [DISTWIDTH] IN BLOOD BY AUTOMATED COUNT: 14.8 % (ref 11.5–14.5)
ERYTHROCYTE [DISTWIDTH] IN BLOOD BY AUTOMATED COUNT: 14.8 % (ref 11.5–14.5)
ERYTHROCYTE [DISTWIDTH] IN BLOOD BY AUTOMATED COUNT: 14.9 % (ref 11.5–14.5)
ERYTHROCYTE [DISTWIDTH] IN BLOOD BY AUTOMATED COUNT: 14.9 % (ref 11.5–14.5)
ERYTHROCYTE [DISTWIDTH] IN BLOOD BY AUTOMATED COUNT: 15 % (ref 11.5–14.5)
ERYTHROCYTE [DISTWIDTH] IN BLOOD BY AUTOMATED COUNT: 15.2 % (ref 11.5–14.5)
EST. GFR  (AFRICAN AMERICAN): 21.8 ML/MIN/1.73 M^2
EST. GFR  (AFRICAN AMERICAN): 25.9 ML/MIN/1.73 M^2
EST. GFR  (AFRICAN AMERICAN): 27.5 ML/MIN/1.73 M^2
EST. GFR  (AFRICAN AMERICAN): 27.5 ML/MIN/1.73 M^2
EST. GFR  (AFRICAN AMERICAN): 33.9 ML/MIN/1.73 M^2
EST. GFR  (AFRICAN AMERICAN): 33.9 ML/MIN/1.73 M^2
EST. GFR  (AFRICAN AMERICAN): 36.6 ML/MIN/1.73 M^2
EST. GFR  (AFRICAN AMERICAN): 39.8 ML/MIN/1.73 M^2
EST. GFR  (AFRICAN AMERICAN): 43.2 ML/MIN/1.73 M^2
EST. GFR  (AFRICAN AMERICAN): 47.6 ML/MIN/1.73 M^2
EST. GFR  (AFRICAN AMERICAN): 48 ML/MIN/1.73 M^2
EST. GFR  (AFRICAN AMERICAN): 59.8 ML/MIN/1.73 M^2
EST. GFR  (AFRICAN AMERICAN): >60 ML/MIN/1.73 M^2
EST. GFR  (NO RACE VARIABLE): 31.4 ML/MIN/1.73 M^2
EST. GFR  (NON AFRICAN AMERICAN): 18.9 ML/MIN/1.73 M^2
EST. GFR  (NON AFRICAN AMERICAN): 22.4 ML/MIN/1.73 M^2
EST. GFR  (NON AFRICAN AMERICAN): 23.9 ML/MIN/1.73 M^2
EST. GFR  (NON AFRICAN AMERICAN): 23.9 ML/MIN/1.73 M^2
EST. GFR  (NON AFRICAN AMERICAN): 29.4 ML/MIN/1.73 M^2
EST. GFR  (NON AFRICAN AMERICAN): 29.4 ML/MIN/1.73 M^2
EST. GFR  (NON AFRICAN AMERICAN): 31.8 ML/MIN/1.73 M^2
EST. GFR  (NON AFRICAN AMERICAN): 34.5 ML/MIN/1.73 M^2
EST. GFR  (NON AFRICAN AMERICAN): 37.5 ML/MIN/1.73 M^2
EST. GFR  (NON AFRICAN AMERICAN): 41.3 ML/MIN/1.73 M^2
EST. GFR  (NON AFRICAN AMERICAN): 41.6 ML/MIN/1.73 M^2
EST. GFR  (NON AFRICAN AMERICAN): 51.9 ML/MIN/1.73 M^2
EST. GFR  (NON AFRICAN AMERICAN): 58.9 ML/MIN/1.73 M^2
EST. GFR  (NON AFRICAN AMERICAN): 58.9 ML/MIN/1.73 M^2
EST. GFR  (NON AFRICAN AMERICAN): >60 ML/MIN/1.73 M^2
ESTIMATED AVG GLUCOSE: 266 MG/DL (ref 68–131)
ESTIMATED AVG GLUCOSE: 309 MG/DL (ref 68–131)
ESTIMATED AVG GLUCOSE: ABNORMAL MG/DL (ref 68–131)
FRACTIONAL SHORTENING: 36 % (ref 28–44)
GLUCOSE SERPL-MCNC: 104 MG/DL (ref 70–110)
GLUCOSE SERPL-MCNC: 105 MG/DL (ref 70–110)
GLUCOSE SERPL-MCNC: 106 MG/DL (ref 70–110)
GLUCOSE SERPL-MCNC: 118 MG/DL (ref 70–110)
GLUCOSE SERPL-MCNC: 125 MG/DL (ref 70–110)
GLUCOSE SERPL-MCNC: 126 MG/DL (ref 70–110)
GLUCOSE SERPL-MCNC: 143 MG/DL (ref 70–110)
GLUCOSE SERPL-MCNC: 154 MG/DL (ref 70–110)
GLUCOSE SERPL-MCNC: 229 MG/DL (ref 70–110)
GLUCOSE SERPL-MCNC: 229 MG/DL (ref 70–110)
GLUCOSE SERPL-MCNC: 238 MG/DL (ref 70–110)
GLUCOSE SERPL-MCNC: 279 MG/DL (ref 70–110)
GLUCOSE SERPL-MCNC: 288 MG/DL (ref 70–110)
GLUCOSE SERPL-MCNC: 297 MG/DL (ref 70–110)
GLUCOSE SERPL-MCNC: 439 MG/DL (ref 70–110)
GLUCOSE SERPL-MCNC: 496 MG/DL (ref 70–110)
GLUCOSE SERPL-MCNC: 579 MG/DL (ref 70–110)
GLUCOSE SERPL-MCNC: 621 MG/DL (ref 70–110)
GLUCOSE SERPL-MCNC: 655 MG/DL (ref 70–110)
GLUCOSE SERPL-MCNC: 717 MG/DL (ref 70–110)
GLUCOSE SERPL-MCNC: 807 MG/DL (ref 70–110)
GLUCOSE SERPL-MCNC: 89 MG/DL (ref 70–110)
GLUCOSE UR QL STRIP: ABNORMAL
GLUCOSE UR QL STRIP: ABNORMAL
HBA1C MFR BLD: 10.9 % (ref 4–5.6)
HBA1C MFR BLD: 12.4 % (ref 4–5.6)
HBA1C MFR BLD: >14 % (ref 4–5.6)
HCO3 UR-SCNC: 10.9 MMOL/L (ref 24–28)
HCT VFR BLD AUTO: 34.2 % (ref 37–48.5)
HCT VFR BLD AUTO: 34.5 % (ref 37–48.5)
HCT VFR BLD AUTO: 37 % (ref 37–48.5)
HCT VFR BLD AUTO: 37 % (ref 37–48.5)
HCT VFR BLD AUTO: 37.1 % (ref 37–48.5)
HCT VFR BLD AUTO: 37.7 % (ref 37–48.5)
HCT VFR BLD AUTO: 38.4 % (ref 37–48.5)
HCT VFR BLD AUTO: 39.7 % (ref 37–48.5)
HCT VFR BLD AUTO: 40.2 % (ref 37–48.5)
HCT VFR BLD AUTO: 41.3 % (ref 37–48.5)
HCT VFR BLD AUTO: 41.5 % (ref 37–48.5)
HCT VFR BLD AUTO: 42.2 % (ref 37–48.5)
HCT VFR BLD AUTO: 47 % (ref 37–48.5)
HCT VFR BLD CALC: 41 %PCV (ref 36–54)
HDLC SERPL-MCNC: 51 MG/DL (ref 40–75)
HDLC SERPL: 20.2 % (ref 20–50)
HGB BLD-MCNC: 10.2 G/DL (ref 12–16)
HGB BLD-MCNC: 10.6 G/DL (ref 12–16)
HGB BLD-MCNC: 11.4 G/DL (ref 12–16)
HGB BLD-MCNC: 11.5 G/DL (ref 12–16)
HGB BLD-MCNC: 11.6 G/DL (ref 12–16)
HGB BLD-MCNC: 11.6 G/DL (ref 12–16)
HGB BLD-MCNC: 11.7 G/DL (ref 12–16)
HGB BLD-MCNC: 12.4 G/DL (ref 12–16)
HGB BLD-MCNC: 12.5 G/DL (ref 12–16)
HGB BLD-MCNC: 12.5 G/DL (ref 12–16)
HGB BLD-MCNC: 12.9 G/DL (ref 12–16)
HGB BLD-MCNC: 12.9 G/DL (ref 12–16)
HGB BLD-MCNC: 14 G/DL (ref 12–16)
HGB UR QL STRIP: NEGATIVE
HGB UR QL STRIP: NEGATIVE
HYALINE CASTS UR QL AUTO: 1 /LPF
IMM GRANULOCYTES # BLD AUTO: 0.01 K/UL (ref 0–0.04)
IMM GRANULOCYTES # BLD AUTO: 0.01 K/UL (ref 0–0.04)
IMM GRANULOCYTES # BLD AUTO: 0.02 K/UL (ref 0–0.04)
IMM GRANULOCYTES # BLD AUTO: 0.03 K/UL (ref 0–0.04)
IMM GRANULOCYTES # BLD AUTO: 0.03 K/UL (ref 0–0.04)
IMM GRANULOCYTES # BLD AUTO: 0.05 K/UL (ref 0–0.04)
IMM GRANULOCYTES # BLD AUTO: 0.07 K/UL (ref 0–0.04)
IMM GRANULOCYTES # BLD AUTO: 0.07 K/UL (ref 0–0.04)
IMM GRANULOCYTES # BLD AUTO: 0.08 K/UL (ref 0–0.04)
IMM GRANULOCYTES # BLD AUTO: 0.09 K/UL (ref 0–0.04)
IMM GRANULOCYTES # BLD AUTO: 0.1 K/UL (ref 0–0.04)
IMM GRANULOCYTES NFR BLD AUTO: 0.2 % (ref 0–0.5)
IMM GRANULOCYTES NFR BLD AUTO: 0.3 % (ref 0–0.5)
IMM GRANULOCYTES NFR BLD AUTO: 0.4 % (ref 0–0.5)
IMM GRANULOCYTES NFR BLD AUTO: 0.5 % (ref 0–0.5)
IMM GRANULOCYTES NFR BLD AUTO: 0.5 % (ref 0–0.5)
IMM GRANULOCYTES NFR BLD AUTO: 0.7 % (ref 0–0.5)
IMM GRANULOCYTES NFR BLD AUTO: 0.7 % (ref 0–0.5)
IMM GRANULOCYTES NFR BLD AUTO: 0.8 % (ref 0–0.5)
IMM GRANULOCYTES NFR BLD AUTO: 0.8 % (ref 0–0.5)
INTERVENTRICULAR SEPTUM: 0.89 CM (ref 0.6–1.1)
KETONES UR QL STRIP: ABNORMAL
KETONES UR QL STRIP: NEGATIVE
LA MAJOR: 4.89 CM
LA MINOR: 5.36 CM
LA WIDTH: 3.41 CM
LACTATE SERPL-SCNC: 2.2 MMOL/L (ref 0.5–2.2)
LACTATE SERPL-SCNC: 2.5 MMOL/L (ref 0.5–2.2)
LACTATE SERPL-SCNC: 3.8 MMOL/L (ref 0.5–2.2)
LDLC SERPL CALC-MCNC: 169.4 MG/DL (ref 63–159)
LEFT ATRIUM SIZE: 3.31 CM
LEFT ATRIUM VOLUME INDEX MOD: 21 ML/M2
LEFT ATRIUM VOLUME INDEX: 26.7 ML/M2
LEFT ATRIUM VOLUME MOD: 38.71 CM3
LEFT ATRIUM VOLUME: 49.07 CM3
LEFT INTERNAL DIMENSION IN SYSTOLE: 3.06 CM (ref 2.1–4)
LEFT VENTRICLE DIASTOLIC VOLUME INDEX: 41.58 ML/M2
LEFT VENTRICLE DIASTOLIC VOLUME: 76.5 ML
LEFT VENTRICLE MASS INDEX: 79 G/M2
LEFT VENTRICLE SYSTOLIC VOLUME INDEX: 19.9 ML/M2
LEFT VENTRICLE SYSTOLIC VOLUME: 36.63 ML
LEFT VENTRICULAR INTERNAL DIMENSION IN DIASTOLE: 4.8 CM (ref 3.5–6)
LEFT VENTRICULAR MASS: 145.62 G
LEUKOCYTE ESTERASE UR QL STRIP: NEGATIVE
LEUKOCYTE ESTERASE UR QL STRIP: NEGATIVE
LIPASE SERPL-CCNC: 59 U/L (ref 4–60)
LV LATERAL E/E' RATIO: 11.1 M/S
LV SEPTAL E/E' RATIO: 18.5 M/S
LYMPHOCYTES # BLD AUTO: 0.3 K/UL (ref 1–4.8)
LYMPHOCYTES # BLD AUTO: 1 K/UL (ref 1–4.8)
LYMPHOCYTES # BLD AUTO: 1.3 K/UL (ref 1–4.8)
LYMPHOCYTES # BLD AUTO: 2.3 K/UL (ref 1–4.8)
LYMPHOCYTES # BLD AUTO: 2.3 K/UL (ref 1–4.8)
LYMPHOCYTES # BLD AUTO: 2.4 K/UL (ref 1–4.8)
LYMPHOCYTES # BLD AUTO: 2.6 K/UL (ref 1–4.8)
LYMPHOCYTES # BLD AUTO: 2.6 K/UL (ref 1–4.8)
LYMPHOCYTES # BLD AUTO: 2.9 K/UL (ref 1–4.8)
LYMPHOCYTES # BLD AUTO: 3 K/UL (ref 1–4.8)
LYMPHOCYTES # BLD AUTO: 3.2 K/UL (ref 1–4.8)
LYMPHOCYTES # BLD AUTO: 3.5 K/UL (ref 1–4.8)
LYMPHOCYTES # BLD AUTO: 3.8 K/UL (ref 1–4.8)
LYMPHOCYTES NFR BLD: 2.1 % (ref 18–48)
LYMPHOCYTES NFR BLD: 21.2 % (ref 18–48)
LYMPHOCYTES NFR BLD: 25.1 % (ref 18–48)
LYMPHOCYTES NFR BLD: 26.5 % (ref 18–48)
LYMPHOCYTES NFR BLD: 26.9 % (ref 18–48)
LYMPHOCYTES NFR BLD: 27.1 % (ref 18–48)
LYMPHOCYTES NFR BLD: 33.2 % (ref 18–48)
LYMPHOCYTES NFR BLD: 36.7 % (ref 18–48)
LYMPHOCYTES NFR BLD: 38.1 % (ref 18–48)
LYMPHOCYTES NFR BLD: 39.1 % (ref 18–48)
LYMPHOCYTES NFR BLD: 41 % (ref 18–48)
LYMPHOCYTES NFR BLD: 6.1 % (ref 18–48)
LYMPHOCYTES NFR BLD: 7 % (ref 18–48)
MAGNESIUM SERPL-MCNC: 1.5 MG/DL (ref 1.6–2.6)
MAGNESIUM SERPL-MCNC: 1.6 MG/DL (ref 1.6–2.6)
MAGNESIUM SERPL-MCNC: 1.8 MG/DL (ref 1.6–2.6)
MAGNESIUM SERPL-MCNC: 2 MG/DL (ref 1.6–2.6)
MAGNESIUM SERPL-MCNC: 2.2 MG/DL (ref 1.6–2.6)
MAGNESIUM SERPL-MCNC: 2.4 MG/DL (ref 1.6–2.6)
MCH RBC QN AUTO: 26.6 PG (ref 27–31)
MCH RBC QN AUTO: 27.2 PG (ref 27–31)
MCH RBC QN AUTO: 27.3 PG (ref 27–31)
MCH RBC QN AUTO: 27.6 PG (ref 27–31)
MCH RBC QN AUTO: 27.8 PG (ref 27–31)
MCH RBC QN AUTO: 27.8 PG (ref 27–31)
MCH RBC QN AUTO: 27.9 PG (ref 27–31)
MCH RBC QN AUTO: 28 PG (ref 27–31)
MCH RBC QN AUTO: 28.3 PG (ref 27–31)
MCH RBC QN AUTO: 28.5 PG (ref 27–31)
MCHC RBC AUTO-ENTMCNC: 29.6 G/DL (ref 32–36)
MCHC RBC AUTO-ENTMCNC: 29.7 G/DL (ref 32–36)
MCHC RBC AUTO-ENTMCNC: 29.8 G/DL (ref 32–36)
MCHC RBC AUTO-ENTMCNC: 30.3 G/DL (ref 32–36)
MCHC RBC AUTO-ENTMCNC: 30.6 G/DL (ref 32–36)
MCHC RBC AUTO-ENTMCNC: 30.8 G/DL (ref 32–36)
MCHC RBC AUTO-ENTMCNC: 31 G/DL (ref 32–36)
MCHC RBC AUTO-ENTMCNC: 31.1 G/DL (ref 32–36)
MCHC RBC AUTO-ENTMCNC: 31.2 G/DL (ref 32–36)
MCHC RBC AUTO-ENTMCNC: 31.3 G/DL (ref 32–36)
MCHC RBC AUTO-ENTMCNC: 31.6 G/DL (ref 32–36)
MCV RBC AUTO: 86 FL (ref 82–98)
MCV RBC AUTO: 88 FL (ref 82–98)
MCV RBC AUTO: 89 FL (ref 82–98)
MCV RBC AUTO: 89 FL (ref 82–98)
MCV RBC AUTO: 90 FL (ref 82–98)
MCV RBC AUTO: 90 FL (ref 82–98)
MCV RBC AUTO: 91 FL (ref 82–98)
MCV RBC AUTO: 91 FL (ref 82–98)
MCV RBC AUTO: 92 FL (ref 82–98)
MCV RBC AUTO: 93 FL (ref 82–98)
MCV RBC AUTO: 94 FL (ref 82–98)
MICROSCOPIC COMMENT: NORMAL
MICROSCOPIC COMMENT: NORMAL
MODE: ABNORMAL
MONOCYTES # BLD AUTO: 0.5 K/UL (ref 0.3–1)
MONOCYTES # BLD AUTO: 0.7 K/UL (ref 0.3–1)
MONOCYTES # BLD AUTO: 0.9 K/UL (ref 0.3–1)
MONOCYTES # BLD AUTO: 1 K/UL (ref 0.3–1)
MONOCYTES # BLD AUTO: 1.1 K/UL (ref 0.3–1)
MONOCYTES # BLD AUTO: 1.2 K/UL (ref 0.3–1)
MONOCYTES # BLD AUTO: 1.3 K/UL (ref 0.3–1)
MONOCYTES # BLD AUTO: 1.4 K/UL (ref 0.3–1)
MONOCYTES # BLD AUTO: 1.4 K/UL (ref 0.3–1)
MONOCYTES NFR BLD: 10.4 % (ref 4–15)
MONOCYTES NFR BLD: 10.7 % (ref 4–15)
MONOCYTES NFR BLD: 11.5 % (ref 4–15)
MONOCYTES NFR BLD: 11.9 % (ref 4–15)
MONOCYTES NFR BLD: 12.5 % (ref 4–15)
MONOCYTES NFR BLD: 12.8 % (ref 4–15)
MONOCYTES NFR BLD: 12.9 % (ref 4–15)
MONOCYTES NFR BLD: 13.7 % (ref 4–15)
MONOCYTES NFR BLD: 14 % (ref 4–15)
MONOCYTES NFR BLD: 3 % (ref 4–15)
MONOCYTES NFR BLD: 6.1 % (ref 4–15)
MONOCYTES NFR BLD: 7.8 % (ref 4–15)
MONOCYTES NFR BLD: 9.8 % (ref 4–15)
MV PEAK E VEL: 1.11 M/S
NEUTROPHILS # BLD AUTO: 14.3 K/UL (ref 1.8–7.7)
NEUTROPHILS # BLD AUTO: 14.7 K/UL (ref 1.8–7.7)
NEUTROPHILS # BLD AUTO: 15.2 K/UL (ref 1.8–7.7)
NEUTROPHILS # BLD AUTO: 2.3 K/UL (ref 1.8–7.7)
NEUTROPHILS # BLD AUTO: 2.5 K/UL (ref 1.8–7.7)
NEUTROPHILS # BLD AUTO: 3.8 K/UL (ref 1.8–7.7)
NEUTROPHILS # BLD AUTO: 4.1 K/UL (ref 1.8–7.7)
NEUTROPHILS # BLD AUTO: 4.1 K/UL (ref 1.8–7.7)
NEUTROPHILS # BLD AUTO: 5.9 K/UL (ref 1.8–7.7)
NEUTROPHILS # BLD AUTO: 5.9 K/UL (ref 1.8–7.7)
NEUTROPHILS # BLD AUTO: 6.9 K/UL (ref 1.8–7.7)
NEUTROPHILS # BLD AUTO: 7 K/UL (ref 1.8–7.7)
NEUTROPHILS # BLD AUTO: 7.9 K/UL (ref 1.8–7.7)
NEUTROPHILS NFR BLD: 39.3 % (ref 38–73)
NEUTROPHILS NFR BLD: 41.1 % (ref 38–73)
NEUTROPHILS NFR BLD: 43.8 % (ref 38–73)
NEUTROPHILS NFR BLD: 43.9 % (ref 38–73)
NEUTROPHILS NFR BLD: 48.3 % (ref 38–73)
NEUTROPHILS NFR BLD: 55 % (ref 38–73)
NEUTROPHILS NFR BLD: 58.8 % (ref 38–73)
NEUTROPHILS NFR BLD: 60.9 % (ref 38–73)
NEUTROPHILS NFR BLD: 61.9 % (ref 38–73)
NEUTROPHILS NFR BLD: 66.1 % (ref 38–73)
NEUTROPHILS NFR BLD: 84.6 % (ref 38–73)
NEUTROPHILS NFR BLD: 87.2 % (ref 38–73)
NEUTROPHILS NFR BLD: 94.4 % (ref 38–73)
NITRITE UR QL STRIP: NEGATIVE
NITRITE UR QL STRIP: NEGATIVE
NONHDLC SERPL-MCNC: 201 MG/DL
NRBC BLD-RTO: 0 /100 WBC
PCO2 BLDA: 33.6 MMHG (ref 35–45)
PH SMN: 7.12 [PH] (ref 7.35–7.45)
PH UR STRIP: 5 [PH] (ref 5–8)
PH UR STRIP: 5 [PH] (ref 5–8)
PHOSPHATE SERPL-MCNC: 2.3 MG/DL (ref 2.7–4.5)
PHOSPHATE SERPL-MCNC: 2.5 MG/DL (ref 2.7–4.5)
PHOSPHATE SERPL-MCNC: 2.8 MG/DL (ref 2.7–4.5)
PHOSPHATE SERPL-MCNC: 3 MG/DL (ref 2.7–4.5)
PHOSPHATE SERPL-MCNC: 3.5 MG/DL (ref 2.7–4.5)
PHOSPHATE SERPL-MCNC: 3.6 MG/DL (ref 2.7–4.5)
PHOSPHATE SERPL-MCNC: 3.7 MG/DL (ref 2.7–4.5)
PHOSPHATE SERPL-MCNC: 3.8 MG/DL (ref 2.7–4.5)
PHOSPHATE SERPL-MCNC: 3.8 MG/DL (ref 2.7–4.5)
PHOSPHATE SERPL-MCNC: 5.6 MG/DL (ref 2.7–4.5)
PHOSPHATE SERPL-MCNC: 7 MG/DL (ref 2.7–4.5)
PISA MRMAX VEL: 0.05 M/S
PISA TR MAX VEL: 2.22 M/S
PLATELET # BLD AUTO: 191 K/UL (ref 150–450)
PLATELET # BLD AUTO: 226 K/UL (ref 150–450)
PLATELET # BLD AUTO: 240 K/UL (ref 150–450)
PLATELET # BLD AUTO: 284 K/UL (ref 150–450)
PLATELET # BLD AUTO: 317 K/UL (ref 150–450)
PLATELET # BLD AUTO: 336 K/UL (ref 150–450)
PLATELET # BLD AUTO: 407 K/UL (ref 150–450)
PLATELET # BLD AUTO: 427 K/UL (ref 150–450)
PLATELET # BLD AUTO: 433 K/UL (ref 150–450)
PLATELET # BLD AUTO: 469 K/UL (ref 150–450)
PLATELET # BLD AUTO: 484 K/UL (ref 150–450)
PLATELET # BLD AUTO: 538 K/UL (ref 150–450)
PLATELET # BLD AUTO: 595 K/UL (ref 150–450)
PMV BLD AUTO: 10.2 FL (ref 9.2–12.9)
PMV BLD AUTO: 10.3 FL (ref 9.2–12.9)
PMV BLD AUTO: 10.8 FL (ref 9.2–12.9)
PMV BLD AUTO: 11.6 FL (ref 9.2–12.9)
PMV BLD AUTO: 11.6 FL (ref 9.2–12.9)
PMV BLD AUTO: 11.9 FL (ref 9.2–12.9)
PMV BLD AUTO: 12.1 FL (ref 9.2–12.9)
PMV BLD AUTO: 12.3 FL (ref 9.2–12.9)
PMV BLD AUTO: 12.6 FL (ref 9.2–12.9)
PMV BLD AUTO: 9.4 FL (ref 9.2–12.9)
PMV BLD AUTO: 9.8 FL (ref 9.2–12.9)
PMV BLD AUTO: 9.9 FL (ref 9.2–12.9)
PMV BLD AUTO: 9.9 FL (ref 9.2–12.9)
PO2 BLDA: 36 MMHG (ref 40–60)
POC BE: -18 MMOL/L
POC IONIZED CALCIUM: 0.92 MMOL/L (ref 1.06–1.42)
POC SATURATED O2: 51 % (ref 95–100)
POC TCO2 (MEASURED): 9 MMOL/L (ref 23–29)
POC TCO2: 12 MMOL/L (ref 24–29)
POCT GLUCOSE: 100 MG/DL (ref 70–110)
POCT GLUCOSE: 102 MG/DL (ref 70–110)
POCT GLUCOSE: 102 MG/DL (ref 70–110)
POCT GLUCOSE: 104 MG/DL (ref 70–110)
POCT GLUCOSE: 110 MG/DL (ref 70–110)
POCT GLUCOSE: 113 MG/DL (ref 70–110)
POCT GLUCOSE: 115 MG/DL (ref 70–110)
POCT GLUCOSE: 116 MG/DL (ref 70–110)
POCT GLUCOSE: 117 MG/DL (ref 70–110)
POCT GLUCOSE: 119 MG/DL (ref 70–110)
POCT GLUCOSE: 120 MG/DL (ref 70–110)
POCT GLUCOSE: 130 MG/DL (ref 70–110)
POCT GLUCOSE: 131 MG/DL (ref 70–110)
POCT GLUCOSE: 132 MG/DL (ref 70–110)
POCT GLUCOSE: 134 MG/DL (ref 70–110)
POCT GLUCOSE: 135 MG/DL (ref 70–110)
POCT GLUCOSE: 135 MG/DL (ref 70–110)
POCT GLUCOSE: 137 MG/DL (ref 70–110)
POCT GLUCOSE: 139 MG/DL (ref 70–110)
POCT GLUCOSE: 140 MG/DL (ref 70–110)
POCT GLUCOSE: 140 MG/DL (ref 70–110)
POCT GLUCOSE: 143 MG/DL (ref 70–110)
POCT GLUCOSE: 144 MG/DL (ref 70–110)
POCT GLUCOSE: 144 MG/DL (ref 70–110)
POCT GLUCOSE: 145 MG/DL (ref 70–110)
POCT GLUCOSE: 152 MG/DL (ref 70–110)
POCT GLUCOSE: 152 MG/DL (ref 70–110)
POCT GLUCOSE: 155 MG/DL (ref 70–110)
POCT GLUCOSE: 156 MG/DL (ref 70–110)
POCT GLUCOSE: 157 MG/DL (ref 70–110)
POCT GLUCOSE: 158 MG/DL (ref 70–110)
POCT GLUCOSE: 158 MG/DL (ref 70–110)
POCT GLUCOSE: 160 MG/DL (ref 70–110)
POCT GLUCOSE: 167 MG/DL (ref 70–110)
POCT GLUCOSE: 168 MG/DL (ref 70–110)
POCT GLUCOSE: 171 MG/DL (ref 70–110)
POCT GLUCOSE: 171 MG/DL (ref 70–110)
POCT GLUCOSE: 172 MG/DL (ref 70–110)
POCT GLUCOSE: 173 MG/DL (ref 70–110)
POCT GLUCOSE: 173 MG/DL (ref 70–110)
POCT GLUCOSE: 174 MG/DL (ref 70–110)
POCT GLUCOSE: 174 MG/DL (ref 70–110)
POCT GLUCOSE: 178 MG/DL (ref 70–110)
POCT GLUCOSE: 180 MG/DL (ref 70–110)
POCT GLUCOSE: 185 MG/DL (ref 70–110)
POCT GLUCOSE: 186 MG/DL (ref 70–110)
POCT GLUCOSE: 187 MG/DL (ref 70–110)
POCT GLUCOSE: 189 MG/DL (ref 70–110)
POCT GLUCOSE: 191 MG/DL (ref 70–110)
POCT GLUCOSE: 191 MG/DL (ref 70–110)
POCT GLUCOSE: 192 MG/DL (ref 70–110)
POCT GLUCOSE: 193 MG/DL (ref 70–110)
POCT GLUCOSE: 194 MG/DL (ref 70–110)
POCT GLUCOSE: 196 MG/DL (ref 70–110)
POCT GLUCOSE: 201 MG/DL (ref 70–110)
POCT GLUCOSE: 202 MG/DL (ref 70–110)
POCT GLUCOSE: 202 MG/DL (ref 70–110)
POCT GLUCOSE: 203 MG/DL (ref 70–110)
POCT GLUCOSE: 207 MG/DL (ref 70–110)
POCT GLUCOSE: 208 MG/DL (ref 70–110)
POCT GLUCOSE: 209 MG/DL (ref 70–110)
POCT GLUCOSE: 210 MG/DL (ref 70–110)
POCT GLUCOSE: 213 MG/DL (ref 70–110)
POCT GLUCOSE: 217 MG/DL (ref 70–110)
POCT GLUCOSE: 221 MG/DL (ref 70–110)
POCT GLUCOSE: 224 MG/DL (ref 70–110)
POCT GLUCOSE: 227 MG/DL (ref 70–110)
POCT GLUCOSE: 236 MG/DL (ref 70–110)
POCT GLUCOSE: 242 MG/DL (ref 70–110)
POCT GLUCOSE: 242 MG/DL (ref 70–110)
POCT GLUCOSE: 243 MG/DL (ref 70–110)
POCT GLUCOSE: 243 MG/DL (ref 70–110)
POCT GLUCOSE: 245 MG/DL (ref 70–110)
POCT GLUCOSE: 248 MG/DL (ref 70–110)
POCT GLUCOSE: 248 MG/DL (ref 70–110)
POCT GLUCOSE: 249 MG/DL (ref 70–110)
POCT GLUCOSE: 252 MG/DL (ref 70–110)
POCT GLUCOSE: 255 MG/DL (ref 70–110)
POCT GLUCOSE: 256 MG/DL (ref 70–110)
POCT GLUCOSE: 260 MG/DL (ref 70–110)
POCT GLUCOSE: 265 MG/DL (ref 70–110)
POCT GLUCOSE: 266 MG/DL (ref 70–110)
POCT GLUCOSE: 269 MG/DL (ref 70–110)
POCT GLUCOSE: 270 MG/DL (ref 70–110)
POCT GLUCOSE: 272 MG/DL (ref 70–110)
POCT GLUCOSE: 274 MG/DL (ref 70–110)
POCT GLUCOSE: 276 MG/DL (ref 70–110)
POCT GLUCOSE: 277 MG/DL (ref 70–110)
POCT GLUCOSE: 278 MG/DL (ref 70–110)
POCT GLUCOSE: 281 MG/DL (ref 70–110)
POCT GLUCOSE: 281 MG/DL (ref 70–110)
POCT GLUCOSE: 282 MG/DL (ref 70–110)
POCT GLUCOSE: 285 MG/DL (ref 70–110)
POCT GLUCOSE: 286 MG/DL (ref 70–110)
POCT GLUCOSE: 287 MG/DL (ref 70–110)
POCT GLUCOSE: 290 MG/DL (ref 70–110)
POCT GLUCOSE: 295 MG/DL (ref 70–110)
POCT GLUCOSE: 304 MG/DL (ref 70–110)
POCT GLUCOSE: 353 MG/DL (ref 70–110)
POCT GLUCOSE: 356 MG/DL (ref 70–110)
POCT GLUCOSE: 356 MG/DL (ref 70–110)
POCT GLUCOSE: 400 MG/DL (ref 70–110)
POCT GLUCOSE: 414 MG/DL (ref 70–110)
POCT GLUCOSE: 420 MG/DL (ref 70–110)
POCT GLUCOSE: 73 MG/DL (ref 70–110)
POCT GLUCOSE: 89 MG/DL (ref 70–110)
POCT GLUCOSE: 89 MG/DL (ref 70–110)
POCT GLUCOSE: 92 MG/DL (ref 70–110)
POCT GLUCOSE: 92 MG/DL (ref 70–110)
POCT GLUCOSE: >500 MG/DL (ref 70–110)
POTASSIUM BLD-SCNC: 4.8 MMOL/L (ref 3.5–5.1)
POTASSIUM SERPL-SCNC: 3.7 MMOL/L (ref 3.5–5.1)
POTASSIUM SERPL-SCNC: 3.8 MMOL/L (ref 3.5–5.1)
POTASSIUM SERPL-SCNC: 3.9 MMOL/L (ref 3.5–5.1)
POTASSIUM SERPL-SCNC: 4 MMOL/L (ref 3.5–5.1)
POTASSIUM SERPL-SCNC: 4.1 MMOL/L (ref 3.5–5.1)
POTASSIUM SERPL-SCNC: 4.2 MMOL/L (ref 3.5–5.1)
POTASSIUM SERPL-SCNC: 4.2 MMOL/L (ref 3.5–5.1)
POTASSIUM SERPL-SCNC: 4.3 MMOL/L (ref 3.5–5.1)
POTASSIUM SERPL-SCNC: 4.5 MMOL/L (ref 3.5–5.1)
POTASSIUM SERPL-SCNC: 4.6 MMOL/L (ref 3.5–5.1)
POTASSIUM SERPL-SCNC: 4.7 MMOL/L (ref 3.5–5.1)
POTASSIUM SERPL-SCNC: 5.1 MMOL/L (ref 3.5–5.1)
POTASSIUM SERPL-SCNC: 5.1 MMOL/L (ref 3.5–5.1)
POTASSIUM SERPL-SCNC: 5.8 MMOL/L (ref 3.5–5.1)
POTASSIUM SERPL-SCNC: 5.8 MMOL/L (ref 3.5–5.1)
PROCALCITONIN SERPL IA-MCNC: 0.15 NG/ML
PROCALCITONIN SERPL IA-MCNC: 0.21 NG/ML
PROT SERPL-MCNC: 5.9 G/DL (ref 6–8.4)
PROT SERPL-MCNC: 5.9 G/DL (ref 6–8.4)
PROT SERPL-MCNC: 6 G/DL (ref 6–8.4)
PROT SERPL-MCNC: 6.1 G/DL (ref 6–8.4)
PROT SERPL-MCNC: 6.4 G/DL (ref 6–8.4)
PROT SERPL-MCNC: 7 G/DL (ref 6–8.4)
PROT SERPL-MCNC: 7.2 G/DL (ref 6–8.4)
PROT SERPL-MCNC: 7.3 G/DL (ref 6–8.4)
PROT SERPL-MCNC: 7.5 G/DL (ref 6–8.4)
PROT SERPL-MCNC: 7.8 G/DL (ref 6–8.4)
PROT UR QL STRIP: NEGATIVE
PROT UR QL STRIP: NEGATIVE
RA MAJOR: 4.46 CM
RA PRESSURE: 3 MMHG
RA WIDTH: 2.29 CM
RBC # BLD AUTO: 3.75 M/UL (ref 4–5.4)
RBC # BLD AUTO: 3.88 M/UL (ref 4–5.4)
RBC # BLD AUTO: 4.04 M/UL (ref 4–5.4)
RBC # BLD AUTO: 4.09 M/UL (ref 4–5.4)
RBC # BLD AUTO: 4.13 M/UL (ref 4–5.4)
RBC # BLD AUTO: 4.14 M/UL (ref 4–5.4)
RBC # BLD AUTO: 4.16 M/UL (ref 4–5.4)
RBC # BLD AUTO: 4.44 M/UL (ref 4–5.4)
RBC # BLD AUTO: 4.49 M/UL (ref 4–5.4)
RBC # BLD AUTO: 4.62 M/UL (ref 4–5.4)
RBC # BLD AUTO: 4.64 M/UL (ref 4–5.4)
RBC # BLD AUTO: 4.7 M/UL (ref 4–5.4)
RBC # BLD AUTO: 5.07 M/UL (ref 4–5.4)
RBC #/AREA URNS AUTO: 1 /HPF (ref 0–4)
RBC #/AREA URNS AUTO: 1 /HPF (ref 0–4)
RIGHT VENTRICULAR END-DIASTOLIC DIMENSION: 2.08 CM
SAMPLE: ABNORMAL
SAMPLE: ABNORMAL
SARS-COV-2 RDRP RESP QL NAA+PROBE: NEGATIVE
SARS-COV-2 RDRP RESP QL NAA+PROBE: NEGATIVE
SINUS: 3.51 CM
SITE: ABNORMAL
SODIUM BLD-SCNC: 132 MMOL/L (ref 136–145)
SODIUM SERPL-SCNC: 128 MMOL/L (ref 136–145)
SODIUM SERPL-SCNC: 130 MMOL/L (ref 136–145)
SODIUM SERPL-SCNC: 130 MMOL/L (ref 136–145)
SODIUM SERPL-SCNC: 131 MMOL/L (ref 136–145)
SODIUM SERPL-SCNC: 132 MMOL/L (ref 136–145)
SODIUM SERPL-SCNC: 132 MMOL/L (ref 136–145)
SODIUM SERPL-SCNC: 133 MMOL/L (ref 136–145)
SODIUM SERPL-SCNC: 134 MMOL/L (ref 136–145)
SODIUM SERPL-SCNC: 136 MMOL/L (ref 136–145)
SODIUM SERPL-SCNC: 137 MMOL/L (ref 136–145)
SODIUM SERPL-SCNC: 138 MMOL/L (ref 136–145)
SODIUM SERPL-SCNC: 139 MMOL/L (ref 136–145)
SODIUM SERPL-SCNC: 139 MMOL/L (ref 136–145)
SODIUM SERPL-SCNC: 140 MMOL/L (ref 136–145)
SODIUM SERPL-SCNC: 141 MMOL/L (ref 136–145)
SP GR UR STRIP: 1.02 (ref 1–1.03)
SP GR UR STRIP: 1.02 (ref 1–1.03)
SQUAMOUS #/AREA URNS AUTO: 1 /HPF
SQUAMOUS #/AREA URNS AUTO: 1 /HPF
STJ: 2.47 CM
T4 FREE SERPL-MCNC: 0.95 NG/DL (ref 0.71–1.51)
T4 FREE SERPL-MCNC: 1.03 NG/DL (ref 0.71–1.51)
TDI LATERAL: 0.1 M/S
TDI SEPTAL: 0.06 M/S
TDI: 0.08 M/S
TR MAX PG: 20 MMHG
TRICUSPID ANNULAR PLANE SYSTOLIC EXCURSION: 1.49 CM
TRIGL SERPL-MCNC: 158 MG/DL (ref 30–150)
TROPONIN I SERPL DL<=0.01 NG/ML-MCNC: 0.01 NG/ML (ref 0–0.03)
TSH SERPL DL<=0.005 MIU/L-ACNC: 0.15 UIU/ML (ref 0.4–4)
TSH SERPL DL<=0.005 MIU/L-ACNC: 1.43 UIU/ML (ref 0.4–4)
TV REST PULMONARY ARTERY PRESSURE: 23 MMHG
URN SPEC COLLECT METH UR: ABNORMAL
URN SPEC COLLECT METH UR: ABNORMAL
WBC # BLD AUTO: 10.64 K/UL (ref 3.9–12.7)
WBC # BLD AUTO: 11.25 K/UL (ref 3.9–12.7)
WBC # BLD AUTO: 11.85 K/UL (ref 3.9–12.7)
WBC # BLD AUTO: 12.01 K/UL (ref 3.9–12.7)
WBC # BLD AUTO: 15.61 K/UL (ref 3.9–12.7)
WBC # BLD AUTO: 16.37 K/UL (ref 3.9–12.7)
WBC # BLD AUTO: 17.97 K/UL (ref 3.9–12.7)
WBC # BLD AUTO: 5.75 K/UL (ref 3.9–12.7)
WBC # BLD AUTO: 6.09 K/UL (ref 3.9–12.7)
WBC # BLD AUTO: 7.82 K/UL (ref 3.9–12.7)
WBC # BLD AUTO: 9.23 K/UL (ref 3.9–12.7)
WBC # BLD AUTO: 9.48 K/UL (ref 3.9–12.7)
WBC # BLD AUTO: 9.48 K/UL (ref 3.9–12.7)
WBC #/AREA URNS AUTO: 1 /HPF (ref 0–5)
WBC #/AREA URNS AUTO: 2 /HPF (ref 0–5)
YEAST UR QL AUTO: NORMAL
YEAST UR QL AUTO: NORMAL

## 2022-01-01 PROCEDURE — 25000003 PHARM REV CODE 250: Performed by: HOSPITALIST

## 2022-01-01 PROCEDURE — 99232 PR SUBSEQUENT HOSPITAL CARE,LEVL II: ICD-10-PCS | Mod: ,,, | Performed by: STUDENT IN AN ORGANIZED HEALTH CARE EDUCATION/TRAINING PROGRAM

## 2022-01-01 PROCEDURE — 80053 COMPREHEN METABOLIC PANEL: CPT | Performed by: STUDENT IN AN ORGANIZED HEALTH CARE EDUCATION/TRAINING PROGRAM

## 2022-01-01 PROCEDURE — 25000003 PHARM REV CODE 250: Performed by: NURSE PRACTITIONER

## 2022-01-01 PROCEDURE — 97530 THERAPEUTIC ACTIVITIES: CPT | Mod: CQ

## 2022-01-01 PROCEDURE — 83735 ASSAY OF MAGNESIUM: CPT | Performed by: HOSPITALIST

## 2022-01-01 PROCEDURE — 83605 ASSAY OF LACTIC ACID: CPT | Mod: 91

## 2022-01-01 PROCEDURE — 96375 TX/PRO/DX INJ NEW DRUG ADDON: CPT

## 2022-01-01 PROCEDURE — 3074F SYST BP LT 130 MM HG: CPT | Mod: CPTII,S$GLB,, | Performed by: INTERNAL MEDICINE

## 2022-01-01 PROCEDURE — 96366 THER/PROPH/DIAG IV INF ADDON: CPT

## 2022-01-01 PROCEDURE — 97110 THERAPEUTIC EXERCISES: CPT | Mod: CQ

## 2022-01-01 PROCEDURE — 97110 THERAPEUTIC EXERCISES: CPT | Mod: CO

## 2022-01-01 PROCEDURE — 85025 COMPLETE CBC W/AUTO DIFF WBC: CPT | Performed by: STUDENT IN AN ORGANIZED HEALTH CARE EDUCATION/TRAINING PROGRAM

## 2022-01-01 PROCEDURE — 99499 UNLISTED E&M SERVICE: CPT | Mod: S$GLB,,, | Performed by: INTERNAL MEDICINE

## 2022-01-01 PROCEDURE — 83036 HEMOGLOBIN GLYCOSYLATED A1C: CPT

## 2022-01-01 PROCEDURE — 97116 GAIT TRAINING THERAPY: CPT

## 2022-01-01 PROCEDURE — 11000004 HC SNF PRIVATE

## 2022-01-01 PROCEDURE — 25000003 PHARM REV CODE 250

## 2022-01-01 PROCEDURE — 1159F MED LIST DOCD IN RCRD: CPT | Mod: CPTII,S$GLB,, | Performed by: INTERNAL MEDICINE

## 2022-01-01 PROCEDURE — 1111F PR DISCHARGE MEDS RECONCILED W/ CURRENT OUTPATIENT MED LIST: ICD-10-PCS | Mod: CPTII,S$GLB,, | Performed by: INTERNAL MEDICINE

## 2022-01-01 PROCEDURE — 1126F AMNT PAIN NOTED NONE PRSNT: CPT | Mod: CPTII,S$GLB,, | Performed by: INTERNAL MEDICINE

## 2022-01-01 PROCEDURE — 97116 GAIT TRAINING THERAPY: CPT | Mod: CQ

## 2022-01-01 PROCEDURE — 1159F PR MEDICATION LIST DOCUMENTED IN MEDICAL RECORD: ICD-10-PCS | Mod: CPTII,S$GLB,, | Performed by: INTERNAL MEDICINE

## 2022-01-01 PROCEDURE — 63600175 PHARM REV CODE 636 W HCPCS: Performed by: NURSE PRACTITIONER

## 2022-01-01 PROCEDURE — 80048 BASIC METABOLIC PNL TOTAL CA: CPT | Mod: 91 | Performed by: NURSE PRACTITIONER

## 2022-01-01 PROCEDURE — 84100 ASSAY OF PHOSPHORUS: CPT | Performed by: NURSE PRACTITIONER

## 2022-01-01 PROCEDURE — 99232 SBSQ HOSP IP/OBS MODERATE 35: CPT | Mod: ,,, | Performed by: STUDENT IN AN ORGANIZED HEALTH CARE EDUCATION/TRAINING PROGRAM

## 2022-01-01 PROCEDURE — 85025 COMPLETE CBC W/AUTO DIFF WBC: CPT | Performed by: HOSPITALIST

## 2022-01-01 PROCEDURE — 83735 ASSAY OF MAGNESIUM: CPT | Performed by: NURSE PRACTITIONER

## 2022-01-01 PROCEDURE — 83605 ASSAY OF LACTIC ACID: CPT | Performed by: NURSE PRACTITIONER

## 2022-01-01 PROCEDURE — 99291 CRITICAL CARE FIRST HOUR: CPT | Mod: ,,, | Performed by: NURSE PRACTITIONER

## 2022-01-01 PROCEDURE — 3288F PR FALLS RISK ASSESSMENT DOCUMENTED: ICD-10-PCS | Mod: CPTII,S$GLB,, | Performed by: INTERNAL MEDICINE

## 2022-01-01 PROCEDURE — 3078F PR MOST RECENT DIASTOLIC BLOOD PRESSURE < 80 MM HG: ICD-10-PCS | Mod: CPTII,S$GLB,, | Performed by: INTERNAL MEDICINE

## 2022-01-01 PROCEDURE — 96368 THER/DIAG CONCURRENT INF: CPT

## 2022-01-01 PROCEDURE — 97530 THERAPEUTIC ACTIVITIES: CPT | Mod: CO

## 2022-01-01 PROCEDURE — 97535 SELF CARE MNGMENT TRAINING: CPT | Mod: CO

## 2022-01-01 PROCEDURE — 96365 THER/PROPH/DIAG IV INF INIT: CPT

## 2022-01-01 PROCEDURE — 99239 HOSP IP/OBS DSCHRG MGMT >30: CPT | Mod: 95,,, | Performed by: INTERNAL MEDICINE

## 2022-01-01 PROCEDURE — 1111F PR DISCHARGE MEDS RECONCILED W/ CURRENT OUTPATIENT MED LIST: ICD-10-PCS | Mod: 95,CPTII,, | Performed by: INTERNAL MEDICINE

## 2022-01-01 PROCEDURE — 93010 ELECTROCARDIOGRAM REPORT: CPT | Mod: ,,, | Performed by: INTERNAL MEDICINE

## 2022-01-01 PROCEDURE — 81001 URINALYSIS AUTO W/SCOPE: CPT

## 2022-01-01 PROCEDURE — C9399 UNCLASSIFIED DRUGS OR BIOLOG: HCPCS

## 2022-01-01 PROCEDURE — 99999 PR PBB SHADOW E&M-EST. PATIENT-LVL V: CPT | Mod: PBBFAC,,, | Performed by: INTERNAL MEDICINE

## 2022-01-01 PROCEDURE — 99214 PR OFFICE/OUTPT VISIT, EST, LEVL IV, 30-39 MIN: ICD-10-PCS | Mod: S$GLB,,, | Performed by: INTERNAL MEDICINE

## 2022-01-01 PROCEDURE — 80053 COMPREHEN METABOLIC PANEL: CPT

## 2022-01-01 PROCEDURE — 82962 GLUCOSE BLOOD TEST: CPT

## 2022-01-01 PROCEDURE — 80048 BASIC METABOLIC PNL TOTAL CA: CPT | Performed by: HOSPITALIST

## 2022-01-01 PROCEDURE — 1126F PR PAIN SEVERITY QUANTIFIED, NO PAIN PRESENT: ICD-10-PCS | Mod: CPTII,S$GLB,, | Performed by: INTERNAL MEDICINE

## 2022-01-01 PROCEDURE — 83690 ASSAY OF LIPASE: CPT | Performed by: NURSE PRACTITIONER

## 2022-01-01 PROCEDURE — 97165 OT EVAL LOW COMPLEX 30 MIN: CPT

## 2022-01-01 PROCEDURE — 36415 COLL VENOUS BLD VENIPUNCTURE: CPT | Performed by: NURSE PRACTITIONER

## 2022-01-01 PROCEDURE — G0108 DIAB MANAGE TRN  PER INDIV: HCPCS | Mod: S$GLB,,, | Performed by: INTERNAL MEDICINE

## 2022-01-01 PROCEDURE — 99306 PR NURSING FACILITY CARE, INIT, HIGH SEVERITY: ICD-10-PCS | Mod: AI,,, | Performed by: HOSPITALIST

## 2022-01-01 PROCEDURE — 97535 SELF CARE MNGMENT TRAINING: CPT

## 2022-01-01 PROCEDURE — 83735 ASSAY OF MAGNESIUM: CPT | Performed by: STUDENT IN AN ORGANIZED HEALTH CARE EDUCATION/TRAINING PROGRAM

## 2022-01-01 PROCEDURE — 83735 ASSAY OF MAGNESIUM: CPT

## 2022-01-01 PROCEDURE — 80047 BASIC METABLC PNL IONIZED CA: CPT

## 2022-01-01 PROCEDURE — 1111F DSCHRG MED/CURRENT MED MERGE: CPT | Mod: CPTII,GC,S$GLB, | Performed by: INTERNAL MEDICINE

## 2022-01-01 PROCEDURE — 97530 THERAPEUTIC ACTIVITIES: CPT

## 2022-01-01 PROCEDURE — U0002 COVID-19 LAB TEST NON-CDC: HCPCS | Performed by: INTERNAL MEDICINE

## 2022-01-01 PROCEDURE — 84443 ASSAY THYROID STIM HORMONE: CPT

## 2022-01-01 PROCEDURE — 99239 PR HOSPITAL DISCHARGE DAY,>30 MIN: ICD-10-PCS | Mod: 95,,, | Performed by: INTERNAL MEDICINE

## 2022-01-01 PROCEDURE — 1101F PR PT FALLS ASSESS DOC 0-1 FALLS W/OUT INJ PAST YR: ICD-10-PCS | Mod: CPTII,S$GLB,, | Performed by: INTERNAL MEDICINE

## 2022-01-01 PROCEDURE — 84100 ASSAY OF PHOSPHORUS: CPT | Performed by: STUDENT IN AN ORGANIZED HEALTH CARE EDUCATION/TRAINING PROGRAM

## 2022-01-01 PROCEDURE — 25000003 PHARM REV CODE 250: Performed by: STUDENT IN AN ORGANIZED HEALTH CARE EDUCATION/TRAINING PROGRAM

## 2022-01-01 PROCEDURE — 1101F PT FALLS ASSESS-DOCD LE1/YR: CPT | Mod: CPTII,S$GLB,, | Performed by: INTERNAL MEDICINE

## 2022-01-01 PROCEDURE — 1160F PR REVIEW ALL MEDS BY PRESCRIBER/CLIN PHARMACIST DOCUMENTED: ICD-10-PCS | Mod: CPTII,S$GLB,, | Performed by: INTERNAL MEDICINE

## 2022-01-01 PROCEDURE — 99214 OFFICE O/P EST MOD 30 MIN: CPT | Mod: S$GLB,,, | Performed by: INTERNAL MEDICINE

## 2022-01-01 PROCEDURE — 93010 EKG 12-LEAD: ICD-10-PCS | Mod: ,,, | Performed by: INTERNAL MEDICINE

## 2022-01-01 PROCEDURE — 96361 HYDRATE IV INFUSION ADD-ON: CPT

## 2022-01-01 PROCEDURE — 85025 COMPLETE CBC W/AUTO DIFF WBC: CPT

## 2022-01-01 PROCEDURE — 97110 THERAPEUTIC EXERCISES: CPT

## 2022-01-01 PROCEDURE — 1157F PR ADVANCE CARE PLAN OR EQUIV PRESENT IN MEDICAL RECORD: ICD-10-PCS | Mod: CPTII,S$GLB,, | Performed by: INTERNAL MEDICINE

## 2022-01-01 PROCEDURE — 99999 PR PBB SHADOW E&M-EST. PATIENT-LVL III: ICD-10-PCS | Mod: PBBFAC,,, | Performed by: INTERNAL MEDICINE

## 2022-01-01 PROCEDURE — 94640 AIRWAY INHALATION TREATMENT: CPT

## 2022-01-01 PROCEDURE — 36415 COLL VENOUS BLD VENIPUNCTURE: CPT | Performed by: INTERNAL MEDICINE

## 2022-01-01 PROCEDURE — 99291 PR CRITICAL CARE, E/M 30-74 MINUTES: ICD-10-PCS | Mod: ,,, | Performed by: NURSE PRACTITIONER

## 2022-01-01 PROCEDURE — 84100 ASSAY OF PHOSPHORUS: CPT

## 2022-01-01 PROCEDURE — 63600175 PHARM REV CODE 636 W HCPCS: Performed by: HOSPITALIST

## 2022-01-01 PROCEDURE — 82010 KETONE BODYS QUAN: CPT

## 2022-01-01 PROCEDURE — 1157F ADVNC CARE PLAN IN RCRD: CPT | Mod: CPTII,S$GLB,, | Performed by: INTERNAL MEDICINE

## 2022-01-01 PROCEDURE — C9399 UNCLASSIFIED DRUGS OR BIOLOG: HCPCS | Performed by: HOSPITALIST

## 2022-01-01 PROCEDURE — 36415 COLL VENOUS BLD VENIPUNCTURE: CPT | Performed by: STUDENT IN AN ORGANIZED HEALTH CARE EDUCATION/TRAINING PROGRAM

## 2022-01-01 PROCEDURE — 94761 N-INVAS EAR/PLS OXIMETRY MLT: CPT

## 2022-01-01 PROCEDURE — 84100 ASSAY OF PHOSPHORUS: CPT | Mod: 91 | Performed by: NURSE PRACTITIONER

## 2022-01-01 PROCEDURE — 99999 PR PBB SHADOW E&M-EST. PATIENT-LVL V: ICD-10-PCS | Mod: PBBFAC,,, | Performed by: INTERNAL MEDICINE

## 2022-01-01 PROCEDURE — 36415 COLL VENOUS BLD VENIPUNCTURE: CPT | Performed by: HOSPITALIST

## 2022-01-01 PROCEDURE — 3074F PR MOST RECENT SYSTOLIC BLOOD PRESSURE < 130 MM HG: ICD-10-PCS | Mod: CPTII,S$GLB,, | Performed by: INTERNAL MEDICINE

## 2022-01-01 PROCEDURE — 80061 LIPID PANEL: CPT | Performed by: INTERNAL MEDICINE

## 2022-01-01 PROCEDURE — 1111F PR DISCHARGE MEDS RECONCILED W/ CURRENT OUTPATIENT MED LIST: ICD-10-PCS | Mod: CPTII,GC,S$GLB, | Performed by: INTERNAL MEDICINE

## 2022-01-01 PROCEDURE — G0108 PR DIAB MANAGE TRN  PER INDIV: ICD-10-PCS | Mod: S$GLB,,, | Performed by: INTERNAL MEDICINE

## 2022-01-01 PROCEDURE — 99205 PR OFFICE/OUTPT VISIT, NEW, LEVL V, 60-74 MIN: ICD-10-PCS | Mod: S$GLB,,, | Performed by: INTERNAL MEDICINE

## 2022-01-01 PROCEDURE — 82010 KETONE BODYS QUAN: CPT | Performed by: EMERGENCY MEDICINE

## 2022-01-01 PROCEDURE — 3078F DIAST BP <80 MM HG: CPT | Mod: CPTII,S$GLB,, | Performed by: INTERNAL MEDICINE

## 2022-01-01 PROCEDURE — C9399 UNCLASSIFIED DRUGS OR BIOLOG: HCPCS | Performed by: NURSE PRACTITIONER

## 2022-01-01 PROCEDURE — 80053 COMPREHEN METABOLIC PANEL: CPT | Performed by: INTERNAL MEDICINE

## 2022-01-01 PROCEDURE — 99285 PR EMERGENCY DEPT VISIT,LEVEL V: ICD-10-PCS | Mod: ,,, | Performed by: EMERGENCY MEDICINE

## 2022-01-01 PROCEDURE — 99499 RISK ADDL DX/OHS AUDIT: ICD-10-PCS | Mod: S$GLB,,, | Performed by: INTERNAL MEDICINE

## 2022-01-01 PROCEDURE — 84443 ASSAY THYROID STIM HORMONE: CPT | Performed by: INTERNAL MEDICINE

## 2022-01-01 PROCEDURE — 20600001 HC STEP DOWN PRIVATE ROOM

## 2022-01-01 PROCEDURE — 84145 PROCALCITONIN (PCT): CPT | Performed by: NURSE PRACTITIONER

## 2022-01-01 PROCEDURE — 99999 PR PBB SHADOW E&M-EST. PATIENT-LVL III: CPT | Mod: PBBFAC,,, | Performed by: INTERNAL MEDICINE

## 2022-01-01 PROCEDURE — U0002 COVID-19 LAB TEST NON-CDC: HCPCS

## 2022-01-01 PROCEDURE — 83036 HEMOGLOBIN GLYCOSYLATED A1C: CPT | Performed by: INTERNAL MEDICINE

## 2022-01-01 PROCEDURE — 84439 ASSAY OF FREE THYROXINE: CPT | Performed by: INTERNAL MEDICINE

## 2022-01-01 PROCEDURE — 84100 ASSAY OF PHOSPHORUS: CPT | Performed by: HOSPITALIST

## 2022-01-01 PROCEDURE — 99214 PR OFFICE/OUTPT VISIT, EST, LEVL IV, 30-39 MIN: ICD-10-PCS | Mod: GC,S$GLB,, | Performed by: INTERNAL MEDICINE

## 2022-01-01 PROCEDURE — 96360 HYDRATION IV INFUSION INIT: CPT

## 2022-01-01 PROCEDURE — 1111F DSCHRG MED/CURRENT MED MERGE: CPT | Mod: CPTII,S$GLB,, | Performed by: INTERNAL MEDICINE

## 2022-01-01 PROCEDURE — 85025 COMPLETE CBC W/AUTO DIFF WBC: CPT | Performed by: NURSE PRACTITIONER

## 2022-01-01 PROCEDURE — 99291 PR CRITICAL CARE, E/M 30-74 MINUTES: ICD-10-PCS | Mod: GC,CS,, | Performed by: EMERGENCY MEDICINE

## 2022-01-01 PROCEDURE — 99291 CRITICAL CARE FIRST HOUR: CPT | Mod: 25

## 2022-01-01 PROCEDURE — 84145 PROCALCITONIN (PCT): CPT

## 2022-01-01 PROCEDURE — 99999 PR PBB SHADOW E&M-EST. PATIENT-LVL V: CPT | Mod: PBBFAC,GC,, | Performed by: INTERNAL MEDICINE

## 2022-01-01 PROCEDURE — 96372 THER/PROPH/DIAG INJ SC/IM: CPT

## 2022-01-01 PROCEDURE — 80076 HEPATIC FUNCTION PANEL: CPT | Performed by: NURSE PRACTITIONER

## 2022-01-01 PROCEDURE — 84100 ASSAY OF PHOSPHORUS: CPT | Mod: 91

## 2022-01-01 PROCEDURE — 1100F PTFALLS ASSESS-DOCD GE2>/YR: CPT | Mod: CPTII,S$GLB,, | Performed by: INTERNAL MEDICINE

## 2022-01-01 PROCEDURE — 99306 1ST NF CARE HIGH MDM 50: CPT | Mod: AI,,, | Performed by: HOSPITALIST

## 2022-01-01 PROCEDURE — 84484 ASSAY OF TROPONIN QUANT: CPT

## 2022-01-01 PROCEDURE — 80048 BASIC METABOLIC PNL TOTAL CA: CPT

## 2022-01-01 PROCEDURE — 1100F PR PT FALLS ASSESS DOC 2+ FALLS/FALL W/INJURY/YR: ICD-10-PCS | Mod: CPTII,S$GLB,, | Performed by: INTERNAL MEDICINE

## 2022-01-01 PROCEDURE — 93005 ELECTROCARDIOGRAM TRACING: CPT

## 2022-01-01 PROCEDURE — 1111F DSCHRG MED/CURRENT MED MERGE: CPT | Mod: 95,CPTII,, | Performed by: INTERNAL MEDICINE

## 2022-01-01 PROCEDURE — 3288F FALL RISK ASSESSMENT DOCD: CPT | Mod: CPTII,S$GLB,, | Performed by: INTERNAL MEDICINE

## 2022-01-01 PROCEDURE — 1160F RVW MEDS BY RX/DR IN RCRD: CPT | Mod: CPTII,S$GLB,, | Performed by: INTERNAL MEDICINE

## 2022-01-01 PROCEDURE — 99999 PR PBB SHADOW E&M-EST. PATIENT-LVL I: CPT | Mod: PBBFAC,,,

## 2022-01-01 PROCEDURE — 99900035 HC TECH TIME PER 15 MIN (STAT)

## 2022-01-01 PROCEDURE — 63600175 PHARM REV CODE 636 W HCPCS

## 2022-01-01 PROCEDURE — 82803 BLOOD GASES ANY COMBINATION: CPT

## 2022-01-01 PROCEDURE — 83880 ASSAY OF NATRIURETIC PEPTIDE: CPT

## 2022-01-01 PROCEDURE — 87040 BLOOD CULTURE FOR BACTERIA: CPT

## 2022-01-01 PROCEDURE — 99285 EMERGENCY DEPT VISIT HI MDM: CPT | Mod: 25

## 2022-01-01 PROCEDURE — 97161 PT EVAL LOW COMPLEX 20 MIN: CPT

## 2022-01-01 PROCEDURE — 99285 EMERGENCY DEPT VISIT HI MDM: CPT | Mod: ,,, | Performed by: EMERGENCY MEDICINE

## 2022-01-01 PROCEDURE — C9399 UNCLASSIFIED DRUGS OR BIOLOG: HCPCS | Performed by: STUDENT IN AN ORGANIZED HEALTH CARE EDUCATION/TRAINING PROGRAM

## 2022-01-01 PROCEDURE — 80048 BASIC METABOLIC PNL TOTAL CA: CPT | Performed by: NURSE PRACTITIONER

## 2022-01-01 PROCEDURE — 99999 PR PBB SHADOW E&M-EST. PATIENT-LVL V: ICD-10-PCS | Mod: PBBFAC,GC,, | Performed by: INTERNAL MEDICINE

## 2022-01-01 PROCEDURE — 99999 PR PBB SHADOW E&M-EST. PATIENT-LVL I: ICD-10-PCS | Mod: PBBFAC,,,

## 2022-01-01 PROCEDURE — 92610 EVALUATE SWALLOWING FUNCTION: CPT

## 2022-01-01 PROCEDURE — 84439 ASSAY OF FREE THYROXINE: CPT

## 2022-01-01 PROCEDURE — 99214 OFFICE O/P EST MOD 30 MIN: CPT | Mod: GC,S$GLB,, | Performed by: INTERNAL MEDICINE

## 2022-01-01 PROCEDURE — 99205 OFFICE O/P NEW HI 60 MIN: CPT | Mod: S$GLB,,, | Performed by: INTERNAL MEDICINE

## 2022-01-01 PROCEDURE — 25000242 PHARM REV CODE 250 ALT 637 W/ HCPCS: Performed by: HOSPITALIST

## 2022-01-01 PROCEDURE — 1125F AMNT PAIN NOTED PAIN PRSNT: CPT | Mod: CPTII,S$GLB,, | Performed by: INTERNAL MEDICINE

## 2022-01-01 PROCEDURE — 99291 CRITICAL CARE FIRST HOUR: CPT | Mod: GC,CS,, | Performed by: EMERGENCY MEDICINE

## 2022-01-01 PROCEDURE — 12000002 HC ACUTE/MED SURGE SEMI-PRIVATE ROOM

## 2022-01-01 PROCEDURE — 1125F PR PAIN SEVERITY QUANTIFIED, PAIN PRESENT: ICD-10-PCS | Mod: CPTII,S$GLB,, | Performed by: INTERNAL MEDICINE

## 2022-01-01 PROCEDURE — 63600175 PHARM REV CODE 636 W HCPCS: Performed by: STUDENT IN AN ORGANIZED HEALTH CARE EDUCATION/TRAINING PROGRAM

## 2022-01-01 RX ORDER — GLUCAGON 1 MG
1 KIT INJECTION
Status: DISCONTINUED | OUTPATIENT
Start: 2022-01-01 | End: 2022-01-01 | Stop reason: HOSPADM

## 2022-01-01 RX ORDER — TALC
9 POWDER (GRAM) TOPICAL NIGHTLY PRN
Refills: 0 | COMMUNITY
Start: 2022-01-01

## 2022-01-01 RX ORDER — METOPROLOL TARTRATE 1 MG/ML
5 INJECTION, SOLUTION INTRAVENOUS EVERY 5 MIN PRN
Status: DISCONTINUED | OUTPATIENT
Start: 2022-01-01 | End: 2022-01-01

## 2022-01-01 RX ORDER — DICLOFENAC SODIUM 10 MG/G
4 GEL TOPICAL 2 TIMES DAILY
Qty: 100 G | Refills: 2 | Status: CANCELLED | OUTPATIENT
Start: 2022-01-01

## 2022-01-01 RX ORDER — ENOXAPARIN SODIUM 100 MG/ML
40 INJECTION SUBCUTANEOUS EVERY 24 HOURS
Status: DISCONTINUED | OUTPATIENT
Start: 2022-01-01 | End: 2022-01-01

## 2022-01-01 RX ORDER — IBUPROFEN 200 MG
16 TABLET ORAL
Status: DISCONTINUED | OUTPATIENT
Start: 2022-01-01 | End: 2022-01-01 | Stop reason: HOSPADM

## 2022-01-01 RX ORDER — LEVOTHYROXINE SODIUM 75 UG/1
75 TABLET ORAL DAILY
Qty: 90 TABLET | Refills: 4 | Status: SHIPPED | OUTPATIENT
Start: 2022-01-01

## 2022-01-01 RX ORDER — METOPROLOL TARTRATE 50 MG/1
100 TABLET ORAL 2 TIMES DAILY
Status: DISCONTINUED | OUTPATIENT
Start: 2022-01-01 | End: 2022-01-01

## 2022-01-01 RX ORDER — INSULIN ASPART 100 [IU]/ML
3 INJECTION, SOLUTION INTRAVENOUS; SUBCUTANEOUS
Status: DISCONTINUED | OUTPATIENT
Start: 2022-01-01 | End: 2022-01-01

## 2022-01-01 RX ORDER — INSULIN ASPART 100 [IU]/ML
0-5 INJECTION, SOLUTION INTRAVENOUS; SUBCUTANEOUS
Status: DISCONTINUED | OUTPATIENT
Start: 2022-01-01 | End: 2022-01-01 | Stop reason: HOSPADM

## 2022-01-01 RX ORDER — BLOOD SUGAR DIAGNOSTIC
STRIP MISCELLANEOUS
Qty: 400 STRIP | Refills: 3 | Status: SHIPPED | OUTPATIENT
Start: 2022-01-01

## 2022-01-01 RX ORDER — LISINOPRIL 20 MG/1
20 TABLET ORAL DAILY
Qty: 30 TABLET | Refills: 3 | Status: SHIPPED | OUTPATIENT
Start: 2022-01-01 | End: 2022-01-01

## 2022-01-01 RX ORDER — IBUPROFEN 200 MG
24 TABLET ORAL
Status: DISCONTINUED | OUTPATIENT
Start: 2022-01-01 | End: 2022-01-01 | Stop reason: HOSPADM

## 2022-01-01 RX ORDER — METOPROLOL TARTRATE 25 MG/1
25 TABLET, FILM COATED ORAL 2 TIMES DAILY
Status: DISCONTINUED | OUTPATIENT
Start: 2022-01-01 | End: 2022-01-01

## 2022-01-01 RX ORDER — METOPROLOL TARTRATE 50 MG/1
50 TABLET ORAL 2 TIMES DAILY
Qty: 60 TABLET | Refills: 3 | Status: CANCELLED | OUTPATIENT
Start: 2022-01-01

## 2022-01-01 RX ORDER — ALENDRONATE SODIUM 70 MG/1
70 TABLET ORAL
Qty: 12 TABLET | Refills: 3 | Status: SHIPPED | OUTPATIENT
Start: 2022-01-01

## 2022-01-01 RX ORDER — HALOPERIDOL 5 MG/ML
INJECTION INTRAMUSCULAR
Status: DISPENSED
Start: 2022-01-01 | End: 2022-01-01

## 2022-01-01 RX ORDER — ONDANSETRON 8 MG/1
8 TABLET, ORALLY DISINTEGRATING ORAL EVERY 8 HOURS PRN
Status: DISCONTINUED | OUTPATIENT
Start: 2022-01-01 | End: 2022-01-01 | Stop reason: HOSPADM

## 2022-01-01 RX ORDER — FAMOTIDINE 20 MG/1
40 TABLET, FILM COATED ORAL
Status: DISCONTINUED | OUTPATIENT
Start: 2022-01-01 | End: 2022-01-01 | Stop reason: HOSPADM

## 2022-01-01 RX ORDER — METOPROLOL TARTRATE 1 MG/ML
5 INJECTION, SOLUTION INTRAVENOUS ONCE
Status: COMPLETED | OUTPATIENT
Start: 2022-01-01 | End: 2022-01-01

## 2022-01-01 RX ORDER — LANOLIN ALCOHOL/MO/W.PET/CERES
400 CREAM (GRAM) TOPICAL 2 TIMES DAILY
Status: COMPLETED | OUTPATIENT
Start: 2022-01-01 | End: 2022-01-01

## 2022-01-01 RX ORDER — INSULIN ASPART 100 [IU]/ML
10 INJECTION, SOLUTION INTRAVENOUS; SUBCUTANEOUS 3 TIMES DAILY
Qty: 9 ML | Refills: 11 | Status: SHIPPED | OUTPATIENT
Start: 2022-01-01 | End: 2022-01-01

## 2022-01-01 RX ORDER — FAMOTIDINE 40 MG/1
TABLET, FILM COATED ORAL
Qty: 30 TABLET | Refills: 11 | Status: ON HOLD | OUTPATIENT
Start: 2022-01-01 | End: 2022-01-01 | Stop reason: HOSPADM

## 2022-01-01 RX ORDER — INSULIN GLARGINE 100 [IU]/ML
12 INJECTION, SOLUTION SUBCUTANEOUS 2 TIMES DAILY
Qty: 9 ML | Refills: 3 | Status: SHIPPED | OUTPATIENT
Start: 2022-01-01 | End: 2022-01-01

## 2022-01-01 RX ORDER — HEPARIN SODIUM 5000 [USP'U]/ML
5000 INJECTION, SOLUTION INTRAVENOUS; SUBCUTANEOUS EVERY 8 HOURS
Status: DISCONTINUED | OUTPATIENT
Start: 2022-01-01 | End: 2022-01-01 | Stop reason: HOSPADM

## 2022-01-01 RX ORDER — INSULIN ASPART 100 [IU]/ML
1-10 INJECTION, SOLUTION INTRAVENOUS; SUBCUTANEOUS
Status: DISCONTINUED | OUTPATIENT
Start: 2022-01-01 | End: 2022-01-01 | Stop reason: HOSPADM

## 2022-01-01 RX ORDER — TALC
9 POWDER (GRAM) TOPICAL NIGHTLY PRN
Refills: 0 | Status: CANCELLED | COMMUNITY
Start: 2022-01-01

## 2022-01-01 RX ORDER — INSULIN ASPART 100 [IU]/ML
5 INJECTION, SOLUTION INTRAVENOUS; SUBCUTANEOUS
Status: COMPLETED | OUTPATIENT
Start: 2022-01-01 | End: 2022-01-01

## 2022-01-01 RX ORDER — ACETAMINOPHEN 500 MG
500 TABLET ORAL EVERY 6 HOURS PRN
Status: DISCONTINUED | OUTPATIENT
Start: 2022-01-01 | End: 2022-01-01 | Stop reason: HOSPADM

## 2022-01-01 RX ORDER — DICLOFENAC SODIUM 10 MG/G
4 GEL TOPICAL 2 TIMES DAILY
Qty: 200 G | Refills: 3 | Status: SHIPPED | OUTPATIENT
Start: 2022-01-01

## 2022-01-01 RX ORDER — INSULIN ASPART 100 [IU]/ML
1-10 INJECTION, SOLUTION INTRAVENOUS; SUBCUTANEOUS
Refills: 0 | Status: ON HOLD
Start: 2022-01-01 | End: 2022-01-01 | Stop reason: HOSPADM

## 2022-01-01 RX ORDER — POLYETHYLENE GLYCOL 3350 17 G/17G
17 POWDER, FOR SOLUTION ORAL DAILY
Status: DISCONTINUED | OUTPATIENT
Start: 2022-01-01 | End: 2022-01-01 | Stop reason: HOSPADM

## 2022-01-01 RX ORDER — FUROSEMIDE 20 MG/1
20 TABLET ORAL 2 TIMES DAILY
Qty: 60 TABLET | Refills: 0 | Status: SHIPPED | OUTPATIENT
Start: 2022-01-01 | End: 2022-01-01

## 2022-01-01 RX ORDER — METOPROLOL TARTRATE 25 MG/1
25 TABLET, FILM COATED ORAL 2 TIMES DAILY
Qty: 60 TABLET | Refills: 3 | Status: SHIPPED | OUTPATIENT
Start: 2022-01-01 | End: 2023-09-26

## 2022-01-01 RX ORDER — FAMOTIDINE 20 MG/1
40 TABLET, FILM COATED ORAL DAILY
Status: DISCONTINUED | OUTPATIENT
Start: 2022-01-01 | End: 2022-01-01 | Stop reason: HOSPADM

## 2022-01-01 RX ORDER — LISINOPRIL 10 MG/1
10 TABLET ORAL DAILY
Status: DISCONTINUED | OUTPATIENT
Start: 2022-01-01 | End: 2022-01-01

## 2022-01-01 RX ORDER — AMOXICILLIN 250 MG
1 CAPSULE ORAL 2 TIMES DAILY PRN
Status: CANCELLED | COMMUNITY
Start: 2022-01-01

## 2022-01-01 RX ORDER — LOPERAMIDE HYDROCHLORIDE 2 MG/1
2 CAPSULE ORAL 4 TIMES DAILY PRN
Status: DISCONTINUED | OUTPATIENT
Start: 2022-01-01 | End: 2022-01-01 | Stop reason: HOSPADM

## 2022-01-01 RX ORDER — SODIUM,POTASSIUM PHOSPHATES 280-250MG
2 POWDER IN PACKET (EA) ORAL EVERY 4 HOURS
Status: DISCONTINUED | OUTPATIENT
Start: 2022-01-01 | End: 2022-01-01

## 2022-01-01 RX ORDER — IPRATROPIUM BROMIDE AND ALBUTEROL SULFATE 2.5; .5 MG/3ML; MG/3ML
3 SOLUTION RESPIRATORY (INHALATION) EVERY 4 HOURS PRN
Status: DISCONTINUED | OUTPATIENT
Start: 2022-01-01 | End: 2022-01-01 | Stop reason: HOSPADM

## 2022-01-01 RX ORDER — BENZONATATE 100 MG/1
100 CAPSULE ORAL 3 TIMES DAILY PRN
Status: DISCONTINUED | OUTPATIENT
Start: 2022-01-01 | End: 2022-01-01 | Stop reason: HOSPADM

## 2022-01-01 RX ORDER — BENZONATATE 100 MG/1
CAPSULE ORAL
Qty: 30 CAPSULE | Refills: 1 | Status: ON HOLD | OUTPATIENT
Start: 2022-01-01 | End: 2022-01-01 | Stop reason: HOSPADM

## 2022-01-01 RX ORDER — LEVOTHYROXINE SODIUM 75 UG/1
75 TABLET ORAL
Status: DISCONTINUED | OUTPATIENT
Start: 2022-01-01 | End: 2022-01-01 | Stop reason: HOSPADM

## 2022-01-01 RX ORDER — DICLOFENAC SODIUM 10 MG/G
4 GEL TOPICAL 2 TIMES DAILY
Status: DISCONTINUED | OUTPATIENT
Start: 2022-01-01 | End: 2022-01-01 | Stop reason: HOSPADM

## 2022-01-01 RX ORDER — MUPIROCIN 20 MG/G
OINTMENT TOPICAL 2 TIMES DAILY
Status: COMPLETED | OUTPATIENT
Start: 2022-01-01 | End: 2022-01-01

## 2022-01-01 RX ORDER — PEN NEEDLE, DIABETIC 29 G X1/2"
NEEDLE, DISPOSABLE MISCELLANEOUS
Qty: 200 EACH | Refills: 11 | Status: SHIPPED | OUTPATIENT
Start: 2022-01-01

## 2022-01-01 RX ORDER — INSULIN GLARGINE 100 [IU]/ML
12 INJECTION, SOLUTION SUBCUTANEOUS 2 TIMES DAILY
Qty: 45 ML | Refills: 5
Start: 2022-01-01 | End: 2022-01-01

## 2022-01-01 RX ORDER — LISINOPRIL 2.5 MG/1
2.5 TABLET ORAL DAILY
Status: DISCONTINUED | OUTPATIENT
Start: 2022-01-01 | End: 2022-01-01 | Stop reason: HOSPADM

## 2022-01-01 RX ORDER — INSULIN ASPART 100 [IU]/ML
5 INJECTION, SOLUTION INTRAVENOUS; SUBCUTANEOUS
Status: DISCONTINUED | OUTPATIENT
Start: 2022-01-01 | End: 2022-01-01

## 2022-01-01 RX ORDER — METOPROLOL TARTRATE 25 MG/1
25 TABLET, FILM COATED ORAL 2 TIMES DAILY
Qty: 60 TABLET | Refills: 3 | Status: SHIPPED | OUTPATIENT
Start: 2022-01-01 | End: 2022-01-01 | Stop reason: SDUPTHER

## 2022-01-01 RX ORDER — INSULIN GLARGINE 100 [IU]/ML
24 INJECTION, SOLUTION SUBCUTANEOUS NIGHTLY
Qty: 30 ML | Refills: 3 | Status: SHIPPED | OUTPATIENT
Start: 2022-01-01 | End: 2023-04-27

## 2022-01-01 RX ORDER — METOPROLOL TARTRATE 1 MG/ML
5 INJECTION, SOLUTION INTRAVENOUS EVERY 6 HOURS PRN
Status: DISCONTINUED | OUTPATIENT
Start: 2022-01-01 | End: 2022-01-01 | Stop reason: HOSPADM

## 2022-01-01 RX ORDER — METOPROLOL TARTRATE 50 MG/1
50 TABLET ORAL 2 TIMES DAILY
Status: DISCONTINUED | OUTPATIENT
Start: 2022-01-01 | End: 2022-01-01

## 2022-01-01 RX ORDER — METOPROLOL TARTRATE 50 MG/1
150 TABLET ORAL 2 TIMES DAILY
Status: DISCONTINUED | OUTPATIENT
Start: 2022-01-01 | End: 2022-01-01

## 2022-01-01 RX ORDER — FLUTICASONE PROPIONATE 50 MCG
SPRAY, SUSPENSION (ML) NASAL
Qty: 48 G | Refills: 4 | Status: SHIPPED | OUTPATIENT
Start: 2022-01-01

## 2022-01-01 RX ORDER — SYRINGE,SAFETY WITH NEEDLE,1ML 25GX1"
SYRINGE (EA) MISCELLANEOUS
Qty: 200 EACH | Refills: 11 | Status: SHIPPED | OUTPATIENT
Start: 2022-01-01

## 2022-01-01 RX ORDER — SIMETHICONE 80 MG
1 TABLET,CHEWABLE ORAL 3 TIMES DAILY PRN
Status: DISCONTINUED | OUTPATIENT
Start: 2022-01-01 | End: 2022-01-01 | Stop reason: HOSPADM

## 2022-01-01 RX ORDER — DEXTROSE, SODIUM CHLORIDE, SODIUM LACTATE, POTASSIUM CHLORIDE, AND CALCIUM CHLORIDE 5; .6; .31; .03; .02 G/100ML; G/100ML; G/100ML; G/100ML; G/100ML
INJECTION, SOLUTION INTRAVENOUS CONTINUOUS
Status: DISCONTINUED | OUTPATIENT
Start: 2022-01-01 | End: 2022-01-01

## 2022-01-01 RX ORDER — SODIUM CHLORIDE 9 MG/ML
100 INJECTION, SOLUTION INTRAVENOUS CONTINUOUS
Status: DISCONTINUED | OUTPATIENT
Start: 2022-01-01 | End: 2022-01-01 | Stop reason: HOSPADM

## 2022-01-01 RX ORDER — TALC
6 POWDER (GRAM) TOPICAL NIGHTLY PRN
Status: DISCONTINUED | OUTPATIENT
Start: 2022-01-01 | End: 2022-01-01

## 2022-01-01 RX ORDER — METOPROLOL TARTRATE 100 MG/1
100 TABLET ORAL 2 TIMES DAILY
Status: DISCONTINUED | OUTPATIENT
Start: 2022-01-01 | End: 2022-01-01

## 2022-01-01 RX ORDER — ACETAMINOPHEN 500 MG
500 TABLET ORAL EVERY 6 HOURS PRN
Refills: 0 | Status: CANCELLED | COMMUNITY
Start: 2022-01-01

## 2022-01-01 RX ORDER — METOPROLOL TARTRATE 25 MG/1
25 TABLET, FILM COATED ORAL ONCE
Status: COMPLETED | OUTPATIENT
Start: 2022-01-01 | End: 2022-01-01

## 2022-01-01 RX ORDER — ESCITALOPRAM OXALATE 10 MG/1
10 TABLET ORAL DAILY
Qty: 90 TABLET | Refills: 0 | Status: SHIPPED | OUTPATIENT
Start: 2022-01-01 | End: 2022-01-01

## 2022-01-01 RX ORDER — LEVOTHYROXINE SODIUM 75 UG/1
TABLET ORAL
Qty: 90 TABLET | Refills: 0 | Status: SHIPPED | OUTPATIENT
Start: 2022-01-01 | End: 2022-01-01 | Stop reason: SDUPTHER

## 2022-01-01 RX ORDER — DULAGLUTIDE 0.75 MG/.5ML
0.75 INJECTION, SOLUTION SUBCUTANEOUS
Qty: 4 PEN | Refills: 11 | Status: SHIPPED | OUTPATIENT
Start: 2022-01-01 | End: 2022-01-01

## 2022-01-01 RX ORDER — ESCITALOPRAM OXALATE 10 MG/1
10 TABLET ORAL NIGHTLY
Status: DISCONTINUED | OUTPATIENT
Start: 2022-01-01 | End: 2022-01-01 | Stop reason: HOSPADM

## 2022-01-01 RX ORDER — CALCIUM CARBONATE 200(500)MG
500 TABLET,CHEWABLE ORAL 2 TIMES DAILY PRN
Status: DISCONTINUED | OUTPATIENT
Start: 2022-01-01 | End: 2022-01-01 | Stop reason: HOSPADM

## 2022-01-01 RX ORDER — AMOXICILLIN 250 MG
1 CAPSULE ORAL 2 TIMES DAILY PRN
COMMUNITY
Start: 2022-01-01

## 2022-01-01 RX ORDER — FLUTICASONE PROPIONATE 50 MCG
2 SPRAY, SUSPENSION (ML) NASAL DAILY
Status: DISCONTINUED | OUTPATIENT
Start: 2022-01-01 | End: 2022-01-01 | Stop reason: HOSPADM

## 2022-01-01 RX ORDER — METOPROLOL TARTRATE 75 MG/1
150 TABLET, FILM COATED ORAL 2 TIMES DAILY
Status: ON HOLD
Start: 2022-01-01 | End: 2022-01-01 | Stop reason: HOSPADM

## 2022-01-01 RX ORDER — HALOPERIDOL 5 MG/ML
2.5 INJECTION INTRAMUSCULAR
Status: COMPLETED | OUTPATIENT
Start: 2022-01-01 | End: 2022-01-01

## 2022-01-01 RX ORDER — LISINOPRIL 20 MG/1
10 TABLET ORAL DAILY
Qty: 90 TABLET | Refills: 0 | Status: ON HOLD
Start: 2022-01-01 | End: 2022-01-01 | Stop reason: HOSPADM

## 2022-01-01 RX ORDER — FAMOTIDINE 40 MG/1
TABLET, FILM COATED ORAL
Qty: 30 TABLET | Refills: 11 | Status: SHIPPED | OUTPATIENT
Start: 2022-01-01 | End: 2023-05-15

## 2022-01-01 RX ORDER — ASPIRIN 81 MG/1
81 TABLET ORAL DAILY
Status: DISCONTINUED | OUTPATIENT
Start: 2022-01-01 | End: 2022-01-01 | Stop reason: HOSPADM

## 2022-01-01 RX ORDER — PEN NEEDLE, DIABETIC 29 G X1/2"
NEEDLE, DISPOSABLE MISCELLANEOUS
COMMUNITY
Start: 2022-01-01

## 2022-01-01 RX ORDER — HYDRALAZINE HYDROCHLORIDE 25 MG/1
25 TABLET, FILM COATED ORAL EVERY 8 HOURS PRN
Status: DISCONTINUED | OUTPATIENT
Start: 2022-01-01 | End: 2022-01-01 | Stop reason: HOSPADM

## 2022-01-01 RX ORDER — TRAZODONE HYDROCHLORIDE 50 MG/1
50 TABLET ORAL NIGHTLY
Status: DISCONTINUED | OUTPATIENT
Start: 2022-01-01 | End: 2022-01-01 | Stop reason: HOSPADM

## 2022-01-01 RX ORDER — INSULIN ASPART 100 [IU]/ML
10 INJECTION, SOLUTION INTRAVENOUS; SUBCUTANEOUS 3 TIMES DAILY
Qty: 9 ML | Refills: 3 | Status: CANCELLED | OUTPATIENT
Start: 2022-01-01 | End: 2023-04-07

## 2022-01-01 RX ORDER — MAGNESIUM SULFATE HEPTAHYDRATE 40 MG/ML
2 INJECTION, SOLUTION INTRAVENOUS ONCE
Status: COMPLETED | OUTPATIENT
Start: 2022-01-01 | End: 2022-01-01

## 2022-01-01 RX ORDER — METOPROLOL TARTRATE 25 MG/1
25 TABLET, FILM COATED ORAL 2 TIMES DAILY
Status: DISCONTINUED | OUTPATIENT
Start: 2022-01-01 | End: 2022-01-01 | Stop reason: HOSPADM

## 2022-01-01 RX ORDER — AMOXICILLIN 250 MG
1 CAPSULE ORAL 2 TIMES DAILY
Status: DISCONTINUED | OUTPATIENT
Start: 2022-01-01 | End: 2022-01-01 | Stop reason: HOSPADM

## 2022-01-01 RX ORDER — ESCITALOPRAM OXALATE 10 MG/1
10 TABLET ORAL DAILY
Status: DISCONTINUED | OUTPATIENT
Start: 2022-01-01 | End: 2022-01-01

## 2022-01-01 RX ORDER — PEN NEEDLE, DIABETIC 29 G X1/2"
NEEDLE, DISPOSABLE MISCELLANEOUS
Qty: 100 EACH | Refills: 3 | Status: SHIPPED | OUTPATIENT
Start: 2022-01-01 | End: 2022-01-01 | Stop reason: SDUPTHER

## 2022-01-01 RX ORDER — POTASSIUM CHLORIDE 750 MG/1
30 CAPSULE, EXTENDED RELEASE ORAL ONCE
Status: COMPLETED | OUTPATIENT
Start: 2022-01-01 | End: 2022-01-01

## 2022-01-01 RX ORDER — NYSTATIN 100000 [USP'U]/G
POWDER TOPICAL
Qty: 60 G | Refills: 0 | Status: ON HOLD | OUTPATIENT
Start: 2022-01-01 | End: 2022-01-01 | Stop reason: HOSPADM

## 2022-01-01 RX ORDER — INSULIN ASPART 100 [IU]/ML
7 INJECTION, SOLUTION INTRAVENOUS; SUBCUTANEOUS
Status: DISCONTINUED | OUTPATIENT
Start: 2022-01-01 | End: 2022-01-01 | Stop reason: HOSPADM

## 2022-01-01 RX ORDER — INSULIN ASPART 100 [IU]/ML
8 INJECTION, SOLUTION INTRAVENOUS; SUBCUTANEOUS
Qty: 30 ML | Refills: 3 | Status: SHIPPED | OUTPATIENT
Start: 2022-01-01 | End: 2023-04-27

## 2022-01-01 RX ORDER — LISINOPRIL 20 MG/1
20 TABLET ORAL DAILY
Status: DISCONTINUED | OUTPATIENT
Start: 2022-01-01 | End: 2022-01-01 | Stop reason: HOSPADM

## 2022-01-01 RX ORDER — ONDANSETRON 2 MG/ML
4 INJECTION INTRAMUSCULAR; INTRAVENOUS EVERY 8 HOURS PRN
Status: DISCONTINUED | OUTPATIENT
Start: 2022-01-01 | End: 2022-01-01 | Stop reason: HOSPADM

## 2022-01-01 RX ORDER — MUPIROCIN 20 MG/G
OINTMENT TOPICAL 2 TIMES DAILY
Status: ON HOLD
Start: 2022-01-01 | End: 2022-01-01 | Stop reason: HOSPADM

## 2022-01-01 RX ORDER — ACETAMINOPHEN 325 MG/1
650 TABLET ORAL EVERY 6 HOURS PRN
Status: DISCONTINUED | OUTPATIENT
Start: 2022-01-01 | End: 2022-01-01 | Stop reason: HOSPADM

## 2022-01-01 RX ORDER — MUPIROCIN 20 MG/G
OINTMENT TOPICAL 2 TIMES DAILY
Status: DISCONTINUED | OUTPATIENT
Start: 2022-01-01 | End: 2022-01-01 | Stop reason: HOSPADM

## 2022-01-01 RX ORDER — SODIUM CHLORIDE 0.9 % (FLUSH) 0.9 %
10 SYRINGE (ML) INJECTION
Status: DISCONTINUED | OUTPATIENT
Start: 2022-01-01 | End: 2022-01-01 | Stop reason: HOSPADM

## 2022-01-01 RX ORDER — ERYTHROMYCIN 5 MG/G
OINTMENT OPHTHALMIC EVERY EVENING
Status: DISCONTINUED | OUTPATIENT
Start: 2022-01-01 | End: 2022-01-01

## 2022-01-01 RX ORDER — TALC
9 POWDER (GRAM) TOPICAL NIGHTLY PRN
Status: DISCONTINUED | OUTPATIENT
Start: 2022-01-01 | End: 2022-01-01 | Stop reason: HOSPADM

## 2022-01-01 RX ORDER — ESCITALOPRAM OXALATE 10 MG/1
10 TABLET ORAL DAILY
Status: DISCONTINUED | OUTPATIENT
Start: 2022-01-01 | End: 2022-01-01 | Stop reason: HOSPADM

## 2022-01-01 RX ORDER — AMOXICILLIN 250 MG
1 CAPSULE ORAL DAILY PRN
Status: DISCONTINUED | OUTPATIENT
Start: 2022-01-01 | End: 2022-01-01 | Stop reason: HOSPADM

## 2022-01-01 RX ORDER — LOPERAMIDE HYDROCHLORIDE 2 MG/1
2 CAPSULE ORAL 4 TIMES DAILY PRN
Refills: 0 | Status: CANCELLED | COMMUNITY
Start: 2022-01-01 | End: 2022-01-01

## 2022-01-01 RX ORDER — LISINOPRIL 20 MG/1
TABLET ORAL
Qty: 90 TABLET | Refills: 0 | Status: ON HOLD | OUTPATIENT
Start: 2022-01-01 | End: 2022-01-01 | Stop reason: SDUPTHER

## 2022-01-01 RX ORDER — TALC
9 POWDER (GRAM) TOPICAL NIGHTLY
Status: DISCONTINUED | OUTPATIENT
Start: 2022-01-01 | End: 2022-01-01

## 2022-01-01 RX ADMIN — INSULIN ASPART 7 UNITS: 100 INJECTION, SOLUTION INTRAVENOUS; SUBCUTANEOUS at 12:04

## 2022-01-01 RX ADMIN — INSULIN ASPART 3 UNITS: 100 INJECTION, SOLUTION INTRAVENOUS; SUBCUTANEOUS at 07:03

## 2022-01-01 RX ADMIN — FAMOTIDINE 40 MG: 20 TABLET ORAL at 09:04

## 2022-01-01 RX ADMIN — LEVOTHYROXINE SODIUM 75 MCG: 75 TABLET ORAL at 05:03

## 2022-01-01 RX ADMIN — ASPIRIN 81 MG: 81 TABLET, COATED ORAL at 10:04

## 2022-01-01 RX ADMIN — LISINOPRIL 20 MG: 20 TABLET ORAL at 09:04

## 2022-01-01 RX ADMIN — HEPARIN SODIUM 5000 UNITS: 5000 INJECTION INTRAVENOUS; SUBCUTANEOUS at 09:03

## 2022-01-01 RX ADMIN — INSULIN ASPART 2 UNITS: 100 INJECTION, SOLUTION INTRAVENOUS; SUBCUTANEOUS at 02:03

## 2022-01-01 RX ADMIN — INSULIN ASPART 1 UNITS: 100 INJECTION, SOLUTION INTRAVENOUS; SUBCUTANEOUS at 10:04

## 2022-01-01 RX ADMIN — METOPROLOL TARTRATE 100 MG: 50 TABLET, FILM COATED ORAL at 09:03

## 2022-01-01 RX ADMIN — FAMOTIDINE 40 MG: 20 TABLET ORAL at 10:04

## 2022-01-01 RX ADMIN — METOPROLOL TARTRATE 50 MG: 50 TABLET, FILM COATED ORAL at 09:04

## 2022-01-01 RX ADMIN — DICLOFENAC SODIUM 4 G: 10 GEL TOPICAL at 10:03

## 2022-01-01 RX ADMIN — BENZONATATE 100 MG: 100 CAPSULE ORAL at 09:03

## 2022-01-01 RX ADMIN — FAMOTIDINE 40 MG: 20 TABLET ORAL at 08:03

## 2022-01-01 RX ADMIN — SENNOSIDES AND DOCUSATE SODIUM 1 TABLET: 50; 8.6 TABLET ORAL at 09:04

## 2022-01-01 RX ADMIN — INSULIN ASPART 2 UNITS: 100 INJECTION, SOLUTION INTRAVENOUS; SUBCUTANEOUS at 05:04

## 2022-01-01 RX ADMIN — ESCITALOPRAM OXALATE 10 MG: 5 TABLET, FILM COATED ORAL at 09:03

## 2022-01-01 RX ADMIN — ASPIRIN 81 MG: 81 TABLET, COATED ORAL at 08:03

## 2022-01-01 RX ADMIN — FLUTICASONE PROPIONATE 100 MCG: 50 SPRAY, METERED NASAL at 10:04

## 2022-01-01 RX ADMIN — LEVOTHYROXINE SODIUM 75 MCG: 75 TABLET ORAL at 05:04

## 2022-01-01 RX ADMIN — INSULIN ASPART 5 UNITS: 100 INJECTION, SOLUTION INTRAVENOUS; SUBCUTANEOUS at 12:04

## 2022-01-01 RX ADMIN — LEVOTHYROXINE SODIUM 75 MCG: 75 TABLET ORAL at 06:03

## 2022-01-01 RX ADMIN — INSULIN ASPART 5 UNITS: 100 INJECTION, SOLUTION INTRAVENOUS; SUBCUTANEOUS at 12:03

## 2022-01-01 RX ADMIN — INSULIN DETEMIR 12 UNITS: 100 INJECTION, SOLUTION SUBCUTANEOUS at 09:03

## 2022-01-01 RX ADMIN — DICLOFENAC SODIUM 4 G: 10 GEL TOPICAL at 09:04

## 2022-01-01 RX ADMIN — SIMETHICONE 80 MG: 80 TABLET, CHEWABLE ORAL at 05:03

## 2022-01-01 RX ADMIN — INSULIN ASPART 6 UNITS: 100 INJECTION, SOLUTION INTRAVENOUS; SUBCUTANEOUS at 08:03

## 2022-01-01 RX ADMIN — ENOXAPARIN SODIUM 40 MG: 100 INJECTION SUBCUTANEOUS at 05:03

## 2022-01-01 RX ADMIN — INSULIN ASPART 2 UNITS: 100 INJECTION, SOLUTION INTRAVENOUS; SUBCUTANEOUS at 09:03

## 2022-01-01 RX ADMIN — INSULIN HUMAN 2 UNITS/HR: 1 INJECTION, SOLUTION INTRAVENOUS at 04:03

## 2022-01-01 RX ADMIN — INSULIN ASPART 3 UNITS: 100 INJECTION, SOLUTION INTRAVENOUS; SUBCUTANEOUS at 11:03

## 2022-01-01 RX ADMIN — LISINOPRIL 10 MG: 10 TABLET ORAL at 09:03

## 2022-01-01 RX ADMIN — ESCITALOPRAM OXALATE 10 MG: 10 TABLET ORAL at 09:04

## 2022-01-01 RX ADMIN — LEVOTHYROXINE SODIUM 75 MCG: 75 TABLET ORAL at 06:04

## 2022-01-01 RX ADMIN — INSULIN ASPART 2 UNITS: 100 INJECTION, SOLUTION INTRAVENOUS; SUBCUTANEOUS at 06:03

## 2022-01-01 RX ADMIN — FAMOTIDINE 40 MG: 20 TABLET ORAL at 08:04

## 2022-01-01 RX ADMIN — FLUTICASONE PROPIONATE 100 MCG: 50 SPRAY, METERED NASAL at 08:04

## 2022-01-01 RX ADMIN — MUPIROCIN: 20 OINTMENT TOPICAL at 09:03

## 2022-01-01 RX ADMIN — MELATONIN TAB 3 MG 9 MG: 3 TAB at 09:04

## 2022-01-01 RX ADMIN — BENZONATATE 100 MG: 100 CAPSULE ORAL at 09:04

## 2022-01-01 RX ADMIN — INSULIN ASPART 3 UNITS: 100 INJECTION, SOLUTION INTRAVENOUS; SUBCUTANEOUS at 08:03

## 2022-01-01 RX ADMIN — MELATONIN TAB 3 MG 9 MG: 3 TAB at 09:03

## 2022-01-01 RX ADMIN — INSULIN ASPART 3 UNITS: 100 INJECTION, SOLUTION INTRAVENOUS; SUBCUTANEOUS at 05:03

## 2022-01-01 RX ADMIN — INSULIN DETEMIR 15 UNITS: 100 INJECTION, SOLUTION SUBCUTANEOUS at 09:03

## 2022-01-01 RX ADMIN — DICLOFENAC SODIUM 4 G: 10 GEL TOPICAL at 08:04

## 2022-01-01 RX ADMIN — LISINOPRIL 10 MG: 10 TABLET ORAL at 10:03

## 2022-01-01 RX ADMIN — APIXABAN 5 MG: 2.5 TABLET, FILM COATED ORAL at 09:04

## 2022-01-01 RX ADMIN — INSULIN ASPART 7 UNITS: 100 INJECTION, SOLUTION INTRAVENOUS; SUBCUTANEOUS at 08:04

## 2022-01-01 RX ADMIN — ASPIRIN 81 MG: 81 TABLET, COATED ORAL at 09:03

## 2022-01-01 RX ADMIN — ACETAMINOPHEN 500 MG: 500 TABLET ORAL at 09:03

## 2022-01-01 RX ADMIN — Medication 16 G: at 09:03

## 2022-01-01 RX ADMIN — INSULIN ASPART 3 UNITS: 100 INJECTION, SOLUTION INTRAVENOUS; SUBCUTANEOUS at 11:04

## 2022-01-01 RX ADMIN — HYDRALAZINE HYDROCHLORIDE 25 MG: 25 TABLET, FILM COATED ORAL at 12:03

## 2022-01-01 RX ADMIN — INSULIN ASPART 3 UNITS: 100 INJECTION, SOLUTION INTRAVENOUS; SUBCUTANEOUS at 01:04

## 2022-01-01 RX ADMIN — INSULIN ASPART 2 UNITS: 100 INJECTION, SOLUTION INTRAVENOUS; SUBCUTANEOUS at 05:03

## 2022-01-01 RX ADMIN — FAMOTIDINE 40 MG: 20 TABLET ORAL at 10:03

## 2022-01-01 RX ADMIN — APIXABAN 5 MG: 2.5 TABLET, FILM COATED ORAL at 08:04

## 2022-01-01 RX ADMIN — Medication 400 MG: at 08:04

## 2022-01-01 RX ADMIN — MELATONIN TAB 3 MG 9 MG: 3 TAB at 08:03

## 2022-01-01 RX ADMIN — INSULIN ASPART 1 UNITS: 100 INJECTION, SOLUTION INTRAVENOUS; SUBCUTANEOUS at 09:03

## 2022-01-01 RX ADMIN — SENNOSIDES AND DOCUSATE SODIUM 1 TABLET: 50; 8.6 TABLET ORAL at 09:03

## 2022-01-01 RX ADMIN — SENNOSIDES AND DOCUSATE SODIUM 1 TABLET: 50; 8.6 TABLET ORAL at 08:04

## 2022-01-01 RX ADMIN — METOPROLOL TARTRATE 50 MG: 50 TABLET, FILM COATED ORAL at 08:04

## 2022-01-01 RX ADMIN — HEPARIN SODIUM 5000 UNITS: 5000 INJECTION INTRAVENOUS; SUBCUTANEOUS at 05:03

## 2022-01-01 RX ADMIN — LISINOPRIL 20 MG: 20 TABLET ORAL at 08:04

## 2022-01-01 RX ADMIN — METOPROLOL TARTRATE 150 MG: 100 TABLET, FILM COATED ORAL at 09:03

## 2022-01-01 RX ADMIN — ASPIRIN 81 MG: 81 TABLET, COATED ORAL at 08:04

## 2022-01-01 RX ADMIN — INSULIN DETEMIR 10 UNITS: 100 INJECTION, SOLUTION SUBCUTANEOUS at 12:03

## 2022-01-01 RX ADMIN — TRAZODONE HYDROCHLORIDE 50 MG: 50 TABLET ORAL at 09:04

## 2022-01-01 RX ADMIN — ESCITALOPRAM OXALATE 10 MG: 10 TABLET ORAL at 08:04

## 2022-01-01 RX ADMIN — METOPROLOL TARTRATE 25 MG: 25 TABLET, FILM COATED ORAL at 09:03

## 2022-01-01 RX ADMIN — LISINOPRIL 10 MG: 10 TABLET ORAL at 08:03

## 2022-01-01 RX ADMIN — INSULIN DETEMIR 15 UNITS: 100 INJECTION, SOLUTION SUBCUTANEOUS at 08:03

## 2022-01-01 RX ADMIN — INSULIN ASPART 2 UNITS: 100 INJECTION, SOLUTION INTRAVENOUS; SUBCUTANEOUS at 11:03

## 2022-01-01 RX ADMIN — INSULIN ASPART 7 UNITS: 100 INJECTION, SOLUTION INTRAVENOUS; SUBCUTANEOUS at 05:04

## 2022-01-01 RX ADMIN — INSULIN ASPART 7 UNITS: 100 INJECTION, SOLUTION INTRAVENOUS; SUBCUTANEOUS at 04:04

## 2022-01-01 RX ADMIN — FLUTICASONE PROPIONATE 100 MCG: 50 SPRAY, METERED NASAL at 09:03

## 2022-01-01 RX ADMIN — DEXTROSE, SODIUM CHLORIDE, SODIUM LACTATE, POTASSIUM CHLORIDE, AND CALCIUM CHLORIDE: 5; .6; .31; .03; .02 INJECTION, SOLUTION INTRAVENOUS at 04:03

## 2022-01-01 RX ADMIN — Medication 9 MG: at 09:04

## 2022-01-01 RX ADMIN — METOPROLOL TARTRATE 150 MG: 50 TABLET, FILM COATED ORAL at 09:03

## 2022-01-01 RX ADMIN — ESCITALOPRAM OXALATE 10 MG: 10 TABLET ORAL at 10:03

## 2022-01-01 RX ADMIN — SODIUM CHLORIDE, SODIUM LACTATE, POTASSIUM CHLORIDE, AND CALCIUM CHLORIDE 1000 ML: .6; .31; .03; .02 INJECTION, SOLUTION INTRAVENOUS at 07:03

## 2022-01-01 RX ADMIN — INSULIN ASPART 1 UNITS: 100 INJECTION, SOLUTION INTRAVENOUS; SUBCUTANEOUS at 08:03

## 2022-01-01 RX ADMIN — BENZONATATE 100 MG: 100 CAPSULE ORAL at 08:04

## 2022-01-01 RX ADMIN — INSULIN ASPART 3 UNITS: 100 INJECTION, SOLUTION INTRAVENOUS; SUBCUTANEOUS at 05:04

## 2022-01-01 RX ADMIN — INSULIN ASPART 7 UNITS: 100 INJECTION, SOLUTION INTRAVENOUS; SUBCUTANEOUS at 06:04

## 2022-01-01 RX ADMIN — ESCITALOPRAM OXALATE 10 MG: 5 TABLET, FILM COATED ORAL at 08:03

## 2022-01-01 RX ADMIN — INSULIN ASPART 7 UNITS: 100 INJECTION, SOLUTION INTRAVENOUS; SUBCUTANEOUS at 09:04

## 2022-01-01 RX ADMIN — ASPIRIN 81 MG: 81 TABLET, COATED ORAL at 10:03

## 2022-01-01 RX ADMIN — FLUTICASONE PROPIONATE 100 MCG: 50 SPRAY, METERED NASAL at 10:03

## 2022-01-01 RX ADMIN — MUPIROCIN: 20 OINTMENT TOPICAL at 08:03

## 2022-01-01 RX ADMIN — INSULIN ASPART 3 UNITS: 100 INJECTION, SOLUTION INTRAVENOUS; SUBCUTANEOUS at 09:03

## 2022-01-01 RX ADMIN — METOPROLOL TARTRATE 150 MG: 50 TABLET, FILM COATED ORAL at 08:03

## 2022-01-01 RX ADMIN — HEPARIN SODIUM 5000 UNITS: 5000 INJECTION INTRAVENOUS; SUBCUTANEOUS at 01:03

## 2022-01-01 RX ADMIN — POLYETHYLENE GLYCOL 3350 17 G: 17 POWDER, FOR SOLUTION ORAL at 08:03

## 2022-01-01 RX ADMIN — INSULIN ASPART 7 UNITS: 100 INJECTION, SOLUTION INTRAVENOUS; SUBCUTANEOUS at 11:04

## 2022-01-01 RX ADMIN — SIMETHICONE 80 MG: 80 TABLET, CHEWABLE ORAL at 03:03

## 2022-01-01 RX ADMIN — ONDANSETRON 8 MG: 8 TABLET, ORALLY DISINTEGRATING ORAL at 09:03

## 2022-01-01 RX ADMIN — INSULIN ASPART 5 UNITS: 100 INJECTION, SOLUTION INTRAVENOUS; SUBCUTANEOUS at 06:03

## 2022-01-01 RX ADMIN — ACETAMINOPHEN 650 MG: 325 TABLET ORAL at 08:04

## 2022-01-01 RX ADMIN — INSULIN ASPART 3 UNITS: 100 INJECTION, SOLUTION INTRAVENOUS; SUBCUTANEOUS at 08:04

## 2022-01-01 RX ADMIN — INSULIN ASPART 2 UNITS: 100 INJECTION, SOLUTION INTRAVENOUS; SUBCUTANEOUS at 12:04

## 2022-01-01 RX ADMIN — ACETAMINOPHEN 650 MG: 325 TABLET ORAL at 09:03

## 2022-01-01 RX ADMIN — INSULIN ASPART 2 UNITS: 100 INJECTION, SOLUTION INTRAVENOUS; SUBCUTANEOUS at 01:03

## 2022-01-01 RX ADMIN — INSULIN ASPART 3 UNITS: 100 INJECTION, SOLUTION INTRAVENOUS; SUBCUTANEOUS at 02:03

## 2022-01-01 RX ADMIN — HALOPERIDOL LACTATE 2.5 MG: 5 INJECTION, SOLUTION INTRAMUSCULAR at 05:03

## 2022-01-01 RX ADMIN — HEPARIN SODIUM 5000 UNITS: 5000 INJECTION INTRAVENOUS; SUBCUTANEOUS at 03:03

## 2022-01-01 RX ADMIN — LISINOPRIL 2.5 MG: 2.5 TABLET ORAL at 08:03

## 2022-01-01 RX ADMIN — SODIUM CHLORIDE 100 ML/HR: 0.9 INJECTION, SOLUTION INTRAVENOUS at 09:04

## 2022-01-01 RX ADMIN — METOPROLOL TARTRATE 50 MG: 50 TABLET, FILM COATED ORAL at 10:04

## 2022-01-01 RX ADMIN — ONDANSETRON 4 MG: 2 INJECTION INTRAMUSCULAR; INTRAVENOUS at 01:03

## 2022-01-01 RX ADMIN — INSULIN ASPART 5 UNITS: 100 INJECTION, SOLUTION INTRAVENOUS; SUBCUTANEOUS at 07:04

## 2022-01-01 RX ADMIN — ASPIRIN 81 MG: 81 TABLET, COATED ORAL at 09:04

## 2022-01-01 RX ADMIN — MUPIROCIN: 20 OINTMENT TOPICAL at 12:03

## 2022-01-01 RX ADMIN — METOPROLOL TARTRATE 100 MG: 50 TABLET, FILM COATED ORAL at 09:04

## 2022-01-01 RX ADMIN — INSULIN DETEMIR 10 UNITS: 100 INJECTION, SOLUTION SUBCUTANEOUS at 09:03

## 2022-01-01 RX ADMIN — ONDANSETRON 4 MG: 2 INJECTION INTRAMUSCULAR; INTRAVENOUS at 06:03

## 2022-01-01 RX ADMIN — METOROPROLOL TARTRATE 5 MG: 5 INJECTION, SOLUTION INTRAVENOUS at 08:03

## 2022-01-01 RX ADMIN — Medication 400 MG: at 09:04

## 2022-01-01 RX ADMIN — ESCITALOPRAM OXALATE 10 MG: 10 TABLET ORAL at 08:03

## 2022-01-01 RX ADMIN — FLUTICASONE PROPIONATE 100 MCG: 50 SPRAY, METERED NASAL at 08:03

## 2022-01-01 RX ADMIN — INSULIN ASPART 3 UNITS: 100 INJECTION, SOLUTION INTRAVENOUS; SUBCUTANEOUS at 09:04

## 2022-01-01 RX ADMIN — Medication 400 MG: at 10:03

## 2022-01-01 RX ADMIN — INSULIN ASPART 2 UNITS: 100 INJECTION, SOLUTION INTRAVENOUS; SUBCUTANEOUS at 08:03

## 2022-01-01 RX ADMIN — INSULIN ASPART 5 UNITS: 100 INJECTION, SOLUTION INTRAVENOUS; SUBCUTANEOUS at 05:04

## 2022-01-01 RX ADMIN — SENNOSIDES AND DOCUSATE SODIUM 1 TABLET: 50; 8.6 TABLET ORAL at 10:03

## 2022-01-01 RX ADMIN — INSULIN HUMAN 6 UNITS/HR: 1 INJECTION, SOLUTION INTRAVENOUS at 09:03

## 2022-01-01 RX ADMIN — FAMOTIDINE 40 MG: 20 TABLET ORAL at 09:03

## 2022-01-01 RX ADMIN — METOPROLOL TARTRATE 100 MG: 100 TABLET, FILM COATED ORAL at 08:03

## 2022-01-01 RX ADMIN — INSULIN ASPART 2 UNITS: 100 INJECTION, SOLUTION INTRAVENOUS; SUBCUTANEOUS at 12:03

## 2022-01-01 RX ADMIN — DICLOFENAC SODIUM 4 G: 10 GEL TOPICAL at 10:04

## 2022-01-01 RX ADMIN — INSULIN ASPART 4 UNITS: 100 INJECTION, SOLUTION INTRAVENOUS; SUBCUTANEOUS at 10:03

## 2022-01-01 RX ADMIN — METOPROLOL TARTRATE 25 MG: 25 TABLET, FILM COATED ORAL at 09:04

## 2022-01-01 RX ADMIN — METOPROLOL TARTRATE 150 MG: 100 TABLET, FILM COATED ORAL at 08:03

## 2022-01-01 RX ADMIN — LISINOPRIL 20 MG: 20 TABLET ORAL at 10:04

## 2022-01-01 RX ADMIN — METOROPROLOL TARTRATE 5 MG: 5 INJECTION, SOLUTION INTRAVENOUS at 06:03

## 2022-01-01 RX ADMIN — INSULIN ASPART 3 UNITS: 100 INJECTION, SOLUTION INTRAVENOUS; SUBCUTANEOUS at 12:04

## 2022-01-01 RX ADMIN — METOPROLOL TARTRATE 150 MG: 50 TABLET, FILM COATED ORAL at 10:03

## 2022-01-01 RX ADMIN — METOPROLOL TARTRATE 25 MG: 25 TABLET, FILM COATED ORAL at 08:04

## 2022-01-01 RX ADMIN — BENZONATATE 100 MG: 100 CAPSULE ORAL at 12:04

## 2022-01-01 RX ADMIN — INSULIN ASPART 1 UNITS: 100 INJECTION, SOLUTION INTRAVENOUS; SUBCUTANEOUS at 08:04

## 2022-01-01 RX ADMIN — APIXABAN 5 MG: 2.5 TABLET, FILM COATED ORAL at 10:04

## 2022-01-01 RX ADMIN — SENNOSIDES AND DOCUSATE SODIUM 1 TABLET: 50; 8.6 TABLET ORAL at 10:04

## 2022-01-01 RX ADMIN — INSULIN ASPART 1 UNITS: 100 INJECTION, SOLUTION INTRAVENOUS; SUBCUTANEOUS at 10:03

## 2022-01-01 RX ADMIN — ACETAMINOPHEN 500 MG: 500 TABLET ORAL at 12:04

## 2022-01-01 RX ADMIN — INSULIN ASPART 5 UNITS: 100 INJECTION, SOLUTION INTRAVENOUS; SUBCUTANEOUS at 09:03

## 2022-01-01 RX ADMIN — INSULIN ASPART 4 UNITS: 100 INJECTION, SOLUTION INTRAVENOUS; SUBCUTANEOUS at 09:03

## 2022-01-01 RX ADMIN — Medication 400 MG: at 10:04

## 2022-01-01 RX ADMIN — INSULIN ASPART 5 UNITS: 100 INJECTION, SOLUTION INTRAVENOUS; SUBCUTANEOUS at 06:04

## 2022-01-01 RX ADMIN — MELATONIN TAB 3 MG 9 MG: 3 TAB at 08:04

## 2022-01-01 RX ADMIN — METOPROLOL TARTRATE 25 MG: 25 TABLET, FILM COATED ORAL at 08:03

## 2022-01-01 RX ADMIN — METOPROLOL TARTRATE 75 MG: 50 TABLET, FILM COATED ORAL at 08:03

## 2022-01-01 RX ADMIN — ENOXAPARIN SODIUM 40 MG: 100 INJECTION SUBCUTANEOUS at 04:03

## 2022-01-01 RX ADMIN — SODIUM CHLORIDE 1000 ML: 0.9 INJECTION, SOLUTION INTRAVENOUS at 06:03

## 2022-01-01 RX ADMIN — IPRATROPIUM BROMIDE AND ALBUTEROL SULFATE 3 ML: 2.5; .5 SOLUTION RESPIRATORY (INHALATION) at 11:03

## 2022-01-01 RX ADMIN — INSULIN DETEMIR 20 UNITS: 100 INJECTION, SOLUTION SUBCUTANEOUS at 08:03

## 2022-01-01 RX ADMIN — MAGNESIUM SULFATE HEPTAHYDRATE 2 G: 2 INJECTION, SOLUTION INTRAVENOUS at 07:03

## 2022-01-01 RX ADMIN — FLUTICASONE PROPIONATE 100 MCG: 50 SPRAY, METERED NASAL at 09:04

## 2022-01-01 RX ADMIN — SENNOSIDES AND DOCUSATE SODIUM 1 TABLET: 50; 8.6 TABLET ORAL at 08:03

## 2022-01-01 RX ADMIN — SENNOSIDES AND DOCUSATE SODIUM 1 TABLET: 50; 8.6 TABLET ORAL at 05:03

## 2022-01-01 RX ADMIN — APIXABAN 5 MG: 2.5 TABLET, FILM COATED ORAL at 09:03

## 2022-01-01 RX ADMIN — INSULIN DETEMIR 6 UNITS: 100 INJECTION, SOLUTION SUBCUTANEOUS at 10:03

## 2022-01-01 RX ADMIN — LOPERAMIDE HYDROCHLORIDE 2 MG: 2 CAPSULE ORAL at 06:04

## 2022-01-01 RX ADMIN — ACETAMINOPHEN 500 MG: 500 TABLET ORAL at 08:04

## 2022-01-01 RX ADMIN — SIMETHICONE 80 MG: 80 TABLET, CHEWABLE ORAL at 04:03

## 2022-01-01 RX ADMIN — HEPARIN SODIUM 5000 UNITS: 5000 INJECTION INTRAVENOUS; SUBCUTANEOUS at 06:03

## 2022-01-01 RX ADMIN — HEPARIN SODIUM 5000 UNITS: 5000 INJECTION INTRAVENOUS; SUBCUTANEOUS at 02:03

## 2022-01-01 RX ADMIN — Medication 400 MG: at 09:03

## 2022-01-01 RX ADMIN — MELATONIN TAB 3 MG 6 MG: 3 TAB at 09:03

## 2022-01-01 RX ADMIN — ONDANSETRON 8 MG: 8 TABLET, ORALLY DISINTEGRATING ORAL at 01:03

## 2022-01-01 RX ADMIN — METOPROLOL TARTRATE 100 MG: 100 TABLET, FILM COATED ORAL at 09:03

## 2022-01-01 RX ADMIN — ESCITALOPRAM OXALATE 10 MG: 10 TABLET ORAL at 09:03

## 2022-01-01 RX ADMIN — INSULIN ASPART 5 UNITS: 100 INJECTION, SOLUTION INTRAVENOUS; SUBCUTANEOUS at 11:04

## 2022-01-01 RX ADMIN — ESCITALOPRAM OXALATE 10 MG: 10 TABLET ORAL at 10:04

## 2022-01-01 RX ADMIN — ACETAMINOPHEN 500 MG: 500 TABLET ORAL at 09:04

## 2022-01-01 RX ADMIN — INSULIN ASPART 2 UNITS: 100 INJECTION, SOLUTION INTRAVENOUS; SUBCUTANEOUS at 04:04

## 2022-01-01 RX ADMIN — INSULIN DETEMIR 12 UNITS: 100 INJECTION, SOLUTION SUBCUTANEOUS at 08:03

## 2022-01-01 RX ADMIN — METOROPROLOL TARTRATE 5 MG: 5 INJECTION, SOLUTION INTRAVENOUS at 05:03

## 2022-01-01 RX ADMIN — POTASSIUM & SODIUM PHOSPHATES POWDER PACK 280-160-250 MG 2 PACKET: 280-160-250 PACK at 10:03

## 2022-01-01 RX ADMIN — POTASSIUM CHLORIDE 30 MEQ: 10 CAPSULE, COATED, EXTENDED RELEASE ORAL at 03:03

## 2022-01-01 RX ADMIN — ONDANSETRON 8 MG: 8 TABLET, ORALLY DISINTEGRATING ORAL at 10:03

## 2022-01-01 RX ADMIN — DICLOFENAC SODIUM 4 G: 10 GEL TOPICAL at 09:03

## 2022-01-01 RX ADMIN — POLYETHYLENE GLYCOL 3350 17 G: 17 POWDER, FOR SOLUTION ORAL at 09:03

## 2022-01-01 RX ADMIN — VANCOMYCIN HYDROCHLORIDE 750 MG: 750 INJECTION, POWDER, LYOPHILIZED, FOR SOLUTION INTRAVENOUS at 09:03

## 2022-01-01 RX ADMIN — POLYETHYLENE GLYCOL 3350 17 G: 17 POWDER, FOR SOLUTION ORAL at 04:03

## 2022-01-01 RX ADMIN — INSULIN ASPART 1 UNITS: 100 INJECTION, SOLUTION INTRAVENOUS; SUBCUTANEOUS at 09:04

## 2022-01-01 RX ADMIN — METOROPROLOL TARTRATE 5 MG: 5 INJECTION, SOLUTION INTRAVENOUS at 07:03

## 2022-01-01 RX ADMIN — ACETAMINOPHEN 650 MG: 325 TABLET ORAL at 09:04

## 2022-01-01 RX ADMIN — LISINOPRIL 10 MG: 10 TABLET ORAL at 09:04

## 2022-01-01 RX ADMIN — INSULIN ASPART 3 UNITS: 100 INJECTION, SOLUTION INTRAVENOUS; SUBCUTANEOUS at 04:03

## 2022-01-01 RX ADMIN — SODIUM CHLORIDE 1000 ML: 0.9 INJECTION, SOLUTION INTRAVENOUS at 05:03

## 2022-01-01 RX ADMIN — PIPERACILLIN AND TAZOBACTAM 4.5 G: 4; .5 INJECTION, POWDER, LYOPHILIZED, FOR SOLUTION INTRAVENOUS; PARENTERAL at 08:03

## 2022-01-01 RX ADMIN — DICLOFENAC SODIUM 4 G: 10 GEL TOPICAL at 08:03

## 2022-01-01 RX ADMIN — INSULIN ASPART 3 UNITS: 100 INJECTION, SOLUTION INTRAVENOUS; SUBCUTANEOUS at 10:04

## 2022-01-01 RX ADMIN — INSULIN ASPART 5 UNITS: 100 INJECTION, SOLUTION INTRAVENOUS; SUBCUTANEOUS at 08:04

## 2022-01-01 RX ADMIN — INSULIN HUMAN 3 UNITS/HR: 1 INJECTION, SOLUTION INTRAVENOUS at 07:03

## 2022-01-27 NOTE — TELEPHONE ENCOUNTER
----- Message from Lisha Chapman sent at 1/27/2022  1:31 PM CST -----  Contact: self [phone]  Requesting an RX refill or new RX.  Is this a refill or new RX: refill  RX name and strength (copy/paste from chart):  benzonatate (TESSALON) 100 MG capsule  Is this a 30 day or 90 day RX:   Patient advised that in the future they can use their [facility] account to request a refill?:  yes  Patient advised that RX refills can take up to 72 hours?: yes  Pharmacy name and phone # (copy/paste from chart):    [id number] [address] & [address] [address]  Phone: [phone] Fax: [phone]      Requesting an RX refill or new RX.  Is this a refill or new RX: refill  RX name and strength (copy/paste from chart):  fluticasone propionate (FLONASE) 50 mcg/actuation nasal spray  Is this a 30 day or 90 day RX:   Patient advised that in the future they can use their [facility] account to request a refill?:  yes  Patient advised that RX refills can take up to 72 hours?: yes  Pharmacy name and phone # (copy/paste from chart):    [facility] #[id number] [address] & [address] [address]  Phone: [phone] Fax: [phone]       Requesting an RX refill or new RX.  Is this a refill or new RX:   RX name and strength (copy/paste from chart):  EScitalopram oxalate (LEXAPRO) 10 MG tablet  Is this a 30 day or 90 day RX:   Patient advised that in the future they can use their [facility] account to request a refill?:  yes  Patient advised that RX refills can take up to 72 hours?:  yes  Pharmacy name and phone # (copy/paste from chart):    [id number] [address]  [address] [facility]  [place] [address]  Phone: [phone] Fax:  [phone]       Requesting an RX refill or new RX.  Is this a refill or new RX:   RX name and strength (copy/paste from chart):  alendronate (FOSAMAX) 70 MG tablet  Is this a 30 day or 90 day RX:   Patient advised that in the future they can use their [facility] account to request a refill?:  yes  Patient advised that RX refills can take up to 72 hours?: yes  Pharmacy name and phone # (copy/paste from chart):    [facility] #[id number] - [place] - [facility] AT [address] [address]  Phone: [phone] Fax: [phone]       Requesting an RX refill or new RX.  Is this a refill or new RX: refill  RX name and strength (copy/paste from chart):  nystatin (NYSTOP) powder  Is this a 30 day or 90 day RX:   Patient advised that in the future they can use their [facility] account to request a refill?:  yes  Patient advised that RX refills can take up to 72 hours?: yes  Pharmacy name and phone # (copy/paste from chart):    [facility] #[id number] - [place] [address] [address] [address]  Phone: [phone] Fax: [phone]        Comments:     Please call and advise

## 2022-01-27 NOTE — TELEPHONE ENCOUNTER
Care Due:                  Date            Visit Type   Department     Provider  --------------------------------------------------------------------------------                                             [facility]  Last Visit: 06-      None         MEDICINE       Vishal Ulloa  Next Visit: None Scheduled  None         None Found                                                            Last  Test          Frequency    Reason                     Performed    Due Date  --------------------------------------------------------------------------------    CMP.........  12 months..  famotidine, insulin,       Not Found    Overdue                             lisinopriL...............    Powered by [employer] by [employer]. Reference number: [id number].   1/27/2022 2:16:49 PM CST

## 2022-01-28 NOTE — TELEPHONE ENCOUNTER
----- Message from Reddy Olson sent at 1/28/2022  4:15 PM CST -----  Contact: [phone]  Pt is calling about RX nystatin (NYSTOP) powder she said that [employer] didn't cover it and she had to pay out of pocket she wants to see if she can get something stating why she needs it and she was told that if she has something from the Dr she can be reimbursed please call pt back, thanks

## 2022-01-29 NOTE — TELEPHONE ENCOUNTER
So I presume she is taking this for a skin yeast infection.  Is she asking for a prior authorization for this?  This may actually be available over-the-counter but we can try if available.  I am just not sure what she is asking

## 2022-01-31 NOTE — TELEPHONE ENCOUNTER
Prior authorization for Nystatin powder performed at this time. Decision to be sent via fax  See copied reply below            Courtney Engle Key: [id number] - PA Case ID: [id number]   Status  Sent to [employer]  Drug  Nystatin powder  Form  [facility] Electronic PA Form

## 2022-02-16 NOTE — TELEPHONE ENCOUNTER
Prior authorization for Nystatin powder has been denied.  See response below from insurance company            Courtney Engle Key: [id number] - PA Case ID: [id number]    The Medicare rule in the Prescription Drug Manual (Chapter 6, Section 10.4) says bulk powders (i.e., Active Pharmaceutical Ingredients for compounding) do not satisfy the definition of a Part D drug and are not covered by Part D. [employer] follows Medicare rules. Your drug is a bulk powder and per Medicare rules is not covered.  Drug  Nystatin powder  Form  [employer] Electronic PA Form

## 2022-02-17 NOTE — TELEPHONE ENCOUNTER
No new care gaps identified.  Powered by [employer] by [employer]. Reference number: [id number].   2/17/2022 5:28:54 AM CST

## 2022-03-16 NOTE — TELEPHONE ENCOUNTER
----- Message from Kimberlee Marc sent at 3/16/2022  3:24 PM CDT -----  Contact: [phone]  Pt's daughter (Brenna) would like to speak to the nurse regarding her mother. Please Advise

## 2022-03-16 NOTE — TELEPHONE ENCOUNTER
Spoke with patient daughter Brenna, she reports that her mom has been complaining of being thirsty a lot and having an upset stomach, Brenna is requesting staff nurse to call and speak with patient and schedule office visit    Called patient home number twice, no answer, left voicemail message

## 2022-03-17 PROBLEM — E11.10: Status: ACTIVE | Noted: 2022-01-01

## 2022-03-17 NOTE — TELEPHONE ENCOUNTER
Additional Information   Negative: Unconscious or difficult to awaken    Protocols used: DIABETES - HIGH BLOOD SUGAR-A-OH

## 2022-03-17 NOTE — TELEPHONE ENCOUNTER
Notes show pt did arrive in [facility] facility since 1620 and is betting treated for hyperglycemia and testing to r/o Covid.

## 2022-03-17 NOTE — ED NOTES
"Pt endorses vomiting "green bile" starting this am. Hx of DM; daughter states she hasn't been eating or drinking x 2 days. Pt endorses generalized abdominal pain along with N/V. BG greater than 500 per EMS. Given 850 mL NS and 4 mg zofran on scene.    AAOx3; general confusion noted. Pt actively retching. Even and unlabored respirations noted.   "

## 2022-03-17 NOTE — ED NOTES
Dr. Jimenez made aware of glucose   Patient: Lucy Chapin    : 1951 Attending:Cody AYN MD   67 year old female        Chief complaint:   Nausea  Vomiting  Diarrhea  Weakness  INITIAL Eval/Consult/HPI:  Ms. Chapin is a 67-year-old white female with a past medical history of end-stage renal disease/stage VI chronic kidney disease who is on chronic peritoneal dialysis via the cycler. She is status post bariatric procedure with gastric sleeve placement on 2018. She presented here yesterday with complaints of vomiting, diarrhea, increased fatigue and weakness. In the emergency room she was thought to be dehydrated and found to be profoundly hypokalemic, and was subsequently admitted. She continues to feel profoundly fatigued, but has improved. She continues to have intermittent vomiting as well as loose stools. She denies shortness of breath. We are asked to evaluate her renal failure and continued dialysis management.    Subjective: Feeling more short of breath today. Still with some loose stools.  Past Medical History:   Diagnosis Date   • Anemia    • Carotid stenosis    • Cataracts, bilateral    • CRF (chronic renal failure)    • Dependence on peritoneal dialysis (CMS/[facility])     every evening for 9 hours   • Hyperlipidemia    • Lupus nephritis (CMS/[facility])    • Obesity    • Other chronic pain    • RAD (reactive airway disease)        Past Surgical History:   Procedure Laterality Date   • Abdomen surgery     • Av fistula placement Right    • Breast surgery     • Cataract extraction w/  intraocular lens implant Bilateral 2017    right 2017; left 2017   • Colonoscopy  2017   • Hysterectomy  2010   • Removal gallbladder     • Sleeve gastroplasty  2018    Dr Gooden       ALLERGIES:   Allergen Reactions   • Keflex RASH   • Penicillins RASH   • Sulfa Drugs Cross Reactors RASH       Medications/Infusions:  Scheduled:   • gentamicin   Topical Daily   • dianeal 2500 mL with additives for peritoneal  dialysis   Intraperitoneal Q12H   • Dianeal Low Calcium/2.5%  2,500 mL Intraperitoneal Q12H   • pantoprazole  40 mg Oral Nightly   • cinacalcet  30 mg Oral Daily   • lanthanum  500 mg Oral BID WC   • sevelamer carbonate  800 mg Oral TID WC   • heparin (porcine)  5,000 Units Subcutaneous 3 times per day   • ondansetron  4 mg Oral BID   • magnesium lactate  84 mg Oral Daily with breakfast   • sodium chloride (PF)  2 mL Injection 2 times per day   • gabapentin  300 mg Oral Daily   • montelukast  10 mg Oral Q Evening   • simvastatin  20 mg Oral Nightly       Continuous Infusions:     Social History     Social History   • Marital status: Single     Spouse name: N/A   • Number of children: N/A   • Years of education: N/A     Occupational History   • Not on file.     Social History Main Topics   • Smoking status: Former Smoker     Packs/day: 1.00     Years: 30.00     Types: Cigarettes     Quit date: 10/10/1996   • Smokeless tobacco: Never Used   • Alcohol use No   • Drug use: No   • Sexual activity: Not on file     Other Topics Concern   • Not on file     Social History Narrative   • No narrative on file       Family History   Problem Relation Age of Onset   • Cancer Mother         colon   • Substance abuse Father         alchol       ROS: 12 point ROS reviewed and unremarkable except for what was elicited in the H and P      Vital Last Value 24 Hour Range   Temperature 97.6 °F (36.4 °C) Temp  Min: 95.5 °F (35.3 °C)  Max: 97.6 °F (36.4 °C)   Pulse 109 Pulse  Min: 90  Max: 109   Respiratory 20 Resp  Min: 18  Max: 20   Blood Pressure 135/70 BP  Min: 116/64  Max: 139/69   Art BP   No Data Recorded   Pulse Oximetry 96 % SpO2  Min: 93 %  Max: 96 %     Vital Today Admitted   Weight 92.5 kg Weight: 83 kg   Height N/A Height: 5' 2\" (157.5 cm)   BMI N/A BMI (Calculated): 33.54     Weight over the past 48 Hours:  Patient Vitals for the past 48 hrs:   Weight   07/29/18 0535 94.3 kg   07/30/18 0541 92.5 kg        Hemodynamics:       Last Value 24 Hour Range   CVP   No Data Recorded   PAS/PAD   No Data Recorded   PCWP   No Data Recorded   CO   No Data Recorded   CI   No Data Recorded   SVR   No Data Recorded   SV02   No Data Recorded     Intake/Output:    Last Stool Occurrence: 3 (clear, yellow liquid) (07/30/18 0635)    I/O this shift:  In: -   Out: -200     I/O last 3 completed shifts:  In: 240 [P.O.:240]  Out: -1900       Intake/Output Summary (Last 24 hours) at 07/30/18 0930  Last data filed at 07/30/18 0816   Gross per 24 hour   Intake              240 ml   Output            -2100 ml   Net             2340 ml         Physical Exam:  GENERAL: ALERT ORIENTED X 3, NOT IN RESPIRATORY DISTRESS, APPROPRIATE MOOD AND AFFECT  HEENT: NORMOCEPHALLIC ATRAUMATIC,PERRL, NO PALLOR, NO ICTERUS, MUCOUS MEMBRANE MOIST, NO LESIONS  NECK: SUPPLE, NO ELEVATED JVD, NO CERVICAL AND NO SUPRACLAVICULAR LYMPHADENOPATHY, NO GOITER, NO MASS, TRACHEA MIDLINE  CHEST: SYMMETRIC AIRENTRY, CLEAR TO AUSCULATATION BILATERAL  HEART: REGULAR RATE & RHYTHM,  S1, S2, NO RUB  ABDOMEN: SOFT, NON TENDER, NO DISTENTION, NO HEPATOSPLENOMEGALY, BS PRESENT. Peritoneal dialysis catheter intact to the abdominal wall with clear exit site.  EXTREMITIES: NO  CYANOSIS, NOCLUBBING, NO EDEMA. Right and left hand equal in warmth. No increased swelling. No tenderness to palpation.  NEUROMUSCULAR: MOVES ALL EXTREMITIES, GROSSLY INTACT, NON-FOCAL  SKIN: NO RASH, NO LESIONS, NO NODULES      Laboratory Results:  No results found for: FIO2, APH, APCO2, APO2, AHCO3, ASAT, URIC  Lab Results   Component Value Date    INR 1.1 07/23/2018    PTT 28 07/23/2018    VB12 1,192 (H) 03/01/2018    C3 97 01/24/2013    C4 26.8 01/24/2013    PST 53 (H) 03/01/2018    FERR 112 12/10/2011       Urine Panel  Lab Results   Component Value Date    UKET NEGATIVE 03/27/2018    UPROT 100 (A) 03/27/2018    UWBC TRACE (A) 03/27/2018    URBC SMALL (A) 03/27/2018    UNITR NEGATIVE 03/27/2018    UBILI NEGATIVE 03/27/2018    UPH  8.0 (H) 03/27/2018    USPG 1.010 03/27/2018    UBACTR FEW (A) 03/27/2018       Recent Labs      07/28/18   0530  07/29/18   0528  07/29/18   1259  07/29/18 2010 07/30/18   0528   SODIUM  134*  134*   --    --   136   POTASSIUM  3.6  3.1*  3.1*  3.1*  3.3*   CHLORIDE  103  101   --    --   99   CO2  22  25   --    --   25   ANIONGAP  13  11   --    --   15   BUN  40*  37*   --    --   35*   CREATININE  9.63*  9.53*   --    --   9.34*   GLUCOSE  89  84   --    --   121*   PHOS  4.5  4.8*   --    --   3.9   WBC  12.7*  12.9*   --    --   15.7*   HGB  9.3*  9.3*   --    --   9.8*   HCT  28.4*  28.2*   --    --   30.3*   PLT  381  405   --    --   466*         Diagnosis/Plan:  · End-stage renal disease/stage VI chronic kidney disease: Weight increasing with some symptomsincreased shortness of breath. Change peritoneal dialysis to all 2.5% exchanges, 2.5 L, q.4 hours.  · Hypokalemia: Supplement per protocol and recheck.  · Hypomagnesemia: Currently resolved. Recheck in AM.  · Status post.bariatric surgery with gastric sleeve placement.  · Nausea, vomiting, and diarrhea: Workup in progress.  · Dehydration: Resolved.  · Gastric emptying study unremarkable, but unable to consume enough of study meal to ensure accuracy.

## 2022-03-17 NOTE — ED PROVIDER NOTES
Encounter Date: 3/17/2022       History     Chief Complaint   Patient presents with    Vomiting     At home, blood sugar >500. Takes insulin, family unsure of whether she took it today. 850mL fluid from EMS     Mrs. Courtney Engle is a 86 y.o. female with IDDM, HTN, HLD, H/o osteoporosis, hypothyroidism, HFpEF (chronic diastolic heart failure), morbid obesity, CKD3, colonic diverticulosis, PVD presents with EMS with complaints of abdominal pain and vomiting. Generalized abdominal pain and vomiting since the morning. EMS notes  and with new afib. She also notes confusion, polyuria, polydipsia. She does not know when she used insulin last. She denies chest pain, palpitations, leg swelling, fever, chills.     Pt's daughter reports that dietary habits have not been the best, she regularly drinks orange crush. Pt has an extensive allergic reactions to medications, >15 meds, allergic to sulfa, vitamins.         Review of patient's allergies indicates:   Allergen Reactions    Contac 12 hour allergy Anaphylaxis and Hives    Diphenhydramine hcl Shortness Of Breath     Other reaction(s): Shortness of breath    Ciprofloxacin (bulk) Nausea And Vomiting    (d)-limonene flavor Hives     Other reaction(s): Shortness of breath    Anti-hyst Other (See Comments)    Antihistamines - alkylamine Hives    Ciprocinonide      Other reaction(s): Stomach upset    Codeine      bad reaction    Contact metal agent      Other reaction(s): Hives    Glipizide Hives    Hydroxyzine      Unknown    Iodinated contrast media Other (See Comments)    Nitrofuran analogues     Penicillin      Other reaction(s): Rash    Propoxyphene Itching    Statins-hmg-coa reductase inhibitors      Nausea to all statins    Doxycycline Rash    Penicillins Rash    Sulfa (sulfonamide antibiotics) Rash    Sulfacetamide sodium-urea Rash     Past Medical History:   Diagnosis Date    Acquired hypothyroidism 4/25/2014    Anxiety     Aortic  calcification 12/8/2016    X-Ray Chest-5/08/2014    Arthritis     Bilateral carotid artery disease 12/8/2016    US Carotid Bilateral-1/24/2013    CKD stage 3 due to type 2 diabetes mellitus 2/16/2017    Depression     Essential hypertension     Fever blister     GERD (gastroesophageal reflux disease)     Glaucoma 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 robledo)     Hyperlipidemia LDL goal <70 2/16/2017    Keloid cicatrix     Mixed stress and urge urinary incontinence 2/16/2017    Morbid obesity with BMI of 40.0-44.9, adult 12/8/2016    Ocular migraine 1/24/2013    Type 2 diabetes mellitus with hyperglycemia, with long-term current use of insulin      Past Surgical History:   Procedure Laterality Date    CATARACT EXTRACTION W/  INTRAOCULAR LENS IMPLANT Right 05/30/2012        CATARACT EXTRACTION W/  INTRAOCULAR LENS IMPLANT Left 05/26/2010        CHOLECYSTECTOMY      COLONOSCOPY N/A 12/20/2019    Procedure: COLONOSCOPY;  Surgeon: Higinio Gilbert MD;  Location: [facility] ([address] [place] [address]);  Service: Endoscopy;  Laterality: N/A;  patient to call back to schedule Colonoscopy once she schedules Cardiology Clearance appt-BB  8/30/19 Low CV risk for colonospcopy per   patient requesting  but can't wait for his next available due to rectal bleeding    COLONOSCOPY W/ POLYPECTOMY  04/20/2015    ESOPHAGOGASTRODUODENOSCOPY  04/20/2015    HYSTERECTOMY      Partial    JOINT REPLACEMENT      knee replacement right      TONSILLECTOMY, ADENOIDECTOMY      tonsillectomy only     Family History   Problem Relation Age of Onset    Glaucoma Father     Stroke Father     Heart attack Father 93    Nephrolithiasis Father     Colon cancer Mother     Cancer Mother         colon ca    Heart disease Mother     Uterine cancer Daughter         uterine sarcoma    Hematuria Brother     Heart disease Maternal Grandmother     Hypertension Maternal Grandmother     Heart  attack Maternal Grandmother     No Known Problems Daughter     Amblyopia Neg Hx     Blindness Neg Hx     Cataracts Neg Hx     Macular degeneration Neg Hx     Retinal detachment Neg Hx     Strabismus Neg Hx      Social History     Tobacco Use    Smoking status: Never Smoker    Smokeless tobacco: Never Used   Substance Use Topics    Alcohol use: No    Drug use: No     Review of Systems   Unable to perform ROS: Acuity of condition   Gastrointestinal: Positive for abdominal pain.   Endocrine: Positive for polydipsia and polyuria.       Physical Exam     Initial Vitals   BP Pulse Resp Temp SpO2   03/17/22 1623 03/17/22 1623 03/17/22 1623 03/17/22 1625 03/17/22 1623   110/68 90 20 (!) 95.6 °F (35.3 °C) 98 %      MAP       --                Physical Exam    Nursing note and vitals reviewed.  Constitutional: She appears well-developed and well-nourished. She is not diaphoretic. She is Obese . She is active and cooperative. She appears distressed.   HENT:   Head: Normocephalic and atraumatic.   Right Ear: External ear normal.   Left Ear: External ear normal.   Eyes: EOM are normal. Pupils are equal, round, and reactive to light. No scleral icterus.   Neck: Neck supple.   Normal range of motion.  Cardiovascular: Normal heart sounds and intact distal pulses. An irregularly irregular rhythm present.  Tachycardia present.    Pulmonary/Chest: Effort normal and breath sounds normal. No respiratory distress.   Abdominal: Abdomen is soft. Bowel sounds are normal. She exhibits no distension. There is abdominal tenderness. There is no guarding.   Musculoskeletal:         General: No edema. Normal range of motion.      Cervical back: Normal range of motion and neck supple.     Neurological: She is alert.   Skin: Skin is warm and dry. Rash (bilateral healing rashes noted on shins) noted.   Psychiatric: Her mood appears anxious. She expresses inappropriate judgment. She exhibits abnormal recent memory.         ED Course    Procedures  Labs Reviewed   CBC W/ AUTO DIFFERENTIAL - Abnormal; Notable for the following components:       Result Value    WBC 15.61 (*)     MCHC 29.8 (*)     Gran # (ANC) 14.7 (*)     Immature Grans (Abs) 0.07 (*)     Lymph # 0.3 (*)     Gran % 94.4 (*)     Lymph % 2.1 (*)     Mono % 3.0 (*)     All other components within normal limits   COMPREHENSIVE METABOLIC PANEL - Abnormal; Notable for the following components:    Sodium 128 (*)     Potassium 5.8 (*)     Chloride 91 (*)     CO2 9 (*)     Glucose 807 (*)     BUN 40 (*)     Creatinine 2.3 (*)     Anion Gap 28 (*)     eGFR if  21.8 (*)     eGFR if non  18.9 (*)     All other components within normal limits   URINALYSIS, REFLEX TO URINE CULTURE - Abnormal; Notable for the following components:    Glucose, UA 3+ (*)     Ketones, UA 3+ (*)     All other components within normal limits    Narrative:     Specimen Source->Urine   LACTIC ACID, PLASMA - Abnormal; Notable for the following components:    Lactate (Lactic Acid) 3.8 (*)     All other components within normal limits   TSH - Abnormal; Notable for the following components:    TSH 0.147 (*)     All other components within normal limits   B-TYPE NATRIURETIC PEPTIDE - Abnormal; Notable for the following components:     (*)     All other components within normal limits   PHOSPHORUS - Abnormal; Notable for the following components:    Phosphorus 5.6 (*)     All other components within normal limits   PHOSPHORUS - Abnormal; Notable for the following components:    Phosphorus 7.0 (*)     All other components within normal limits    Narrative:     ADD ON PHOS PER RN SHEMAR ORD#[id number] 03/17/22 @1915   HEMOGLOBIN A1C - Abnormal; Notable for the following components:    Hemoglobin A1C >14.0 (*)     All other components within normal limits    Narrative:     ADD ON PHOS PER RN SHEMAR ORD#[id number] 03/17/22 @1915    add on GHGB per Ramos Alonso MD @  03/17/2022  19:43     LACTIC ACID, PLASMA - Abnormal; Notable for the following components:    Lactate (Lactic Acid) 2.5 (*)     All other components within normal limits    Narrative:     ADD ON LIPASE PER DR.ELLEN SANDRINE PRATT /ORDER#[id number] @3/17/2022 20:34   BETA - HYDROXYBUTYRATE, SERUM - Abnormal; Notable for the following components:    Beta-Hydroxybutyrate 5.3 (*)     All other components within normal limits   BASIC METABOLIC PANEL - Abnormal; Notable for the following components:    Sodium 130 (*)     Potassium 5.8 (*)     CO2 7 (*)     Glucose 717 (*)     BUN 38 (*)     Creatinine 1.9 (*)     Calcium 7.7 (*)     Anion Gap 25 (*)     eGFR if  27.5 (*)     eGFR if non  23.9 (*)     All other components within normal limits    Narrative:     add on bmp per dr favio casillas/order#[id number] @21:32 03/17/2022   POCT GLUCOSE - Abnormal; Notable for the following components:    POCT Glucose >500 (*)     All other components within normal limits   ISTAT PROCEDURE - Abnormal; Notable for the following components:    POC PH 7.120 (*)     POC PCO2 33.6 (*)     POC PO2 36 (*)     POC HCO3 10.9 (*)     POC SATURATED O2 51 (*)     POC TCO2 12 (*)     All other components within normal limits   POCT GLUCOSE - Abnormal; Notable for the following components:    POCT Glucose >500 (*)     All other components within normal limits   POCT GLUCOSE - Abnormal; Notable for the following components:    POCT Glucose >500 (*)     All other components within normal limits   POCT GLUCOSE - Abnormal; Notable for the following components:    POCT Glucose >500 (*)     All other components within normal limits   POCT GLUCOSE - Abnormal; Notable for the following components:    POCT Glucose >500 (*)     All other components within normal limits   ISTAT PROCEDURE - Abnormal; Notable for the following components:    POC Glucose 621 (*)     POC BUN 33 (*)     POC Sodium 132 (*)     POC TCO2 (MEASURED) 9 (*)     POC Ionized Calcium  0.92 (*)     All other components within normal limits   CULTURE, BLOOD   CULTURE, BLOOD   MAGNESIUM   PROCALCITONIN   TROPONIN I   T4, FREE   HEMOGLOBIN A1C   PHOSPHORUS   URINALYSIS MICROSCOPIC    Narrative:     Specimen Source->Urine   LIPASE   LIPASE    Narrative:     ADD ON LIPASE PER DR.ELLEN SANDRINE PRATT /ORDER#[id number] @3/17/2022 20:34   BASIC METABOLIC PANEL   BASIC METABOLIC PANEL   PHOSPHORUS   BASIC METABOLIC PANEL   BASIC METABOLIC PANEL   PROCALCITONIN   SARS-COV-2 RDRP GENE    Narrative:     This test utilizes isothermal nucleic acid amplification   technology to detect the SARS-CoV-2 RdRp nucleic acid segment.   The analytical sensitivity (limit of detection) is 125 genome   equivalents/mL.   A POSITIVE result implies infection with the SARS-CoV-2 virus;   the patient is presumed to be contagious.     A NEGATIVE result means that SARS-CoV-2 nucleic acids are not   present above the limit of detection. A NEGATIVE result should be   treated as presumptive. It does not rule out the possibility of   COVID-19 and should not be the sole basis for treatment decisions.   If COVID-19 is strongly suspected based on clinical and exposure   history, re-testing using an alternate molecular assay should be   considered.   This test is only for use under the Food and Drug   Administration s Emergency Use Authorization (EUA).   Commercial kits are provided by [employer].   Performance characteristics of the EUA have been independently   verified by [facility] Department of   Pathology and Laboratory Medicine.   _________________________________________________________________   The authorized Fact Sheet for Healthcare Providers and the authorized Fact   Sheet for Patients of the ID NOW COVID-19 are available on the FDA   website:     https://www.fda.gov/media/362649/download  https://www.fda.gov/media/012572/download            POCT GLUCOSE MONITORING CONTINUOUS   POCT GLUCOSE MONITORING CONTINUOUS    ISTAT CHEM8     EKG Readings: (Independently Interpreted)   Initial Reading: No STEMI. Rhythm: Atrial Fibrillation. Heart Rate: 142. ST Segments: Normal ST Segments. T Waves Flipped: AVL.       Imaging Results          CT Head Without Contrast (No Result on File)                X-Ray Chest AP Portable (Final result)  Result time 03/17/22 18:49:05    Final result by David Sánchez MD (03/17/22 18:49:05)                 Impression:      No acute abnormality.    Electronically signed by resident: Aissatou Matthews  Date:    03/17/2022  Time:    18:29    Electronically signed by: David Sánchez MD  Date:    03/17/2022  Time:    18:49             Narrative:    EXAMINATION:  XR CHEST AP PORTABLE    CLINICAL HISTORY:  Sepsis; hyperglycemia;.    TECHNIQUE:  Single frontal portable view of the chest was performed.    COMPARISON:  Chest radiograph 08/06/2020.  01/12/2018    FINDINGS:  The lungs are clear, with normal appearance of pulmonary vasculature and no pleural effusion or pneumothorax.    The cardiac silhouette is normal in size.  Atherosclerosis of the aortic arch.  The hilar and mediastinal contours are unremarkable.    Bones are intact.  There is no evidence of free air beneath the hemidiaphragms.                                 Medications   dextrose 10% bolus 250 mL (has no administration in time range)   dextrose 10% bolus 125 mL (has no administration in time range)   insulin regular in 0.9 % NaCl 100 unit/100 mL (1 unit/mL) infusion (6 Units/hr Intravenous New Bag 3/17/22 5288)   aspirin EC tablet 81 mg (has no administration in time range)   EScitalopram oxalate tablet 10 mg (has no administration in time range)   famotidine tablet 40 mg (has no administration in time range)   levothyroxine tablet 75 mcg (has no administration in time range)   sodium chloride 0.9% flush 10 mL (has no administration in time range)   heparin (porcine) injection 5,000 Units (has no administration in time range)   acetaminophen tablet  650 mg (has no administration in time range)   ondansetron injection 4 mg (has no administration in time range)   metoprolol tartrate (LOPRESSOR) tablet 25 mg (has no administration in time range)   metoprolol injection 5 mg (has no administration in time range)   sodium chloride 0.9% bolus 1,000 mL (0 mLs Intravenous Stopped 3/17/22 1812)   haloperidol lactate injection 2.5 mg (2.5 mg Intravenous Given 3/17/22 1734)   sodium chloride 0.9% bolus 1,000 mL (0 mLs Intravenous Stopped 3/17/22 1921)   lactated ringers bolus 1,000 mL (0 mLs Intravenous Stopped 3/17/22 2032)   insulin detemir U-100 pen 20 Units (20 Units Subcutaneous Given 3/17/22 2027)   piperacillin-tazobactam 4.5 g in sodium chloride 0.9% 100 mL IVPB (ready to mix system) (0 g Intravenous Stopped 3/17/22 2049)   vancomycin 750 mg in dextrose 5 % 250 mL IVPB (ready to mix system) (0 mg Intravenous Stopped 3/17/22 2225)   metoprolol injection 5 mg (5 mg Intravenous Given 3/17/22 2020)   metoprolol tartrate (LOPRESSOR) tablet 25 mg (25 mg Oral Given 3/17/22 2125)     Medical Decision Making:   History:   I obtained history from: someone other than patient and EMS provider.       <> Summary of History: Patient found at home in distress with   Daughter notes poor glycemic control  Old Medical Records: I decided to obtain old medical records.  Initial Assessment:   86 year old female with IDDM with poor glucose control. Confusion, ab pain, polyuria and polydipsia noted.  Differential Diagnosis:   DKA, HHS, infection, UTI, medication noncompliance, ischemia, hyperglycemia,  ICH  Clinical Tests:   Lab Tests: Ordered and Reviewed  The following lab test(s) were unremarkable: CBC, CMP, Urinalysis, Lactate, Troponin and BNP  Radiological Study: Ordered and Reviewed  Medical Tests: Ordered and Reviewed  ED Management:  POCT gluc >500  VBG PH 7.12/33.6/36  WBC 16 with granulocytosis    MICU consulted for acidosis, Afib with RVR. Metoprolol  given  Empiric antibiotics given zosyn+vanc  Insulin drip started, IVF 3L  CTH pending  Latest Bicarb 7  Patient to admit to ICU    Other:   I discussed test(s) with the performing physician.            Attending Attestation:   Physician Attestation Statement for Resident:  As the supervising MD   Physician Attestation Statement: I have personally seen and examined this patient.   I agree with the above history. -: 85 yo W with extensive pmhx including IDDM with history of noncompliance, CKD III, migraines, obesity, anxiety presents with chief complaint of altered mental status.  Patient is not answering questions and majority of history was provided by her daughter.  She is becoming increasingly drowsy for the past few days.  Her daughter is suspicious that she is not taking her glucose.  She has been vomiting as well.  Patient subjectively denies any complaints to me but did endorse them to the resident.  On exam patient is tachycardic with an irregularly irregular rhythm.  Her lungs are clear.  Her abdomen was not tender on my palpation.  It was soft.  Her cap refill is 2-3 seconds.  She is confused.  She is awake but only answering questions intermittently.  She has a GCS of 12 (E3V4M5).  Point of care glucose greater than 500. My suspicion is for DKA, HHS, sepsis, ICH or space occupying lesion.  Will administer 2 L IV fluids and antiemetics.  Will obtain labs, chest x-ray, cultures.  Unfortunately, patient became combative with nurses and refused treatment.  She required chemical sedation with low-dose IV Haldol.  Will monitor respiratory status closely, in particular given the my concern for a metabolic acidosis and I want to keep her respiratory drive functional.    Reassessment:  Labs reveal DKA with hyperglycemia greater than 800.  There is an increased anion gap of 28. She is profoundly acidotic with pH of 7.1.  She remains in AFib with RVR which I suspect secondary to her metabolic milieu.  Will  administer broad-spectrum antibiotics.  MICU consulted for management recommendations        As the supervising MD I agree with the above PE.    As the supervising MD I agree with the above treatment, course, plan, and disposition.        Attending Critical Care:   Critical Care Times:   Direct Patient Care (initial evaluation, reassessments, and time considering the case)................................................................15 minutes.   Additional History from reviewing old medical records or taking additional history from the family, EMS, PCP, etc.......................5 minutes.   Ordering, Reviewing, and Interpreting Diagnostic Studies...............................................................................................................5 minutes.   Documentation..................................................................................................................................................................................5 minutes.   Consultation with other Physicians. .................................................................................................................................................10 minutes.   ==============================================================  · Total Critical Care Time - exclusive of procedural time: 40 minutes.  ==============================================================  Critical care was necessary to treat or prevent imminent or life-threatening deterioration of the following conditions: metabolic crisis.                    Clinical Impression:   Final diagnoses:  [R73.9] Hyperglycemia  [R11.2] Nausea and vomiting, intractability of vomiting not specified, unspecified vomiting type  [R10.84] Generalized abdominal pain  [N17.9] TAMARA (acute kidney injury)  [I48.91] Atrial fibrillation with RVR  [D72.829] Leukocytosis, unspecified type  [E11.10] Diabetes mellitus with ketoacidosis and lactic acidosis but without coma  (Primary)  [E83.39] Hyperphosphatemia  [E03.9] Hypothyroidism, unspecified type          ED Disposition Condition    Admit               Shobha Ruth MD  Resident  03/17/22 2340       Ramos Jimenez MD  03/18/22 0002

## 2022-03-17 NOTE — TELEPHONE ENCOUNTER
Daughter states that EMS is with the patient and states her bs is 560. She's vomiting green liquid and she feels faint. Granddauthter said her speech was slurred. EMS states an ambulace will not be able to transport her until about an hour. They are giving her fluids and something for nausea. EMS now state an ambulance will be there in 5 min. Please contact caller directly to discuss any further care advice.

## 2022-03-18 PROBLEM — N17.9 ACUTE RENAL FAILURE SUPERIMPOSED ON STAGE 3 CHRONIC KIDNEY DISEASE: Status: ACTIVE | Noted: 2022-01-01

## 2022-03-18 PROBLEM — I48.91 ATRIAL FIBRILLATION WITH RVR: Status: ACTIVE | Noted: 2022-01-01

## 2022-03-18 PROBLEM — N18.30 ACUTE RENAL FAILURE SUPERIMPOSED ON STAGE 3 CHRONIC KIDNEY DISEASE: Status: ACTIVE | Noted: 2022-01-01

## 2022-03-18 PROBLEM — G93.41 ACUTE METABOLIC ENCEPHALOPATHY: Status: ACTIVE | Noted: 2022-01-01

## 2022-03-18 NOTE — ED NOTES
Showed iSTAT results to TEODORO Patel NP. Ms. Patel states to continue maintaining current insulin rate of 6 units/hr, based off iSTAT results, and to send another BMP STAT. Awaiting further orders. BMP sent to lab.

## 2022-03-18 NOTE — HPI
"Patient is a 86 y.o. female with significant past medical history of IDDM, HTN, HLD, diastolic dysfunction (most recent echo 2018 normal), CKD 3, hypothyroidism and GERD presented to hospital complaining of altered mental status. HPI obtained from the daughter at bedside. Daughter states that for the past few weeks, patient has woken up with slurred speech that has improved as the day progressed. Last week the patient told the daughter that she "didn't feel well" but did not elaborate on specific symptoms. Earlier this week, the patient started complaining of increased thirst (for which she was requesting soda to be brought to her) and increased urination with dysuria (though patient attributed dysuria to a scratch on her vagina). She also complained of "kidney issues" though did not elaborate on specific symptoms. The daughter is unable to confirm whether or not the patient has been compliant with medications, including insulin, however she suspects that the patient has not been compliant as the patient's other daughter reported "several boxes of medication" in the patient's refrigerator (daughter at bedside does not know which medications they were). The patient had been resistant to calling her PCP or seeking medical care for the past two weeks, however was finally able to be brought to the ED today by her daughter. At present the patient is encephalopathic, but only complaint is of epigastric pain. Daughter reports episode of N/V within the past few days.      Work up in the ED revealed a leukocytosis of 15, TAMARA with hyperkalemia (creatinine 2.3, baseline 0.8), lactic of 3.8 and labs consistent with DKA (pH 7.1, bhb 5.3, bicarb 9 w/ glucose 800). Additionally, the patient was found to be in new onset a fib RVR (rates up to 160 in the ED). Critical Care Medicine has been consulted for DKA & A fib RVR.  "

## 2022-03-18 NOTE — CONSULTS
Patient evaluated. Dispo pending repeat labs. Will re-eval following 9p labs. Full note to follow.    Lana Patel NP  Critical Care Medicine

## 2022-03-18 NOTE — ASSESSMENT & PLAN NOTE
--most recent echo 1/2018 showed LVEF 60-65%, normal LV diastolic function and normal RV function  --checking repeat echo

## 2022-03-18 NOTE — CARE UPDATE
RAPID RESPONSE NURSE AI ALERT       AI alert received.    Chart Reviewed: 03/18/2022, 3:59 PM    MRN: [id number]  Bed: 23228/36786 A    Dx: Diabetes mellitus with ketoacidosis and lactic acidosis but without coma    Courtney Engle has a past medical history of Acquired hypothyroidism, Anxiety, Aortic calcification, Arthritis, Bilateral carotid artery disease, CKD stage 3 due to type 2 diabetes mellitus, Depression, Essential hypertension, Fever blister, GERD (gastroesophageal reflux disease), Glaucoma suspect with open angle, Hyperlipidemia LDL goal <70, Keloid cicatrix, Mixed stress and urge urinary incontinence, Morbid obesity with BMI of 40.0-44.9, adult, Ocular migraine, and Type 2 diabetes mellitus with hyperglycemia, with long-term current use of insulin.    Last VS: BP (!) 155/81   Pulse (!) 116   Temp 98.8 °F (37.1 °C) (Oral)   Resp 19   Wt 88 kg (194 lb 0.1 oz)   SpO2 96%   BMI 40.55 kg/m²     24H Vital Sign Range:  Temp:  [95.6 °F (35.3 °C)-98.8 °F (37.1 °C)]   Pulse:  []   Resp:  [18-24]   BP: (104-180)/()   SpO2:  [95 %-99 %]     Level of Consciousness (AVPU): alert    Recent Labs     03/17/22  1703 03/17/22  2226 03/18/22  0350   WBC 15.61*  --  16.37*   HGB 14.0  --  12.9   HCT 47.0 41 41.5     --  240       Recent Labs     03/17/22  1703 03/17/22  2057 03/17/22  2233 03/18/22  0005 03/18/22  0213 03/18/22  0350 03/18/22  0926 03/18/22  1222   * 130*   < > 134*   < > 134* 134* 136   K 5.8* 5.8*   < > 4.7   < > 4.5 4.5 4.2   CL 91* 98   < > 103   < > 104 104 107   CO2 9* 7*   < > 8*   < > 13* 17* 20*   CREATININE 2.3* 1.9*   < > 1.9*   < > 1.6* 1.5* 1.2   * 717*   < > 439*   < > 288* 238* 125*   PHOS 7.0* 5.6*  --  3.0  --  2.5*  --   --    MG 2.4  --   --   --   --  1.8  --   --     < > = values in this interval not displayed.        Recent Labs     03/17/22  1710   PH 7.120*   PCO2 33.6*   PO2 36*   HCO3 10.9*   POCSATURATED 51*   BE -18        OXYGEN:        O2  Device (Oxygen Therapy): room air    MEWS score: 3    bedside RN, Xena contacted. No concerns verbalized at this time. Instructed to call [phone] for further concerns or assistance.    Sridevi Rock RN

## 2022-03-18 NOTE — SUBJECTIVE & OBJECTIVE
Interval History/Significant Events: Patient admitted to [facility] evening of 3/17 for DKA and new onset a fib RVR. Started on insulin per DKA protocol. Better rate control achieved with initiation of metoprolol. Encephalopathy slowly improving.     Review of Systems   Reason unable to perform ROS: limited secondary to encephalopathy.   Gastrointestinal:  Positive for abdominal pain and nausea. Negative for vomiting.   Endocrine: Positive for polydipsia and polyuria.   Genitourinary:  Positive for dysuria and frequency.   Psychiatric/Behavioral:  Positive for confusion.    Objective:     Vital Signs (Most Recent):  Temp: 98.2 °F (36.8 °C) (03/17/22 2046)  Pulse: (!) 122 (03/18/22 0233)  Resp: 18 (03/18/22 0233)  BP: 138/78 (03/18/22 0233)  SpO2: 95 % (03/18/22 0233)   Vital Signs (24h Range):  Temp:  [95.6 °F (35.3 °C)-98.2 °F (36.8 °C)] 98.2 °F (36.8 °C)  Pulse:  [] 122  Resp:  [18-24] 18  SpO2:  [95 %-98 %] 95 %  BP: (104-177)/() 138/78   Weight: 88 kg (194 lb 0.1 oz)  Body mass index is 40.55 kg/m².      Intake/Output Summary (Last 24 hours) at 3/18/2022 0301  Last data filed at 3/17/2022 2225  Gross per 24 hour   Intake 2350 ml   Output --   Net 2350 ml       Physical Exam  Vitals and nursing note reviewed.   Constitutional:       General: She is not in acute distress.     Appearance: She is ill-appearing.   HENT:      Head: Normocephalic and atraumatic.      Right Ear: External ear normal.      Left Ear: External ear normal.      Nose: Nose normal.      Mouth/Throat:      Mouth: Mucous membranes are moist.      Pharynx: Oropharynx is clear.   Eyes:      Conjunctiva/sclera: Conjunctivae normal.   Cardiovascular:      Rate and Rhythm: Tachycardia present. Rhythm irregular.      Pulses: Normal pulses.      Heart sounds: Normal heart sounds.   Pulmonary:      Effort: Pulmonary effort is normal. No respiratory distress.      Breath sounds: Normal breath sounds. No wheezing.   Abdominal:      General: Abdomen  is flat. Bowel sounds are normal. There is no distension.      Palpations: Abdomen is soft.      Tenderness: There is no abdominal tenderness.   Musculoskeletal:         General: No swelling or deformity. Normal range of motion.      Cervical back: Normal range of motion and neck supple.   Skin:     General: Skin is warm and dry.   Neurological:      General: No focal deficit present.      Mental Status: She is disoriented.      GCS: GCS eye subscore is 4. GCS verbal subscore is 4. GCS motor subscore is 6.       Vents:     Lines/Drains/Airways       Drain  Duration             Female External Urinary Catheter 03/17/22 1819 <1 day              Peripheral Intravenous Line  Duration                  Peripheral IV - Single Lumen 03/17/22 1624 20 G Left Antecubital <1 day         Peripheral IV - Single Lumen 03/17/22 1933 20 G Left Hand <1 day                  Significant Labs:    CBC/Anemia Profile:  Recent Labs   Lab 03/17/22  1703 03/17/22  2226   WBC 15.61*  --    HGB 14.0  --    HCT 47.0 41     --    MCV 93  --    RDW 14.0  --         Chemistries:  Recent Labs   Lab 03/17/22  1703 03/17/22  2057 03/17/22  2233 03/18/22  0005 03/18/22  0213   * 130* 131* 134* 133*   K 5.8* 5.8* 5.1 4.7 4.5   CL 91* 98 99 103 104   CO2 9* 7* 7* 8*  --    BUN 40* 38* 36* 33*  --    CREATININE 2.3* 1.9* 2.0* 1.9*  --    CALCIUM 9.0 7.7* 7.7* 8.2* 7.8*   ALBUMIN 3.5  --   --   --   --    PROT 7.8  --   --   --   --    BILITOT 0.9  --   --   --   --    ALKPHOS 90  --   --   --   --    ALT 11  --   --   --   --    AST 12  --   --   --   --    MG 2.4  --   --   --   --    PHOS 7.0* 5.6*  --  3.0  --        All pertinent labs within the past 24 hours have been reviewed.    Significant Imaging:  I have reviewed all pertinent imaging results/findings within the past 24 hours.

## 2022-03-18 NOTE — ED NOTES
Insulin rate not changed r/t nursing communication order by TEODORO Patel NP . See order set for info.

## 2022-03-18 NOTE — HOSPITAL COURSE
ICU Course:  Patient admitted to [facility] evening of 3/17 for DKA and new onset a fib RVR. Started on insulin per DKA protocol. Better rate control achieved with initiation of metoprolol. Encephalopathy improved; gap closed. Will transition to SQ insulin. Patient is stable for stepdown.     Hospital Medicine Course:  Pt stepped down to medicine 3/18.  Glycemic control improved.  Metoprolol also up titrated with better rate controlled.  Echo unremarkable.  Risks and benefits of anticoagulation in the setting of atrial fibrillation to prevent thromboembolism discussed with patient and daughter.  Patient would like to defer anticoagulation.  TAMARA improved to baseline.  Lisinopril started.  Per PT OT evaluation patient to benefit from SNF.  Pending discharge to SNF.

## 2022-03-18 NOTE — PROGRESS NOTES
Pharmacist Renal Dose Adjustment Note    Courtney Engle is a 86 y.o. female being treated with the medication famotidine 40 mg every 24 hr    Patient Data:    Vital Signs (Most Recent):  Temp: 98.2 °F (36.8 °C) (03/17/22 2046)  Pulse: (!) 122 (03/17/22 2132)  Resp: (!) 21 (03/17/22 2132)  BP: (!) 153/64 (03/17/22 2132)  SpO2: 98 % (03/17/22 2132)   Vital Signs (72h Range):  Temp:  [95.6 °F (35.3 °C)-98.2 °F (36.8 °C)]   Pulse:  []   Resp:  [18-24]   BP: (104-177)/()   SpO2:  [95 %-98 %]      Recent Labs   Lab 03/17/22  1703 03/17/22 2057   CREATININE 2.3* 1.9*     Serum creatinine: 1.9 mg/dL (H) 03/17/22 2057  Estimated creatinine clearance: 22.1 mL/min (A)    Changed to famotidine 40 mg every 48 hours per protocol.     Pharmacist's Name: Kit Loving  Pharmacist's Extension: [phone]

## 2022-03-18 NOTE — ED NOTES
TEODORO Patel NP notified of 2 different CBG results. MsBrandi Patel states to titrate insulin using CBG of 356.

## 2022-03-18 NOTE — CONSULTS
[facility] - Intensive Care ([place] [id number])  Hospital Medicine  Telemedicine Consult Note    Patient Name: Courtney Engle  MRN: [id number]  Admission Date: 3/17/2022  Hospital Length of Stay: 1 days  Attending Physician: Shalonda Parks MD   Primary Care Provider: Vishal Ulloa MD     Thank you for your consult to [facility]. We have reviewed the patient chart. This patient does not meet criteria for [facility] service at this time due to Patient has a condition likely to benefit from in-depth physical exam, or palpation / auscultation, or serial abdominal exams, or evaluation of tachycardia and lethargy. Will hand back to In-house service..      Avis Greene MD  Department of Hospital Medicine   [facility] - Intensive [facility] [place] ([place]-14)

## 2022-03-18 NOTE — SUBJECTIVE & OBJECTIVE
Interval History:   Pt stepped own from MICU. Daughter at bedside.  Daughter describes approximately 1-2 week history in which patient is not quite herself.  Of note, earlier this week daughter notes that patient complained of extreme thirst and issues with kidney manifesting as urinary frequency.  During this time daughter believes that patient had copious amounts of soda to curb her thirst.    Patient, initially sleeping, arouses with ease to name.  Patient denies any acute complaints and notes that her abdominal pain and nausea have resolved.  She notes that she has never really been compliant with a diabetic diet and that she sometimes does not take her medications.     Review of Systems   Constitutional:  Negative for chills and diaphoresis.   HENT:  Negative for congestion, sneezing, sore throat and trouble swallowing.    Eyes:  Negative for visual disturbance.   Respiratory:  Negative for cough, shortness of breath and wheezing.    Cardiovascular:  Negative for chest pain, palpitations and leg swelling.   Gastrointestinal:  Negative for abdominal pain, constipation, diarrhea, nausea and vomiting.   Genitourinary:  Negative for dysuria.   Musculoskeletal:  Negative for arthralgias and myalgias.   Skin:  Negative for color change.   Neurological:  Negative for dizziness, light-headedness and headaches.   Psychiatric/Behavioral:  Negative for decreased concentration.      Objective:     Vital Signs (Most Recent):  Temp: 98.8 °F (37.1 °C) (03/18/22 0711)  Pulse: (!) 116 (03/18/22 1542)  Resp: 19 (03/18/22 1231)  BP: (!) 155/81 (03/18/22 1231)  SpO2: 96 % (03/18/22 1231)   Vital Signs (24h Range):  Temp:  [95.6 °F (35.3 °C)-98.8 °F (37.1 °C)] 98.8 °F (37.1 °C)  Pulse:  [] 116  Resp:  [18-24] 19  SpO2:  [95 %-99 %] 96 %  BP: (104-180)/() 155/81     Weight: 88 kg (194 lb 0.1 oz)  Body mass index is 40.55 kg/m².    Intake/Output Summary (Last 24 hours) at 3/18/2022 3023  Last data filed at 3/18/2022  0916  Gross per 24 hour   Intake 2400 ml   Output --   Net 2400 ml      Physical Exam  Vitals and nursing note reviewed.   Constitutional:       General: She is not in acute distress.     Appearance: She is not toxic-appearing or diaphoretic.   HENT:      Head: Normocephalic and atraumatic.      Right Ear: External ear normal.      Left Ear: External ear normal.      Nose: No congestion or rhinorrhea.      Mouth/Throat:      Mouth: Mucous membranes are dry.      Pharynx: No oropharyngeal exudate or posterior oropharyngeal erythema.   Eyes:      General: No scleral icterus.        Right eye: No discharge.         Left eye: No discharge.   Cardiovascular:      Rate and Rhythm: Tachycardia present.      Heart sounds: No murmur heard.  Pulmonary:      Effort: Pulmonary effort is normal.      Breath sounds: Normal breath sounds. No wheezing, rhonchi or rales.   Abdominal:      General: Bowel sounds are normal.      Palpations: Abdomen is soft.      Tenderness: There is no abdominal tenderness.   Musculoskeletal:      Cervical back: Normal range of motion and neck supple.      Right lower leg: No edema.      Left lower leg: No edema.   Skin:     General: Skin is warm and dry.   Neurological:      Mental Status: She is alert and oriented to person, place, and time.   Psychiatric:         Mood and Affect: Mood normal.         Behavior: Behavior normal.       Significant Labs: All pertinent labs within the past 24 hours have been reviewed.    Recent Results (from the past 24 hour(s))   POCT glucose    Collection Time: 03/17/22  4:52 PM   Result Value Ref Range    POCT Glucose >500 (H) 70 - 110 mg/dL   CBC auto differential    Collection Time: 03/17/22  5:03 PM   Result Value Ref Range    WBC 15.61 (H) 3.90 - 12.70 K/uL    RBC 5.07 4.00 - 5.40 M/uL    Hemoglobin 14.0 12.0 - 16.0 g/dL    Hematocrit 47.0 37.0 - 48.5 %    MCV 93 82 - 98 fL    MCH 27.6 27.0 - 31.0 pg    MCHC 29.8 (L) 32.0 - 36.0 g/dL    RDW 14.0 11.5 - 14.5 %     Platelets 317 150 - 450 K/uL    MPV 11.9 9.2 - 12.9 fL    Immature Granulocytes 0.4 0.0 - 0.5 %    Gran # (ANC) 14.7 (H) 1.8 - 7.7 K/uL    Immature Grans (Abs) 0.07 (H) 0.00 - 0.04 K/uL    Lymph # 0.3 (L) 1.0 - 4.8 K/uL    Mono # 0.5 0.3 - 1.0 K/uL    Eos # 0.0 0.0 - 0.5 K/uL    Baso # 0.02 0.00 - 0.20 K/uL    nRBC 0 0 /100 WBC    Gran % 94.4 (H) 38.0 - 73.0 %    Lymph % 2.1 (L) 18.0 - 48.0 %    Mono % 3.0 (L) 4.0 - 15.0 %    Eosinophil % 0.0 0.0 - 8.0 %    Basophil % 0.1 0.0 - 1.9 %    Differential Method Automated    Comprehensive metabolic panel    Collection Time: 03/17/22  5:03 PM   Result Value Ref Range    Sodium 128 (L) 136 - 145 mmol/L    Potassium 5.8 (H) 3.5 - 5.1 mmol/L    Chloride 91 (L) 95 - 110 mmol/L    CO2 9 (LL) 23 - 29 mmol/L    Glucose 807 (HH) 70 - 110 mg/dL    BUN 40 (H) 8 - 23 mg/dL    Creatinine 2.3 (H) 0.5 - 1.4 mg/dL    Calcium 9.0 8.7 - 10.5 mg/dL    Total Protein 7.8 6.0 - 8.4 g/dL    Albumin 3.5 3.5 - 5.2 g/dL    Total Bilirubin 0.9 0.1 - 1.0 mg/dL    Alkaline Phosphatase 90 55 - 135 U/L    AST 12 10 - 40 U/L    ALT 11 10 - 44 U/L    Anion Gap 28 (H) 8 - 16 mmol/L    eGFR if African American 21.8 (A) >60 mL/min/1.73 m^2    eGFR if non  18.9 (A) >60 mL/min/1.73 m^2   Lactic acid, plasma #1    Collection Time: 03/17/22  5:03 PM   Result Value Ref Range    Lactate (Lactic Acid) 3.8 (HH) 0.5 - 2.2 mmol/L   Magnesium    Collection Time: 03/17/22  5:03 PM   Result Value Ref Range    Magnesium 2.4 1.6 - 2.6 mg/dL   TSH    Collection Time: 03/17/22  5:03 PM   Result Value Ref Range    TSH 0.147 (L) 0.400 - 4.000 uIU/mL   Brain natriuretic peptide    Collection Time: 03/17/22  5:03 PM   Result Value Ref Range     (H) 0 - 99 pg/mL   Troponin I    Collection Time: 03/17/22  5:03 PM   Result Value Ref Range    Troponin I 0.012 0.000 - 0.026 ng/mL   T4, Free    Collection Time: 03/17/22  5:03 PM   Result Value Ref Range    Free T4 0.95 0.71 - 1.51 ng/dL   Phosphorus     Collection Time: 03/17/22  5:03 PM   Result Value Ref Range    Phosphorus 7.0 (H) 2.7 - 4.5 mg/dL   ISTAT PROCEDURE    Collection Time: 03/17/22  5:10 PM   Result Value Ref Range    POC PH 7.120 (L) 7.35 - 7.45    POC PCO2 33.6 (L) 35 - 45 mmHg    POC PO2 36 (L) 40 - 60 mmHg    POC HCO3 10.9 (L) 24 - 28 mmol/L    POC BE -18 -2 to 2 mmol/L    POC SATURATED O2 51 (L) 95 - 100 %    POC TCO2 12 (L) 24 - 29 mmol/L    Sample VENOUS     Site Other     Allens Test N/A     DelSys Room Air     Mode SPONT    Blood culture x two cultures. Draw prior to antibiotics.    Collection Time: 03/17/22  5:18 PM    Specimen: Peripheral, Upper Arm, Left; Blood   Result Value Ref Range    Blood Culture, Routine No Growth to date    Procalcitonin    Collection Time: 03/17/22  5:36 PM   Result Value Ref Range    Procalcitonin 0.15 <0.25 ng/mL   Hemoglobin A1C    Collection Time: 03/17/22  5:36 PM   Result Value Ref Range    Hemoglobin A1C >14.0 (H) 4.0 - 5.6 %    Estimated Avg Glucose Unable to calculate 68 - 131 mg/dL   Blood culture x two cultures. Draw prior to antibiotics.    Collection Time: 03/17/22  5:45 PM    Specimen: Other (Specify); Blood   Result Value Ref Range    Blood Culture, Routine No Growth to date    Urinalysis, Reflex to Urine Culture Urine, Clean Catch    Collection Time: 03/17/22  5:55 PM    Specimen: Urine   Result Value Ref Range    Specimen UA Urine, Clean Catch     Color, UA Straw Yellow, Straw, Jenny    Appearance, UA Clear Clear    pH, UA 5.0 5.0 - 8.0    Specific Gravity, UA 1.020 1.005 - 1.030    Protein, UA Negative Negative    Glucose, UA 3+ (A) Negative    Ketones, UA 3+ (A) Negative    Bilirubin (UA) Negative Negative    Occult Blood UA Negative Negative    Nitrite, UA Negative Negative    Leukocytes, UA Negative Negative   Urinalysis Microscopic    Collection Time: 03/17/22  5:55 PM   Result Value Ref Range    RBC, UA 1 0 - 4 /hpf    WBC, UA 1 0 - 5 /hpf    Bacteria Rare None-Occ /hpf    Yeast, UA None  None    Squam Epithel, UA 1 /hpf    Hyaline Casts, UA 1 0-1/lpf /lpf    Microscopic Comment SEE COMMENT    POCT COVID-19 Rapid Screening    Collection Time: 03/17/22  6:47 PM   Result Value Ref Range    POC Rapid COVID Negative Negative     Acceptable Yes    POCT glucose    Collection Time: 03/17/22  7:44 PM   Result Value Ref Range    POCT Glucose >500 (H) 70 - 110 mg/dL   POCT glucose    Collection Time: 03/17/22  8:30 PM   Result Value Ref Range    POCT Glucose >500 (H) 70 - 110 mg/dL   POCT glucose    Collection Time: 03/17/22  8:44 PM   Result Value Ref Range    POCT Glucose >500 (H) 70 - 110 mg/dL   Phosphorus    Collection Time: 03/17/22  8:57 PM   Result Value Ref Range    Phosphorus 5.6 (H) 2.7 - 4.5 mg/dL   Lactic acid, plasma #2    Collection Time: 03/17/22  8:57 PM   Result Value Ref Range    Lactate (Lactic Acid) 2.5 (H) 0.5 - 2.2 mmol/L   Beta - Hydroxybutyrate, Serum    Collection Time: 03/17/22  8:57 PM   Result Value Ref Range    Beta-Hydroxybutyrate 5.3 (H) 0.0 - 0.5 mmol/L   Lipase    Collection Time: 03/17/22  8:57 PM   Result Value Ref Range    Lipase 59 4 - 60 U/L   Basic Metabolic Panel    Collection Time: 03/17/22  8:57 PM   Result Value Ref Range    Sodium 130 (L) 136 - 145 mmol/L    Potassium 5.8 (H) 3.5 - 5.1 mmol/L    Chloride 98 95 - 110 mmol/L    CO2 7 (LL) 23 - 29 mmol/L    Glucose 717 (HH) 70 - 110 mg/dL    BUN 38 (H) 8 - 23 mg/dL    Creatinine 1.9 (H) 0.5 - 1.4 mg/dL    Calcium 7.7 (L) 8.7 - 10.5 mg/dL    Anion Gap 25 (H) 8 - 16 mmol/L    eGFR if  27.5 (A) >60 mL/min/1.73 m^2    eGFR if non  23.9 (A) >60 mL/min/1.73 m^2   POCT glucose    Collection Time: 03/17/22  9:44 PM   Result Value Ref Range    POCT Glucose >500 (H) 70 - 110 mg/dL   ISTAT PROCEDURE    Collection Time: 03/17/22 10:26 PM   Result Value Ref Range    POC Glucose 621 (HH) 70 - 110 mg/dL    POC BUN 33 (H) 6 - 30 mg/dL    POC Creatinine 1.3 0.5 - 1.4 mg/dL    POC Sodium  132 (L) 136 - 145 mmol/L    POC Potassium 4.8 3.5 - 5.1 mmol/L    POC Chloride 105 95 - 110 mmol/L    POC TCO2 (MEASURED) 9 (L) 23 - 29 mmol/L    POC Ionized Calcium 0.92 (L) 1.06 - 1.42 mmol/L    POC Hematocrit 41 36 - 54 %PCV    Sample ALKA    Basic Metabolic Panel    Collection Time: 03/17/22 10:33 PM   Result Value Ref Range    Sodium 131 (L) 136 - 145 mmol/L    Potassium 5.1 3.5 - 5.1 mmol/L    Chloride 99 95 - 110 mmol/L    CO2 7 (LL) 23 - 29 mmol/L    Glucose 655 (HH) 70 - 110 mg/dL    BUN 36 (H) 8 - 23 mg/dL    Creatinine 2.0 (H) 0.5 - 1.4 mg/dL    Calcium 7.7 (L) 8.7 - 10.5 mg/dL    Anion Gap 25 (H) 8 - 16 mmol/L    eGFR if African American 25.9 (A) >60 mL/min/1.73 m^2    eGFR if non African American 22.4 (A) >60 mL/min/1.73 m^2   Phosphorus    Collection Time: 03/18/22 12:05 AM   Result Value Ref Range    Phosphorus 3.0 2.7 - 4.5 mg/dL   Basic Metabolic Panel    Collection Time: 03/18/22 12:05 AM   Result Value Ref Range    Sodium 134 (L) 136 - 145 mmol/L    Potassium 4.7 3.5 - 5.1 mmol/L    Chloride 103 95 - 110 mmol/L    CO2 8 (LL) 23 - 29 mmol/L    Glucose 439 (H) 70 - 110 mg/dL    BUN 33 (H) 8 - 23 mg/dL    Creatinine 1.9 (H) 0.5 - 1.4 mg/dL    Calcium 8.2 (L) 8.7 - 10.5 mg/dL    Anion Gap 23 (H) 8 - 16 mmol/L    eGFR if  27.5 (A) >60 mL/min/1.73 m^2    eGFR if non  23.9 (A) >60 mL/min/1.73 m^2   Procalcitonin    Collection Time: 03/18/22 12:05 AM   Result Value Ref Range    Procalcitonin 0.21 <0.25 ng/mL   POCT glucose    Collection Time: 03/18/22  1:02 AM   Result Value Ref Range    POCT Glucose 420 (H) 70 - 110 mg/dL   POCT glucose    Collection Time: 03/18/22  2:06 AM   Result Value Ref Range    POCT Glucose 353 (H) 70 - 110 mg/dL   Basic Metabolic Panel    Collection Time: 03/18/22  2:13 AM   Result Value Ref Range    Sodium 133 (L) 136 - 145 mmol/L    Potassium 4.5 3.5 - 5.1 mmol/L    Chloride 104 95 - 110 mmol/L    CO2 9 (LL) 23 - 29 mmol/L    Glucose 297 (H) 70 -  110 mg/dL    BUN 32 (H) 8 - 23 mg/dL    Creatinine 1.6 (H) 0.5 - 1.4 mg/dL    Calcium 7.8 (L) 8.7 - 10.5 mg/dL    Anion Gap 20 (H) 8 - 16 mmol/L    eGFR if African American 33.9 (A) >60 mL/min/1.73 m^2    eGFR if non  29.4 (A) >60 mL/min/1.73 m^2   POCT glucose    Collection Time: 03/18/22  3:09 AM   Result Value Ref Range    POCT Glucose 400 (H) 70 - 110 mg/dL   POCT glucose    Collection Time: 03/18/22  3:11 AM   Result Value Ref Range    POCT Glucose 356 (H) 70 - 110 mg/dL   Hepatic function panel    Collection Time: 03/18/22  3:50 AM   Result Value Ref Range    Total Protein 7.0 6.0 - 8.4 g/dL    Albumin 3.2 (L) 3.5 - 5.2 g/dL    Total Bilirubin 0.7 0.1 - 1.0 mg/dL    Bilirubin, Direct 0.3 0.1 - 0.3 mg/dL    AST 20 10 - 40 U/L    ALT 11 10 - 44 U/L    Alkaline Phosphatase 70 55 - 135 U/L   Magnesium    Collection Time: 03/18/22  3:50 AM   Result Value Ref Range    Magnesium 1.8 1.6 - 2.6 mg/dL   Phosphorus    Collection Time: 03/18/22  3:50 AM   Result Value Ref Range    Phosphorus 2.5 (L) 2.7 - 4.5 mg/dL   CBC auto differential    Collection Time: 03/18/22  3:50 AM   Result Value Ref Range    WBC 16.37 (H) 3.90 - 12.70 K/uL    RBC 4.62 4.00 - 5.40 M/uL    Hemoglobin 12.9 12.0 - 16.0 g/dL    Hematocrit 41.5 37.0 - 48.5 %    MCV 90 82 - 98 fL    MCH 27.9 27.0 - 31.0 pg    MCHC 31.1 (L) 32.0 - 36.0 g/dL    RDW 13.7 11.5 - 14.5 %    Platelets 240 150 - 450 K/uL    MPV 11.6 9.2 - 12.9 fL    Immature Granulocytes 0.5 0.0 - 0.5 %    Gran # (ANC) 14.3 (H) 1.8 - 7.7 K/uL    Immature Grans (Abs) 0.09 (H) 0.00 - 0.04 K/uL    Lymph # 1.0 1.0 - 4.8 K/uL    Mono # 1.0 0.3 - 1.0 K/uL    Eos # 0.0 0.0 - 0.5 K/uL    Baso # 0.02 0.00 - 0.20 K/uL    nRBC 0 0 /100 WBC    Gran % 87.2 (H) 38.0 - 73.0 %    Lymph % 6.1 (L) 18.0 - 48.0 %    Mono % 6.1 4.0 - 15.0 %    Eosinophil % 0.0 0.0 - 8.0 %    Basophil % 0.1 0.0 - 1.9 %    Differential Method Automated    Lactic acid, plasma    Collection Time: 03/18/22  3:50 AM    Result Value Ref Range    Lactate (Lactic Acid) 2.2 0.5 - 2.2 mmol/L   Basic Metabolic Panel    Collection Time: 03/18/22  3:50 AM   Result Value Ref Range    Sodium 134 (L) 136 - 145 mmol/L    Potassium 4.5 3.5 - 5.1 mmol/L    Chloride 104 95 - 110 mmol/L    CO2 13 (L) 23 - 29 mmol/L    Glucose 288 (H) 70 - 110 mg/dL    BUN 31 (H) 8 - 23 mg/dL    Creatinine 1.6 (H) 0.5 - 1.4 mg/dL    Calcium 8.1 (L) 8.7 - 10.5 mg/dL    Anion Gap 17 (H) 8 - 16 mmol/L    eGFR if African American 33.9 (A) >60 mL/min/1.73 m^2    eGFR if non  29.4 (A) >60 mL/min/1.73 m^2   POCT glucose    Collection Time: 03/18/22  4:10 AM   Result Value Ref Range    POCT Glucose 256 (H) 70 - 110 mg/dL   POCT glucose    Collection Time: 03/18/22  5:13 AM   Result Value Ref Range    POCT Glucose 269 (H) 70 - 110 mg/dL   POCT glucose    Collection Time: 03/18/22  6:21 AM   Result Value Ref Range    POCT Glucose 243 (H) 70 - 110 mg/dL   POCT glucose    Collection Time: 03/18/22  7:17 AM   Result Value Ref Range    POCT Glucose 286 (H) 70 - 110 mg/dL   POCT glucose    Collection Time: 03/18/22  8:23 AM   Result Value Ref Range    POCT Glucose 270 (H) 70 - 110 mg/dL   POCT glucose    Collection Time: 03/18/22  9:23 AM   Result Value Ref Range    POCT Glucose 277 (H) 70 - 110 mg/dL   Basic Metabolic Panel    Collection Time: 03/18/22  9:26 AM   Result Value Ref Range    Sodium 134 (L) 136 - 145 mmol/L    Potassium 4.5 3.5 - 5.1 mmol/L    Chloride 104 95 - 110 mmol/L    CO2 17 (L) 23 - 29 mmol/L    Glucose 238 (H) 70 - 110 mg/dL    BUN 29 (H) 8 - 23 mg/dL    Creatinine 1.5 (H) 0.5 - 1.4 mg/dL    Calcium 8.4 (L) 8.7 - 10.5 mg/dL    Anion Gap 13 8 - 16 mmol/L    eGFR if African American 36.6 (A) >60 mL/min/1.73 m^2    eGFR if non  31.8 (A) >60 mL/min/1.73 m^2   POCT glucose    Collection Time: 03/18/22 10:34 AM   Result Value Ref Range    POCT Glucose 278 (H) 70 - 110 mg/dL   POCT glucose    Collection Time: 03/18/22 11:33 AM    Result Value Ref Range    POCT Glucose 139 (H) 70 - 110 mg/dL   Basic Metabolic Panel    Collection Time: 03/18/22 12:22 PM   Result Value Ref Range    Sodium 136 136 - 145 mmol/L    Potassium 4.2 3.5 - 5.1 mmol/L    Chloride 107 95 - 110 mmol/L    CO2 20 (L) 23 - 29 mmol/L    Glucose 125 (H) 70 - 110 mg/dL    BUN 27 (H) 8 - 23 mg/dL    Creatinine 1.2 0.5 - 1.4 mg/dL    Calcium 8.2 (L) 8.7 - 10.5 mg/dL    Anion Gap 9 8 - 16 mmol/L    eGFR if African American 48.0 (A) >60 mL/min/1.73 m^2    eGFR if non  41.6 (A) >60 mL/min/1.73 m^2   POCT glucose    Collection Time: 03/18/22 12:41 PM   Result Value Ref Range    POCT Glucose 120 (H) 70 - 110 mg/dL   POCT glucose    Collection Time: 03/18/22  1:30 PM   Result Value Ref Range    POCT Glucose 117 (H) 70 - 110 mg/dL   POCT glucose    Collection Time: 03/18/22  3:42 PM   Result Value Ref Range    POCT Glucose 100 70 - 110 mg/dL         Significant Imaging: I have reviewed all pertinent imaging results/findings within the past 24 hours.    Imaging Results              CT Head Without Contrast (In process)                      X-Ray Chest AP Portable (Final result)  Result time 03/17/22 18:49:05      Final result by David Sánchez MD (03/17/22 18:49:05)                   Impression:      No acute abnormality.    Electronically signed by resident: Aissatou Matthews  Date:    03/17/2022  Time:    18:29    Electronically signed by: David Sánchez MD  Date:    03/17/2022  Time:    18:49               Narrative:    EXAMINATION:  XR CHEST AP PORTABLE    CLINICAL HISTORY:  Sepsis; hyperglycemia;.    TECHNIQUE:  Single frontal portable view of the chest was performed.    COMPARISON:  Chest radiograph 08/06/2020.  01/12/2018    FINDINGS:  The lungs are clear, with normal appearance of pulmonary vasculature and no pleural effusion or pneumothorax.    The cardiac silhouette is normal in size.  Atherosclerosis of the aortic arch.  The hilar and mediastinal contours are  unremarkable.    Bones are intact.  There is no evidence of free air beneath the hemidiaphragms.

## 2022-03-18 NOTE — ASSESSMENT & PLAN NOTE
--most recent echo 1/2018 showed LVEF 60-65%, normal LV diastolic function and normal RV function  --repeat echo pending

## 2022-03-18 NOTE — ASSESSMENT & PLAN NOTE
Lab Results   Component Value Date    HGBA1C >14.0 (H) 03/17/2022     --suspect secondary to medication non-compliance and dietary indiscretion  --home regimen aspart 15 units TIDWM & lantus 36 units QHS  --s/p insulin gtt per protocol   --transition to detemir 10 units b.i.d. and moderate dose sliding scale insulin  --continue to monitor blood glucose closely and titrate scheduled insulin as needed to inpatient goal  --poct bg x4  --diabetic diet  --no obvious source of infection and suspect leukocytosis is reactive; will hold on further abx at this time (s/p vanc/zosyn in ED)  --f/u blood cultures

## 2022-03-18 NOTE — ASSESSMENT & PLAN NOTE
--holding home lisinopril in setting of TAMARA  --holding home atenolol in favor of metoprolol in setting of a fib RVR

## 2022-03-18 NOTE — ASSESSMENT & PLAN NOTE
--suspect secondary to medication non-compliance  --insulin gtt per protocol   --20 units basal given  --BMP Q4  --will start dextrose containing fluids once serum glucose < 200  --transition to SQ once gap closed  --no obvious source of infection; will hold on further abx at this time (s/p vanc/zosyn in ED)  --f/u cultures

## 2022-03-18 NOTE — ED NOTES
"Pt awake, alert and cooperative. Sitting up and eating broth with daughter's assistance. Daughter states she thinks pt has "woken up" and is back to baseline.   "

## 2022-03-18 NOTE — SUBJECTIVE & OBJECTIVE
Past Medical History:   Diagnosis Date    Acquired hypothyroidism 4/25/2014    Anxiety     Aortic calcification 12/8/2016    X-Ray Chest-5/08/2014    Arthritis     Bilateral carotid artery disease 12/8/2016    US Carotid Bilateral-1/24/2013    CKD stage 3 due to type 2 diabetes mellitus 2/16/2017    Depression     Essential hypertension     Fever blister     GERD (gastroesophageal reflux disease)     Glaucoma 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 robledo)     Hyperlipidemia LDL goal <70 2/16/2017    Keloid cicatrix     Mixed stress and urge urinary incontinence 2/16/2017    Morbid obesity with BMI of 40.0-44.9, adult 12/8/2016    Ocular migraine 1/24/2013    Type 2 diabetes mellitus with hyperglycemia, with long-term current use of insulin        Past Surgical History:   Procedure Laterality Date    CATARACT EXTRACTION W/  INTRAOCULAR LENS IMPLANT Right 05/30/2012        CATARACT EXTRACTION W/  INTRAOCULAR LENS IMPLANT Left 05/26/2010        CHOLECYSTECTOMY      COLONOSCOPY N/A 12/20/2019    Procedure: COLONOSCOPY;  Surgeon: Higinio Gilbert MD;  Location: [address]);  Service: Endoscopy;  Laterality: N/A;  patient to call back to schedule Colonoscopy once she schedules Cardiology Clearance [id number]  8/30/19 Low CV risk for colonospcopy per   patient requesting  but can't wait for his next available due to rectal bleeding    COLONOSCOPY W/ POLYPECTOMY  04/20/2015    ESOPHAGOGASTRODUODENOSCOPY  04/20/2015    HYSTERECTOMY      Partial    JOINT REPLACEMENT      knee replacement right      TONSILLECTOMY, ADENOIDECTOMY      tonsillectomy only       Review of patient's allergies indicates:   Allergen Reactions    Contac 12 hour allergy Anaphylaxis and Hives    Diphenhydramine hcl Shortness Of Breath     Other reaction(s): Shortness of breath    Ciprofloxacin (bulk) Nausea And Vomiting    (d)-limonene flavor Hives     Other reaction(s): Shortness of breath     Anti-hyst Other (See Comments)    Antihistamines - alkylamine Hives    Ciprocinonide      Other reaction(s): Stomach upset    Codeine      bad reaction    Contact metal agent      Other reaction(s): Hives    Glipizide Hives    Hydroxyzine      Unknown    Iodinated contrast media Other (See Comments)    Nitrofuran analogues     Penicillin      Other reaction(s): Rash    Propoxyphene Itching    Statins-hmg-coa reductase inhibitors      Nausea to all statins    Doxycycline Rash    Penicillins Rash    Sulfa (sulfonamide antibiotics) Rash    Sulfacetamide sodium-urea Rash       Family History       Problem Relation (Age of Onset)    Cancer Mother    Colon cancer Mother    Glaucoma Father    Heart attack Father (93), Maternal Grandmother    Heart disease Mother, Maternal Grandmother    Hematuria Brother    Hypertension Maternal Grandmother    Nephrolithiasis Father    No Known Problems Daughter    Stroke Father    Uterine cancer Daughter          Tobacco Use    Smoking status: Never Smoker    Smokeless tobacco: Never Used   Substance and Sexual Activity    Alcohol use: No    Drug use: No    Sexual activity: Never      Review of Systems   Reason unable to perform ROS: limited secondary to encephalopathy.   Constitutional:  Positive for appetite change.   Gastrointestinal:  Positive for abdominal pain, nausea and vomiting.   Endocrine: Positive for polydipsia and polyuria.   Genitourinary:  Positive for dysuria and frequency.   Psychiatric/Behavioral:  Positive for confusion.    Objective:     Vital Signs (Most Recent):  Temp: 98.2 °F (36.8 °C) (03/17/22 2046)  Pulse: (!) 122 (03/17/22 2132)  Resp: (!) 21 (03/17/22 2132)  BP: (!) 153/64 (03/17/22 2132)  SpO2: 98 % (03/17/22 2132)   Vital Signs (24h Range):  Temp:  [95.6 °F (35.3 °C)-98.2 °F (36.8 °C)] 98.2 °F (36.8 °C)  Pulse:  [] 122  Resp:  [18-24] 21  SpO2:  [95 %-98 %] 98 %  BP: (104-177)/() 153/64   Weight: 88 kg (194 lb 0.1 oz)  Body mass index is 40.55  kg/m².      Intake/Output Summary (Last 24 hours) at 3/17/2022 2302  Last data filed at 3/17/2022 2225  Gross per 24 hour   Intake 2350 ml   Output --   Net 2350 ml       Physical Exam  Vitals and nursing note reviewed.   Constitutional:       General: She is not in acute distress.     Appearance: She is ill-appearing.   HENT:      Head: Normocephalic and atraumatic.      Right Ear: External ear normal.      Left Ear: External ear normal.      Nose: Nose normal.      Mouth/Throat:      Mouth: Mucous membranes are moist.      Pharynx: Oropharynx is clear.   Eyes:      Conjunctiva/sclera: Conjunctivae normal.   Cardiovascular:      Rate and Rhythm: Tachycardia present. Rhythm irregular.      Pulses: Normal pulses.      Heart sounds: Normal heart sounds.   Pulmonary:      Effort: Pulmonary effort is normal. No respiratory distress.      Breath sounds: Normal breath sounds. No wheezing.   Abdominal:      General: Abdomen is flat. Bowel sounds are normal. There is no distension.      Palpations: Abdomen is soft.      Tenderness: There is no abdominal tenderness.   Musculoskeletal:         General: No swelling or deformity. Normal range of motion.      Cervical back: Normal range of motion and neck supple.   Skin:     General: Skin is warm and dry.   Neurological:      General: No focal deficit present.      Mental Status: She is disoriented.      GCS: GCS eye subscore is 4. GCS verbal subscore is 4. GCS motor subscore is 6.       Vents:     Lines/Drains/Airways       Drain  Duration             Female External Urinary Catheter 03/17/22 1819 <1 day              Peripheral Intravenous Line  Duration                  Peripheral IV - Single Lumen 03/17/22 1624 20 G Left Antecubital <1 day         Peripheral IV - Single Lumen 03/17/22 1933 20 G Left Hand <1 day                  Significant Labs:    CBC/Anemia Profile:  Recent Labs   Lab 03/17/22  1703 03/17/22  2226   WBC 15.61*  --    HGB 14.0  --    HCT 47.0 41      --    MCV 93  --    RDW 14.0  --         Chemistries:  Recent Labs   Lab 03/17/22  1703 03/17/22 2057   * 130*   K 5.8* 5.8*   CL 91* 98   CO2 9* 7*   BUN 40* 38*   CREATININE 2.3* 1.9*   CALCIUM 9.0 7.7*   ALBUMIN 3.5  --    PROT 7.8  --    BILITOT 0.9  --    ALKPHOS 90  --    ALT 11  --    AST 12  --    MG 2.4  --    PHOS 7.0* 5.6*       All pertinent labs within the past 24 hours have been reviewed.    Significant Imaging: I have reviewed all pertinent imaging results/findings within the past 24 hours.

## 2022-03-18 NOTE — ASSESSMENT & PLAN NOTE
--creatinine on admission 2.3; baseline 0.8  --suspect pre-renal in setting of DKA  --s/p 3L fluid resuscitation in ED  --will investigate further if renal function does not improve with fluids  --holding home lisinopril

## 2022-03-18 NOTE — PROGRESS NOTES
"[facility] ([address] Medicine  Progress Note    Patient Name: Courtney Engle  MRN: [id number]  Patient Class: IP- Inpatient   Admission Date: 3/17/2022  Length of Stay: 1 days  Attending Physician: Emma Garcia MD  Primary Care Provider: Vishal Ulloa MD        Subjective:     Principal Problem:Diabetes mellitus with ketoacidosis and lactic acidosis but without coma        HPI:  Patient is a 86 y.o. female with significant past medical history of IDDM, HTN, HLD, diastolic dysfunction (most recent echo 2018 normal), CKD 3, hypothyroidism and GERD presented to hospital complaining of altered mental status. HPI obtained from the daughter at bedside. Daughter states that for the past few weeks, patient has woken up with slurred speech that has improved as the day progressed. Last week the patient told the daughter that she "didn't feel well" but did not elaborate on specific symptoms. Earlier this week, the patient started complaining of increased thirst (for which she was requesting soda to be brought to her) and increased urination with dysuria (though patient attributed dysuria to a scratch on her vagina). She also complained of "kidney issues" though did not elaborate on specific symptoms. The daughter is unable to confirm whether or not the patient has been compliant with medications, including insulin, however she suspects that the patient has not been compliant as the patient's other daughter reported "several boxes of medication" in the patient's refrigerator (daughter at bedside does not know which medications they were). The patient had been resistant to calling her PCP or seeking medical care for the past two weeks, however was finally able to be brought to the ED today by her daughter. At present the patient is encephalopathic, but only complaint is of epigastric pain. Daughter reports episode of N/V within the past few days.      Work up in the ED revealed a leukocytosis of " 15, TAMARA with hyperkalemia (creatinine 2.3, baseline 0.8), lactic of 3.8 and labs consistent with DKA (pH 7.1, bhb 5.3, bicarb 9 w/ glucose 800). Additionally, the patient was found to be in new onset a fib RVR (rates up to 160 in the ED). Critical Care Medicine has been consulted for DKA & A fib RVR.      Overview/Hospital Course:  ICU Course:  Patient admitted to [facility] evening of 3/17 for DKA and new onset a fib RVR. Started on insulin per DKA protocol. Better rate control achieved with initiation of metoprolol. Encephalopathy improved; gap closed. Will transition to SQ insulin. Patient is stable for stepdown.     Hospital Medicine Course:  Pt stepped down to medicine 3/18.           Interval History:   Pt stepped own from MICU. Daughter at bedside.  Daughter describes approximately 1-2 week history in which patient is not quite herself.  Of note, earlier this week daughter notes that patient complained of extreme thirst and issues with kidney manifesting as urinary frequency.  During this time daughter believes that patient had copious amounts of soda to curb her thirst.    Patient, initially sleeping, arouses with ease to name.  Patient denies any acute complaints and notes that her abdominal pain and nausea have resolved.  She notes that she has never really been compliant with a diabetic diet and that she sometimes does not take her medications.     Review of Systems   Constitutional:  Negative for chills and diaphoresis.   HENT:  Negative for congestion, sneezing, sore throat and trouble swallowing.    Eyes:  Negative for visual disturbance.   Respiratory:  Negative for cough, shortness of breath and wheezing.    Cardiovascular:  Negative for chest pain, palpitations and leg swelling.   Gastrointestinal:  Negative for abdominal pain, constipation, diarrhea, nausea and vomiting.   Genitourinary:  Negative for dysuria.   Musculoskeletal:  Negative for arthralgias and myalgias.   Skin:  Negative for color change.    Neurological:  Negative for dizziness, light-headedness and headaches.   Psychiatric/Behavioral:  Negative for decreased concentration.      Objective:     Vital Signs (Most Recent):  Temp: 98.8 °F (37.1 °C) (03/18/22 0711)  Pulse: (!) 116 (03/18/22 1542)  Resp: 19 (03/18/22 1231)  BP: (!) 155/81 (03/18/22 1231)  SpO2: 96 % (03/18/22 1231)   Vital Signs (24h Range):  Temp:  [95.6 °F (35.3 °C)-98.8 °F (37.1 °C)] 98.8 °F (37.1 °C)  Pulse:  [] 116  Resp:  [18-24] 19  SpO2:  [95 %-99 %] 96 %  BP: (104-180)/() 155/81     Weight: 88 kg (194 lb 0.1 oz)  Body mass index is 40.55 kg/m².    Intake/Output Summary (Last 24 hours) at 3/18/2022 1553  Last data filed at 3/18/2022 0916  Gross per 24 hour   Intake 2400 ml   Output --   Net 2400 ml      Physical Exam  Vitals and nursing note reviewed.   Constitutional:       General: She is not in acute distress.     Appearance: She is not toxic-appearing or diaphoretic.   HENT:      Head: Normocephalic and atraumatic.      Right Ear: External ear normal.      Left Ear: External ear normal.      Nose: No congestion or rhinorrhea.      Mouth/Throat:      Mouth: Mucous membranes are dry.      Pharynx: No oropharyngeal exudate or posterior oropharyngeal erythema.   Eyes:      General: No scleral icterus.        Right eye: No discharge.         Left eye: No discharge.   Cardiovascular:      Rate and Rhythm: Tachycardia present.      Heart sounds: No murmur heard.  Pulmonary:      Effort: Pulmonary effort is normal.      Breath sounds: Normal breath sounds. No wheezing, rhonchi or rales.   Abdominal:      General: Bowel sounds are normal.      Palpations: Abdomen is soft.      Tenderness: There is no abdominal tenderness.   Musculoskeletal:      Cervical back: Normal range of motion and neck supple.      Right lower leg: No edema.      Left lower leg: No edema.   Skin:     General: Skin is warm and dry.   Neurological:      Mental Status: She is alert and oriented to person,  place, and time.   Psychiatric:         Mood and Affect: Mood normal.         Behavior: Behavior normal.       Significant Labs: All pertinent labs within the past 24 hours have been reviewed.    Recent Results (from the past 24 hour(s))   POCT glucose    Collection Time: 03/17/22  4:52 PM   Result Value Ref Range    POCT Glucose >500 (H) 70 - 110 mg/dL   CBC auto differential    Collection Time: 03/17/22  5:03 PM   Result Value Ref Range    WBC 15.61 (H) 3.90 - 12.70 K/uL    RBC 5.07 4.00 - 5.40 M/uL    Hemoglobin 14.0 12.0 - 16.0 g/dL    Hematocrit 47.0 37.0 - 48.5 %    MCV 93 82 - 98 fL    MCH 27.6 27.0 - 31.0 pg    MCHC 29.8 (L) 32.0 - 36.0 g/dL    RDW 14.0 11.5 - 14.5 %    Platelets 317 150 - 450 K/uL    MPV 11.9 9.2 - 12.9 fL    Immature Granulocytes 0.4 0.0 - 0.5 %    Gran # (ANC) 14.7 (H) 1.8 - 7.7 K/uL    Immature Grans (Abs) 0.07 (H) 0.00 - 0.04 K/uL    Lymph # 0.3 (L) 1.0 - 4.8 K/uL    Mono # 0.5 0.3 - 1.0 K/uL    Eos # 0.0 0.0 - 0.5 K/uL    Baso # 0.02 0.00 - 0.20 K/uL    nRBC 0 0 /100 WBC    Gran % 94.4 (H) 38.0 - 73.0 %    Lymph % 2.1 (L) 18.0 - 48.0 %    Mono % 3.0 (L) 4.0 - 15.0 %    Eosinophil % 0.0 0.0 - 8.0 %    Basophil % 0.1 0.0 - 1.9 %    Differential Method Automated    Comprehensive metabolic panel    Collection Time: 03/17/22  5:03 PM   Result Value Ref Range    Sodium 128 (L) 136 - 145 mmol/L    Potassium 5.8 (H) 3.5 - 5.1 mmol/L    Chloride 91 (L) 95 - 110 mmol/L    CO2 9 (LL) 23 - 29 mmol/L    Glucose 807 (HH) 70 - 110 mg/dL    BUN 40 (H) 8 - 23 mg/dL    Creatinine 2.3 (H) 0.5 - 1.4 mg/dL    Calcium 9.0 8.7 - 10.5 mg/dL    Total Protein 7.8 6.0 - 8.4 g/dL    Albumin 3.5 3.5 - 5.2 g/dL    Total Bilirubin 0.9 0.1 - 1.0 mg/dL    Alkaline Phosphatase 90 55 - 135 U/L    AST 12 10 - 40 U/L    ALT 11 10 - 44 U/L    Anion Gap 28 (H) 8 - 16 mmol/L    eGFR if African American 21.8 (A) >60 mL/min/1.73 m^2    eGFR if non  18.9 (A) >60 mL/min/1.73 m^2   Lactic acid, plasma #1     Collection Time: 03/17/22  5:03 PM   Result Value Ref Range    Lactate (Lactic Acid) 3.8 (HH) 0.5 - 2.2 mmol/L   Magnesium    Collection Time: 03/17/22  5:03 PM   Result Value Ref Range    Magnesium 2.4 1.6 - 2.6 mg/dL   TSH    Collection Time: 03/17/22  5:03 PM   Result Value Ref Range    TSH 0.147 (L) 0.400 - 4.000 uIU/mL   Brain natriuretic peptide    Collection Time: 03/17/22  5:03 PM   Result Value Ref Range     (H) 0 - 99 pg/mL   Troponin I    Collection Time: 03/17/22  5:03 PM   Result Value Ref Range    Troponin I 0.012 0.000 - 0.026 ng/mL   T4, Free    Collection Time: 03/17/22  5:03 PM   Result Value Ref Range    Free T4 0.95 0.71 - 1.51 ng/dL   Phosphorus    Collection Time: 03/17/22  5:03 PM   Result Value Ref Range    Phosphorus 7.0 (H) 2.7 - 4.5 mg/dL   ISTAT PROCEDURE    Collection Time: 03/17/22  5:10 PM   Result Value Ref Range    POC PH 7.120 (L) 7.35 - 7.45    POC PCO2 33.6 (L) 35 - 45 mmHg    POC PO2 36 (L) 40 - 60 mmHg    POC HCO3 10.9 (L) 24 - 28 mmol/L    POC BE -18 -2 to 2 mmol/L    POC SATURATED O2 51 (L) 95 - 100 %    POC TCO2 12 (L) 24 - 29 mmol/L    Sample VENOUS     Site Other     Allens Test N/A     DelSys Room Air     Mode SPONT    Blood culture x two cultures. Draw prior to antibiotics.    Collection Time: 03/17/22  5:18 PM    Specimen: Peripheral, Upper Arm, Left; Blood   Result Value Ref Range    Blood Culture, Routine No Growth to date    Procalcitonin    Collection Time: 03/17/22  5:36 PM   Result Value Ref Range    Procalcitonin 0.15 <0.25 ng/mL   Hemoglobin A1C    Collection Time: 03/17/22  5:36 PM   Result Value Ref Range    Hemoglobin A1C >14.0 (H) 4.0 - 5.6 %    Estimated Avg Glucose Unable to calculate 68 - 131 mg/dL   Blood culture x two cultures. Draw prior to antibiotics.    Collection Time: 03/17/22  5:45 PM    Specimen: Other (Specify); Blood   Result Value Ref Range    Blood Culture, Routine No Growth to date    Urinalysis, Reflex to Urine Culture Urine, Clean  Catch    Collection Time: 03/17/22  5:55 PM    Specimen: Urine   Result Value Ref Range    Specimen UA Urine, Clean Catch     Color, UA Straw Yellow, Straw, Jenny    Appearance, UA Clear Clear    pH, UA 5.0 5.0 - 8.0    Specific Gravity, UA 1.020 1.005 - 1.030    Protein, UA Negative Negative    Glucose, UA 3+ (A) Negative    Ketones, UA 3+ (A) Negative    Bilirubin (UA) Negative Negative    Occult Blood UA Negative Negative    Nitrite, UA Negative Negative    Leukocytes, UA Negative Negative   Urinalysis Microscopic    Collection Time: 03/17/22  5:55 PM   Result Value Ref Range    RBC, UA 1 0 - 4 /hpf    WBC, UA 1 0 - 5 /hpf    Bacteria Rare None-Occ /hpf    Yeast, UA None None    Squam Epithel, UA 1 /hpf    Hyaline Casts, UA 1 0-1/lpf /lpf    Microscopic Comment SEE COMMENT    POCT COVID-19 Rapid Screening    Collection Time: 03/17/22  6:47 PM   Result Value Ref Range    POC Rapid COVID Negative Negative     Acceptable Yes    POCT glucose    Collection Time: 03/17/22  7:44 PM   Result Value Ref Range    POCT Glucose >500 (H) 70 - 110 mg/dL   POCT glucose    Collection Time: 03/17/22  8:30 PM   Result Value Ref Range    POCT Glucose >500 (H) 70 - 110 mg/dL   POCT glucose    Collection Time: 03/17/22  8:44 PM   Result Value Ref Range    POCT Glucose >500 (H) 70 - 110 mg/dL   Phosphorus    Collection Time: 03/17/22  8:57 PM   Result Value Ref Range    Phosphorus 5.6 (H) 2.7 - 4.5 mg/dL   Lactic acid, plasma #2    Collection Time: 03/17/22  8:57 PM   Result Value Ref Range    Lactate (Lactic Acid) 2.5 (H) 0.5 - 2.2 mmol/L   Beta - Hydroxybutyrate, Serum    Collection Time: 03/17/22  8:57 PM   Result Value Ref Range    Beta-Hydroxybutyrate 5.3 (H) 0.0 - 0.5 mmol/L   Lipase    Collection Time: 03/17/22  8:57 PM   Result Value Ref Range    Lipase 59 4 - 60 U/L   Basic Metabolic Panel    Collection Time: 03/17/22  8:57 PM   Result Value Ref Range    Sodium 130 (L) 136 - 145 mmol/L    Potassium 5.8 (H) 3.5 -  5.1 mmol/L    Chloride 98 95 - 110 mmol/L    CO2 7 (LL) 23 - 29 mmol/L    Glucose 717 (HH) 70 - 110 mg/dL    BUN 38 (H) 8 - 23 mg/dL    Creatinine 1.9 (H) 0.5 - 1.4 mg/dL    Calcium 7.7 (L) 8.7 - 10.5 mg/dL    Anion Gap 25 (H) 8 - 16 mmol/L    eGFR if  27.5 (A) >60 mL/min/1.73 m^2    eGFR if non  23.9 (A) >60 mL/min/1.73 m^2   POCT glucose    Collection Time: 03/17/22  9:44 PM   Result Value Ref Range    POCT Glucose >500 (H) 70 - 110 mg/dL   ISTAT PROCEDURE    Collection Time: 03/17/22 10:26 PM   Result Value Ref Range    POC Glucose 621 (HH) 70 - 110 mg/dL    POC BUN 33 (H) 6 - 30 mg/dL    POC Creatinine 1.3 0.5 - 1.4 mg/dL    POC Sodium 132 (L) 136 - 145 mmol/L    POC Potassium 4.8 3.5 - 5.1 mmol/L    POC Chloride 105 95 - 110 mmol/L    POC TCO2 (MEASURED) 9 (L) 23 - 29 mmol/L    POC Ionized Calcium 0.92 (L) 1.06 - 1.42 mmol/L    POC Hematocrit 41 36 - 54 %PCV    Sample ALKA    Basic Metabolic Panel    Collection Time: 03/17/22 10:33 PM   Result Value Ref Range    Sodium 131 (L) 136 - 145 mmol/L    Potassium 5.1 3.5 - 5.1 mmol/L    Chloride 99 95 - 110 mmol/L    CO2 7 (LL) 23 - 29 mmol/L    Glucose 655 (HH) 70 - 110 mg/dL    BUN 36 (H) 8 - 23 mg/dL    Creatinine 2.0 (H) 0.5 - 1.4 mg/dL    Calcium 7.7 (L) 8.7 - 10.5 mg/dL    Anion Gap 25 (H) 8 - 16 mmol/L    eGFR if African American 25.9 (A) >60 mL/min/1.73 m^2    eGFR if non African American 22.4 (A) >60 mL/min/1.73 m^2   Phosphorus    Collection Time: 03/18/22 12:05 AM   Result Value Ref Range    Phosphorus 3.0 2.7 - 4.5 mg/dL   Basic Metabolic Panel    Collection Time: 03/18/22 12:05 AM   Result Value Ref Range    Sodium 134 (L) 136 - 145 mmol/L    Potassium 4.7 3.5 - 5.1 mmol/L    Chloride 103 95 - 110 mmol/L    CO2 8 (LL) 23 - 29 mmol/L    Glucose 439 (H) 70 - 110 mg/dL    BUN 33 (H) 8 - 23 mg/dL    Creatinine 1.9 (H) 0.5 - 1.4 mg/dL    Calcium 8.2 (L) 8.7 - 10.5 mg/dL    Anion Gap 23 (H) 8 - 16 mmol/L    eGFR if African  American 27.5 (A) >60 mL/min/1.73 m^2    eGFR if non  23.9 (A) >60 mL/min/1.73 m^2   Procalcitonin    Collection Time: 03/18/22 12:05 AM   Result Value Ref Range    Procalcitonin 0.21 <0.25 ng/mL   POCT glucose    Collection Time: 03/18/22  1:02 AM   Result Value Ref Range    POCT Glucose 420 (H) 70 - 110 mg/dL   POCT glucose    Collection Time: 03/18/22  2:06 AM   Result Value Ref Range    POCT Glucose 353 (H) 70 - 110 mg/dL   Basic Metabolic Panel    Collection Time: 03/18/22  2:13 AM   Result Value Ref Range    Sodium 133 (L) 136 - 145 mmol/L    Potassium 4.5 3.5 - 5.1 mmol/L    Chloride 104 95 - 110 mmol/L    CO2 9 (LL) 23 - 29 mmol/L    Glucose 297 (H) 70 - 110 mg/dL    BUN 32 (H) 8 - 23 mg/dL    Creatinine 1.6 (H) 0.5 - 1.4 mg/dL    Calcium 7.8 (L) 8.7 - 10.5 mg/dL    Anion Gap 20 (H) 8 - 16 mmol/L    eGFR if African American 33.9 (A) >60 mL/min/1.73 m^2    eGFR if non  29.4 (A) >60 mL/min/1.73 m^2   POCT glucose    Collection Time: 03/18/22  3:09 AM   Result Value Ref Range    POCT Glucose 400 (H) 70 - 110 mg/dL   POCT glucose    Collection Time: 03/18/22  3:11 AM   Result Value Ref Range    POCT Glucose 356 (H) 70 - 110 mg/dL   Hepatic function panel    Collection Time: 03/18/22  3:50 AM   Result Value Ref Range    Total Protein 7.0 6.0 - 8.4 g/dL    Albumin 3.2 (L) 3.5 - 5.2 g/dL    Total Bilirubin 0.7 0.1 - 1.0 mg/dL    Bilirubin, Direct 0.3 0.1 - 0.3 mg/dL    AST 20 10 - 40 U/L    ALT 11 10 - 44 U/L    Alkaline Phosphatase 70 55 - 135 U/L   Magnesium    Collection Time: 03/18/22  3:50 AM   Result Value Ref Range    Magnesium 1.8 1.6 - 2.6 mg/dL   Phosphorus    Collection Time: 03/18/22  3:50 AM   Result Value Ref Range    Phosphorus 2.5 (L) 2.7 - 4.5 mg/dL   CBC auto differential    Collection Time: 03/18/22  3:50 AM   Result Value Ref Range    WBC 16.37 (H) 3.90 - 12.70 K/uL    RBC 4.62 4.00 - 5.40 M/uL    Hemoglobin 12.9 12.0 - 16.0 g/dL    Hematocrit 41.5 37.0 - 48.5 %     MCV 90 82 - 98 fL    MCH 27.9 27.0 - 31.0 pg    MCHC 31.1 (L) 32.0 - 36.0 g/dL    RDW 13.7 11.5 - 14.5 %    Platelets 240 150 - 450 K/uL    MPV 11.6 9.2 - 12.9 fL    Immature Granulocytes 0.5 0.0 - 0.5 %    Gran # (ANC) 14.3 (H) 1.8 - 7.7 K/uL    Immature Grans (Abs) 0.09 (H) 0.00 - 0.04 K/uL    Lymph # 1.0 1.0 - 4.8 K/uL    Mono # 1.0 0.3 - 1.0 K/uL    Eos # 0.0 0.0 - 0.5 K/uL    Baso # 0.02 0.00 - 0.20 K/uL    nRBC 0 0 /100 WBC    Gran % 87.2 (H) 38.0 - 73.0 %    Lymph % 6.1 (L) 18.0 - 48.0 %    Mono % 6.1 4.0 - 15.0 %    Eosinophil % 0.0 0.0 - 8.0 %    Basophil % 0.1 0.0 - 1.9 %    Differential Method Automated    Lactic acid, plasma    Collection Time: 03/18/22  3:50 AM   Result Value Ref Range    Lactate (Lactic Acid) 2.2 0.5 - 2.2 mmol/L   Basic Metabolic Panel    Collection Time: 03/18/22  3:50 AM   Result Value Ref Range    Sodium 134 (L) 136 - 145 mmol/L    Potassium 4.5 3.5 - 5.1 mmol/L    Chloride 104 95 - 110 mmol/L    CO2 13 (L) 23 - 29 mmol/L    Glucose 288 (H) 70 - 110 mg/dL    BUN 31 (H) 8 - 23 mg/dL    Creatinine 1.6 (H) 0.5 - 1.4 mg/dL    Calcium 8.1 (L) 8.7 - 10.5 mg/dL    Anion Gap 17 (H) 8 - 16 mmol/L    eGFR if African American 33.9 (A) >60 mL/min/1.73 m^2    eGFR if non  29.4 (A) >60 mL/min/1.73 m^2   POCT glucose    Collection Time: 03/18/22  4:10 AM   Result Value Ref Range    POCT Glucose 256 (H) 70 - 110 mg/dL   POCT glucose    Collection Time: 03/18/22  5:13 AM   Result Value Ref Range    POCT Glucose 269 (H) 70 - 110 mg/dL   POCT glucose    Collection Time: 03/18/22  6:21 AM   Result Value Ref Range    POCT Glucose 243 (H) 70 - 110 mg/dL   POCT glucose    Collection Time: 03/18/22  7:17 AM   Result Value Ref Range    POCT Glucose 286 (H) 70 - 110 mg/dL   POCT glucose    Collection Time: 03/18/22  8:23 AM   Result Value Ref Range    POCT Glucose 270 (H) 70 - 110 mg/dL   POCT glucose    Collection Time: 03/18/22  9:23 AM   Result Value Ref Range    POCT Glucose 277 (H)  70 - 110 mg/dL   Basic Metabolic Panel    Collection Time: 03/18/22  9:26 AM   Result Value Ref Range    Sodium 134 (L) 136 - 145 mmol/L    Potassium 4.5 3.5 - 5.1 mmol/L    Chloride 104 95 - 110 mmol/L    CO2 17 (L) 23 - 29 mmol/L    Glucose 238 (H) 70 - 110 mg/dL    BUN 29 (H) 8 - 23 mg/dL    Creatinine 1.5 (H) 0.5 - 1.4 mg/dL    Calcium 8.4 (L) 8.7 - 10.5 mg/dL    Anion Gap 13 8 - 16 mmol/L    eGFR if African American 36.6 (A) >60 mL/min/1.73 m^2    eGFR if non  31.8 (A) >60 mL/min/1.73 m^2   POCT glucose    Collection Time: 03/18/22 10:34 AM   Result Value Ref Range    POCT Glucose 278 (H) 70 - 110 mg/dL   POCT glucose    Collection Time: 03/18/22 11:33 AM   Result Value Ref Range    POCT Glucose 139 (H) 70 - 110 mg/dL   Basic Metabolic Panel    Collection Time: 03/18/22 12:22 PM   Result Value Ref Range    Sodium 136 136 - 145 mmol/L    Potassium 4.2 3.5 - 5.1 mmol/L    Chloride 107 95 - 110 mmol/L    CO2 20 (L) 23 - 29 mmol/L    Glucose 125 (H) 70 - 110 mg/dL    BUN 27 (H) 8 - 23 mg/dL    Creatinine 1.2 0.5 - 1.4 mg/dL    Calcium 8.2 (L) 8.7 - 10.5 mg/dL    Anion Gap 9 8 - 16 mmol/L    eGFR if African American 48.0 (A) >60 mL/min/1.73 m^2    eGFR if non  41.6 (A) >60 mL/min/1.73 m^2   POCT glucose    Collection Time: 03/18/22 12:41 PM   Result Value Ref Range    POCT Glucose 120 (H) 70 - 110 mg/dL   POCT glucose    Collection Time: 03/18/22  1:30 PM   Result Value Ref Range    POCT Glucose 117 (H) 70 - 110 mg/dL   POCT glucose    Collection Time: 03/18/22  3:42 PM   Result Value Ref Range    POCT Glucose 100 70 - 110 mg/dL         Significant Imaging: I have reviewed all pertinent imaging results/findings within the past 24 hours.    Imaging Results              CT Head Without Contrast (In process)                      X-Ray Chest AP Portable (Final result)  Result time 03/17/22 18:49:05      Final result by David Sánchez MD (03/17/22 18:49:05)                   Impression:       No acute abnormality.    Electronically signed by resident: Aissatou Matthews  Date:    03/17/2022  Time:    18:29    Electronically signed by: David Sánchez MD  Date:    03/17/2022  Time:    18:49               Narrative:    EXAMINATION:  XR CHEST AP PORTABLE    CLINICAL HISTORY:  Sepsis; hyperglycemia;.    TECHNIQUE:  Single frontal portable view of the chest was performed.    COMPARISON:  Chest radiograph 08/06/2020.  01/12/2018    FINDINGS:  The lungs are clear, with normal appearance of pulmonary vasculature and no pleural effusion or pneumothorax.    The cardiac silhouette is normal in size.  Atherosclerosis of the aortic arch.  The hilar and mediastinal contours are unremarkable.    Bones are intact.  There is no evidence of free air beneath the hemidiaphragms.                                          Assessment/Plan:      * Diabetes mellitus with ketoacidosis and lactic acidosis but without coma  Lab Results   Component Value Date    HGBA1C >14.0 (H) 03/17/2022     --suspect secondary to medication non-compliance and dietary indiscretion  --home regimen aspart 15 units TIDWM & lantus 36 units QHS  --s/p insulin gtt per protocol   --transition to detemir 10 units b.i.d. and moderate dose sliding scale insulin  --continue to monitor blood glucose closely and titrate scheduled insulin as needed to inpatient goal  --poct bg x4  --diabetic diet  --no obvious source of infection and suspect leukocytosis is reactive; will hold on further abx at this time (s/p vanc/zosyn in ED)  --f/u blood cultures    Type 2 diabetes mellitus without complication, with long-term current use of insulin  See diabetes mellitus with ketoacidosis and lactic acidosis but without coma      Acute renal failure superimposed on stage 3 chronic kidney disease  --creatinine on admission 2.3; baseline 0.8  --suspect pre-renal in setting of DKA  --s/p 3L fluid resuscitation in ED  --renal function improving with fluids  --holding home  lisinopril  --continue to trend creatinine daily       CKD stage 3 due to type 2 diabetes mellitus  See acute renal failure    Atrial fibrillation with RVR  --new onset, suspect due to acute illness with DKA  --recent thyroid studies show normal T4  --follow-up 2D echo  --K > 4, Mag > 2  --telemetry  --continue metoprolol for rate control  --will address anticoagulation if a fib sustains    [id number] Score: 4.         Diastolic dysfunction  --most recent echo 1/2018 showed LVEF 60-65%, normal LV diastolic function and normal RV function  --repeat echo pending, follow-up    Acute metabolic encephalopathy  -noted on admission  -improved w/ improved glycemic control  -at baseline upon step down  -follow-up head CT ordered upon admission      GERD (gastroesophageal reflux disease)  --continue home pepcid      Depression  Continue home Lexapro        Anxiety  Continue home Lexapro      Acquired hypothyroidism  --TSH low, however free T4 WNL  --continue home levothyroxine    Essential hypertension  --holding home lisinopril in setting of TAMARA  --holding home atenolol in favor of metoprolol in setting of a fib RVR        VTE Risk Mitigation (From admission, onward)         Ordered     heparin (porcine) injection 5,000 Units  Every 8 hours         03/17/22 2313     IP VTE HIGH RISK PATIENT  Once         03/17/22 2313     Place sequential compression device  Until discontinued         03/17/22 2313                Discharge Planning   FAY:      Code Status: Full Code   Is the patient medically ready for discharge?:     Reason for patient still in hospital (select all that apply): Patient trending condition                     Shalonda Parks MD  Department of Hospital Medicine   [facility] - Intensive Care ([place])

## 2022-03-18 NOTE — PROGRESS NOTES
"[facility] - Emergency Dept  Critical Care Medicine  Progress Note    Patient Name: Courtney Engle  MRN: [id number]  Admission Date: 3/17/2022  Hospital Length of Stay: 1 days  Code Status: Full Code  Attending Provider: Emma Garcia MD  Primary Care Provider: Vishal Ulloa MD   Principal Problem: Diabetes mellitus with ketoacidosis and lactic acidosis but without coma    Subjective:     HPI:  Patient is a 86 y.o. female with significant past medical history of IDDM, HTN, HLD, diastolic dysfunction (most recent echo 2018 normal), CKD 3, hypothyroidism and GERD presented to hospital complaining of altered mental status. HPI obtained from the daughter at bedside. Daughter states that for the past few weeks, patient has woken up with slurred speech that has improved as the day progressed. Last week the patient told the daughter that she "didn't feel well" but did not elaborate on specific symptoms. Earlier this week, the patient started complaining of increased thirst (for which she was requesting soda to be brought to her) and increased urination with dysuria (though patient attributed dysuria to a scratch on her vagina). She also complained of "kidney issues" though did not elaborate on specific symptoms. The daughter is unable to confirm whether or not the patient has been compliant with medications, including insulin, however she suspects that the patient has not been compliant as the patient's other daughter reported "several boxes of medication" in the patient's refrigerator (daughter at bedside does not know which medications they were). The patient had been resistant to calling her PCP or seeking medical care for the past two weeks, however was finally able to be brought to the ED today by her daughter. At present the patient is encephalopathic, but only complaint is of epigastric pain. Daughter reports episode of N/V within the past few days.     Work up in the ED revealed a leukocytosis of 15, TAMARA with " hyperkalemia (creatinine 2.3, baseline 0.8), lactic of 3.8 and labs consistent with DKA (pH 7.1, bhb 5.3, bicarb 9 w/ glucose 800). Additionally, the patient was found to be in new onset a fib RVR (rates up to 160 in the ED). Critical Care Medicine has been consulted for DKA & A fib RVR.         Hospital/ICU Course:  Patient admitted to [facility] evening of 3/17 for DKA and new onset a fib RVR. Started on insulin per DKA protocol. Better rate control achieved with initiation of metoprolol. Encephalopathy slowly improving.       Interval History/Significant Events: Patient admitted to [facility] evening of 3/17 for DKA and new onset a fib RVR. Started on insulin per DKA protocol. Better rate control achieved with initiation of metoprolol. Encephalopathy slowly improving.     Review of Systems   Reason unable to perform ROS: limited secondary to encephalopathy.   Gastrointestinal:  Positive for abdominal pain and nausea. Negative for vomiting.   Endocrine: Positive for polydipsia and polyuria.   Genitourinary:  Positive for dysuria and frequency.   Psychiatric/Behavioral:  Positive for confusion.    Objective:     Vital Signs (Most Recent):  Temp: 98.2 °F (36.8 °C) (03/17/22 2046)  Pulse: (!) 122 (03/18/22 0233)  Resp: 18 (03/18/22 0233)  BP: 138/78 (03/18/22 0233)  SpO2: 95 % (03/18/22 0233)   Vital Signs (24h Range):  Temp:  [95.6 °F (35.3 °C)-98.2 °F (36.8 °C)] 98.2 °F (36.8 °C)  Pulse:  [] 122  Resp:  [18-24] 18  SpO2:  [95 %-98 %] 95 %  BP: (104-177)/() 138/78   Weight: 88 kg (194 lb 0.1 oz)  Body mass index is 40.55 kg/m².      Intake/Output Summary (Last 24 hours) at 3/18/2022 0301  Last data filed at 3/17/2022 2225  Gross per 24 hour   Intake 2350 ml   Output --   Net 2350 ml       Physical Exam  Vitals and nursing note reviewed.   Constitutional:       General: She is not in acute distress.     Appearance: She is ill-appearing.   HENT:      Head: Normocephalic and atraumatic.      Right Ear: External ear  normal.      Left Ear: External ear normal.      Nose: Nose normal.      Mouth/Throat:      Mouth: Mucous membranes are moist.      Pharynx: Oropharynx is clear.   Eyes:      Conjunctiva/sclera: Conjunctivae normal.   Cardiovascular:      Rate and Rhythm: Tachycardia present. Rhythm irregular.      Pulses: Normal pulses.      Heart sounds: Normal heart sounds.   Pulmonary:      Effort: Pulmonary effort is normal. No respiratory distress.      Breath sounds: Normal breath sounds. No wheezing.   Abdominal:      General: Abdomen is flat. Bowel sounds are normal. There is no distension.      Palpations: Abdomen is soft.      Tenderness: There is no abdominal tenderness.   Musculoskeletal:         General: No swelling or deformity. Normal range of motion.      Cervical back: Normal range of motion and neck supple.   Skin:     General: Skin is warm and dry.   Neurological:      General: No focal deficit present.      Mental Status: She is disoriented.      GCS: GCS eye subscore is 4. GCS verbal subscore is 4. GCS motor subscore is 6.       Vents:     Lines/Drains/Airways       Drain  Duration             Female External Urinary Catheter 03/17/22 1819 <1 day              Peripheral Intravenous Line  Duration                  Peripheral IV - Single Lumen 03/17/22 1624 20 G Left Antecubital <1 day         Peripheral IV - Single Lumen 03/17/22 1933 20 G Left Hand <1 day                  Significant Labs:    CBC/Anemia Profile:  Recent Labs   Lab 03/17/22  1703 03/17/22  2226   WBC 15.61*  --    HGB 14.0  --    HCT 47.0 41     --    MCV 93  --    RDW 14.0  --         Chemistries:  Recent Labs   Lab 03/17/22  1703 03/17/22  2057 03/17/22  2233 03/18/22  0005 03/18/22  0213   * 130* 131* 134* 133*   K 5.8* 5.8* 5.1 4.7 4.5   CL 91* 98 99 103 104   CO2 9* 7* 7* 8*  --    BUN 40* 38* 36* 33*  --    CREATININE 2.3* 1.9* 2.0* 1.9*  --    CALCIUM 9.0 7.7* 7.7* 8.2* 7.8*   ALBUMIN 3.5  --   --   --   --    PROT 7.8  --    --   --   --    BILITOT 0.9  --   --   --   --    ALKPHOS 90  --   --   --   --    ALT 11  --   --   --   --    AST 12  --   --   --   --    MG 2.4  --   --   --   --    PHOS 7.0* 5.6*  --  3.0  --        All pertinent labs within the past 24 hours have been reviewed.    Significant Imaging:  I have reviewed all pertinent imaging results/findings within the past 24 hours.      ABG  Recent Labs   Lab 03/17/22  1710   PH 7.120*   PO2 36*   PCO2 33.6*   HCO3 10.9*   BE -18     Assessment/Plan:     Neuro  Acute metabolic encephalopathy  --suspect secondary to DKA  --exam non-focal and slowly improving with correction of acidosis  --ct head pending    Cardiac/Vascular  Atrial fibrillation with RVR  --new onset, suspect due to acute illness with DKA  --checking 2D echo  --K > 4, Mag > 2  --continue metoprolol for rate control  --will address anticoagulation if a fib sustains    Diastolic dysfunction  --most recent echo 1/2018 showed LVEF 60-65%, normal LV diastolic function and normal RV function  --repeat echo pending    Essential hypertension  --holding home lisinopril in setting of TAMARA  --holding home atenolol in favor of metoprolol in setting of a fib RVR    Endocrine  * Diabetes mellitus with ketoacidosis and lactic acidosis but without coma  --suspect secondary to medication non-compliance  --insulin gtt per protocol   --20 units basal given  --BMP Q4  --starting dextrose containing fluids to allow for higher insulin rate and close anion gap  --transition to SQ once gap closed  --no obvious source of infection; will hold on further abx at this time (s/p vanc/zosyn in ED)  --f/u cultures    CKD stage 3 due to type 2 diabetes mellitus  --creatinine on admission 2.3; baseline 0.8  --suspect pre-renal in setting of DKA  --s/p 3L fluid resuscitation in ED  --renal function improving with fluids  --holding home lisinopril    Acquired hypothyroidism  --TSH low, however free T4 WNL  --continue home levothyroxine    Type 2  diabetes mellitus without complication, with long-term current use of insulin  --home regimen aspart 15 units TIDWM & lantus 36 units QHS  --currently on tx per DKA protocol    GI  GERD (gastroesophageal reflux disease)  --continue home pepcid       Critical Care Daily Checklist:    A: Awake: RASS Goal/Actual Goal:    Actual:     B: Spontaneous Breathing Trial Performed?  n/a   C: SAT & SBT Coordinated?  n/a                      D: Delirium: CAM-ICU     E: Early Mobility Performed? Yes   F: Feeding Goal:    Status:     Current Diet Order   Procedures    Diet clear liquid      AS: Analgesia/Sedation Prn tylenol   T: Thromboembolic Prophylaxis heparin   H: HOB > 300 Yes   U: Stress Ulcer Prophylaxis (if needed) pepcid   G: Glucose Control Insulin gtt   B: Bowel Function     I: Indwelling Catheter (Lines & Mulligan) Necessity    D: De-escalation of Antimicrobials/Pharmacotherapies Deferring at this time    Plan for the day/ETD tx dka    Code Status:  Family/Goals of Care: Full Code  Discussed with daughter at bedside     Patient to be evaluated by and further recommendations per Dr. Garcia.     Critical Care Time: 40 minutes  Critical secondary to Patient has a condition that poses threat to life and bodily function: DKA      Critical care was time spent personally by me on the following activities: development of treatment plan with patient or surrogate and bedside caregivers, discussions with consultants, evaluation of patient's response to treatment, examination of patient, ordering and performing treatments and interventions, ordering and review of laboratory studies, ordering and review of radiographic studies, pulse oximetry, re-evaluation of patient's condition. This critical care time did not overlap with that of any other provider or involve time for any procedures.     Lana Patel NP  Critical Care Medicine  [facility] - Emergency Dept

## 2022-03-18 NOTE — ASSESSMENT & PLAN NOTE
--new onset, suspect due to acute illness with DKA  --checking 2D echo  --K > 4, Mag > 2  --starting metoprolol for rate control  --will address anticoagulation if a fib sustains

## 2022-03-18 NOTE — ASSESSMENT & PLAN NOTE
--creatinine on admission 2.3; baseline 0.8  --suspect pre-renal in setting of DKA  --s/p 3L fluid resuscitation in ED  --renal function improving with fluids  --holding home lisinopril  --continue to trend creatinine daily

## 2022-03-18 NOTE — ED NOTES
Patient had bowel movement and was cleaned up at this time. Patient made comfortable in bed. Denies further needs at this time.

## 2022-03-18 NOTE — ED NOTES
"TEODORO Patel NP notified of pt's 0000 BG from BMP being 439. Ms. Patel states to titrate to nomogram ">100 glucose decrease" now, and after, restart titrating insulin per insulin nomogram via hourly CBG results.   "

## 2022-03-18 NOTE — ED NOTES
Attempted to call report to [facility], states not taking pt at this time. Charge nurse aware. Report to ANJUM Paige RN.

## 2022-03-18 NOTE — ED NOTES
Insulin rate not changed r/t nursing communication order by TEODORO aPtel NP . See order set for info.

## 2022-03-18 NOTE — ED NOTES
Critical care team updated on patient's insulin rates and patient status. CC team states to continue following insulin nomogram.

## 2022-03-18 NOTE — ED NOTES
Consulted critical care team regarding patient's insulin rate change as ordered. Critical care team states to maintain insulin at current rate (6 units/hr) until midnight BMP results. Critical care team states that CBG results are currently unreliable for this patient and to DC them until CBG is less than 500 on BMP.

## 2022-03-18 NOTE — ED NOTES
Attempting to gain a second form of IV access. Two nurses at bedside attempting. MD notified of possible need for US IV.

## 2022-03-18 NOTE — ASSESSMENT & PLAN NOTE
-noted on admission  -improved w/ improved glycemic control  -at baseline upon step down  -follow-up head CT ordered upon admission

## 2022-03-18 NOTE — ASSESSMENT & PLAN NOTE
--new onset, suspect due to acute illness with DKA  --checking 2D echo  --K > 4, Mag > 2  --continue metoprolol for rate control  --will address anticoagulation if a fib sustains

## 2022-03-18 NOTE — ASSESSMENT & PLAN NOTE
--new onset, suspect due to acute illness with DKA  --recent thyroid studies show normal T4  --follow-up 2D echo  --K > 4, Mag > 2  --telemetry  --continue metoprolol for rate control  --will address anticoagulation if a fib sustains    [id number] Score: 4.

## 2022-03-18 NOTE — ASSESSMENT & PLAN NOTE
--creatinine on admission 2.3; baseline 0.8  --suspect pre-renal in setting of DKA  --s/p 3L fluid resuscitation in ED  --renal function improving with fluids  --holding home lisinopril

## 2022-03-18 NOTE — ED NOTES
Report received from RANCHO Blank. Pt resting on stretcher with no complaints. Pt changed into hospital gown. External female cath repositioned. Daughter at bedside. Updated on plan of care and awaiting admit bed, verbalized understanding.

## 2022-03-18 NOTE — ED NOTES
Dr. Ruth placed arm board under patient's left hand and wrist to protect IV site r/t patient bending wrist and being restless. Pt is tolerating well.

## 2022-03-18 NOTE — ASSESSMENT & PLAN NOTE
--suspect secondary to medication non-compliance  --insulin gtt per protocol   --20 units basal given  --BMP Q2 until serum glucose < 500, then Q4  --no obvious source of infection; will hold on further abx at this time (s/p vanc/zosyn in ED)  --f/u cultures  --transition to SQ once gap closed

## 2022-03-18 NOTE — ASSESSMENT & PLAN NOTE
--most recent echo 1/2018 showed LVEF 60-65%, normal LV diastolic function and normal RV function  --repeat echo pending, follow-up

## 2022-03-18 NOTE — H&P
"[facility] - Emergency Dept  Critical Care Medicine  History & Physical    Patient Name: Courtney Engle  MRN: [id number]  Admission Date: 3/17/2022  Hospital Length of Stay: 1 days  Code Status: Full Code  Attending Physician: Emma Garcia MD   Primary Care Provider: Vishal Ulloa MD   Principal Problem: Diabetes mellitus with ketoacidosis and lactic acidosis but without coma    Subjective:     HPI:  Patient is a 86 y.o. female with significant past medical history of IDDM, HTN, HLD, diastolic dysfunction (most recent echo 2018 normal), CKD 3, hypothyroidism and GERD presented to hospital complaining of altered mental status. HPI obtained from the daughter at bedside. Daughter states that for the past few weeks, patient has woken up with slurred speech that has improved as the day progressed. Last week the patient told the daughter that she "didn't feel well" but did not elaborate on specific symptoms. Earlier this week, the patient started complaining of increased thirst (for which she was requesting soda to be brought to her) and increased urination with dysuria (though patient attributed dysuria to a scratch on her vagina). She also complained of "kidney issues" though did not elaborate on specific symptoms. The daughter is unable to confirm whether or not the patient has been compliant with medications, including insulin, however she suspects that the patient has not been compliant as the patient's other daughter reported "several boxes of medication" in the patient's refrigerator (daughter at bedside does not know which medications they were). The patient had been resistant to calling her PCP or seeking medical care for the past two weeks, however was finally able to be brought to the ED today by her daughter. At present the patient is encephalopathic, but only complaint is of epigastric pain. Daughter reports episode of N/V within the past few days.     Work up in the ED revealed a leukocytosis of 15, TAMARA " with hyperkalemia (creatinine 2.3, baseline 0.8), lactic of 3.8 and labs consistent with DKA (pH 7.1, bhb 5.3, bicarb 9 w/ glucose 800). Additionally, the patient was found to be in new onset a fib RVR (rates up to 160 in the ED). Critical Care Medicine has been consulted for DKA & A fib RVR.         Hospital/ICU Course:  No notes on file     Past Medical History:   Diagnosis Date    Acquired hypothyroidism 4/25/2014    Anxiety     Aortic calcification 12/8/2016    X-Ray Chest-5/08/2014    Arthritis     Bilateral carotid artery disease 12/8/2016    US Carotid Bilateral-1/24/2013    CKD stage 3 due to type 2 diabetes mellitus 2/16/2017    Depression     Essential hypertension     Fever blister     GERD (gastroesophageal reflux disease)     Glaucoma 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 robledo)     Hyperlipidemia LDL goal <70 2/16/2017    Keloid cicatrix     Mixed stress and urge urinary incontinence 2/16/2017    Morbid obesity with BMI of 40.0-44.9, adult 12/8/2016    Ocular migraine 1/24/2013    Type 2 diabetes mellitus with hyperglycemia, with long-term current use of insulin        Past Surgical History:   Procedure Laterality Date    CATARACT EXTRACTION W/  INTRAOCULAR LENS IMPLANT Right 05/30/2012        CATARACT EXTRACTION W/  INTRAOCULAR LENS IMPLANT Left 05/26/2010        CHOLECYSTECTOMY      COLONOSCOPY N/A 12/20/2019    Procedure: COLONOSCOPY;  Surgeon: Higinio Gilbert MD;  Location: [address]);  Service: Endoscopy;  Laterality: N/A;  patient to call back to schedule Colonoscopy once she schedules Cardiology Clearance appt-BB  8/30/19 Low CV risk for colonospcopy per   patient requesting  but can't wait for his next available due to rectal bleeding    COLONOSCOPY W/ POLYPECTOMY  04/20/2015    ESOPHAGOGASTRODUODENOSCOPY  04/20/2015    HYSTERECTOMY      Partial    JOINT REPLACEMENT      knee replacement right       TONSILLECTOMY, ADENOIDECTOMY      tonsillectomy only       Review of patient's allergies indicates:   Allergen Reactions    Contac 12 hour allergy Anaphylaxis and Hives    Diphenhydramine hcl Shortness Of Breath     Other reaction(s): Shortness of breath    Ciprofloxacin (bulk) Nausea And Vomiting    (d)-limonene flavor Hives     Other reaction(s): Shortness of breath    Anti-hyst Other (See Comments)    Antihistamines - alkylamine Hives    Ciprocinonide      Other reaction(s): Stomach upset    Codeine      bad reaction    Contact metal agent      Other reaction(s): Hives    Glipizide Hives    Hydroxyzine      Unknown    Iodinated contrast media Other (See Comments)    Nitrofuran analogues     Penicillin      Other reaction(s): Rash    Propoxyphene Itching    Statins-hmg-coa reductase inhibitors      Nausea to all statins    Doxycycline Rash    Penicillins Rash    Sulfa (sulfonamide antibiotics) Rash    Sulfacetamide sodium-urea Rash       Family History       Problem Relation (Age of Onset)    Cancer Mother    Colon cancer Mother    Glaucoma Father    Heart attack Father (93), Maternal Grandmother    Heart disease Mother, Maternal Grandmother    Hematuria Brother    Hypertension Maternal Grandmother    Nephrolithiasis Father    No Known Problems Daughter    Stroke Father    Uterine cancer Daughter          Tobacco Use    Smoking status: Never Smoker    Smokeless tobacco: Never Used   Substance and Sexual Activity    Alcohol use: No    Drug use: No    Sexual activity: Never      Review of Systems   Reason unable to perform ROS: limited secondary to encephalopathy.   Constitutional:  Positive for appetite change.   Gastrointestinal:  Positive for abdominal pain, nausea and vomiting.   Endocrine: Positive for polydipsia and polyuria.   Genitourinary:  Positive for dysuria and frequency.   Psychiatric/Behavioral:  Positive for confusion.    Objective:     Vital Signs (Most Recent):  Temp: 98.2  °F (36.8 °C) (03/17/22 2046)  Pulse: (!) 122 (03/17/22 2132)  Resp: (!) 21 (03/17/22 2132)  BP: (!) 153/64 (03/17/22 2132)  SpO2: 98 % (03/17/22 2132)   Vital Signs (24h Range):  Temp:  [95.6 °F (35.3 °C)-98.2 °F (36.8 °C)] 98.2 °F (36.8 °C)  Pulse:  [] 122  Resp:  [18-24] 21  SpO2:  [95 %-98 %] 98 %  BP: (104-177)/() 153/64   Weight: 88 kg (194 lb 0.1 oz)  Body mass index is 40.55 kg/m².      Intake/Output Summary (Last 24 hours) at 3/17/2022 2302  Last data filed at 3/17/2022 2225  Gross per 24 hour   Intake 2350 ml   Output --   Net 2350 ml       Physical Exam  Vitals and nursing note reviewed.   Constitutional:       General: She is not in acute distress.     Appearance: She is ill-appearing.   HENT:      Head: Normocephalic and atraumatic.      Right Ear: External ear normal.      Left Ear: External ear normal.      Nose: Nose normal.      Mouth/Throat:      Mouth: Mucous membranes are moist.      Pharynx: Oropharynx is clear.   Eyes:      Conjunctiva/sclera: Conjunctivae normal.   Cardiovascular:      Rate and Rhythm: Tachycardia present. Rhythm irregular.      Pulses: Normal pulses.      Heart sounds: Normal heart sounds.   Pulmonary:      Effort: Pulmonary effort is normal. No respiratory distress.      Breath sounds: Normal breath sounds. No wheezing.   Abdominal:      General: Abdomen is flat. Bowel sounds are normal. There is no distension.      Palpations: Abdomen is soft.      Tenderness: There is no abdominal tenderness.   Musculoskeletal:         General: No swelling or deformity. Normal range of motion.      Cervical back: Normal range of motion and neck supple.   Skin:     General: Skin is warm and dry.   Neurological:      General: No focal deficit present.      Mental Status: She is disoriented.      GCS: GCS eye subscore is 4. GCS verbal subscore is 4. GCS motor subscore is 6.       Vents:     Lines/Drains/Airways       Drain  Duration             Female External Urinary Catheter  03/17/22 1819 <1 day              Peripheral Intravenous Line  Duration                  Peripheral IV - Single Lumen 03/17/22 1624 20 G Left Antecubital <1 day         Peripheral IV - Single Lumen 03/17/22 1933 20 G Left Hand <1 day                  Significant Labs:    CBC/Anemia Profile:  Recent Labs   Lab 03/17/22  1703 03/17/22  2226   WBC 15.61*  --    HGB 14.0  --    HCT 47.0 41     --    MCV 93  --    RDW 14.0  --         Chemistries:  Recent Labs   Lab 03/17/22  1703 03/17/22  2057   * 130*   K 5.8* 5.8*   CL 91* 98   CO2 9* 7*   BUN 40* 38*   CREATININE 2.3* 1.9*   CALCIUM 9.0 7.7*   ALBUMIN 3.5  --    PROT 7.8  --    BILITOT 0.9  --    ALKPHOS 90  --    ALT 11  --    AST 12  --    MG 2.4  --    PHOS 7.0* 5.6*       All pertinent labs within the past 24 hours have been reviewed.    Significant Imaging: I have reviewed all pertinent imaging results/findings within the past 24 hours.    Assessment/Plan:     Cardiac/Vascular  Atrial fibrillation with RVR  --new onset, suspect due to acute illness with DKA  --checking 2D echo  --K > 4, Mag > 2  --starting metoprolol for rate control  --will address anticoagulation if a fib sustains    Diastolic dysfunction  --most recent echo 1/2018 showed LVEF 60-65%, normal LV diastolic function and normal RV function  --checking repeat echo     Essential hypertension  --holding home lisinopril in setting of TAMARA  --holding home atenolol in favor of metoprolol in setting of a fib RVR    Endocrine  * Diabetes mellitus with ketoacidosis and lactic acidosis but without coma  --suspect secondary to medication non-compliance  --insulin gtt per protocol   --20 units basal given  --BMP Q2 until serum glucose < 500, then Q4  --no obvious source of infection; will hold on further abx at this time (s/p vanc/zosyn in ED)  --f/u cultures  --transition to SQ once gap closed    CKD stage 3 due to type 2 diabetes mellitus  --creatinine on admission 2.3; baseline 0.8  --suspect  pre-renal in setting of DKA  --s/p 3L fluid resuscitation in ED  --will investigate further if renal function does not improve with fluids  --holding home lisinopril    Acquired hypothyroidism  --TSH low, however free T4 WNL  --continue home levothyroxine    Type 2 diabetes mellitus without complication, with long-term current use of insulin  --home regimen aspart 15 units TIDWM & lantus 36 units QHS  --currently on tx per DKA protocol    GI  GERD (gastroesophageal reflux disease)  --continue home pepcid      Critical Care Daily Checklist:    A: Awake: RASS Goal/Actual Goal:    Actual:     B: Spontaneous Breathing Trial Performed?  n/a   C: SAT & SBT Coordinated?  n/a                      D: Delirium: CAM-ICU     E: Early Mobility Performed? Yes   F: Feeding Goal:    Status:     Current Diet Order   Procedures    Diet clear liquid      AS: Analgesia/Sedation Prn tylenol   T: Thromboembolic Prophylaxis heparin   H: HOB > 300 Yes   U: Stress Ulcer Prophylaxis (if needed) pepcid   G: Glucose Control Insulin gtt   B: Bowel Function     I: Indwelling Catheter (Lines & Mulligan) Necessity piv x2   D: De-escalation of Antimicrobials/Pharmacotherapies Defer at this time    Plan for the day/ETD tx dka    Code Status:  Family/Goals of Care: Full Code  Discussed with daughter at bedside     Case discussed with CCM Fellow Dr. Jose Mullen.     Critical Care Time: 45 minutes  Critical secondary to Patient has a condition that poses threat to life and bodily function: DKA     Critical care was time spent personally by me on the following activities: development of treatment plan with patient or surrogate and bedside caregivers, discussions with consultants, evaluation of patient's response to treatment, examination of patient, ordering and performing treatments and interventions, ordering and review of laboratory studies, ordering and review of radiographic studies, pulse oximetry, re-evaluation of patient's condition. This  critical care time did not overlap with that of any other provider or involve time for any procedures.     Lana Patel NP  Critical Care Medicine  [facility] - Emergency Dept

## 2022-03-18 NOTE — ED NOTES
Received report from RANCHO Lara. Assumed care of pt.    Adult Physical Assessment  LOC: Courtney Engle, 86 y.o. female verified via two identifiers.  The patient is awake, alert, and oriented to person and place only. Patient appears to be confused.   APPEARANCE: Patient is mildly restless. Patient is clean and well groomed, patient's clothing is properly fastened.  SKIN:The skin is warm and dry, color consistent with ethnicity, patient has normal skin turgor and moist mucus membranes, skin intact, no breakdown or brusing noted. Patient has mottling noted to bilateral lower extremities.   MUSCULOSKELETAL: Patient moving all extremities well, no obvious swelling or deformities noted.  RESPIRATORY: Airway is open and patent, respirations are spontaneous, patient has a normal effort and rate, no accessory muscle use noted.  CARDIAC: Patient has a increased rate and irregular rhythm, no periphreal edema noted in any extremity, capillary refill < 3 seconds in all extremities.  ABDOMEN: Soft and non tender to palpation, no abdominal distention noted. Bowel sounds present in all four quadrants.  NEUROLOGIC: Eyes open spontaneously, behavior inappropriate to situation, follows commands, facial expression symmetrical, bilateral hand grasp equal and even, purposeful motor response noted, normal sensation in all extremities when touched with a finger.

## 2022-03-18 NOTE — ED NOTES
Critical care at bedside. Obtaining iSTAT to check patient's glucose. Glucose is 621 per iSTAT results.

## 2022-03-18 NOTE — HOSPITAL COURSE
Patient admitted to [facility] evening of 3/17 for DKA and new onset a fib RVR. Started on insulin per DKA protocol. Better rate control achieved with initiation of metoprolol. Encephalopathy improved; gap closed. Will transition to SQ insulin. Patient is stable for stepdown.

## 2022-03-18 NOTE — ASSESSMENT & PLAN NOTE
--suspect secondary to DKA  --exam non-focal and slowly improving with correction of acidosis  --ct head pending

## 2022-03-18 NOTE — ED NOTES
Dr. Ruth consulted regarding CBG value; he states to follow the nomogram and titrate the insulin per nomogram, and titrate up.

## 2022-03-18 NOTE — RESIDENT HANDOFF
"ICU Transfer of Care Note  Critical Care Medicine    Admit Date: 3/17/2022  LOS: 1    CC: Diabetes mellitus with ketoacidosis and lactic acidosis but without coma    Code Status: Full Code         HPI and Hospital Course:     HPI:  Patient is a 86 y.o. female with significant past medical history of IDDM, HTN, HLD, diastolic dysfunction (most recent echo 2018 normal), CKD 3, hypothyroidism and GERD presented to hospital complaining of altered mental status. HPI obtained from the daughter at bedside. Daughter states that for the past few weeks, patient has woken up with slurred speech that has improved as the day progressed. Last week the patient told the daughter that she "didn't feel well" but did not elaborate on specific symptoms. Earlier this week, the patient started complaining of increased thirst (for which she was requesting soda to be brought to her) and increased urination with dysuria (though patient attributed dysuria to a scratch on her vagina). She also complained of "kidney issues" though did not elaborate on specific symptoms. The daughter is unable to confirm whether or not the patient has been compliant with medications, including insulin, however she suspects that the patient has not been compliant as the patient's other daughter reported "several boxes of medication" in the patient's refrigerator (daughter at bedside does not know which medications they were). The patient had been resistant to calling her PCP or seeking medical care for the past two weeks, however was finally able to be brought to the ED today by her daughter. At present the patient is encephalopathic, but only complaint is of epigastric pain. Daughter reports episode of N/V within the past few days.     Work up in the ED revealed a leukocytosis of 15, TAMARA with hyperkalemia (creatinine 2.3, baseline 0.8), lactic of 3.8 and labs consistent with DKA (pH 7.1, bhb 5.3, bicarb 9 w/ glucose 800). Additionally, the patient was found to " be in new onset a fib RVR (rates up to 160 in the ED). Critical Care Medicine has been consulted for DKA & A fib RVR.         Hospital/ICU Course:  Patient admitted to [facility] evening of 3/17 for DKA and new onset a fib RVR. Started on insulin per DKA protocol. Better rate control achieved with initiation of metoprolol. Encephalopathy improved; gap closed. Will transition to SQ insulin. Patient is stable for stepdown.         To Follow Up:     Echo pending  Transition orders in for insulin drip      Discharge Plan:     Patient's DKA likely result of noncompliance; may need to address in outpatient care.    Call with questions. [id number]    Hyacinth Veloz   Pulmonary Critical Care

## 2022-03-19 NOTE — PROGRESS NOTES
"[facility] - [facility] ([address] Medicine  Progress Note    Patient Name: Courtney Engle  MRN: [id number]  Patient Class: IP- Inpatient   Admission Date: 3/17/2022  Length of Stay: 2 days  Attending Physician: Shalonda Parks MD  Primary Care Provider: Vishal Ulloa MD        Subjective:     Principal Problem:Diabetes mellitus with ketoacidosis and lactic acidosis but without coma        HPI:  Patient is a 86 y.o. female with significant past medical history of IDDM, HTN, HLD, diastolic dysfunction (most recent echo 2018 normal), CKD 3, hypothyroidism and GERD presented to hospital complaining of altered mental status. HPI obtained from the daughter at bedside. Daughter states that for the past few weeks, patient has woken up with slurred speech that has improved as the day progressed. Last week the patient told the daughter that she "didn't feel well" but did not elaborate on specific symptoms. Earlier this week, the patient started complaining of increased thirst (for which she was requesting soda to be brought to her) and increased urination with dysuria (though patient attributed dysuria to a scratch on her vagina). She also complained of "kidney issues" though did not elaborate on specific symptoms. The daughter is unable to confirm whether or not the patient has been compliant with medications, including insulin, however she suspects that the patient has not been compliant as the patient's other daughter reported "several boxes of medication" in the patient's refrigerator (daughter at bedside does not know which medications they were). The patient had been resistant to calling her PCP or seeking medical care for the past two weeks, however was finally able to be brought to the ED today by her daughter. At present the patient is encephalopathic, but only complaint is of epigastric pain. Daughter reports episode of N/V within the past few days.      Work up in the ED revealed a leukocytosis of " 15, TAMARA with hyperkalemia (creatinine 2.3, baseline 0.8), lactic of 3.8 and labs consistent with DKA (pH 7.1, bhb 5.3, bicarb 9 w/ glucose 800). Additionally, the patient was found to be in new onset a fib RVR (rates up to 160 in the ED). Critical Care Medicine has been consulted for DKA & A fib RVR.      Overview/Hospital Course:  ICU Course:  Patient admitted to [facility] evening of 3/17 for DKA and new onset a fib RVR. Started on insulin per DKA protocol. Better rate control achieved with initiation of metoprolol. Encephalopathy improved; gap closed. Will transition to SQ insulin. Patient is stable for stepdown.     Hospital Medicine Course:  Pt stepped down to medicine 3/18.           Interval History:   Delirium and agitation overnight. However, Pt at mental status baseline this AM and oriented to PPT. Daughter at bedside. Pt notes some nausea prior to breakfast, otherwise has no acute complaints. Risk/benefit of anticoagulation in setting of afib discussed.     Review of Systems   Constitutional:  Negative for chills and diaphoresis.   HENT:  Negative for congestion, sneezing, sore throat and trouble swallowing.    Eyes:  Negative for visual disturbance.   Respiratory:  Negative for cough, shortness of breath and wheezing.    Cardiovascular:  Negative for chest pain, palpitations and leg swelling.   Gastrointestinal:  Positive for nausea. Negative for abdominal pain, constipation, diarrhea and vomiting.   Genitourinary:  Negative for dysuria.   Musculoskeletal:  Negative for arthralgias and myalgias.   Skin:  Negative for color change.   Neurological:  Negative for dizziness, light-headedness and headaches.   Psychiatric/Behavioral:  Negative for decreased concentration.      Objective:     Vital Signs (Most Recent):  Temp: 97.8 °F (36.6 °C) (03/19/22 0823)  Pulse: 105 (03/19/22 1030)  Resp: 19 (03/19/22 1030)  BP: (!) 146/94 (03/19/22 1030)  SpO2: 95 % (03/19/22 1030)   Vital Signs (24h Range):  Temp:  [97.5 °F  (36.4 °C)-98.8 °F (37.1 °C)] 97.8 °F (36.6 °C)  Pulse:  [105-125] 105  Resp:  [18-24] 19  SpO2:  [93 %-99 %] 95 %  BP: (136-180)/(77-95) 146/94     Weight: 88 kg (194 lb 0.1 oz)  Body mass index is 37.89 kg/m².    Intake/Output Summary (Last 24 hours) at 3/19/2022 1336  Last data filed at 3/18/2022 2100  Gross per 24 hour   Intake 140 ml   Output 300 ml   Net -160 ml        Physical Exam  Vitals and nursing note reviewed.   Constitutional:       General: She is not in acute distress.     Appearance: She is not toxic-appearing or diaphoretic.   HENT:      Head: Normocephalic and atraumatic.      Right Ear: External ear normal.      Left Ear: External ear normal.      Nose: No congestion or rhinorrhea.      Mouth/Throat:      Mouth: Mucous membranes are dry.      Pharynx: No oropharyngeal exudate or posterior oropharyngeal erythema.   Eyes:      General: No scleral icterus.        Right eye: No discharge.         Left eye: No discharge.   Cardiovascular:      Rate and Rhythm: Tachycardia present. Rhythm irregular.      Heart sounds: No murmur heard.  Pulmonary:      Effort: Pulmonary effort is normal.      Breath sounds: Normal breath sounds. No wheezing, rhonchi or rales.   Abdominal:      General: Bowel sounds are normal.      Palpations: Abdomen is soft.      Tenderness: There is no abdominal tenderness.   Musculoskeletal:      Cervical back: Normal range of motion and neck supple.      Right lower leg: No edema.      Left lower leg: No edema.   Skin:     General: Skin is warm and dry.   Neurological:      Mental Status: She is alert and oriented to person, place, and time.   Psychiatric:         Mood and Affect: Mood normal.         Behavior: Behavior normal.       Significant Labs: All pertinent labs within the past 24 hours have been reviewed.    Recent Results (from the past 24 hour(s))   POCT glucose    Collection Time: 03/18/22  3:42 PM   Result Value Ref Range    POCT Glucose 100 70 - 110 mg/dL   POCT  glucose    Collection Time: 03/18/22  5:01 PM   Result Value Ref Range    POCT Glucose 130 (H) 70 - 110 mg/dL   POCT glucose    Collection Time: 03/18/22  7:35 PM   Result Value Ref Range    POCT Glucose 202 (H) 70 - 110 mg/dL   POCT glucose    Collection Time: 03/18/22 11:53 PM   Result Value Ref Range    POCT Glucose 272 (H) 70 - 110 mg/dL   POCT glucose    Collection Time: 03/19/22  3:25 AM   Result Value Ref Range    POCT Glucose 266 (H) 70 - 110 mg/dL   CBC auto differential    Collection Time: 03/19/22  3:26 AM   Result Value Ref Range    WBC 17.97 (H) 3.90 - 12.70 K/uL    RBC 4.64 4.00 - 5.40 M/uL    Hemoglobin 12.9 12.0 - 16.0 g/dL    Hematocrit 42.2 37.0 - 48.5 %    MCV 91 82 - 98 fL    MCH 27.8 27.0 - 31.0 pg    MCHC 30.6 (L) 32.0 - 36.0 g/dL    RDW 14.7 (H) 11.5 - 14.5 %    Platelets 191 150 - 450 K/uL    MPV 12.6 9.2 - 12.9 fL    Immature Granulocytes 0.4 0.0 - 0.5 %    Gran # (ANC) 15.2 (H) 1.8 - 7.7 K/uL    Immature Grans (Abs) 0.08 (H) 0.00 - 0.04 K/uL    Lymph # 1.3 1.0 - 4.8 K/uL    Mono # 1.4 (H) 0.3 - 1.0 K/uL    Eos # 0.0 0.0 - 0.5 K/uL    Baso # 0.03 0.00 - 0.20 K/uL    nRBC 0 0 /100 WBC    Gran % 84.6 (H) 38.0 - 73.0 %    Lymph % 7.0 (L) 18.0 - 48.0 %    Mono % 7.8 4.0 - 15.0 %    Eosinophil % 0.0 0.0 - 8.0 %    Basophil % 0.2 0.0 - 1.9 %    Differential Method Automated    Magnesium    Collection Time: 03/19/22  3:26 AM   Result Value Ref Range    Magnesium 2.2 1.6 - 2.6 mg/dL   Phosphorus    Collection Time: 03/19/22  3:26 AM   Result Value Ref Range    Phosphorus 2.8 2.7 - 4.5 mg/dL   Comprehensive metabolic panel    Collection Time: 03/19/22  3:26 AM   Result Value Ref Range    Sodium 132 (L) 136 - 145 mmol/L    Potassium 4.6 3.5 - 5.1 mmol/L    Chloride 102 95 - 110 mmol/L    CO2 19 (L) 23 - 29 mmol/L    Glucose 279 (H) 70 - 110 mg/dL    BUN 24 (H) 8 - 23 mg/dL    Creatinine 1.4 0.5 - 1.4 mg/dL    Calcium 8.3 (L) 8.7 - 10.5 mg/dL    Total Protein 6.4 6.0 - 8.4 g/dL    Albumin 2.9 (L) 3.5 -  5.2 g/dL    Total Bilirubin 0.6 0.1 - 1.0 mg/dL    Alkaline Phosphatase 75 55 - 135 U/L    AST 22 10 - 40 U/L    ALT 10 10 - 44 U/L    Anion Gap 11 8 - 16 mmol/L    eGFR if African American 39.8 (A) >60 mL/min/1.73 m^2    eGFR if non  34.5 (A) >60 mL/min/1.73 m^2   POCT glucose    Collection Time: 03/19/22  8:23 AM   Result Value Ref Range    POCT Glucose 281 (H) 70 - 110 mg/dL   POCT glucose    Collection Time: 03/19/22 12:18 PM   Result Value Ref Range    POCT Glucose 185 (H) 70 - 110 mg/dL         Significant Imaging: I have reviewed all pertinent imaging results/findings within the past 24 hours.    Imaging Results              CT Head Without Contrast (Final result)  Result time 03/19/22 07:17:18      Final result by John Villatoro MD (03/19/22 07:17:18)                   Impression:      No acute intracranial process.  Mild increased density within the right lentiform nucleus may be within normal variation but can also be seen with hyperglycemia if clinically consistent.    Electronically signed by resident: Benny Otto  Date:    03/18/2022  Time:    08:55    Electronically signed by: John Villatoro  Date:    03/19/2022  Time:    07:17               Narrative:    EXAMINATION:  CT HEAD WITHOUT CONTRAST    CLINICAL HISTORY:  Mental status change, unknown cause;    TECHNIQUE:  Low dose axial CT images obtained throughout the head without the use of intravenous contrast.  Axial, sagittal and coronal reconstructions were performed.    COMPARISON:  MRA and MRI 04/29/2013, CT 05/15/2014    FINDINGS:  Intracranial compartment:    Ventricles and sulci are stable in size.  No hydrocephalus.    Mild generalized volume loss. Scattered white matter hypoattenuation, nonspecific suggesting mild chronic ischemic microvascular changes.  Preserved differentiation of white and gray matter.  No mass effect, hemorrhage, edema or major vascular distribution infarct.  Mild increased density within the right  lentiform nucleus may be within normal variation but can also be seen with hyperglycemia if clinically consistent.    No extra-axial blood or fluid collections.    Skull/extracranial contents (limited evaluation): No acute fracture.    Mastoid air cells and paranasal sinuses are essentially clear.                                       X-Ray Chest AP Portable (Final result)  Result time 03/17/22 18:49:05      Final result by David Sánchez MD (03/17/22 18:49:05)                   Impression:      No acute abnormality.    Electronically signed by resident: Aissatou Matthews  Date:    03/17/2022  Time:    18:29    Electronically signed by: David Sánchez MD  Date:    03/17/2022  Time:    18:49               Narrative:    EXAMINATION:  XR CHEST AP PORTABLE    CLINICAL HISTORY:  Sepsis; hyperglycemia;.    TECHNIQUE:  Single frontal portable view of the chest was performed.    COMPARISON:  Chest radiograph 08/06/2020.  01/12/2018    FINDINGS:  The lungs are clear, with normal appearance of pulmonary vasculature and no pleural effusion or pneumothorax.    The cardiac silhouette is normal in size.  Atherosclerosis of the aortic arch.  The hilar and mediastinal contours are unremarkable.    Bones are intact.  There is no evidence of free air beneath the hemidiaphragms.                                          Assessment/Plan:      * Diabetes mellitus with ketoacidosis and lactic acidosis but without coma  Lab Results   Component Value Date    HGBA1C >14.0 (H) 03/17/2022     --suspect secondary to medication non-compliance and dietary indiscretion  --home regimen aspart 15 units TIDWM & lantus 36 units QHS  --s/p insulin gtt per protocol   --increase detemir to 15 units b.i.d. and moderate dose sliding scale insulin; add aspart 5U TIDWM  --continue to monitor blood glucose closely and titrate scheduled insulin as needed to inpatient goal  --poct bg x4  --diabetic diet  --no obvious source of infection and suspect leukocytosis is  reactive; will hold on further abx at this time (s/p vanc/zosyn in ED)  --f/u blood cultures    Type 2 diabetes mellitus without complication, with long-term current use of insulin  See diabetes mellitus with ketoacidosis and lactic acidosis but without coma      Acute renal failure superimposed on stage 3 chronic kidney disease  --creatinine on admission 2.3; baseline 0.8  --suspect pre-renal in setting of DKA  --s/p 3L fluid resuscitation in ED  --renal function improving with fluids  --holding home lisinopril  --continue to trend creatinine daily       CKD stage 3 due to type 2 diabetes mellitus  See acute renal failure    Atrial fibrillation with RVR  --new onset, suspect due to acute illness with DKA  --recent thyroid studies show normal T4  --follow-up 2D echo  --K > 4, Mag > 2  --telemetry  --continue metoprolol for rate control; titrate up as clinically indicated  --addressed anticoagulation risk/benefit w/ Pt and daughter; they would like to think about it    [id number] Score: 4.         Diastolic dysfunction  --most recent echo 1/2018 showed LVEF 60-65%, normal LV diastolic function and normal RV function  --repeat echo pending, follow-up    Acute metabolic encephalopathy  -noted on admission  -improved w/ improved glycemic control  -at baseline upon step down  -head CT w/o acute process      GERD (gastroesophageal reflux disease)  --continue home pepcid      Depression  Continue home Lexapro        Anxiety  Continue home Lexapro      Acquired hypothyroidism  --TSH low, however free T4 WNL  --continue home levothyroxine    Essential hypertension  --holding home lisinopril in setting of TAMARA  --holding home atenolol in favor of metoprolol in setting of a fib RVR        VTE Risk Mitigation (From admission, onward)         Ordered     heparin (porcine) injection 5,000 Units  Every 8 hours         03/17/22 2313     IP VTE HIGH RISK PATIENT  Once         03/17/22 2313     Place sequential compression device   Until discontinued         03/17/22 2313                Discharge Planning   FAY: 3/20/2022     Code Status: Full Code   Is the patient medically ready for discharge?: No    Reason for patient still in hospital (select all that apply): Patient trending condition and PT / OT recommendations                     Shalonda Parks MD  Department of Hospital Medicine   [facility] - Intensive Care ([place]-14)

## 2022-03-19 NOTE — ASSESSMENT & PLAN NOTE
Lab Results   Component Value Date    HGBA1C >14.0 (H) 03/17/2022     --suspect secondary to medication non-compliance and dietary indiscretion  --home regimen aspart 15 units TIDWM & lantus 36 units QHS  --s/p insulin gtt per protocol   --increase detemir to 15 units b.i.d. and moderate dose sliding scale insulin; add aspart 5U TIDWM  --continue to monitor blood glucose closely and titrate scheduled insulin as needed to inpatient goal  --poct bg x4  --diabetic diet  --no obvious source of infection and suspect leukocytosis is reactive; will hold on further abx at this time (s/p vanc/zosyn in ED)  --f/u blood cultures

## 2022-03-19 NOTE — CARE UPDATE
RAPID RESPONSE NURSE CHART REVIEW        Chart Reviewed: 03/18/2022, 11:38 PM    MRN: [id number]  Bed: 61992/25892 A    Dx: Diabetes mellitus with ketoacidosis and lactic acidosis but without coma    Courtney Engle has a past medical history of Acquired hypothyroidism, Anxiety, Aortic calcification, Arthritis, Bilateral carotid artery disease, CKD stage 3 due to type 2 diabetes mellitus, Depression, Essential hypertension, Fever blister, GERD (gastroesophageal reflux disease), Glaucoma suspect with open angle, Hyperlipidemia LDL goal <70, Keloid cicatrix, Mixed stress and urge urinary incontinence, Morbid obesity with BMI of 40.0-44.9, adult, Ocular migraine, and Type 2 diabetes mellitus with hyperglycemia, with long-term current use of insulin.    Last VS: BP (!) 159/79   Pulse (!) 119   Temp 97.5 °F (36.4 °C) (Oral)   Resp 19   Ht 5' (1.524 m)   Wt 88 kg (194 lb 0.1 oz)   SpO2 (!) 94%   BMI 37.89 kg/m²     24H Vital Sign Range:  Temp:  [97.5 °F (36.4 °C)-98.8 °F (37.1 °C)]   Pulse:  [106-129]   Resp:  [18-24]   BP: (129-180)/()   SpO2:  [93 %-99 %]     Level of Consciousness (AVPU): alert    Recent Labs     03/17/22  1703 03/17/22  2226 03/18/22  0350   WBC 15.61*  --  16.37*   HGB 14.0  --  12.9   HCT 47.0 41 41.5     --  240       Recent Labs     03/17/22  1703 03/17/22  2057 03/17/22  2233 03/18/22  0005 03/18/22  0213 03/18/22  0350 03/18/22  0926 03/18/22  1222   * 130*   < > 134*   < > 134* 134* 136   K 5.8* 5.8*   < > 4.7   < > 4.5 4.5 4.2   CL 91* 98   < > 103   < > 104 104 107   CO2 9* 7*   < > 8*   < > 13* 17* 20*   CREATININE 2.3* 1.9*   < > 1.9*   < > 1.6* 1.5* 1.2   * 717*   < > 439*   < > 288* 238* 125*   PHOS 7.0* 5.6*  --  3.0  --  2.5*  --   --    MG 2.4  --   --   --   --  1.8  --   --     < > = values in this interval not displayed.        Recent Labs     03/17/22  1710   PH 7.120*   PCO2 33.6*   PO2 36*   HCO3 10.9*   POCSATURATED 51*   BE -18        OXYGEN:         O2 Device (Oxygen Therapy): room air    MEWS score: 3    Bedside RN, Jayla contacted. No concerns verbalized at this time. Instructed to call [phone] for further concerns or assistance.    Brooke Benedict RN

## 2022-03-19 NOTE — ASSESSMENT & PLAN NOTE
--new onset, suspect due to acute illness with DKA  --recent thyroid studies show normal T4  --follow-up 2D echo  --K > 4, Mag > 2  --telemetry  --continue metoprolol for rate control; titrate up as clinically indicated  --addressed anticoagulation risk/benefit w/ Pt and daughter; they would like to think about it    [id number] Score: 4.

## 2022-03-19 NOTE — CARE UPDATE
RAPID RESPONSE NURSE AI ALERT       AI alert received.    Chart Reviewed: 03/19/2022, 6:35 AM    MRN: [id number]  Bed: 24252/25791 A    Dx: Diabetes mellitus with ketoacidosis and lactic acidosis but without coma    Courtney Engle has a past medical history of Acquired hypothyroidism, Anxiety, Aortic calcification, Arthritis, Bilateral carotid artery disease, CKD stage 3 due to type 2 diabetes mellitus, Depression, Essential hypertension, Fever blister, GERD (gastroesophageal reflux disease), Glaucoma suspect with open angle, Hyperlipidemia LDL goal <70, Keloid cicatrix, Mixed stress and urge urinary incontinence, Morbid obesity with BMI of 40.0-44.9, adult, Ocular migraine, and Type 2 diabetes mellitus with hyperglycemia, with long-term current use of insulin.    Last VS: BP (!) 171/77   Pulse (!) 125   Temp 97.5 °F (36.4 °C) (Oral)   Resp 19   Ht 5' (1.524 m)   Wt 88 kg (194 lb 0.1 oz)   SpO2 96%   BMI 37.89 kg/m²     24H Vital Sign Range:  Temp:  [97.5 °F (36.4 °C)-98.8 °F (37.1 °C)]   Pulse:  [106-125]   Resp:  [18-24]   BP: (129-180)/(72-95)   SpO2:  [93 %-99 %]     Level of Consciousness (AVPU): alert    Recent Labs     03/17/22  1703 03/17/22  2226 03/18/22  0350 03/19/22  0326   WBC 15.61*  --  16.37* 17.97*   HGB 14.0  --  12.9 12.9   HCT 47.0 41 41.5 42.2     --  240 191       Recent Labs     03/17/22  1703 03/17/22 2057 03/18/22  0005 03/18/22  0213 03/18/22  0350 03/18/22  0926 03/18/22  1222 03/19/22  0326   *   < > 134*   < > 134* 134* 136 132*   K 5.8*   < > 4.7   < > 4.5 4.5 4.2 4.6   CL 91*   < > 103   < > 104 104 107 102   CO2 9*   < > 8*   < > 13* 17* 20* 19*   CREATININE 2.3*   < > 1.9*   < > 1.6* 1.5* 1.2 1.4   *   < > 439*   < > 288* 238* 125* 279*   PHOS 7.0*   < > 3.0  --  2.5*  --   --  2.8   MG 2.4  --   --   --  1.8  --   --  2.2    < > = values in this interval not displayed.        Recent Labs     03/17/22  1710   PH 7.120*   PCO2 33.6*   PO2 36*   HCO3 10.9*    POCSATURATED 51*   BE -18        OXYGEN:        O2 Device (Oxygen Therapy): room air    MEWS score: 4    Charge RN, Jayla contacted. No concerns verbalized at this time. Instructed to call [phone] for further concerns or assistance.    Brooek Benedict RN

## 2022-03-19 NOTE — CONSULTS
[facility] - Intensive [facility] ([place] [address])  Hospital Medicine  Telemedicine Consult Note    Patient Name: Courtney Engle  MRN: [id number]  Admission Date: 3/17/2022  Hospital Length of Stay: 2 days  Attending Physician: Shalonda Parks MD   Primary Care Provider: Vishal Ulloa MD       Thank you for your consult to [facility]. We have reviewed the patient chart. This patient does not meet criteria for [facility] service at this time due to Patient is unlikely to participate well with the telemedicine platform due to confusion in the past 24 hours. and [facility] service capacity limitations. Will hand back to In-house service..        Avis Greene MD  Department of Hospital Medicine   [facility] ([place]-14)

## 2022-03-19 NOTE — NURSING
Pt along with pt's daughter request to have tele monitor removed from room. Explained the need of the monitor to both pt and pt's daughter. Patients daughter states she can and she will do the job of the tele monitor and that they would prefer to do do without it. Charge nurse notified, Tele removed. Bed alarm on for added protection. Frequent rounding done to ensure safety. Currently pt is in no distress and resting comfortably

## 2022-03-19 NOTE — NURSING
Patient is alert a\nd responsive. Patient complain of nausea , zofran administered as ordered and tolerated well. All due care rendered. Patient remains in stable condition. No acute distress noted at this time. Safety precautions maintained.

## 2022-03-19 NOTE — ASSESSMENT & PLAN NOTE
-noted on admission  -improved w/ improved glycemic control  -at baseline upon step down  -head CT w/o acute process

## 2022-03-19 NOTE — SUBJECTIVE & OBJECTIVE
Interval History:   Delirium and agitation overnight. However, Pt at mental status baseline this AM and oriented to PPT. Daughter at bedside. Pt notes some nausea prior to breakfast, otherwise has no acute complaints. Risk/benefit of anticoagulation in setting of afib discussed.     Review of Systems   Constitutional:  Negative for chills and diaphoresis.   HENT:  Negative for congestion, sneezing, sore throat and trouble swallowing.    Eyes:  Negative for visual disturbance.   Respiratory:  Negative for cough, shortness of breath and wheezing.    Cardiovascular:  Negative for chest pain, palpitations and leg swelling.   Gastrointestinal:  Positive for nausea. Negative for abdominal pain, constipation, diarrhea and vomiting.   Genitourinary:  Negative for dysuria.   Musculoskeletal:  Negative for arthralgias and myalgias.   Skin:  Negative for color change.   Neurological:  Negative for dizziness, light-headedness and headaches.   Psychiatric/Behavioral:  Negative for decreased concentration.      Objective:     Vital Signs (Most Recent):  Temp: 97.8 °F (36.6 °C) (03/19/22 0823)  Pulse: 105 (03/19/22 1030)  Resp: 19 (03/19/22 1030)  BP: (!) 146/94 (03/19/22 1030)  SpO2: 95 % (03/19/22 1030)   Vital Signs (24h Range):  Temp:  [97.5 °F (36.4 °C)-98.8 °F (37.1 °C)] 97.8 °F (36.6 °C)  Pulse:  [105-125] 105  Resp:  [18-24] 19  SpO2:  [93 %-99 %] 95 %  BP: (136-180)/(77-95) 146/94     Weight: 88 kg (194 lb 0.1 oz)  Body mass index is 37.89 kg/m².    Intake/Output Summary (Last 24 hours) at 3/19/2022 1336  Last data filed at 3/18/2022 2100  Gross per 24 hour   Intake 140 ml   Output 300 ml   Net -160 ml        Physical Exam  Vitals and nursing note reviewed.   Constitutional:       General: She is not in acute distress.     Appearance: She is not toxic-appearing or diaphoretic.   HENT:      Head: Normocephalic and atraumatic.      Right Ear: External ear normal.      Left Ear: External ear normal.      Nose: No congestion  or rhinorrhea.      Mouth/Throat:      Mouth: Mucous membranes are dry.      Pharynx: No oropharyngeal exudate or posterior oropharyngeal erythema.   Eyes:      General: No scleral icterus.        Right eye: No discharge.         Left eye: No discharge.   Cardiovascular:      Rate and Rhythm: Tachycardia present. Rhythm irregular.      Heart sounds: No murmur heard.  Pulmonary:      Effort: Pulmonary effort is normal.      Breath sounds: Normal breath sounds. No wheezing, rhonchi or rales.   Abdominal:      General: Bowel sounds are normal.      Palpations: Abdomen is soft.      Tenderness: There is no abdominal tenderness.   Musculoskeletal:      Cervical back: Normal range of motion and neck supple.      Right lower leg: No edema.      Left lower leg: No edema.   Skin:     General: Skin is warm and dry.   Neurological:      Mental Status: She is alert and oriented to person, place, and time.   Psychiatric:         Mood and Affect: Mood normal.         Behavior: Behavior normal.       Significant Labs: All pertinent labs within the past 24 hours have been reviewed.    Recent Results (from the past 24 hour(s))   POCT glucose    Collection Time: 03/18/22  3:42 PM   Result Value Ref Range    POCT Glucose 100 70 - 110 mg/dL   POCT glucose    Collection Time: 03/18/22  5:01 PM   Result Value Ref Range    POCT Glucose 130 (H) 70 - 110 mg/dL   POCT glucose    Collection Time: 03/18/22  7:35 PM   Result Value Ref Range    POCT Glucose 202 (H) 70 - 110 mg/dL   POCT glucose    Collection Time: 03/18/22 11:53 PM   Result Value Ref Range    POCT Glucose 272 (H) 70 - 110 mg/dL   POCT glucose    Collection Time: 03/19/22  3:25 AM   Result Value Ref Range    POCT Glucose 266 (H) 70 - 110 mg/dL   CBC auto differential    Collection Time: 03/19/22  3:26 AM   Result Value Ref Range    WBC 17.97 (H) 3.90 - 12.70 K/uL    RBC 4.64 4.00 - 5.40 M/uL    Hemoglobin 12.9 12.0 - 16.0 g/dL    Hematocrit 42.2 37.0 - 48.5 %    MCV 91 82 - 98 fL     MCH 27.8 27.0 - 31.0 pg    MCHC 30.6 (L) 32.0 - 36.0 g/dL    RDW 14.7 (H) 11.5 - 14.5 %    Platelets 191 150 - 450 K/uL    MPV 12.6 9.2 - 12.9 fL    Immature Granulocytes 0.4 0.0 - 0.5 %    Gran # (ANC) 15.2 (H) 1.8 - 7.7 K/uL    Immature Grans (Abs) 0.08 (H) 0.00 - 0.04 K/uL    Lymph # 1.3 1.0 - 4.8 K/uL    Mono # 1.4 (H) 0.3 - 1.0 K/uL    Eos # 0.0 0.0 - 0.5 K/uL    Baso # 0.03 0.00 - 0.20 K/uL    nRBC 0 0 /100 WBC    Gran % 84.6 (H) 38.0 - 73.0 %    Lymph % 7.0 (L) 18.0 - 48.0 %    Mono % 7.8 4.0 - 15.0 %    Eosinophil % 0.0 0.0 - 8.0 %    Basophil % 0.2 0.0 - 1.9 %    Differential Method Automated    Magnesium    Collection Time: 03/19/22  3:26 AM   Result Value Ref Range    Magnesium 2.2 1.6 - 2.6 mg/dL   Phosphorus    Collection Time: 03/19/22  3:26 AM   Result Value Ref Range    Phosphorus 2.8 2.7 - 4.5 mg/dL   Comprehensive metabolic panel    Collection Time: 03/19/22  3:26 AM   Result Value Ref Range    Sodium 132 (L) 136 - 145 mmol/L    Potassium 4.6 3.5 - 5.1 mmol/L    Chloride 102 95 - 110 mmol/L    CO2 19 (L) 23 - 29 mmol/L    Glucose 279 (H) 70 - 110 mg/dL    BUN 24 (H) 8 - 23 mg/dL    Creatinine 1.4 0.5 - 1.4 mg/dL    Calcium 8.3 (L) 8.7 - 10.5 mg/dL    Total Protein 6.4 6.0 - 8.4 g/dL    Albumin 2.9 (L) 3.5 - 5.2 g/dL    Total Bilirubin 0.6 0.1 - 1.0 mg/dL    Alkaline Phosphatase 75 55 - 135 U/L    AST 22 10 - 40 U/L    ALT 10 10 - 44 U/L    Anion Gap 11 8 - 16 mmol/L    eGFR if African American 39.8 (A) >60 mL/min/1.73 m^2    eGFR if non  34.5 (A) >60 mL/min/1.73 m^2   POCT glucose    Collection Time: 03/19/22  8:23 AM   Result Value Ref Range    POCT Glucose 281 (H) 70 - 110 mg/dL   POCT glucose    Collection Time: 03/19/22 12:18 PM   Result Value Ref Range    POCT Glucose 185 (H) 70 - 110 mg/dL         Significant Imaging: I have reviewed all pertinent imaging results/findings within the past 24 hours.    Imaging Results              CT Head Without Contrast (Final result)   Result time 03/19/22 07:17:18      Final result by John Villatoro MD (03/19/22 07:17:18)                   Impression:      No acute intracranial process.  Mild increased density within the right lentiform nucleus may be within normal variation but can also be seen with hyperglycemia if clinically consistent.    Electronically signed by resident: Benny Otto  Date:    03/18/2022  Time:    08:55    Electronically signed by: John Villatoro  Date:    03/19/2022  Time:    07:17               Narrative:    EXAMINATION:  CT HEAD WITHOUT CONTRAST    CLINICAL HISTORY:  Mental status change, unknown cause;    TECHNIQUE:  Low dose axial CT images obtained throughout the head without the use of intravenous contrast.  Axial, sagittal and coronal reconstructions were performed.    COMPARISON:  MRA and MRI 04/29/2013, CT 05/15/2014    FINDINGS:  Intracranial compartment:    Ventricles and sulci are stable in size.  No hydrocephalus.    Mild generalized volume loss. Scattered white matter hypoattenuation, nonspecific suggesting mild chronic ischemic microvascular changes.  Preserved differentiation of white and gray matter.  No mass effect, hemorrhage, edema or major vascular distribution infarct.  Mild increased density within the right lentiform nucleus may be within normal variation but can also be seen with hyperglycemia if clinically consistent.    No extra-axial blood or fluid collections.    Skull/extracranial contents (limited evaluation): No acute fracture.    Mastoid air cells and paranasal sinuses are essentially clear.                                       X-Ray Chest AP Portable (Final result)  Result time 03/17/22 18:49:05      Final result by David Sánchez MD (03/17/22 18:49:05)                   Impression:      No acute abnormality.    Electronically signed by resident: Aissatou Matthews  Date:    03/17/2022  Time:    18:29    Electronically signed by: David Sánchez MD  Date:    03/17/2022  Time:    18:49                Narrative:    EXAMINATION:  XR CHEST AP PORTABLE    CLINICAL HISTORY:  Sepsis; hyperglycemia;.    TECHNIQUE:  Single frontal portable view of the chest was performed.    COMPARISON:  Chest radiograph 08/06/2020.  01/12/2018    FINDINGS:  The lungs are clear, with normal appearance of pulmonary vasculature and no pleural effusion or pneumothorax.    The cardiac silhouette is normal in size.  Atherosclerosis of the aortic arch.  The hilar and mediastinal contours are unremarkable.    Bones are intact.  There is no evidence of free air beneath the hemidiaphragms.

## 2022-03-19 NOTE — NURSING
0321 Notified Dr Steen of patients htn despite prn hydralazine, no new orders.          END OF SHIFT NOTE  Pt slighted agitated this shift, with intermittent confusion and verbal profanity. Slight hypertension, hydralazine given with mild relief. HR remain in the 100's, lopressor given x2 with temp relief. Pt up with stand by assist, instructed pt and daughter on call light use and bed alarm purposes. Pt got out of bed and urinated on floor and self, initiated telesitter and it is now at bedside. Blood sugars been in the 200's this shift. daughter remain at bedside. no distress at this time, no complaints, VSS, bed in lowest position, side rails up x2. Bed alarm set, personal items within reach. [facility]      RON PersaudN RN

## 2022-03-19 NOTE — CODE/ RAPID DOCUMENTATION
RAPID RESPONSE NURSE ROUND       Rounding completed with charge RNPeter. No concerns verbalized at this time. Instructed to call [phone] for further concerns or assistance.

## 2022-03-19 NOTE — PLAN OF CARE
Problem: Adult Inpatient Plan of Care  Goal: Plan of Care Review  Outcome: Ongoing, Progressing  Goal: Patient-Specific Goal (Individualized)  Outcome: Ongoing, Progressing  Goal: Absence of Hospital-Acquired Illness or Injury  Outcome: Ongoing, Progressing  Goal: Optimal Comfort and Wellbeing  Outcome: Ongoing, Progressing  Goal: Readiness for Transition of Care  Outcome: Ongoing, Progressing     Problem: Bariatric Environmental Safety  Goal: Safety Maintained with Care  Outcome: Ongoing, Progressing     Problem: Diabetes Comorbidity  Goal: Blood Glucose Level Within Targeted Range  Outcome: Ongoing, Progressing     Problem: Fluid and Electrolyte Imbalance (Acute Kidney Injury/Impairment)  Goal: Fluid and Electrolyte Balance  Outcome: Ongoing, Progressing     Problem: Oral Intake Inadequate (Acute Kidney Injury/Impairment)  Goal: Optimal Nutrition Intake  Outcome: Ongoing, Progressing     Problem: Renal Function Impairment (Acute Kidney Injury/Impairment)  Goal: Effective Renal Function  Outcome: Ongoing, Progressing     Problem: Skin Injury Risk Increased  Goal: Skin Health and Integrity  Outcome: Ongoing, Progressing     Problem: Fall Injury Risk  Goal: Absence of Fall and Fall-Related Injury  Outcome: Ongoing, Progressing

## 2022-03-20 NOTE — ASSESSMENT & PLAN NOTE
--new onset, suspect due to acute illness with DKA  --recent thyroid studies show normal T4  --follow-up 2D echo  --K > 4, Mag > 2  --telemetry  --continue metoprolol for rate control; titrate up as clinically indicated  --addressed anticoagulation risk/benefit w/ Pt and daughter; patient would like to defer therapy at this time    [id number] Score: 4.

## 2022-03-20 NOTE — CONSULTS
Nutrition-Related Diabetes Education      Time Spent: 20 minutes    Learners: Pt and caregiver    Current HbA1c: >14    Is patient aware of their A1c and their goal A1c? yes    Home diabetes medication(s): aspart 15 units TIDWM & lantus 36 units QHS - Pt non-compliant with taking medications regularly     Nutrition Education with handouts: Carbohydrate Counting for people with Diabetes, MyPlate Method for Meal Planning.     Comments: Educated Pt and caregiver on carbohydrate counting and meal planning. Emphasized importance of diet adherence. Pt and caregiver verbalized understanding. Educational materials left with caregiver.     Barriers to Learning: Pt very sleepy at time of visit, kept closing eyes during education. Caregiver listened attentively.     Follow up: Yes    Please consult as needed.  Thank you!    Frances De Guzman RD, LDN

## 2022-03-20 NOTE — PROGRESS NOTES
"[facility] - [facility] ([address] Medicine  Progress Note    Patient Name: Courtney Engle  MRN: [id number]  Patient Class: IP- Inpatient   Admission Date: 3/17/2022  Length of Stay: 3 days  Attending Physician: Shalonda Parks MD  Primary Care Provider: Vishal Ulloa MD        Subjective:     Principal Problem:Diabetes mellitus with ketoacidosis and lactic acidosis but without coma        HPI:  Patient is a 86 y.o. female with significant past medical history of IDDM, HTN, HLD, diastolic dysfunction (most recent echo 2018 normal), CKD 3, hypothyroidism and GERD presented to hospital complaining of altered mental status. HPI obtained from the daughter at bedside. Daughter states that for the past few weeks, patient has woken up with slurred speech that has improved as the day progressed. Last week the patient told the daughter that she "didn't feel well" but did not elaborate on specific symptoms. Earlier this week, the patient started complaining of increased thirst (for which she was requesting soda to be brought to her) and increased urination with dysuria (though patient attributed dysuria to a scratch on her vagina). She also complained of "kidney issues" though did not elaborate on specific symptoms. The daughter is unable to confirm whether or not the patient has been compliant with medications, including insulin, however she suspects that the patient has not been compliant as the patient's other daughter reported "several boxes of medication" in the patient's refrigerator (daughter at bedside does not know which medications they were). The patient had been resistant to calling her PCP or seeking medical care for the past two weeks, however was finally able to be brought to the ED today by her daughter. At present the patient is encephalopathic, but only complaint is of epigastric pain. Daughter reports episode of N/V within the past few days.      Work up in the ED revealed a leukocytosis of " 15, TAMARA with hyperkalemia (creatinine 2.3, baseline 0.8), lactic of 3.8 and labs consistent with DKA (pH 7.1, bhb 5.3, bicarb 9 w/ glucose 800). Additionally, the patient was found to be in new onset a fib RVR (rates up to 160 in the ED). Critical Care Medicine has been consulted for DKA & A fib RVR.      Overview/Hospital Course:  ICU Course:  Patient admitted to [facility] evening of 3/17 for DKA and new onset a fib RVR. Started on insulin per DKA protocol. Better rate control achieved with initiation of metoprolol. Encephalopathy improved; gap closed. Will transition to SQ insulin. Patient is stable for stepdown.     Hospital Medicine Course:  Pt stepped down to medicine 3/18.           Interval History:   No acute events overnight.  Patient slept well overnight.  Daughter at bedside.  Patient notes resolution of nausea.  However, she endorses some left-sided abdominal pain.  Daughter and patient note that patient has not had a bowel movement.  Patient would like to defer anticoagulation at this time after discussion about risk benefit of anticoagulation in the setting of atrial fibrillation.     Review of Systems   Constitutional:  Negative for chills and diaphoresis.   HENT:  Negative for congestion, sneezing, sore throat and trouble swallowing.    Eyes:  Negative for visual disturbance.   Respiratory:  Negative for cough, shortness of breath and wheezing.    Cardiovascular:  Negative for chest pain, palpitations and leg swelling.   Gastrointestinal:  Positive for abdominal pain (llq). Negative for constipation, diarrhea, nausea and vomiting.   Genitourinary:  Negative for dysuria.   Musculoskeletal:  Negative for arthralgias and myalgias.   Skin:  Negative for color change.   Neurological:  Negative for dizziness, light-headedness and headaches.   Psychiatric/Behavioral:  Negative for decreased concentration.      Objective:     Vital Signs (Most Recent):  Temp: 98.7 °F (37.1 °C) (03/20/22 0750)  Pulse: 108 (03/20/22  0750)  Resp: 13 (03/20/22 0750)  BP: (!) 165/80 (03/20/22 0750)  SpO2: 98 % (03/20/22 0750)   Vital Signs (24h Range):  Temp:  [98.7 °F (37.1 °C)] 98.7 °F (37.1 °C)  Pulse:  [104-111] 108  Resp:  [13-18] 13  SpO2:  [96 %-98 %] 98 %  BP: (134-169)/(65-80) 165/80     Weight: 88 kg (194 lb 0.1 oz)  Body mass index is 37.89 kg/m².    Intake/Output Summary (Last 24 hours) at 3/20/2022 1507  Last data filed at 3/19/2022 1700  Gross per 24 hour   Intake 500 ml   Output 800 ml   Net -300 ml        Physical Exam  Vitals and nursing note reviewed.   Constitutional:       General: She is not in acute distress.     Appearance: She is not toxic-appearing or diaphoretic.   HENT:      Head: Normocephalic and atraumatic.      Right Ear: External ear normal.      Left Ear: External ear normal.      Nose: No congestion or rhinorrhea.      Mouth/Throat:      Mouth: Mucous membranes are dry.      Pharynx: No oropharyngeal exudate or posterior oropharyngeal erythema.   Eyes:      General: No scleral icterus.        Right eye: No discharge.         Left eye: No discharge.   Cardiovascular:      Rate and Rhythm: Tachycardia present. Rhythm irregular.      Heart sounds: No murmur heard.  Pulmonary:      Effort: Pulmonary effort is normal.      Breath sounds: Normal breath sounds. No wheezing, rhonchi or rales.   Abdominal:      General: Bowel sounds are normal.      Palpations: Abdomen is soft.      Tenderness: There is no abdominal tenderness.   Musculoskeletal:      Cervical back: Normal range of motion and neck supple.      Right lower leg: No edema.      Left lower leg: No edema.   Skin:     General: Skin is warm and dry.   Neurological:      Mental Status: She is alert and oriented to person, place, and time.   Psychiatric:         Mood and Affect: Mood normal.         Behavior: Behavior normal.       Significant Labs: All pertinent labs within the past 24 hours have been reviewed.    Recent Results (from the past 24 hour(s))   POCT  glucose    Collection Time: 03/19/22  5:26 PM   Result Value Ref Range    POCT Glucose 193 (H) 70 - 110 mg/dL   POCT glucose    Collection Time: 03/19/22  8:42 PM   Result Value Ref Range    POCT Glucose 191 (H) 70 - 110 mg/dL   CBC auto differential    Collection Time: 03/20/22  2:38 AM   Result Value Ref Range    WBC 11.25 3.90 - 12.70 K/uL    RBC 4.44 4.00 - 5.40 M/uL    Hemoglobin 12.4 12.0 - 16.0 g/dL    Hematocrit 39.7 37.0 - 48.5 %    MCV 89 82 - 98 fL    MCH 27.9 27.0 - 31.0 pg    MCHC 31.2 (L) 32.0 - 36.0 g/dL    RDW 14.8 (H) 11.5 - 14.5 %    Platelets 226 150 - 450 K/uL    MPV 11.6 9.2 - 12.9 fL    Immature Granulocytes 0.3 0.0 - 0.5 %    Gran # (ANC) 6.9 1.8 - 7.7 K/uL    Immature Grans (Abs) 0.03 0.00 - 0.04 K/uL    Lymph # 3.0 1.0 - 4.8 K/uL    Mono # 1.3 (H) 0.3 - 1.0 K/uL    Eos # 0.0 0.0 - 0.5 K/uL    Baso # 0.03 0.00 - 0.20 K/uL    nRBC 0 0 /100 WBC    Gran % 60.9 38.0 - 73.0 %    Lymph % 26.5 18.0 - 48.0 %    Mono % 11.9 4.0 - 15.0 %    Eosinophil % 0.1 0.0 - 8.0 %    Basophil % 0.3 0.0 - 1.9 %    Differential Method Automated    Magnesium    Collection Time: 03/20/22  2:38 AM   Result Value Ref Range    Magnesium 2.0 1.6 - 2.6 mg/dL   Comprehensive metabolic panel    Collection Time: 03/20/22  2:38 AM   Result Value Ref Range    Sodium 139 136 - 145 mmol/L    Potassium 3.9 3.5 - 5.1 mmol/L    Chloride 107 95 - 110 mmol/L    CO2 22 (L) 23 - 29 mmol/L    Glucose 126 (H) 70 - 110 mg/dL    BUN 19 8 - 23 mg/dL    Creatinine 1.0 0.5 - 1.4 mg/dL    Calcium 8.6 (L) 8.7 - 10.5 mg/dL    Total Protein 6.1 6.0 - 8.4 g/dL    Albumin 2.6 (L) 3.5 - 5.2 g/dL    Total Bilirubin 0.5 0.1 - 1.0 mg/dL    Alkaline Phosphatase 71 55 - 135 U/L    AST 14 10 - 40 U/L    ALT 10 10 - 44 U/L    Anion Gap 10 8 - 16 mmol/L    eGFR if African American 59.8 (A) >60 mL/min/1.73 m^2    eGFR if non  51.9 (A) >60 mL/min/1.73 m^2   Phosphorus    Collection Time: 03/20/22  2:38 AM   Result Value Ref Range    Phosphorus  2.3 (L) 2.7 - 4.5 mg/dL   POCT glucose    Collection Time: 03/20/22  9:46 AM   Result Value Ref Range    POCT Glucose 221 (H) 70 - 110 mg/dL   POCT glucose    Collection Time: 03/20/22 12:23 PM   Result Value Ref Range    POCT Glucose 104 70 - 110 mg/dL         Significant Imaging: I have reviewed all pertinent imaging results/findings within the past 24 hours.    Imaging Results              CT Head Without Contrast (Final result)  Result time 03/19/22 07:17:18      Final result by John Villatoro MD (03/19/22 07:17:18)                   Impression:      No acute intracranial process.  Mild increased density within the right lentiform nucleus may be within normal variation but can also be seen with hyperglycemia if clinically consistent.    Electronically signed by resident: Benny Otto  Date:    03/18/2022  Time:    08:55    Electronically signed by: John Villatoro  Date:    03/19/2022  Time:    07:17               Narrative:    EXAMINATION:  CT HEAD WITHOUT CONTRAST    CLINICAL HISTORY:  Mental status change, unknown cause;    TECHNIQUE:  Low dose axial CT images obtained throughout the head without the use of intravenous contrast.  Axial, sagittal and coronal reconstructions were performed.    COMPARISON:  MRA and MRI 04/29/2013, CT 05/15/2014    FINDINGS:  Intracranial compartment:    Ventricles and sulci are stable in size.  No hydrocephalus.    Mild generalized volume loss. Scattered white matter hypoattenuation, nonspecific suggesting mild chronic ischemic microvascular changes.  Preserved differentiation of white and gray matter.  No mass effect, hemorrhage, edema or major vascular distribution infarct.  Mild increased density within the right lentiform nucleus may be within normal variation but can also be seen with hyperglycemia if clinically consistent.    No extra-axial blood or fluid collections.    Skull/extracranial contents (limited evaluation): No acute fracture.    Mastoid air cells and  paranasal sinuses are essentially clear.                                       X-Ray Chest AP Portable (Final result)  Result time 03/17/22 18:49:05      Final result by David Sánchez MD (03/17/22 18:49:05)                   Impression:      No acute abnormality.    Electronically signed by resident: Asisatou Matthews  Date:    03/17/2022  Time:    18:29    Electronically signed by: David Sánchez MD  Date:    03/17/2022  Time:    18:49               Narrative:    EXAMINATION:  XR CHEST AP PORTABLE    CLINICAL HISTORY:  Sepsis; hyperglycemia;.    TECHNIQUE:  Single frontal portable view of the chest was performed.    COMPARISON:  Chest radiograph 08/06/2020.  01/12/2018    FINDINGS:  The lungs are clear, with normal appearance of pulmonary vasculature and no pleural effusion or pneumothorax.    The cardiac silhouette is normal in size.  Atherosclerosis of the aortic arch.  The hilar and mediastinal contours are unremarkable.    Bones are intact.  There is no evidence of free air beneath the hemidiaphragms.                                          Assessment/Plan:      * Diabetes mellitus with ketoacidosis and lactic acidosis but without coma  Lab Results   Component Value Date    HGBA1C >14.0 (H) 03/17/2022     --suspect secondary to medication non-compliance and dietary indiscretion  --home regimen aspart 15 units TIDWM & lantus 36 units QHS  --s/p insulin gtt per protocol   --increase detemir to 15 units b.i.d. and moderate dose sliding scale insulin; aspart 3U TIDWM  --continue to monitor blood glucose closely and titrate scheduled insulin as needed to inpatient goal  --poct bg x4  --diabetic diet  --no obvious source of infection and suspect leukocytosis is reactive; will hold on further abx at this time (s/p vanc/zosyn in ED); leukocytosis resolved  --f/u blood cultures ngtd  --blood glucose trend improving    Type 2 diabetes mellitus without complication, with long-term current use of insulin  See diabetes mellitus  with ketoacidosis and lactic acidosis but without coma      Acute renal failure superimposed on stage 3 chronic kidney disease  --creatinine on admission 2.3; baseline 0.8  --suspect pre-renal in setting of DKA  --s/p 3L fluid resuscitation in ED  --renal function improving with fluids  --holding home lisinopril  --continue to trend creatinine daily       CKD stage 3 due to type 2 diabetes mellitus  See acute renal failure    Atrial fibrillation with RVR  --new onset, suspect due to acute illness with DKA  --recent thyroid studies show normal T4  --follow-up 2D echo  --K > 4, Mag > 2  --telemetry  --continue metoprolol for rate control; titrate up as clinically indicated  --addressed anticoagulation risk/benefit w/ Pt and daughter; patient would like to defer therapy at this time    [id number] Score: 4.         Diastolic dysfunction  --most recent echo 1/2018 showed LVEF 60-65%, normal LV diastolic function and normal RV function  --repeat echo pending, follow-up    Acute metabolic encephalopathy  -noted on admission  -improved w/ improved glycemic control  -at baseline upon step down  -head CT w/o acute process      GERD (gastroesophageal reflux disease)  --continue home pepcid      Depression  Continue home Lexapro        Anxiety  Continue home Lexapro      Acquired hypothyroidism  --TSH low, however free T4 WNL  --continue home levothyroxine    Essential hypertension  --holding home lisinopril in setting of TAMARA  --holding home atenolol in favor of metoprolol in setting of a fib RVR      VTE Risk Mitigation (From admission, onward)         Ordered     heparin (porcine) injection 5,000 Units  Every 8 hours         03/17/22 2313     IP VTE HIGH RISK PATIENT  Once         03/17/22 2313     Place sequential compression device  Until discontinued         03/17/22 2313                Discharge Planning   FAY: 3/22/2022     Code Status: Full Code   Is the patient medically ready for discharge?: No    Reason for patient  still in hospital (select all that apply): Patient trending condition and Pending disposition                     Shalonda Parks MD  Department of Hospital Medicine   [facility] - Intensive Care ([place])

## 2022-03-20 NOTE — ASSESSMENT & PLAN NOTE
Lab Results   Component Value Date    HGBA1C >14.0 (H) 03/17/2022     --suspect secondary to medication non-compliance and dietary indiscretion  --home regimen aspart 15 units TIDWM & lantus 36 units QHS  --s/p insulin gtt per protocol   --increase detemir to 15 units b.i.d. and moderate dose sliding scale insulin; aspart 3U TIDWM  --continue to monitor blood glucose closely and titrate scheduled insulin as needed to inpatient goal  --poct bg x4  --diabetic diet  --no obvious source of infection and suspect leukocytosis is reactive; will hold on further abx at this time (s/p vanc/zosyn in ED); leukocytosis resolved  --f/u blood cultures ngtd  --blood glucose trend improving

## 2022-03-20 NOTE — SUBJECTIVE & OBJECTIVE
Interval History:   No acute events overnight.  Patient slept well overnight.  Daughter at bedside.  Patient notes resolution of nausea.  However, she endorses some left-sided abdominal pain.  Daughter and patient note that patient has not had a bowel movement.  Patient would like to defer anticoagulation at this time after discussion about risk benefit of anticoagulation in the setting of atrial fibrillation.     Review of Systems   Constitutional:  Negative for chills and diaphoresis.   HENT:  Negative for congestion, sneezing, sore throat and trouble swallowing.    Eyes:  Negative for visual disturbance.   Respiratory:  Negative for cough, shortness of breath and wheezing.    Cardiovascular:  Negative for chest pain, palpitations and leg swelling.   Gastrointestinal:  Positive for abdominal pain (llq). Negative for constipation, diarrhea, nausea and vomiting.   Genitourinary:  Negative for dysuria.   Musculoskeletal:  Negative for arthralgias and myalgias.   Skin:  Negative for color change.   Neurological:  Negative for dizziness, light-headedness and headaches.   Psychiatric/Behavioral:  Negative for decreased concentration.      Objective:     Vital Signs (Most Recent):  Temp: 98.7 °F (37.1 °C) (03/20/22 0750)  Pulse: 108 (03/20/22 0750)  Resp: 13 (03/20/22 0750)  BP: (!) 165/80 (03/20/22 0750)  SpO2: 98 % (03/20/22 0750)   Vital Signs (24h Range):  Temp:  [98.7 °F (37.1 °C)] 98.7 °F (37.1 °C)  Pulse:  [104-111] 108  Resp:  [13-18] 13  SpO2:  [96 %-98 %] 98 %  BP: (134-169)/(65-80) 165/80     Weight: 88 kg (194 lb 0.1 oz)  Body mass index is 37.89 kg/m².    Intake/Output Summary (Last 24 hours) at 3/20/2022 1507  Last data filed at 3/19/2022 1700  Gross per 24 hour   Intake 500 ml   Output 800 ml   Net -300 ml        Physical Exam  Vitals and nursing note reviewed.   Constitutional:       General: She is not in acute distress.     Appearance: She is not toxic-appearing or diaphoretic.   HENT:      Head:  Normocephalic and atraumatic.      Right Ear: External ear normal.      Left Ear: External ear normal.      Nose: No congestion or rhinorrhea.      Mouth/Throat:      Mouth: Mucous membranes are dry.      Pharynx: No oropharyngeal exudate or posterior oropharyngeal erythema.   Eyes:      General: No scleral icterus.        Right eye: No discharge.         Left eye: No discharge.   Cardiovascular:      Rate and Rhythm: Tachycardia present. Rhythm irregular.      Heart sounds: No murmur heard.  Pulmonary:      Effort: Pulmonary effort is normal.      Breath sounds: Normal breath sounds. No wheezing, rhonchi or rales.   Abdominal:      General: Bowel sounds are normal.      Palpations: Abdomen is soft.      Tenderness: There is no abdominal tenderness.   Musculoskeletal:      Cervical back: Normal range of motion and neck supple.      Right lower leg: No edema.      Left lower leg: No edema.   Skin:     General: Skin is warm and dry.   Neurological:      Mental Status: She is alert and oriented to person, place, and time.   Psychiatric:         Mood and Affect: Mood normal.         Behavior: Behavior normal.       Significant Labs: All pertinent labs within the past 24 hours have been reviewed.    Recent Results (from the past 24 hour(s))   POCT glucose    Collection Time: 03/19/22  5:26 PM   Result Value Ref Range    POCT Glucose 193 (H) 70 - 110 mg/dL   POCT glucose    Collection Time: 03/19/22  8:42 PM   Result Value Ref Range    POCT Glucose 191 (H) 70 - 110 mg/dL   CBC auto differential    Collection Time: 03/20/22  2:38 AM   Result Value Ref Range    WBC 11.25 3.90 - 12.70 K/uL    RBC 4.44 4.00 - 5.40 M/uL    Hemoglobin 12.4 12.0 - 16.0 g/dL    Hematocrit 39.7 37.0 - 48.5 %    MCV 89 82 - 98 fL    MCH 27.9 27.0 - 31.0 pg    MCHC 31.2 (L) 32.0 - 36.0 g/dL    RDW 14.8 (H) 11.5 - 14.5 %    Platelets 226 150 - 450 K/uL    MPV 11.6 9.2 - 12.9 fL    Immature Granulocytes 0.3 0.0 - 0.5 %    Gran # (ANC) 6.9 1.8 - 7.7  K/uL    Immature Grans (Abs) 0.03 0.00 - 0.04 K/uL    Lymph # 3.0 1.0 - 4.8 K/uL    Mono # 1.3 (H) 0.3 - 1.0 K/uL    Eos # 0.0 0.0 - 0.5 K/uL    Baso # 0.03 0.00 - 0.20 K/uL    nRBC 0 0 /100 WBC    Gran % 60.9 38.0 - 73.0 %    Lymph % 26.5 18.0 - 48.0 %    Mono % 11.9 4.0 - 15.0 %    Eosinophil % 0.1 0.0 - 8.0 %    Basophil % 0.3 0.0 - 1.9 %    Differential Method Automated    Magnesium    Collection Time: 03/20/22  2:38 AM   Result Value Ref Range    Magnesium 2.0 1.6 - 2.6 mg/dL   Comprehensive metabolic panel    Collection Time: 03/20/22  2:38 AM   Result Value Ref Range    Sodium 139 136 - 145 mmol/L    Potassium 3.9 3.5 - 5.1 mmol/L    Chloride 107 95 - 110 mmol/L    CO2 22 (L) 23 - 29 mmol/L    Glucose 126 (H) 70 - 110 mg/dL    BUN 19 8 - 23 mg/dL    Creatinine 1.0 0.5 - 1.4 mg/dL    Calcium 8.6 (L) 8.7 - 10.5 mg/dL    Total Protein 6.1 6.0 - 8.4 g/dL    Albumin 2.6 (L) 3.5 - 5.2 g/dL    Total Bilirubin 0.5 0.1 - 1.0 mg/dL    Alkaline Phosphatase 71 55 - 135 U/L    AST 14 10 - 40 U/L    ALT 10 10 - 44 U/L    Anion Gap 10 8 - 16 mmol/L    eGFR if African American 59.8 (A) >60 mL/min/1.73 m^2    eGFR if non  51.9 (A) >60 mL/min/1.73 m^2   Phosphorus    Collection Time: 03/20/22  2:38 AM   Result Value Ref Range    Phosphorus 2.3 (L) 2.7 - 4.5 mg/dL   POCT glucose    Collection Time: 03/20/22  9:46 AM   Result Value Ref Range    POCT Glucose 221 (H) 70 - 110 mg/dL   POCT glucose    Collection Time: 03/20/22 12:23 PM   Result Value Ref Range    POCT Glucose 104 70 - 110 mg/dL         Significant Imaging: I have reviewed all pertinent imaging results/findings within the past 24 hours.    Imaging Results              CT Head Without Contrast (Final result)  Result time 03/19/22 07:17:18      Final result by John Villatoro MD (03/19/22 07:17:18)                   Impression:      No acute intracranial process.  Mild increased density within the right lentiform nucleus may be within normal variation  but can also be seen with hyperglycemia if clinically consistent.    Electronically signed by resident: Benny Otto  Date:    03/18/2022  Time:    08:55    Electronically signed by: John Villatoro  Date:    03/19/2022  Time:    07:17               Narrative:    EXAMINATION:  CT HEAD WITHOUT CONTRAST    CLINICAL HISTORY:  Mental status change, unknown cause;    TECHNIQUE:  Low dose axial CT images obtained throughout the head without the use of intravenous contrast.  Axial, sagittal and coronal reconstructions were performed.    COMPARISON:  MRA and MRI 04/29/2013, CT 05/15/2014    FINDINGS:  Intracranial compartment:    Ventricles and sulci are stable in size.  No hydrocephalus.    Mild generalized volume loss. Scattered white matter hypoattenuation, nonspecific suggesting mild chronic ischemic microvascular changes.  Preserved differentiation of white and gray matter.  No mass effect, hemorrhage, edema or major vascular distribution infarct.  Mild increased density within the right lentiform nucleus may be within normal variation but can also be seen with hyperglycemia if clinically consistent.    No extra-axial blood or fluid collections.    Skull/extracranial contents (limited evaluation): No acute fracture.    Mastoid air cells and paranasal sinuses are essentially clear.                                       X-Ray Chest AP Portable (Final result)  Result time 03/17/22 18:49:05      Final result by David Sánchez MD (03/17/22 18:49:05)                   Impression:      No acute abnormality.    Electronically signed by resident: Aissatou Matthews  Date:    03/17/2022  Time:    18:29    Electronically signed by: David Sánhcez MD  Date:    03/17/2022  Time:    18:49               Narrative:    EXAMINATION:  XR CHEST AP PORTABLE    CLINICAL HISTORY:  Sepsis; hyperglycemia;.    TECHNIQUE:  Single frontal portable view of the chest was performed.    COMPARISON:  Chest radiograph 08/06/2020.   01/12/2018    FINDINGS:  The lungs are clear, with normal appearance of pulmonary vasculature and no pleural effusion or pneumothorax.    The cardiac silhouette is normal in size.  Atherosclerosis of the aortic arch.  The hilar and mediastinal contours are unremarkable.    Bones are intact.  There is no evidence of free air beneath the hemidiaphragms.

## 2022-03-21 NOTE — PT/OT/SLP EVAL
"Physical Therapy Evaluation/Treatment    Patient Name:  Courtney Engle   MRN:  [id number]    Recommendations:     Discharge Recommendations:  nursing facility, skilled   Discharge Equipment Recommendations: bedside commode   Barriers to discharge: Inaccessible home and Decreased caregiver support (lives alone; 5 ROSALIO)    Assessment:     Courtney Engle is a 86 y.o. female admitted with a medical diagnosis of Diabetes mellitus with ketoacidosis and lactic acidosis but without coma.  She presents with the following impairments/functional limitations:  weakness, impaired endurance, impaired self care skills, impaired functional mobilty, gait instability, impaired balance, decreased lower extremity function, decreased coordination, decreased safety awareness, pain, impaired cardiopulmonary response to activity. Pt completing transfers sit>stand without physical assist. However, requiring assist and BUE support throughout ambulation 2* noted gait instability. Pt reporting weakness during mobility and BP noted to be decreased following. Pt would continue to benefit from skilled acute PT in order to address current deficits and progress functional mobility. Currently recommending SNF upon d/c, as pt is limited with mobility, at increased risk of falls, and does not have adequate caregiver support available.     Rehab Prognosis: Good; patient would benefit from acute skilled PT services to address these deficits and reach maximum level of function.    Recent Surgery: * No surgery found *      Plan:     During this hospitalization, patient to be seen 4 x/week to address the identified rehab impairments via gait training, therapeutic activities, therapeutic exercises, neuromuscular re-education and progress toward the following goals:    · Plan of Care Expires:  04/19/22    Subjective     Chief Complaint: weakness  Patient/Family Comments/goals: "Wow I didn't realize I was this weak. If you weren't there holding on to me I " "would have fallen. And if I fall at home I don't think I'd be able to get myself up." "I'm glad y'all came and showed me this. I don't think I can go home today anymore."  Pain/Comfort:  · Pain Rating 1:  (did not rate)  · Location - Side 1: Left  · Location - Orientation 1: lower  · Location 1: back  · Pain Addressed 1: Reposition, Distraction    Patients cultural, spiritual, Advent conflicts given the current situation: no    Living Environment:  Pt lives alone in a 2SH with 5 ROSALIO, B handrails; pt stays on 1st floor of home.  Prior to admission, patients level of function was independent with household mobility; mod-I with ADLs; uses quad cane, rollator, or motorized scooter for community mobility.  Equipment used at home: grab bar, walker, rolling, cane, quad, shower chair (motorized scooter).  DME owned (not currently used): none.  Upon discharge, patient will have assistance from her daughter; however, pt reports assist is limited and her daughter does not live in the same town.    Objective:     Communicated with RN prior to session.  Patient found sitting edge of bed with telemetry, pulse ox (continuous)  upon PT entry to room.    General Precautions: Standard, fall   Orthopedic Precautions:N/A   Braces: N/A  Respiratory Status: Room air    Exams:  · Cognitive Exam:  Patient is oriented to Person, Place, Time and Situation  · Postural Exam:  Patient presented with the following abnormalities:    · -       Rounded shoulders  · -       Forward head  · Sensation:    · -       Intact  · RLE ROM: WFL  · RLE Strength: WFL  · LLE ROM: WFL  · LLE Strength: WFL    Functional Mobility:  · Transfers:     · Sit to Stand:  contact guard assistance with no AD, x1 rep from EOB and x1 rep from bedside chair   · Cues provided for safe transfer techniques, dary during transition stand>sit.  · Gait: ~10 ft. with Cecilia and B HHA  · demo'd decreased step length, decreased toe-floor clearance, decreased thaddeus, impaired " weight-shifting, and instability  · Cues provided for sequencing, appropriate weight-shifts, and safe technique   · Pt reporting weakness during gait. BP following gait trial 108/64 (81). RN notified following session     Therapeutic Activities and Exercises:  Pt educated on role of PT and PT POC. Pt verbalized understanding.   Pt educated on the effects of bed rest and the importance of OOB activity. Pt encouraged to sit UIC majority of day as tolerated and continue daily ambulation with nursing assist. Pt verbalized understanding.  Pt oriented to call bell and instructed to call for staff assist with standing tasks/transfers. Pt verbalized understanding.   MD notified of d/c recs and functional limitations noted during session as well as request for OT orders to be placed.    AM-PAC 6 CLICK MOBILITY  Total Score:18     Patient left up in chair with all lines intact, call button in reach and MD and RN notified.    GOALS:   Multidisciplinary Problems     Physical Therapy Goals        Problem: Physical Therapy Goal    Goal Priority Disciplines Outcome Goal Variances Interventions   Physical Therapy Goal     PT, PT/OT Ongoing, Progressing     Description: Goals to be met by: 3/31/2022     Patient will increase functional independence with mobility by performin. Supine to sit with Modified [place]  2. Sit to stand transfer with Supervision  3. Gait  x 150 feet with Stand-by Assistance using Rolling Walker or LRAD as needed.   4. Ascend/descend 5 stair with bilateral Handrails Contact Guard Assistance.   5. Lower extremity exercise program x15 reps, with supervision, in order to increase LE strength and (I) with functional mobility.                      History:     Past Medical History:   Diagnosis Date    Acquired hypothyroidism 2014    Anxiety     Aortic calcification 2016    X-Ray Chest-2014    Arthritis     Bilateral carotid artery disease 2016    US Carotid Bilateral-2013     CKD stage 3 due to type 2 diabetes mellitus 2/16/2017    Depression     Essential hypertension     Fever blister     GERD (gastroesophageal reflux disease)     Glaucoma 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 robledo)     Hyperlipidemia LDL goal <70 2/16/2017    Keloid cicatrix     Mixed stress and urge urinary incontinence 2/16/2017    Morbid obesity with BMI of 40.0-44.9, adult 12/8/2016    Ocular migraine 1/24/2013    Type 2 diabetes mellitus with hyperglycemia, with long-term current use of insulin        Past Surgical History:   Procedure Laterality Date    CATARACT EXTRACTION W/  INTRAOCULAR LENS IMPLANT Right 05/30/2012        CATARACT EXTRACTION W/  INTRAOCULAR LENS IMPLANT Left 05/26/2010        CHOLECYSTECTOMY      COLONOSCOPY N/A 12/20/2019    Procedure: COLONOSCOPY;  Surgeon: Higinio Gilbert MD;  Location: [address]);  Service: Endoscopy;  Laterality: N/A;  patient to call back to schedule Colonoscopy once she schedules Cardiology Clearance appt-BB  8/30/19 Low CV risk for colonospcopy per   patient requesting  but can't wait for his next available due to rectal bleeding    COLONOSCOPY W/ POLYPECTOMY  04/20/2015    ESOPHAGOGASTRODUODENOSCOPY  04/20/2015    HYSTERECTOMY      Partial    JOINT REPLACEMENT      knee replacement right      TONSILLECTOMY, ADENOIDECTOMY      tonsillectomy only       Time Tracking:     PT Received On: 03/21/22  PT Start Time: 1108     PT Stop Time: 1126  PT Total Time (min): 18 min     Billable Minutes: Evaluation 10 and Therapeutic Activity 8      03/21/2022

## 2022-03-21 NOTE — NURSING
[address] [place] [address], [address] [place] [address]                                OPERATIVE REPORT    PATIENT NAME: Casandra Flores                    :        1949  MED REC NO:   [id number]                          ROOM:  ACCOUNT NO:   [de-identified]                           ADMIT DATE: 2020  PROVIDER:     Florencio White MD    DATE OF PROCEDURE:  2020    PREOPERATIVE DIAGNOSIS:  Chronic calculous cholecystitis. POSTOPERATIVE DIAGNOSIS:  Chronic calculous cholecystitis. PROCEDURE PERFORMED:  Laparoscopic cholecystectomy. SURGEON:  Florencio White MD    ANESTHESIA:  General anesthesia combined with endotracheal tube  intubation. SPECIMENS:  Gallbladder. COMPLICATIONS:  None. DRAINS:  15-Malaysian Antonio drain placement in the gallbladder bed fossa. ESTIMATED BLOOD LOSS:  Negligible. INTRAOPERATIVE FINDINGS:  As I was dissecting the peritoneal reflection  off of the gallbladder near the fundus area, I did note that there  appeared to be a tubular structure going directly into the gallbladder. I carefully dissected this out with a [place], and then  proximally what appeared to be coming right out of the liver. I then  placed two clips with the laparoscopic clipper. Once this was  completed, I then continued just to remove the gallbladder from the  peritoneal reflection. I did not see any other abnormalities. Due to  my concern that this could be an accessory duct going to the  gallbladder, I did placed a 15-Malaysian Hassie Busman drain within the  gallbladder bed fossa to be able to follow the patient's clinical course  being able to control a possible bile leak.     INDICATIONS FOR PROCEDURE:  The patient is a very pleasant 63-year-old   male who presented to my office complaining of multiple  episodes of right upper quadrant abdominal pain with associated nausea  and vomiting, and fatty meal All care rendered. Safety maintained. Instructed pt to ring the bell for any kind of assistance, pt verbalizes understanding. Appetite remains poor. Reports relief from abd pain . Pt remains in stable condition. Monitoring ongoing   copiously irrigated out the gallbladder bed fossa,  suctioned it free. The surface was a little raw near the bottom of the  gallbladder bed fossa, so I did put some Fibrillar within this region  and again, there was no leakage of bile from the cystic duct and no  bleeding from the cystic artery stumps. I then carefully examined the  possible accessory duct that I had put the clips on. There was no  leakage of bile and at this time, I did go ahead and place a 15-Amharic  Antonio drain in the gallbladder bed fossa. This exited out the right  upper quadrant lateral trocar site. It was anchored into place with a  3-0 nylon suture. At this time, all secondary trocars were then removed  under direct vision. No bleeding noted in the trocar site placement,  and the abdomen was allowed to be desufflate fully. Subxiphoid trocar  site fascia was then closed with figure-of-eight 0 Vicryl stitch, and  all skin incisions were reapproximated with simple interrupted 5-0 nylon  sutures. Dry sterile dressings were then applied, and the AWILDA drain was  then hooked up to bulb suction. The patient was then awoken from  anesthesia, transported to the recovery room in satisfactory stable  condition.       Ingrid Glover MD    D: 04/23/2020 10:09:09       T: 04/23/2020 10:19:09     [email or web address]  Job#: [id number]     Doc#: [id number]    CC:  Lucretia Nicolas MD

## 2022-03-21 NOTE — PLAN OF CARE
PT evaluation complete and appropriate goals established.    3/21/2022    Problem: Physical Therapy Goal  Goal: Physical Therapy Goal  Description: Goals to be met by: 3/31/2022     Patient will increase functional independence with mobility by performin. Supine to sit with Modified [place]  2. Sit to stand transfer with Supervision  3. Gait  x 150 feet with Stand-by Assistance using Rolling Walker or LRAD as needed.   4. Ascend/descend 5 stair with bilateral Handrails Contact Guard Assistance.   5. Lower extremity exercise program x15 reps, with supervision, in order to increase LE strength and (I) with functional mobility.     Outcome: Ongoing, Progressing

## 2022-03-21 NOTE — SUBJECTIVE & OBJECTIVE
Interval History:   No acute events overnight.  Patient slept well overnight.  Patient notes resolution of nausea and abdominal pain.  Patient notes that food is poor tasting.  Patient would like to continue to for initiating anticoagulation.      Review of Systems   Constitutional:  Negative for chills and diaphoresis.   HENT:  Negative for congestion, sneezing, sore throat and trouble swallowing.    Eyes:  Negative for visual disturbance.   Respiratory:  Negative for cough, shortness of breath and wheezing.    Cardiovascular:  Negative for chest pain, palpitations and leg swelling.   Gastrointestinal:  Negative for abdominal pain, constipation, diarrhea, nausea and vomiting.   Genitourinary:  Negative for dysuria.   Musculoskeletal:  Negative for arthralgias and myalgias.   Skin:  Negative for color change.   Neurological:  Negative for dizziness, light-headedness and headaches.   Psychiatric/Behavioral:  Negative for decreased concentration.      Objective:     Vital Signs (Most Recent):  Temp: 99.1 °F (37.3 °C) (03/21/22 0926)  Pulse: 94 (03/21/22 1602)  Resp: 14 (03/21/22 0900)  BP: (!) 164/72 (03/21/22 0926)  SpO2: 96 % (03/21/22 1256)   Vital Signs (24h Range):  Temp:  [98 °F (36.7 °C)-99.1 °F (37.3 °C)] 99.1 °F (37.3 °C)  Pulse:  [] 94  Resp:  [14-21] 14  SpO2:  [92 %-97 %] 96 %  BP: (108-164)/(50-72) 164/72     Weight: 88 kg (194 lb)  Body mass index is 37.89 kg/m².    Intake/Output Summary (Last 24 hours) at 3/21/2022 1717  Last data filed at 3/20/2022 1800  Gross per 24 hour   Intake 300 ml   Output 700 ml   Net -400 ml        Physical Exam  Vitals and nursing note reviewed.   Constitutional:       General: She is not in acute distress.     Appearance: She is not toxic-appearing or diaphoretic.   HENT:      Head: Normocephalic and atraumatic.      Right Ear: External ear normal.      Left Ear: External ear normal.      Nose: No congestion or rhinorrhea.      Mouth/Throat:      Mouth: Mucous  membranes are dry.      Pharynx: No oropharyngeal exudate or posterior oropharyngeal erythema.   Eyes:      General: No scleral icterus.        Right eye: No discharge.         Left eye: No discharge.   Cardiovascular:      Rate and Rhythm: Tachycardia present. Rhythm irregular.      Heart sounds: No murmur heard.  Pulmonary:      Effort: Pulmonary effort is normal.      Breath sounds: Normal breath sounds. No wheezing, rhonchi or rales.   Abdominal:      General: Bowel sounds are normal.      Palpations: Abdomen is soft.      Tenderness: There is no abdominal tenderness.   Musculoskeletal:      Cervical back: Normal range of motion and neck supple.      Right lower leg: No edema.      Left lower leg: No edema.   Skin:     General: Skin is warm and dry.   Neurological:      Mental Status: She is alert and oriented to person, place, and time.   Psychiatric:         Mood and Affect: Mood normal.         Behavior: Behavior normal.       Significant Labs: All pertinent labs within the past 24 hours have been reviewed.    Recent Results (from the past 24 hour(s))   POCT glucose    Collection Time: 03/20/22  5:48 PM   Result Value Ref Range    POCT Glucose 132 (H) 70 - 110 mg/dL   POCT glucose    Collection Time: 03/20/22  8:03 PM   Result Value Ref Range    POCT Glucose 203 (H) 70 - 110 mg/dL   CBC auto differential    Collection Time: 03/21/22  5:37 AM   Result Value Ref Range    WBC 9.48 3.90 - 12.70 K/uL    RBC 4.13 4.00 - 5.40 M/uL    Hemoglobin 11.7 (L) 12.0 - 16.0 g/dL    Hematocrit 37.0 37.0 - 48.5 %    MCV 90 82 - 98 fL    MCH 28.3 27.0 - 31.0 pg    MCHC 31.6 (L) 32.0 - 36.0 g/dL    RDW 14.9 (H) 11.5 - 14.5 %    Platelets 284 150 - 450 K/uL    MPV 12.3 9.2 - 12.9 fL    Immature Granulocytes 0.5 0.0 - 0.5 %    Gran # (ANC) 5.9 1.8 - 7.7 K/uL    Immature Grans (Abs) 0.05 (H) 0.00 - 0.04 K/uL    Lymph # 2.4 1.0 - 4.8 K/uL    Mono # 1.1 (H) 0.3 - 1.0 K/uL    Eos # 0.1 0.0 - 0.5 K/uL    Baso # 0.05 0.00 - 0.20 K/uL     nRBC 0 0 /100 WBC    Gran % 61.9 38.0 - 73.0 %    Lymph % 25.1 18.0 - 48.0 %    Mono % 11.5 4.0 - 15.0 %    Eosinophil % 0.5 0.0 - 8.0 %    Basophil % 0.5 0.0 - 1.9 %    Differential Method Automated    Magnesium    Collection Time: 03/21/22  5:37 AM   Result Value Ref Range    Magnesium 1.8 1.6 - 2.6 mg/dL   Comprehensive metabolic panel    Collection Time: 03/21/22  5:37 AM   Result Value Ref Range    Sodium 138 136 - 145 mmol/L    Potassium 4.1 3.5 - 5.1 mmol/L    Chloride 103 95 - 110 mmol/L    CO2 28 23 - 29 mmol/L    Glucose 229 (H) 70 - 110 mg/dL    BUN 14 8 - 23 mg/dL    Creatinine 0.9 0.5 - 1.4 mg/dL    Calcium 8.9 8.7 - 10.5 mg/dL    Total Protein 6.0 6.0 - 8.4 g/dL    Albumin 2.5 (L) 3.5 - 5.2 g/dL    Total Bilirubin 0.7 0.1 - 1.0 mg/dL    Alkaline Phosphatase 73 55 - 135 U/L    AST 12 10 - 40 U/L    ALT 8 (L) 10 - 44 U/L    Anion Gap 7 (L) 8 - 16 mmol/L    eGFR if African American >60.0 >60 mL/min/1.73 m^2    eGFR if non  58.9 (A) >60 mL/min/1.73 m^2   POCT glucose    Collection Time: 03/21/22  7:36 AM   Result Value Ref Range    POCT Glucose 208 (H) 70 - 110 mg/dL   Echo    Collection Time: 03/21/22  8:04 AM   Result Value Ref Range    BSA 1.93 m2    TDI SEPTAL 0.06 m/s    LV LATERAL E/E' RATIO 11.10 m/s    LV SEPTAL E/E' RATIO 18.50 m/s    LA WIDTH 3.41 cm    TDI LATERAL 0.10 m/s    LVIDd 4.80 3.5 - 6.0 cm    IVS 0.89 0.6 - 1.1 cm    Posterior Wall 0.89 0.6 - 1.1 cm    LVIDs 3.06 2.1 - 4.0 cm    FS 36 28 - 44 %    LA volume 49.07 cm3    Sinus 3.51 cm    STJ 2.47 cm    Ascending aorta 2.95 cm    LV mass 145.62 g    LA size 3.31 cm    RVDD 2.08 cm    TAPSE 1.49 cm    Left Ventricle Relative Wall Thickness 0.37 cm    AV mean gradient 4 mmHg    AV valve area 1.87 cm2    AV Velocity Ratio 0.53     AV index (prosthetic) 0.52     Mean e' 0.08 m/s    LVOT diameter 2.15 cm    LVOT area 3.6 cm2    LVOT peak aris 0.70 m/s    LVOT peak VTI 12.86 cm    Ao peak aris 1.31 m/s    Ao VTI 24.94 cm    Mr  max alexys 0.05 m/s    LVOT stroke volume 46.66 cm3    AV peak gradient 7 mmHg    E/E' ratio 13.88 m/s    MV Peak E Alexys 1.11 m/s    TR Max Alexys 2.22 m/s    LV Systolic Volume 36.63 mL    LV Systolic Volume Index 19.9 mL/m2    LV Diastolic Volume 76.50 mL    LV Diastolic Volume Index 41.58 mL/m2    LA Volume Index 26.7 mL/m2    LV Mass Index 79 g/m2    RA Major Axis 4.46 cm    Left Atrium Minor Axis 5.36 cm    Left Atrium Major Axis 4.89 cm    Triscuspid Valve Regurgitation Peak Gradient 20 mmHg    LA Volume Index (Mod) 21.0 mL/m2    LA volume (mod) 38.71 cm3    RA Width 2.29 cm    Right Atrial Pressure (from IVC) 3 mmHg    EF 65 %    TV rest pulmonary artery pressure 23 mmHg   POCT glucose    Collection Time: 03/21/22 11:59 AM   Result Value Ref Range    POCT Glucose 173 (H) 70 - 110 mg/dL   POCT glucose    Collection Time: 03/21/22  3:27 PM   Result Value Ref Range    POCT Glucose 92 70 - 110 mg/dL         Significant Imaging: I have reviewed all pertinent imaging results/findings within the past 24 hours.    Imaging Results              CT Head Without Contrast (Final result)  Result time 03/19/22 07:17:18      Final result by John Villatoro MD (03/19/22 07:17:18)                   Impression:      No acute intracranial process.  Mild increased density within the right lentiform nucleus may be within normal variation but can also be seen with hyperglycemia if clinically consistent.    Electronically signed by resident: Benny Otto  Date:    03/18/2022  Time:    08:55    Electronically signed by: John Villatoro  Date:    03/19/2022  Time:    07:17               Narrative:    EXAMINATION:  CT HEAD WITHOUT CONTRAST    CLINICAL HISTORY:  Mental status change, unknown cause;    TECHNIQUE:  Low dose axial CT images obtained throughout the head without the use of intravenous contrast.  Axial, sagittal and coronal reconstructions were performed.    COMPARISON:  MRA and MRI 04/29/2013, CT  05/15/2014    FINDINGS:  Intracranial compartment:    Ventricles and sulci are stable in size.  No hydrocephalus.    Mild generalized volume loss. Scattered white matter hypoattenuation, nonspecific suggesting mild chronic ischemic microvascular changes.  Preserved differentiation of white and gray matter.  No mass effect, hemorrhage, edema or major vascular distribution infarct.  Mild increased density within the right lentiform nucleus may be within normal variation but can also be seen with hyperglycemia if clinically consistent.    No extra-axial blood or fluid collections.    Skull/extracranial contents (limited evaluation): No acute fracture.    Mastoid air cells and paranasal sinuses are essentially clear.                                       X-Ray Chest AP Portable (Final result)  Result time 03/17/22 18:49:05      Final result by David Sánchez MD (03/17/22 18:49:05)                   Impression:      No acute abnormality.    Electronically signed by resident: Aissatou Matthews  Date:    03/17/2022  Time:    18:29    Electronically signed by: David Sánchez MD  Date:    03/17/2022  Time:    18:49               Narrative:    EXAMINATION:  XR CHEST AP PORTABLE    CLINICAL HISTORY:  Sepsis; hyperglycemia;.    TECHNIQUE:  Single frontal portable view of the chest was performed.    COMPARISON:  Chest radiograph 08/06/2020.  01/12/2018    FINDINGS:  The lungs are clear, with normal appearance of pulmonary vasculature and no pleural effusion or pneumothorax.    The cardiac silhouette is normal in size.  Atherosclerosis of the aortic arch.  The hilar and mediastinal contours are unremarkable.    Bones are intact.  There is no evidence of free air beneath the hemidiaphragms.

## 2022-03-21 NOTE — ASSESSMENT & PLAN NOTE
--most recent echo 1/2018 showed LVEF 60-65%, normal LV diastolic function and normal RV function  --repeat echo:    Results for orders placed during the hospital encounter of 03/17/22    Echo    Interpretation Summary  · The estimated ejection fraction is 65%.  · The left ventricle is normal in size with normal systolic function.  · Normal left ventricular diastolic function.  · Normal right ventricular size with normal right ventricular systolic function.  · The estimated PA systolic pressure is 23 mmHg.  · Normal central venous pressure (3 mmHg).

## 2022-03-21 NOTE — ASSESSMENT & PLAN NOTE
--new onset, suspect due to acute illness with DKA  --recent thyroid studies show normal T4  -- 2D echo unremarkable as below  --K > 4, Mag > 2  --telemetry  --continue metoprolol for rate control; titrate up as clinically indicated  --addressed anticoagulation risk/benefit w/ Pt and daughter; patient would like to defer therapy at this time    [id number] Score: 4.

## 2022-03-21 NOTE — PROGRESS NOTES
"[facility] ([address] Medicine  Progress Note    Patient Name: Courtney Engle  MRN: [id number]  Patient Class: IP- Inpatient   Admission Date: 3/17/2022  Length of Stay: 4 days  Attending Physician: Shalonda Parks MD  Primary Care Provider: Vishal Ulloa MD        Subjective:     Principal Problem:Diabetes mellitus with ketoacidosis and lactic acidosis but without coma        HPI:  Patient is a 86 y.o. female with significant past medical history of IDDM, HTN, HLD, diastolic dysfunction (most recent echo 2018 normal), CKD 3, hypothyroidism and GERD presented to hospital complaining of altered mental status. HPI obtained from the daughter at bedside. Daughter states that for the past few weeks, patient has woken up with slurred speech that has improved as the day progressed. Last week the patient told the daughter that she "didn't feel well" but did not elaborate on specific symptoms. Earlier this week, the patient started complaining of increased thirst (for which she was requesting soda to be brought to her) and increased urination with dysuria (though patient attributed dysuria to a scratch on her vagina). She also complained of "kidney issues" though did not elaborate on specific symptoms. The daughter is unable to confirm whether or not the patient has been compliant with medications, including insulin, however she suspects that the patient has not been compliant as the patient's other daughter reported "several boxes of medication" in the patient's refrigerator (daughter at bedside does not know which medications they were). The patient had been resistant to calling her PCP or seeking medical care for the past two weeks, however was finally able to be brought to the ED today by her daughter. At present the patient is encephalopathic, but only complaint is of epigastric pain. Daughter reports episode of N/V within the past few days.      Work up in the ED revealed a leukocytosis of " 15, TAMARA with hyperkalemia (creatinine 2.3, baseline 0.8), lactic of 3.8 and labs consistent with DKA (pH 7.1, bhb 5.3, bicarb 9 w/ glucose 800). Additionally, the patient was found to be in new onset a fib RVR (rates up to 160 in the ED). Critical Care Medicine has been consulted for DKA & A fib RVR.      Overview/Hospital Course:  ICU Course:  Patient admitted to [facility] evening of 3/17 for DKA and new onset a fib RVR. Started on insulin per DKA protocol. Better rate control achieved with initiation of metoprolol. Encephalopathy improved; gap closed. Will transition to SQ insulin. Patient is stable for stepdown.     Hospital Medicine Course:  Pt stepped down to medicine 3/18.           Interval History:   No acute events overnight.  Patient slept well overnight.  Patient notes resolution of nausea and abdominal pain.  Patient notes that food is poor tasting.  Patient would like to continue to for initiating anticoagulation.      Review of Systems   Constitutional:  Negative for chills and diaphoresis.   HENT:  Negative for congestion, sneezing, sore throat and trouble swallowing.    Eyes:  Negative for visual disturbance.   Respiratory:  Negative for cough, shortness of breath and wheezing.    Cardiovascular:  Negative for chest pain, palpitations and leg swelling.   Gastrointestinal:  Negative for abdominal pain, constipation, diarrhea, nausea and vomiting.   Genitourinary:  Negative for dysuria.   Musculoskeletal:  Negative for arthralgias and myalgias.   Skin:  Negative for color change.   Neurological:  Negative for dizziness, light-headedness and headaches.   Psychiatric/Behavioral:  Negative for decreased concentration.      Objective:     Vital Signs (Most Recent):  Temp: 99.1 °F (37.3 °C) (03/21/22 0926)  Pulse: 94 (03/21/22 1602)  Resp: 14 (03/21/22 0900)  BP: (!) 164/72 (03/21/22 0926)  SpO2: 96 % (03/21/22 1256)   Vital Signs (24h Range):  Temp:  [98 °F (36.7 °C)-99.1 °F (37.3 °C)] 99.1 °F (37.3 °C)  Pulse:   [] 94  Resp:  [14-21] 14  SpO2:  [92 %-97 %] 96 %  BP: (108-164)/(50-72) 164/72     Weight: 88 kg (194 lb)  Body mass index is 37.89 kg/m².    Intake/Output Summary (Last 24 hours) at 3/21/2022 1717  Last data filed at 3/20/2022 1800  Gross per 24 hour   Intake 300 ml   Output 700 ml   Net -400 ml        Physical Exam  Vitals and nursing note reviewed.   Constitutional:       General: She is not in acute distress.     Appearance: She is not toxic-appearing or diaphoretic.   HENT:      Head: Normocephalic and atraumatic.      Right Ear: External ear normal.      Left Ear: External ear normal.      Nose: No congestion or rhinorrhea.      Mouth/Throat:      Mouth: Mucous membranes are dry.      Pharynx: No oropharyngeal exudate or posterior oropharyngeal erythema.   Eyes:      General: No scleral icterus.        Right eye: No discharge.         Left eye: No discharge.   Cardiovascular:      Rate and Rhythm: Tachycardia present. Rhythm irregular.      Heart sounds: No murmur heard.  Pulmonary:      Effort: Pulmonary effort is normal.      Breath sounds: Normal breath sounds. No wheezing, rhonchi or rales.   Abdominal:      General: Bowel sounds are normal.      Palpations: Abdomen is soft.      Tenderness: There is no abdominal tenderness.   Musculoskeletal:      Cervical back: Normal range of motion and neck supple.      Right lower leg: No edema.      Left lower leg: No edema.   Skin:     General: Skin is warm and dry.   Neurological:      Mental Status: She is alert and oriented to person, place, and time.   Psychiatric:         Mood and Affect: Mood normal.         Behavior: Behavior normal.       Significant Labs: All pertinent labs within the past 24 hours have been reviewed.    Recent Results (from the past 24 hour(s))   POCT glucose    Collection Time: 03/20/22  5:48 PM   Result Value Ref Range    POCT Glucose 132 (H) 70 - 110 mg/dL   POCT glucose    Collection Time: 03/20/22  8:03 PM   Result Value Ref  Range    POCT Glucose 203 (H) 70 - 110 mg/dL   CBC auto differential    Collection Time: 03/21/22  5:37 AM   Result Value Ref Range    WBC 9.48 3.90 - 12.70 K/uL    RBC 4.13 4.00 - 5.40 M/uL    Hemoglobin 11.7 (L) 12.0 - 16.0 g/dL    Hematocrit 37.0 37.0 - 48.5 %    MCV 90 82 - 98 fL    MCH 28.3 27.0 - 31.0 pg    MCHC 31.6 (L) 32.0 - 36.0 g/dL    RDW 14.9 (H) 11.5 - 14.5 %    Platelets 284 150 - 450 K/uL    MPV 12.3 9.2 - 12.9 fL    Immature Granulocytes 0.5 0.0 - 0.5 %    Gran # (ANC) 5.9 1.8 - 7.7 K/uL    Immature Grans (Abs) 0.05 (H) 0.00 - 0.04 K/uL    Lymph # 2.4 1.0 - 4.8 K/uL    Mono # 1.1 (H) 0.3 - 1.0 K/uL    Eos # 0.1 0.0 - 0.5 K/uL    Baso # 0.05 0.00 - 0.20 K/uL    nRBC 0 0 /100 WBC    Gran % 61.9 38.0 - 73.0 %    Lymph % 25.1 18.0 - 48.0 %    Mono % 11.5 4.0 - 15.0 %    Eosinophil % 0.5 0.0 - 8.0 %    Basophil % 0.5 0.0 - 1.9 %    Differential Method Automated    Magnesium    Collection Time: 03/21/22  5:37 AM   Result Value Ref Range    Magnesium 1.8 1.6 - 2.6 mg/dL   Comprehensive metabolic panel    Collection Time: 03/21/22  5:37 AM   Result Value Ref Range    Sodium 138 136 - 145 mmol/L    Potassium 4.1 3.5 - 5.1 mmol/L    Chloride 103 95 - 110 mmol/L    CO2 28 23 - 29 mmol/L    Glucose 229 (H) 70 - 110 mg/dL    BUN 14 8 - 23 mg/dL    Creatinine 0.9 0.5 - 1.4 mg/dL    Calcium 8.9 8.7 - 10.5 mg/dL    Total Protein 6.0 6.0 - 8.4 g/dL    Albumin 2.5 (L) 3.5 - 5.2 g/dL    Total Bilirubin 0.7 0.1 - 1.0 mg/dL    Alkaline Phosphatase 73 55 - 135 U/L    AST 12 10 - 40 U/L    ALT 8 (L) 10 - 44 U/L    Anion Gap 7 (L) 8 - 16 mmol/L    eGFR if African American >60.0 >60 mL/min/1.73 m^2    eGFR if non  58.9 (A) >60 mL/min/1.73 m^2   POCT glucose    Collection Time: 03/21/22  7:36 AM   Result Value Ref Range    POCT Glucose 208 (H) 70 - 110 mg/dL   Echo    Collection Time: 03/21/22  8:04 AM   Result Value Ref Range    BSA 1.93 m2    TDI SEPTAL 0.06 m/s    LV LATERAL E/E' RATIO 11.10 m/s    LV  SEPTAL E/E' RATIO 18.50 m/s    LA WIDTH 3.41 cm    TDI LATERAL 0.10 m/s    LVIDd 4.80 3.5 - 6.0 cm    IVS 0.89 0.6 - 1.1 cm    Posterior Wall 0.89 0.6 - 1.1 cm    LVIDs 3.06 2.1 - 4.0 cm    FS 36 28 - 44 %    LA volume 49.07 cm3    Sinus 3.51 cm    STJ 2.47 cm    Ascending aorta 2.95 cm    LV mass 145.62 g    LA size 3.31 cm    RVDD 2.08 cm    TAPSE 1.49 cm    Left Ventricle Relative Wall Thickness 0.37 cm    AV mean gradient 4 mmHg    AV valve area 1.87 cm2    AV Velocity Ratio 0.53     AV index (prosthetic) 0.52     Mean e' 0.08 m/s    LVOT diameter 2.15 cm    LVOT area 3.6 cm2    LVOT peak alexys 0.70 m/s    LVOT peak VTI 12.86 cm    Ao peak alexys 1.31 m/s    Ao VTI 24.94 cm    Mr max alexys 0.05 m/s    LVOT stroke volume 46.66 cm3    AV peak gradient 7 mmHg    E/E' ratio 13.88 m/s    MV Peak E Alexys 1.11 m/s    TR Max Alexys 2.22 m/s    LV Systolic Volume 36.63 mL    LV Systolic Volume Index 19.9 mL/m2    LV Diastolic Volume 76.50 mL    LV Diastolic Volume Index 41.58 mL/m2    LA Volume Index 26.7 mL/m2    LV Mass Index 79 g/m2    RA Major Axis 4.46 cm    Left Atrium Minor Axis 5.36 cm    Left Atrium Major Axis 4.89 cm    Triscuspid Valve Regurgitation Peak Gradient 20 mmHg    LA Volume Index (Mod) 21.0 mL/m2    LA volume (mod) 38.71 cm3    RA Width 2.29 cm    Right Atrial Pressure (from IVC) 3 mmHg    EF 65 %    TV rest pulmonary artery pressure 23 mmHg   POCT glucose    Collection Time: 03/21/22 11:59 AM   Result Value Ref Range    POCT Glucose 173 (H) 70 - 110 mg/dL   POCT glucose    Collection Time: 03/21/22  3:27 PM   Result Value Ref Range    POCT Glucose 92 70 - 110 mg/dL         Significant Imaging: I have reviewed all pertinent imaging results/findings within the past 24 hours.    Imaging Results              CT Head Without Contrast (Final result)  Result time 03/19/22 07:17:18      Final result by John Villatoro MD (03/19/22 07:17:18)                   Impression:      No acute intracranial process.  Mild  increased density within the right lentiform nucleus may be within normal variation but can also be seen with hyperglycemia if clinically consistent.    Electronically signed by resident: Benny Otto  Date:    03/18/2022  Time:    08:55    Electronically signed by: John Villatoro  Date:    03/19/2022  Time:    07:17               Narrative:    EXAMINATION:  CT HEAD WITHOUT CONTRAST    CLINICAL HISTORY:  Mental status change, unknown cause;    TECHNIQUE:  Low dose axial CT images obtained throughout the head without the use of intravenous contrast.  Axial, sagittal and coronal reconstructions were performed.    COMPARISON:  MRA and MRI 04/29/2013, CT 05/15/2014    FINDINGS:  Intracranial compartment:    Ventricles and sulci are stable in size.  No hydrocephalus.    Mild generalized volume loss. Scattered white matter hypoattenuation, nonspecific suggesting mild chronic ischemic microvascular changes.  Preserved differentiation of white and gray matter.  No mass effect, hemorrhage, edema or major vascular distribution infarct.  Mild increased density within the right lentiform nucleus may be within normal variation but can also be seen with hyperglycemia if clinically consistent.    No extra-axial blood or fluid collections.    Skull/extracranial contents (limited evaluation): No acute fracture.    Mastoid air cells and paranasal sinuses are essentially clear.                                       X-Ray Chest AP Portable (Final result)  Result time 03/17/22 18:49:05      Final result by David Sánchez MD (03/17/22 18:49:05)                   Impression:      No acute abnormality.    Electronically signed by resident: Aissatou Matthews  Date:    03/17/2022  Time:    18:29    Electronically signed by: David Sánchez MD  Date:    03/17/2022  Time:    18:49               Narrative:    EXAMINATION:  XR CHEST AP PORTABLE    CLINICAL HISTORY:  Sepsis; hyperglycemia;.    TECHNIQUE:  Single frontal portable view of the chest was  performed.    COMPARISON:  Chest radiograph 08/06/2020.  01/12/2018    FINDINGS:  The lungs are clear, with normal appearance of pulmonary vasculature and no pleural effusion or pneumothorax.    The cardiac silhouette is normal in size.  Atherosclerosis of the aortic arch.  The hilar and mediastinal contours are unremarkable.    Bones are intact.  There is no evidence of free air beneath the hemidiaphragms.                                          Assessment/Plan:      * Diabetes mellitus with ketoacidosis and lactic acidosis but without coma  Lab Results   Component Value Date    HGBA1C >14.0 (H) 03/17/2022     --suspect secondary to medication non-compliance and dietary indiscretion  --home regimen aspart 15 units TIDWM & lantus 36 units QHS  --s/p insulin gtt per protocol   --detemir to 15 units b.i.d. and moderate dose sliding scale insulin; aspart 3U TIDWM  --continue to monitor blood glucose closely and titrate scheduled insulin as needed to inpatient goal  --poct bg x4  --diabetic diet  --no obvious source of infection and suspect leukocytosis is reactive; will hold on further abx at this time (s/p vanc/zosyn in ED); leukocytosis resolved  --f/u blood cultures ngtd  --blood glucose trend improving    Type 2 diabetes mellitus without complication, with long-term current use of insulin  See diabetes mellitus with ketoacidosis and lactic acidosis but without coma      Acute renal failure superimposed on stage 3 chronic kidney disease  --creatinine on admission 2.3; baseline 0.8  --suspect pre-renal in setting of DKA  --s/p 3L fluid resuscitation in ED  --renal function improving with fluids  --holding home lisinopril  --continue to trend creatinine daily       CKD stage 3 due to type 2 diabetes mellitus  See acute renal failure    Atrial fibrillation with RVR  --new onset, suspect due to acute illness with DKA  --recent thyroid studies show normal T4  -- 2D echo unremarkable as below  --K > 4, Mag >  2  --telemetry  --continue metoprolol for rate control; titrate up as clinically indicated  --addressed anticoagulation risk/benefit w/ Pt and daughter; patient would like to defer therapy at this time    [id number] Score: 4.         Diastolic dysfunction  --most recent echo 1/2018 showed LVEF 60-65%, normal LV diastolic function and normal RV function  --repeat echo:    Results for orders placed during the hospital encounter of 03/17/22    Echo    Interpretation Summary  · The estimated ejection fraction is 65%.  · The left ventricle is normal in size with normal systolic function.  · Normal left ventricular diastolic function.  · Normal right ventricular size with normal right ventricular systolic function.  · The estimated PA systolic pressure is 23 mmHg.  · Normal central venous pressure (3 mmHg).      Acute metabolic encephalopathy  -noted on admission  -improved w/ improved glycemic control  -at baseline upon step down  -head CT w/o acute process      GERD (gastroesophageal reflux disease)  --continue home pepcid      Depression  Continue home Lexapro        Anxiety  Continue home Lexapro      Acquired hypothyroidism  --TSH low, however free T4 WNL  --continue home levothyroxine    Essential hypertension  --holding home lisinopril in setting of TAMARA  --holding home atenolol in favor of metoprolol in setting of a fib RVR      VTE Risk Mitigation (From admission, onward)         Ordered     heparin (porcine) injection 5,000 Units  Every 8 hours         03/17/22 2313     IP VTE HIGH RISK PATIENT  Once         03/17/22 2313     Place sequential compression device  Until discontinued         03/17/22 2313                Discharge Planning   FAY: 3/22/2022     Code Status: Full Code   Is the patient medically ready for discharge?: No    Reason for patient still in hospital (select all that apply): Other (specify) placement  Discharge Plan A: Skilled Nursing Facility                  Shalonda Parks MD  Department of  [facility] Medicine   [facility] ([place]-[facility])

## 2022-03-21 NOTE — PLAN OF CARE
03/21/22 1558   Post-Acute Status   Post-Acute Authorization Placement   Post-Acute Placement Status Referrals Sent     Patient is in need of SNF placement. SW sent referrals via careport and will follow up.        Christie Durán LMSW  [facility]   [phone]

## 2022-03-21 NOTE — PLAN OF CARE
[facility] ([place] [address])  Initial Discharge Assessment       Primary Care Provider: Vishal Ulloa MD    Admission Diagnosis: Hyperphosphatemia [E83.39]  Generalized abdominal pain [R10.84]  Hyperglycemia [R73.9]  TAMARA (acute kidney injury) [N17.9]  Atrial fibrillation with RVR [I48.91]  Diabetes mellitus with ketoacidosis and lactic acidosis but without coma [E11.10]  Leukocytosis, unspecified type [D72.829]  Hypothyroidism, unspecified type [E03.9]  Nausea and vomiting, intractability of vomiting not specified, unspecified vomiting type [R11.2]    Admission Date: 3/17/2022  Expected Discharge Date: 3/22/2022    Discharge Barriers Identified: (P) None    Payor: [email or web address] MEDICARE / Plan: [employer] MEDICARE HMO / Product Type: Capitation /     Extended Emergency Contact Information  Primary Emergency Contact: Patito Britton  Address: [address] [place] [address]  Home Phone: [phone]  Relation: Daughter    Discharge Plan A: (P) Skilled Nursing Facility  Discharge Plan B: (P) Skilled Nursing Facility      [id number] - [place], [place] - [address] AT [facility] & [address] [address]  Phone: [phone] Fax: [phone]    [facility] [address]  [address] [facility] [address]  Phone: [phone] Fax: [phone]    [facility] - [facility] [address]  [address] [facility] [address]  Phone: [phone] Fax: [phone]      Initial Assessment (most recent)       Adult Discharge Assessment - 03/21/22 1513          Discharge Assessment    Assessment Type Discharge Planning Assessment (P)      Confirmed/corrected address, phone number and insurance Yes (P)      Confirmed Demographics Correct on Facesheet (P)      Source of Information patient (P)      Does  patient/caregiver understand observation status Yes (P)      Communicated FAY with patient/caregiver Yes (P)      Lives With alone (P)      Facility Arrived From: HOME (P)      Do you expect to return to your current living situation? Yes (P)      Do you have help at home or someone to help you manage your care at home? Yes (P)      Who are your caregiver(s) and their phone number(s)? Patito Britton daughter [phone] (P)      Prior to hospitilization cognitive status: Alert/Oriented (P)      Current cognitive status: Alert/Oriented (P)      Walking or Climbing Stairs Difficulty none (P)      Dressing/Bathing Difficulty none (P)      Home Layout Able to live on 1st floor (P)      Equipment Currently Used at Home cane, straight (P)      Readmission within 30 days? No (P)      Patient currently being followed by outpatient case management? No (P)      Do you currently have service(s) that help you manage your care at home? No (P)      Is the pt/caregiver preference to resume services with current agency No (P)      Do you take prescription medications? Yes (P)      Do you have prescription coverage? Yes (P)      Coverage [employer] Managed Medicare (P)      Do you have any problems affording any of your prescribed medications? No (P)      Is the patient taking medications as prescribed? yes (P)      Who is going to help you get home at discharge? Patito Britton, daughter [phone] (P)      How do you get to doctors appointments? car, drives self;family or friend will provide (P)      Are you on dialysis? No (P)      Do you take coumadin? No (P)      Discharge Plan A Skilled Nursing Facility (P)      Discharge Plan B Skilled Nursing Facility (P)      DME Needed Upon Discharge  none (P)      Discharge Plan discussed with: Patient (P)      Discharge Barriers Identified None (P)                    SW met with patient at the bedside to conduct the dc planning assessment. Patient reports that she admitted to [facility] from  home. She reports that is currently residing in the home alone and does have the support of her daughter to assist with  her care. Patient states that she is not on dialysis nor courmadin and does use a cane occasionally. Patient states that she is diabetic and does struggle with selecting healthy food items. According to patient she is able to do all ADLS and is still driving. Patient daughter will [facility]      PCP  Vishal Ulloa MD  [phone]    Emergency    Patito Britton, daughter   [phone]    PHARMACY     [facility] #[id number] - [facility] [address] AT [facility] [address] [facility] & [address]  [address] [place] [address]  Phone: [phone] Fax: [phone]    [facility] - [facility] [address]  [address] [facility] [address]  Phone: [phone] Fax: [phone]    [facility] - [place], [place] - [address]  [address] [facility] [address]  Phone: [phone] Fax: [phone]    Christie Durán LMSW  [facility]   [phone]

## 2022-03-21 NOTE — ASSESSMENT & PLAN NOTE
Lab Results   Component Value Date    HGBA1C >14.0 (H) 03/17/2022     --suspect secondary to medication non-compliance and dietary indiscretion  --home regimen aspart 15 units TIDWM & lantus 36 units QHS  --s/p insulin gtt per protocol   --detemir to 15 units b.i.d. and moderate dose sliding scale insulin; aspart 3U TIDWM  --continue to monitor blood glucose closely and titrate scheduled insulin as needed to inpatient goal  --poct bg x4  --diabetic diet  --no obvious source of infection and suspect leukocytosis is reactive; will hold on further abx at this time (s/p vanc/zosyn in ED); leukocytosis resolved  --f/u blood cultures ngtd  --blood glucose trend improving

## 2022-03-21 NOTE — CONSULTS
[facility] ([place] [address])  Hospital Medicine  Telemedicine Consult Note    Patient Name: Courtney Engle  MRN: [id number]  Admission Date: 3/17/2022  Hospital Length of Stay: 3 days  Attending Physician: Shalonda Parks MD   Primary Care Provider: Vishal Ulloa MD     Thank you for your consult to [facility]. We have reviewed the patient chart. This patient does not meet criteria for [facility] service at this time due to [facility] service capacity limitations. Will hand back to In-house service..        Avis Greene MD  Department of Hospital Medicine   [facility]ensive [facility] ([place])

## 2022-03-22 NOTE — PROGRESS NOTES
"[facility] - [facility] ([address] Medicine  Progress Note    Patient Name: Courtney Engle  MRN: [id number]  Patient Class: IP- Inpatient   Admission Date: 3/17/2022  Length of Stay: 5 days  Attending Physician: Shalonda Parks MD  Primary Care Provider: Vishal Ulloa MD        Subjective:     Principal Problem:Diabetes mellitus with ketoacidosis and lactic acidosis but without coma        HPI:  Patient is a 86 y.o. female with significant past medical history of IDDM, HTN, HLD, diastolic dysfunction (most recent echo 2018 normal), CKD 3, hypothyroidism and GERD presented to hospital complaining of altered mental status. HPI obtained from the daughter at bedside. Daughter states that for the past few weeks, patient has woken up with slurred speech that has improved as the day progressed. Last week the patient told the daughter that she "didn't feel well" but did not elaborate on specific symptoms. Earlier this week, the patient started complaining of increased thirst (for which she was requesting soda to be brought to her) and increased urination with dysuria (though patient attributed dysuria to a scratch on her vagina). She also complained of "kidney issues" though did not elaborate on specific symptoms. The daughter is unable to confirm whether or not the patient has been compliant with medications, including insulin, however she suspects that the patient has not been compliant as the patient's other daughter reported "several boxes of medication" in the patient's refrigerator (daughter at bedside does not know which medications they were). The patient had been resistant to calling her PCP or seeking medical care for the past two weeks, however was finally able to be brought to the ED today by her daughter. At present the patient is encephalopathic, but only complaint is of epigastric pain. Daughter reports episode of N/V within the past few days.      Work up in the ED revealed a leukocytosis of " 15, TAMARA with hyperkalemia (creatinine 2.3, baseline 0.8), lactic of 3.8 and labs consistent with DKA (pH 7.1, bhb 5.3, bicarb 9 w/ glucose 800). Additionally, the patient was found to be in new onset a fib RVR (rates up to 160 in the ED). Critical Care Medicine has been consulted for DKA & A fib RVR.      Overview/Hospital Course:  ICU Course:  Patient admitted to [facility] evening of 3/17 for DKA and new onset a fib RVR. Started on insulin per DKA protocol. Better rate control achieved with initiation of metoprolol. Encephalopathy improved; gap closed. Will transition to SQ insulin. Patient is stable for stepdown.     Hospital Medicine Course:  Pt stepped down to medicine 3/18.         Interval History:   No acute events overnight.  Patient slept well overnight.  Patient notes resolution of nausea and abdominal pain.  Patient notes that food is poor tasting.  Patient would like to continue to defer initiating anticoagulation.      Review of Systems   Constitutional:  Negative for chills and diaphoresis.   HENT:  Negative for congestion, sneezing, sore throat and trouble swallowing.    Eyes:  Negative for visual disturbance.   Respiratory:  Negative for cough, shortness of breath and wheezing.    Cardiovascular:  Negative for chest pain, palpitations and leg swelling.   Gastrointestinal:  Negative for abdominal pain, constipation, diarrhea, nausea and vomiting.   Genitourinary:  Negative for dysuria.   Musculoskeletal:  Negative for arthralgias and myalgias.   Skin:  Negative for color change.   Neurological:  Negative for dizziness, light-headedness and headaches.   Psychiatric/Behavioral:  Negative for decreased concentration.      Objective:     Vital Signs (Most Recent):  Temp: 98.4 °F (36.9 °C) (03/22/22 1454)  Pulse: 89 (03/22/22 1103)  Resp: 15 (03/22/22 1103)  BP: 110/62 (03/22/22 1454)  SpO2: (!) 90 % (03/22/22 0056)   Vital Signs (24h Range):  Temp:  [97.8 °F (36.6 °C)-98.6 °F (37 °C)] 98.4 °F (36.9 °C)  Pulse:   [] 89  Resp:  [13-30] 15  SpO2:  [90 %-96 %] 90 %  BP: (110-166)/(62-72) 110/62     Weight: 88 kg (194 lb)  Body mass index is 37.89 kg/m².    Intake/Output Summary (Last 24 hours) at 3/22/2022 1842  Last data filed at 3/21/2022 1900  Gross per 24 hour   Intake 400 ml   Output 800 ml   Net -400 ml        Physical Exam  Vitals and nursing note reviewed.   Constitutional:       General: She is not in acute distress.     Appearance: She is not toxic-appearing or diaphoretic.   HENT:      Head: Normocephalic and atraumatic.      Right Ear: External ear normal.      Left Ear: External ear normal.      Nose: No congestion or rhinorrhea.      Mouth/Throat:      Mouth: Mucous membranes are moist.      Pharynx: No oropharyngeal exudate or posterior oropharyngeal erythema.   Eyes:      General: No scleral icterus.        Right eye: No discharge.         Left eye: No discharge.   Cardiovascular:      Rate and Rhythm: Normal rate. Rhythm irregular.      Heart sounds: No murmur heard.  Pulmonary:      Effort: Pulmonary effort is normal.      Breath sounds: Normal breath sounds. No wheezing, rhonchi or rales.   Abdominal:      General: Bowel sounds are normal.      Palpations: Abdomen is soft.      Tenderness: There is no abdominal tenderness.   Musculoskeletal:      Cervical back: Normal range of motion and neck supple.      Right lower leg: No edema.      Left lower leg: No edema.   Skin:     General: Skin is warm and dry.   Neurological:      Mental Status: She is alert and oriented to person, place, and time.   Psychiatric:         Mood and Affect: Mood normal.         Behavior: Behavior normal.       Significant Labs: All pertinent labs within the past 24 hours have been reviewed.    Recent Results (from the past 24 hour(s))   POCT glucose    Collection Time: 03/21/22  8:23 PM   Result Value Ref Range    POCT Glucose 92 70 - 110 mg/dL   POCT glucose    Collection Time: 03/21/22  9:44 PM   Result Value Ref Range    POCT  Glucose 102 70 - 110 mg/dL   CBC auto differential    Collection Time: 03/22/22  3:16 AM   Result Value Ref Range    WBC 12.01 3.90 - 12.70 K/uL    RBC 4.14 4.00 - 5.40 M/uL    Hemoglobin 11.6 (L) 12.0 - 16.0 g/dL    Hematocrit 37.7 37.0 - 48.5 %    MCV 91 82 - 98 fL    MCH 28.0 27.0 - 31.0 pg    MCHC 30.8 (L) 32.0 - 36.0 g/dL    RDW 14.8 (H) 11.5 - 14.5 %    Platelets 336 150 - 450 K/uL    MPV 12.1 9.2 - 12.9 fL    Immature Granulocytes 0.7 (H) 0.0 - 0.5 %    Gran # (ANC) 7.9 (H) 1.8 - 7.7 K/uL    Immature Grans (Abs) 0.09 (H) 0.00 - 0.04 K/uL    Lymph # 2.6 1.0 - 4.8 K/uL    Mono # 1.3 (H) 0.3 - 1.0 K/uL    Eos # 0.1 0.0 - 0.5 K/uL    Baso # 0.07 0.00 - 0.20 K/uL    nRBC 0 0 /100 WBC    Gran % 66.1 38.0 - 73.0 %    Lymph % 21.2 18.0 - 48.0 %    Mono % 10.4 4.0 - 15.0 %    Eosinophil % 1.0 0.0 - 8.0 %    Basophil % 0.6 0.0 - 1.9 %    Differential Method Automated    Magnesium    Collection Time: 03/22/22  3:16 AM   Result Value Ref Range    Magnesium 1.8 1.6 - 2.6 mg/dL   Comprehensive metabolic panel    Collection Time: 03/22/22  3:16 AM   Result Value Ref Range    Sodium 141 136 - 145 mmol/L    Potassium 4.2 3.5 - 5.1 mmol/L    Chloride 104 95 - 110 mmol/L    CO2 27 23 - 29 mmol/L    Glucose 89 70 - 110 mg/dL    BUN 16 8 - 23 mg/dL    Creatinine 0.8 0.5 - 1.4 mg/dL    Calcium 9.1 8.7 - 10.5 mg/dL    Total Protein 5.9 (L) 6.0 - 8.4 g/dL    Albumin 2.4 (L) 3.5 - 5.2 g/dL    Total Bilirubin 0.6 0.1 - 1.0 mg/dL    Alkaline Phosphatase 73 55 - 135 U/L    AST 12 10 - 40 U/L    ALT 8 (L) 10 - 44 U/L    Anion Gap 10 8 - 16 mmol/L    eGFR if African American >60.0 >60 mL/min/1.73 m^2    eGFR if non African American >60.0 >60 mL/min/1.73 m^2   POCT glucose    Collection Time: 03/22/22  7:36 AM   Result Value Ref Range    POCT Glucose 73 70 - 110 mg/dL   POCT glucose    Collection Time: 03/22/22 11:00 AM   Result Value Ref Range    POCT Glucose 157 (H) 70 - 110 mg/dL   POCT glucose    Collection Time: 03/22/22  2:51 PM    Result Value Ref Range    POCT Glucose 174 (H) 70 - 110 mg/dL         Significant Imaging: I have reviewed all pertinent imaging results/findings within the past 24 hours.    Imaging Results              CT Head Without Contrast (Final result)  Result time 03/19/22 07:17:18      Final result by John Villatoro MD (03/19/22 07:17:18)                   Impression:      No acute intracranial process.  Mild increased density within the right lentiform nucleus may be within normal variation but can also be seen with hyperglycemia if clinically consistent.    Electronically signed by resident: Benny Otto  Date:    03/18/2022  Time:    08:55    Electronically signed by: John Villatoro  Date:    03/19/2022  Time:    07:17               Narrative:    EXAMINATION:  CT HEAD WITHOUT CONTRAST    CLINICAL HISTORY:  Mental status change, unknown cause;    TECHNIQUE:  Low dose axial CT images obtained throughout the head without the use of intravenous contrast.  Axial, sagittal and coronal reconstructions were performed.    COMPARISON:  MRA and MRI 04/29/2013, CT 05/15/2014    FINDINGS:  Intracranial compartment:    Ventricles and sulci are stable in size.  No hydrocephalus.    Mild generalized volume loss. Scattered white matter hypoattenuation, nonspecific suggesting mild chronic ischemic microvascular changes.  Preserved differentiation of white and gray matter.  No mass effect, hemorrhage, edema or major vascular distribution infarct.  Mild increased density within the right lentiform nucleus may be within normal variation but can also be seen with hyperglycemia if clinically consistent.    No extra-axial blood or fluid collections.    Skull/extracranial contents (limited evaluation): No acute fracture.    Mastoid air cells and paranasal sinuses are essentially clear.                                       X-Ray Chest AP Portable (Final result)  Result time 03/17/22 18:49:05      Final result by David Sánchez MD (03/17/22  18:49:05)                   Impression:      No acute abnormality.    Electronically signed by resident: Aissatou Matthews  Date:    03/17/2022  Time:    18:29    Electronically signed by: David Sánchez MD  Date:    03/17/2022  Time:    18:49               Narrative:    EXAMINATION:  XR CHEST AP PORTABLE    CLINICAL HISTORY:  Sepsis; hyperglycemia;.    TECHNIQUE:  Single frontal portable view of the chest was performed.    COMPARISON:  Chest radiograph 08/06/2020.  01/12/2018    FINDINGS:  The lungs are clear, with normal appearance of pulmonary vasculature and no pleural effusion or pneumothorax.    The cardiac silhouette is normal in size.  Atherosclerosis of the aortic arch.  The hilar and mediastinal contours are unremarkable.    Bones are intact.  There is no evidence of free air beneath the hemidiaphragms.                                          Assessment/Plan:      * Diabetes mellitus with ketoacidosis and lactic acidosis but without coma  Lab Results   Component Value Date    HGBA1C >14.0 (H) 03/17/2022     --suspect secondary to medication non-compliance and dietary indiscretion  --home regimen aspart 15 units TIDWM & lantus 36 units QHS  --s/p insulin gtt per protocol   --detemir to 12 units b.i.d. and moderate dose sliding scale insulin  --continue to monitor blood glucose closely and titrate scheduled insulin as needed to inpatient goal  --poct bg x4  --diabetic diet  --no obvious source of infection and suspect leukocytosis is reactive; will hold on further abx at this time (s/p vanc/zosyn in ED); leukocytosis resolved  --f/u blood cultures ngtd  --blood glucose trend improving    Type 2 diabetes mellitus without complication, with long-term current use of insulin  See diabetes mellitus with ketoacidosis and lactic acidosis but without coma      Acute renal failure superimposed on stage 3 chronic kidney disease  --creatinine on admission 2.3; baseline 0.8  --suspect pre-renal in setting of DKA  --s/p 3L  fluid resuscitation in ED  --renal function improving with fluids  --continue to trend creatinine daily, improved  --resume lisinopril at low dose and increase as tolerated       CKD stage 3 due to type 2 diabetes mellitus  See acute renal failure    Atrial fibrillation with RVR  --new onset, suspect due to acute illness with DKA  --recent thyroid studies show normal T4  -- 2D echo unremarkable as below  --K > 4, Mag > 2  --telemetry  --continue metoprolol for rate control; titrate up as clinically indicated  --addressed anticoagulation risk/benefit w/ Pt and daughter; patient would like to defer therapy at this time    [id number] Score: 4.         Diastolic dysfunction  --most recent echo 1/2018 showed LVEF 60-65%, normal LV diastolic function and normal RV function  --repeat echo:    Results for orders placed during the hospital encounter of 03/17/22    Echo    Interpretation Summary  · The estimated ejection fraction is 65%.  · The left ventricle is normal in size with normal systolic function.  · Normal left ventricular diastolic function.  · Normal right ventricular size with normal right ventricular systolic function.  · The estimated PA systolic pressure is 23 mmHg.  · Normal central venous pressure (3 mmHg).      Acute metabolic encephalopathy  -noted on admission  -improved w/ improved glycemic control  -at baseline upon step down  -head CT w/o acute process      GERD (gastroesophageal reflux disease)  --continue home pepcid      Depression  Continue home Lexapro; qhs as concern that causing lethargy when given in the morning        Anxiety  Continue home Lexapro; qhs as concern that causing lethargy when given in the morning      Acquired hypothyroidism  --TSH low, however free T4 WNL  --continue home levothyroxine    Essential hypertension  --TAMARA resolved; re-initiated lisinopril at low dose; titrate up as clinically indicated  --holding home atenolol in favor of metoprolol in setting of a fib  RVR      VTE Risk Mitigation (From admission, onward)         Ordered     heparin (porcine) injection 5,000 Units  Every 8 hours         03/17/22 2313     IP VTE HIGH RISK PATIENT  Once         03/17/22 2313     Place sequential compression device  Until discontinued         03/17/22 2313                Discharge Planning   FAY: 3/24/2022     Code Status: Full Code   Is the patient medically ready for discharge?: Yes    Reason for patient still in hospital (select all that apply): Other (specify) placement  Discharge Plan A: Skilled Nursing Facility                  Shalonda Parks MD  Department of Hospital Medicine   [facility] - Intensive Care ([place])

## 2022-03-22 NOTE — PLAN OF CARE
03/22/22 1459   Post-Acute Status   Post-Acute Authorization Placement     Patient has an accepting SNF with OSNF expected to dc there on Wed or Thurs. SW will continue to follow up.        Christie Durán LMSW  [facility]   [id number]

## 2022-03-22 NOTE — ASSESSMENT & PLAN NOTE
Continue home Lexapro; qhs as concern that causing lethargy when given in the morning       Late   Infant (34-36 6/7 weeks)

## 2022-03-22 NOTE — SUBJECTIVE & OBJECTIVE
Interval History:   No acute events overnight.  Patient slept well overnight.  Patient notes resolution of nausea and abdominal pain.  Patient notes that food is poor tasting.  Patient would like to continue to defer initiating anticoagulation.      Review of Systems   Constitutional:  Negative for chills and diaphoresis.   HENT:  Negative for congestion, sneezing, sore throat and trouble swallowing.    Eyes:  Negative for visual disturbance.   Respiratory:  Negative for cough, shortness of breath and wheezing.    Cardiovascular:  Negative for chest pain, palpitations and leg swelling.   Gastrointestinal:  Negative for abdominal pain, constipation, diarrhea, nausea and vomiting.   Genitourinary:  Negative for dysuria.   Musculoskeletal:  Negative for arthralgias and myalgias.   Skin:  Negative for color change.   Neurological:  Negative for dizziness, light-headedness and headaches.   Psychiatric/Behavioral:  Negative for decreased concentration.      Objective:     Vital Signs (Most Recent):  Temp: 98.4 °F (36.9 °C) (03/22/22 1454)  Pulse: 89 (03/22/22 1103)  Resp: 15 (03/22/22 1103)  BP: 110/62 (03/22/22 1454)  SpO2: (!) 90 % (03/22/22 0056)   Vital Signs (24h Range):  Temp:  [97.8 °F (36.6 °C)-98.6 °F (37 °C)] 98.4 °F (36.9 °C)  Pulse:  [] 89  Resp:  [13-30] 15  SpO2:  [90 %-96 %] 90 %  BP: (110-166)/(62-72) 110/62     Weight: 88 kg (194 lb)  Body mass index is 37.89 kg/m².    Intake/Output Summary (Last 24 hours) at 3/22/2022 1842  Last data filed at 3/21/2022 1900  Gross per 24 hour   Intake 400 ml   Output 800 ml   Net -400 ml        Physical Exam  Vitals and nursing note reviewed.   Constitutional:       General: She is not in acute distress.     Appearance: She is not toxic-appearing or diaphoretic.   HENT:      Head: Normocephalic and atraumatic.      Right Ear: External ear normal.      Left Ear: External ear normal.      Nose: No congestion or rhinorrhea.      Mouth/Throat:      Mouth: Mucous  membranes are moist.      Pharynx: No oropharyngeal exudate or posterior oropharyngeal erythema.   Eyes:      General: No scleral icterus.        Right eye: No discharge.         Left eye: No discharge.   Cardiovascular:      Rate and Rhythm: Normal rate. Rhythm irregular.      Heart sounds: No murmur heard.  Pulmonary:      Effort: Pulmonary effort is normal.      Breath sounds: Normal breath sounds. No wheezing, rhonchi or rales.   Abdominal:      General: Bowel sounds are normal.      Palpations: Abdomen is soft.      Tenderness: There is no abdominal tenderness.   Musculoskeletal:      Cervical back: Normal range of motion and neck supple.      Right lower leg: No edema.      Left lower leg: No edema.   Skin:     General: Skin is warm and dry.   Neurological:      Mental Status: She is alert and oriented to person, place, and time.   Psychiatric:         Mood and Affect: Mood normal.         Behavior: Behavior normal.       Significant Labs: All pertinent labs within the past 24 hours have been reviewed.    Recent Results (from the past 24 hour(s))   POCT glucose    Collection Time: 03/21/22  8:23 PM   Result Value Ref Range    POCT Glucose 92 70 - 110 mg/dL   POCT glucose    Collection Time: 03/21/22  9:44 PM   Result Value Ref Range    POCT Glucose 102 70 - 110 mg/dL   CBC auto differential    Collection Time: 03/22/22  3:16 AM   Result Value Ref Range    WBC 12.01 3.90 - 12.70 K/uL    RBC 4.14 4.00 - 5.40 M/uL    Hemoglobin 11.6 (L) 12.0 - 16.0 g/dL    Hematocrit 37.7 37.0 - 48.5 %    MCV 91 82 - 98 fL    MCH 28.0 27.0 - 31.0 pg    MCHC 30.8 (L) 32.0 - 36.0 g/dL    RDW 14.8 (H) 11.5 - 14.5 %    Platelets 336 150 - 450 K/uL    MPV 12.1 9.2 - 12.9 fL    Immature Granulocytes 0.7 (H) 0.0 - 0.5 %    Gran # (ANC) 7.9 (H) 1.8 - 7.7 K/uL    Immature Grans (Abs) 0.09 (H) 0.00 - 0.04 K/uL    Lymph # 2.6 1.0 - 4.8 K/uL    Mono # 1.3 (H) 0.3 - 1.0 K/uL    Eos # 0.1 0.0 - 0.5 K/uL    Baso # 0.07 0.00 - 0.20 K/uL    nRBC  0 0 /100 WBC    Gran % 66.1 38.0 - 73.0 %    Lymph % 21.2 18.0 - 48.0 %    Mono % 10.4 4.0 - 15.0 %    Eosinophil % 1.0 0.0 - 8.0 %    Basophil % 0.6 0.0 - 1.9 %    Differential Method Automated    Magnesium    Collection Time: 03/22/22  3:16 AM   Result Value Ref Range    Magnesium 1.8 1.6 - 2.6 mg/dL   Comprehensive metabolic panel    Collection Time: 03/22/22  3:16 AM   Result Value Ref Range    Sodium 141 136 - 145 mmol/L    Potassium 4.2 3.5 - 5.1 mmol/L    Chloride 104 95 - 110 mmol/L    CO2 27 23 - 29 mmol/L    Glucose 89 70 - 110 mg/dL    BUN 16 8 - 23 mg/dL    Creatinine 0.8 0.5 - 1.4 mg/dL    Calcium 9.1 8.7 - 10.5 mg/dL    Total Protein 5.9 (L) 6.0 - 8.4 g/dL    Albumin 2.4 (L) 3.5 - 5.2 g/dL    Total Bilirubin 0.6 0.1 - 1.0 mg/dL    Alkaline Phosphatase 73 55 - 135 U/L    AST 12 10 - 40 U/L    ALT 8 (L) 10 - 44 U/L    Anion Gap 10 8 - 16 mmol/L    eGFR if African American >60.0 >60 mL/min/1.73 m^2    eGFR if non African American >60.0 >60 mL/min/1.73 m^2   POCT glucose    Collection Time: 03/22/22  7:36 AM   Result Value Ref Range    POCT Glucose 73 70 - 110 mg/dL   POCT glucose    Collection Time: 03/22/22 11:00 AM   Result Value Ref Range    POCT Glucose 157 (H) 70 - 110 mg/dL   POCT glucose    Collection Time: 03/22/22  2:51 PM   Result Value Ref Range    POCT Glucose 174 (H) 70 - 110 mg/dL         Significant Imaging: I have reviewed all pertinent imaging results/findings within the past 24 hours.    Imaging Results              CT Head Without Contrast (Final result)  Result time 03/19/22 07:17:18      Final result by John Villatoro MD (03/19/22 07:17:18)                   Impression:      No acute intracranial process.  Mild increased density within the right lentiform nucleus may be within normal variation but can also be seen with hyperglycemia if clinically consistent.    Electronically signed by resident: Benny Otto  Date:    03/18/2022  Time:    08:55    Electronically signed by: John  Irving  Date:    03/19/2022  Time:    07:17               Narrative:    EXAMINATION:  CT HEAD WITHOUT CONTRAST    CLINICAL HISTORY:  Mental status change, unknown cause;    TECHNIQUE:  Low dose axial CT images obtained throughout the head without the use of intravenous contrast.  Axial, sagittal and coronal reconstructions were performed.    COMPARISON:  MRA and MRI 04/29/2013, CT 05/15/2014    FINDINGS:  Intracranial compartment:    Ventricles and sulci are stable in size.  No hydrocephalus.    Mild generalized volume loss. Scattered white matter hypoattenuation, nonspecific suggesting mild chronic ischemic microvascular changes.  Preserved differentiation of white and gray matter.  No mass effect, hemorrhage, edema or major vascular distribution infarct.  Mild increased density within the right lentiform nucleus may be within normal variation but can also be seen with hyperglycemia if clinically consistent.    No extra-axial blood or fluid collections.    Skull/extracranial contents (limited evaluation): No acute fracture.    Mastoid air cells and paranasal sinuses are essentially clear.                                       X-Ray Chest AP Portable (Final result)  Result time 03/17/22 18:49:05      Final result by David Sánchez MD (03/17/22 18:49:05)                   Impression:      No acute abnormality.    Electronically signed by resident: Aissatou Matthews  Date:    03/17/2022  Time:    18:29    Electronically signed by: David Sánchez MD  Date:    03/17/2022  Time:    18:49               Narrative:    EXAMINATION:  XR CHEST AP PORTABLE    CLINICAL HISTORY:  Sepsis; hyperglycemia;.    TECHNIQUE:  Single frontal portable view of the chest was performed.    COMPARISON:  Chest radiograph 08/06/2020.  01/12/2018    FINDINGS:  The lungs are clear, with normal appearance of pulmonary vasculature and no pleural effusion or pneumothorax.    The cardiac silhouette is normal in size.  Atherosclerosis of the aortic arch.   The hilar and mediastinal contours are unremarkable.    Bones are intact.  There is no evidence of free air beneath the hemidiaphragms.

## 2022-03-22 NOTE — PT/OT/SLP PROGRESS
"Physical Therapy Treatment    Patient Name:  Courtney Engle   MRN:  [id number]    Recommendations:     Discharge Recommendations:  nursing facility, skilled   Discharge Equipment Recommendations: bedside commode   Barriers to discharge: Decreased caregiver support and inaccessible home (lives alone; 5 ROSALIO)    Assessment:     Courtney Engle is a 86 y.o. female admitted with a medical diagnosis of Diabetes mellitus with ketoacidosis and lactic acidosis but without coma.  She presents with the following impairments/functional limitations:  weakness, impaired balance, impaired endurance, impaired self care skills, impaired functional mobilty, gait instability, decreased coordination, decreased upper extremity function, decreased lower extremity function, pain, impaired coordination, impaired fine motor, decreased safety awareness Pt performed bed mobility and transfers with modified [place]. Pt performed gait trial with RW successfully and was able to perform 2 trials of ~20 ft in her room but reported feeling unsteady and really weak. Pt is progressing with amb but requires CGA for decreased endurance levels and instability. Pt would continue to benefit from skilled acute PT in order to address current deficits and progress functional mobility.     Rehab Prognosis: Good; patient would benefit from acute skilled PT services to address these deficits and reach maximum level of function.     Recent Surgery: * No surgery found *      Plan:     During this hospitalization, patient to be seen 4 x/week to address the identified rehab impairments via gait training, therapeutic activities, therapeutic exercises, neuromuscular re-education and progress toward the following goals:    · Plan of Care Expires:  04/20/22    Subjective     Chief Complaint: Weakness   Patient/Family Comments/goals: "I just feel really weak." "My legs are strong." Re: LE exercises.   Pain/Comfort:  Pain Rating 1:  (L knee pain reported during amb. " "(did not rate))  Pain Addressed 1: Cessation of Activity, Reposition      Objective:     Communicated with RN prior to session.  Patient found supine HOB elevated with telemetry upon PT entry to room.     General Precautions: Standard, diabetic, fall   Orthopedic Precautions:N/A   Braces: N/A  Respiratory Status: Room air     Functional Mobility:  · Bed Mobility:     · Scooting: modified independence from EOB   · Anterior scoot: Pt utilized RW to come forward from EOB to place feet flat on the floor   · Supine to Sit: modified independence with HOB elevated ~75 degrees   · Transfers:     · Sit to Stand: SBA with rolling walker, x1 rep from EOB and x1 rep from bedside chair   · Cues provided for sequencing and postural alignment   · Gait: ~20 ft. x2 trials with RW and CGA as needed for minimal LOB  · Cues provided for sequencing, postural alignment, self-pacing, decreased weight shifts, and Sx assessment  · Pt demo'd WBOS, instability, decreased toe-floor clearance, and decreased step length.   · Pt reported L knee pain following amb 2* to "knee cap collapsing down"   · Balance:   · Static Sitting: Good  · Dynamic Standing: Good   · Dynamic Sitting: Good  · Pt required supervision only for BLE exercises (Marches x10, long arc quads x10, and ankle pumps x10)      AM-PAC 6 CLICK MOBILITY  Turning over in bed (including adjusting bedclothes, sheets and blankets)?: 4  Sitting down on and standing up from a chair with arms (e.g., wheelchair, bedside commode, etc.): 3  Moving from lying on back to sitting on the side of the bed?: 3  Moving to and from a bed to a chair (including a wheelchair)?: 3  Need to walk in hospital room?: 3  Climbing 3-5 steps with a railing?: 2  Basic Mobility Total Score: 18       Therapeutic Activities and Exercises:  Pt educated on role of PT and PT POC. Pt verbalized understanding.   Pt educated on the effects of bed rest and the importance of OOB activity. Pt encouraged to sit UIC majority of " day as tolerated and continue daily ambulation with nursing assist. Pt verbalized understanding.  Pt oriented to call bell and instructed to call for staff assist with standing tasks/transfers. Pt verbalized understanding.   Pt performed BLE exercises; marches x10 reps, long arc quads x10 reps, ankle pumps x10 reps. Pt had minimal SOB and was cued to decrease speed of exercises. Pt demo'd understanding.   Pt instructed to call nurse for OOB functional mobility for safety. Pt verbalized understanding.   RW ordered for use during hospital admission     Patient left up in chair with all lines intact, call button in reach and RN notified..    GOALS:   Multidisciplinary Problems     Physical Therapy Goals        Problem: Physical Therapy Goal    Goal Priority Disciplines Outcome Goal Variances Interventions   Physical Therapy Goal     PT, PT/OT Ongoing, Progressing     Description: Goals to be met by: 3/31/2022     Patient will increase functional independence with mobility by performin. Supine to sit with Modified [place] - met  2. Sit to stand transfer with Supervision   3. Gait  x 150 feet with Stand-by Assistance using Rolling Walker or LRAD as needed.   4. Ascend/descend 5 stair with bilateral Handrails Contact Guard Assistance.   5. Lower extremity exercise program x15 reps, with supervision, in order to increase LE strength and (I) with functional mobility.                      Time Tracking:     PT Received On: 22  PT Start Time: 1118     PT Stop Time: 1142  PT Total Time (min): 24 min     Billable Minutes: Gait Training 13 and Therapeutic Exercise 11    Treatment Type: Treatment  PT/PTA: PT     PTA Visit Number: 0     2022

## 2022-03-22 NOTE — ASSESSMENT & PLAN NOTE
--TAMARA resolved; re-initiated lisinopril at low dose; titrate up as clinically indicated  --holding home atenolol in favor of metoprolol in setting of a fib RVR

## 2022-03-22 NOTE — ASSESSMENT & PLAN NOTE
--creatinine on admission 2.3; baseline 0.8  --suspect pre-renal in setting of DKA  --s/p 3L fluid resuscitation in ED  --renal function improving with fluids  --continue to trend creatinine daily, improved  --resume lisinopril at low dose and increase as tolerated

## 2022-03-22 NOTE — CARE UPDATE
RAPID RESPONSE NURSE ROUND       Rounding completed with charge RNPeter. No concerns verbalized at this time. Instructed to call [phone] for further concerns or assistance.

## 2022-03-22 NOTE — ASSESSMENT & PLAN NOTE
Lab Results   Component Value Date    HGBA1C >14.0 (H) 03/17/2022     --suspect secondary to medication non-compliance and dietary indiscretion  --home regimen aspart 15 units TIDWM & lantus 36 units QHS  --s/p insulin gtt per protocol   --detemir to 12 units b.i.d. and moderate dose sliding scale insulin  --continue to monitor blood glucose closely and titrate scheduled insulin as needed to inpatient goal  --poct bg x4  --diabetic diet  --no obvious source of infection and suspect leukocytosis is reactive; will hold on further abx at this time (s/p vanc/zosyn in ED); leukocytosis resolved  --f/u blood cultures ngtd  --blood glucose trend improving

## 2022-03-23 NOTE — DISCHARGE SUMMARY
"[facility] ([address] Medicine  Telemedicine Discharge Summary      Patient Name: Courtney Engle  MRN: [id number]  Patient Class: IP- Inpatient  Admission Date: 3/17/2022  Hospital Length of Stay: 6 days  Discharge Date and Time:  03/23/2022 10:13 AM  Attending Physician: Avis Greene MD   Discharging Provider: Avis Greene MD  Primary Care Provider: Vishal Ulloa MD      HPI:   Patient is a 86 y.o. female with significant past medical history of IDDM, HTN, HLD, diastolic dysfunction (most recent echo 2018 normal), CKD 3, hypothyroidism and GERD presented to hospital complaining of altered mental status. HPI obtained from the daughter at bedside. Daughter states that for the past few weeks, patient has woken up with slurred speech that has improved as the day progressed. Last week the patient told the daughter that she "didn't feel well" but did not elaborate on specific symptoms. Earlier this week, the patient started complaining of increased thirst (for which she was requesting soda to be brought to her) and increased urination with dysuria (though patient attributed dysuria to a scratch on her vagina). She also complained of "kidney issues" though did not elaborate on specific symptoms. The daughter is unable to confirm whether or not the patient has been compliant with medications, including insulin, however she suspects that the patient has not been compliant as the patient's other daughter reported "several boxes of medication" in the patient's refrigerator (daughter at bedside does not know which medications they were). The patient had been resistant to calling her PCP or seeking medical care for the past two weeks, however was finally able to be brought to the ED today by her daughter. At present the patient is encephalopathic, but only complaint is of epigastric pain. Daughter reports episode of N/V within the past few days.      Work up in the ED revealed a " leukocytosis of 15, TAMARA with hyperkalemia (creatinine 2.3, baseline 0.8), lactic of 3.8 and labs consistent with DKA (pH 7.1, bhb 5.3, bicarb 9 w/ glucose 800). Additionally, the patient was found to be in new onset a fib RVR (rates up to 160 in the ED). Critical Care Medicine has been consulted for DKA & A fib RVR.      * No surgery found *      Hospital Course:   ICU Course:  Patient admitted to [facility] evening of 3/17 for DKA and new onset a fib RVR. Started on insulin per DKA protocol. Better rate control achieved with initiation of metoprolol. Encephalopathy improved; gap closed. Will transition to SQ insulin. Patient is stable for stepdown.     Hospital Medicine Course:  Pt stepped down to medicine 3/18.  Glycemic control improved.  Metoprolol also up titrated with better rate controlled.  Echo unremarkable.  Risks and benefits of anticoagulation in the setting of atrial fibrillation to prevent thromboembolism discussed with patient and daughter.  Patient would like to defer anticoagulation.  TAMARA improved to baseline.  Lisinopril started.  Per PT OT evaluation patient to benefit from SNF.  Pending discharge to SNF.       See Problems listed below for additional details.    Goals of Care Treatment Preferences:  Code Status: Full Code      Consults:   Consults (From admission, onward)        Status Ordering Provider     Inpatient virtual consult to Hospital Medicine  Once        Provider:  (Not yet assigned)    Completed BAHD, KELSIE     Inpatient virtual consult to Hospital Medicine  Once        Provider:  (Not yet assigned)    Completed BAHD, KELSIE     Inpatient virtual consult to Hospital Medicine  Once        Provider:  (Not yet assigned)    Completed BAHD, KELSIE     Inpatient consult to Registered Dietitian/Nutritionist  Once        Provider:  (Not yet assigned)    Completed BAHD, KELSIE     Inpatient virtual consult to Hospital Medicine  Once        Provider:  (Not yet assigned)    Completed BAHD,  KELSIE     Inpatient consult to Critical Care Medicine  Once        Provider:  (Not yet assigned)    Completed CHELY KENNY           * Diabetes mellitus with ketoacidosis and lactic acidosis but without coma        Lab Results   Component Value Date     HGBA1C >14.0 (H) 03/17/2022      --suspect secondary to medication non-compliance and dietary indiscretion  --home regimen aspart 15 units TIDWM & lantus 36 units QHS  --s/p insulin gtt per protocol   --detemir to 12 units b.i.d. and moderate dose sliding scale insulin  --continue to monitor blood glucose closely and titrate scheduled insulin as needed to inpatient goal  --poct bg x4  --diabetic diet  --no obvious source of infection and suspect leukocytosis is reactive; will hold on further abx at this time (s/p vanc/zosyn in ED); leukocytosis resolved  --f/u blood cultures ngtd  --blood glucose trend improving     Type 2 diabetes mellitus without complication, with long-term current use of insulin  See diabetes mellitus with ketoacidosis and lactic acidosis but without coma     Acute renal failure superimposed on stage 3 chronic kidney disease  --creatinine on admission 2.3; baseline 0.8  --suspect pre-renal in setting of DKA  --s/p 3L fluid resuscitation in ED  --renal function improving with fluids  --continue to trend creatinine daily, improved  --resume lisinopril at low dose and increase as tolerated     CKD stage 3 due to type 2 diabetes mellitus  See acute renal failure     Atrial fibrillation with RVR  --new onset, suspect due to acute illness with DKA  --recent thyroid studies show normal T4  -- 2D echo unremarkable as below  --K > 4, Mag > 2  --telemetry  --continue metoprolol for rate control; titrate up as clinically indicated  --addressed anticoagulation risk/benefit w/ Pt and daughter; patient would like to defer therapy at this time  [id number] Score: 4.      Diastolic dysfunction  --most recent echo 1/2018 showed LVEF 60-65%, normal LV  diastolic function and normal RV function  --repeat echo:  Interpretation Summary  · The estimated ejection fraction is 65%.  · The left ventricle is normal in size with normal systolic function.  · Normal left ventricular diastolic function.  · Normal right ventricular size with normal right ventricular systolic function.  · The estimated PA systolic pressure is 23 mmHg.  · Normal central venous pressure (3 mmHg).     Acute metabolic encephalopathy  -noted on admission  -improved w/ improved glycemic control  -at baseline upon step down  -head CT w/o acute process     GERD (gastroesophageal reflux disease)  --continue home pepcid     Depression  Continue home Lexapro; qhs as concern that causing lethargy when given in the morning.  Family suspects Lexapro was verbally discontinued by PCP - attempting to confirm     Anxiety  Continue home Lexapro; qhs as concern that causing lethargy when given in the morning     Acquired hypothyroidism  --TSH low, however free T4 WNL  --continue home levothyroxine     Essential hypertension  --TAMARA resolved; re-initiated lisinopril at low dose; titrate up as clinically indicated  --holding home Atenolol in favor of metoprolol in setting of a fib RVR       Final Active Diagnoses:    Diagnosis Date Noted POA    PRINCIPAL PROBLEM:  Diabetes mellitus with ketoacidosis and lactic acidosis but without coma [E11.10] 03/17/2022 Yes    Atrial fibrillation with RVR [I48.91] 03/18/2022 Yes    Acute metabolic encephalopathy [G93.41] 03/18/2022 Yes    Acute renal failure superimposed on stage 3 chronic kidney disease [N17.9, N18.30] 03/18/2022 Yes    GERD (gastroesophageal reflux disease) [K21.9] 08/25/2017 Yes    Diastolic dysfunction [I51.89] 08/25/2017 Yes    CKD stage 3 due to type 2 diabetes mellitus [E11.22, N18.30] 02/16/2017 Yes     Chronic    Anxiety [F41.9] 12/08/2016 Yes    Depression [F32.A] 12/08/2016 Yes    Acquired hypothyroidism [E03.9] 04/25/2014 Yes     Chronic     Type 2 diabetes mellitus without complication, with long-term current use of insulin [E11.9, Z79.4]  Not Applicable     Chronic    Essential hypertension [I10]  Yes     Chronic      Problems Resolved During this Admission:       Discharged Condition: stable    Disposition: Skilled Nursing Facility    Follow Up:   Follow-up Information     Vishal Ulloa MD. Go on 4/1/2022.    Specialty: Internal Medicine  Why: Hosp f/u 4/1 @940a  Contact information:  [address]  [facility] [address]  [phone]             [facility] ENDOCRINOLOGY. Schedule an appointment as soon as possible for a visit in 3 week(s).    Specialty: Endocrinology  Why: To establish care  Contact information:  [address]  [facility] [address]  [phone]           [facility] CARDIOLOGY. Schedule an appointment as soon as possible for a visit in 3 week(s).    Specialty: Cardiology  Why: For discharge from hospital follow up, To establish care  Contact information:  [address]  [facility] [address]  [phone]                     Future Appointments   Date Time Provider Department Center   3/30/2022  1:40 PM Lois Almonte MD [facility] CARDIO [facility]   4/1/2022  9:40 AM Vishal Ulloa MD [facility] IM [facility] PCW   4/1/2022  3:00 PM Darrin Montero MD [facility] ENDODIA [facility]       Patient Instructions:      Ambulatory referral/consult to Endocrinology   Standing Status: Future   Referral Priority: Routine Referral Type: Consultation   Requested Specialty: Endocrinology   Number of Visits Requested: 1     Ambulatory referral/consult to Cardiology   Standing Status: Future   Referral Priority: Routine Referral Type: Consultation   Referral Reason: Specialty Services Required   Requested Specialty: Cardiology   Number of Visits Requested: 1     Ambulatory referral/consult to Diabetes Education   Standing Status: Future   Referral Priority: Routine Referral Type: Consultation   Referral Reason: Specialty Services  Required   Requested Specialty: Diabetes   Number of Visits Requested: 1 Expiration Date: 03/23/23     Ambulatory referral/consult to Nutrition Services   Standing Status: Future   Referral Priority: Routine Referral Type: Consultation   Referral Reason: Specialty Services Required   Requested Specialty: Nutrition   Number of Visits Requested: 1     Diet diabetic     Activity as tolerated       Significant Diagnostic Studies:     Recent Labs   Lab 03/17/22  1736   HGBA1C >14.0*     Recent Labs   Lab 03/21/22  0537 03/22/22  0316 03/23/22  0800   WBC 9.48 12.01 11.85   HGB 11.7* 11.6* 11.6*   HCT 37.0 37.7 37.1    336 407     Recent Labs   Lab 03/21/22  0537 03/22/22  0316 03/23/22  0800   GRAN 61.9  5.9 66.1  7.9* 58.8  7.0   LYMPH 25.1  2.4 21.2  2.6 27.1  3.2   MONO 11.5  1.1* 10.4  1.3* 9.8  1.2*   EOS 0.1 0.1 0.3     Recent Labs   Lab 03/18/22  0350 03/18/22  0926 03/19/22  0326 03/20/22  0238 03/21/22  0537 03/22/22  0316 03/23/22  0800   *   < > 132* 139 138 141 139   K 4.5   < > 4.6 3.9 4.1 4.2 3.8      < > 102 107 103 104 104   CO2 13*   < > 19* 22* 28 27 28   BUN 31*   < > 24* 19 14 16 12   CREATININE 1.6*   < > 1.4 1.0 0.9 0.8 0.7   *   < > 279* 126* 229* 89 104   CALCIUM 8.1*   < > 8.3* 8.6* 8.9 9.1 8.7   ALBUMIN 3.2*  --  2.9* 2.6* 2.5* 2.4* 2.3*   MG 1.8  --  2.2 2.0 1.8 1.8 1.6   PHOS 2.5*  --  2.8 2.3*  --   --   --     < > = values in this interval not displayed.     Recent Labs   Lab 03/17/22 2057 03/18/22 0350 03/21/22  0537 03/22/22  0316 03/23/22  0800   ALKPHOS  --    < > 73 73 64   ALT  --    < > 8* 8* 6*   AST  --    < > 12 12 11   PROT  --    < > 6.0 5.9* 5.9*   BILITOT  --    < > 0.7 0.6 0.5   LIPASE 59  --   --   --   --     < > = values in this interval not displayed.     Recent Labs   Lab 03/17/22 1703   TROPONINI 0.012   *     Recent Labs   Lab 03/17/22  1703 03/17/22  1736 03/17/22 2057 03/18/22  0005 03/18/22 0350   PROCAL  --  0.15  --   0.21  --    LACTATE 3.8*  --  2.5*  --  2.2     SARS-CoV2 (COVID-19) Qualitative PCR (no units)   Date Value   08/06/2020 Not Detected     SARS-CoV-2 RNA, Amplification, Qual (no units)   Date Value   03/23/2022 Negative     POC Rapid COVID (no units)   Date Value   03/17/2022 Negative       ECG Results          EKG 12-lead (Final result)  Result time 03/18/22 09:57:49    Final result by Interface, Lab In [facility] (03/18/22 09:57:49)                 Narrative:    Test Reason : R73.9,    Vent. Rate : 142 BPM     Atrial Rate : 150 BPM     P-R Int : 000 ms          QRS Dur : 078 ms      QT Int : 314 ms       P-R-T Axes : 000 259 069 degrees     QTc Int : 483 ms     Suspect arm lead reversal, interpretation assumes no reversal  Atrial fibrillation with rapid ventricular response  Right superior axis deviation  Right ventricular hypertrophy  Low anterior forces- Cannot rule out Anteroseptal infarct (cited on or  before 12-JAN-2018)  Abnormal ECG  When compared with ECG of 06-AUG-2020 16:36,  Atrial fibrillation has replaced Sinus rhythm  Vent. rate has increased BY  89 BPM  The axis Shifted left  Questionable change in initial forces of Anterior leads  Confirmed by Mundo GREEN MD ([id number]) on 3/18/2022 9:57:34 AM    Referred By: [email or web address]   SELF           Confirmed By:Mundo GREEN MD                            Echo  · The estimated ejection fraction is 65%.  · The left ventricle is normal in size with normal systolic function.  · Normal left ventricular diastolic function.  · Normal right ventricular size with normal right ventricular systolic   function.  · The estimated PA systolic pressure is 23 mmHg.  · Normal central venous pressure (3 mmHg).        Medications:  Reconciled Home Medications:      Medication List      START taking these medications    insulin aspart U-100 100 unit/mL (3 mL) Inpn pen  Commonly known as: NovoLOG  Inject 1-10 Units into the skin before meals and at bedtime as needed (Hyperglycemia). **MODERATE  "CORRECTION DOSE**  Blood Glucose  mg/dL                  Pre-meal                2200  151-200                2 units                    1 unit  201-250                4 units                    2 units    251-300                6 units                    3 units    301-350                8 units                    4 units   >350                     10 units                  5 units  Administer subcutaneously if needed at times designated by monitoring schedule.   DO NOT HOLD correction dose insulin for patients who are  NPO. "HIGH ALERT MEDICATION" - Administer with meals or TF/TPN.  Replaces: insulin aspart U-100 100 unit/mL injection     metoprolol tartrate 75 mg Tab  Take 150 mg by mouth 2 (two) times daily.     mupirocin 2 % ointment  Commonly known as: BACTROBAN  by Nasal route 2 (two) times daily. for 5 days        CHANGE how you take these medications    famotidine 40 MG tablet  Commonly known as: PEPCID  TAKE 1 TABLET(40 MG) BY MOUTH EVERY DAY  What changed: Another medication with the same name was removed. Continue taking this medication, and follow the directions you see here.     LANTUS U-100 INSULIN 100 unit/mL injection  Generic drug: insulin glargine  Inject 12 Units into the skin 2 (two) times daily.  What changed:   · how much to take  · how to take this  · when to take this  · additional instructions     lisinopriL 20 MG tablet  Commonly known as: PRINIVIL,ZESTRIL  Take 0.5 tablets (10 mg total) by mouth once daily.  What changed: how much to take        CONTINUE taking these medications    ACCU-CHEK SMARTVIEW TEST STRIP Strp  Generic drug: blood sugar diagnostic  TEST BLOOD GLUCOSE LEVELS FOUR TIMES DAILY     acetaminophen 500 MG tablet  Commonly known as: TYLENOL  Take 1 tablet (500 mg total) by mouth every 6 (six) hours as needed for Pain.     alcohol swabs Pad  Commonly known as: BD ALCOHOL SWABS  Apply 1 each topically as needed.     alendronate 70 MG tablet  Commonly known as: FOSAMAX  Take " "1 tablet (70 mg total) by mouth every 7 days.     aspirin 81 MG EC tablet  Commonly known as: ECOTRIN  Take 1 tablet (81 mg total) by mouth once daily.     BD INSULIN SYRINGE ULTRA-FINE 0.5 mL 31 gauge x 5/16" Syrg  Generic drug: insulin syringe-needle U-100  USE AS DIRECTED 4 TIMES DAILY.     blood glucose control, low Soln  Test 4 times daily     blood-glucose meter kit  To check BG 4 times daily, to use with insurance preferred meter     erythromycin ophthalmic ointment  Commonly known as: ROMYCIN  Place into both eyes every evening. Apply to itchy eye lid at night as needed     EScitalopram oxalate 10 MG tablet  Commonly known as: LEXAPRO  Take 1 tablet (10 mg total) by mouth once daily.     fluticasone propionate 50 mcg/actuation nasal spray  Commonly known as: FLONASE  SHAKE WELL AND INSTILL 2 SPRAYS IN EACH NOSTRIL EVERY DAY     lancets Misc  To check BG 4 times daily, to use with insurance preferred meter     levothyroxine 75 MCG tablet  Commonly known as: SYNTHROID  TAKE 1 TABLET(75 MCG) BY MOUTH EVERY DAY        STOP taking these medications    atenoloL 25 MG tablet  Commonly known as: TENORMIN     benzonatate 100 MG capsule  Commonly known as: TESSALON     butalbital-acetaminophen-caffeine -40 mg -40 mg per tablet  Commonly known as: FIORICET, ESGIC     clindamycin 150 MG capsule  Commonly known as: CLEOCIN     diclofenac sodium 1 % Gel  Commonly known as: VOLTAREN     furosemide 20 MG tablet  Commonly known as: LASIX     gabapentin 100 MG capsule  Commonly known as: NEURONTIN     insulin aspart U-100 100 unit/mL injection  Commonly known as: NovoLOG U-100 Insulin aspart  Replaced by: insulin aspart U-100 100 unit/mL (3 mL) Inpn pen     LIDOcaine 5 %  Commonly known as: LIDODERM     nystatin powder  Commonly known as: NYSTOP     omeprazole 40 MG capsule  Commonly known as: PRILOSEC     predniSONE 50 MG Tab  Commonly known as: DELTASONE     triamcinolone acetonide 0.1% 0.1 % cream  Commonly " known as: KENALOG            Indwelling Lines/Drains at time of discharge:   Lines/Drains/Airways     None             Time spent on the discharge of patient: 50 minutes  This service was provided by Virtual Visit.   Patient was seen and examined on the date of discharge.  Additional time was spent speaking with consultants and case management, reviewing records, and/or discussing the plan of care with patient/family.  The patient location is: [facility]/[id number] [address]  Admitted 3/17/2022  4:29 PM  Present with the patient at the time of the telemed/virtual assessment: Telepresenter    Patient was transferred to [facility] on:  3/23/2022  This document was prepared by chart review and may not directly reflect my personal knowledge of the patient's case, clinical course, or significant events during the hospital stay.    The attending portion of this evaluation, treatment, and documentation was performed per Avis Greene MD via Telemedicine AudioVisual using the secure [employer] software platform with 2 way audio/video. The provider was located off-site and the patient is located in the hospital. The aforementioned video software was utilized to document the relevant history and physical exam.      Avis Greene MD  Department of Hospital Medicine  [facility] - Intensive Care ([place]

## 2022-03-23 NOTE — PROGRESS NOTES
Report given to Oumou at [facility]. Pt remains in stable condition upon discharge. IV lines removed. Pt wheeled off the unit accompanied by staff and her daughter

## 2022-03-23 NOTE — PLAN OF CARE
03/23/22 1131   Post-Acute Status   Post-Acute Authorization Placement     Patient expected to dc to [facility] today,. SW has provided patient and patient daughter an update.        Christie Durán LMSW  [facility]   [phone]

## 2022-03-23 NOTE — CONSULTS
[facility] ([place] [address])  Hospital Medicine  Telemedicine Consult Note    Patient Name: Courtney Engle  MRN: [id number]  Admission Date: 3/17/2022  Hospital Length of Stay: 5 days  Attending Physician: Shalonda Parks MD   Primary Care Provider: Vishal Ulloa MD       Thank you for your consult to [facility]. We have reviewed the patient chart. This patient does meet criteria for [facility] service at this time. Will assume care on 03/23/22 at 7AM.        Avis Greene MD  Department of Hospital Medicine   [facility] - [facility] ([place])

## 2022-03-23 NOTE — TELEPHONE ENCOUNTER
Spoke with patient daughter Brenna regarding message, she reports her mom is currently in the hospital and is scheduled to be discharged to SNF for PT,OT  Daughter reports that her mom is drowsy and groggy in the am, she thinks that it related to medication Lexapro  Daughter states she mentioned this to Dr Greene who told daughter that if she wants patient to stop medication they can stop giving the medication to the patient  Daughter wants to confirm that PCP ordered Lexapro and would like to discuss possibly stopping medication or switching medication, she is requesting to speak with PCP

## 2022-03-23 NOTE — PLAN OF CARE
[facility] ([facility])  Discharge Final Note    Primary Care Provider: Vishal Ulloa MD    Expected Discharge Date: 3/23/2022    Final Discharge Note (most recent)       Final Note - 03/23/22 1616          Final Note    Assessment Type Final Discharge Note (P)      Anticipated Discharge Disposition Skilled Nursing Facility (P)      Hospital Resources/Appts/Education Provided Provided patient/caregiver with written discharge plan information;Appointments scheduled and added to AVS (P)         Post-Acute Status    Post-Acute Authorization Placement (P)                      Important Message from Medicare  Important Message from Medicare regarding Discharge Appeal Rights: Given to patient/caregiver, Explained to patient/caregiver, Signed/date by patient/caregiver     Date IMM was signed: 03/21/22  Time IMM was signed: 1109    Contact Info       Vishal Ulloa MD   Specialty: Internal Medicine   Relationship: PCP - General    [address] [facility] [address]   Phone: [phone]       Next Steps: Go on 4/1/2022    Instructions: Hosp f/u 4/1 @940a    [facility] ENDOCRINOLOGY   Specialty: Endocrinology    [address] [address]   Phone: [phone]       Next Steps: Schedule an appointment as soon as possible for a visit in 3 week(s)    Instructions: To establish care    [facility] CARDIOLOGY   Specialty: Cardiology    [address] [address]   Phone: [phone]       Next Steps: Schedule an appointment as soon as possible for a visit in 3 week(s)    Instructions: For discharge from hospital follow up, To establish care          Patient discharging to [facility] today. Transport is scheduled for 4:40pm.        Future Appointments   Date Time Provider Department Center   3/30/2022  1:40 PM Lois Almonte MD [facility] CARDIO [facility]   4/1/2022  9:40 AM Vishal Ulloa MD [facility] IM [facility] PCW   4/1/2022  3:00 PM Darrin Montero MD [facility] ENDODIA [facility]      Christie Durán, MADDY  [facility]   [phone]

## 2022-03-23 NOTE — TELEPHONE ENCOUNTER
----- Message from Antonieta Jauregui sent at 3/23/2022 10:14 AM CDT -----  Contact: patient daughter/[phone]  Patient daughter called, requested a courtesy call from Dr Ulloa about patient is been moved to the [facility] and she has been is back in lexapro but every time that she is back on that rx she looks drowsy and confuse. Would like a courtesy call ASAP. Thank you

## 2022-03-23 NOTE — PLAN OF CARE
OSNF ORDERS    03/23/2022  [facility]  KHARI LINDSEY - INTENSIVE CARE (WEST TOW-14)  [address]  [place] [address]  Dept: [phone]  Loc: [phone]     Admit to Nursing Home:  Skilled Nursing Facility    Diagnoses:  Active Hospital Problems    Diagnosis  POA    *Diabetes mellitus with ketoacidosis and lactic acidosis but without coma [E11.10]  Yes    Atrial fibrillation with RVR [I48.91]  Yes    Acute metabolic encephalopathy [G93.41]  Yes    Acute renal failure superimposed on stage 3 chronic kidney disease [N17.9, N18.30]  Yes    GERD (gastroesophageal reflux disease) [K21.9]  Yes    Diastolic dysfunction [I51.89]  Yes    CKD stage 3 due to type 2 diabetes mellitus [E11.22, N18.30]  Yes     Chronic    Anxiety [F41.9]  Yes    Depression [F32.A]  Yes    Acquired hypothyroidism [E03.9]  Yes     Chronic    Type 2 diabetes mellitus without complication, with long-term current use of insulin [E11.9, Z79.4]  Not Applicable     Chronic    Essential hypertension [I10]  Yes     Chronic      Resolved Hospital Problems   No resolved problems to display.       Code Status: FULL    Patient is homebound due to:  Diabetes mellitus with ketoacidosis and lactic acidosis but without coma    Allergies:  Review of patient's allergies indicates:   Allergen Reactions    Contac 12 hour allergy Anaphylaxis and Hives    Diphenhydramine hcl Shortness Of Breath     Other reaction(s): Shortness of breath    Ciprofloxacin (bulk) Nausea And Vomiting    (d)-limonene flavor Hives     Other reaction(s): Shortness of breath    Anti-hyst Other (See Comments)    Antihistamines - alkylamine Hives    Ciprocinonide      Other reaction(s): Stomach upset    Codeine      bad reaction    Contact metal agent      Other reaction(s): Hives    Glipizide Hives    Hydroxyzine      Unknown    Iodinated contrast media Other (See Comments)    Nitrofuran analogues     Penicillin      Other reaction(s):  "Rash    Propoxyphene Itching    Statins-hmg-coa reductase inhibitors      Nausea to all statins    Doxycycline Rash    Penicillins Rash    Sulfa (sulfonamide antibiotics) Rash    Sulfacetamide sodium-urea Rash       Vitals:  Every shift    Diet: diabetic diet: 2000 calorie    Activities:   Up in a chair each morning as tolerated and Activity as tolerated    Labs:  Routine    Nursing Precautions:  Aspiration , Fall and Pressure ulcer prevention    Consults:   PT to evaluate and treat- 5 times a week, OT to evaluate and treat- 5 times a week and Nutrition to evaluate and recommend diet     Miscellaneous Care: Diabetes Care: Diabetes: Check blood sugar. Fingerstick blood sugar AC and HS                   Diabetes Care:  SN to perform and educate Diabetic management with blood glucose monitoring: and Report CBG < 70 or > 250 to physician.      Medications: Discontinue all previous medication orders, if any. See new list below.     Medication List      START taking these medications    insulin aspart U-100 100 unit/mL (3 mL) Inpn pen  Commonly known as: NovoLOG  Inject 1-10 Units into the skin before meals and at bedtime as needed (Hyperglycemia). **MODERATE CORRECTION DOSE**  Blood Glucose  mg/dL                  Pre-meal                2200  151-200                2 units                    1 unit  201-250                4 units                    2 units    251-300                6 units                    3 units    301-350                8 units                    4 units   >350                     10 units                  5 units  Administer subcutaneously if needed at times designated by monitoring schedule.   DO NOT HOLD correction dose insulin for patients who are  NPO. "HIGH ALERT MEDICATION" - Administer with meals or TF/TPN.  Replaces: insulin aspart U-100 100 unit/mL injection     metoprolol tartrate 75 mg Tab  Take 150 mg by mouth 2 (two) times daily.     mupirocin 2 % ointment  Commonly known as: " "BACTROBAN  by Nasal route 2 (two) times daily. for 5 days        CHANGE how you take these medications    famotidine 40 MG tablet  Commonly known as: PEPCID  TAKE 1 TABLET(40 MG) BY MOUTH EVERY DAY  What changed: Another medication with the same name was removed. Continue taking this medication, and follow the directions you see here.     LANTUS U-100 INSULIN 100 unit/mL injection  Generic drug: insulin glargine  Inject 12 Units into the skin 2 (two) times daily.  What changed:   · how much to take  · how to take this  · when to take this  · additional instructions     lisinopriL 20 MG tablet  Commonly known as: PRINIVIL,ZESTRIL  Take 0.5 tablets (10 mg total) by mouth once daily.  What changed: how much to take        CONTINUE taking these medications    ACCU-CHEK SMARTVIEW TEST STRIP Strp  Generic drug: blood sugar diagnostic  TEST BLOOD GLUCOSE LEVELS FOUR TIMES DAILY     acetaminophen 500 MG tablet  Commonly known as: TYLENOL  Take 1 tablet (500 mg total) by mouth every 6 (six) hours as needed for Pain.     alcohol swabs Pad  Commonly known as: BD ALCOHOL SWABS  Apply 1 each topically as needed.     alendronate 70 MG tablet  Commonly known as: FOSAMAX  Take 1 tablet (70 mg total) by mouth every 7 days.     aspirin 81 MG EC tablet  Commonly known as: ECOTRIN  Take 1 tablet (81 mg total) by mouth once daily.     BD INSULIN SYRINGE ULTRA-FINE 0.5 mL 31 gauge x 5/16" Syrg  Generic drug: insulin syringe-needle U-100  USE AS DIRECTED 4 TIMES DAILY.     blood glucose control, low Soln  Test 4 times daily     blood-glucose meter kit  To check BG 4 times daily, to use with insurance preferred meter     erythromycin ophthalmic ointment  Commonly known as: ROMYCIN  Place into both eyes every evening. Apply to itchy eye lid at night as needed     EScitalopram oxalate 10 MG tablet  Commonly known as: LEXAPRO  Take 1 tablet (10 mg total) by mouth once daily.     fluticasone propionate 50 mcg/actuation nasal spray  Commonly " known as: FLONASE  SHAKE WELL AND INSTILL 2 SPRAYS IN EACH NOSTRIL EVERY DAY     lancets Misc  To check BG 4 times daily, to use with insurance preferred meter     levothyroxine 75 MCG tablet  Commonly known as: SYNTHROID  TAKE 1 TABLET(75 MCG) BY MOUTH EVERY DAY        STOP taking these medications    atenoloL 25 MG tablet  Commonly known as: TENORMIN     benzonatate 100 MG capsule  Commonly known as: TESSALON     butalbital-acetaminophen-caffeine -40 mg -40 mg per tablet  Commonly known as: FIORICET, ESGIC     clindamycin 150 MG capsule  Commonly known as: CLEOCIN     diclofenac sodium 1 % Gel  Commonly known as: VOLTAREN     furosemide 20 MG tablet  Commonly known as: LASIX     gabapentin 100 MG capsule  Commonly known as: NEURONTIN     insulin aspart U-100 100 unit/mL injection  Commonly known as: NovoLOG U-100 Insulin aspart  Replaced by: insulin aspart U-100 100 unit/mL (3 mL) Inpn pen     LIDOcaine 5 %  Commonly known as: LIDODERM     nystatin powder  Commonly known as: NYSTOP     omeprazole 40 MG capsule  Commonly known as: PRILOSEC     predniSONE 50 MG Tab  Commonly known as: DELTASONE     triamcinolone acetonide 0.1% 0.1 % cream  Commonly known as: KENALOG              Immunizations Administered as of 3/23/2022  Reviewed on 3/23/2022    No immunizations on file.          Avis Greene MD  03/23/2022

## 2022-03-23 NOTE — NURSING
Patient admitted to SNF. Patient arrived via [facility] transport. Patient denies any pain at this time. Daughter at bedside.

## 2022-03-23 NOTE — PLAN OF CARE
03/23/22 1613   Post-Acute Status   Post-Acute Authorization Placement   Post-Acute Placement Status Set-up Complete/Auth obtained     Patient discharging today to [facility]. Transport is scheduled for 4:30. Patient will be transported by wheelchair. Nurse call report to [phone]. SW has met with patient and patient daughter and provided patient with an update on the time for transportation     Christie Durán LMSW  [facility]   [id number]

## 2022-03-23 NOTE — TELEPHONE ENCOUNTER
I spoke with Brenna Britton, pts daughter. We had a long talk including with the pt. I do not think the Lexapro is causing fatigue so for now will continue it. She is in SNF. Will want a follow up in a few weeks.

## 2022-03-24 NOTE — PLAN OF CARE
Problem: Physical Therapy  Goal: Physical Therapy Goal  Description: PT goals until 4/13/22    1. Pt supine to sit with mod independent-not met  2. Pt sit to supine with mod independent-not met  3. Pt sit to stand with RW with mod independent-not met  4. Pt to perform gait 100ft with RW with supervision.-not met  5. Pt to go up/down curb step with RW with supervision.-not met  6. Pt to up/down 5 steps with B UE rail with supervision.-not met  7. Pt to perform B LE exs in sitting or supine x 15 reps to strengthen B LE to improve functional mobility.-not met  8. Pt to propel w/c 50ft with B UE on level surface with supervision-not met  9. Pt will be able to  object off the ground with the reacher with RW support with set up assist.-not met    Outcome: Ongoing, Progressing   Pt's goals set and pt will benefit from skilled PT services to work towards improved functional mobility including: bed mobility, transfers, up/down steps, w/c mobility, and gait.   3/24/2022

## 2022-03-24 NOTE — CONSULTS
[facility] - Skilled Nursing  Adult Nutrition  Consult Note    SUMMARY     Recommendations    1. Change to 1500 kcal diabetic diet.  2. RD to provide diabetes education on diet and care during sick days at next follow up.    Goals: 1. Pt's intake meals >50% by RD follow up.  Nutrition Goal Status: new  Communication of RD Recs: reviewed with physician    Assessment and Plan    Nutrition Problem  Altered nutrition-related lab values    Related to (etiology):   DKA in setting of uncontrolled diabetes    Signs and Symptoms (as evidenced by):   Pt with A1c >14%, reports she was sick and was not eating at all, was only drinking soda and did not take her insulin.    Interventions(treatment strategy):  Collaboration of care with providers.  CHO modified diet.    Nutrition Diagnosis Status:   New       Malnutrition Assessment             Weight Loss (Malnutrition):  (7.2% x 6 months, not significant)   Orbital Region (Subcutaneous Fat Loss): well nourished  Upper Arm Region (Subcutaneous Fat Loss): well nourished  Thoracic and Lumbar Region: well nourished   [place] Region (Muscle Loss): well nourished  Clavicle Bone Region (Muscle Loss): well nourished  Clavicle and Acromion Bone Region (Muscle Loss): well nourished  Scapular Bone Region (Muscle Loss): well nourished  Dorsal Hand (Muscle Loss): well nourished  Patellar Region (Muscle Loss): well nourished  Anterior Thigh Region (Muscle Loss): well nourished  Posterior Calf Region (Muscle Loss): well nourished   Edema (Fluid Accumulation): 0-->no edema present             Reason for Assessment    Reason For Assessment: consult  Diagnosis: diabetes diagnosis/complications (diabetic acidosis without coma)  Relevant Medical History: IDDM, HTN, HLD, CKD3, hypothyroidism, GERD  General Information Comments: Pt with 50% intake meals over last 5 days in hospital, reports improved appetite and intake and ate all of breakfast this morning.  lbs last year; noted with 7.2% wt  "loss x 6 months, which pt says is unintentional and was surprised that she had lost so much weight. NFPE completed today: pt appears nourished. Pt has no knowledge of extra care steps during illness for blood glucose management.  Nutrition Discharge Planning: carbohydrate consistent, low sodium diet    Nutrition Risk Screen    Nutrition Risk Screen: dysphagia or difficulty swallowing    Nutrition/Diet History    Patient Reported Diet/Restrictions/Preferences: general  Spiritual, Cultural Beliefs, Yarsani Practices, Values that Affect Care: no    Anthropometrics    Temp: 97.6 °F (36.4 °C)  Height Method: Stated  Height: 4' 10" (147.3 cm)  Height (inches): 58 in  Weight Method: Bed Scale  Weight: 81.8 kg (180 lb 5.4 oz)  Weight (lb): 180.34 lb  Ideal Body Weight (IBW), Female: 90 lb  % Ideal Body Weight, Female (lb): 200.38 %  BMI (Calculated): 37.7       Lab/Procedures/Meds    Pertinent Labs Reviewed: reviewed  Pertinent Labs Comments: A1c>14 (3/17/22)  Pertinent Medications Reviewed: reviewed  Pertinent Medications Comments: detemir, famotidine, senna, colace    Estimated/Assessed Needs    Weight Used For Calorie Calculations: 81.8 kg (180 lb 5.4 oz)  Energy Calorie Requirements (kcal): 1274 kcal  Energy Need Method: [place]-St Jeor (PAL 1.10)  Protein Requirements: 66-82g  Weight Used For Protein Calculations: 81.8 kg (180 lb 5.4 oz)        RDA Method (mL): 1274  CHO Requirement: 159g      Nutrition Prescription Ordered    Current Diet Order: Diabetic 2000 kcal    Evaluation of Received Nutrient/Fluid Intake    Comments: last BM 3/23  % Intake of Estimated Energy Needs: 75 - 100 %  % Meal Intake: 75 - 100 %    Nutrition Risk    Level of Risk/Frequency of Follow-up: low       Monitor and Evaluation    Food and Nutrient Intake: energy intake, food and beverage intake  Food and Nutrient Adminstration: diet order  Knowledge/Beliefs/Attitudes: food and nutrition knowledge/skill  Anthropometric Measurements: weight, " weight change  Biochemical Data, Medical Tests and Procedures: electrolyte and renal panel, gastrointestinal profile, glucose/endocrine profile, inflammatory profile, lipid profile  Nutrition-Focused Physical Findings: overall appearance, extremities, muscles and bones, head and eyes, skin       Nutrition Follow-Up    RD Follow-up?: Yes

## 2022-03-24 NOTE — PT/OT/SLP EVAL
"Physical Therapy Evaluation    Patient Name:  Courtney Engle   MRN:  [id number]  Admit Date: 3/23/2022  Admitting Diagnosis: diabetes mellitus with ketoacidosis and lactic acidosis  General Precautions: Standard, diabetic, fall   Orthopedic Precautions:N/A   Braces: N/A     Recommendations:     Discharge Recommendations:  home with home health   Level of Assistance Recommended: Intermittent supervision  Discharge Equipment Recommendations: bedside commode, shower chair, walker, rolling   Barriers to discharge:  lives alone and 5 ROSALIO with B UE rails    Assessment:     Courtney Engle is a 86 y.o. female admitted with a medical diagnosis of diabetes mellitus with ketoacidosis and lactic acidosis.  Pt is unsafe with functional mobility at this time due to pt requires CGA  for bed mobility, CGA for transfers, and CGA for gait due to L LE pain and weakness. Pt is motivated to progress with functional mobility. Pt limited with gait distance due to L knee pain and SOB    Rehab Potential is good     Activity Tolerance: Good    Plan:     Patient to be seen 6 x/week to address the above listed problems via therapeutic activities, gait training, therapeutic exercises, neuromuscular re-education, wheelchair management/training     Plan of Care Expires: 04/23/22  Plan of Care Reviewed with: patient    Subjective   "I have been getting SOB recently when I walk a lot"    Pain/Comfort:  · Pain Rating 1: 10/10 (L knee pain at times during gait)  · Location - Side 1: Left  · Location - Orientation 1: generalized  · Location 1: knee  · Pain Addressed 1: Reposition, Cessation of Activity  · Pain Rating Post-Intervention 1: 0/10 (at rest)    Living Environment: Pt lives alone in a 2 story home with 5 ROSALIO with B UE rails. Pt lives on the first floor. Prior to admission, patients level of function was independent, used a QC when she went out of the home. Pt drives and is independent with ADLs.  Equipment used at home: shower chair, " "rollator, cane, quad, grab bar (owns a RW but doesn't use, motorized scooter). Upon discharge, patient will have assistance from unknown.    Patients cultural, spiritual, Confucianism conflicts given the current situation: no    Objective:     Communicated with nurse prior to session.  Patient found up in chair with  (no lines)  upon PT entry to room.    Exams:  · Cognitive Exam:  Patient is oriented to Person, Place and Time  · Sensation:    · -       Intact  light/touch B LE  · RLE ROM: WFL  · RLE Strength: WFL  · LLE ROM: WFL   · LLE Strength: WFL except hip flex 4-/5; knee ext 4/5    Functional Mobility:  · Bed Mobility:     · Rolling Left:  modified independence using bed rail  · Rolling Right: modified independence using bed rail  · Supine to Sit: contact guard assistance  · Sit to Supine: stand by assistance  · Transfers:     · Sit to Stand:  contact guard assistance with rolling walker  · Bed to Chair: contact guard assistance with  no AD  using  Stand Pivot  · Gait: 20ft then 35ft then 15ft with RW with CGA. pt had episode of L knee flexing suddenly due to pain with mild instability noted and pt supporting herself on the walker. pt performed gait with flexed trunk, decreased step length, and at slow pace. Pt c/o SOB towards end of gait trials   · Stairs:  Pt ascended/descended 4 stair(s) with No Assistive Device with bilateral handrails with Contact Guard Assistance. Pt up/down 4" curb step with Rw with minimal assist.  · Wheelchair Propulsion:  Pt propelled Standard wheelchair x 15 feet on Level tile with  Bilateral upper extremity with Minimal Assistance.     AM-PAC 6 CLICK MOBILITY  Total Score:19     Patient left up in chair with call button in reach and nurse notified.    GOALS:   Multidisciplinary Problems     Physical Therapy Goals        Problem: Physical Therapy    Goal Priority Disciplines Outcome Goal Variances Interventions   Physical Therapy Goal     PT, PT/OT Ongoing, Progressing   "   Description: PT goals until 4/13/22    1. Pt supine to sit with mod independent-not met  2. Pt sit to supine with mod independent-not met  3. Pt sit to stand with RW with mod independent-not met  4. Pt to perform gait 100ft with RW with supervision.-not met  5. Pt to go up/down curb step with RW with supervision.-not met  6. Pt to up/down 5 steps with B UE rail with supervision.-not met  7. Pt to perform B LE exs in sitting or supine x 15 reps to strengthen B LE to improve functional mobility.-not met  8. Pt to propel w/c 50ft with B UE on level surface with supervision-not met  9. Pt will be able to  object off the ground with the reacher with RW support with set up assist.-not met                     History:     Past Medical History:   Diagnosis Date    Acquired hypothyroidism 4/25/2014    Anxiety     Aortic calcification 12/8/2016    X-Ray Chest-5/08/2014    Arthritis     Bilateral carotid artery disease 12/8/2016    US Carotid Bilateral-1/24/2013    CKD stage 3 due to type 2 diabetes mellitus 2/16/2017    Depression     Essential hypertension     Fever blister     GERD (gastroesophageal reflux disease)     Glaucoma 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 robledo)     Hyperlipidemia LDL goal <70 2/16/2017    Keloid cicatrix     Mixed stress and urge urinary incontinence 2/16/2017    Morbid obesity with BMI of 40.0-44.9, adult 12/8/2016    Ocular migraine 1/24/2013    Type 2 diabetes mellitus with hyperglycemia, with long-term current use of insulin        Past Surgical History:   Procedure Laterality Date    CATARACT EXTRACTION W/  INTRAOCULAR LENS IMPLANT Right 05/30/2012        CATARACT EXTRACTION W/  INTRAOCULAR LENS IMPLANT Left 05/26/2010        CHOLECYSTECTOMY      COLONOSCOPY N/A 12/20/2019    Procedure: COLONOSCOPY;  Surgeon: Higinio Gilbert MD;  Location: [facility] ([address] [place] [address]);  Service: Endoscopy;  Laterality: N/A;  patient to call back to  schedule Colonoscopy once she schedules Cardiology Clearance appt-BB  8/30/19 Low CV risk for colonospcopy per   patient requesting  but can't wait for his next available due to rectal bleeding    COLONOSCOPY W/ POLYPECTOMY  04/20/2015    ESOPHAGOGASTRODUODENOSCOPY  04/20/2015    HYSTERECTOMY      Partial    JOINT REPLACEMENT      knee replacement right      TONSILLECTOMY, ADENOIDECTOMY      tonsillectomy only       Time Tracking:     PT Received On: 03/24/22  PT Start Time: 1003     PT Stop Time: 1033  PT Total Time (min): 30 min     Billable Minutes: Evaluation 15 and Gait Training 15      03/24/2022

## 2022-03-24 NOTE — PLAN OF CARE
Problem: Adult Inpatient Plan of Care  Goal: Plan of Care Review  Outcome: Ongoing, Progressing   Recommendations     1. Change to 1500 kcal diabetic diet.  2. RD to provide diabetes education on diet and care during sick days at next follow up.     Goals: 1. Pt's intake meals >50% by RD follow up.  Nutrition Goal Status: new  Communication of RD Recs: reviewed with physician     Assessment and Plan     Nutrition Problem  Altered nutrition-related lab values     Related to (etiology):   DKA in setting of uncontrolled diabetes     Signs and Symptoms (as evidenced by):   Pt with A1c >14%, reports she was sick and was not eating at all, was only drinking soda and did not take her insulin.     Interventions(treatment strategy):  Collaboration of care with providers.  CHO modified diet.     Nutrition Diagnosis Status:   New

## 2022-03-24 NOTE — PLAN OF CARE
[facility] - Skilled Nursing  Initial Discharge Assessment     spoke with patient in, patient reports living alone but has  adaughter will help her often.  The patient reports having rolling walker, cane, scooter, shower chair.  Preferred pharmacy is [facility] at [facility].    Milana Aquino, [facility]  Case Management Department  [facility]  [email or web address]        Primary Care Provider: Vishal Ulloa MD    Admission Diagnosis: Diabetic acidosis without coma [E11.10]    Admission Date: 3/23/2022  Expected Discharge Date:          Payor: [employer] MANAGED MEDICARE / Plan: [employer] MEDICARE HMO / Product Type: Capitation /     Extended Emergency Contact Information  Primary Emergency Contact: Patito Britton  Address: [address] [place], [place] [id number] [facility]  Home Phone: [phone]  Relation: Daughter    Discharge Plan A: Home Health, Home  Discharge Plan B: Home Health, Home with family      [id number] - [facility] [address] [facility] AT [facility] & [address] [address]  Phone: [phone] Fax: [phone]    [facility] - [facility] [address] [place]  [address] [facility] [address]  Phone: [phone] Fax: [phone]    [facility] - [facility] [address]  [address] [facility] [address]  Phone: [phone] Fax: [phone]      Initial Assessment (most recent)     Adult Discharge Assessment - 03/24/22 1606        Discharge Assessment    Assessment Type Discharge Planning Assessment     Confirmed/corrected address, phone number and insurance Yes     Confirmed Demographics Correct on Facesheet     Source of Information patient;health record     Communicated FAY with patient/caregiver Date not available/Unable to determine     Lives With alone     Do you expect to return  to your current living situation? Yes     Do you have help at home or someone to help you manage your care at home? Yes     Prior to hospitilization cognitive status: Alert/Oriented     Current cognitive status: Alert/Oriented     Walking or Climbing Stairs Difficulty ambulation difficulty, requires equipment     Dressing/Bathing Difficulty bathing difficulty, requires equipment     Home Layout Able to live on 1st floor     Equipment Currently Used at Home cane, quad;shower chair;grab bar     Readmission within 30 days? No     Patient currently being followed by outpatient case management? No     Do you currently have service(s) that help you manage your care at home? No     Is the pt/caregiver preference to resume services with current agency No     Do you take prescription medications? Yes     Do you have prescription coverage? Yes     Do you have any problems affording any of your prescribed medications? No     Is the patient taking medications as prescribed? yes     How do you get to doctors appointments? car, drives self;family or friend will provide     Are you on dialysis? No     Do you take coumadin? No     Discharge Plan A Home Health;Home     Discharge Plan B Home Health;Home with family     DME Needed Upon Discharge  other (see comments)   TBD    Discharge Plan discussed with: Patient

## 2022-03-24 NOTE — PLAN OF CARE
Interdisciplinary team, Nicole Hernandez, RN Charge Nurse, Higinio Hamilton, Unit Director, Daisy Sadler RN MDS Coordinator, Opal Brown PT, Rehab Supervisor, Milana Rincon LM, and Opal Fulton OT, Activities, spoke to patient for care plan conference, weekly status update, and therapy progress update. Tentative discharge date set for 4/13/22.

## 2022-03-24 NOTE — PLAN OF CARE
Problem: Occupational Therapy  Goal: Occupational Therapy Goal  Description: Goals to be met by: 4/07/2022    Patient will increase functional independence with ADLs by performing:    UE Dressing with [place].  LE Dressing with Modified [place].  Grooming while standing at sink with Modified [place].  Toileting from toilet with Modified [place] for hygiene and clothing management.   Bathing from shower on shower chair/bench with Supervision Assistance.  Step transfer with Modified [place] using AD.   Toilet transfer to toilet with Modified [place].  Upper extremity exercise program per handout, with supervision.  Pt will complete functional reaching task in standing x10 minutes with Supervision assistance for improved safety and independence in standing ADLs/IADLS.   Caregiver will be educated on level of assist required to safely perform self care tasks and functional transfers.    Outcome: Ongoing, Progressing

## 2022-03-24 NOTE — PT/OT/SLP EVAL
Occupational Therapy   Evaluation/Treatment     Name: Courtney Engle  MRN: [id number]  Admit Date: 3/23/2022  Admitting Diagnosis:  Diabetic acidosis without coma, DKA   Recent Surgeries: N/A    General Precautions: Standard, diabetic, fall   Orthopedic Precautions:N/A   Braces: N/A     Recommendations:     Discharge Recommendations: home with home health  Level of Assistance Recommended: Intermittent assistance   Discharge Equipment Recommendations:  bedside commode, shower chair, walker, rolling  Barriers to discharge:  Decreased caregiver support    Assessment:     Courtney Engle is a 86 y.o. female with a medical diagnosis of Diabetic acidosis without coma, DKA. Pt agreeable to OT evaluation and treatment, tolerating session well with good participation throughout. PLOF reported as independent for ADLs. At this time, pt presents with deficits including weakness, impaired endurance, impaired self care skills, impaired functional mobilty, gait instability, impaired balance, decreased lower extremity function, decreased safety awareness, pain, impaired cardiopulmonary response to activity, and decreased upper extremity function which affect safety and performance in self care, functional transfers, and functional mobility. Pt is mainly limited by impaired endurance demonstrating SOB during self care tasks requiring rest breaks to complete, and impaired balance during standing tasks requiring CGA to steady. Pt with one instance of LOB during standing self care tasks due to pain in L knee, pt able to self correct with CGA. Pt would benefit from skilled OT services to address identified deficits for improved safety and independence in functional tasks.      Rehab Potential is good    Activity Tolerance: Fair    Plan:     Patient to be seen 6 x/week to address the above listed problems via self-care/home management, therapeutic activities, therapeutic exercises  · Plan of Care Expires: 04/24/22  · Plan of Care Reviewed  "with: patient    Subjective   "I feel weak."   Chief Complaint: shortness of breath and pain in left knee   Patient/Family Comments/goals: to get stronger and to be safe to go home and not fall     Occupational Profile:  Living Environment: Pt lives alone in a 2SH 5STE BHR w/ WIS w/ shower seat and grab bars in bathroom.   Previous level of function: Pt previously independent with all self care tasks and reports completing functional mobility in home with occasional use of a straight cane. Pt reports using straight cane for outdoor mobility in community.   Roles and Routines: pt drives   Equipment Used at Home:  cane, straight, shower chair (pt owns a RW but does not use)  Assistance upon Discharge: Pt has minimal assistance from daughter who lives in [place].     Pain/Comfort:  Pain Rating 1:  (pt did not rate; expressed pain in L knee during standing self care tasks)  Location - Side 1: Left  Location - Orientation 1: generalized  Location 1: knee  Pain Addressed 1: Reposition  Pain Rating Post-Intervention 1: 0/10    Patients cultural, spiritual, Evangelical conflicts given the current situation: no    Objective:     Communicated with: NSSB prior to session.  Patient found supine with  (no lines) upon OT entry to room.    Occupational Performance:     Eating   Assistance Needed Independent   CARE Score - Eating 6   Oral Hygiene   Assistance Needed Set-up / clean-up   Comment   (seated in w/c at sink)   CARE Score - Oral Hygiene 5   Toileting Hygiene   Assistance Needed Incidental touching   Comment   (CGA to steady pt when standing to manage clothing over hips)   CARE Score - Toileting Hygiene 4   Shower/Bathe Self   Assistance Needed Incidental touching   Comment   (CGA to steady pt when standing to clean molly area; 1 minor LOB requiring steadying assist to correct due to pain in L knee.)   CARE Score - Shower/Bathe Self 4   Upper Body Dressing   Assistance Needed Set-up / clean-up   Comment   (to don pullover shirt) "   CARE Score - Upper Body Dressing 5   Lower Body Dressing   Assistance Needed Incidental touching   Comment   (CGA to pt when standing to manage clothing over hips)   CARE Score - Lower Body Dressing 4   Putting On/Taking Off Footwear   Assistance Needed Supervision   Comment   (SBA with increased time to doff/don bilateral socks)   CARE Score - Putting On/Taking Off Footwear 4   Lying to Sitting on Side of Bed   Assistance Needed Supervision   Comment   (SBA)   CARE Score - Lying to Sitting on Side of Bed 4   Sit to Stand   Assistance Needed Incidental touching   Comment   (CGA using RW)   CARE Score - Sit to Stand 4   Chair/Bed-to-Chair Transfer   Assistance Needed Physical assistance   Physical Assistance Level 25% or less   Comment   (to complete step transfer using RW)   CARE Score - Chair/Bed-to-Chair Transfer 3   Toilet Transfer   Assistance Needed Incidental touching   Comment   (CGA using grab bars to complete SPT)   CARE Score - Toilet Transfer 4     Functional Mobility/Transfers:  · Functional Mobility: Pt completed 2 trials of functional mobility 12 ft each to/from bathroom to engage in self care tasks using RW with min A. Pt required assist to manage RW for safety.       Cognitive/Visual Perceptual:  Cognitive/Psychosocial Skills:     -       Oriented to: Person, Place, Time and Situation   -       Follows Commands/attention:Follows multistep  commands    Physical Exam:  Upper Extremity Range of Motion:     -       Right Upper Extremity: WFL  -       Left Upper Extremity: WFL  Upper Extremity Strength:    -       Right Upper Extremity: 4/5 grossly   -       Left Upper Extremity: 4/5 grossly    Strength:    -       Right Upper Extremity: WFL  -       Left Upper Extremity: WFL  Fine Motor Coordination:    -       Intact  Left hand thumb/finger opposition skills and Right hand thumb/finger opposition skills  Balance:   Static Sitting: SBA  Static Standing: SBA  Dynamic Sitting: SBA  Dynamic Standing:  CGA  Functional Endurance:   Impaired; pt becoming SOB and fatigued during tasks requiring education on breathing techniques and brief rest breaks to complete.       AMPA 6 Click ADL:  AMPAC Total Score: 21    Treatment & Education:  Pt educated on:   - POC/OT role   - benefit of performing OOB activity   - use of call light for NSG assistance for fall prevention   - safety when performing self care tasks, functional transfers, and functional mobility  - proper use of AD   - breathing techniques  Pt receptive to education providing verbal understanding on this day.   Education:    Patient left up in chair with call button in reach and NSG staff notified of level of assist  notified    GOALS:   Multidisciplinary Problems     Occupational Therapy Goals        Problem: Occupational Therapy    Goal Priority Disciplines Outcome Interventions   Occupational Therapy Goal     OT, PT/OT Ongoing, Progressing    Description: Goals to be met by: 4/07/2022    Patient will increase functional independence with ADLs by performing:    UE Dressing with [place].  LE Dressing with Modified [place].  Grooming while standing at sink with Modified [place].  Toileting from toilet with Modified [place] for hygiene and clothing management.   Bathing from shower on shower chair/bench with Supervision Assistance.  Step transfer with Modified [place] using AD.   Toilet transfer to toilet with Modified [place].  Upper extremity exercise program per handout, with supervision.  Pt will complete functional reaching task in standing x10 minutes with Supervision assistance for improved safety and independence in standing ADLs/IADLS.   Caregiver will be educated on level of assist required to safely perform self care tasks and functional transfers.                     History:     Past Medical History:   Diagnosis Date    Acquired hypothyroidism 4/25/2014    Anxiety     Aortic calcification 12/8/2016    X-Ray  Chest-5/08/2014    Arthritis     Bilateral carotid artery disease 12/8/2016     Carotid Bilateral-1/24/2013    CKD stage 3 due to type 2 diabetes mellitus 2/16/2017    Depression     Essential hypertension     Fever blister     GERD (gastroesophageal reflux disease)     Glaucoma 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 robledo)     Hyperlipidemia LDL goal <70 2/16/2017    Keloid cicatrix     Mixed stress and urge urinary incontinence 2/16/2017    Morbid obesity with BMI of 40.0-44.9, adult 12/8/2016    Ocular migraine 1/24/2013    Type 2 diabetes mellitus with hyperglycemia, with long-term current use of insulin          Past Surgical History:   Procedure Laterality Date    CATARACT EXTRACTION W/  INTRAOCULAR LENS IMPLANT Right 05/30/2012        CATARACT EXTRACTION W/  INTRAOCULAR LENS IMPLANT Left 05/26/2010        CHOLECYSTECTOMY      COLONOSCOPY N/A 12/20/2019    Procedure: COLONOSCOPY;  Surgeon: Higinio Gilbert MD;  Location: [address]);  Service: Endoscopy;  Laterality: N/A;  patient to call back to schedule Colonoscopy once she schedules Cardiology Clearance appt-BB  8/30/19 Low CV risk for colonospcopy per   patient requesting  but can't wait for his next available due to rectal bleeding    COLONOSCOPY W/ POLYPECTOMY  04/20/2015    ESOPHAGOGASTRODUODENOSCOPY  04/20/2015    HYSTERECTOMY      Partial    JOINT REPLACEMENT      knee replacement right      TONSILLECTOMY, ADENOIDECTOMY      tonsillectomy only       Time Tracking:     OT Date of Treatment: 03/24/22  OT Start Time: 0748  OT Stop Time: 0836  OT Total Time (min): 48 min    Billable Minutes:Evaluation 20  Self Care/Home Management 28    3/24/2022

## 2022-03-24 NOTE — PROGRESS NOTES
"                                                        [facility]                                  Skilled Nursing Facility                   Progress Note     Admit Date: 3/23/2022  FAY TBD  Principal Problem:  Diabetes mellitus with ketoacidosis and lactic acidosis but without coma   HPI obtained from patient interview and chart review     Chief Complaint: Establish Care/ Initial Visit     HPI:   Mrs. Engle is a 86 year old female with PMHx of IDDM, HTN, HLD, diastolic dysfunction (most recent echo 2018 normal), CKD 3, hypothyroidism and GERD who presents to SNF following hospitalization for DKA, encephalopathy, new AFib.  Admission to SNF for secondary weakness debility.     Patient originally presented to [facility] ED on 03/17 complaining of altered mental status. "HPI obtained from the daughter at bedside. Daughter states that for the past few weeks, patient has woken up with slurred speech that has improved as the day progressed. Last week the patient told the daughter that she "didn't feel well" but did not elaborate on specific symptoms. Earlier this week, the patient started complaining of increased thirst (for which she was requesting soda to be brought to her) and increased urination with dysuria (though patient attributed dysuria to a scratch on her vagina). She also complained of "kidney issues" though did not elaborate on specific symptoms. The daughter is unable to confirm whether or not the patient has been compliant with medications, including insulin, however she suspects that the patient has not been compliant as the patient's other daughter reported "several boxes of medication" in the patient's refrigerator (daughter at bedside does not know which medications they were). The patient had been resistant to calling her PCP or seeking medical care for the past two weeks, however was finally able to be brought to the ED today by her daughter. At present the patient is " "encephalopathic, but only complaint is of epigastric pain. Daughter reports episode of N/V within the past few days. Work up in the ED revealed a leukocytosis of 15, TAMARA with hyperkalemia (creatinine 2.3, baseline 0.8), lactic of 3.8 and labs consistent with DKA (pH 7.1, bhb 5.3, bicarb 9 w/ glucose 800). Additionally, the patient was found to be in new onset a fib RVR (rates up to 160 in the ED). Critical Care Medicine has been consulted for DKA & A fib RVR.  Patient admitted to [facility] evening of 3/17 for DKA and new onset a fib RVR. Started on insulin per DKA protocol. Better rate control achieved with initiation of metoprolol. Encephalopathy improved; gap closed. Pt transitioned to SQ insulin.    Pt stepped down to medicine 3/18.  Glycemic control improved.  Metoprolol also up titrated with better rate controlled.  Echo unremarkable.  Risks and benefits of anticoagulation in the setting of atrial fibrillation to prevent thromboembolism discussed with patient and daughter.  Patient would like to defer anticoagulation.  TAMARA improved to baseline.  Lisinopril started.  Per PT OT evaluation patient to benefit from SNF."    Today, patient is doing well.  She reports her shortness of breath is at her baseline.  She endorses difficulty sleeping last night.     Patient will be treated at [facility] with PT and OT to improve functional status and ability to perform ADLs.     Past Medical History: Patient has a past medical history of Acquired hypothyroidism (4/25/2014), Anxiety, Aortic calcification (12/8/2016), Arthritis, Bilateral carotid artery disease (12/8/2016), CKD stage 3 due to type 2 diabetes mellitus (2/16/2017), Depression, Essential hypertension, Fever blister, GERD (gastroesophageal reflux disease), Glaucoma suspect with open angle (2010), Hyperlipidemia LDL goal <70 (2/16/2017), Keloid cicatrix, Mixed stress and urge urinary incontinence (2/16/2017), Morbid obesity with BMI of 40.0-44.9, adult (12/8/2016), " Ocular migraine (1/24/2013), and Type 2 diabetes mellitus with hyperglycemia, with long-term current use of insulin.    Past Surgical History: Patient has a past surgical history that includes Cholecystectomy; TONSILLECTOMY, ADENOIDECTOMY; knee replacement right; Joint replacement; Hysterectomy; Colonoscopy w/ polypectomy (04/20/2015); Esophagogastroduodenoscopy (04/20/2015); Cataract extraction w/  intraocular lens implant (Right, 05/30/2012); Cataract extraction w/  intraocular lens implant (Left, 05/26/2010); and Colonoscopy (N/A, 12/20/2019).    Social History: Patient reports that she has never smoked. She has never used smokeless tobacco. She reports that she does not drink alcohol and does not use drugs.    Family History: family history includes Cancer in her mother; Colon cancer in her mother; Glaucoma in her father; Heart attack in her maternal grandmother; Heart attack (age of onset: 93) in her father; Heart disease in her maternal grandmother and mother; Hematuria in her brother; Hypertension in her maternal grandmother; Nephrolithiasis in her father; No Known Problems in her daughter; Stroke in her father; Uterine cancer in her daughter.    Allergies: Patient is allergic to contac 12 hour allergy, diphenhydramine hcl, ciprofloxacin (bulk), (d)-limonene flavor, anti-hyst, antihistamines - alkylamine, ciprocinonide, codeine, contact metal agent, glipizide, hydroxyzine, iodinated contrast media, nitrofuran analogues, penicillin, propoxyphene, statins-hmg-coa reductase inhibitors, doxycycline, penicillins, sulfa (sulfonamide antibiotics), and sulfacetamide sodium-urea.    ROS  Constitutional: Negative for fever   Eyes: Negative for blurred vision, double vision   Respiratory: Negative for cough.  + shortness of breath at baseline  Cardiovascular: Negative for chest pain, palpitations, and leg swelling.   Gastrointestinal: Negative for abdominal pain, constipation, diarrhea, nausea, vomiting.    Genitourinary: Negative for dysuria, frequency   Musculoskeletal:  + generalized weakness. Negative for back pain and myalgias.   Skin: Negative for itching and rash.   Neurological: Negative for dizziness, headaches.   Psychiatric/Behavioral: Negative for depression. The patient is not nervous/anxious.      24 hour Vital Sign Range   Temp:  [97.6 °F (36.4 °C)-98.1 °F (36.7 °C)]   Pulse:  []   Resp:  [18]   BP: (124-138)/(65-76)   SpO2:  [94 %-98 %]     PEx  Constitutional: Patient appears debilitated.  No distress noted  HENT:  Poor dentition  Head: Normocephalic and atraumatic.   Eyes: Pupils are equal, round  Neck: Normal range of motion. Neck supple.   Cardiovascular: Normal rate, regular rhythm and normal heart sounds.    Pulmonary/Chest: Effort normal and breath sounds are clear  Abdominal: Soft. Bowel sounds are normal.   Musculoskeletal: Normal range of motion.   Neurological: Alert and oriented to person, place, and time.   Psychiatric: Normal mood and affect. Behavior is normal.   Skin: Skin is warm and dry. Full skin assessment to be completed with next assessment.    Recent Labs   Lab 03/24/22  0627      K 3.7      CO2 26   BUN 11   CREATININE 0.8   MG 1.6       Recent Labs   Lab 03/24/22  0627   WBC 10.64   RBC 4.04   HGB 11.5*   HCT 37.0      MCV 92   MCH 28.5   MCHC 31.1*         Assessment and Plan:    Type 2 diabetes mellitus without complication, with long-term current use of insulin  Diabetes mellitus with ketoacidosis and lactic acidosis but without coma  - last A1C >14 on 3/17/22  - suspect secondary to medication non-compliance and dietary indiscretion  - diabetic diet, Accu checks AC/HS  - home regimen with aspart 15 units TIDWM & lantus 36 units QHS  - initiated aspart 3 units TID WM to begin with, continue detemir 12 units BID and low dose sliding scale insulin     Acute renal failure superimposed on stage 3 chronic kidney disease  - sCr basline is 0.8, creatinine  on admission 2.3  - suspected to be pre-renal in setting of DKA  - acute phase resolved with fluids  - creatinine back to baseline  - continue monitor with twice weekly BMPs, avoid nephrotoxic agents, renally dose medications when appropriate    Atrial fibrillation with RVR  - new onset, suspect due to acute illness with DKA  - recent thyroid studies show normal T4  - 2D echo unremarkable as below  - continue metoprolol for rate control; titrate up as clinically indicated  - addressed anticoagulation risk/benefit w/ Pt and daughter; patient would like to defer therapy at this time  - follow-up with cardiology during SNF stay and after discharge from SNF     Diastolic dysfunction  - most recent echo 1/2018 showed LVEF 60-65%, normal LV diastolic function and normal RV function  - Echo  · The estimated ejection fraction is 65%.  · The left ventricle is normal in size with normal systolic function.  · Normal left ventricular diastolic function.  · Normal right ventricular size with normal right ventricular systolic function.  · The estimated PA systolic pressure is 23 mmHg.  · Normal central venous pressure (3 mmHg).      GERD (gastroesophageal reflux disease)  - continue home pepcid 40 mg daily    Depression, moderate, recurrent  Generalized anxiety disorder  - Continue home Lexapro 10 mg daily    Acquired hypothyroidism  - TSH low, however free T4 WNL  - continue home levothyroxine 75 mcg daily     Essential hypertension  - holding home atenolol in favor of metoprolol in setting of a fib RVR  - continue lisinopril 10 mg daily    Insomnia  - initiated melatonin 9 mg qHS    Obesity  - BMI 37  - RD following, appreciate recommendations    Hypokalemia  Hypomagnesemia  - initiated potassium 30 mEq x1 dose magnesium oxide 400 mg BID x2 days    Debility   - Continue with PT/OT for gait training and strengthening and restoration of ADL's   - Encourage mobility, OOB in chair, and early ambulation as appropriate  - Fall  precautions   - Monitor for bowel and bladder dysfunction  - Monitor for and prevent skin breakdown and pressure ulcers  - initiated DVT prophylaxis with  Lovenox 40 mg daily         Anticipate disposition:  Home with home health      Follow-up needed during SNF stay-cardiology (3/30), endocrinology (4/1)    Appointments to reschedule- internal medicine 4/1    Follow-up needed after discharge from SNF:   - PCP, needs to be rescheduled  - cardiology and Endocrinology will likely schedule follow-up appointments after their above appointments    Future Appointments   Date Time Provider Department Center   3/30/2022  1:40 PM Lois Almonte MD [facility] CARDIO [facility]   4/1/2022  9:40 AM Vishal Ulloa MD [facility] IM [facility] PCW   4/1/2022  3:00 PM Darrin Montero MD [facility] ENDODIA [facility]         I certify that SNF services are required to be given on an inpatient basis because Courtney H Oniel needs for skilled nursing care and/or skilled rehabilitation are required on a daily basis and such services can only practically be provided in a skilled nursing facility setting and are for an ongoing condition for which she received inpatient care in the hospital.     Total time of the visit 112 minutes [id number]  Non physical exam/ non charting time: 67 minutes   Description of non physical exam/non charting time: counseling patient on clinical conditions and therapies provided regarding diabetes regimen, antihypertensive medication regimen, importance of proper PO nutrition hydration, beginning of discharge planning.  Extensive chart review completed including all consultation notes.  All pertinent laboratory and radiographical images reviewed.        Marilynn Alanis NP  Department of Hospital Medicine   [facility]     DOS: 3/24/2022       Patient note was created using MModal Dictation.  Any errors in syntax or even information may not have been identified and edited on initial review prior  to signing this note.

## 2022-03-24 NOTE — CLINICAL REVIEW
Clinical Pharmacy Chart Review Note      Admit Date: 3/23/2022   LOS: 1 day       Courtney Engle is a 86 y.o. female admitted to SNF for PT/OT after hospitalization for diabetes mellitus with ketoacidosis and lactic acidosis but without coma.    Review of patient's allergies indicates:   Allergen Reactions    Contac 12 hour allergy Anaphylaxis and Hives    Diphenhydramine hcl Shortness Of Breath     Other reaction(s): Shortness of breath    Ciprofloxacin (bulk) Nausea And Vomiting    (d)-limonene flavor Hives     Other reaction(s): Shortness of breath    Anti-hyst Other (See Comments)    Antihistamines - alkylamine Hives    Ciprocinonide      Other reaction(s): Stomach upset    Codeine      bad reaction    Contact metal agent      Other reaction(s): Hives    Glipizide Hives    Hydroxyzine      Unknown    Iodinated contrast media Other (See Comments)    Nitrofuran analogues     Penicillin      Other reaction(s): Rash    Propoxyphene Itching    Statins-hmg-coa reductase inhibitors      Nausea to all statins    Doxycycline Rash    Penicillins Rash    Sulfa (sulfonamide antibiotics) Rash    Sulfacetamide sodium-urea Rash     Patient Active Problem List    Diagnosis Date Noted    Atrial fibrillation with RVR 03/18/2022    Acute metabolic encephalopathy 03/18/2022    Acute renal failure superimposed on stage 3 chronic kidney disease 03/18/2022    Diabetes mellitus with ketoacidosis and lactic acidosis but without coma 03/17/2022    Family history of colon cancer requiring screening colonoscopy 12/20/2019    Hematochezia 08/30/2019    Statin intolerance 08/15/2019    Shortness of breath 07/02/2018    History of right knee joint replacement 07/02/2018    Pure hypercholesterolemia 01/29/2018    Weakness 01/11/2018    GERD (gastroesophageal reflux disease) 08/25/2017    Fatty liver 08/25/2017    Diastolic dysfunction 08/25/2017    Neuropathy 08/25/2017    Edema 08/25/2017    Insomnia due  to medical condition 05/09/2017    Left knee pain 05/09/2017    CKD stage 3 due to type 2 diabetes mellitus 02/16/2017    Mixed stress and urge urinary incontinence 02/16/2017    Hyperlipidemia LDL goal <70 02/16/2017    Oropharyngeal dysphagia 02/16/2017    Anxiety 12/08/2016    Depression 12/08/2016    Morbid obesity with BMI of 40.0-44.9, adult 12/08/2016    Aortic calcification 12/08/2016    Bilateral carotid artery disease 12/08/2016    Family history of malignant neoplasm of gastrointestinal tract 04/20/2015    Acquired hypothyroidism 04/25/2014    MGD (meibomian gland dysfunction) - Both Eyes 03/27/2014    Cystitis cystica 11/25/2013    Open angle with borderline findings and low glaucoma risk in both eyes - Both Eyes 05/31/2013    PCO (posterior capsular opacification) - Both Eyes 05/31/2013    Transient vision disturbance - Left Eye 04/22/2013    Pseudophakia - Both Eyes 01/24/2013    Posterior vitreous detachment 01/24/2013    Eyelid inflammation - Both Eyes 01/24/2013    Type 2 diabetes mellitus without complication, with long-term current use of insulin     Arthritis     Essential hypertension        Scheduled Meds:    aspirin  81 mg Oral Daily    erythromycin   Both Eyes Daily PM    EScitalopram oxalate  10 mg Oral Daily    famotidine  40 mg Oral Daily    fluticasone propionate  2 spray Each Nostril Daily    insulin detemir U-100  12 Units Subcutaneous BID    levothyroxine  75 mcg Oral Before breakfast    lisinopriL  10 mg Oral Daily    metoprolol tartrate  150 mg Oral BID    mupirocin   Topical (Top) BID    senna-docusate 8.6-50 mg  1 tablet Oral BID     Continuous Infusions:   PRN Meds: acetaminophen, acetaminophen, calcium carbonate, dextrose 50%, dextrose 50%, glucagon (human recombinant), glucose, glucose, insulin aspart U-100, melatonin    OBJECTIVE:     Vital Signs (Last 24H)  Temp:  [97.6 °F (36.4 °C)-98.1 °F (36.7 °C)]   Pulse:  []   Resp:  [17-18]   BP:  (124-132)/(70-76)   SpO2:  [95 %-98 %]     Laboratory:  CBC:   Recent Labs   Lab 03/22/22  0316 03/23/22  0800 03/24/22  0627   WBC 12.01 11.85 10.64   RBC 4.14 4.16 4.04   HGB 11.6* 11.6* 11.5*   HCT 37.7 37.1 37.0    407 433   MCV 91 89 92   MCH 28.0 27.9 28.5   MCHC 30.8* 31.3* 31.1*     BMP:   Recent Labs   Lab 03/22/22  0316 03/23/22  0800 03/24/22  0627   GLU 89 104 118*    139 138   K 4.2 3.8 3.7    104 104   CO2 27 28 26   BUN 16 12 11   CREATININE 0.8 0.7 0.8   CALCIUM 9.1 8.7 8.7   MG 1.8 1.6 1.6     CMP:   Recent Labs   Lab 03/21/22  0537 03/22/22 0316 03/23/22  0800 03/24/22  0627   * 89 104 118*   CALCIUM 8.9 9.1 8.7 8.7   ALBUMIN 2.5* 2.4* 2.3*  --    PROT 6.0 5.9* 5.9*  --     141 139 138   K 4.1 4.2 3.8 3.7   CO2 28 27 28 26    104 104 104   BUN 14 16 12 11   CREATININE 0.9 0.8 0.7 0.8   ALKPHOS 73 73 64  --    ALT 8* 8* 6*  --    AST 12 12 11  --    BILITOT 0.7 0.6 0.5  --      LFTs:   Recent Labs   Lab 03/21/22  0537 03/22/22 0316 03/23/22  0800   ALT 8* 8* 6*   AST 12 12 11   ALKPHOS 73 73 64   BILITOT 0.7 0.6 0.5   PROT 6.0 5.9* 5.9*   ALBUMIN 2.5* 2.4* 2.3*     Others:   Recent Labs   Lab 03/17/22  1703   TSH 0.147*     Lab Results   Component Value Date    HGBA1C >14.0 (H) 03/17/2022         ASSESSMENT/PLAN:     I have reviewed the medications in compliance with CMS Regulation F756 of the [place]. Based on information gathered, the following items may need to be addressed:    **According to PMH and home medication list, patient takes the following medications at home. These medications are not currently ordered at [facility]:  · Alendronate 70 mg weekly  · Atenolol (currently taking metoprolol)  · Lantus (non-formulary, currently taking Levemir)  · Omeprazole (non-formulary, currently taking famotidine)      **Patient is taking escitalopram (home med) for depression. A gradual dose reduction is not recommended at this time. Patient to follow-up with prescribing MD  as outpatient.  Monitor: mental status for depression, suicide ideation, anxiety, serotonin syndrome, hyponatremia, dizziness, drowsiness      Medications reviewed by PharmD, please re-consult if needed.      Hyacinth Marie, Pharm. D.  Clinical Pharmacist  [facility]

## 2022-03-25 NOTE — PT/OT/SLP PROGRESS
Occupational Therapy      Patient Name:  Courtney Engle   MRN:  [id number]    Patient not seen today secondary to refusal in AM 2* to being nauseated and working with PT in PM. Therapist unable to return for 3rd attempt  . Will follow-up with OT POC    3/25/2022

## 2022-03-25 NOTE — PLAN OF CARE
Recommendations  1) Continue 1500 kcal Diabetic Diet - monitor for tolerance  2) RD to provide diabetes education on diet and care during sick days at next follow up.    Goals: 1. Pt's intake meals >50% by RD follow up.  Nutrition Goal Status: progressing towards goal  Communication of RD Recs:  (POC)    Assessment and Plan  Nutrition Problem  Altered nutrition-related lab values     Related to (etiology):   DKA in setting of uncontrolled diabetes     Signs and Symptoms (as evidenced by):   Pt with A1c >14%, reports she was sick and was not eating at all, was only drinking soda and did not take her insulin.     Interventions(treatment strategy):  Collaboration of care with providers.  CHO modified diet.     Nutrition Diagnosis Status:   Continues

## 2022-03-25 NOTE — PT/OT/SLP EVAL
Speech Language Pathology  Evaluation/DC    Courtney Engle   MRN: [id number]   Admitting Diagnosis: Diabetes mellitus with ketoacidosis and lactic acidosis but without coma    Diet recommendations: Solid Diet Level: Regular  Liquid Diet Level: Thin Standard aspiration precautions    SLP Treatment Date: 03/25/22  Speech Start Time: 0819     Speech Stop Time: 0831     Speech Total (min): 12 min       TREATMENT BILLABLE MINUTES:  Eval Swallow and Oral Function 12    Diagnosis: Diabetes mellitus with ketoacidosis and lactic acidosis but without coma      Past Medical History:   Diagnosis Date    Acquired hypothyroidism 4/25/2014    Anxiety     Aortic calcification 12/8/2016    X-Ray Chest-5/08/2014    Arthritis     Bilateral carotid artery disease 12/8/2016     Carotid Bilateral-1/24/2013    CKD stage 3 due to type 2 diabetes mellitus 2/16/2017    Depression     Essential hypertension     Fever blister     GERD (gastroesophageal reflux disease)     Glaucoma 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 robledo)     Hyperlipidemia LDL goal <70 2/16/2017    Keloid cicatrix     Mixed stress and urge urinary incontinence 2/16/2017    Morbid obesity with BMI of 40.0-44.9, adult 12/8/2016    Ocular migraine 1/24/2013    Type 2 diabetes mellitus with hyperglycemia, with long-term current use of insulin      Past Surgical History:   Procedure Laterality Date    CATARACT EXTRACTION W/  INTRAOCULAR LENS IMPLANT Right 05/30/2012        CATARACT EXTRACTION W/  INTRAOCULAR LENS IMPLANT Left 05/26/2010        CHOLECYSTECTOMY      COLONOSCOPY N/A 12/20/2019    Procedure: COLONOSCOPY;  Surgeon: Higinio Gilbert MD;  Location: [address]);  Service: Endoscopy;  Laterality: N/A;  patient to call back to schedule Colonoscopy once she schedules Cardiology Clearance appt-BB  8/30/19 Low CV risk for colonospcopy per   patient requesting  but can't wait for his next  "available due to rectal bleeding    COLONOSCOPY W/ POLYPECTOMY  04/20/2015    ESOPHAGOGASTRODUODENOSCOPY  04/20/2015    HYSTERECTOMY      Partial    JOINT REPLACEMENT      knee replacement right      TONSILLECTOMY, ADENOIDECTOMY      tonsillectomy only       Has the patient been evaluated by SLP for swallowing? : Yes  Keep patient NPO?: No General Precautions: Standard, fall        HPI:  Patient is a 86 y.o. female with significant past medical history of IDDM, HTN, HLD, diastolic dysfunction (most recent echo 2018 normal), CKD 3, hypothyroidism and GERD presented to hospital complaining of altered mental status. HPI obtained from the daughter at bedside. Daughter states that for the past few weeks, patient has woken up with slurred speech that has improved as the day progressed. Last week the patient told the daughter that she "didn't feel well" but did not elaborate on specific symptoms. Earlier this week, the patient started complaining of increased thirst (for which she was requesting soda to be brought to her) and increased urination with dysuria (though patient attributed dysuria to a scratch on her vagina). She also complained of "kidney issues" though did not elaborate on specific symptoms. The daughter is unable to confirm whether or not the patient has been compliant with medications, including insulin, however she suspects that the patient has not been compliant as the patient's other daughter reported "several boxes of medication" in the patient's refrigerator (daughter at bedside does not know which medications they were). The patient had been resistant to calling her PCP or seeking medical care for the past two weeks, however was finally able to be brought to the ED today by her daughter. At present the patient is encephalopathic, but only complaint is of epigastric pain. Daughter reports episode of N/V within the past few days.      Work up in the ED revealed a leukocytosis of 15, TAMARA with " "hyperkalemia (creatinine 2.3, baseline 0.8), lactic of 3.8 and labs consistent with DKA (pH 7.1, bhb 5.3, bicarb 9 w/ glucose 800). Additionally, the patient was found to be in new onset a fib RVR (rates up to 160 in the ED). Critical Care Medicine has been consulted for DKA & A fib RVR.        Prior diet: Diabetic regular/thin      Subjective:  "I never know.. when it going to happen" Pt stated this when explaining what happens when she takes prescribed medicine. Pt reports of  regurgitation, & symptoms of acid reflux.            Objective:    Pt seen for bedside evaluation. Pt was able to tolerate sips of apple juice via cup sip and straw w/o any s/s of asp. Pt also tolerated a serving bite size of Cymro toast x 1 w/o any issues during oral transist, and sw. Pt reports occasional regurgitation of pills and food over the past 3-4 months. Pt intends to report esophageal issues to PCP on upcoming appt date 04/01. SLP encourages pt to request GI consult if symptoms persist.. At this time ST services are no longer warranted. Pt received education on role of SLP, bedside eval, diet tolerance, asp precautions, reflux precautions, recs to f/up w/ PCP or GI. Pt agreed.     Oral Musculature Evaluation  Oral Musculature: WFL  Dentition: scattered dentition  Secretion Management: adequate  Mucosal Quality: adequate  Mandibular Strength and Mobility: WFL  Oral Labial Strength and Mobility: WFL  Lingual Strength and Mobility: WFL  Velar Elevation: WFL  Buccal Strength and Mobility: WFL  Volitional Cough: WFL  Volitional Swallow: appropriate and WFL  Voice Prior to PO Intake: normal and dry     Bedside Swallow Eval:  Consistencies Assessed: Thin liquids apple juice via cup sip x1, apple juice via straw sip x 1  Solids Cymro toast bite x 1  Oral Phase: WFL  Pharyngeal Phase: no overt clinical signs/symptoms of aspiration  no overt clinical signs/symptoms of pharyngeal dysphagia    Additional Treatment:      Assessment:  Courtney CARRENO" Oniel is a 86 y.o. female with a medical diagnosis of Diabetes mellitus with ketoacidosis and lactic acidosis but without coma and presents with a functional sw for current diet.           Discharge recommendations: Discharge Facility/Level of Care Needs: other (see comments) (DC from  services)     Diet recommendations:    Liquid Diet Level: Thin     Goals:   Multidisciplinary Problems     SLP Goals     Not on file                 Plan:   Patient to be seen    Planned Interventions:    Plan of Care expires:    Plan of Care reviewed with: patient  SLP Follow-up?: No            RYLEE Zhang      03/25/2022

## 2022-03-25 NOTE — PT/OT/SLP PROGRESS
"Physical Therapy Treatment    Patient Name:  Courtney Engle   MRN:  [id number]  Admit Date: 3/23/2022  Admitting Diagnosis: Diabetes mellitus with ketoacidosis and lactic acidosis but without coma  Recent Surgeries:     General Precautions: Standard, diabetic, fall   Orthopedic Precautions:N/A   Braces:       Recommendations:     Discharge Recommendations:  home with home health   Level of Assistance Recommended at Discharge: Intermittent supervision  Discharge Equipment Recommendations: bedside commode, shower chair, walker, rolling   Barriers to discharge:      Assessment:     Courtney Engle is a 86 y.o. female admitted with a medical diagnosis of Diabetes mellitus with ketoacidosis and lactic acidosis but without coma . Pt tolerated fairly well, reports of stomach discomfort, nausea, diarrhea, nsg aware meds given, pt would continue to benefit from skilled PT services to improve overall functional mobility, strength and endurance.  .      Performance deficits affecting function:    .    Rehab Potential is good    Activity Tolerance: Fair    Plan:     Patient to be seen 6 x/week to address the above listed problems via therapeutic activities, gait training, therapeutic exercises, neuromuscular re-education, wheelchair management/training    · Plan of Care Expires: 04/23/22  · Plan of Care Reviewed with: patient    Subjective     "don't feel good my stomach" agreeable to try a little.     Pain/Comfort:  · Pain Rating 1: 7/10  · Location - Side 1: Left  · Location - Orientation 1: generalized  · Location 1: knee  · Pain Addressed 1: Cessation of Activity  · Pain Rating Post-Intervention 1:  (better after resting)    Patient's cultural, spiritual, Mu-ism conflicts given the current situation:  · no    Objective:       Patient found with  (in bathroom (pct brought her in) upon PT entry to room.     Therapeutic Activities and Exercises: 2x10 reps LAQ.hip flex    Functional Mobility:  · Transfers:     · Sit to Stand: "  stand by assistance and contact guard assistance with rolling walker and from toilet and WC and EOB vcs for hand placement  · Toilet Transfer: stand by assistance and contact guard assistance with  rolling walker  using  Stand Pivot  · Gait: amb with RW CG/close SBA ~ 50 ft and 40 ft seated rest break  · WC mobility ~ 90 ft with BUE vcs for tech SBA and 2 rest breaks    AM-PAC 6 CLICK MOBILITY  19    Patient left up in chair with call button in reach, daug present and belongings in reach.    GOALS:   Multidisciplinary Problems     Physical Therapy Goals        Problem: Physical Therapy    Goal Priority Disciplines Outcome Goal Variances Interventions   Physical Therapy Goal     PT, PT/OT Ongoing, Progressing     Description: PT goals until 4/13/22    1. Pt supine to sit with mod independent-not met  2. Pt sit to supine with mod independent-not met  3. Pt sit to stand with RW with mod independent-not met  4. Pt to perform gait 100ft with RW with supervision.-not met  5. Pt to go up/down curb step with RW with supervision.-not met  6. Pt to up/down 5 steps with B UE rail with supervision.-not met  7. Pt to perform B LE exs in sitting or supine x 15 reps to strengthen B LE to improve functional mobility.-not met  8. Pt to propel w/c 50ft with B UE on level surface with supervision-not met  9. Pt will be able to  object off the ground with the reacher with RW support with set up assist.-not met                     Time Tracking:     PT Received On: 03/25/22  PT Total Time (min):       Billable Minutes: Gait Training 15 and Therapeutic Activity 14    Treatment Type: Treatment  PT/PTA: PTA     PTA Visit Number: 1     03/25/2022

## 2022-03-25 NOTE — PLAN OF CARE
Patient states that she has been having trouble with her swallowing and eating. Has not had a swallowing study or dietician speak with her about her diabetes. Consults placed.   Problem: Adult Inpatient Plan of Care  Goal: Plan of Care Review  Outcome: Ongoing, Progressing  Flowsheets (Taken 3/25/2022 0346)  Plan of Care Reviewed With: patient  Goal: Patient-Specific Goal (Individualized)  Outcome: Ongoing, Progressing  Flowsheets (Taken 3/25/2022 0346)  Anxieties, Fears or Concerns: Wants to know how many times a day is she going to be poked  Individualized Care Needs: monitor BGL  Patient-Specific Goals (Include Timeframe): patient will be free of injury or falls during shift  Goal: Absence of Hospital-Acquired Illness or Injury  Outcome: Ongoing, Progressing  Intervention: Identify and Manage Fall Risk  Flowsheets (Taken 3/25/2022 0346)  Safety Promotion/Fall Prevention:   assistive device/personal item within reach   Fall Risk reviewed with patient/family   Fall Risk signage in place   diversional activities provided   lighting adjusted   medications reviewed   side rails raised x 2   nonskid shoes/socks when out of bed   instructed to call staff for mobility  Goal: Optimal Comfort and Wellbeing  Outcome: Ongoing, Progressing  Intervention: Provide Person-Centered Care  Flowsheets (Taken 3/25/2022 0346)  Trust Relationship/Rapport:   care explained   questions answered   choices provided   questions encouraged   emotional support provided   reassurance provided   empathic listening provided   thoughts/feelings acknowledged     Problem: Diabetes Comorbidity  Goal: Blood Glucose Level Within Targeted Range  Outcome: Ongoing, Progressing  Intervention: Monitor and Manage Glycemia  Flowsheets (Taken 3/25/2022 0346)  Glycemic Management: blood glucose monitored     Problem: Fluid and Electrolyte Imbalance (Acute Kidney Injury/Impairment)  Goal: Fluid and Electrolyte Balance  Outcome: Ongoing,  Progressing  Intervention: Monitor and Manage Fluid and Electrolyte Balance  Flowsheets (Taken 3/25/2022 0346)  Fluid/Electrolyte Management: fluids provided     Problem: Fall Injury Risk  Goal: Absence of Fall and Fall-Related Injury  Outcome: Ongoing, Progressing  Intervention: Identify and Manage Contributors  Flowsheets (Taken 3/25/2022 0346)  Self-Care Promotion:   independence encouraged   meal set-up provided   BADL personal objects within reach   safe use of adaptive equipment encouraged   BADL personal routines maintained  Medication Review/Management: medications reviewed

## 2022-03-25 NOTE — CONSULTS
[facility] - Skilled Nursing  Adult Nutrition  Consult Note    SUMMARY     Recommendations  1) Continue 1500 kcal Diabetic Diet - monitor for tolerance  2) RD to provide diabetes education on diet and care during sick days at next follow up.    Goals: 1. Pt's intake meals >50% by RD follow up.  Nutrition Goal Status: progressing towards goal  Communication of RD Recs:  (POC)    Assessment and Plan  Nutrition Problem  Altered nutrition-related lab values     Related to (etiology):   DKA in setting of uncontrolled diabetes     Signs and Symptoms (as evidenced by):   Pt with A1c >14%, reports she was sick and was not eating at all, was only drinking soda and did not take her insulin.     Interventions(treatment strategy):  Collaboration of care with providers.  CHO modified diet.     Nutrition Diagnosis Status:   Continues    Malnutrition Assessment (3/24)   Weight Loss (Malnutrition):  (7.2% x 6 months, not significant)   Orbital Region (Subcutaneous Fat Loss): well nourished  Upper Arm Region (Subcutaneous Fat Loss): well nourished  Thoracic and Lumbar Region: well nourished   Zoroastrian Region (Muscle Loss): well nourished  Clavicle Bone Region (Muscle Loss): well nourished  Clavicle and Acromion Bone Region (Muscle Loss): well nourished  Scapular Bone Region (Muscle Loss): well nourished  Dorsal Hand (Muscle Loss): well nourished  Patellar Region (Muscle Loss): well nourished  Anterior Thigh Region (Muscle Loss): well nourished  Posterior Calf Region (Muscle Loss): well nourished   Edema (Fluid Accumulation): 0-->no edema present  Reason for Assessment  Reason For Assessment: consult  Diagnosis: diabetes diagnosis/complications (diabetic acidosis without coma)  Relevant Medical History: IDDM, HTN, HLD, CKD3, hypothyroidism, GERD  Interdisciplinary Rounds: did not attend  General Information Comments: Remote assessment for coverage, consult for pt having trouble swallowing. Seen by ST this morning for swallowing eval  "- cleared for regular diet. Spoke with pt on room phone, reports food suddenly "popping out of mouth" endorses acid reflux like symptoms. Reports this happened "years ago" and had to see GI doctor, does not remember if she took medication for it. Encouraged pt to sit up straight and chew food well prior to swallowing, avoid foods that cause acid reflux (acidic, caffiene, etc). Endorses nausea today and diarrhea yesterday following stool softener. RD to monitor and follow up  Nutrition Discharge Planning: carbohydrate consistent, low sodium diet    Nutrition Risk Screen  Nutrition Risk Screen: dysphagia or difficulty swallowing    Nutrition/Diet History  Patient Reported Diet/Restrictions/Preferences: general  Spiritual, Cultural Beliefs, Sabianism Practices, Values that Affect Care: no    Anthropometrics  Temp: 97.9 °F (36.6 °C)  Height Method: Stated  Height: 4' 10" (147.3 cm)  Height (inches): 58 in  Weight Method: Bed Scale  Weight: 81.8 kg (180 lb 5.4 oz)  Weight (lb): 180.34 lb  Ideal Body Weight (IBW), Female: 90 lb  % Ideal Body Weight, Female (lb): 200.38 %  BMI (Calculated): 37.7     Lab/Procedures/Meds  Pertinent Labs Reviewed: reviewed  Pertinent Labs Comments: POC -304, Glu 118, Alb 2.3  Pertinent Medications Reviewed: reviewed  Pertinent Medications Comments: famotidine, insulin, levothyroxine, lisinopril, magnesium oxide, senna-docusate    Estimated/Assessed Needs  Weight Used For Calorie Calculations: 81.8 kg (180 lb 5.4 oz)  Energy Calorie Requirements (kcal): 1274 kcal  Energy Need Method: [place] (PAL 1.10)  Protein Requirements: 66-82g  Weight Used For Protein Calculations: 81.8 kg (180 lb 5.4 oz)   RDA Method (mL): 1274  CHO Requirement: 159g    Nutrition Prescription Ordered  Current Diet Order: Diabetic - 1500 kcal    Evaluation of Received Nutrient/Fluid Intake  Energy Calories Required: meeting needs  Protein Required: meeting needs  Fluid Required: meeting needs  Comments: LBM " 3/24  Tolerance: tolerating  % Intake of Estimated Energy Needs: 50 - 75 %  % Meal Intake: 50 - 75 %    Nutrition Risk  Level of Risk/Frequency of Follow-up:  (1xweekly)       Monitor and Evaluation  Food and Nutrient Intake: energy intake, food and beverage intake  Food and Nutrient Adminstration: diet order  Knowledge/Beliefs/Attitudes: food and nutrition knowledge/skill  Anthropometric Measurements: weight, weight change  Biochemical Data, Medical Tests and Procedures: electrolyte and renal panel, gastrointestinal profile, glucose/endocrine profile, inflammatory profile, lipid profile  Nutrition-Focused Physical Findings: overall appearance, extremities, muscles and bones, head and eyes, skin     Nutrition Follow-Up  RD Follow-up?: Yes

## 2022-03-26 NOTE — PLAN OF CARE
Problem: Occupational Therapy  Goal: Occupational Therapy Goal  Description: Goals to be met by: 4/07/2022    Patient will increase functional independence with ADLs by performing:    UE Dressing with [place].  LE Dressing with Modified [place].  Grooming while standing at sink with Modified [place].  Toileting from toilet with Modified [place] for hygiene and clothing management.   Bathing from shower on shower chair/bench with Supervision Assistance.  Step transfer with Modified [place] using AD.   Toilet transfer to toilet with Modified [place].  Upper extremity exercise program per handout, with supervision.  Pt will complete functional reaching task in standing x10 minutes with Supervision assistance for improved safety and independence in standing ADLs/IADLS.   Caregiver will be educated on level of assist required to safely perform self care tasks and functional transfers.    Outcome: Ongoing, Progressing

## 2022-03-26 NOTE — NURSING
Patient is refusing to be NPO orders and insisting on eating her breakfast. Risk of aspiration explained to patient, patient continued to insist on eating. Breakfast given to patient with nectar thick liquids. NP notified.

## 2022-03-26 NOTE — PLAN OF CARE
-Patient has complained of swallowing issues since admit 3/23. Patient choked on medication  during evening medication pass. Was attempting to take them with water, was sitting upright in bed.   MD notified after patient continued to cough, lung R side lung sounds wheezy.  Orders placed.  Patient refused suction, continued to cough even after breathing treatment. Will continue to monitor    Problem: Adult Inpatient Plan of Care  Goal: Plan of Care Review  Outcome: Ongoing, Progressing  Flowsheets (Taken 3/26/2022 0238)  Plan of Care Reviewed With: patient  Goal: Patient-Specific Goal (Individualized)  Outcome: Ongoing, Progressing  Goal: Optimal Comfort and Wellbeing  Outcome: Ongoing, Progressing  Intervention: Provide Person-Centered Care  Flowsheets (Taken 3/26/2022 0238)  Trust Relationship/Rapport:   care explained   questions answered   choices provided   questions encouraged   emotional support provided   reassurance provided   empathic listening provided   thoughts/feelings acknowledged     Problem: Diabetes Comorbidity  Goal: Blood Glucose Level Within Targeted Range  Outcome: Ongoing, Progressing  Intervention: Monitor and Manage Glycemia  Flowsheets (Taken 3/26/2022 0238)  Glycemic Management: blood glucose monitored     Problem: Fluid and Electrolyte Imbalance (Acute Kidney Injury/Impairment)  Goal: Fluid and Electrolyte Balance  Outcome: Ongoing, Progressing  Intervention: Monitor and Manage Fluid and Electrolyte Balance  Flowsheets (Taken 3/26/2022 0238)  Fluid/Electrolyte Management: fluids adjusted     Problem: Renal Function Impairment (Acute Kidney Injury/Impairment)  Goal: Effective Renal Function  Outcome: Ongoing, Progressing  Intervention: Monitor and Support Renal Function  Flowsheets (Taken 3/26/2022 0238)  Medication Review/Management: medications reviewed     Problem: Fall Injury Risk  Goal: Absence of Fall and Fall-Related Injury  Outcome: Ongoing, Progressing  Intervention: Identify and  Manage Contributors  Flowsheets (Taken 3/26/2022 1398)  Self-Care Promotion:   independence encouraged   BADL personal objects within reach   BADL personal routines maintained   safe use of adaptive equipment encouraged  Medication Review/Management: medications reviewed

## 2022-03-26 NOTE — PT/OT/SLP PROGRESS
"Occupational Therapy   Treatment    Name: Courtney Engle  MRN: [id number]  Admit Date: 3/23/2022  Admitting Diagnosis:  Diabetes mellitus with ketoacidosis and lactic acidosis but without coma    General Precautions: Standard, diabetic, fall   Orthopedic Precautions:N/A   Braces:       Recommendations:     Discharge Recommendations: home with home health  Level of Assistance Recommended at Discharge: Intermittent assistance for ADL's and homemaking tasks  Discharge Equipment Recommendations:  bedside commode, shower chair, walker, rolling  Barriers to discharge:  Decreased caregiver support    Assessment:     Courtney Engle is a 86 y.o. female with a medical diagnosis of Diabetes mellitus with ketoacidosis and lactic acidosis but without coma  Performance deficits affecting function are weakness, impaired endurance, impaired self care skills, impaired functional mobilty, gait instability, impaired balance, decreased lower extremity function, decreased safety awareness, pain, impaired cardiopulmonary response to activity, and decreased upper extremity function which affect safety and performance in self care, functional transfers, and functional mobility. Pt. participated well with session on this day. Pt is progressing well with session on this day still continues to requires cues with aspects of safety . Pt. Will continue to benefit from continued OT to progress towards goals    Rehab Potential is good    Activity tolerance:  Good    Plan:     Patient to be seen 6 x/week to address the above listed problems via self-care/home management, therapeutic activities, therapeutic exercises    · Plan of Care Expires: 04/24/22  · Plan of Care Reviewed with: patient    Subjective     Communicated with: naseem lopez prior to session. "I had a rough night lastnight"    Pain/Comfort:  Pain Rating 1: 0/10  Pain Rating Post-Intervention 1: 0/10    Patient's cultural, spiritual, Mormon conflicts given the current " situation:  no    Objective:     Patient found HOB elevated upon OT entry to room.    Bed Mobility:    · Patient completed Rolling/Turning to Left with  modified independence  · Patient completed Scooting/Bridging with modified independence  · Patient completed Supine to Sit with modified independence     Functional Mobility/Transfers:  · Patient completed Sit <> Stand Transfer with contact guard assistance  with  rolling walker   · Patient completed Bed <> Chair Transfer using Step Transfer technique with contact guard assistance with rolling walker  · Functional Mobility: Pt. With fxl mobility throughout room and bathroom with RW and CGA/SBA. No LOB noted    Activities of Daily Living:  · Grooming: supervision with grooming aspects while seated  · Bathing: stand by assistance with all bathing aspects while seated at sink level  · Upper Body Dressing: supervision to doff/romi pullover shirt  · Lower Body Dressing: stand by assistance to doff/romi pants over hips instance and BLE socks    AMPAC 6 Click ADL: 21    Treatment & Education:  Pt. With standing and therex performed to increase ROM, endurance selfcare task and fxl mobility for independence     Patient left up in chair with all lines intact and call button in reachEducation:      GOALS:   Multidisciplinary Problems     Occupational Therapy Goals        Problem: Occupational Therapy    Goal Priority Disciplines Outcome Interventions   Occupational Therapy Goal     OT, PT/OT Ongoing, Progressing    Description: Goals to be met by: 4/07/2022    Patient will increase functional independence with ADLs by performing:    UE Dressing with [place].  LE Dressing with Modified [place].  Grooming while standing at sink with Modified [place].  Toileting from toilet with Modified [place] for hygiene and clothing management.   Bathing from shower on shower chair/bench with Supervision Assistance.  Step transfer with Modified [place] using AD.    Toilet transfer to toilet with Modified [place].  Upper extremity exercise program per handout, with supervision.  Pt will complete functional reaching task in standing x10 minutes with Supervision assistance for improved safety and independence in standing ADLs/IADLS.   Caregiver will be educated on level of assist required to safely perform self care tasks and functional transfers.                     Time Tracking:     OT Date of Treatment: OT Date of Treatment: 03/26/22  OT Total Time (min): Total Time (min): 43 min    Billable Minutes:Self Care/Home Management 43    3/26/2022   OT and HUGGINS have discussed the above patients goals and status in collaboration with Plan of Care.

## 2022-03-27 PROBLEM — G93.41 ACUTE METABOLIC ENCEPHALOPATHY: Status: RESOLVED | Noted: 2022-01-01 | Resolved: 2022-01-01

## 2022-03-27 PROBLEM — I48.0 PAROXYSMAL ATRIAL FIBRILLATION: Status: ACTIVE | Noted: 2022-01-01

## 2022-03-27 NOTE — PT/OT/SLP PROGRESS
"Physical Therapy Treatment    Patient Name:  Courtney Engle   MRN:  [id number]  Admit Date: 3/23/2022  Admitting Diagnosis: Diabetes mellitus with ketoacidosis and lactic acidosis but without coma  Recent Surgeries:     General Precautions: Standard, fall   Orthopedic Precautions:N/A   Braces: N/A     Recommendations:     Discharge Recommendations:  home with home health   Level of Assistance Recommended at Discharge: Intermittent supervision  Discharge Equipment Recommendations: bedside commode, shower chair, walker, rolling   Barriers to discharge:      Assessment:     Courtney Engle is a 86 y.o. female admitted with a medical diagnosis of Diabetes mellitus with ketoacidosis and lactic acidosis but without coma . . Patient had a little difficulty due to pain on the L knee with weight bearing. No shortness of breath or loss of balance noted during treatment.      Performance deficits affecting function:  weakness, impaired endurance, impaired self care skills, impaired functional mobilty, gait instability, impaired balance, pain, decreased ROM .    Rehab Potential is good    Activity Tolerance: Good    Plan:     Patient to be seen 6 x/week to address the above listed problems via gait training, therapeutic activities, therapeutic exercises, neuromuscular re-education, wheelchair management/training    · Plan of Care Expires: 04/23/22  · Plan of Care Reviewed with: patient    Subjective     Patient states" The only problem I have is my left knee".     Pain/Comfort:  · Pain Rating 1: 0/10  · Location - Side 1: Left  · Location - Orientation 1: generalized  · Location 1: knee  · Pain Addressed 1: Reposition, Cessation of Activity  · Pain Rating Post-Intervention 1: 5/10    Patient's cultural, spiritual, Restorationist conflicts given the current situation:  · no    Objective:     Communicated with NSG prior to session.  Patient found supine with   upon PT entry to room.     Therapeutic Activities and Exercises: LE " Ergometer x 12 minutes.     Functional Mobility:  · Bed Mobility:     · Rolling Left:  stand by assistance  · Supine to Sit: stand by assistance  · Transfers:     · Sit to Stand:  contact guard assistance with rolling walker  · Bed to Chair: contact guard assistance with  rolling walker  using  Stand Pivot  · Gait: 40 ft and 44 ft with RW and CGA.  · Wheelchair Propulsion:  Pt propelled Standard wheelchair x 51 feet on Level tile with  Right upper extremity and Left upper extremity with Stand-by Assistance.     AM-PAC 6 CLICK MOBILITY  20    Patient left up in chair with call button in reach.    GOALS:   Multidisciplinary Problems     Physical Therapy Goals        Problem: Physical Therapy    Goal Priority Disciplines Outcome Goal Variances Interventions   Physical Therapy Goal     PT, PT/OT Ongoing, Progressing     Description: PT goals until 4/13/22    1. Pt supine to sit with mod independent-not met  2. Pt sit to supine with mod independent-not met  3. Pt sit to stand with RW with mod independent-not met  4. Pt to perform gait 100ft with RW with supervision.-not met  5. Pt to go up/down curb step with RW with supervision.-not met  6. Pt to up/down 5 steps with B UE rail with supervision.-not met  7. Pt to perform B LE exs in sitting or supine x 15 reps to strengthen B LE to improve functional mobility.-not met  8. Pt to propel w/c 50ft with B UE on level surface with supervision-not met  9. Pt will be able to  object off the ground with the reacher with RW support with set up assist.-not met                     Time Tracking:     PT Received On: 03/27/22  PT Total Time (min):       Billable Minutes: Gait Training 11, Therapeutic Activity 15 and Therapeutic Exercise 15    Treatment Type: Treatment  PT/PTA: PTA     PTA Visit Number: 2 03/27/2022

## 2022-03-27 NOTE — H&P
"Hospital Medicine  Skilled Nursing Facility   History and Physical Exam      Date of Service: 03/27/2022      Patient Name: Courtney Engle  MRN: [id number]  Admission Date: 3/23/2022  Attending Physician: Jose Manuel Wu MD  Primary Care Provider: Vishal Ulloa MD  Code Status: Full Code      Principal problem:  Diabetes mellitus with ketoacidosis and lactic acidosis but without coma      Chief Complaint:   Chief Complaint   Patient presents with    Altered Mental Status     Admitted to [facility] for rehabilitation following hospital stay for DKA, encephalopathy and atrial fibrillation.           HPI:   "Mrs. Engle is a 86 year old female with PMHx of IDDM, HTN, HLD, diastolic dysfunction (most recent echo 2018 normal), CKD 3, hypothyroidism and GERD who presents to SNF following hospitalization for DKA, encephalopathy, new AFib.  Admission to SNF for secondary weakness debility.      Patient originally presented to [facility] ED on 03/17 complaining of altered mental status. "HPI obtained from the daughter at bedside. Daughter states that for the past few weeks, patient has woken up with slurred speech that has improved as the day progressed. Last week the patient told the daughter that she "didn't feel well" but did not elaborate on specific symptoms. Earlier this week, the patient started complaining of increased thirst (for which she was requesting soda to be brought to her) and increased urination with dysuria (though patient attributed dysuria to a scratch on her vagina). She also complained of "kidney issues" though did not elaborate on specific symptoms. The daughter is unable to confirm whether or not the patient has been compliant with medications, including insulin, however she suspects that the patient has not been compliant as the patient's other daughter reported "several boxes of medication" in the patient's refrigerator (daughter at bedside does not know which medications they were). The patient had " "been resistant to calling her PCP or seeking medical care for the past two weeks, however was finally able to be brought to the ED today by her daughter. At present the patient is encephalopathic, but only complaint is of epigastric pain. Daughter reports episode of N/V within the past few days. Work up in the ED revealed a leukocytosis of 15, TAMARA with hyperkalemia (creatinine 2.3, baseline 0.8), lactic of 3.8 and labs consistent with DKA (pH 7.1, bhb 5.3, bicarb 9 w/ glucose 800). Additionally, the patient was found to be in new onset a fib RVR (rates up to 160 in the ED). Critical Care Medicine has been consulted for DKA & A fib RVR.  Patient admitted to [facility] evening of 3/17 for DKA and new onset a fib RVR. Started on insulin per DKA protocol. Better rate control achieved with initiation of metoprolol. Encephalopathy improved; gap closed. Pt transitioned to SQ insulin.    Pt stepped down to medicine 3/18.  Glycemic control improved.  Metoprolol also up titrated with better rate controlled.  Echo unremarkable.  Risks and benefits of anticoagulation in the setting of atrial fibrillation to prevent thromboembolism discussed with patient and daughter.  Patient would like to defer anticoagulation.  TAMARA improved to baseline.  Lisinopril started.  Per PT OT evaluation patient to benefit from SNF."     Today, patient is doing well.  She reports her shortness of breath is at her baseline.  She endorses difficulty sleeping last night." per Marilynn Alanis NP on 3/24/22.     She is feeling better today. She had mild aspiration event with pills crushed in pudding yesterday and coughed for 3 hours. Has not had any coughing since then. Denies any shortness of breath and has not had any further episodes of possible aspiration. CXR shows new LLL opacity which could be due to aspiration. She is not febrile and has not had increased work of breathing or hypoxia. Held metoprolol last night because of low blood pressure, but improved " this AM and was given. Was given glucose tablets last night for low blood sugar. She is eating well.     Patient admitted with skilled services with PT and OT to improve functional status and ability to perform ADLs.       Past Medical History:   Past Medical History:   Diagnosis Date    Acquired hypothyroidism 4/25/2014    Anxiety     Aortic calcification 12/8/2016    X-Ray Chest-5/08/2014    Arthritis     Bilateral carotid artery disease 12/8/2016    US Carotid Bilateral-1/24/2013    CKD stage 3 due to type 2 diabetes mellitus 2/16/2017    Depression     Essential hypertension     Fever blister     GERD (gastroesophageal reflux disease)     Glaucoma 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 robledo)     Hyperlipidemia LDL goal <70 2/16/2017    Keloid cicatrix     Mixed stress and urge urinary incontinence 2/16/2017    Morbid obesity with BMI of 40.0-44.9, adult 12/8/2016    Ocular migraine 1/24/2013    Type 2 diabetes mellitus with hyperglycemia, with long-term current use of insulin        Past Surgical History:   Past Surgical History:   Procedure Laterality Date    CATARACT EXTRACTION W/  INTRAOCULAR LENS IMPLANT Right 05/30/2012        CATARACT EXTRACTION W/  INTRAOCULAR LENS IMPLANT Left 05/26/2010        CHOLECYSTECTOMY      COLONOSCOPY N/A 12/20/2019    Procedure: COLONOSCOPY;  Surgeon: Higinio Gilbert MD;  Location: [facility] ([address] [place] [address]);  Service: Endoscopy;  Laterality: N/A;  patient to call back to schedule Colonoscopy once she schedules Cardiology Clearance appt-BB  8/30/19 Low CV risk for colonospcopy per   patient requesting  but can't wait for his next available due to rectal bleeding    COLONOSCOPY W/ POLYPECTOMY  04/20/2015    ESOPHAGOGASTRODUODENOSCOPY  04/20/2015    HYSTERECTOMY      Partial    JOINT REPLACEMENT      knee replacement right      TONSILLECTOMY, ADENOIDECTOMY      tonsillectomy only       Social History:  "  Tobacco Use    Smoking status: Never Smoker    Smokeless tobacco: Never Used   Substance and Sexual Activity    Alcohol use: No    Drug use: No    Sexual activity: Never       Family History:   Family History     Problem Relation (Age of Onset)    Cancer Mother    Colon cancer Mother    Glaucoma Father    Heart attack Father (93), Maternal Grandmother    Heart disease Mother, Maternal Grandmother    Hematuria Brother    Hypertension Maternal Grandmother    Nephrolithiasis Father    No Known Problems Daughter    Stroke Father    Uterine cancer Daughter          No current facility-administered medications on file prior to encounter.     Current Outpatient Medications on File Prior to Encounter   Medication Sig    ACCU-CHEK SMARTVIEW TEST STRIP Strp TEST BLOOD GLUCOSE LEVELS FOUR TIMES DAILY    acetaminophen (TYLENOL) 500 MG tablet Take 1 tablet (500 mg total) by mouth every 6 (six) hours as needed for Pain.    alcohol swabs (BD ALCOHOL SWABS) PadM Apply 1 each topically as needed.    alendronate (FOSAMAX) 70 MG tablet Take 1 tablet (70 mg total) by mouth every 7 days.    BD INSULIN SYRINGE ULTRA-FINE 0.5 mL 31 gauge x 5/16" Syrg USE AS DIRECTED 4 TIMES DAILY.    blood glucose control, low Soln Test 4 times daily    erythromycin (ROMYCIN) ophthalmic ointment Place into both eyes every evening. Apply to itchy eye lid at night as needed    EScitalopram oxalate (LEXAPRO) 10 MG tablet Take 1 tablet (10 mg total) by mouth once daily.    famotidine (PEPCID) 40 MG tablet TAKE 1 TABLET(40 MG) BY MOUTH EVERY DAY    fluticasone propionate (FLONASE) 50 mcg/actuation nasal spray SHAKE WELL AND INSTILL 2 SPRAYS IN EACH NOSTRIL EVERY DAY    insulin aspart U-100 (NOVOLOG) 100 unit/mL (3 mL) InPn pen Inject 1-10 Units into the skin before meals and at bedtime as needed (Hyperglycemia). **MODERATE CORRECTION DOSE**  Blood Glucose  mg/dL                  Pre-meal                2200  151-200                2 units     " "               1 unit  201-250                4 units                    2 units    251-300                6 units                    3 units    301-350                8 units                    4 units   >350                     10 units                  5 units  Administer subcutaneously if needed at times designated by monitoring schedule.   DO NOT HOLD correction dose insulin for patients who are  NPO. "HIGH ALERT MEDICATION" - Administer with meals or TF/TPN.    insulin glargine (LANTUS U-100 INSULIN) 100 unit/mL injection Inject 12 Units into the skin 2 (two) times daily.    lancets Misc To check BG 4 times daily, to use with insurance preferred meter    levothyroxine (SYNTHROID) 75 MCG tablet TAKE 1 TABLET(75 MCG) BY MOUTH EVERY DAY    lisinopriL (PRINIVIL,ZESTRIL) 20 MG tablet Take 0.5 tablets (10 mg total) by mouth once daily.    metoprolol tartrate 75 mg Tab Take 150 mg by mouth 2 (two) times daily.    mupirocin (BACTROBAN) 2 % ointment by Nasal route 2 (two) times daily. for 5 days    aspirin (ECOTRIN) 81 MG EC tablet Take 1 tablet (81 mg total) by mouth once daily.    blood-glucose meter kit To check BG 4 times daily, to use with insurance preferred meter    [DISCONTINUED] atenoloL (TENORMIN) 25 MG tablet TAKE 1 TABLET(25 MG) BY MOUTH EVERY DAY    [DISCONTINUED] diclofenac sodium (VOLTAREN) 1 % Gel Apply 2 g topically once daily.    [DISCONTINUED] furosemide (LASIX) 20 MG tablet Take 1 tablet (20 mg total) by mouth 2 (two) times daily.    [DISCONTINUED] gabapentin (NEURONTIN) 100 MG capsule TAKE 1 CAPSULE(100 MG) BY MOUTH TWICE DAILY    [DISCONTINUED] nystatin (NYSTOP) powder Apply to affected area three times a day    [DISCONTINUED] omeprazole (PRILOSEC) 40 MG capsule TAKE 1 CAPSULE(40 MG) BY MOUTH TWICE DAILY BEFORE MEALS    [DISCONTINUED] triamcinolone acetonide 0.1% (KENALOG) 0.1 % cream AAA bid on rash       Allergies:   Review of patient's allergies indicates:   Allergen Reactions "    Contac 12 hour allergy Anaphylaxis and Hives    Diphenhydramine hcl Shortness Of Breath     Other reaction(s): Shortness of breath    Ciprofloxacin (bulk) Nausea And Vomiting    (d)-limonene flavor Hives     Other reaction(s): Shortness of breath    Anti-hyst Other (See Comments)    Antihistamines - alkylamine Hives    Ciprocinonide      Other reaction(s): Stomach upset    Codeine      bad reaction    Contact metal agent      Other reaction(s): Hives    Glipizide Hives    Hydroxyzine      Unknown    Iodinated contrast media Other (See Comments)    Nitrofuran analogues     Penicillin      Other reaction(s): Rash    Propoxyphene Itching    Statins-hmg-coa reductase inhibitors      Nausea to all statins    Doxycycline Rash    Penicillins Rash    Sulfa (sulfonamide antibiotics) Rash    Sulfacetamide sodium-urea Rash       ROS:  Review of Systems   Constitutional: Negative for appetite change, chills and fever.   HENT: Negative for congestion and sore throat.    Eyes: Negative for redness and visual disturbance.   Respiratory: Positive for shortness of breath (with exertion occasionally). Negative for cough.    Cardiovascular: Positive for palpitations. Negative for chest pain and leg swelling.   Gastrointestinal: Negative for abdominal pain, nausea and vomiting.   Genitourinary: Negative for difficulty urinating and dysuria.   Musculoskeletal: Negative for arthralgias and back pain.   Skin: Negative for pallor and rash.   Allergic/Immunologic: Negative for environmental allergies and food allergies.   Neurological: Positive for weakness. Negative for dizziness and light-headedness.   Hematological: Does not bruise/bleed easily.   Psychiatric/Behavioral: Negative for sleep disturbance. The patient is not nervous/anxious.          Objective:  Temp:  [98.2 °F (36.8 °C)]   Pulse:  [78]   Resp:  [16]   BP: (103)/(60)   SpO2:  [96 %]     Body mass index is 37.69 kg/m².      Physical Exam  Vitals and  nursing note reviewed.   Constitutional:       General: She is not in acute distress.     Appearance: She is well-developed.   HENT:      Head: Normocephalic and atraumatic.      Right Ear: External ear normal.      Left Ear: External ear normal.      Nose: No nasal deformity or mucosal edema.      Mouth/Throat:      Mouth: Mucous membranes are moist.      Pharynx: Uvula midline. No oropharyngeal exudate.   Eyes:      General: No scleral icterus.     Conjunctiva/sclera: Conjunctivae normal.      Pupils: Pupils are equal, round, and reactive to light.   Neck:      Trachea: Phonation normal.   Cardiovascular:      Rate and Rhythm: Normal rate and regular rhythm.      Heart sounds: S1 normal and S2 normal. No murmur heard.  Pulmonary:      Effort: Pulmonary effort is normal. No respiratory distress.      Breath sounds: Normal breath sounds. No wheezing or rales.   Chest:   Breasts:      Right: No supraclavicular adenopathy.      Left: No supraclavicular adenopathy.       Abdominal:      General: Bowel sounds are normal. There is no distension.      Palpations: Abdomen is soft.      Tenderness: There is no abdominal tenderness. There is no guarding or rebound.   Musculoskeletal:         General: No tenderness.      Cervical back: Neck supple. No muscular tenderness.      Right lower leg: No edema.      Left lower leg: No edema.   Lymphadenopathy:      Cervical:      Right cervical: No superficial cervical adenopathy.     Left cervical: No superficial cervical adenopathy.      Upper Body:      Right upper body: No supraclavicular adenopathy.      Left upper body: No supraclavicular adenopathy.   Skin:     General: Skin is warm and dry.      Findings: No bruising or rash.   Neurological:      Mental Status: She is alert and oriented to person, place, and time.      Motor: No tremor or seizure activity.   Psychiatric:         Mood and Affect: Mood normal.         Behavior: Behavior normal. Behavior is cooperative.          Thought Content: Thought content normal.         Significant Labs:   A1C:   Recent Labs   Lab 03/17/22  1736   HGBA1C >14.0*     POCT Glucose:   Recent Labs   Lab 03/26/22  2041 03/26/22  2153 03/27/22  0642   POCTGLUCOSE 89 160* 135*     TSH:   Recent Labs   Lab 03/17/22  1703   TSH 0.147*     Lab Results   Component Value Date    WBC 10.64 03/24/2022    HGB 11.5 (L) 03/24/2022    HCT 37.0 03/24/2022    MCV 92 03/24/2022     03/24/2022       BMP  Lab Results   Component Value Date     03/24/2022    K 3.7 03/24/2022     03/24/2022    CO2 26 03/24/2022    BUN 11 03/24/2022    CREATININE 0.8 03/24/2022    CALCIUM 8.7 03/24/2022    ANIONGAP 8 03/24/2022    ESTGFRAFRICA >60.0 03/24/2022    EGFRNONAA >60.0 03/24/2022         Significant Imaging: I have reviewed all pertinent imaging results/findings completed during prior hospitalization.      Assessment and Plan:  Active Diagnoses:    Diagnosis Date Noted POA    PRINCIPAL PROBLEM:  Diabetes mellitus with ketoacidosis and lactic acidosis but without coma [E11.10] 03/17/2022 Yes    Acute renal failure superimposed on stage 3 chronic kidney disease [N17.9, N18.30] 03/18/2022 Yes    Paroxysmal atrial fibrillation [I48.0] 03/18/2022 Yes    Pure hypercholesterolemia [E78.00] 01/29/2018 Yes    GERD (gastroesophageal reflux disease) [K21.9] 08/25/2017 Yes    CKD stage 3 due to type 2 diabetes mellitus [E11.22, N18.30] 02/16/2017 Yes     Chronic    Anxiety [F41.9] 12/08/2016 Yes    Depression [F32.A] 12/08/2016 Yes    Acquired hypothyroidism [E03.9] 04/25/2014 Yes     Chronic    Type 2 diabetes mellitus without complication, with long-term current use of insulin [E11.9, Z79.4]  Not Applicable     Chronic    Essential hypertension [I10]  Yes     Chronic      Problems Resolved During this Admission:       Type 2 diabetes mellitus without complication, with long-term current use of insulin  Diabetes mellitus with ketoacidosis and lactic acidosis but  without coma  - last A1C >14 on 3/17/22  - suspect secondary to medication non-compliance and dietary indiscretion  - diabetic diet, Accu checks AC/HS  - home regimen with aspart 15 units TIDWM & lantus 36 units QHS  - Decrease insulin detemir to 8 units BID. Hold insulin aspart with meals for now. Continue low dose SSI PRN. Increase calories on diet to 2000 kcal.      Acute renal failure superimposed on stage 3 chronic kidney disease  - sCr basline is 0.8, creatinine on admission 2.3  - suspected to be pre-renal in setting of DKA  - acute phase resolved with fluids  - creatinine back to baseline  - continue monitor with twice weekly BMPs  - avoid nephrotoxic agents  - renally dose medications when appropriate     Atrial fibrillation with RVR  - new onset, suspect due to acute illness with DKA  - recent thyroid studies show normal T4  - 2D echo unremarkable as below  - continue metoprolol for rate control; titrate up as clinically indicated  - Pt and daughter deferred anticoagulation therapy at this time  - follow-up with cardiology during SNF stay and after discharge from SNF     Diastolic dysfunction  - most recent echo 1/2018 showed LVEF 60-65%, normal LV diastolic function and normal RV function  - Echo  · The estimated ejection fraction is 65%.  · The left ventricle is normal in size with normal systolic function.  · Normal left ventricular diastolic function.  · Normal right ventricular size with normal right ventricular systolic function.  · The estimated PA systolic pressure is 23 mmHg.  · Normal central venous pressure (3 mmHg).      GERD (gastroesophageal reflux disease)  - continue home pepcid 40 mg daily     Depression, moderate, recurrent  Generalized anxiety disorder  - Continue home Lexapro 10 mg daily     Acquired hypothyroidism  - TSH low, however free T4 WNL  - continue home levothyroxine 75 mcg daily     Essential hypertension  - holding home atenolol in favor of metoprolol in setting of a fib  RVR  - continue lisinopril 10 mg daily     Insomnia  - Continue melatonin 9 mg qHS     Obesity  - BMI 37  - RD following, appreciate recommendations     Debility   - Continue with PT/OT for gait training and strengthening and restoration of ADL's   - Encourage mobility, OOB in chair, and early ambulation as appropriate  - Fall precautions   - Monitor for bowel and bladder dysfunction  - Monitor for and prevent skin breakdown and pressure ulcers  - Continue DVT prophylaxis with  Lovenox 40 mg daily       Anticipated Disposition:  Home with home health      Future Appointments   Date Time Provider Department Center   3/30/2022  1:40 PM Lois Almonte MD [facility] CARDIO [facility]   4/1/2022  9:40 AM Vishal Ulloa MD [facility] IM [facility] PCW   4/1/2022  3:00 PM Darrin Montero MD [facility] ENDODIA [facility]       I certify that SNF services are required to be given on an inpatient basis because Courtney Engle needs for skilled nursing care and/or skilled rehabilitation are required on a daily basis and such services can only practically be provided in a skilled nursing facility setting and are for an ongoing condition for which she received inpatient care in the hospital.       Jose Manuel Wu MD  Department of Hospital Medicine   [facility] - Skilled Nursing

## 2022-03-27 NOTE — NURSING
Pt with BS of 89 given glucose tabs as ordered and rechecked in 30 mins with .  On call MD notified and order to give half dosage of hs insulin coverage noted.  Pt's bp 103/60 and order to hold medication noted.  Will cont to monitor.

## 2022-03-28 NOTE — PT/OT/SLP PROGRESS
"Physical Therapy Treatment    Patient Name:  Courtney Engle   MRN:  [id number]  Admit Date: 3/23/2022  Admitting Diagnosis: Diabetes mellitus with ketoacidosis and lactic acidosis but without coma  Recent Surgeries:     General Precautions: Standard, fall   Orthopedic Precautions:N/A   Braces:       Recommendations:     Discharge Recommendations:  home with home health   Level of Assistance Recommended at Discharge: Intermittent supervision  Discharge Equipment Recommendations: bedside commode, shower chair, walker, rolling   Barriers to discharge:      Assessment:     Courtney Engle is a 86 y.o. female admitted with a medical diagnosis of Diabetes mellitus with ketoacidosis and lactic acidosis but without coma . Pt tolerated well, pt would continue to benefit from skilled PT services to improve overall functional mobility, strength and endurance.  .      Performance deficits affecting function:  weakness, impaired endurance, impaired self care skills, impaired functional mobilty, gait instability, impaired balance, pain, decreased ROM .    Rehab Potential is good    Activity Tolerance: Fair    Plan:     Patient to be seen 6 x/week to address the above listed problems via gait training, therapeutic activities, therapeutic exercises, neuromuscular re-education, wheelchair management/training    · Plan of Care Expires: 04/23/22  · Plan of Care Reviewed with: patient    Subjective     "do I have to?" agreeable to therapy "I'm wheezing, you hear me?" nsg notified.     Pain/Comfort:  · Pain Rating 1: 7/10  · Location - Side 1: Left  · Location - Orientation 1: generalized  · Location 1: knee  · Pain Addressed 1: Reposition, Distraction, Cessation of Activity (with gait)  · Pain Rating Post-Intervention 1:  (none at rest)    Patient's cultural, spiritual, Zoroastrianism conflicts given the current situation:  · no    Objective:     Communicated with nsg re pt reports of wheezing  Patient found  with  (in BSC) upon PT entry to " "room.     Therapeutic Activities and Exercises: mini elliptical x 10 minutes  Rows/press/bicep curls 2x 10 reps #1 olive YTB    Functional Mobility:  · Transfers:     · Sit to Stand:  stand by assistance and contact guard assistance with rolling walker  · Bed to Chair: contact guard assistance with  rolling walker and ~ 5 ft x 2 in room BSC<>WC  using  Stand Pivot  · Gait: amb with RW CGA ~ 60 ft and 20 ft seated rest break L knee buckled twice with min A to maintain balance  · Pt reports L knee has been buckling for years, "can't have surgery" discussed with PT trial tomorrow with ace wrap and if it helps she may benefit from velcro knee support and follow up with ortho     AM-PAC 6 CLICK MOBILITY  20    Patient left up in chair with call button in reach and belongings in reach.    GOALS:   Multidisciplinary Problems     Physical Therapy Goals        Problem: Physical Therapy    Goal Priority Disciplines Outcome Goal Variances Interventions   Physical Therapy Goal     PT, PT/OT Ongoing, Progressing     Description: PT goals until 4/13/22    1. Pt supine to sit with mod independent-not met  2. Pt sit to supine with mod independent-not met  3. Pt sit to stand with RW with mod independent-not met  4. Pt to perform gait 100ft with RW with supervision.-not met  5. Pt to go up/down curb step with RW with supervision.-not met  6. Pt to up/down 5 steps with B UE rail with supervision.-not met  7. Pt to perform B LE exs in sitting or supine x 15 reps to strengthen B LE to improve functional mobility.-not met  8. Pt to propel w/c 50ft with B UE on level surface with supervision-not met  9. Pt will be able to  object off the ground with the reacher with RW support with set up assist.-not met                     Time Tracking:     PT Received On: 03/28/22  PT Total Time (min):       Billable Minutes: Gait Training 15, Therapeutic Activity 10 and Therapeutic Exercise 15    Treatment Type: Treatment  PT/PTA: PTA     PTA " Visit Number: 3     03/28/2022

## 2022-03-28 NOTE — PLAN OF CARE
Problem: Adult Inpatient Plan of Care  Goal: Plan of Care Review  Outcome: Ongoing, Progressing  Goal: Absence of Hospital-Acquired Illness or Injury  Outcome: Ongoing, Progressing  Goal: Optimal Comfort and Wellbeing  Outcome: Ongoing, Progressing  Goal: Readiness for Transition of Care  Outcome: Ongoing, Progressing     Problem: Diabetes Comorbidity  Goal: Blood Glucose Level Within Targeted Range  Outcome: Ongoing, Progressing     Problem: Oral Intake Inadequate (Acute Kidney Injury/Impairment)  Goal: Optimal Nutrition Intake  Outcome: Ongoing, Progressing     Problem: Fall Injury Risk  Goal: Absence of Fall and Fall-Related Injury  Outcome: Ongoing, Progressing

## 2022-03-28 NOTE — PROGRESS NOTES
[facility]                                  Skilled Nursing Facility                   Progress Note     Admit Date: 3/23/2022  FAY 4/13/2022  Principal Problem:  Diabetes mellitus with ketoacidosis and lactic acidosis but without coma   HPI obtained from patient interview and chart review     Chief Complaint: Re-evaluation of medical treatment and therapy status: Lab  review    HPI:   Mrs. Engle is a 86 year old female with PMHx of IDDM, HTN, HLD, diastolic dysfunction (most recent echo 2018 normal), CKD 3, hypothyroidism and GERD who presents to SNF following hospitalization for DKA, encephalopathy, new AFib.  Admission to SNF for secondary weakness debility.     Interval history: All of 3/28 labs reviewed and are listed below.  24 hr vital sign ranges listed below.  Mag 1.8.  24 hour blood glucose range is 190-290.  Patient with constipation and nausea earlier today, she has had a bowel movement and states she feels much better now.  Patient reports an adequate appetite.  Patient denies dysuria.  Patient progessing with PT/OT- Gait: amb with RW CGA ~ 60 ft and 20 ft seated rest break L knee buckled twice with min A to maintain balance. Continuing to follow and treat all acute and chronic conditions.    Past Medical History: Patient has a past medical history of Acquired hypothyroidism (4/25/2014), Anxiety, Aortic calcification (12/8/2016), Arthritis, Bilateral carotid artery disease (12/8/2016), CKD stage 3 due to type 2 diabetes mellitus (2/16/2017), Depression, Essential hypertension, Fever blister, GERD (gastroesophageal reflux disease), Glaucoma suspect with open angle (2010), Hyperlipidemia LDL goal <70 (2/16/2017), Keloid cicatrix, Mixed stress and urge urinary incontinence (2/16/2017), Morbid obesity with BMI of 40.0-44.9, adult (12/8/2016), Ocular migraine (1/24/2013), and Type 2 diabetes mellitus with hyperglycemia, with  long-term current use of insulin.    Past Surgical History: Patient has a past surgical history that includes Cholecystectomy; TONSILLECTOMY, ADENOIDECTOMY; knee replacement right; Joint replacement; Hysterectomy; Colonoscopy w/ polypectomy (04/20/2015); Esophagogastroduodenoscopy (04/20/2015); Cataract extraction w/  intraocular lens implant (Right, 05/30/2012); Cataract extraction w/  intraocular lens implant (Left, 05/26/2010); and Colonoscopy (N/A, 12/20/2019).    Social History: Patient reports that she has never smoked. She has never used smokeless tobacco. She reports that she does not drink alcohol and does not use drugs.    Family History: family history includes Cancer in her mother; Colon cancer in her mother; Glaucoma in her father; Heart attack in her maternal grandmother; Heart attack (age of onset: 93) in her father; Heart disease in her maternal grandmother and mother; Hematuria in her brother; Hypertension in her maternal grandmother; Nephrolithiasis in her father; No Known Problems in her daughter; Stroke in her father; Uterine cancer in her daughter.    Allergies: Patient is allergic to contac 12 hour allergy, diphenhydramine hcl, ciprofloxacin (bulk), (d)-limonene flavor, anti-hyst, antihistamines - alkylamine, ciprocinonide, codeine, contact metal agent, glipizide, hydroxyzine, iodinated contrast media, nitrofuran analogues, penicillin, propoxyphene, statins-hmg-coa reductase inhibitors, doxycycline, penicillins, sulfa (sulfonamide antibiotics), and sulfacetamide sodium-urea.    ROS  Constitutional: Negative for fever   Eyes: Negative for blurred vision, double vision   Respiratory: Negative for cough.  + shortness of breath at baseline  Cardiovascular: Negative for chest pain, palpitations, and leg swelling.   Gastrointestinal: Negative for abdominal pain, diarrhea.  +intermittent constipation/nausea  Genitourinary: Negative for dysuria, frequency   Musculoskeletal:  + generalized weakness.  Negative for back pain and myalgias.   Skin: Negative for itching and rash.   Neurological: Negative for dizziness, headaches.   Psychiatric/Behavioral: Negative for depression. The patient is not nervous/anxious.      24 hour Vital Sign Range   Temp:  [97.9 °F (36.6 °C)-98.5 °F (36.9 °C)]   Pulse:  [66]   Resp:  [16-17]   BP: (103-141)/(55-62)   SpO2:  [96 %-98 %]     PEx  Constitutional: Patient appears debilitated.  No distress noted  HENT:  Poor dentition  Head: Normocephalic and atraumatic.   Eyes: Pupils are equal, round  Neck: Normal range of motion. Neck supple.   Cardiovascular: Normal rate, regular rhythm and normal heart sounds.    Pulmonary/Chest: Effort normal and breath sounds are clear  Abdominal: Soft. Bowel sounds are normal.   Musculoskeletal: Normal range of motion.   Neurological: Alert and oriented to person, place, and time.   Psychiatric: Normal mood and affect. Behavior is normal.   Skin: Skin is warm and dry.  Full skin assessment completed.  No skin breakdown noted.    Recent Labs   Lab 03/28/22  0558      K 4.3      CO2 28   BUN 11   CREATININE 0.9   MG 1.8       Recent Labs   Lab 03/28/22  0558   WBC 9.48   RBC 4.09   HGB 11.4*   HCT 38.4   *   MCV 94   MCH 27.9   MCHC 29.7*         Assessment and Plan:    Hypomagnesemia  - initiated magnesium oxide 400 mg BID x2 days    Type 2 diabetes mellitus without complication, with long-term current use of insulin  Diabetes mellitus with ketoacidosis and lactic acidosis but without coma  - last A1C >14 on 3/17/22  - suspect secondary to medication non-compliance and dietary indiscretion  - diabetic diet, Accu checks AC/HS  - home regimen with aspart 15 units TIDWM & lantus 36 units QHS  - continue detemir 12 units BID and low dose sliding scale insulin     Acute renal failure superimposed on stage 3 chronic kidney disease  - sCr basline is 0.8, creatinine on admission 2.3  - suspected to be pre-renal in setting of DKA  - acute  phase resolved with fluids  - creatinine back to baseline  - continue monitor with twice weekly BMPs, avoid nephrotoxic agents, renally dose medications when appropriate    Atrial fibrillation with RVR  - new onset, suspect due to acute illness with DKA  - recent thyroid studies show normal T4  - 2D echo unremarkable as below  - continue metoprolol for rate control; titrate up as clinically indicated  - addressed anticoagulation risk/benefit w/ Pt and daughter; patient would like to defer therapy at this time  - follow-up with cardiology during SNF stay and after discharge from SNF     Diastolic dysfunction  - most recent echo 1/2018 showed LVEF 60-65%, normal LV diastolic function and normal RV function  - Echo  · The estimated ejection fraction is 65%.  · The left ventricle is normal in size with normal systolic function.  · Normal left ventricular diastolic function.  · Normal right ventricular size with normal right ventricular systolic function.  · The estimated PA systolic pressure is 23 mmHg.  · Normal central venous pressure (3 mmHg).      GERD (gastroesophageal reflux disease)  - continue home pepcid 40 mg daily    Depression, moderate, recurrent  Generalized anxiety disorder  - Continue home Lexapro 10 mg daily    Acquired hypothyroidism  - TSH low, however free T4 WNL  - continue home levothyroxine 75 mcg daily     Essential hypertension  - holding home atenolol in favor of metoprolol in setting of a fib RVR  - continue lisinopril 10 mg daily    Insomnia  - melatonin 9 mg qHS    Obesity  - BMI 37  - RD following, appreciate recommendations    Debility   - Continue with PT/OT for gait training and strengthening and restoration of ADL's   - Encourage mobility, OOB in chair, and early ambulation as appropriate  - Fall precautions   - Monitor for bowel and bladder dysfunction  - Monitor for and prevent skin breakdown and pressure ulcers  - initiated DVT prophylaxis with  Lovenox 40 mg daily         Anticipate  disposition:  Home with home health      Follow-up needed during SNF stay-cardiology (3/30), endocrinology (4/1)    Appointments to reschedule- internal medicine 4/1    Follow-up needed after discharge from SNF:   - PCP, needs to be rescheduled  - cardiology and Endocrinology will likely schedule follow-up appointments after their above appointments    Future Appointments   Date Time Provider Department Center   3/30/2022  1:40 PM Lois Almonte MD [facility] CARDIO [facility]   4/1/2022  9:40 AM Vishal Ulloa MD [facility] IM [facility] PCW   4/1/2022  3:00 PM Darrin Montero MD [facility] ENDODIA [facility] NP  Department of Hospital Medicine   [facility]     DOS: 3/29/2022       Patient note was created using MModal Dictation.  Any errors in syntax or even information may not have been identified and edited on initial review prior to signing this note.

## 2022-03-28 NOTE — PT/OT/SLP PROGRESS
"Occupational Therapy   Treatment    Name: Courtney Engle  MRN: [id number]  Admit Date: 3/23/2022  Admitting Diagnosis:  Diabetes mellitus with ketoacidosis and lactic acidosis but without coma    General Precautions: Standard, diabetic, fall   Orthopedic Precautions:N/A   Braces:       Recommendations:     Discharge Recommendations: home with home health  Level of Assistance Recommended at Discharge: Intermittent assistance for ADL's and homemaking tasks  Discharge Equipment Recommendations:  bedside commode, shower chair, walker, rolling  Barriers to discharge:  Decreased caregiver support    Assessment:     Courtney Engle is a 86 y.o. female with a medical diagnosis of Diabetes mellitus with ketoacidosis and lactic acidosis but without coma .Performance deficits affecting function are weakness, impaired endurance, impaired self care skills, impaired functional mobilty, gait instability, impaired balance, decreased lower extremity function, decreased safety awareness, pain, impaired cardiopulmonary response to activity, and decreased upper extremity function which affect safety and performance in self care, functional transfers, and functional mobility. Pt. participated well with session on this day. Pt is progressing well with session on this day still continues to requires cues with aspects of safety . Pt. Will continue to benefit from continued OT to progress towards goals    Rehab Potential is good    Activity tolerance:  Good    Plan:     Patient to be seen 6 x/week to address the above listed problems via self-care/home management, therapeutic activities, therapeutic exercises    · Plan of Care Expires: 04/24/22  · Plan of Care Reviewed with: patient    Subjective     Communicated with: naseem prior to session. "I dont want to get up. I want to go home"  Pt. Required increased encouragement to participate in therapy session    Pain/Comfort:  Pain Rating 1: 0/10  Pain Rating Post-Intervention 1: 0/10    Patient's " cultural, spiritual, Restorationist conflicts given the current situation:  no    Objective:     Patient found left sidelying  upon OT entry to room.    Bed Mobility:    · Patient completed Supine to Sit with modified independence     Functional Mobility/Transfers:  · Patient completed Sit <> Stand Transfer with stand by assistance  with  rolling walker   · Patient completed Bed <> Chair Transfer using Step Transfer technique with contact guard assistance with rolling walker  · Patient completed Toilet Transfer Step Transfer technique with stand by assistance with  rolling walker  · Functional Mobility: Pt. With fxl mobility throughout room and bathroom with RW and CGA/SBA    Activities of Daily Living:  · Grooming: modified independence with grooming while seated  · Bathing: stand by assistance with all bathing asepects while seated at sink  · Upper Body Dressing: modified independence to doff/romi pullover shirt  · Lower Body Dressing: stand by assistance to doff/romi pants and BLE socks  · Toileting: supervision with hygiene and clothing management     [facility] 6 Click ADL: 21    Treatment & Education:  Pt. With 2# dowel activity with 2x20 reps with  shd flex, bicep curls horz adb/add and forward flex motion to increase BUE ROM and strength,.   Pt. With standing and therex performed to increase ROM, endurance selfcare task and fxl mobility for independence     Patient left up in chair with all lines intact and call button in reachEducation:      GOALS:   Multidisciplinary Problems     Occupational Therapy Goals        Problem: Occupational Therapy    Goal Priority Disciplines Outcome Interventions   Occupational Therapy Goal     OT, PT/OT Ongoing, Progressing    Description: Goals to be met by: 4/07/2022    Patient will increase functional independence with ADLs by performing:    UE Dressing with [place].  LE Dressing with Modified [place].  Grooming while standing at sink with Modified [place].  Toileting  from toilet with Modified [place] for hygiene and clothing management.   Bathing from shower on shower chair/bench with Supervision Assistance.  Step transfer with Modified [place] using AD.   Toilet transfer to toilet with Modified [place].  Upper extremity exercise program per handout, with supervision.  Pt will complete functional reaching task in standing x10 minutes with Supervision assistance for improved safety and independence in standing ADLs/IADLS.   Caregiver will be educated on level of assist required to safely perform self care tasks and functional transfers.                     Time Tracking:     OT Date of Treatment: OT Date of Treatment: 03/28/22  OT Total Time (min): Total Time (min): 38 min    Billable Minutes:Self Care/Home Management 30  Therapeutic Exercise 8    3/28/2022   OT and HUGGINS have discussed the above patients goals and status in collaboration with Plan of Care.

## 2022-03-29 NOTE — PT/OT/SLP PROGRESS
"Physical Therapy      Patient Name:  Courtney Engle   MRN:  [id number]    Patient not seen today secondary to pt declined stating she doesn't feel well, upset stomach, nausea, nsg aware, ed on the importance and benefits of PT services, pt verbalized understanding "I just can't"  . Will follow-up next PT session        "

## 2022-03-29 NOTE — PLAN OF CARE
Problem: Adult Inpatient Plan of Care  Goal: Plan of Care Review  Outcome: Ongoing, Progressing  Goal: Patient-Specific Goal (Individualized)  Outcome: Ongoing, Progressing  Goal: Optimal Comfort and Wellbeing  Outcome: Ongoing, Progressing     Problem: Diabetes Comorbidity  Goal: Blood Glucose Level Within Targeted Range  Outcome: Ongoing, Progressing     Problem: Fluid and Electrolyte Imbalance (Acute Kidney Injury/Impairment)  Goal: Fluid and Electrolyte Balance  Outcome: Ongoing, Progressing     Problem: Oral Intake Inadequate (Acute Kidney Injury/Impairment)  Goal: Optimal Nutrition Intake  Outcome: Ongoing, Progressing     Problem: Renal Function Impairment (Acute Kidney Injury/Impairment)  Goal: Effective Renal Function  Outcome: Ongoing, Progressing     Problem: Fall Injury Risk  Goal: Absence of Fall and Fall-Related Injury  Outcome: Ongoing, Progressing

## 2022-03-29 NOTE — PLAN OF CARE
Problem: Occupational Therapy  Goal: Occupational Therapy Goal  Description: Goals to be met by: 4/07/2022    Patient will increase functional independence with ADLs by performing:    UE Dressing with [place].  LE Dressing with Modified [place].  Grooming while standing at sink with Modified [place].  Toileting from toilet with Modified [place] for hygiene and clothing management.   Bathing from shower on shower chair/bench with Supervision Assistance.  Step transfer with Modified [place] using AD.   Toilet transfer to toilet with Modified [place].  Upper extremity exercise program per handout, with supervision.  Pt will complete functional reaching task in standing x10 minutes with Supervision assistance for improved safety and independence in standing ADLs/IADLS.   Caregiver will be educated on level of assist required to safely perform self care tasks and functional transfers.    Outcome: Ongoing, Progressing   Pts goals remain appropriate.
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
## 2022-03-29 NOTE — PLAN OF CARE
Problem: Adult Inpatient Plan of Care  Goal: Plan of Care Review  Outcome: Ongoing, Progressing  Goal: Patient-Specific Goal (Individualized)  Outcome: Ongoing, Progressing  Goal: Absence of Hospital-Acquired Illness or Injury  Outcome: Ongoing, Progressing  Intervention: Identify and Manage Fall Risk  Flowsheets (Taken 3/29/2022 0342)  Safety Promotion/Fall Prevention:   assistive device/personal item within reach   bedside commode chair   commode/urinal/bedpan at bedside   diversional activities provided   Fall Risk reviewed with patient/family   in recliner, wheels locked   medications reviewed   lighting adjusted   instructed to call staff for mobility  Goal: Optimal Comfort and Wellbeing  Outcome: Ongoing, Progressing  Intervention: Provide Person-Centered Care  Flowsheets (Taken 3/29/2022 0342)  Trust Relationship/Rapport:   care explained   choices provided   questions encouraged   questions answered   reassurance provided   empathic listening provided   thoughts/feelings acknowledged     Problem: Fluid and Electrolyte Imbalance (Acute Kidney Injury/Impairment)  Goal: Fluid and Electrolyte Balance  Outcome: Ongoing, Progressing  Intervention: Monitor and Manage Fluid and Electrolyte Balance  Flowsheets (Taken 3/29/2022 0342)  Fluid/Electrolyte Management:   fluids provided   electrolyte-binding therapy initiated     Problem: Fall Injury Risk  Goal: Absence of Fall and Fall-Related Injury  Outcome: Ongoing, Progressing  Intervention: Identify and Manage Contributors  Flowsheets (Taken 3/29/2022 0342)  Self-Care Promotion:   independence encouraged   meal set-up provided   BADL personal objects within reach   BADL personal routines maintained   safe use of adaptive equipment encouraged  Medication Review/Management: medications reviewed

## 2022-03-30 NOTE — PROGRESS NOTES
MD Jim Grissom MD in [facility] - Cardiology on 8/30/2019  ECHO, MAIN CAMPUS in [facility] - Echo / Stress Lab on 3/21/2022  Lois Almonte MD in [facility] - Cardiology - [facility] on 3/30/2022    Subjective:   Patient ID:  Courtney Engle is a 87 y.o. female who presents for evaluation of new diagnosis of Atrial fibrillation.    She is a 88 yo F with significant past medical history of IDDM, HTN, HLD, diastolic dysfunction (most recent echo 2018 normal), CKD 3, hypothyroidism and GERD who was recently admitted for DKA. During that admission she was found to be in AF. She was successfully managed for her AF and discharged with referral to see caridology for management of her AF. Pt reportedly refused anticoagulation therapy during hospitalization.     Since discharge, she reports feeling weak at times but not significantly disturbing her. We had a long discussion about the diagnosis of AF including the risks and management. Patient agreeable to OAC, but she was not interested in seeing EP for possible rate control strategies. Pt currently lives at SNF, but her daughter seems to be closely involved with her care.    Pt denies any chest pain, SOB, VERA, light headedness, palpitations at this time.    ECG in clinic today showed sinus bradycardia      Review of Systems   Constitutional: Positive for malaise/fatigue. Negative for chills, decreased appetite and fever.   HENT: Negative for congestion and sore throat.    Eyes: Negative for blurred vision and discharge.   Cardiovascular: Negative for chest pain, claudication, cyanosis, dyspnea on exertion and leg swelling.   Respiratory: Negative for cough, hemoptysis and shortness of breath.    Endocrine: Negative for cold intolerance and heat intolerance.   Skin: Negative for color change.   Musculoskeletal: Negative for muscle weakness and myalgias.   Gastrointestinal: Negative for bloating and abdominal pain.   Neurological: Negative for dizziness, focal  weakness and weakness.   Psychiatric/Behavioral: Negative for altered mental status and depression.       Past Medical History:   Diagnosis Date    Acquired hypothyroidism 4/25/2014    Anxiety     Aortic calcification 12/8/2016    X-Ray Chest-5/08/2014    Arthritis     Bilateral carotid artery disease 12/8/2016    US Carotid Bilateral-1/24/2013    CKD stage 3 due to type 2 diabetes mellitus 2/16/2017    Depression     Essential hypertension     Fever blister     GERD (gastroesophageal reflux disease)     Glaucoma 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 robledo)     Hyperlipidemia LDL goal <70 2/16/2017    Keloid cicatrix     Mixed stress and urge urinary incontinence 2/16/2017    Morbid obesity with BMI of 40.0-44.9, adult 12/8/2016    Ocular migraine 1/24/2013    Type 2 diabetes mellitus with hyperglycemia, with long-term current use of insulin        Past Surgical History:   Procedure Laterality Date    CATARACT EXTRACTION W/  INTRAOCULAR LENS IMPLANT Right 05/30/2012        CATARACT EXTRACTION W/  INTRAOCULAR LENS IMPLANT Left 05/26/2010        CHOLECYSTECTOMY      COLONOSCOPY N/A 12/20/2019    Procedure: COLONOSCOPY;  Surgeon: Higinio Gilbert MD;  Location: [address]);  Service: Endoscopy;  Laterality: N/A;  patient to call back to schedule Colonoscopy once she schedules Cardiology Clearance appt-BB  8/30/19 Low CV risk for colonospcopy per   patient requesting  but can't wait for his next available due to rectal bleeding    COLONOSCOPY W/ POLYPECTOMY  04/20/2015    ESOPHAGOGASTRODUODENOSCOPY  04/20/2015    HYSTERECTOMY      Partial    JOINT REPLACEMENT      knee replacement right      TONSILLECTOMY, ADENOIDECTOMY      tonsillectomy only       Family History   Problem Relation Age of Onset    Glaucoma Father     Stroke Father     Heart attack Father 93    Nephrolithiasis Father     Colon cancer Mother     Cancer Mother          colon ca    Heart disease Mother     Uterine cancer Daughter         uterine sarcoma    Hematuria Brother     Heart disease Maternal Grandmother     Hypertension Maternal Grandmother     Heart attack Maternal Grandmother     No Known Problems Daughter     Amblyopia Neg Hx     Blindness Neg Hx     Cataracts Neg Hx     Macular degeneration Neg Hx     Retinal detachment Neg Hx     Strabismus Neg Hx        No current facility-administered medications for this visit.    Current Outpatient Medications:     apixaban (ELIQUIS) 5 mg Tab, Take 1 tablet (5 mg total) by mouth 2 (two) times daily., Disp: 60 tablet, Rfl: 11    Facility-Administered Medications Ordered in Other Visits:     acetaminophen tablet 500 mg, 500 mg, Oral, Q6H PRN, Jose Manuel Wu MD, 500 mg at 03/28/22 2102    acetaminophen tablet 650 mg, 650 mg, Oral, Q6H PRN, Jose Manuel Wu MD    albuterol-ipratropium 2.5 mg-0.5 mg/3 mL nebulizer solution 3 mL, 3 mL, Nebulization, Q4H PRN, Michaela Canales MD, 3 mL at 03/25/22 2354    aspirin EC tablet 81 mg, 81 mg, Oral, Daily, Jose Manuel Wu MD, 81 mg at 03/30/22 0853    benzonatate capsule 100 mg, 100 mg, Oral, TID PRN, Michaela Canales MD, 100 mg at 03/28/22 2101    calcium carbonate 200 mg calcium (500 mg) chewable tablet 500 mg, 500 mg, Oral, BID PRN, Jose Manuel Wu MD    dextrose 50% injection 12.5 g, 12.5 g, Intravenous, PRN, Jose Manuel Wu MD    dextrose 50% injection 25 g, 25 g, Intravenous, PRN, Jose Manuel Wu MD    enoxaparin injection 40 mg, 40 mg, Subcutaneous, Daily, Marilynn Alanis NP, 40 mg at 03/29/22 1749    EScitalopram oxalate tablet 10 mg, 10 mg, Oral, Daily, Jose Manuel Wu MD, 10 mg at 03/30/22 0852    famotidine tablet 40 mg, 40 mg, Oral, Daily, Jose Manuel Wu MD, 40 mg at 03/30/22 0852    fluticasone propionate 50 mcg/actuation nasal spray 100 mcg, 2 spray, Each Nostril, Daily, Jose Manuel Wu MD, 100 mcg at 03/30/22 0847    " glucagon (human recombinant) injection 1 mg, 1 mg, Intramuscular, PRN, Jose Manuel Wu MD    glucose chewable tablet 16 g, 16 g, Oral, PRN, Jose Manuel Wu MD, 16 g at 03/26/22 2116    glucose chewable tablet 24 g, 24 g, Oral, PRN, Jose Manuel Wu MD    insulin aspart U-100 pen 0-5 Units, 0-5 Units, Subcutaneous, QID (AC + HS) PRN, Jose Manuel Wu MD, 2 Units at 03/29/22 2125    insulin detemir U-100 pen 12 Units, 12 Units, Subcutaneous, BID, Marilynn Alanis NP, 12 Units at 03/30/22 0858    levothyroxine tablet 75 mcg, 75 mcg, Oral, Before breakfast, Jose Manuel Wu MD, 75 mcg at 03/30/22 0555    lisinopriL tablet 10 mg, 10 mg, Oral, Daily, Jose Manuel Wu MD, 10 mg at 03/30/22 0852    melatonin tablet 9 mg, 9 mg, Oral, Nightly, Marilynn Alanis NP, 9 mg at 03/29/22 2125    metoprolol tartrate (LOPRESSOR) tablet 150 mg, 150 mg, Oral, BID, Jose Manuel Wu MD, 150 mg at 03/30/22 0856    ondansetron disintegrating tablet 8 mg, 8 mg, Oral, Q8H PRN, Michaela Canales MD, 8 mg at 03/29/22 1018    senna-docusate 8.6-50 mg per tablet 1 tablet, 1 tablet, Oral, BID, Jose Manuel Wu MD, 1 tablet at 03/30/22 0852  Objective:   /66 (BP Location: Left arm, Patient Position: Sitting, BP Method: Medium (Automatic))   Pulse 82   Ht 4' 10" (1.473 m)   Wt 81.8 kg (180 lb 5.4 oz)   SpO2 95%   BMI 37.69 kg/m²      Physical Exam  Constitutional:       Appearance: She is well-developed. She is obese.      Comments: elderly   HENT:      Head: Normocephalic and atraumatic.      Right Ear: External ear normal.      Left Ear: External ear normal.   Eyes:      Conjunctiva/sclera: Conjunctivae normal.   Cardiovascular:      Rate and Rhythm: Regular rhythm. Bradycardia present.      Pulses: Intact distal pulses.           Radial pulses are 2+ on the right side and 2+ on the left side.      Heart sounds: Normal heart sounds.   Pulmonary:      Effort: Pulmonary effort is normal. No respiratory " distress.      Breath sounds: Normal breath sounds. No wheezing.   Abdominal:      General: Bowel sounds are normal. There is no distension.      Palpations: Abdomen is soft.      Tenderness: There is no abdominal tenderness.   Musculoskeletal:         General: Normal range of motion.      Cervical back: Normal range of motion and neck supple.   Skin:     General: Skin is warm and dry.   Neurological:      Mental Status: She is alert and oriented to person, place, and time.   Psychiatric:         Mood and Affect: Mood normal.         Behavior: Behavior normal.         Lab Results   Component Value Date     03/28/2022    K 4.3 03/28/2022     03/28/2022    CO2 28 03/28/2022    BUN 11 03/28/2022    CREATININE 0.9 03/28/2022    ANIONGAP 8 03/28/2022     Lab Results   Component Value Date    HGBA1C >14.0 (H) 03/17/2022     Lab Results   Component Value Date     (H) 03/17/2022     (H) 01/22/2018     (H) 01/09/2018       Lab Results   Component Value Date    WBC 9.48 03/28/2022    HGB 11.4 (L) 03/28/2022    HCT 38.4 03/28/2022    HCT 41 03/17/2022     (H) 03/28/2022    GRAN 4.1 03/28/2022    GRAN 43.8 03/28/2022     Lab Results   Component Value Date    CHOL 281 (H) 08/09/2019    HDL 39 (L) 08/09/2019    LDLCALC 169.0 (H) 08/09/2019    TRIG 365 (H) 08/09/2019        Assessment:     1. Paroxysmal atrial fibrillation    2. Essential hypertension    3. Pure hypercholesterolemia    4. Type 2 diabetes mellitus without complication, with long-term current use of insulin    5. Chronic diastolic heart failure        Plan:   Paroxysmal atrial fibrillation  Pt in regular rhythm on exam, which was confirmed on ecg  [id number] score of 7  AF run occurred in setting of hospitalization for DKA  Currently in sinus rhythm today    I had a long discussion with the patient about the pathophysiology and risks of atrial fibrillation and its basic pathophysiology, including its health implications and  "treatment options. Specifically, I addressed the need for CVA (stroke) prophylaxis with oral anticoagulation and need to come to the ER for any head trauma for CT scanning even if asymptomatic).      I offered EP referral for management of AF and possible rhythm control strategies in case afib were to recur, but she was not interested.    Pt agreeable to eliquis, so we started that today  Continue metoprolol        Essential hypertension  Hypertension:  -BP today: /66 (BP Location: Left arm, Patient Position: Sitting, BP Method: Medium (Automatic))   Pulse 82   Ht 4' 10" (1.473 m)   Wt 81.8 kg (180 lb 5.4 oz)   SpO2 95%   BMI 37.69 kg/m²   -HTN well controlled, pt advised to continue current management of lisinpril, metoprolol.    -Pt advised to be compliant with their treatment regimen daily to avoid BP fluctuation and any complications.   -Pt advised to monitor their blood pressure regularly and bring their recorded results to next office visit.  -Pt educated that it is important to eat right. Following a reasonable-calorie low-salt diet (Such as the DASH diet) has been shown to control blood pressure better with less medications. Recommended to exercise at least 30 mins a day for 5 days a week and also told to avoid smoking all together.        Pure hypercholesterolemia  Reported allergy to statins  Continue aspirin    Type 2 diabetes mellitus without complication, with long-term current use of insulin  Management per primary care      Chronic diastolic heart failure  Chronic diastolic heart failure  Continue current regimen  At her baseline per patient and daughter        Patient discussed with attending Cardiologist, Dr. Guillen, who agrees with management and plan.      Lois Almonte MD  Cardiovascular Disease Fellow PGY V  Pager [phone]          "

## 2022-03-30 NOTE — ASSESSMENT & PLAN NOTE
Pt in regular rhythm on exam, which was confirmed on ecg  [id number] score of 7  AF run occurred in setting of hospitalization for DKA    I had a long discussion with the patient about the pathophysiology and risks of atrial fibrillation and its basic pathophysiology, including its health implications and treatment options. Specifically, I addressed the need for CVA (stroke) prophylaxis with oral anticoagulation and need to come to the ER for any head trauma for CT scanning even if asymptomatic).      I offered EP referral for management of AF and possible rhythm control strategies in case afib were to recur, but she was not interested.    Pt agreeable to eliquis, so we started that today  Continue metoprolol

## 2022-03-30 NOTE — PROGRESS NOTES
[facility] - Skilled Nursing  Adult Nutrition  Progress Note    SUMMARY       Recommendations    1. Continue Diabetic diet.  2. Add Boost Glucose Control BID.    Goals: 1. Pt's intake meals >50% by RD follow up.  Nutrition Goal Status: progressing towards goal  Communication of RD Recs:  (POC)    Assessment and Plan  Nutrition Problem  Altered nutrition-related lab values     Related to (etiology):   DKA in setting of uncontrolled diabetes     Signs and Symptoms (as evidenced by):   Pt with A1c >14%, reports she was sick and was not eating at all, was only drinking soda and did not take her insulin.     Interventions(treatment strategy):  Collaboration of care with providers.  CHO modified diet.     Nutrition Diagnosis Status:   Improving      Malnutrition Assessment 3/24/22             Weight Loss (Malnutrition):  (7.2% x 6 months, not significant)   Orbital Region (Subcutaneous Fat Loss): well nourished  Upper Arm Region (Subcutaneous Fat Loss): well nourished  Thoracic and Lumbar Region: well nourished   Mormonism Region (Muscle Loss): well nourished  Clavicle Bone Region (Muscle Loss): well nourished  Clavicle and Acromion Bone Region (Muscle Loss): well nourished  Scapular Bone Region (Muscle Loss): well nourished  Dorsal Hand (Muscle Loss): well nourished  Patellar Region (Muscle Loss): well nourished  Anterior Thigh Region (Muscle Loss): well nourished  Posterior Calf Region (Muscle Loss): well nourished   Edema (Fluid Accumulation): 0-->no edema present             Reason for Assessment    Reason For Assessment: RD follow-up  Diagnosis: diabetes diagnosis/complications (diabetic acidosis without coma)  Relevant Medical History: IDDM, HTN, HLD, CKD3, hypothyroidism, GERD  Interdisciplinary Rounds: did not attend  General Information Comments: Pt with 50% intake meals over last week as pt does not like the food here. Pt nourished per NFPE 3/24. Pt c/o difficulty swallowing last Friday; per SLP note 3/25, pt can  "eat regular texture and have thin liquids and pt endorses this is resolved. Nausea is also resolved per pt. Blood glucose levels trending down.   Educated pt on strategies to manage blood glucose on sick days; reviewed and provided handouts regarding sick day management and preparation as well as ketone testing on sick days. Pt verbalized understanding but needs reinforcement.  Nutrition Discharge Planning: carbohydrate consistent, low sodium diet    Nutrition Risk Screen    Nutrition Risk Screen: dysphagia or difficulty swallowing    Nutrition/Diet History    Patient Reported Diet/Restrictions/Preferences: general  Spiritual, Cultural Beliefs, Catholic Practices, Values that Affect Care: no    Anthropometrics    Temp: 98.1 °F (36.7 °C)  Height Method: Stated  Height: 4' 10" (147.3 cm)  Height (inches): 58 in  Weight Method: Bed Scale  Weight: 81.8 kg (180 lb 5.4 oz)  Weight (lb): 180.34 lb  Ideal Body Weight (IBW), Female: 90 lb  % Ideal Body Weight, Female (lb): 200.38 %  BMI (Calculated): 37.7       Lab/Procedures/Meds    Pertinent Labs Reviewed: reviewed  Pertinent Labs Comments: glu 105, A1c 14% (3/17)  Pertinent Medications Reviewed: reviewed  Pertinent Medications Comments: famotidine, senna, colace, detemir    Estimated/Assessed Needs    Weight Used For Calorie Calculations: 81.8 kg (180 lb 5.4 oz)  Energy Calorie Requirements (kcal): 1274 kcal  Energy Need Method: [place]-St Jeor (PAL 1.10)  Protein Requirements: 66-82g  Weight Used For Protein Calculations: 81.8 kg (180 lb 5.4 oz)        RDA Method (mL): 1274  CHO Requirement: 159g      Nutrition Prescription Ordered    Current Diet Order: Diabetic 2000 kcal diet    Evaluation of Received Nutrient/Fluid Intake    Energy Calories Required: meeting needs  Protein Required: meeting needs  Fluid Required: meeting needs  Comments: last BM 3/27  Tolerance: tolerating  % Intake of Estimated Energy Needs: 50 - 75 %  % Meal Intake: 25 - 50 %    Nutrition " Risk    Level of Risk/Frequency of Follow-up: low     Monitor and Evaluation    Food and Nutrient Intake: energy intake, food and beverage intake  Food and Nutrient Adminstration: diet order  Knowledge/Beliefs/Attitudes: food and nutrition knowledge/skill  Anthropometric Measurements: weight, weight change  Biochemical Data, Medical Tests and Procedures: electrolyte and renal panel, gastrointestinal profile, glucose/endocrine profile, inflammatory profile, lipid profile  Nutrition-Focused Physical Findings: overall appearance, extremities, muscles and bones, head and eyes, skin     Nutrition Follow-Up    RD Follow-up?: Yes

## 2022-03-30 NOTE — PLAN OF CARE
Problem: Adult Inpatient Plan of Care  Goal: Plan of Care Review  Outcome: Ongoing, Progressing   Recommendations     1. Continue Diabetic diet.  2. Add Boost Glucose Control BID.     Goals: 1. Pt's intake meals >50% by RD follow up.  Nutrition Goal Status: progressing towards goal  Communication of RD Recs:  (POC)     Assessment and Plan  Nutrition Problem  Altered nutrition-related lab values     Related to (etiology):   DKA in setting of uncontrolled diabetes     Signs and Symptoms (as evidenced by):   Pt with A1c >14%, reports she was sick and was not eating at all, was only drinking soda and did not take her insulin.     Interventions(treatment strategy):  Collaboration of care with providers.  CHO modified diet.     Nutrition Diagnosis Status:   Improving

## 2022-03-30 NOTE — ASSESSMENT & PLAN NOTE
"Hypertension:  -BP today: /66 (BP Location: Left arm, Patient Position: Sitting, BP Method: Medium (Automatic))   Pulse 82   Ht 4' 10" (1.473 m)   Wt 81.8 kg (180 lb 5.4 oz)   SpO2 95%   BMI 37.69 kg/m²   -HTN well controlled, pt advised to continue current management of lisinpril, metoprolol.    -Pt advised to be compliant with their treatment regimen daily to avoid BP fluctuation and any complications.   -Pt advised to monitor their blood pressure regularly and bring their recorded results to next office visit.  -Pt educated that it is important to eat right. Following a reasonable-calorie low-salt diet (Such as the DASH diet) has been shown to control blood pressure better with less medications. Recommended to exercise at least 30 mins a day for 5 days a week and also told to avoid smoking all together.      "

## 2022-03-30 NOTE — PT/OT/SLP PROGRESS
"Physical Therapy Treatment    Patient Name:  Courtney Engle   MRN:  [id number]  Admit Date: 3/23/2022  Admitting Diagnosis: Diabetes mellitus with ketoacidosis and lactic acidosis but without coma  Recent Surgeries:     General Precautions: Standard, fall   Orthopedic Precautions:N/A   Braces:       Recommendations:     Discharge Recommendations:  home with home health   Level of Assistance Recommended at Discharge: Intermittent supervision  Discharge Equipment Recommendations: bedside commode, shower chair, walker, rolling   Barriers to discharge:      Assessment:     Courtney Engle is a 87 y.o. female admitted with a medical diagnosis of Diabetes mellitus with ketoacidosis and lactic acidosis but without coma . Pt tolerated fairly well, continues to c/o pain with L knee with occ buckling, ed that inc fall risk, pt verbalized understanding and reports of L eye burning, nsg was notified and pt stated the Dr was going to come see her, pt would continue to benefit from skilled PT services to improve overall functional mobility, strength and endurance.  .      Performance deficits affecting function:  weakness, impaired endurance, impaired self care skills, impaired functional mobilty, gait instability, impaired balance, pain, decreased ROM .    Rehab Potential is good    Activity Tolerance: Fair    Plan:     Patient to be seen 6 x/week to address the above listed problems via gait training, therapeutic activities, therapeutic exercises, neuromuscular re-education, wheelchair management/training    · Plan of Care Expires: 04/23/22  · Plan of Care Reviewed with: patient    Subjective     "my eye is burning, the nurse knows, lets get this over with". Hops out of bed    Pain/Comfort:  · Pain Rating 1:  (did not rate, "burning, started last night the nurse knows")  · Location - Side 1: Left  · Location - Orientation 1: generalized  · Location 1: knee  · Pain Addressed 1: Reposition, Distraction, Cessation of Activity, " "Nurse notified (unsure if pre meds)    Patient's cultural, spiritual, Confucianist conflicts given the current situation:  · no    Objective:     Communicated with nsg about pt L eye burning and spoke with PT and NP about pt L knee buckling/pain, ace wrap did not help, Patient found with  (in bed) upon PT entry to room.     Therapeutic Activities and Exercises: 2x10 reps AP,LAQ,hip flex mini ellipitical x 8 minutes "this feels fine "    Functional Mobility:  · Bed Mobility:     · Supine to Sit: modified independence  · Transfers:     · Sit to Stand:  stand by assistance and contact guard assistance with rolling walker  · Bed to Chair: stand by assistance and contact guard assistance with  no AD  using  Stand Pivot  · Gait: amb with RW CGA ~ 60 ft and 50 ft seated rest break ace wrap to L knee, still buckled 3 times with min/CG to maintain balance  · Stairs: asc/yudith 4 step with BHR CGA L knee buckled on last step to descend min A to maintain  · Curb asc/yudith 4" curb with RW CGA vcs/demo for safety/tech    AM-PAC 6 CLICK MOBILITY  20    Patient left up in chair with with OT.    GOALS:   Multidisciplinary Problems     Physical Therapy Goals        Problem: Physical Therapy    Goal Priority Disciplines Outcome Goal Variances Interventions   Physical Therapy Goal     PT, PT/OT Ongoing, Progressing     Description: PT goals until 4/13/22    1. Pt supine to sit with mod independent-not met  2. Pt sit to supine with mod independent-not met  3. Pt sit to stand with RW with mod independent-not met  4. Pt to perform gait 100ft with RW with supervision.-not met  5. Pt to go up/down curb step with RW with supervision.-not met  6. Pt to up/down 5 steps with B UE rail with supervision.-not met  7. Pt to perform B LE exs in sitting or supine x 15 reps to strengthen B LE to improve functional mobility.-not met  8. Pt to propel w/c 50ft with B UE on level surface with supervision-not met  9. Pt will be able to  object off the " ground with the reacher with RW support with set up assist.-not met                     Time Tracking:     PT Received On: 03/30/22  PT Total Time (min):       Billable Minutes: Gait Training 15, Therapeutic Activity 10 and Therapeutic Exercise 15    Treatment Type: Treatment  PT/PTA: PTA     PTA Visit Number: 4     03/30/2022

## 2022-03-30 NOTE — NURSING
2100 Metoprolol 150 mg held. Patient HR was 55 /74. 0200 Bp 127/61 HR 54. Patient lying in bed with no complaints.

## 2022-03-30 NOTE — PT/OT/SLP PROGRESS
"Occupational Therapy   Treatment    Name: Courtney Engle  MRN: [id number]  Admit Date: 3/23/2022  Admitting Diagnosis:  Diabetes mellitus with ketoacidosis and lactic acidosis but without coma    General Precautions: Standard, fall   Orthopedic Precautions:N/A   Braces:       Recommendations:     Discharge Recommendations: home with home health  Level of Assistance Recommended at Discharge: Intermittent assistance for ADL's and homemaking tasks  Discharge Equipment Recommendations:  bedside commode, shower chair, walker, rolling  Barriers to discharge:  Decreased caregiver support    Assessment:     Courtney Engle is a 87 y.o. female with a medical diagnosis of Diabetes mellitus with ketoacidosis and lactic acidosis but without coma . Performance deficits affecting function are weakness, impaired endurance, impaired self care skills, impaired functional mobilty, gait instability, impaired balance, decreased ROM. Pt. participated well with session on this day. Pt is progressing well with session on this day still continues to requires cues with aspects of safety . Pt. Will continue to benefit from continued OT to progress towards goals    Rehab Potential is good    Activity tolerance:  Good    Plan:     Patient to be seen 6 x/week to address the above listed problems via self-care/home management, therapeutic activities, therapeutic exercises    · Plan of Care Expires: 04/24/22  · Plan of Care Reviewed with: patient    Subjective     Communicated with: nsg prior to session. "Thank you for the birthday song"    Pain/Comfort:  Pain Rating 1: 0/10  Pain Rating Post-Intervention 1: 0/10    Patient's cultural, spiritual, Spiritism conflicts given the current situation:  no    Objective:     Patient found up in chair with PTA upon OT entry to room.      Functional Mobility/Transfers:  · Patient completed Sit <> Stand Transfer with contact guard assistance  with  rolling walker   · Patient completed Bed <> Chair Transfer " using Step Transfer technique with contact guard assistance with rolling walker  · Functional Mobility: Pt. With fxl mobility throughout room and bathroom with RW and CGA. NO LOB noted    Activities of Daily Living:  · Grooming: contact guard assistance with grooming aspects instance  · Toileting: supervision with hygiene and clothing management     [facility] 6 Click ADL: 21    OT Exercises: UE Ergometer 10 mins with moderate resistance    Treatment & Education:  Pt. With standing act on this day with task. Pt. With CGA/SBA for balance aspects with task with AD at raised counter Pt with visual perception task with discrimination of various shapes and sizes x 5 mins and 38 secs with standing bal and min cues throught out. Pt. able to complete finish task with no seated rest break required..     Pt. With standing and therex performed to increase ROM, endurance selfcare task and fxl mobility for independence     Patient left up in chair with all lines intact and call button in reachEducation:      GOALS:   Multidisciplinary Problems     Occupational Therapy Goals        Problem: Occupational Therapy    Goal Priority Disciplines Outcome Interventions   Occupational Therapy Goal     OT, PT/OT Ongoing, Progressing    Description: Goals to be met by: 4/07/2022    Patient will increase functional independence with ADLs by performing:    UE Dressing with [place].  LE Dressing with Modified [place].  Grooming while standing at sink with Modified [place].  Toileting from toilet with Modified [place] for hygiene and clothing management.   Bathing from shower on shower chair/bench with Supervision Assistance.  Step transfer with Modified [place] using AD.   Toilet transfer to toilet with Modified [place].  Upper extremity exercise program per handout, with supervision.  Pt will complete functional reaching task in standing x10 minutes with Supervision assistance for improved safety and independence in  standing ADLs/IADLS.   Caregiver will be educated on level of assist required to safely perform self care tasks and functional transfers.                     Time Tracking:     OT Date of Treatment: OT Date of Treatment: 03/30/22  OT Total Time (min): Total Time (min): 28 min    Billable Minutes:Therapeutic Activity 8  Therapeutic Exercise 20    3/30/2022   OT and HUGGINS have discussed the above patients goals and status in collaboration with Plan of Care.

## 2022-03-30 NOTE — ASSESSMENT & PLAN NOTE
Chronic diastolic heart failure  Continue current regimen  At her baseline per patient and daughter

## 2022-03-31 NOTE — PLAN OF CARE
Problem: Adult Inpatient Plan of Care  Goal: Plan of Care Review  Outcome: Ongoing, Progressing  Goal: Patient-Specific Goal (Individualized)  Outcome: Ongoing, Progressing  Goal: Optimal Comfort and Wellbeing  Outcome: Ongoing, Progressing     Problem: Diabetes Comorbidity  Goal: Blood Glucose Level Within Targeted Range  Outcome: Ongoing, Progressing     Problem: Fall Injury Risk  Goal: Absence of Fall and Fall-Related Injury  Outcome: Ongoing, Progressing

## 2022-03-31 NOTE — PROGRESS NOTES
[facility]                                  Skilled Nursing Facility                   Progress Note     Admit Date: 3/23/2022  FAY 4/13/2022  Principal Problem:  Diabetes mellitus with ketoacidosis and lactic acidosis but without coma   HPI obtained from patient interview and chart review     Chief Complaint: Re-evaluation of medical treatment and therapy status:  Hyperglycemia    HPI:   Mrs. Engle is a 86 year old female with PMHx of IDDM, HTN, HLD, diastolic dysfunction (most recent echo 2018 normal), CKD 3, hypothyroidism and GERD who presents to SNF following hospitalization for DKA, encephalopathy, new AFib.  Admission to SNF for secondary weakness debility.     Interval history: 24 hr vital sign ranges listed below.  24 hour blood glucose range is 186-248.  Scheduled aspart was discontinued by another provider.  Will resume at this time.  Patient denies shortness of breath, abdominal discomfort, nausea, or vomiting.  Patient reports an adequate appetite.  Patient denies dysuria.  Patient reports having regular bowel movements.  Patient progessing with PT/OT-Gait: amb with RW CGA ~ 85 ft and 95 ft seated rest break vcs for taking her time for safety. Continuing to follow and treat all acute and chronic conditions.    Past Medical History: Patient has a past medical history of Acquired hypothyroidism (4/25/2014), Anxiety, Aortic calcification (12/8/2016), Arthritis, Bilateral carotid artery disease (12/8/2016), CKD stage 3 due to type 2 diabetes mellitus (2/16/2017), Depression, Essential hypertension, Fever blister, GERD (gastroesophageal reflux disease), Glaucoma suspect with open angle (2010), Hyperlipidemia LDL goal <70 (2/16/2017), Keloid cicatrix, Mixed stress and urge urinary incontinence (2/16/2017), Morbid obesity with BMI of 40.0-44.9, adult (12/8/2016), Ocular migraine (1/24/2013), and Type 2 diabetes mellitus with  hyperglycemia, with long-term current use of insulin.    Past Surgical History: Patient has a past surgical history that includes Cholecystectomy; TONSILLECTOMY, ADENOIDECTOMY; knee replacement right; Joint replacement; Hysterectomy; Colonoscopy w/ polypectomy (04/20/2015); Esophagogastroduodenoscopy (04/20/2015); Cataract extraction w/  intraocular lens implant (Right, 05/30/2012); Cataract extraction w/  intraocular lens implant (Left, 05/26/2010); and Colonoscopy (N/A, 12/20/2019).    Social History: Patient reports that she has never smoked. She has never used smokeless tobacco. She reports that she does not drink alcohol and does not use drugs.    Family History: family history includes Cancer in her mother; Colon cancer in her mother; Glaucoma in her father; Heart attack in her maternal grandmother; Heart attack (age of onset: 93) in her father; Heart disease in her maternal grandmother and mother; Hematuria in her brother; Hypertension in her maternal grandmother; Nephrolithiasis in her father; No Known Problems in her daughter; Stroke in her father; Uterine cancer in her daughter.    Allergies: Patient is allergic to contac 12 hour allergy, diphenhydramine hcl, ciprofloxacin (bulk), (d)-limonene flavor, anti-hyst, antihistamines - alkylamine, ciprocinonide, codeine, contact metal agent, glipizide, hydroxyzine, iodinated contrast media, nitrofuran analogues, penicillin, propoxyphene, statins-hmg-coa reductase inhibitors, doxycycline, penicillins, sulfa (sulfonamide antibiotics), and sulfacetamide sodium-urea.    ROS  Constitutional: Negative for fever   Eyes: Negative for blurred vision, double vision   Respiratory: Negative for cough.  + shortness of breath at baseline  Cardiovascular: Negative for chest pain, palpitations, and leg swelling.   Gastrointestinal: Negative for abdominal pain, diarrhea.  +intermittent constipation/nausea  Genitourinary: Negative for dysuria, frequency   Musculoskeletal:  +  generalized weakness. Negative for back pain and myalgias.   Skin: Negative for itching and rash.   Neurological: Negative for dizziness, headaches.   Psychiatric/Behavioral: Negative for depression. The patient is not nervous/anxious.      24 hour Vital Sign Range   Temp:  [97.9 °F (36.6 °C)-98.7 °F (37.1 °C)]   Pulse:  [54-61]   Resp:  [18]   BP: (127-168)/(57-70)   SpO2:  [92 %-97 %]     PEx  Constitutional: Patient appears debilitated.  No distress noted  HENT:  Poor dentition  Head: Normocephalic and atraumatic.   Eyes: Pupils are equal, round  Neck: Normal range of motion. Neck supple.   Cardiovascular: Normal rate, regular rhythm and normal heart sounds.    Pulmonary/Chest: Effort normal and breath sounds are clear  Abdominal: Soft. Bowel sounds are normal.   Musculoskeletal: Normal range of motion.   Neurological: Alert and oriented to person, place, and time.   Psychiatric: Normal mood and affect. Behavior is normal.   Skin: Skin is warm and dry.      No results for input(s): GLUCOSE, NA, K, CL, CO2, BUN, CREATININE, MG in the last 24 hours.    Invalid input(s):  CALCIUM    No results for input(s): WBC, RBC, HGB, HCT, PLT, MCV, MCH, MCHC in the last 24 hours.      Assessment and Plan:      Type 2 diabetes mellitus without complication, with long-term current use of insulin  Diabetes mellitus with ketoacidosis and lactic acidosis but without coma  - last A1C >14 on 3/17/22  - suspect secondary to medication non-compliance and dietary indiscretion  - diabetic diet, Accu checks AC/HS  - home regimen with aspart 15 units TIDWM & lantus 36 units QHS  - 3/31 initiated aspart 3 units TID WM, continue detemir 12 units BID and low dose sliding scale insulin     Acute renal failure superimposed on stage 3 chronic kidney disease  - sCr basline is 0.8, creatinine on admission 2.3  - suspected to be pre-renal in setting of DKA  - acute phase resolved with fluids  - creatinine back to baseline  - continue monitor with  twice weekly BMPs, avoid nephrotoxic agents, renally dose medications when appropriate    Atrial fibrillation with RVR  - new onset, suspect due to acute illness with DKA  - recent thyroid studies show normal T4  - 2D echo unremarkable as below  - continue metoprolol for rate control; titrate up as clinically indicated  - addressed anticoagulation risk/benefit w/ Pt and daughter; patient would like to defer therapy at this time  - follow-up with cardiology during SNF stay and after discharge from SNF  - continue metoprolol 100 mg BID with holding parameters  - initiated Eliquis 5 mg BID per Cardiology note     Diastolic dysfunction  - most recent echo 1/2018 showed LVEF 60-65%, normal LV diastolic function and normal RV function  - Echo  · The estimated ejection fraction is 65%.  · The left ventricle is normal in size with normal systolic function.  · Normal left ventricular diastolic function.  · Normal right ventricular size with normal right ventricular systolic function.  · The estimated PA systolic pressure is 23 mmHg.  · Normal central venous pressure (3 mmHg).      GERD (gastroesophageal reflux disease)  - continue home pepcid 40 mg daily    Depression, moderate, recurrent  Generalized anxiety disorder  - Continue home Lexapro 10 mg daily    Acquired hypothyroidism  - TSH low, however free T4 WNL  - continue home levothyroxine 75 mcg daily     Essential hypertension  - holding home atenolol in favor of metoprolol in setting of a fib RVR  - continue lisinopril 10 mg daily    Insomnia  - melatonin 9 mg qHS    Obesity  - BMI 37  - RD following, appreciate recommendations    Debility   - Continue with PT/OT for gait training and strengthening and restoration of ADL's   - Encourage mobility, OOB in chair, and early ambulation as appropriate  - Fall precautions   - Monitor for bowel and bladder dysfunction  - Monitor for and prevent skin breakdown and pressure ulcers  - initiated DVT prophylaxis with  Lovenox 40 mg  daily         Anticipate disposition:  Home with home health      Follow-up needed during SNF stay-cardiology (3/30), endocrinology (4/1)    Appointments to reschedule- internal medicine 4/1    Follow-up needed after discharge from SNF:   - PCP, needs to be rescheduled  - cardiology and Endocrinology will likely schedule follow-up appointments after their above appointments    Future Appointments   Date Time Provider Department Center   4/1/2022  9:40 AM Vishal Ulloa MD [facility] IM [facility] PCW   4/1/2022  3:00 PM Darrin Montero MD [facility] ENDODIA [facility]   6/1/2022  3:00 PM Lois Almonte MD [facility] CARDIO [facility] Alanis NP  Department of Hospital Medicine   [facility]     DOS: 3/31/2022      Patient note was created using MModal Dictation.  Any errors in syntax or even information may not have been identified and edited on initial review prior to signing this note.

## 2022-03-31 NOTE — PT/OT/SLP PROGRESS
"Occupational Therapy   Treatment    Name: Courtney Engle  MRN: [id number]  Admit Date: 3/23/2022  Admitting Diagnosis:  Diabetes mellitus with ketoacidosis and lactic acidosis but without coma    General Precautions: Standard, fall   Orthopedic Precautions:N/A   Braces:       Recommendations:     Discharge Recommendations: home with home health  Level of Assistance Recommended at Discharge: Intermittent assistance for ADL's and homemaking tasks  Discharge Equipment Recommendations:  bedside commode, shower chair, walker, rolling  Barriers to discharge:  Decreased caregiver support    Assessment:     Courtney Engle is a 87 y.o. female with a medical diagnosis of Diabetes mellitus with ketoacidosis and lactic acidosis but without coma . Performance deficits affecting function are weakness, impaired endurance, impaired self care skills, impaired functional mobilty, gait instability, impaired balance, decreased ROM. Pt. participated well with session on this day. Pt is progressing well with session on this day still continues to requires cues with aspects of safety . Pt. Will continue to benefit from continued OT to progress towards goals    Rehab Potential is good    Activity tolerance:  Good    Plan:     Patient to be seen 6 x/week to address the above listed problems via self-care/home management, therapeutic activities, therapeutic exercises    · Plan of Care Expires: 04/24/22  · Plan of Care Reviewed with: patient    Subjective     Communicated with: nsannabelle prior to session. "What are we going to do today"    Pain/Comfort:  Pain Rating 1: 0/10  Pain Rating Post-Intervention 1: 0/10    Patient's cultural, spiritual, Lutheran conflicts given the current situation:  no    Objective:     Patient found up in chair  upon OT entry to room.     Functional Mobility/Transfers:  · Patient completed Sit <> Stand Transfer with contact guard assistance  with  rolling walker   · Patient completed Toilet Transfer Step Transfer " technique with stand by assistance with  rolling walker  · Functional Mobility: Pt. With fxl mobility throughout room and bathroom with RW and CGA. Pt. With w/c mobility from room to gym with good use of BUEs    Activities of Daily Living:  · Grooming: modified independence with grooming while seated  · Bathing: supervision with all bathing aspects while seated at sink level. Pt. declined shower on this day   · Upper Body Dressing: modified independence to doff/romi pullover gown   · Lower Body Dressing: supervision to doff/romi depends and BLE socks   · Toileting: supervision with hygiene and clothing management     [facility] 6 Click ADL: 21    OT Exercises: UE Ergometer 10 mins with moderate resistance    Treatment & Education:  Pt. With standing and therex performed to increase ROM, endurance selfcare task and fxl mobility for independence     Patient left up in chair with with   presentEducation:      GOALS:   Multidisciplinary Problems     Occupational Therapy Goals        Problem: Occupational Therapy    Goal Priority Disciplines Outcome Interventions   Occupational Therapy Goal     OT, PT/OT Ongoing, Progressing    Description: Goals to be met by: 4/07/2022    Patient will increase functional independence with ADLs by performing:    UE Dressing with [place].  LE Dressing with Modified [place].  Grooming while standing at sink with Modified [place].  Toileting from toilet with Modified [place] for hygiene and clothing management.   Bathing from shower on shower chair/bench with Supervision Assistance.  Step transfer with Modified [place] using AD.   Toilet transfer to toilet with Modified [place].  Upper extremity exercise program per handout, with supervision.  Pt will complete functional reaching task in standing x10 minutes with Supervision assistance for improved safety and independence in standing ADLs/IADLS.   Caregiver will be educated on level of assist  required to safely perform self care tasks and functional transfers.                     Time Tracking:     OT Date of Treatment: OT Date of Treatment: 03/31/22  OT Total Time (min): Total Time (min): 44 min    Billable Minutes:Self Care/Home Management 29  Therapeutic Exercise 15    3/31/2022   OT and HUGGINS have discussed the above patients goals and status in collaboration with Plan of Care.

## 2022-03-31 NOTE — PROGRESS NOTES
[facility]                                  Skilled Nursing Facility                   Progress Note     Admit Date: 3/23/2022  FAY 4/13/2022  Principal Problem:  Diabetes mellitus with ketoacidosis and lactic acidosis but without coma   HPI obtained from patient interview and chart review     Chief Complaint:  Bradycardia    HPI:   Mrs. Engle is a 86 year old female with PMHx of IDDM, HTN, HLD, diastolic dysfunction (most recent echo 2018 normal), CKD 3, hypothyroidism and GERD who presents to SNF following hospitalization for DKA, encephalopathy, new AFib.  Admission to SNF for secondary weakness debility.     Interval history:  Patient with 24 hour heart rate 48-72.  Patient is asymptomatic from her bradycardia.  She is currently receiving metoprolol 150 mg BID, will initiate a lower dose.    Past Medical History: Patient has a past medical history of Acquired hypothyroidism (4/25/2014), Anxiety, Aortic calcification (12/8/2016), Arthritis, Bilateral carotid artery disease (12/8/2016), CKD stage 3 due to type 2 diabetes mellitus (2/16/2017), Depression, Essential hypertension, Fever blister, GERD (gastroesophageal reflux disease), Glaucoma suspect with open angle (2010), Hyperlipidemia LDL goal <70 (2/16/2017), Keloid cicatrix, Mixed stress and urge urinary incontinence (2/16/2017), Morbid obesity with BMI of 40.0-44.9, adult (12/8/2016), Ocular migraine (1/24/2013), and Type 2 diabetes mellitus with hyperglycemia, with long-term current use of insulin.    Past Surgical History: Patient has a past surgical history that includes Cholecystectomy; TONSILLECTOMY, ADENOIDECTOMY; knee replacement right; Joint replacement; Hysterectomy; Colonoscopy w/ polypectomy (04/20/2015); Esophagogastroduodenoscopy (04/20/2015); Cataract extraction w/  intraocular lens implant (Right, 05/30/2012); Cataract extraction w/  intraocular lens implant (Left,  05/26/2010); and Colonoscopy (N/A, 12/20/2019).    Social History: Patient reports that she has never smoked. She has never used smokeless tobacco. She reports that she does not drink alcohol and does not use drugs.    Family History: family history includes Cancer in her mother; Colon cancer in her mother; Glaucoma in her father; Heart attack in her maternal grandmother; Heart attack (age of onset: 93) in her father; Heart disease in her maternal grandmother and mother; Hematuria in her brother; Hypertension in her maternal grandmother; Nephrolithiasis in her father; No Known Problems in her daughter; Stroke in her father; Uterine cancer in her daughter.    Allergies: Patient is allergic to contac 12 hour allergy, diphenhydramine hcl, ciprofloxacin (bulk), (d)-limonene flavor, anti-hyst, antihistamines - alkylamine, ciprocinonide, codeine, contact metal agent, glipizide, hydroxyzine, iodinated contrast media, nitrofuran analogues, penicillin, propoxyphene, statins-hmg-coa reductase inhibitors, doxycycline, penicillins, sulfa (sulfonamide antibiotics), and sulfacetamide sodium-urea.    ROS  Constitutional: Negative for fever   Eyes: Negative for blurred vision, double vision   Respiratory: Negative for cough.  + shortness of breath at baseline  Cardiovascular: Negative for chest pain, palpitations, and leg swelling.   Gastrointestinal: Negative for abdominal pain, diarrhea.  +intermittent constipation/nausea  Genitourinary: Negative for dysuria, frequency   Musculoskeletal:  + generalized weakness. Negative for back pain and myalgias.   Skin: Negative for itching and rash.   Neurological: Negative for dizziness, headaches.   Psychiatric/Behavioral: Negative for depression. The patient is not nervous/anxious.      24 hour Vital Sign Range   Temp:  [97.9 °F (36.6 °C)-98.7 °F (37.1 °C)]   Pulse:  [54-61]   Resp:  [18]   BP: (127-168)/(57-70)   SpO2:  [92 %-97 %]     PEx  Constitutional: Patient appears debilitated.  No  distress noted  HENT:  Poor dentition  Head: Normocephalic and atraumatic.   Eyes: Pupils are equal, round  Neck: Normal range of motion. Neck supple.   Cardiovascular:  Decreased rate, regular rhythm and normal heart sounds.    Pulmonary/Chest: Effort normal and breath sounds are clear  Abdominal: Soft. Bowel sounds are normal.   Musculoskeletal: Normal range of motion.   Neurological: Alert and oriented to person, place, and time.   Psychiatric: Normal mood and affect. Behavior is normal.   Skin: Skin is warm and dry.     No results for input(s): GLUCOSE, NA, K, CL, CO2, BUN, CREATININE, MG in the last 24 hours.    Invalid input(s):  CALCIUM    No results for input(s): WBC, RBC, HGB, HCT, PLT, MCV, MCH, MCHC in the last 24 hours.      Assessment and Plan:    Bradycardia  Atrial fibrillation with RVR  - new onset, suspect due to acute illness with DKA  - recent thyroid studies show normal T4  - 2D echo unremarkable as below  - admitted to SNF with metoprolol 150 mg BID for rate control; titrate up as clinically indicated  - addressed anticoagulation risk/benefit w/ Pt and daughter; patient would like to defer therapy at this time  - follow-up with cardiology during SNF stay and after discharge from SNF  - 3/30 initiated metoprolol 100 mg BID with holding parameters    Type 2 diabetes mellitus without complication, with long-term current use of insulin  Diabetes mellitus with ketoacidosis and lactic acidosis but without coma  - last A1C >14 on 3/17/22  - suspect secondary to medication non-compliance and dietary indiscretion  - diabetic diet, Accu checks AC/HS  - home regimen with aspart 15 units TIDWM & lantus 36 units QHS  - continue detemir 12 units BID and low dose sliding scale insulin     Acute renal failure superimposed on stage 3 chronic kidney disease  - sCr basline is 0.8, creatinine on admission 2.3  - suspected to be pre-renal in setting of DKA  - acute phase resolved with fluids  - creatinine back to  baseline  - continue monitor with twice weekly BMPs, avoid nephrotoxic agents, renally dose medications when appropriate     Diastolic dysfunction  - most recent echo 1/2018 showed LVEF 60-65%, normal LV diastolic function and normal RV function  - Echo  · The estimated ejection fraction is 65%.  · The left ventricle is normal in size with normal systolic function.  · Normal left ventricular diastolic function.  · Normal right ventricular size with normal right ventricular systolic function.  · The estimated PA systolic pressure is 23 mmHg.  · Normal central venous pressure (3 mmHg).      GERD (gastroesophageal reflux disease)  - continue home pepcid 40 mg daily    Depression, moderate, recurrent  Generalized anxiety disorder  - Continue home Lexapro 10 mg daily    Acquired hypothyroidism  - TSH low, however free T4 WNL  - continue home levothyroxine 75 mcg daily     Essential hypertension  - holding home atenolol in favor of metoprolol in setting of a fib RVR  - continue lisinopril 10 mg daily    Insomnia  - melatonin 9 mg qHS    Obesity  - BMI 37  - RD following, appreciate recommendations    Debility   - Continue with PT/OT for gait training and strengthening and restoration of ADL's   - Encourage mobility, OOB in chair, and early ambulation as appropriate  - Fall precautions   - Monitor for bowel and bladder dysfunction  - Monitor for and prevent skin breakdown and pressure ulcers  - initiated DVT prophylaxis with  Lovenox 40 mg daily         Anticipate disposition:  Home with home health      Follow-up needed during SNF stay-cardiology (3/30), endocrinology (4/1)    Appointments to reschedule- internal medicine 4/1    Follow-up needed after discharge from SNF:   - PCP, needs to be rescheduled  - cardiology and Endocrinology will likely schedule follow-up appointments after their above appointments    Future Appointments   Date Time Provider Department Center   4/1/2022  9:40 AM Vishal Ulloa MD [facility]  [facility]   4/1/2022  3:00 PM Darrin Montero MD [facility] ENDODIA [facility]   6/1/2022  3:00 PM Lois Almonte MD [facility] CARDIO [facility] NP  Department of [facility] Medicine   [facility] Sk[facility] Facility     DOS: 3/30/2022       Patient note was created using MModal Dictation.  Any errors in syntax or even information may not have been identified and edited on initial review prior to signing this note.

## 2022-04-01 PROBLEM — Z51.5 PALLIATIVE CARE ENCOUNTER: Status: ACTIVE | Noted: 2022-01-01

## 2022-04-01 PROBLEM — R53.81 DEBILITY: Status: ACTIVE | Noted: 2022-01-01

## 2022-04-01 NOTE — SUBJECTIVE & OBJECTIVE
Interval History:   This pleasant lady was seen while sitting in bedside chair in no acute distress.   Introduced myself as a member of the palliative care team, and described how we support patients and families dealing with serious illness.  GOC/ACP discussions were held, see Palliative care encounter A&P for details.      Past Medical History:   Diagnosis Date    Acquired hypothyroidism 4/25/2014    Anxiety     Aortic calcification 12/8/2016    X-Ray Chest-5/08/2014    Arthritis     Bilateral carotid artery disease 12/8/2016    US Carotid Bilateral-1/24/2013    CKD stage 3 due to type 2 diabetes mellitus 2/16/2017    Depression     Essential hypertension     Fever blister     GERD (gastroesophageal reflux disease)     Glaucoma 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 robledo)     Hyperlipidemia LDL goal <70 2/16/2017    Keloid cicatrix     Mixed stress and urge urinary incontinence 2/16/2017    Morbid obesity with BMI of 40.0-44.9, adult 12/8/2016    Ocular migraine 1/24/2013    Type 2 diabetes mellitus with hyperglycemia, with long-term current use of insulin        Past Surgical History:   Procedure Laterality Date    CATARACT EXTRACTION W/  INTRAOCULAR LENS IMPLANT Right 05/30/2012        CATARACT EXTRACTION W/  INTRAOCULAR LENS IMPLANT Left 05/26/2010        CHOLECYSTECTOMY      COLONOSCOPY N/A 12/20/2019    Procedure: COLONOSCOPY;  Surgeon: Higinio Gilbert MD;  Location: [address]);  Service: Endoscopy;  Laterality: N/A;  patient to call back to schedule Colonoscopy once she schedules Cardiology Clearance appt-BB  8/30/19 Low CV risk for colonospcopy per   patient requesting  but can't wait for his next available due to rectal bleeding    COLONOSCOPY W/ POLYPECTOMY  04/20/2015    ESOPHAGOGASTRODUODENOSCOPY  04/20/2015    HYSTERECTOMY      Partial    JOINT REPLACEMENT      knee replacement right      TONSILLECTOMY, ADENOIDECTOMY      tonsillectomy  only       Review of patient's allergies indicates:   Allergen Reactions    Contac 12 hour allergy Anaphylaxis and Hives    Diphenhydramine hcl Shortness Of Breath     Other reaction(s): Shortness of breath    Ciprofloxacin (bulk) Nausea And Vomiting    (d)-limonene flavor Hives     Other reaction(s): Shortness of breath    Anti-hyst Other (See Comments)    Antihistamines - alkylamine Hives    Ciprocinonide      Other reaction(s): Stomach upset    Codeine      bad reaction    Contact metal agent      Other reaction(s): Hives    Glipizide Hives    Hydroxyzine      Unknown    Iodinated contrast media Other (See Comments)    Nitrofuran analogues     Penicillin      Other reaction(s): Rash    Propoxyphene Itching    Statins-hmg-coa reductase inhibitors      Nausea to all statins    Doxycycline Rash    Penicillins Rash    Sulfa (sulfonamide antibiotics) Rash    Sulfacetamide sodium-urea Rash       Medications:  Continuous Infusions:  Scheduled Meds:   apixaban  5 mg Oral BID    aspirin  81 mg Oral Daily    diclofenac sodium  4 g Topical (Top) BID    EScitalopram oxalate  10 mg Oral Daily    famotidine  40 mg Oral Daily    fluticasone propionate  2 spray Each Nostril Daily    insulin aspart U-100  3 Units Subcutaneous TIDWM    insulin detemir U-100  12 Units Subcutaneous BID    levothyroxine  75 mcg Oral Before breakfast    lisinopriL  10 mg Oral Daily    melatonin  9 mg Oral Nightly    metoprolol tartrate  100 mg Oral BID    senna-docusate 8.6-50 mg  1 tablet Oral BID     PRN Meds:acetaminophen, acetaminophen, albuterol-ipratropium, benzonatate, calcium carbonate, dextrose 50%, dextrose 50%, glucagon (human recombinant), glucose, glucose, insulin aspart U-100, ondansetron    Family History       Problem Relation (Age of Onset)    Cancer Mother    Colon cancer Mother    Glaucoma Father    Heart attack Father (93), Maternal Grandmother    Heart disease Mother, Maternal Grandmother    Hematuria Brother    Hypertension  Maternal Grandmother    Nephrolithiasis Father    No Known Problems Daughter    Stroke Father    Uterine cancer Daughter          Tobacco Use    Smoking status: Never Smoker    Smokeless tobacco: Never Used   Substance and Sexual Activity    Alcohol use: No    Drug use: No    Sexual activity: Never       Review of Systems   Constitutional:  Positive for activity change and fatigue. Negative for appetite change.   HENT:  Positive for dental problem. Negative for trouble swallowing.    Respiratory:  Positive for shortness of breath (intermittent). Negative for cough.    Cardiovascular:  Negative for chest pain.   Gastrointestinal:  Negative for abdominal pain, constipation, nausea and vomiting.   Genitourinary:  Negative for difficulty urinating and flank pain.   Musculoskeletal:  Positive for joint swelling (chronic- left knee).   Skin:  Negative for color change.   Neurological:  Positive for weakness (generalized) and headaches.   Psychiatric/Behavioral:  Negative for confusion and sleep disturbance. The patient is not nervous/anxious.    Objective:     Vital Signs (Most Recent):  Temp: 97.9 °F (36.6 °C) (04/01/22 0913)  Pulse: 60 (04/01/22 1130)  Resp: 17 (04/01/22 0913)  BP: 138/74 (04/01/22 1130)  SpO2: 98 % (04/01/22 0913) Vital Signs (24h Range):  Temp:  [97.9 °F (36.6 °C)-98.5 °F (36.9 °C)] 97.9 °F (36.6 °C)  Pulse:  [57-60] 60  Resp:  [14-17] 17  SpO2:  [96 %-98 %] 98 %  BP: (138-150)/() 138/74     Weight: 81.8 kg (180 lb 5.4 oz)  Body mass index is 37.69 kg/m².    Physical Exam  Vitals and nursing note reviewed.   Constitutional:       General: She is not in acute distress.     Appearance: Normal appearance.   HENT:      Head: Normocephalic.      Comments: Bitemporal wasting noted     Nose: Nose normal. No congestion.      Mouth/Throat:      Mouth: Mucous membranes are moist.   Eyes:      Extraocular Movements: Extraocular movements intact.      Conjunctiva/sclera: Conjunctivae normal.   Pulmonary:     "  Effort: Pulmonary effort is normal. No respiratory distress.   Abdominal:      General: There is no distension.      Palpations: Abdomen is soft.      Tenderness: There is no abdominal tenderness.   Musculoskeletal:         General: Swelling (trace) present.   Skin:     General: Skin is warm and dry.   Neurological:      Mental Status: She is alert and oriented to person, place, and time.      Motor: Weakness (generalized) present.   Psychiatric:         Mood and Affect: Mood normal.         Behavior: Behavior normal.         Thought Content: Thought content normal.       Review of Symptoms      Symptom Assessment (ESAS 0-10 Scale)  Pain:  0  Dyspnea:  2  Anxiety:  0  Nausea:  0  Depression:  0  Fatigue:  0  Insomnia:  0     CAM / Delirium:  Negative  Constipation:  Negative  Diarrhea:  Negative    Bowel Management Plan (BMP):  Yes      Comments:  Senna    Modified Eden Scale:  0.5    Performance Status:  70 (Prior to this hospitalization)    Karnofsky Performance Scale:  50%    Living Arrangements:  Lives alone    Psychosocial/Cultural: .  Has a daughter, Patito Chen" as primary support. She lives alone with many cats.  PLOF was grossly independent, able to buy groceries, and drive.    Spiritual:  F - Rohini and Belief:  Cheondoism  A - Address in Care:  Open to       Advance Care Planning   Advance Directives:   Living Will: No    LaPOST: No    Do Not Resuscitate Status: No    Medical Power of : Yes    Agent's Name:  Patito Britton (Patty)   Agent's Contact Number:  [phone]    Decision Making:  Patient answered questions       Significant Labs: All pertinent labs within the past 24 hours have been reviewed.  CBC:   Recent Labs   Lab 04/01/22  0454   WBC 7.82   HGB 12.5   HCT 41.3   MCV 92   *     BMP:  Recent Labs   Lab 04/01/22  0454         K 4.1      CO2 26   BUN 10   CREATININE 0.8   CALCIUM 9.3   MG 1.6     LFT:  Lab Results   Component Value Date    " AST 11 03/23/2022    ALKPHOS 64 03/23/2022    BILITOT 0.5 03/23/2022     Albumin:   Albumin   Date Value Ref Range Status   03/23/2022 2.3 (L) 3.5 - 5.2 g/dL Final     Protein:   Total Protein   Date Value Ref Range Status   03/23/2022 5.9 (L) 6.0 - 8.4 g/dL Final     Lactic acid:   Lab Results   Component Value Date    LACTATE 2.2 03/18/2022    LACTATE 2.5 (H) 03/17/2022       Significant Imaging: I have reviewed all pertinent imaging results/findings within the past 24 hours.  X-Ray Chest 1 View  Narrative: EXAMINATION:  XR CHEST 1 VIEW    CLINICAL HISTORY:  Aspiration;    TECHNIQUE:  Single frontal view of the chest was performed.    COMPARISON:  Prior exams dating back to 2004    FINDINGS:  Since March 17, 2022, new trace left effusion.    New mild left lower lung zone interstitial opacities.    Right lung is clear.  No dense focal consolidation.    No right effusion.    No pneumothorax.    No acute bone abnormality.  Impression: Since March 17, 2022, new trace left effusion.    New mild left lower lung zone interstitial opacities, possibly edema or mild infectious/inflammatory disease (including aspiration).    Electronically signed by: Luis Alvarado MD  Date:    03/26/2022  Time:    10:17

## 2022-04-01 NOTE — PLAN OF CARE
Problem: Adult Inpatient Plan of Care  Goal: Plan of Care Review  Outcome: Ongoing, Progressing  Flowsheets (Taken 4/1/2022 0247)  Plan of Care Reviewed With: patient  Goal: Patient-Specific Goal (Individualized)  Outcome: Ongoing, Progressing  Goal: Absence of Hospital-Acquired Illness or Injury  Outcome: Ongoing, Progressing  Intervention: Identify and Manage Fall Risk  Flowsheets (Taken 4/1/2022 0247)  Safety Promotion/Fall Prevention:   assistive device/personal item within reach   diversional activities provided   Fall Risk signage in place   lighting adjusted   medications reviewed   side rails raised x 2   instructed to call staff for mobility  Goal: Optimal Comfort and Wellbeing  Outcome: Ongoing, Progressing  Intervention: Provide Person-Centered Care  Flowsheets (Taken 4/1/2022 0247)  Trust Relationship/Rapport:   questions answered   care explained   emotional support provided   questions encouraged   choices provided   reassurance provided   empathic listening provided   thoughts/feelings acknowledged     Problem: Diabetes Comorbidity  Goal: Blood Glucose Level Within Targeted Range  Outcome: Ongoing, Progressing  Intervention: Monitor and Manage Glycemia  Flowsheets (Taken 4/1/2022 0247)  Glycemic Management:   blood glucose monitored   supplemental insulin given     Problem: Fluid and Electrolyte Imbalance (Acute Kidney Injury/Impairment)  Goal: Fluid and Electrolyte Balance  Outcome: Ongoing, Progressing  Intervention: Monitor and Manage Fluid and Electrolyte Balance  Flowsheets (Taken 4/1/2022 0247)  Fluid/Electrolyte Management: fluids provided     Problem: Oral Intake Inadequate (Acute Kidney Injury/Impairment)  Goal: Optimal Nutrition Intake  Outcome: Ongoing, Progressing  Intervention: Promote and Optimize Nutrition  Flowsheets (Taken 4/1/2022 0247)  Oral Nutrition Promotion:   nutritional therapy counseling provided   rest periods promoted   safe use of adaptive equipment encouraged      Problem: Renal Function Impairment (Acute Kidney Injury/Impairment)  Goal: Effective Renal Function  Outcome: Ongoing, Progressing  Intervention: Monitor and Support Renal Function  Flowsheets (Taken 4/1/2022 0247)  Medication Review/Management: medications reviewed     Problem: Fall Injury Risk  Goal: Absence of Fall and Fall-Related Injury  Outcome: Ongoing, Progressing  Intervention: Identify and Manage Contributors  Flowsheets (Taken 4/1/2022 0247)  Self-Care Promotion:   independence encouraged   BADL personal objects within reach   safe use of adaptive equipment encouraged   BADL personal routines maintained  Medication Review/Management: medications reviewed

## 2022-04-01 NOTE — PT/OT/SLP PROGRESS
"Occupational Therapy      Patient Name:  Courtney Engle   MRN:  [id number]    Patient not seen today secondary to refusal in AM 2* to reporting "Im not longer doing therapy before lunch" and Pt. Sleeping on 2nd attempt. Writing therapist unable to return for 3rd attempt. Will follow-up with OT POC    4/1/2022  "

## 2022-04-01 NOTE — HPI
Courtney Engle is a 87 y.o. female with oast medical history significant for IDDM, HTN, HLD, diastolic dysfunction (most recent echo 2018 normal), CKD 3, hypothyroidism and GERD who presents to SNF following hospitalization for DKA, encephalopathy, new AFib.  Admission to SNF for secondary weakness debility. Palliative Medicine team consulted for GOC/ACP discussions.    Prophylactic measure

## 2022-04-01 NOTE — PLAN OF CARE
Problem: Adult Inpatient Plan of Care  Goal: Plan of Care Review  Outcome: Ongoing, Progressing  Goal: Patient-Specific Goal (Individualized)  Outcome: Ongoing, Progressing  Goal: Absence of Hospital-Acquired Illness or Injury  Outcome: Ongoing, Progressing  Goal: Optimal Comfort and Wellbeing  Outcome: Ongoing, Progressing  Goal: Readiness for Transition of Care  Outcome: Ongoing, Progressing     Problem: Diabetes Comorbidity  Goal: Blood Glucose Level Within Targeted Range  Outcome: Ongoing, Progressing     Problem: Fluid and Electrolyte Imbalance (Acute Kidney Injury/Impairment)  Goal: Fluid and Electrolyte Balance  Outcome: Ongoing, Progressing     Problem: Oral Intake Inadequate (Acute Kidney Injury/Impairment)  Goal: Optimal Nutrition Intake  Outcome: Ongoing, Progressing     Problem: Renal Function Impairment (Acute Kidney Injury/Impairment)  Goal: Effective Renal Function  Outcome: Ongoing, Progressing     Problem: Fall Injury Risk  Goal: Absence of Fall and Fall-Related Injury  Outcome: Ongoing, Progressing     Problem: Coping Ineffective  Goal: Effective Coping  Outcome: Ongoing, Progressing

## 2022-04-01 NOTE — CONSULTS
[facility] - Skilled Nursing  Palliative Medicine  Consult Note    Patient Name: Courtney Engle  MRN: [id number]  Admission Date: 3/23/2022  Hospital Length of Stay: 9 days  Date of Service: 4/1/2022  Code Status: Full Code   Attending Provider: Jose Manuel Wu MD  Consulting Provider: Jesenia Reyes NP  Primary Care Physician: Vishal Ulloa MD  Principal Problem:Diabetes mellitus with ketoacidosis and lactic acidosis but without coma    Patient information was obtained from patient, past medical records and primary team.      Inpatient consult to Palliative Care  Consult performed by: Jesenia Reyes NP  Consult ordered by: Marilynn Alanis NP        Assessment/Plan:     * Diabetes mellitus with ketoacidosis and lactic acidosis but without coma  Type 2 diabetes mellitus without complication, with long-term current use of insulin  CKD stage 3 due to type 2 diabetes mellitus  Hemoglobin A1C   Date Value Ref Range Status   03/17/2022 >14.0 (H) 4.0 - 5.6 % Final   12/09/2019 9.2 (H) 4.0 - 5.6 % Final   08/09/2019 9.5 (H) 4.0 - 5.6 % Final   Managed by primary team      Palliative care encounter  Impression:    1) Symptoms:    None     2) Medicolegal: Patient has decision making capacity.  Daughter is surrogate decision maker.   Patito Alfonso has been designated as HCPOA.     3) Psychosocial:  Support system consists of Patito alfonso     4) Spiritual: Baptism    5) Prognostication:  This 87 y.o. female s/p hospitalization for DKA, encephalopathy, new AFib.  Admitted to SNF for secondary weakness debility.  Given age, debility and frailty, prognosis is guarded.    6) Understanding of disease and Illness Trajectory: Patient  has  minimal understanding of her illness, they can benefit from continued education on what to expect in the future.    7) Goals of care:    Advance Care Planning   Date: 04/01/2022  Power of   I initiated the process of advance care planning today and explained the  "importance of this process to the patient.  I introduced the concept of advance directives to the patient, as well. Then the patient received detailed information about the importance of designating a Health Care Power of  (HCPOA). She was also instructed to communicate with this person about their wishes for future healthcare, should she become sick and lose decision-making capacity. The patient has not previously appointed a HCPOA. After our discussion, the patient has decided to complete a HCPOA and has appointed her daughter, health care agent: Jeff Chen" Tobi & health care agent number: [phone]. Forms signed, witnessed, uploaded to Epic.  Originals along with Palliative Medicine brochure given to patient at bedside.      Code Status  In light of the patients advanced and life limiting illness,I engaged the patient in a conversation about the patient's preferences for care  at the very end of life. The patient wishes to have CPR or other invasive treatments performed when her heart and/or breathing stops. I communicated to the patient that full code status will remain in file.     [facility]  I engaged the patient in a conversation about advance care planning and we specifically addressed what the goals of care would be moving forward, in light of the patient's change in clinical status, specifically hopes and fears. She shares that she hopes to get better and return home. She states that she has been treated like a queen here. She endorses being worried about her 7 cats.  States that her daughter put them out based on concerns of lack of cleanliness of home.  We did specifically address the patient's likely prognosis, which is guarded.  We explored the patient's values and preferences for future care.  The patient endorses that what is most important right now is to focus on spending time at home, avoiding the hospital, remaining as independent as possible and quality of life, even if it means " sacrificing a little time    Accordingly, we have decided that the best plan to meet the patient's goals includes continuing with treatment    Total time of the visit 81 minutes  Non physical exam/ non charting time: 75 minutes ( >50% of total time) spent on extensive chart review which includes collaboration and discussion of care with IDT team (Primary team, CM, SW, PT/OT, SLP, and Wound care NP), review of all consultation notes, nurses's notes, vital signs, all pertinent laboratory and radiographical images. Additionally, provided emotional support, formulated and communicated prognosis and explored burden/benefit of various approaches of treatment.     37 min time was spent on advance care planning, separate from above total time.    8) Code status: Full Code    9) Advance directives: HCPOA    Summary and recommendations:  Continue ongoing ACP/GOC discussions        Debility  Continue with PT/OT for gait training and strengthening and restoration of ADL's   Encourage mobility, OOB in chair, and early ambulation as appropriate  Fall precautions   Monitor for bowel and bladder dysfunction      Acute renal failure superimposed on stage 3 chronic kidney disease  Managed by primary team      Paroxysmal atrial fibrillation  New onset, suspect due to acute illness with DKA  Managed by primary team    Chronic diastolic heart failure  Echo-3/17/2022  Interpretation Summary  · The estimated ejection fraction is 65%.  · The left ventricle is normal in size with normal systolic function.  · Normal left ventricular diastolic function.  · Normal right ventricular size with normal right ventricular systolic function.  · The estimated PA systolic pressure is 23 mmHg.  · Normal central venous pressure (3 mmHg).  Managed by primary team      Moderate recurrent major depression  Generalized anxiety disorder  - Continue home Lexapro 10 mg daily  Managed by primary team    Essential hypertension  Managed by primary team          Thank you for your consult. I will follow-up with patient. Please contact us if you have any additional questions.    Subjective:     HPI:   Courtney Engle is a 87 y.o. female with oast medical history significant for IDDM, HTN, HLD, diastolic dysfunction (most recent echo 2018 normal), CKD 3, hypothyroidism and GERD who presents to SNF following hospitalization for DKA, encephalopathy, new AFib.  Admission to SNF for secondary weakness debility. Palliative Medicine team consulted for GOC/ACP discussions.       Hospital Course:  No notes on file    Interval History:   This pleasant lady was seen while sitting in bedside chair in no acute distress.   Introduced myself as a member of the palliative care team, and described how we support patients and families dealing with serious illness.  GOC/ACP discussions were held, see Palliative care encounter A&P for details.      Past Medical History:   Diagnosis Date    Acquired hypothyroidism 4/25/2014    Anxiety     Aortic calcification 12/8/2016    X-Ray Chest-5/08/2014    Arthritis     Bilateral carotid artery disease 12/8/2016    US Carotid Bilateral-1/24/2013    CKD stage 3 due to type 2 diabetes mellitus 2/16/2017    Depression     Essential hypertension     Fever blister     GERD (gastroesophageal reflux disease)     Glaucoma 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 robledo)     Hyperlipidemia LDL goal <70 2/16/2017    Keloid cicatrix     Mixed stress and urge urinary incontinence 2/16/2017    Morbid obesity with BMI of 40.0-44.9, adult 12/8/2016    Ocular migraine 1/24/2013    Type 2 diabetes mellitus with hyperglycemia, with long-term current use of insulin        Past Surgical History:   Procedure Laterality Date    CATARACT EXTRACTION W/  INTRAOCULAR LENS IMPLANT Right 05/30/2012        CATARACT EXTRACTION W/  INTRAOCULAR LENS IMPLANT Left 05/26/2010        CHOLECYSTECTOMY      COLONOSCOPY N/A 12/20/2019     Procedure: COLONOSCOPY;  Surgeon: Higinio Gilbert MD;  Location: [facility] ([address] [place] [address]);  Service: Endoscopy;  Laterality: N/A;  patient to call back to schedule Colonoscopy once she schedules Cardiology Clearance appt-BB  8/30/19 Low CV risk for colonospcopy per   patient requesting  but can't wait for his next available due to rectal bleeding    COLONOSCOPY W/ POLYPECTOMY  04/20/2015    ESOPHAGOGASTRODUODENOSCOPY  04/20/2015    HYSTERECTOMY      Partial    JOINT REPLACEMENT      knee replacement right      TONSILLECTOMY, ADENOIDECTOMY      tonsillectomy only       Review of patient's allergies indicates:   Allergen Reactions    Contac 12 hour allergy Anaphylaxis and Hives    Diphenhydramine hcl Shortness Of Breath     Other reaction(s): Shortness of breath    Ciprofloxacin (bulk) Nausea And Vomiting    (d)-limonene flavor Hives     Other reaction(s): Shortness of breath    Anti-hyst Other (See Comments)    Antihistamines - alkylamine Hives    Ciprocinonide      Other reaction(s): Stomach upset    Codeine      bad reaction    Contact metal agent      Other reaction(s): Hives    Glipizide Hives    Hydroxyzine      Unknown    Iodinated contrast media Other (See Comments)    Nitrofuran analogues     Penicillin      Other reaction(s): Rash    Propoxyphene Itching    Statins-hmg-coa reductase inhibitors      Nausea to all statins    Doxycycline Rash    Penicillins Rash    Sulfa (sulfonamide antibiotics) Rash    Sulfacetamide sodium-urea Rash       Medications:  Continuous Infusions:  Scheduled Meds:   apixaban  5 mg Oral BID    aspirin  81 mg Oral Daily    diclofenac sodium  4 g Topical (Top) BID    EScitalopram oxalate  10 mg Oral Daily    famotidine  40 mg Oral Daily    fluticasone propionate  2 spray Each Nostril Daily    insulin aspart U-100  3 Units Subcutaneous TIDWM    insulin detemir U-100  12 Units Subcutaneous BID    levothyroxine  75 mcg Oral Before  breakfast    lisinopriL  10 mg Oral Daily    melatonin  9 mg Oral Nightly    metoprolol tartrate  100 mg Oral BID    senna-docusate 8.6-50 mg  1 tablet Oral BID     PRN Meds:acetaminophen, acetaminophen, albuterol-ipratropium, benzonatate, calcium carbonate, dextrose 50%, dextrose 50%, glucagon (human recombinant), glucose, glucose, insulin aspart U-100, ondansetron    Family History       Problem Relation (Age of Onset)    Cancer Mother    Colon cancer Mother    Glaucoma Father    Heart attack Father (93), Maternal Grandmother    Heart disease Mother, Maternal Grandmother    Hematuria Brother    Hypertension Maternal Grandmother    Nephrolithiasis Father    No Known Problems Daughter    Stroke Father    Uterine cancer Daughter          Tobacco Use    Smoking status: Never Smoker    Smokeless tobacco: Never Used   Substance and Sexual Activity    Alcohol use: No    Drug use: No    Sexual activity: Never       Review of Systems   Constitutional:  Positive for activity change and fatigue. Negative for appetite change.   HENT:  Positive for dental problem. Negative for trouble swallowing.    Respiratory:  Positive for shortness of breath (intermittent). Negative for cough.    Cardiovascular:  Negative for chest pain.   Gastrointestinal:  Negative for abdominal pain, constipation, nausea and vomiting.   Genitourinary:  Negative for difficulty urinating and flank pain.   Musculoskeletal:  Positive for joint swelling (chronic- left knee).   Skin:  Negative for color change.   Neurological:  Positive for weakness (generalized) and headaches.   Psychiatric/Behavioral:  Negative for confusion and sleep disturbance. The patient is not nervous/anxious.    Objective:     Vital Signs (Most Recent):  Temp: 97.9 °F (36.6 °C) (04/01/22 0913)  Pulse: 60 (04/01/22 1130)  Resp: 17 (04/01/22 0913)  BP: 138/74 (04/01/22 1130)  SpO2: 98 % (04/01/22 0913) Vital Signs (24h Range):  Temp:  [97.9 °F (36.6 °C)-98.5 °F (36.9 °C)] 97.9  "°F (36.6 °C)  Pulse:  [57-60] 60  Resp:  [14-17] 17  SpO2:  [96 %-98 %] 98 %  BP: (138-150)/() 138/74     Weight: 81.8 kg (180 lb 5.4 oz)  Body mass index is 37.69 kg/m².    Physical Exam  Vitals and nursing note reviewed.   Constitutional:       General: She is not in acute distress.     Appearance: Normal appearance.   HENT:      Head: Normocephalic.      Comments: Bitemporal wasting noted     Nose: Nose normal. No congestion.      Mouth/Throat:      Mouth: Mucous membranes are moist.   Eyes:      Extraocular Movements: Extraocular movements intact.      Conjunctiva/sclera: Conjunctivae normal.   Pulmonary:      Effort: Pulmonary effort is normal. No respiratory distress.   Abdominal:      General: There is no distension.      Palpations: Abdomen is soft.      Tenderness: There is no abdominal tenderness.   Musculoskeletal:         General: Swelling (trace) present.   Skin:     General: Skin is warm and dry.   Neurological:      Mental Status: She is alert and oriented to person, place, and time.      Motor: Weakness (generalized) present.   Psychiatric:         Mood and Affect: Mood normal.         Behavior: Behavior normal.         Thought Content: Thought content normal.       Review of Symptoms      Symptom Assessment (ESAS 0-10 Scale)  Pain:  0  Dyspnea:  2  Anxiety:  0  Nausea:  0  Depression:  0  Fatigue:  0  Insomnia:  0     CAM / Delirium:  Negative  Constipation:  Negative  Diarrhea:  Negative    Bowel Management Plan (BMP):  Yes      Comments:  Senna    Modified Eden Scale:  0.5    Performance Status:  70 (Prior to this hospitalization)    Karnofsky Performance Scale:  50%    Living Arrangements:  Lives alone    Psychosocial/Cultural: .  Has a daughter, Patito Chen" as primary support. She lives alone with many cats.  PLOF was grossly independent, able to buy groceries, and drive.    Spiritual:  F - Rohini and Belief:  Holiness  A - Address in Care:  Open to       Advance Care " "Planning   Advance Directives:   Living Will: No    LaPOST: No    Do Not Resuscitate Status: No    Medical Power of : Yes    Agent's Name:  Patito Britton (Patty)   Agent's Contact Number:  [phone]    Decision Making:  Patient answered questions       Significant Labs: All pertinent labs within the past 24 hours have been reviewed.  CBC:   Recent Labs   Lab 04/01/22  0454   WBC 7.82   HGB 12.5   HCT 41.3   MCV 92   *     BMP:  Recent Labs   Lab 04/01/22 0454         K 4.1      CO2 26   BUN 10   CREATININE 0.8   CALCIUM 9.3   MG 1.6     LFT:  Lab Results   Component Value Date    AST 11 03/23/2022    ALKPHOS 64 03/23/2022    BILITOT 0.5 03/23/2022     Albumin:   Albumin   Date Value Ref Range Status   03/23/2022 2.3 (L) 3.5 - 5.2 g/dL Final     Protein:   Total Protein   Date Value Ref Range Status   03/23/2022 5.9 (L) 6.0 - 8.4 g/dL Final     Lactic acid:   Lab Results   Component Value Date    LACTATE 2.2 03/18/2022    LACTATE 2.5 (H) 03/17/2022       Significant Imaging: I have reviewed all pertinent imaging results/findings within the past 24 hours.  X-Ray Chest 1 View  Narrative: EXAMINATION:  XR CHEST 1 VIEW    CLINICAL HISTORY:  Aspiration;    TECHNIQUE:  Single frontal view of the chest was performed.    COMPARISON:  Prior exams dating back to 2004    FINDINGS:  Since March 17, 2022, new trace left effusion.    New mild left lower lung zone interstitial opacities.    Right lung is clear.  No dense focal consolidation.    No right effusion.    No pneumothorax.    No acute bone abnormality.  Impression: Since March 17, 2022, new trace left effusion.    New mild left lower lung zone interstitial opacities, possibly edema or mild infectious/inflammatory disease (including aspiration).    Electronically signed by: Luis Alvarado MD  Date:    03/26/2022  Time:    10:17       Jesenia Reyes NP  Palliative Medicine  [facility] - Skilled Nursing            "

## 2022-04-01 NOTE — ASSESSMENT & PLAN NOTE
"Impression:    1) Symptoms:    None     2) Medicolegal: Patient has decision making capacity.  Daughter is surrogate decision maker.   DaughterPatito has been designated as HCPOA.     3) Psychosocial:  Support system consists of daughterPatito     4) Spiritual: Orthodoxy    5) Prognostication:  This 87 y.o. female s/p hospitalization for DKA, encephalopathy, new AFib.  Admitted to SNF for secondary weakness debility.  Given age, debility and frailty, prognosis is guarded.    6) Understanding of disease and Illness Trajectory: Patient  has  minimal understanding of her illness, they can benefit from continued education on what to expect in the future.    7) Goals of care:    Advance Care Planning   Date: 04/01/2022  Power of   I initiated the process of advance care planning today and explained the importance of this process to the patient.  I introduced the concept of advance directives to the patient, as well. Then the patient received detailed information about the importance of designating a Health Care Power of  (HCPOA). She was also instructed to communicate with this person about their wishes for future healthcare, should she become sick and lose decision-making capacity. The patient has not previously appointed a HCPOA. After our discussion, the patient has decided to complete a HCPOA and has appointed her daughter, health care agent: Jeff Chen" Tobi & health care agent number: [phone]. Forms signed, witnessed, uploaded to Epic.  Originals along with Palliative Medicine brochure given to patient at bedside.      Code Status  In light of the patients advanced and life limiting illness,I engaged the patient in a conversation about the patient's preferences for care  at the very end of life. The patient wishes to have CPR or other invasive treatments performed when her heart and/or breathing stops. I communicated to the patient that full code status will remain in file. "     GO  I engaged the patient in a conversation about advance care planning and we specifically addressed what the goals of care would be moving forward, in light of the patient's change in clinical status, specifically hopes and fears. She shares that she hopes to get better and return home. She states that she has been treated like a queen here. She endorses being worried about her 7 cats.  States that her daughter put them out based on concerns of lack of cleanliness of home.  We did specifically address the patient's likely prognosis, which is guarded.  We explored the patient's values and preferences for future care.  The patient endorses that what is most important right now is to focus on spending time at home, avoiding the hospital, remaining as independent as possible and quality of life, even if it means sacrificing a little time    Accordingly, we have decided that the best plan to meet the patient's goals includes continuing with treatment    Total time of the visit 81 minutes  Non physical exam/ non charting time: 75 minutes ( >50% of total time) spent on extensive chart review which includes collaboration and discussion of care with IDT team (Primary team, CM, SW, PT/OT, SLP, and Wound care NP), review of all consultation notes, nurses's notes, vital signs, all pertinent laboratory and radiographical images. Additionally, provided emotional support, formulated and communicated prognosis and explored burden/benefit of various approaches of treatment.     37 min time was spent on advance care planning, separate from above total time.    8) Code status: Full Code    9) Advance directives: HCPOA    Summary and recommendations:  Continue ongoing ACP/GOC discussions

## 2022-04-01 NOTE — PT/OT/SLP PROGRESS
Physical Therapy Treatment    Patient Name:  Courtney Engle   MRN:  [id number]  Admit Date: 3/23/2022  Admitting Diagnosis: Diabetes mellitus with ketoacidosis and lactic acidosis but without coma  Recent Surgeries: N/A    General Precautions: Standard, fall   Orthopedic Precautions:N/A   Braces: N/A   PT had a face-to-face encounter with regards to the patient's plan of care with the PTA who has been seeing this patient regularly.  Recommendations:     Discharge Recommendations:  home with home health   Level of Assistance Recommended at Discharge: Intermittent assistance   Discharge Equipment Recommendations: bedside commode, shower chair, walker, rolling   Barriers to discharge:      Assessment:     Courtney Engle is a 87 y.o. female admitted with a medical diagnosis of Diabetes mellitus with ketoacidosis and lactic acidosis but without coma . Pt continues to make good functional progress with no L Knee buckling noted today. Pt will benefit from continued snf rehab to help pt improve her functional mobility and safety.    Performance deficits affecting function:  weakness, impaired endurance, impaired self care skills, impaired functional mobilty, gait instability, impaired balance, decreased lower extremity function, impaired cardiopulmonary response to activity .    Rehab Potential is good    Activity Tolerance: Good    Plan:     Patient to be seen 6 x/week to address the above listed problems via gait training, therapeutic activities, therapeutic exercises, neuromuscular re-education, wheelchair management/training    · Plan of Care Expires: 04/23/22  · Plan of Care Reviewed with: patient    Subjective     Pt agreeable to work with PT.     Pain/Comfort:  · Pain Rating 1: 0/10  · Pain Rating Post-Intervention 1: 0/10    Patient's cultural, spiritual, Sikhism conflicts given the current situation:  · no    Objective:     Communicated with pt's nurse prior to session.  Patient found up in chair with   upon PT  "entry to room.     Therapeutic Activities and Exercises: Mini-eliptical x 12mins to help improve B L/E MMT and endurance.    Functional Mobility:  · Transfers:     · Sit to Stand:  supervision with no AD  · Gait: ~90ft x 2 trials with RW and CGA 2* to pt with FFT and keeping walker too far forward. VCs provided for pt to correct with good feedback and correction made by pt.  · Stairs:  Pt ascended/descended 4 stair(s) and 4" curb step with Rolling Walker with bilateral handrails with Contact Guard Assistance.   · Wheelchair Propulsion:  Pt propelled Standard wheelchair x 150 feet on Level tile with  Bilateral upper extremity with Supervision or Set-up Assistance.     AM-PAC 6 CLICK MOBILITY  20    Patient left up in chair with call button in reach.    GOALS:   Multidisciplinary Problems     Physical Therapy Goals        Problem: Physical Therapy    Goal Priority Disciplines Outcome Goal Variances Interventions   Physical Therapy Goal     PT, PT/OT Ongoing, Progressing     Description: PT goals until 4/13/22    1. Pt supine to sit with mod independent-not met  2. Pt sit to supine with mod independent-not met  3. Pt sit to stand with RW with mod independent-not met  4. Pt to perform gait 100ft with RW with supervision.-not met  5. Pt to go up/down curb step with RW with supervision.-not met  6. Pt to up/down 5 steps with B UE rail with supervision.-not met  7. Pt to perform B LE exs in sitting or supine x 15 reps to strengthen B LE to improve functional mobility.-not met  8. Pt to propel w/c 50ft with B UE on level surface with supervision-not met  9. Pt will be able to  object off the ground with the reacher with RW support with set up assist.-not met                     Time Tracking:     PT Received On: 04/01/22  PT Total Time (min):       Billable Minutes: Gait Training 15mins and Therapeutic Exercise 15mins    Treatment Type: Treatment  PT/PTA: PT     PTA Visit Number: 0     04/01/2022  "

## 2022-04-01 NOTE — ASSESSMENT & PLAN NOTE
Continue with PT/OT for gait training and strengthening and restoration of ADL's   Encourage mobility, OOB in chair, and early ambulation as appropriate  Fall precautions   Monitor for bowel and bladder dysfunction

## 2022-04-01 NOTE — ASSESSMENT & PLAN NOTE
Echo-3/17/2022  Interpretation Summary  · The estimated ejection fraction is 65%.  · The left ventricle is normal in size with normal systolic function.  · Normal left ventricular diastolic function.  · Normal right ventricular size with normal right ventricular systolic function.  · The estimated PA systolic pressure is 23 mmHg.  · Normal central venous pressure (3 mmHg).  Managed by primary team

## 2022-04-01 NOTE — PROGRESS NOTES
[facility]                                  Skilled Nursing Facility                   Progress Note     Admit Date: 3/23/2022  FAY 4/13/2022  Principal Problem:  Diabetes mellitus with ketoacidosis and lactic acidosis but without coma   HPI obtained from patient interview and chart review     Chief Complaint: Re-evaluation of medical treatment and therapy status: Lab review, bradycardia    HPI:   Mrs. Engle is a 86 year old female with PMHx of IDDM, HTN, HLD, diastolic dysfunction (most recent echo 2018 normal), CKD 3, hypothyroidism and GERD who presents to SNF following hospitalization for DKA, encephalopathy, new AFib.  Admission to SNF for secondary weakness debility.     Interval history:All of today's labs reviewed and are listed below.  24 hr vital sign ranges listed below.  24 hour heart rate 57-60.  24 hour blood pressure range is 127/57- 150/112.  Patient denies shortness of breath, abdominal discomfort, nausea, or vomiting.  Patient reports an adequate appetite.  Patient denies dysuria.  Patient reports having regular bowel movements.  Patient progessing with PT/OT. Continuing to follow and treat all acute and chronic conditions.    Past Medical History: Patient has a past medical history of Acquired hypothyroidism (4/25/2014), Anxiety, Aortic calcification (12/8/2016), Arthritis, Bilateral carotid artery disease (12/8/2016), CKD stage 3 due to type 2 diabetes mellitus (2/16/2017), Depression, Essential hypertension, Fever blister, GERD (gastroesophageal reflux disease), Glaucoma suspect with open angle (2010), Hyperlipidemia LDL goal <70 (2/16/2017), Keloid cicatrix, Mixed stress and urge urinary incontinence (2/16/2017), Morbid obesity with BMI of 40.0-44.9, adult (12/8/2016), Ocular migraine (1/24/2013), and Type 2 diabetes mellitus with hyperglycemia, with long-term current use of insulin.    Past Surgical History: Patient  has a past surgical history that includes Cholecystectomy; TONSILLECTOMY, ADENOIDECTOMY; knee replacement right; Joint replacement; Hysterectomy; Colonoscopy w/ polypectomy (04/20/2015); Esophagogastroduodenoscopy (04/20/2015); Cataract extraction w/  intraocular lens implant (Right, 05/30/2012); Cataract extraction w/  intraocular lens implant (Left, 05/26/2010); and Colonoscopy (N/A, 12/20/2019).    Social History: Patient reports that she has never smoked. She has never used smokeless tobacco. She reports that she does not drink alcohol and does not use drugs.    Family History: family history includes Cancer in her mother; Colon cancer in her mother; Glaucoma in her father; Heart attack in her maternal grandmother; Heart attack (age of onset: 93) in her father; Heart disease in her maternal grandmother and mother; Hematuria in her brother; Hypertension in her maternal grandmother; Nephrolithiasis in her father; No Known Problems in her daughter; Stroke in her father; Uterine cancer in her daughter.    Allergies: Patient is allergic to contac 12 hour allergy, diphenhydramine hcl, ciprofloxacin (bulk), (d)-limonene flavor, anti-hyst, antihistamines - alkylamine, ciprocinonide, codeine, contact metal agent, glipizide, hydroxyzine, iodinated contrast media, nitrofuran analogues, penicillin, propoxyphene, statins-hmg-coa reductase inhibitors, doxycycline, penicillins, sulfa (sulfonamide antibiotics), and sulfacetamide sodium-urea.    ROS  Constitutional: Negative for fever   Eyes: Negative for blurred vision, double vision   Respiratory: Negative for cough.  + shortness of breath at baseline  Cardiovascular: Negative for chest pain, palpitations, and leg swelling.   Gastrointestinal: Negative for abdominal pain, diarrhea.  +intermittent constipation/nausea  Genitourinary: Negative for dysuria, frequency   Musculoskeletal:  + generalized weakness. Negative for back pain and myalgias.   Skin: Negative for itching and  rash.   Neurological: Negative for dizziness, headaches.   Psychiatric/Behavioral: Negative for depression. The patient is not nervous/anxious.      24 hour Vital Sign Range   Temp:  [97.9 °F (36.6 °C)-98.5 °F (36.9 °C)]   Pulse:  [57-60]   Resp:  [14-17]   BP: (138-150)/()   SpO2:  [96 %-98 %]     PEx  Constitutional: Patient appears debilitated.  No distress noted  HENT:  Poor dentition  Head: Normocephalic and atraumatic.   Eyes: Pupils are equal, round  Neck: Normal range of motion. Neck supple.   Cardiovascular: Normal rate, regular rhythm and normal heart sounds.    Pulmonary/Chest: Effort normal and breath sounds are clear  Abdominal: Soft. Bowel sounds are normal.   Musculoskeletal: Normal range of motion.   Neurological: Alert and oriented to person, place, and time.   Psychiatric: Normal mood and affect. Behavior is normal.   Skin: Skin is warm and dry.      Recent Labs   Lab 04/01/22  0454      K 4.1      CO2 26   BUN 10   CREATININE 0.8   MG 1.6       Recent Labs   Lab 04/01/22  0454   WBC 7.82   RBC 4.49   HGB 12.5   HCT 41.3   *   MCV 92   MCH 27.8   MCHC 30.3*         Assessment and Plan:    Bradycardia  Essential hypertension  - initiated reduction of metoprolol to 50 mg BID, increased lisinopril 20 mg daily    Type 2 diabetes mellitus without complication, with long-term current use of insulin  Diabetes mellitus with ketoacidosis and lactic acidosis but without coma  - last A1C >14 on 3/17/22  - suspect secondary to medication non-compliance and dietary indiscretion  - diabetic diet, Accu checks AC/HS  - home regimen with aspart 15 units TIDWM & lantus 36 units QHS  - continue aspart 3 units TID WM, continue detemir 12 units BID and low dose sliding scale insulin     Acute renal failure superimposed on stage 3 chronic kidney disease  - sCr basline is 0.8, creatinine on admission 2.3  - suspected to be pre-renal in setting of DKA  - acute phase resolved with fluids  -  creatinine back to baseline  - continue monitor with twice weekly BMPs, avoid nephrotoxic agents, renally dose medications when appropriate    Atrial fibrillation with RVR  - new onset, suspect due to acute illness with DKA  - recent thyroid studies show normal T4  - 2D echo unremarkable as below  - continue metoprolol for rate control; titrate up as clinically indicated  - addressed anticoagulation risk/benefit w/ Pt and daughter; patient would like to defer therapy at this time  - follow-up with cardiology during SNF stay and after discharge from SNF  - continue metoprolol 50 mg BID with holding parameters  - initiated Eliquis 5 mg BID per Cardiology note     Diastolic dysfunction  - most recent echo 1/2018 showed LVEF 60-65%, normal LV diastolic function and normal RV function  - Echo  · The estimated ejection fraction is 65%.  · The left ventricle is normal in size with normal systolic function.  · Normal left ventricular diastolic function.  · Normal right ventricular size with normal right ventricular systolic function.  · The estimated PA systolic pressure is 23 mmHg.  · Normal central venous pressure (3 mmHg).      GERD (gastroesophageal reflux disease)  - continue home pepcid 40 mg daily    Depression, moderate, recurrent  Generalized anxiety disorder  - Continue home Lexapro 10 mg daily    Acquired hypothyroidism  - TSH low, however free T4 WNL  - continue home levothyroxine 75 mcg daily     Essential hypertension  - holding home atenolol in favor of metoprolol in setting of a fib RVR  - continue lisinopril 10 mg daily    Insomnia  - melatonin 9 mg qHS    Obesity  - BMI 37  - RD following, appreciate recommendations    Debility   - Continue with PT/OT for gait training and strengthening and restoration of ADL's   - Encourage mobility, OOB in chair, and early ambulation as appropriate  - Fall precautions   - Monitor for bowel and bladder dysfunction  - Monitor for and prevent skin breakdown and pressure  ulcers  - initiated DVT prophylaxis with  Lovenox 40 mg daily         Anticipate disposition:  Home with home health      Follow-up needed during SNF stay-cardiology (3/30), endocrinology (4/1)    Appointments to reschedule- internal medicine 4/1    Follow-up needed after discharge from SNF:   - PCP, needs to be rescheduled  - cardiology and Endocrinology will likely schedule follow-up appointments after their above appointments    Future Appointments   Date Time Provider Department Center   4/1/2022  3:00 PM Darrin Montero MD [facility] ENDODIA [facility]   6/1/2022  3:00 PM Lois Almonte MD [facility] CARDIO Ch[facility] Alanis NP  Department of Hospital Medicine   [facility]     DOS: 4/1/2022      Patient note was created using MModal Dictation.  Any errors in syntax or even information may not have been identified and edited on initial review prior to signing this note.

## 2022-04-01 NOTE — ASSESSMENT & PLAN NOTE
Type 2 diabetes mellitus without complication, with long-term current use of insulin  CKD stage 3 due to type 2 diabetes mellitus  Hemoglobin A1C   Date Value Ref Range Status   03/17/2022 >14.0 (H) 4.0 - 5.6 % Final   12/09/2019 9.2 (H) 4.0 - 5.6 % Final   08/09/2019 9.5 (H) 4.0 - 5.6 % Final   Managed by primary team

## 2022-04-02 NOTE — PLAN OF CARE
Patient c/o of pills getting stuck in her throat during evening medication pass.    Refuses to drink thickened liquids even though she coughs when taking medications.    Repeatedly coughed up medications, states they get stuck in her throat.   Patient wheezing/ coughing, MD notified. Chest xray ordered. Patient refused breathing treatment.     Will continue to monitor...      Problem: Adult Inpatient Plan of Care  Goal: Plan of Care Review  Outcome: Ongoing, Progressing  Flowsheets (Taken 4/2/2022 0106)  Plan of Care Reviewed With: patient  Goal: Patient-Specific Goal (Individualized)  Outcome: Ongoing, Progressing  Goal: Absence of Hospital-Acquired Illness or Injury  Outcome: Ongoing, Progressing  Intervention: Identify and Manage Fall Risk  Flowsheets (Taken 4/2/2022 0106)  Safety Promotion/Fall Prevention:   assistive device/personal item within reach   diversional activities provided   Fall Risk signage in place   lighting adjusted   medications reviewed   nonskid shoes/socks when out of bed   side rails raised x 2   supervised activity   instructed to call staff for mobility  Goal: Optimal Comfort and Wellbeing  Outcome: Ongoing, Progressing  Intervention: Provide Person-Centered Care  Flowsheets (Taken 4/2/2022 0106)  Trust Relationship/Rapport:   care explained   questions answered   choices provided   questions encouraged   emotional support provided   reassurance provided   empathic listening provided   thoughts/feelings acknowledged     Problem: Diabetes Comorbidity  Goal: Blood Glucose Level Within Targeted Range  Outcome: Ongoing, Progressing  Intervention: Monitor and Manage Glycemia  Flowsheets (Taken 4/2/2022 0106)  Glycemic Management:   blood glucose monitored   supplemental insulin given     Problem: Fluid and Electrolyte Imbalance (Acute Kidney Injury/Impairment)  Goal: Fluid and Electrolyte Balance  Outcome: Ongoing, Progressing  Intervention: Monitor and Manage Fluid and Electrolyte  Balance  Flowsheets (Taken 4/2/2022 0106)  Fluid/Electrolyte Management: fluids provided     Problem: Oral Intake Inadequate (Acute Kidney Injury/Impairment)  Goal: Optimal Nutrition Intake  Outcome: Ongoing, Progressing  Intervention: Promote and Optimize Nutrition  Flowsheets (Taken 4/2/2022 0106)  Oral Nutrition Promotion:   rest periods promoted   safe use of adaptive equipment encouraged     Problem: Renal Function Impairment (Acute Kidney Injury/Impairment)  Goal: Effective Renal Function  Outcome: Ongoing, Progressing  Intervention: Monitor and Support Renal Function  Flowsheets (Taken 4/2/2022 0106)  Medication Review/Management: medications reviewed     Problem: Fall Injury Risk  Goal: Absence of Fall and Fall-Related Injury  Outcome: Ongoing, Progressing  Intervention: Identify and Manage Contributors  Flowsheets (Taken 4/2/2022 0106)  Self-Care Promotion:   independence encouraged   BADL personal objects within reach   safe use of adaptive equipment encouraged  Medication Review/Management: medications reviewed     Problem: Coping Ineffective  Goal: Effective Coping  Outcome: Ongoing, Progressing  Intervention: Support and Enhance Coping Strategies  Flowsheets (Taken 4/2/2022 0106)  Supportive Measures:   active listening utilized   verbalization of feelings encouraged  Environmental Support:   calm environment promoted   caregiver consistency promoted

## 2022-04-02 NOTE — PT/OT/SLP PROGRESS
"Occupational Therapy   Treatment    Name: Courtney Engle  MRN: [id number]  Admit Date: 3/23/2022  Admitting Diagnosis:  Diabetes mellitus with ketoacidosis and lactic acidosis but without coma    General Precautions: Standard, fall   Orthopedic Precautions:N/A   Braces: N/A     Recommendations:     Discharge Recommendations: home with home health  Level of Assistance Recommended at Discharge: Intermittent assistance for ADL's and homemaking tasks  Discharge Equipment Recommendations:  bedside commode, shower chair, walker, rolling  Barriers to discharge:  Decreased caregiver support    Assessment:     Courtney Engle is a 87 y.o. female with a medical diagnosis of Diabetes mellitus with ketoacidosis and lactic acidosis but without coma.  She presents with the following performance deficits affecting function: weakness, impaired endurance, impaired self care skills, impaired functional mobilty, gait instability, impaired balance, decreased lower extremity function, impaired coordination.     Rehab Potential is good    Activity tolerance:  Good    Plan:     Patient to be seen 6 x/week to address the above listed problems via self-care/home management, therapeutic activities, therapeutic exercises    · Plan of Care Expires: 04/24/22  · Plan of Care Reviewed with: patient    Subjective     Communicated with: nurse prior to session.   "I really want to work on my strength" .    Pain/Comfort:  · Pain Rating 1: 0/10  · Pain Rating Post-Intervention 1: 0/10    Patient's cultural, spiritual, Amish conflicts given the current situation:  · no    Objective:     Patient found HOB elevated with  (no active lines) upon OT entry to room.    Bed Mobility:    · Patient completed Scooting/Bridging with supervision  · Patient completed Supine to Sit with supervision     Functional Mobility/Transfers:  · Patient completed Sit <> Stand Transfer with stand by assistance  with  rolling walker   · Patient completed Bed <> Chair Transfer " using Step Transfer technique with stand by assistance with rolling walker  · Functional Mobility:   · Ambulation: pt able to walk ~10' in room to sit in recliner at end of session  · W/C:  Pt able to propel w/c from therapy gym to 90% distance to room using B UEs (needed A to complete distance due to fatigue)    Activities of Daily Living:  · N/A    AMPAC 6 Click ADL: 21    OT Exercises: Pt worked on UE strengthening and endurance exercises to improve her safety and independence during functional activities, such as, w/c mobility, transfers, walking with RW, bed mobility and ADLs  · AROM:  · Using 3# hand weight:  Completed 1 set of 10 reps of shoulder press overhead and biceps curls (stated that 3# was too heavy for shoulder when done - tolerable for biceps curls)  · Using 1# hand weight:  Completed 1 set of 10 reps of chest press  (decreased from 3# per request)  · 1# dowel:  Completed 3 sets of 10 reps of shoulder hor abd/add  · UE Ergometer for 15' with moderate resistance (increased time by 5') - no stopping    Treatment & Education:  · Pt completed UE therex and func mobility activities for tx session as noted above  · Whiteboard updated  · Pt educated on role of OT and POC      Patient left up in chair with call button in reachEducation:      GOALS:   Multidisciplinary Problems     Occupational Therapy Goals        Problem: Occupational Therapy    Goal Priority Disciplines Outcome Interventions   Occupational Therapy Goal     OT, PT/OT Ongoing, Progressing    Description: Goals to be met by: 4/07/2022    Patient will increase functional independence with ADLs by performing:    UE Dressing with [place].  LE Dressing with Modified [place].  Grooming while standing at sink with Modified [place].  Toileting from toilet with Modified [place] for hygiene and clothing management.   Bathing from shower on shower chair/bench with Supervision Assistance.  Step transfer with Modified [place]  using AD.   Toilet transfer to toilet with Modified [place].  Upper extremity exercise program per handout, with supervision.  Pt will complete functional reaching task in standing x10 minutes with Supervision assistance for improved safety and independence in standing ADLs/IADLS.   Caregiver will be educated on level of assist required to safely perform self care tasks and functional transfers.                     Time Tracking:     OT Date of Treatment: OT Date of Treatment: 04/02/22  OT Total Time (min): Total Time (min): 39 min    Billable Minutes:Therapeutic Exercise 39    4/2/2022

## 2022-04-02 NOTE — PLAN OF CARE
Problem: Occupational Therapy  Goal: Occupational Therapy Goal  Description: Goals to be met by: 4/07/2022    Patient will increase functional independence with ADLs by performing:    UE Dressing with [place].  LE Dressing with Modified [place].  Grooming while standing at sink with Modified [place].  Toileting from toilet with Modified [place] for hygiene and clothing management.   Bathing from shower on shower chair/bench with Supervision Assistance.  Step transfer with Modified [place] using AD.   Toilet transfer to toilet with Modified [place].  Upper extremity exercise program per handout, with supervision.  Pt will complete functional reaching task in standing x10 minutes with Supervision assistance for improved safety and independence in standing ADLs/IADLS.   Caregiver will be educated on level of assist required to safely perform self care tasks and functional transfers.    Outcome: Ongoing, Progressing     Pt was agreeable to OT and was noted to make progress towards her goals in therapy.  During today's session, pt worked on UE therex and func mobility.  Pt's goals remain appropriate at this time.  She will continue to benefit from skilled OT services in order to assist her with increasing her safety and level of independence with self care and mobility tasks.     Milana Aly, OT  4/2/2022

## 2022-04-03 NOTE — PLAN OF CARE
Patient reminded to use call light before ambulating in room due to being a fall risk.    Reminded patient to not walk so fast with walker due to increased risk of fall, gait becomes increasingly unsteady.  Patient may need a camera, will continue to monitor    Problem: Adult Inpatient Plan of Care  Goal: Plan of Care Review  Outcome: Ongoing, Progressing  Flowsheets (Taken 4/3/2022 0228)  Plan of Care Reviewed With: patient  Goal: Patient-Specific Goal (Individualized)  Outcome: Ongoing, Progressing  Goal: Absence of Hospital-Acquired Illness or Injury  Outcome: Ongoing, Progressing  Intervention: Identify and Manage Fall Risk  Flowsheets (Taken 4/3/2022 0228)  Safety Promotion/Fall Prevention:   assistive device/personal item within reach   diversional activities provided   Fall Risk reviewed with patient/family   Fall Risk signage in place   lighting adjusted   medications reviewed   side rails raised x 2   instructed to call staff for mobility  Goal: Optimal Comfort and Wellbeing  Outcome: Ongoing, Progressing  Intervention: Provide Person-Centered Care  Flowsheets (Taken 4/3/2022 0228)  Trust Relationship/Rapport:   care explained   questions answered   choices provided   questions encouraged   emotional support provided   reassurance provided   empathic listening provided   thoughts/feelings acknowledged     Problem: Diabetes Comorbidity  Goal: Blood Glucose Level Within Targeted Range  Outcome: Ongoing, Progressing  Intervention: Monitor and Manage Glycemia  Flowsheets (Taken 4/3/2022 0228)  Glycemic Management: blood glucose monitored     Problem: Fluid and Electrolyte Imbalance (Acute Kidney Injury/Impairment)  Goal: Fluid and Electrolyte Balance  Outcome: Ongoing, Progressing  Intervention: Monitor and Manage Fluid and Electrolyte Balance  Flowsheets (Taken 4/3/2022 0228)  Fluid/Electrolyte Management: fluids provided     Problem: Oral Intake Inadequate (Acute Kidney Injury/Impairment)  Goal: Optimal  Nutrition Intake  Outcome: Ongoing, Progressing  Intervention: Promote and Optimize Nutrition  Flowsheets (Taken 4/3/2022 0228)  Oral Nutrition Promotion:   rest periods promoted   safe use of adaptive equipment encouraged     Problem: Renal Function Impairment (Acute Kidney Injury/Impairment)  Goal: Effective Renal Function  Outcome: Ongoing, Progressing  Intervention: Monitor and Support Renal Function  Flowsheets (Taken 4/3/2022 0228)  Medication Review/Management: medications reviewed     Problem: Fall Injury Risk  Goal: Absence of Fall and Fall-Related Injury  Outcome: Ongoing, Progressing  Intervention: Identify and Manage Contributors  Flowsheets (Taken 4/3/2022 0228)  Self-Care Promotion:   independence encouraged   BADL personal objects within reach   safe use of adaptive equipment encouraged   BADL personal routines maintained  Medication Review/Management: medications reviewed     Problem: Coping Ineffective  Goal: Effective Coping  Outcome: Ongoing, Progressing  Intervention: Support and Enhance Coping Strategies  Flowsheets (Taken 4/3/2022 0228)  Environmental Support:   rest periods encouraged   environmental consistency promoted   personal routine supported   calm environment promoted

## 2022-04-03 NOTE — PT/OT/SLP PROGRESS
"Physical Therapy Treatment    Patient Name:  Courtney Engle   MRN:  [id number]  Admit Date: 3/23/2022  Admitting Diagnosis: Diabetes mellitus with ketoacidosis and lactic acidosis but without coma  Recent Surgeries:     General Precautions: Standard, fall   Orthopedic Precautions:N/A   Braces:       Recommendations:     Discharge Recommendations:  home with home health   Level of Assistance Recommended at Discharge: Intermittent assistance   Discharge Equipment Recommendations: bedside commode, shower chair, walker, rolling   Barriers to discharge:      Assessment:     Courtney Engle is a 87 y.o. female admitted with a medical diagnosis of Diabetes mellitus with ketoacidosis and lactic acidosis but without coma . Pt tolerated well, pt would continue to benefit from skilled PT services to improve overall functional mobility, strength and endurance.  .      Performance deficits affecting function:  weakness, impaired endurance, impaired self care skills, impaired functional mobilty, gait instability, impaired balance, decreased lower extremity function, impaired cardiopulmonary response to activity .    Rehab Potential is good    Activity Tolerance: Fair    Plan:     Patient to be seen 6 x/week to address the above listed problems via gait training, therapeutic activities, therapeutic exercises, neuromuscular re-education, wheelchair management/training    · Plan of Care Expires: 04/23/22  · Plan of Care Reviewed with: patient    Subjective     "OK, I want to go do activities too".     Pain/Comfort:  · Pain Rating 1: 0/10  · Pain Rating Post-Intervention 1: 0/10    Patient's cultural, spiritual, Confucianism conflicts given the current situation:  · no    Objective:     .  Patient found with  (sitting EOB) upon PT entry to room.     Therapeutic Activities and Exercises: 2x10 reps AP,LAQ,hip flex mini elliptical x 15 min light resistance  Amb with RW ~ 100 ft close SBA from activity room vcs for getting closer to RW and " taking her time  Toileting SBA with all aspects, except CGA with trfs 2* to knee buckling pt able to maintain balance, grab bar, molly care and hand hygiene in sitting    Functional Mobility:  · Transfers:     · Sit to Stand:  supervision with no AD and rolling walker  · Gait: amb in room with no AD close SBA around the bed to couch, then ~100 ft with RW close SBA no buckling occ vcs to get closer to RW and for taking her time for safety    AM-PAC 6 CLICK MOBILITY  20    Patient left up in chair with with .    GOALS:   Multidisciplinary Problems     Physical Therapy Goals        Problem: Physical Therapy    Goal Priority Disciplines Outcome Goal Variances Interventions   Physical Therapy Goal     PT, PT/OT Ongoing, Progressing     Description: PT goals until 4/13/22    1. Pt supine to sit with mod independent-not met  2. Pt sit to supine with mod independent-not met  3. Pt sit to stand with RW with mod independent-not met  4. Pt to perform gait 100ft with RW with supervision.-not met  5. Pt to go up/down curb step with RW with supervision.-not met  6. Pt to up/down 5 steps with B UE rail with supervision.-not met  7. Pt to perform B LE exs in sitting or supine x 15 reps to strengthen B LE to improve functional mobility.-not met  8. Pt to propel w/c 50ft with B UE on level surface with supervision-not met  9. Pt will be able to  object off the ground with the reacher with RW support with set up assist.-not met                     Time Tracking:     PT Received On: 04/03/22  PT Total Time (min):       Billable Minutes: Gait Training 10 and Therapeutic Exercise 18 therapeutic activity 12    Treatment Type: Treatment  PT/PTA: PTA     PTA Visit Number: 1 04/03/2022

## 2022-04-04 NOTE — PLAN OF CARE
Problem: Adult Inpatient Plan of Care  Goal: Plan of Care Review  Outcome: Ongoing, Progressing  Flowsheets (Taken 4/4/2022 0304)  Plan of Care Reviewed With: patient  Goal: Patient-Specific Goal (Individualized)  Outcome: Ongoing, Progressing  Goal: Absence of Hospital-Acquired Illness or Injury  Outcome: Ongoing, Progressing  Intervention: Identify and Manage Fall Risk  Flowsheets (Taken 4/4/2022 0304)  Safety Promotion/Fall Prevention:   assistive device/personal item within reach   diversional activities provided   Fall Risk reviewed with patient/family   Fall Risk signage in place   lighting adjusted   medications reviewed   room near unit station   supervised activity   instructed to call staff for mobility  Goal: Optimal Comfort and Wellbeing  Outcome: Ongoing, Progressing  Intervention: Provide Person-Centered Care  Flowsheets (Taken 4/4/2022 0304)  Trust Relationship/Rapport:   empathic listening provided   reassurance provided   emotional support provided   questions encouraged   care explained   thoughts/feelings acknowledged     Problem: Diabetes Comorbidity  Goal: Blood Glucose Level Within Targeted Range  Outcome: Ongoing, Progressing  Intervention: Monitor and Manage Glycemia  Flowsheets (Taken 4/4/2022 0304)  Glycemic Management: blood glucose monitored     Problem: Fluid and Electrolyte Imbalance (Acute Kidney Injury/Impairment)  Goal: Fluid and Electrolyte Balance  Outcome: Ongoing, Progressing  Intervention: Monitor and Manage Fluid and Electrolyte Balance  Flowsheets (Taken 4/4/2022 0304)  Fluid/Electrolyte Management: fluids provided

## 2022-04-04 NOTE — PT/OT/SLP PROGRESS
Occupational Therapy   Treatment    Name: Courtney Engle  MRN: [id number]  Admit Date: 3/23/2022  Admitting Diagnosis:  Diabetes mellitus with ketoacidosis and lactic acidosis but without coma    General Precautions: Standard, fall   Orthopedic Precautions:N/A   Braces:       Recommendations:     Discharge Recommendations: home with home health  Level of Assistance Recommended at Discharge: Intermittent assistance for ADL's and homemaking tasks  Discharge Equipment Recommendations:  bedside commode, shower chair, walker, rolling  Barriers to discharge:  Decreased caregiver support    Assessment:     Courtney Engle is a 87 y.o. female with a medical diagnosis of Diabetes mellitus with ketoacidosis and lactic acidosis but without coma.  She presents with  Performance deficits affecting function are weakness, impaired endurance, impaired self care skills, impaired functional mobility, gait instability, impaired balance, decreased lower extremity function, impaired coordination.      Pt. Was cooperative and participated well with session on this day. Pt  continues to demonstrate levels of physical deficits with  functional indep with daily management activities tasks, selfcare skills with balance,  functional mobility, UB strength and endurance. Pt. Will continue to benefit from continued OT to progress towards goals   .     Rehab Potential is fair    Activity tolerance:  Fair    Plan:     Patient to be seen 6 x/week to address the above listed problems via self-care/home management, therapeutic activities, therapeutic exercises    · Plan of Care Expires: 04/24/22  · Plan of Care Reviewed with: patient    Subjective     Communicated with: nsg and Pt. prior to session. I a need help putting on my diaper  Pain/Comfort:  · Pain Rating 1: 0/10  · Location - Side 1: Left  · Location - Orientation 1: generalized  · Location 1: knee  · Pain Addressed 1: Pre-medicate for activity, Reposition, Distraction  · Pain Rating  Post-Intervention 1: 0/10    Patient's cultural, spiritual, Holiness conflicts given the current situation:  · no    Objective:     Patient found sitting edge of bed    upon OT entry to room    Functional Mobility/Transfers:  · Patient completed Sit <> Stand Transfer with stand by assistance and contact guard assistance  with  rolling walker   · Patient completed Bed <> Chair Transfer using Stand Pivot technique with stand by assistance and contact guard assistance with rolling walker pt. With cues for safety and hand placement     Activities of Daily Living:  · Grooming: stand by assistance at sink level with grooming needs  · Lower Body Dressing: stand by assistance to romi shorts seated and to manage over hips instance with RW for bal and (A) with diaper management  in standing pt. attempting to romi diaper on arrival     [facility] 6 Click ADL: 21    OT Exercises: UE Ergometer 10 min    Treatment & Education:  Pt. With standing act on this day with task. Pt. With CGA/SBA for balance aspects with task with  AD at raised counter Pt with visual perception task with discrimination of various shapes and sizes x  min with standing bal and min cues through out with weight shifting and use of BUE's incorporated and crossing mid line and facilitation with posture in prep for home management .     Pt. With 2# dowel activity with 2x20 reps with  shd flex, bicep curls horz adb/add and forward flex motion to increase BUE ROM and strength,.   Pt. With therex performed to increase ROM, endurance selfcare task and fxl mobility for independence     Pt edu on role of OT, POC, safety when performing self care tasks , benefit of performing OOB activity, and safety when performing functional transfers and mobility management for preparation with goals to progress towards next level of care    Patient left up in chair with all lines intact and call button in reachEducation:      GOALS:   Multidisciplinary Problems     Occupational  Therapy Goals        Problem: Occupational Therapy    Goal Priority Disciplines Outcome Interventions   Occupational Therapy Goal     OT, PT/OT Ongoing, Progressing    Description: Goals to be met by: 4/07/2022    Patient will increase functional independence with ADLs by performing:    UE Dressing with [place].  LE Dressing with Modified [place].  Grooming while standing at sink with Modified [place].  Toileting from toilet with Modified [place] for hygiene and clothing management.   Bathing from shower on shower chair/bench with Supervision Assistance.  Step transfer with Modified [place] using AD.   Toilet transfer to toilet with Modified [place].  Upper extremity exercise program per handout, with supervision.  Pt will complete functional reaching task in standing x10 minutes with Supervision assistance for improved safety and independence in standing ADLs/IADLS.   Caregiver will be educated on level of assist required to safely perform self care tasks and functional transfers.                     Time Tracking:     OT Date of Treatment: OT Date of Treatment: 04/04/22  OT Total Time (min):      Billable Minutes:Therapeutic Activity 38    4/4/2022   .A client care conference was performed between the CANDY and JOSELUIS, prior to treatment to discuss the patient's status, treatment plan and established goals.

## 2022-04-04 NOTE — PLAN OF CARE
Problem: Occupational Therapy  Goal: Occupational Therapy Goal  Description: Goals to be met by: 4/07/2022    Patient will increase functional independence with ADLs by performing:    UE Dressing with [place].  LE Dressing with Modified [place].  Grooming while standing at sink with Modified [place].  Toileting from toilet with Modified [place] for hygiene and clothing management.   Bathing from shower on shower chair/bench with Supervision Assistance.  Step transfer with Modified [place] using AD.   Toilet transfer to toilet with Modified [place].  Upper extremity exercise program per handout, with supervision.  Pt will complete functional reaching task in standing x10 minutes with Supervision assistance for improved safety and independence in standing ADLs/IADLS.   Caregiver will be educated on level of assist required to safely perform self care tasks and functional transfers.    Outcome: Ongoing, Progressing

## 2022-04-04 NOTE — PT/OT/SLP PROGRESS
"Physical Therapy Treatment    Patient Name:  Courtney Engle   MRN:  [id number]  Admit Date: 3/23/2022  Admitting Diagnosis: Diabetes mellitus with ketoacidosis and lactic acidosis but without coma    General Precautions: Standard, fall   Orthopedic Precautions:N/A   Braces: N/A     Recommendations:     Discharge Recommendations:  home with home health   Level of Assistance Recommended at Discharge: Intermittent assistance   Discharge Equipment Recommendations: bedside commode, shower chair, walker, rolling   Barriers to discharge:      Assessment:     Courtney Engle is a 87 y.o. female admitted with a medical diagnosis of Diabetes mellitus with ketoacidosis and lactic acidosis but without coma .     Pt tolerated today's therapy session fairly well. Pt required occasional rest break, after gait training and therex. Pt is progressing well and continues to benefit from therapy to improve functional endurance and mobility.     Performance deficits affecting function:  weakness, impaired endurance, impaired self care skills, impaired functional mobilty, gait instability, impaired balance, decreased lower extremity function, impaired cardiopulmonary response to activity .    Rehab Potential is good    Activity Tolerance: Good    Plan:     Patient to be seen 6 x/week to address the above listed problems via gait training, therapeutic activities, therapeutic exercises, neuromuscular re-education, wheelchair management/training    · Plan of Care Expires: 04/23/22  · Plan of Care Reviewed with: patient    Subjective     "[RLE] feeling tired like I can barely move it".     Pain/Comfort:  · Pain Rating 1:  (did not rate)  · Location - Side 1: Left  · Location - Orientation 1: generalized  · Location 1: knee  · Pain Addressed 1: Reposition, Distraction  · Pain Rating Post-Intervention 1:  (did not rate)    Patient's cultural, spiritual, Yazidi conflicts given the current situation:  · no    Objective:      Patient found up in " chair with  (no lines) upon PT entry to room.     Therapeutic Activities and Exercises:    Seated BLE therex x10 reps: LAQ (eccentric lowering)  o Verbal cues to decrease speed to maximize muscle contraction.   Mini elliptical [id number]  Patient educated on role of therapy, goals of session, and benefits of out of bed mobility.   Instructed on use of call button and importance of calling nursing staff for assistance with mobility   Questions/concerns addressed within PTA scope of practice  Pt verbalized understanding.      Functional Mobility:  · Transfers:     · Sit to Stand:  contact guard assistance with rolling walker  · Bed to Chair: contact guard assistance with  no AD  using  Stand Pivot  · Gait: pt ambulated 90ft with RW and CGA-close SBA. Pt demonstrated mildly unsteady with flexed posture, decrease thaddeus, and decrease step length. Verbal cues to keep RW close. 1 minor LOB, but able to recover.   · Wheelchair Propulsion:  Pt propelled Standard wheelchair x 80 feet on Level tile with  Bilateral upper extremity with Supervision or Set-up Assistance.     AM-PAC 6 CLICK MOBILITY  20    Patient left up in chair with call button in reach.    GOALS:   Multidisciplinary Problems     Physical Therapy Goals        Problem: Physical Therapy    Goal Priority Disciplines Outcome Goal Variances Interventions   Physical Therapy Goal     PT, PT/OT Ongoing, Progressing     Description: PT goals until 4/13/22    1. Pt supine to sit with mod independent-not met  2. Pt sit to supine with mod independent-not met  3. Pt sit to stand with RW with mod independent-not met  4. Pt to perform gait 100ft with RW with supervision.-not met  5. Pt to go up/down curb step with RW with supervision.-not met  6. Pt to up/down 5 steps with B UE rail with supervision.-not met  7. Pt to perform B LE exs in sitting or supine x 15 reps to strengthen B LE to improve functional mobility.-not met  8. Pt to propel w/c 50ft with B UE on level surface  with supervision-not met  9. Pt will be able to  object off the ground with the reacher with RW support with set up assist.-not met                     Time Tracking:     PT Received On: 04/04/22  PT Total Time (min):   28    Billable Minutes: Gait Training 12 and Therapeutic Exercise 16    Treatment Type: Treatment  PT/PTA: PTA     PTA Visit Number: 2     04/04/2022

## 2022-04-05 NOTE — PT/OT/SLP PROGRESS
Occupational Therapy   Treatment    Name: Courtney Engle  MRN: [id number]  Admit Date: 3/23/2022  Admitting Diagnosis:  Diabetes mellitus with ketoacidosis and lactic acidosis but without coma    General Precautions: Standard, fall   Orthopedic Precautions:N/A   Braces:       Recommendations:     Discharge Recommendations: home with home health  Level of Assistance Recommended at Discharge: Intermittent assistance for ADL's and homemaking tasks  Discharge Equipment Recommendations:  bedside commode, shower chair, walker, rolling  Barriers to discharge:  Decreased caregiver support    Assessment:     Courtney Engle is a 87 y.o. female with a medical diagnosis of Diabetes mellitus with ketoacidosis and lactic acidosis but without coma.  She presents with  Performance deficits affecting function are weakness, impaired endurance, impaired self care skills, impaired functional mobility, gait instability, impaired balance, decreased lower extremity function, impaired coordination.      Pt. Was cooperative and participated well with session on this day. Pt  continues to demonstrate levels of physical deficits with  functional indep with daily management activities tasks, selfcare skills with balance,  functional mobility, UB strength and endurance. Pt. Will continue to benefit from continued OT to progress towards goals   .     Rehab Potential is fair    Activity tolerance:  Fair    Plan:     Patient to be seen 6 x/week to address the above listed problems via self-care/home management, therapeutic activities, therapeutic exercises    · Plan of Care Expires: 04/24/22  · Plan of Care Reviewed with: patient    Subjective     Communicated with: nsg and Pt. prior to session. I am doing well today. Am I still going to activity   Pain/Comfort:  Pain Rating 1: 0/10  Pain Rating Post-Intervention 1: 0/10    Patient's cultural, spiritual, Jain conflicts given the current situation:  no    Objective:     Patient found up in  chair    upon OT entry to room    Functional Mobility/Transfers:  · Patient completed Sit <> Stand Transfer with stand by assistance  with  rolling walker    · Pt. With fxl mobility from be side chair to w/c approx 10 ft with CGA/SBA for safety and cues       Activities of Daily Living:  · Lower Body Dressing: stand by assistance to romi shorts seated and to manage over hips instance with RW for bal      [facility] 6 Click ADL: 21    OT Exercises: UE Ergometer 10 min    Treatment & Education:  Pt. With standing act on this day with task. Pt. With CGA/SBA for balance aspects with task with  AD at raised counter Pt with visual perception task with discrimination of various shapes and sizes x 5 min with standing bal and min cues through out with weight shifting and use of BUE's incorporated and crossing mid line and facilitation with posture in prep for home management .     Pt. With 3# dowel activity with 2x20 reps with  shd flex, bicep curls horz adb/add and forward flex motion to increase BUE ROM and strength,.   Pt. With therex performed to increase ROM, endurance selfcare task and fxl mobility for independence     Pt edu on role of OT, POC, safety when performing self care tasks , benefit of performing OOB activity, and safety when performing functional transfers and mobility management for preparation with goals to progress towards next level of care    Patient left up in chair with all lines intact and call button in reachEducation:      GOALS:   Multidisciplinary Problems     Occupational Therapy Goals        Problem: Occupational Therapy    Goal Priority Disciplines Outcome Interventions   Occupational Therapy Goal     OT, PT/OT Ongoing, Progressing    Description: Goals to be met by: 4/07/2022    Patient will increase functional independence with ADLs by performing:    UE Dressing with [place].  LE Dressing with Modified [place].  Grooming while standing at sink with Modified [place].  Toileting  from toilet with Modified [place] for hygiene and clothing management.   Bathing from shower on shower chair/bench with Supervision Assistance.  Step transfer with Modified [place] using AD.   Toilet transfer to toilet with Modified [place].  Upper extremity exercise program per handout, with supervision.  Pt will complete functional reaching task in standing x10 minutes with Supervision assistance for improved safety and independence in standing ADLs/IADLS.   Caregiver will be educated on level of assist required to safely perform self care tasks and functional transfers.                     Time Tracking:     OT Date of Treatment: OT Date of Treatment: 04/05/22  OT Total Time (min):      Billable Minutes:Therapeutic Activity 38    4/5/2022   .

## 2022-04-05 NOTE — PROGRESS NOTES
[facility]                                  Skilled Nursing Facility                   Progress Note     Admit Date: 3/23/2022  FAY 4/13/2022  Principal Problem:  Diabetes mellitus with ketoacidosis and lactic acidosis but without coma   HPI obtained from patient interview and chart review     Chief Complaint: Re-evaluation of medical treatment and therapy status: Lab review, hyperglycemia, insomnia    HPI:   Mrs. Engle is a 86 year old female with PMHx of IDDM, HTN, HLD, diastolic dysfunction (most recent echo 2018 normal), CKD 3, hypothyroidism and GERD who presents to SNF following hospitalization for DKA, encephalopathy, new AFib.  Admission to SNF for secondary weakness debility.     Interval history:All of today's labs reviewed and are listed below.  Mag 1.5.  24 hr vital sign ranges listed below.  24 hour blood glucose range is 140-252.  Patient continues to report trouble sleeping at night, patient states at home she normally sleeps all day is up all night.  Patient and I had a very lengthy discussion regarding importance of a proper routine, diabetic diet, blood glucose checks, risk of stroke and heart attack with uncontrolled diabetes and medication compliance.  Patient denies shortness of breath, abdominal discomfort, nausea, or vomiting.  Patient reports an adequate appetite.  Patient denies dysuria.  Patient reports having regular bowel movements.  Patient progessing with PT/OT- Gait: pt ambulated 90ft with RW and CGA-close SBA. Pt demonstrated mildly unsteady with flexed posture, decrease thaddeus, and decrease step length. Verbal cues to keep RW close. 1 minor LOB, but able to recover. Continuing to follow and treat all acute and chronic conditions.    Past Medical History: Patient has a past medical history of Acquired hypothyroidism (4/25/2014), Anxiety, Aortic calcification (12/8/2016), Arthritis, Bilateral carotid artery  disease (12/8/2016), CKD stage 3 due to type 2 diabetes mellitus (2/16/2017), Depression, Essential hypertension, Fever blister, GERD (gastroesophageal reflux disease), Glaucoma suspect with open angle (2010), Hyperlipidemia LDL goal <70 (2/16/2017), Keloid cicatrix, Mixed stress and urge urinary incontinence (2/16/2017), Morbid obesity with BMI of 40.0-44.9, adult (12/8/2016), Ocular migraine (1/24/2013), and Type 2 diabetes mellitus with hyperglycemia, with long-term current use of insulin.    Past Surgical History: Patient has a past surgical history that includes Cholecystectomy; TONSILLECTOMY, ADENOIDECTOMY; knee replacement right; Joint replacement; Hysterectomy; Colonoscopy w/ polypectomy (04/20/2015); Esophagogastroduodenoscopy (04/20/2015); Cataract extraction w/  intraocular lens implant (Right, 05/30/2012); Cataract extraction w/  intraocular lens implant (Left, 05/26/2010); and Colonoscopy (N/A, 12/20/2019).    Social History: Patient reports that she has never smoked. She has never used smokeless tobacco. She reports that she does not drink alcohol and does not use drugs.    Family History: family history includes Cancer in her mother; Colon cancer in her mother; Glaucoma in her father; Heart attack in her maternal grandmother; Heart attack (age of onset: 93) in her father; Heart disease in her maternal grandmother and mother; Hematuria in her brother; Hypertension in her maternal grandmother; Nephrolithiasis in her father; No Known Problems in her daughter; Stroke in her father; Uterine cancer in her daughter.    Allergies: Patient is allergic to contac 12 hour allergy, diphenhydramine hcl, ciprofloxacin (bulk), (d)-limonene flavor, anti-hyst, antihistamines - alkylamine, ciprocinonide, codeine, contact metal agent, glipizide, hydroxyzine, iodinated contrast media, nitrofuran analogues, penicillin, propoxyphene, statins-hmg-coa reductase inhibitors, doxycycline, penicillins, sulfa (sulfonamide  antibiotics), and sulfacetamide sodium-urea.    ROS  Constitutional: Negative for fever   Eyes: Negative for blurred vision, double vision   Respiratory: Negative for cough.  + shortness of breath at baseline  Cardiovascular: Negative for chest pain, palpitations, and leg swelling.   Gastrointestinal: Negative for abdominal pain, diarrhea.  +intermittent constipation/nausea  Genitourinary: Negative for dysuria, frequency   Musculoskeletal:  + generalized weakness. Negative for back pain and myalgias.   Skin: Negative for itching and rash.   Neurological: Negative for dizziness, headaches.   Psychiatric/Behavioral: Negative for depression. The patient is not nervous/anxious.      24 hour Vital Sign Range   Temp:  [98.1 °F (36.7 °C)-98.6 °F (37 °C)]   Pulse:  [63-65]   Resp:  [16-18]   BP: (140-145)/(78-86)   SpO2:  [94 %-96 %]     PEx  Constitutional: Patient appears debilitated.  No distress noted  HENT:  Poor dentition  Head: Normocephalic and atraumatic.   Eyes: Pupils are equal, round  Neck: Normal range of motion. Neck supple.   Cardiovascular: Normal rate, regular rhythm and normal heart sounds.    Pulmonary/Chest: Effort normal and breath sounds are clear  Abdominal: Soft. Bowel sounds are normal.   Musculoskeletal: Normal range of motion.   Neurological: Alert and oriented to person, place, and time.   Psychiatric: Normal mood and affect. Behavior is normal.   Skin: Skin is warm and dry.      Recent Labs   Lab 04/05/22  0449      K 3.7      CO2 27   BUN 10   CREATININE 0.7   MG 1.5*       Recent Labs   Lab 04/05/22  0449   WBC 6.09   RBC 3.88*   HGB 10.6*   HCT 34.2*      MCV 88   MCH 27.3   MCHC 31.0*         Assessment and Plan:    Type 2 diabetes mellitus without complication, with long-term current use of insulin  Diabetes mellitus with ketoacidosis and lactic acidosis but without coma  - last A1C >14 on 3/17/22  - suspect secondary to medication non-compliance and dietary  indiscretion  - diabetic diet, Accu checks AC/HS  - home regimen with aspart 15 units TIDWM & lantus 36 units QHS  - 4/5 increased aspart to 5 units TID WM, continue detemir 12 units BID and low dose sliding scale insulin    Insomnia  - initiated trazodone 50 mg qHS., changed melatonin to PRN    Bradycardia  Essential hypertension  -bradycardia improved, continue metoprolol to 50 mg BID, lisinopril 20 mg daily     Acute renal failure superimposed on stage 3 chronic kidney disease  - sCr basline is 0.8, creatinine on admission 2.3  - suspected to be pre-renal in setting of DKA  - acute phase resolved with fluids  - creatinine back to baseline  - continue monitor with twice weekly BMPs, avoid nephrotoxic agents, renally dose medications when appropriate    Atrial fibrillation with RVR  - new onset, suspect due to acute illness with DKA  - recent thyroid studies show normal T4  - 2D echo unremarkable as below  - continue metoprolol for rate control; titrate up as clinically indicated  - addressed anticoagulation risk/benefit w/ Pt and daughter; patient would like to defer therapy at this time  - follow-up with cardiology during SNF stay and after discharge from SNF  - continue metoprolol 50 mg BID with holding parameters  - initiated Eliquis 5 mg BID per Cardiology note     Diastolic dysfunction  - most recent echo 1/2018 showed LVEF 60-65%, normal LV diastolic function and normal RV function  - Echo  · The estimated ejection fraction is 65%.  · The left ventricle is normal in size with normal systolic function.  · Normal left ventricular diastolic function.  · Normal right ventricular size with normal right ventricular systolic function.  · The estimated PA systolic pressure is 23 mmHg.  · Normal central venous pressure (3 mmHg).      GERD (gastroesophageal reflux disease)  - continue home pepcid 40 mg daily    Depression, moderate, recurrent  Generalized anxiety disorder  - Continue home Lexapro 10 mg  daily    Acquired hypothyroidism  - TSH low, however free T4 WNL  - continue home levothyroxine 75 mcg daily     Essential hypertension  - holding home atenolol in favor of metoprolol in setting of a fib RVR  - continue lisinopril 10 mg daily    Insomnia  - melatonin 9 mg qHS    Obesity  - BMI 37  - RD following, appreciate recommendations    Debility   - Continue with PT/OT for gait training and strengthening and restoration of ADL's   - Encourage mobility, OOB in chair, and early ambulation as appropriate  - Fall precautions   - Monitor for bowel and bladder dysfunction  - Monitor for and prevent skin breakdown and pressure ulcers  - initiated DVT prophylaxis with  Lovenox 40 mg daily         Anticipate disposition:  Home with home health      Follow-up needed during SNF stay-cardiology (3/30), endocrinology (4/1)    Appointments to reschedule- internal medicine 4/1    Follow-up needed after discharge from SNF:   - PCP, needs to be rescheduled  - cardiology (6/1)  - Endocrinology - asked  to reschedule, unsure why patient did not go to appointment on 04/01    Future Appointments   Date Time Provider Department Center   6/1/2022  3:00 PM Lois Almonte MD [facility] CARDIO Chaz Alanis NP  Department of Hospital Medicine   [facility]     DOS: 4/5/2022      Patient note was created using MModal Dictation.  Any errors in syntax or even information may not have been identified and edited on initial review prior to signing this note.

## 2022-04-05 NOTE — PLAN OF CARE
Problem: Adult Inpatient Plan of Care  Goal: Plan of Care Review  Outcome: Ongoing, Progressing  Flowsheets (Taken 4/5/2022 1729)  Plan of Care Reviewed With: patient     Problem: Diabetes Comorbidity  Goal: Blood Glucose Level Within Targeted Range  Outcome: Ongoing, Progressing     Problem: Fluid and Electrolyte Imbalance (Acute Kidney Injury/Impairment)  Goal: Fluid and Electrolyte Balance  Outcome: Ongoing, Progressing     Problem: Oral Intake Inadequate (Acute Kidney Injury/Impairment)  Goal: Optimal Nutrition Intake  Outcome: Ongoing, Progressing     Problem: Renal Function Impairment (Acute Kidney Injury/Impairment)  Goal: Effective Renal Function  Outcome: Ongoing, Progressing     Problem: Fall Injury Risk  Goal: Absence of Fall and Fall-Related Injury  Outcome: Ongoing, Progressing

## 2022-04-05 NOTE — PLAN OF CARE
Problem: Adult Inpatient Plan of Care  Goal: Absence of Hospital-Acquired Illness or Injury  Outcome: Ongoing, Progressing     Problem: Adult Inpatient Plan of Care  Goal: Optimal Comfort and Wellbeing  Outcome: Ongoing, Progressing     Problem: Fall Injury Risk  Goal: Absence of Fall and Fall-Related Injury  Outcome: Ongoing, Progressing

## 2022-04-05 NOTE — PT/OT/SLP PROGRESS
"Physical Therapy Treatment    Patient Name:  Courtney Engle   MRN:  [id number]  Admit Date: 3/23/2022  Admitting Diagnosis: Diabetes mellitus with ketoacidosis and lactic acidosis but without coma  Recent Surgeries:     General Precautions: Standard, fall   Orthopedic Precautions:N/A   Braces:       Recommendations:     Discharge Recommendations:  home with home health   Level of Assistance Recommended at Discharge: Intermittent assistance   Discharge Equipment Recommendations: bedside commode, shower chair, walker, rolling   Barriers to discharge:      Assessment:     Courtney Engle is a 87 y.o. female admitted with a medical diagnosis of Diabetes mellitus with ketoacidosis and lactic acidosis but without coma . Pt tolerated well, pt continues to require vcs for taking her time for safety, impulsive at times, pt would continue to benefit from skilled PT services to improve overall functional mobility, strength and endurance.  .      Performance deficits affecting function:  weakness, impaired endurance, impaired self care skills, impaired functional mobilty, gait instability, impaired balance, decreased lower extremity function, impaired cardiopulmonary response to activity .    Rehab Potential is good    Activity Tolerance: Fair    Plan:     Patient to be seen 6 x/week to address the above listed problems via gait training, therapeutic activities, therapeutic exercises, neuromuscular re-education, wheelchair management/training    · Plan of Care Expires: 04/23/22  · Plan of Care Reviewed with: patient    Subjective     "let me finish this" painting activity with coordinator returned 30 minutes later, pt agreeable still doing activity, will finish later    Pain/Comfort:  · Pain Rating 1: 0/10  · Pain Rating Post-Intervention 1: 0/10    Patient's cultural, spiritual, Buddhism conflicts given the current situation:  · no    Objective:     .  Patient found  with  (in WC) upon PT entry to room.     Therapeutic " "Activities and Exercises: mini elliptical x 10 min    Functional Mobility:  · Transfers:     · Sit to Stand:  stand by assistance with rolling walker and vcs for tech/handplacement/safety pt pops up quick  · Gait: amb with RW (CGA for safety in case knee azul) close SBA ~120 ft and 50 ft vcs for taking her time for safety  · Stairs: asc/yudith 4 steps with BHR CGA for safety/close SBA  · Curb asc/yudith 4" curb with RW CGA vcs for safety  · Wheelchair Propulsion: ~ 150 ft with BUE S    AM-PAC 6 CLICK MOBILITY  20    Patient left up in chair with call button in reach and belonging [facility].    GOALS:   Multidisciplinary Problems     Physical Therapy Goals        Problem: Physical Therapy    Goal Priority Disciplines Outcome Goal Variances Interventions   Physical Therapy Goal     PT, PT/OT Ongoing, Progressing     Description: PT goals until 4/13/22    1. Pt supine to sit with mod independent-not met  2. Pt sit to supine with mod independent-not met  3. Pt sit to stand with RW with mod independent-not met  4. Pt to perform gait 100ft with RW with supervision.-not met  5. Pt to go up/down curb step with RW with supervision.-not met  6. Pt to up/down 5 steps with B UE rail with supervision.-not met  7. Pt to perform B LE exs in sitting or supine x 15 reps to strengthen B LE to improve functional mobility.-not met  8. Pt to propel w/c 50ft with B UE on level surface with supervision- met  9. Pt will be able to  object off the ground with the reacher with RW support with set up assist.-not met                     Time Tracking:     PT Received On: 04/05/22  PT Total Time (min):       Billable Minutes: Gait Training 13, Therapeutic Activity 16 and Therapeutic Exercise 10    Treatment Type: Treatment  PT/PTA: PTA     PTA Visit Number: 4     04/05/2022  "

## 2022-04-06 NOTE — PT/OT/SLP PROGRESS
Occupational Therapy   Treatment    Name: Courtney Engle  MRN: [id number]  Admit Date: 3/23/2022  Admitting Diagnosis:  Diabetes mellitus with ketoacidosis and lactic acidosis but without coma    General Precautions: Standard, fall   Orthopedic Precautions:N/A   Braces:       Recommendations:     Discharge Recommendations: home with home health  Level of Assistance Recommended at Discharge: Intermittent assistance for ADL's and homemaking tasks  Discharge Equipment Recommendations:  bedside commode, shower chair, walker, rolling  Barriers to discharge:  Decreased caregiver support    Assessment:     Courtney Engle is a 87 y.o. female with a medical diagnosis of Diabetes mellitus with ketoacidosis and lactic acidosis but without coma.  She presents with Performance deficits affecting function are weakness, impaired endurance, impaired self care skills, impaired functional mobility, gait instability, impaired balance, decreased lower extremity function, impaired coordination.       Pt. Was cooperative and participated well with session on this day. Pt  continues to demonstrate levels of physical deficits with  functional indep with daily management activities tasks, selfcare skills with balance,  functional mobility, UB strength and endurance. Pt. Will continue to benefit from continued OT to progress towards goals   .        Rehab Potential is fair    Activity tolerance:  Fair    Plan:     Patient to be seen 6 x/week to address the above listed problems via self-care/home management, therapeutic activities, therapeutic exercises    · Plan of Care Expires: 04/24/22  · Plan of Care Reviewed with: patient    Subjective     Communicated with:  nsg and Pt.prior to session.I took a sleeping pill last night  I still did not fall asleep     Pain/Comfort:  · Pain Rating 1: 0/10  · Pain Rating Post-Intervention 1: 0/10    Patient's cultural, spiritual, Worship conflicts given the current situation:  · no    Objective:      Patient found supine    upon OT entry to room.    Bed Mobility:    · Patient completed Scooting/Bridging with supervision  · Patient completed Supine to Sit with supervision     Functional Mobility/Transfers:  · Patient completed Sit <> Stand Transfer with stand by assistance  with  no assistive device   · Patient completed Bed <> Chair Transfer using Stand Pivot technique with stand by assistance with no assistive device  · Patient completed Toilet Transfer Stand Pivot technique with stand by assistance with  rolling walker    Activities of Daily Living:  · Feeding:  modified independence with breakfast  · Grooming:supervision with grooming needs standing at sink level  · Bathing: stand by assistance alternation between sitting and standing sink side Pt. declined shower  · Upper Body Dressing: supervision to off/romi pull over gown\  · Lower Body Dressing: stand by assistance to doff/romi fresh socks and depends  · Toileting: supervision with cleaning and clothing management     [facility] 6 Click ADL: 21    Treatment & Education:  Pt edu on role of OT, POC, safety when performing self care tasks , benefit of performing OOB activity, and safety when performing functional transfers and mobility management for preparation with goals to progress towards next level of care    Patient left sitting edge of bed with all lines intact and call button in reachEducation:      GOALS:   Multidisciplinary Problems     Occupational Therapy Goals        Problem: Occupational Therapy    Goal Priority Disciplines Outcome Interventions   Occupational Therapy Goal     OT, PT/OT Ongoing, Progressing    Description: Goals to be met by: 4/07/2022    Patient will increase functional independence with ADLs by performing:    UE Dressing with [place].  LE Dressing with Modified [place].  Grooming while standing at sink with Modified [place].  Toileting from toilet with Modified [place] for hygiene and clothing management.    Bathing from shower on shower chair/bench with Supervision Assistance.  Step transfer with Modified [place] using AD.   Toilet transfer to toilet with Modified [place].  Upper extremity exercise program per handout, with supervision.  Pt will complete functional reaching task in standing x10 minutes with Supervision assistance for improved safety and independence in standing ADLs/IADLS.   Caregiver will be educated on level of assist required to safely perform self care tasks and functional transfers.                     Time Tracking:     OT Date of Treatment: OT Date of Treatment: 04/06/22  OT Total Time (min):      Billable Minutes:Self Care/Home Management 53    4/6/2022

## 2022-04-07 NOTE — PLAN OF CARE
04/07/22 1631   Medicare Message   Important Message from Medicare regarding Discharge Appeal Rights Given to patient/caregiver;Explained to patient/caregiver;Signed/date by patient/caregiver   Date IMM was signed 04/07/22   Time IMM was signed 1631         ANGEL visited pt at  to serve [employer] and explain appeals process. ANGEL advised pt of current d/c date and explained that all appeals must be preformed, at the latest by 12 NOON on the day before scheduled discharge date and answered any questions. ANGEL provided [employer] number () to pt. Advised pt to contact  if there are any further questions regarding discharge and/or appeals. ANGEL explained that should pt fail to file appeal or if appeal is filed and denied, pt will become financially responsible for skilled stay beginning the date immediately after the scheduled discharge. Pt expressed understanding and signed [facility], copy given to patient, original filed in office.

## 2022-04-07 NOTE — TELEPHONE ENCOUNTER
I probably do not have anything because I am going to be out on vacation for the week of [place].

## 2022-04-07 NOTE — PT/OT/SLP PROGRESS
Occupational Therapy   Treatment    Name: Courtney Engle  MRN: [id number]  Admit Date: 3/23/2022  Admitting Diagnosis:  Diabetes mellitus with ketoacidosis and lactic acidosis but without coma    General Precautions: Standard, fall   Orthopedic Precautions:N/A   Braces:       Recommendations:     Discharge Recommendations: home with home health  Level of Assistance Recommended at Discharge: Intermittent assistance for ADL's and homemaking tasks  Discharge Equipment Recommendations:  bedside commode, shower chair, walker, rolling  Barriers to discharge:  Decreased caregiver support    Assessment:     Courtney Engle is a 87 y.o. female with a medical diagnosis of Diabetes mellitus with ketoacidosis and lactic acidosis but without coma.  She presents with  Performance deficits affecting function are weakness, impaired endurance, impaired self care skills, impaired functional mobility, gait instability, impaired balance, decreased lower extremity function, impaired coordination.      Pt. Was cooperative and participated well with session on this day. Pt  continues to demonstrate levels of physical deficits with  functional indep with daily management activities tasks, selfcare skills with balance,  functional mobility, UB strength and endurance. Pt. Will continue to benefit from continued OT to progress towards goals   .     Rehab Potential is fair    Activity tolerance:  Fair    Plan:     Patient to be seen 6 x/week to address the above listed problems via self-care/home management, therapeutic activities, therapeutic exercises    · Plan of Care Expires: 04/24/22  · Plan of Care Reviewed with: patient    Subjective     Communicated with: nsg and Pt. prior to session. I am doing well today. Am I still going to activity   Pain/Comfort:  Pain Rating 1: 0/10  Pain Rating Post-Intervention 1: 0/10    Patient's cultural, spiritual, Zoroastrian conflicts given the current situation:  no    Objective:     Patient found up in  chair  upon OT entry to room    Functional Mobility/Transfers:  · Patient completed Sit <> Stand Transfer with stand by assistance  with  rolling walker   · Patient completed Toilet Transfer Step Transfer technique with stand by assistance with  rolling walker   · Pt. With fxl mobility from bed side chair to in room bath with RW for balance and cues for safety      Activities of Daily Living:  · Grooming:supervision with grooming needs standing at sink level  · Lower Body Dressing: stand by assistance to romi shorts seated and to manage over hips instance with RW for bal BLE sandals   · Toileting: supervision with cleaning and clothing management     [facility] 6 Click ADL: 21    OT Exercises: UE Ergometer 10 min    Treatment & Education:  Pt. With standing act on this day with task. Pt. With CGA/SBA for balance aspects with task with  AD at raised counter Pt with visual perception task with discrimination of various shapes and sizes x 5 min with standing bal and min cues through out with weight shifting and use of BUE's incorporated and crossing mid line and facilitation with posture in prep for home management .     Pt. With 4# dowel activity with 2x20 reps with  shd flex, bicep curls horz adb/add and forward flex motion to increase BUE ROM and strength,.   Pt. With therex performed to increase ROM, endurance selfcare task and fxl mobility for independence     Pt edu on role of OT, POC, safety when performing self care tasks , benefit of performing OOB activity, and safety when performing functional transfers and mobility management for preparation with goals to progress towards next level of care    Patient left up in chair with all lines intact and call button in reachEducation:      GOALS:   Multidisciplinary Problems     Occupational Therapy Goals        Problem: Occupational Therapy    Goal Priority Disciplines Outcome Interventions   Occupational Therapy Goal     OT, PT/OT Ongoing, Progressing    Description:  Goals to be met by: 4/07/2022    Patient will increase functional independence with ADLs by performing:    UE Dressing with Outing.  LE Dressing with Modified Outing.  Grooming while standing at sink with Modified Outing.  Toileting from toilet with Modified Outing for hygiene and clothing management.   Bathing from shower on shower chair/bench with Supervision Assistance.  Step transfer with Modified Outing using AD.   Toilet transfer to toilet with Modified Outing.  Upper extremity exercise program per handout, with supervision.  Pt will complete functional reaching task in standing x10 minutes with Supervision assistance for improved safety and independence in standing ADLs/IADLS.   Caregiver will be educated on level of assist required to safely perform self care tasks and functional transfers.                     Time Tracking:     OT Date of Treatment: OT Date of Treatment: 04/07/22  OT Total Time (min):      Billable Minutes:Self Care/Home Management 30  Therapeutic Activity 23    4/7/2022   .

## 2022-04-07 NOTE — PLAN OF CARE
[facility] - Skilled Nursing      HOME HEALTH ORDERS  FACE TO FACE ENCOUNTER    Patient Name: Courtney Engle  YOB: 1935    PCP: Vishal Ulloa MD   PCP Address: [address] / [place] [address]  PCP Phone Number: [phone]  PCP Fax: [phone]    Encounter Date: 3/23/22    Admit to Home Health    Diagnoses:  Active Hospital Problems    Diagnosis  POA    *Diabetes mellitus with ketoacidosis and lactic acidosis but without coma [E11.10]  Yes    Debility [R53.81]  Yes    Palliative care encounter [Z51.5]  Not Applicable    Acute renal failure superimposed on stage 3 chronic kidney disease [N17.9, N18.30]  Yes    Paroxysmal atrial fibrillation [I48.0]  Yes    Pure hypercholesterolemia [E78.00]  Yes    Chronic diastolic heart failure [I50.32]  Yes    GERD (gastroesophageal reflux disease) [K21.9]  Yes    CKD stage 3 due to type 2 diabetes mellitus [E11.22, N18.30]  Yes     Chronic    Anxiety [F41.9]  Yes    Moderate recurrent major depression [F33.1]  Yes    Acquired hypothyroidism [E03.9]  Yes     Chronic    Type 2 diabetes mellitus without complication, with long-term current use of insulin [E11.9, Z79.4]  Not Applicable     Chronic    Essential hypertension [I10]  Yes     Chronic      Resolved Hospital Problems   No resolved problems to display.       Follow Up Appointments:  Future Appointments   Date Time Provider Department Center   4/12/2022  8:30 AM Darrin Montero MD [facility] ENDODIA [facility]   6/1/2022  3:00 PM Lois Almonte MD [facility] CARDIO [facility]       Allergies:  Review of patient's allergies indicates:   Allergen Reactions    Contac 12 hour allergy Anaphylaxis and Hives    Diphenhydramine hcl Shortness Of Breath     Other reaction(s): Shortness of breath    Ciprofloxacin (bulk) Nausea And Vomiting    (d)-limonene flavor Hives     Other reaction(s): Shortness of breath    Anti-hyst Other (See Comments)    Antihistamines - alkylamine Hives     Ciprocinonide      Other reaction(s): Stomach upset    Codeine      bad reaction    Contact metal agent      Other reaction(s): Hives    Glipizide Hives    Hydroxyzine      Unknown    Iodinated contrast media Other (See Comments)    Nitrofuran analogues     Penicillin      Other reaction(s): Rash    Propoxyphene Itching    Statins-hmg-coa reductase inhibitors      Nausea to all statins    Doxycycline Rash    Penicillins Rash    Sulfa (sulfonamide antibiotics) Rash    Sulfacetamide sodium-urea Rash       Medications: Review discharge medications with patient and family and provide education.      I have seen and examined this patient within the last 30 days. My clinical findings that support the need for the home health skilled services and home bound status are the following:no   Weakness/numbness causing balance and gait disturbance due to Weakness/Debility making it taxing to leave home.     Diet:   diabetic diet 2000 calorie    Referrals/ Consults  Physical Therapy to evaluate and treat. Evaluate for home safety and equipment needs; Establish/upgrade home exercise program. Perform / instruct on therapeutic exercises, gait training, transfer training, and Range of Motion.  Occupational Therapy to evaluate and treat. Evaluate home environment for safety and equipment needs. Perform/Instruct on transfers, ADL training, ROM, and therapeutic exercises.   to evaluate for community resources/long-range planning.  Aide to provide assistance with personal care, ADLs, and vital signs.    Activities:   activity as tolerated    Nursing:   Agency to admit patient within 24 hours of hospital discharge unless specified on physician order or at patient request    SN to complete comprehensive assessment including routine vital signs. Instruct on disease process and s/s of complications to report to MD. Review/verify medication list sent home with the patient at time of discharge  and instruct  patient/caregiver as needed. Frequency may be adjusted depending on start of care date.     Skilled nurse to perform up to 3 visits PRN for symptoms related to diagnosis    Notify MD if SBP > 160 or < 90; DBP > 90 or < 50; HR > 120 or < 50; Temp > 101; O2 < 88%    Ok to schedule additional visits based on staff availability and patient request on consecutive days within the home health episode.    Miscellaneous   Diabetic Care:   SN to perform and educate Diabetic management with blood glucose monitoring:, Fingerstick blood sugar AC and HS and Report CBG < 60 or > 350 to physician.    Home Health Aide:  Nursing Three times weekly, Physical Therapy Three times weekly, Occupational Therapy Three times weekly, Medical Social Work Three times weekly and Home Health Aide Three times weekly    Wound Care Orders  no    I certify that this patient is confined to her home and needs intermittent skilled nursing care, physical therapy and occupational therapy.

## 2022-04-07 NOTE — TELEPHONE ENCOUNTER
----- Message from Zoë Estevez sent at 4/7/2022 12:47 PM CDT -----  Contact: [phone] Patito Britton (Daughter)  Pt daughter states the pt is discharging from the skilled nursing on 04/11/22. Pt daughter is requesting a call back about scheduling the pt for a hospital follow up. Brenna is asking if the pt needs to see Dr Ulloa before seeing Dr Montero.     Please call and advise.

## 2022-04-07 NOTE — TELEPHONE ENCOUNTER
"Spoke to pt  Daughter and offer several appointments pt Daughter declined because she stated that Dr. Ulloa is aware of her Mothers  care and don't want to see anyone else because He will "fit her in"   Pt daughter is concerned about Pt medications moving forward, pt Daughter wants an appointment  on Monday or Tuesday   Please advise   "

## 2022-04-07 NOTE — PT/OT/SLP PROGRESS
"Physical Therapy Treatment    Patient Name:  Courtney Engle   MRN:  [id number]  Admit Date: 3/23/2022  Admitting Diagnosis: Diabetes mellitus with ketoacidosis and lactic acidosis but without coma    General Precautions: Standard, fall   Orthopedic Precautions:N/A   Braces: N/A     Recommendations:     Discharge Recommendations:  home with home health   Level of Assistance Recommended at Discharge: Intermittent assistance   Discharge Equipment Recommendations: bedside commode, shower chair, walker, rolling   Barriers to discharge:      Assessment:     Courtney Engle is a 87 y.o. female admitted with a medical diagnosis of Diabetes mellitus with ketoacidosis and lactic acidosis but without coma .   Pt tolerated today's therapy session fairly due to increase knee pain limiting gait. Pt completed 2 trials of 4 steps using only 1HR with either 1UE or BUE with CGA for safety. Pt required occasional rest breaks and educated on pacing herself. Pt is progressing well and continues to benefit from therapy to achieve highest level of independence prior to discharge.     Performance deficits affecting function:  weakness, impaired endurance, impaired self care skills, impaired functional mobilty, gait instability, impaired balance, decreased lower extremity function, impaired cardiopulmonary response to activity .    Rehab Potential is good    Activity Tolerance: Fair    Plan:     Patient to be seen 6 x/week to address the above listed problems via gait training, therapeutic activities, therapeutic exercises, neuromuscular re-education, wheelchair management/training    · Plan of Care Expires: 04/23/22  · Plan of Care Reviewed with: patient    Subjective     "I think it was good that I rested yesterday".     Pain/Comfort:  Pain Rating 1: 0/10  Location - Side 1: Left  Location - Orientation 1: generalized  Location 1: knee  Pain Addressed 1: Pre-medicate for activity, Reposition, Distraction  Pain Rating Post-Intervention 1:  " (did not rate)    Patient's cultural, spiritual, Confucianism conflicts given the current situation:  no    Objective:     Communicated with HARJINDER prior to session.  Patient found up in wheelchair in therapy gym with HARJINDER with  (no lines) upon PT entry to room.     Therapeutic Activities and Exercises:    Seated BLE therex x15 reps: ankle pumps, LAQ, marching   Mini elliptical x15 mins  Patient educated on role of therapy, goals of session, and benefits of out of bed mobility.   Instructed on use of call button and importance of calling nursing staff for assistance with mobility   Questions/concerns addressed within PTA scope of practice  Pt verbalized understanding.      Functional Mobility:  · Transfers:     · Sit to Stand:  stand by assistance with rolling walker. Verbal cues fot hane placement for safety   · Wheelchair to chair: stand by assistance with no AD using stand pivot.   · Gait: pt ambulated 35ft with RW and close SBA and w/c following. Distance limited by increase knee pain. Pt demonstrated mildly unsteady gait with flexed posture, decrease step length, and decrease thaddeus. No LOB occurred.   · Stairs:  Pt ascended/descended 2 trials of 4 stair(s) with No Assistive Device with 1 handrail with Contact Guard Assistance. Step-to gait pattern.  · 1 trial going straight up and down steps with 1UE on 1HR  · 2nd trial going sideways up and down steps with BUE on 1 HR. Verbal cues for hand placement and feet placement   · Wheelchair Propulsion:  Pt propelled Standard wheelchair x 50 feet on Level tile with  Bilateral upper extremity with Supervision or Set-up Assistance.     AM-PAC 6 CLICK MOBILITY  20    Patient left up in chair with call button in reach and RN notified.    GOALS:   Multidisciplinary Problems     Physical Therapy Goals        Problem: Physical Therapy    Goal Priority Disciplines Outcome Goal Variances Interventions   Physical Therapy Goal     PT, PT/OT Ongoing, Progressing     Description: PT  goals until 4/13/22    1. Pt supine to sit with mod independent-not met  2. Pt sit to supine with mod independent-not met  3. Pt sit to stand with RW with mod independent-not met  4. Pt to perform gait 100ft with RW with supervision.-not met  5. Pt to go up/down curb step with RW with supervision.-not met  6. Pt to up/down 5 steps with B UE rail with supervision.-not met  7. Pt to perform B LE exs in sitting or supine x 15 reps to strengthen B LE to improve functional mobility.-not met  8. Pt to propel w/c 50ft with B UE on level surface with supervision- met  9. Pt will be able to  object off the ground with the reacher with RW support with set up assist.-not met                     Time Tracking:     PT Received On: 04/07/22  PT Total Time (min):  41     Billable Minutes: Gait Training 10, Therapeutic Activity 14 and Therapeutic Exercise 17    Treatment Type: Treatment  PT/PTA: PTA     PTA Visit Number: 4     04/07/2022

## 2022-04-08 NOTE — PLAN OF CARE
Problem: Occupational Therapy  Goal: Occupational Therapy Goal  Description: Goals to be met by: 4/11/2022    Patient will increase functional independence with ADLs by performing:    UE Dressing with [place]. MET  LE Dressing with Modified [place]. MET   Grooming while standing at sink with Modified [place].  Toileting from toilet with Modified [place] for hygiene and clothing management. MET   Bathing from shower on shower chair/bench with Supervision Assistance.  Step transfer with Modified [place] using AD.   Toilet transfer to toilet with Modified [place].  Upper extremity exercise program per handout, with supervision. MET  Pt will complete functional reaching task in standing x10 minutes with Supervision assistance for improved safety and independence in standing ADLs/IADLS.  Caregiver will be educated on level of assist required to safely perform self care tasks and functional transfers.    Outcome: Ongoing, Progressing

## 2022-04-08 NOTE — PLAN OF CARE
Problem: Adult Inpatient Plan of Care  Goal: Plan of Care Review  Outcome: Ongoing, Progressing  Flowsheets (Taken 4/8/2022 0427)  Plan of Care Reviewed With: patient  Goal: Patient-Specific Goal (Individualized)  Outcome: Ongoing, Progressing  Goal: Absence of Hospital-Acquired Illness or Injury  Outcome: Ongoing, Progressing  Intervention: Identify and Manage Fall Risk  Flowsheets (Taken 4/8/2022 0427)  Safety Promotion/Fall Prevention:   assistive device/personal item within reach   diversional activities provided   Fall Risk reviewed with patient/family   Fall Risk signage in place   lighting adjusted   medications reviewed   nonskid shoes/socks when out of bed   side rails raised x 3   instructed to call staff for mobility  Goal: Optimal Comfort and Wellbeing  Outcome: Ongoing, Progressing  Intervention: Provide Person-Centered Care  Flowsheets (Taken 4/8/2022 0427)  Trust Relationship/Rapport:   care explained   questions answered   choices provided   questions encouraged   emotional support provided   reassurance provided   empathic listening provided   thoughts/feelings acknowledged     Problem: Diabetes Comorbidity  Goal: Blood Glucose Level Within Targeted Range  Outcome: Ongoing, Progressing  Intervention: Monitor and Manage Glycemia  Flowsheets (Taken 4/8/2022 0427)  Glycemic Management: blood glucose monitored     Problem: Fluid and Electrolyte Imbalance (Acute Kidney Injury/Impairment)  Goal: Fluid and Electrolyte Balance  Outcome: Ongoing, Progressing  Intervention: Monitor and Manage Fluid and Electrolyte Balance  Flowsheets (Taken 4/8/2022 0427)  Fluid/Electrolyte Management: fluids provided     Problem: Oral Intake Inadequate (Acute Kidney Injury/Impairment)  Goal: Optimal Nutrition Intake  Outcome: Ongoing, Progressing  Intervention: Promote and Optimize Nutrition  Flowsheets (Taken 4/8/2022 0427)  Oral Nutrition Promotion:   rest periods promoted   safe use of adaptive equipment encouraged      Problem: Renal Function Impairment (Acute Kidney Injury/Impairment)  Goal: Effective Renal Function  Outcome: Ongoing, Progressing  Intervention: Monitor and Support Renal Function  Flowsheets (Taken 4/8/2022 0427)  Medication Review/Management: medications reviewed     Problem: Fall Injury Risk  Goal: Absence of Fall and Fall-Related Injury  Outcome: Ongoing, Progressing  Intervention: Identify and Manage Contributors  Flowsheets (Taken 4/8/2022 0427)  Self-Care Promotion:   independence encouraged   BADL personal objects within reach   BADL personal routines maintained   safe use of adaptive equipment encouraged  Medication Review/Management: medications reviewed     Problem: Coping Ineffective  Goal: Effective Coping  Outcome: Ongoing, Progressing  Intervention: Support and Enhance Coping Strategies  Flowsheets (Taken 4/8/2022 0427)  Environmental Support:   calm environment promoted   rest periods encouraged   environmental consistency promoted   personal routine supported

## 2022-04-08 NOTE — PLAN OF CARE
Problem: Adult Inpatient Plan of Care  Goal: Plan of Care Review  Outcome: Ongoing, Progressing  Goal: Patient-Specific Goal (Individualized)  Outcome: Ongoing, Progressing  Goal: Absence of Hospital-Acquired Illness or Injury  Outcome: Ongoing, Progressing  Goal: Optimal Comfort and Wellbeing  Outcome: Ongoing, Progressing     Problem: Diabetes Comorbidity  Goal: Blood Glucose Level Within Targeted Range  Outcome: Ongoing, Progressing     Problem: Fluid and Electrolyte Imbalance (Acute Kidney Injury/Impairment)  Goal: Fluid and Electrolyte Balance  Outcome: Ongoing, Progressing     Problem: Fall Injury Risk  Goal: Absence of Fall and Fall-Related Injury  Outcome: Ongoing, Progressing     Problem: Coping Ineffective  Goal: Effective Coping  Outcome: Ongoing, Progressing

## 2022-04-08 NOTE — PROGRESS NOTES
[facility]                                  Skilled Nursing Facility                   Progress Note     Admit Date: 3/23/2022  FAY 4/11/2022  Principal Problem:  Diabetes mellitus with ketoacidosis and lactic acidosis but without coma   HPI obtained from patient interview and chart review     Chief Complaint: Re-evaluation of medical treatment and therapy status: Lab review, bradycardia    HPI:   Mrs. Engle is a 86 year old female with PMHx of IDDM, HTN, HLD, diastolic dysfunction (most recent echo 2018 normal), CKD 3, hypothyroidism and GERD who presents to SNF following hospitalization for DKA, encephalopathy, new AFib.  Admission to SNF for secondary weakness debility.     Interval history:All of today's labs reviewed and are listed below.  24 hr vital sign ranges listed below.  24 hour heart rate range is 58-73, asymptomatic.  24 hour blood glucose range is 135-221.  Patient denies shortness of breath, abdominal discomfort, nausea, or vomiting.  Patient reports an adequate appetite.  Patient denies dysuria.  Patient reports having regular bowel movements.  Patient progessing with PT/OT- Gait: pt ambulated 35ft with RW and close SBA and w/c following. Distance limited by increase knee pain. Pt demonstrated mildly unsteady gait with flexed posture, decrease step length, and decrease thaddeus. No LOB occurred. Continuing to follow and treat all acute and chronic conditions.    Past Medical History: Patient has a past medical history of Acquired hypothyroidism (4/25/2014), Anxiety, Aortic calcification (12/8/2016), Arthritis, Bilateral carotid artery disease (12/8/2016), CKD stage 3 due to type 2 diabetes mellitus (2/16/2017), Depression, Essential hypertension, Fever blister, GERD (gastroesophageal reflux disease), Glaucoma suspect with open angle (2010), Hyperlipidemia LDL goal <70 (2/16/2017), Keloid cicatrix, Mixed stress and urge  urinary incontinence (2/16/2017), Morbid obesity with BMI of 40.0-44.9, adult (12/8/2016), Ocular migraine (1/24/2013), and Type 2 diabetes mellitus with hyperglycemia, with long-term current use of insulin.    Past Surgical History: Patient has a past surgical history that includes Cholecystectomy; TONSILLECTOMY, ADENOIDECTOMY; knee replacement right; Joint replacement; Hysterectomy; Colonoscopy w/ polypectomy (04/20/2015); Esophagogastroduodenoscopy (04/20/2015); Cataract extraction w/  intraocular lens implant (Right, 05/30/2012); Cataract extraction w/  intraocular lens implant (Left, 05/26/2010); and Colonoscopy (N/A, 12/20/2019).    Social History: Patient reports that she has never smoked. She has never used smokeless tobacco. She reports that she does not drink alcohol and does not use drugs.    Family History: family history includes Cancer in her mother; Colon cancer in her mother; Glaucoma in her father; Heart attack in her maternal grandmother; Heart attack (age of onset: 93) in her father; Heart disease in her maternal grandmother and mother; Hematuria in her brother; Hypertension in her maternal grandmother; Nephrolithiasis in her father; No Known Problems in her daughter; Stroke in her father; Uterine cancer in her daughter.    Allergies: Patient is allergic to contac 12 hour allergy, diphenhydramine hcl, ciprofloxacin (bulk), (d)-limonene flavor, anti-hyst, antihistamines - alkylamine, ciprocinonide, codeine, contact metal agent, glipizide, hydroxyzine, iodinated contrast media, nitrofuran analogues, penicillin, propoxyphene, statins-hmg-coa reductase inhibitors, doxycycline, penicillins, sulfa (sulfonamide antibiotics), and sulfacetamide sodium-urea.    ROS  Constitutional: Negative for fever   Eyes: Negative for blurred vision, double vision   Respiratory: Negative for cough.  + shortness of breath at baseline  Cardiovascular: Negative for chest pain, palpitations, and leg swelling.    Gastrointestinal: Negative for abdominal pain, diarrhea.  +intermittent constipation/nausea  Genitourinary: Negative for dysuria, frequency   Musculoskeletal:  + generalized weakness. Negative for back pain and myalgias.   Skin: Negative for itching and rash.   Neurological: Negative for dizziness, headaches.   Psychiatric/Behavioral: Negative for depression. The patient is not nervous/anxious.      24 hour Vital Sign Range   Temp:  [97.1 °F (36.2 °C)]   Pulse:  [58]   Resp:  [18]   BP: (128)/(67)   SpO2:  [96 %]     PEx  Constitutional: Patient appears debilitated.  No distress noted  HENT:  Poor dentition  Head: Normocephalic and atraumatic.   Eyes: Pupils are equal, round  Neck: Normal range of motion. Neck supple.   Cardiovascular: Normal rate, regular rhythm and normal heart sounds.    Pulmonary/Chest: Effort normal and breath sounds are clear  Abdominal: Soft. Bowel sounds are normal.   Musculoskeletal: Normal range of motion.   Neurological: Alert and oriented to person, place, and time.   Psychiatric: Normal mood and affect. Behavior is normal.   Skin: Skin is warm and dry.      Recent Labs   Lab 04/08/22  0515      K 4.0      CO2 25   BUN 11   CREATININE 0.7   MG 1.6       Recent Labs   Lab 04/08/22  0515   WBC 5.75   RBC 3.75*   HGB 10.2*   HCT 34.5*   *   MCV 92   MCH 27.2   MCHC 29.6*         Assessment and Plan:    Bradycardia  Essential hypertension  - reduced metoprolol to 25 mg BID, lisinopril 20 mg daily    Type 2 diabetes mellitus without complication, with long-term current use of insulin  Diabetes mellitus with ketoacidosis and lactic acidosis but without coma  - last A1C >14 on 3/17/22  - suspect secondary to medication non-compliance and dietary indiscretion  - diabetic diet, Accu checks AC/HS  - home regimen with aspart 15 units TIDWM & lantus 36 units QHS  - 4/8 increased aspart to 7 units TID WM, continue detemir 12 units BID and low dose sliding scale  insulin    Insomnia  - trazodone 50 mg qHS., changed melatonin to PRN     Acute renal failure superimposed on stage 3 chronic kidney disease  - sCr basline is 0.8, creatinine on admission 2.3  - suspected to be pre-renal in setting of DKA  - acute phase resolved with fluids  - creatinine back to baseline  - continue monitor with twice weekly BMPs, avoid nephrotoxic agents, renally dose medications when appropriate    Atrial fibrillation with RVR  - new onset, suspect due to acute illness with DKA  - recent thyroid studies show normal T4  - 2D echo unremarkable as below  - continue metoprolol for rate control; titrate up as clinically indicated  - addressed anticoagulation risk/benefit w/ Pt and daughter; patient would like to defer therapy at this time  - follow-up with cardiology during SNF stay and after discharge from SNF  - continue metoprolol 50 mg BID with holding parameters  - initiated Eliquis 5 mg BID per Cardiology note     Diastolic dysfunction  - most recent echo 1/2018 showed LVEF 60-65%, normal LV diastolic function and normal RV function  - Echo  · The estimated ejection fraction is 65%.  · The left ventricle is normal in size with normal systolic function.  · Normal left ventricular diastolic function.  · Normal right ventricular size with normal right ventricular systolic function.  · The estimated PA systolic pressure is 23 mmHg.  · Normal central venous pressure (3 mmHg).      GERD (gastroesophageal reflux disease)  - continue home pepcid 40 mg daily    Depression, moderate, recurrent  Generalized anxiety disorder  - Continue home Lexapro 10 mg daily    Acquired hypothyroidism  - TSH low, however free T4 WNL  - continue home levothyroxine 75 mcg daily     Essential hypertension  - holding home atenolol in favor of metoprolol in setting of a fib RVR  - continue lisinopril 10 mg daily    Insomnia  - melatonin 9 mg qHS    Obesity  - BMI 37  - RD following, appreciate recommendations    Debility   -  Continue with PT/OT for gait training and strengthening and restoration of ADL's   - Encourage mobility, OOB in chair, and early ambulation as appropriate  - Fall precautions   - Monitor for bowel and bladder dysfunction  - Monitor for and prevent skin breakdown and pressure ulcers  - initiated DVT prophylaxis with  Lovenox 40 mg daily         Anticipate disposition:  Home with home health      Follow-up needed during SNF stay-cardiology (3/30), endocrinology (4/1)    Appointments to reschedule- internal medicine 4/1    Follow-up needed after discharge from SNF:   - PCP, needs to be rescheduled  - cardiology (6/1)  - Endocrinology - asked  to reschedule, unsure why patient did not go to appointment on 04/01    Future Appointments   Date Time Provider Department Center   4/12/2022  8:30 AM Darrin Montero MD [facility] ENDONELLIE [facility]   4/21/2022  9:40 AM Vishal Ulloa MD [facility] IM [facility] PCW   6/1/2022  3:00 PM Lois Almonte MD [facility] CARDIO [facility] NP  Department of Hospital Medicine   [facility]     DOS: 4/8/2022      Patient note was created using MModal Dictation.  Any errors in syntax or even information may not have been identified and edited on initial review prior to signing this note.

## 2022-04-08 NOTE — PLAN OF CARE
SW met with pt and spoke to daughter, Patito, to confirm discharge needs. BSC has been authorized and will be delivered prior to discharge. Patient's daughter will transport pt home and has taken leave to provide care for her. Home Health has been referred to [facility] and patient was [facility]. F/U PCP appt scheduled for 4/22.  D/C set for 1:30 PM.    Milana Rincon, [facility]  Case Management Department  [facility]  [email or web address]

## 2022-04-08 NOTE — PT/OT/SLP PROGRESS
"Occupational Therapy   Treatment    Name: Courtney Engle  MRN: [id number]  Admit Date: 3/23/2022  Admitting Diagnosis:  Diabetes mellitus with ketoacidosis and lactic acidosis but without coma    General Precautions: Standard, fall   Orthopedic Precautions:N/A   Braces: N/A     Recommendations:     Discharge Recommendations: home with home health  Level of Assistance Recommended at Discharge: Intermittent assistance for ADL's and homemaking tasks  Discharge Equipment Recommendations:  bedside commode, shower chair, walker, rolling  Barriers to discharge:  Decreased caregiver support    Assessment:     Courtney Engle is a 87 y.o. female with a medical diagnosis of Diabetes mellitus with ketoacidosis and lactic acidosis but without coma.  She presents with performance deficits affecting function are impaired endurance, impaired functional mobilty, impaired self care skills, weakness, decreased safety awareness, impaired cardiopulmonary response to activity.   Pt tolerated session well with good participation throughout. Pt is progressing well towards functional goals completing tested self care tasks with modified indepednence. Pt demonstrates safety awareness deficits when completing functional transfers and functional mobility during self care tasks requiring verbal cues and education to complete. Pt would benefit from continued skilled OT services to address performance deficits for improved safety and independence in ADLs and additional meaningful occupations.      Rehab Potential is good    Activity tolerance:  Good    Plan:     Patient to be seen 6 x/week to address the above listed problems via self-care/home management, therapeutic activities, therapeutic exercises    · Plan of Care Expires: 04/24/22  · Plan of Care Reviewed with: patient    Subjective   'Feels like my arms and legs have gotten stronger."  Communicated with: FARHAD prior to session.     Pain/Comfort:  · Pain Rating 1: 0/10  · Pain Rating " Post-Intervention 1: 0/10    Patient's cultural, spiritual, Lutheran conflicts given the current situation:  · no    Objective:     Patient found sitting edge of bed with  (no lines) upon OT entry to room.    Functional Mobility/Transfers:  · Patient completed Sit <> Stand Transfer with modified independence  with  rolling walker   · Patient completed Toilet Transfer Step Transfer technique with stand by assistance with  verbal cues for proper management of RW during transfer for imporved safety   · Functional Mobility: Pt performed functional mobility to/from in room bathroom using RW with SBA and verbal cues for proper RW management for improved safety as pt attempting to complete portion of task with no AD.     Activities of Daily Living:  · Grooming: supervision to complete hand hygiene standing at sink   · Lower Body Dressing: modified independence to don shorts using RW to steady self as needed when standing to manage pants over hips   · Footwear: independence to don/doff bilateral socks   · Toileting: set up assistance to complete all aspects of toileting  .    [facility] 6 Click ADL: 23    Therapeutic Exercises:  UE Ergometer: 15 min. minimal resistance 0 rest breaks  Activities provided for increased endurance and UE strength/functional use in self care tasks.     Functional Activities:  Pt performed standing trials at raised tabletop to fold laundry for improved standing tolerance and balance during ADL/IADL tasks.   Trial 1: 9 min. w/ SBA       Treatment & Education:  Pt educated on:   - POC/OT role   - benefit of performing OOB activity   - safety when performing functional transfers, and functional mobility  - proper use of RW during functional transfers for improved safety    Pt receptive to education providing verbal understanding on this day.       Patient left up in chair with call button in reachEducation:      GOALS:   Multidisciplinary Problems     Occupational Therapy Goals        Problem:  Occupational Therapy    Goal Priority Disciplines Outcome Interventions   Occupational Therapy Goal     OT, PT/OT Ongoing, Progressing    Description: Goals to be met by: 4/11/2022    Patient will increase functional independence with ADLs by performing:    UE Dressing with [place]. MET  LE Dressing with Modified [place]. MET   Grooming while standing at sink with Modified [place].  Toileting from toilet with Modified [place] for hygiene and clothing management. MET   Bathing from shower on shower chair/bench with Supervision Assistance.  Step transfer with Modified [place] using AD.   Toilet transfer to toilet with Modified [place].  Upper extremity exercise program per handout, with supervision. MET  Pt will complete functional reaching task in standing x10 minutes with Supervision assistance for improved safety and independence in standing ADLs/IADLS.  Caregiver will be educated on level of assist required to safely perform self care tasks and functional transfers.                     Time Tracking:     OT Date of Treatment: OT Date of Treatment: 04/08/22  OT Total Time (min): Total Time (min): 39 min    Billable Minutes:Self Care/Home Management 15  Therapeutic Activity 9  Therapeutic Exercise 15    4/8/2022

## 2022-04-08 NOTE — PT/OT/SLP PROGRESS
"Physical Therapy Treatment    Patient Name:  Courtney Engle   MRN:  [id number]  Admit Date: 3/23/2022  Admitting Diagnosis: Diabetes mellitus with ketoacidosis and lactic acidosis but without coma    General Precautions: Standard, fall   Orthopedic Precautions:N/A   Braces: N/A     Recommendations:     Discharge Recommendations:  home with home health   Level of Assistance Recommended at Discharge: Intermittent assistance   Discharge Equipment Recommendations: bedside commode, shower chair, walker, rolling   Barriers to discharge:      Assessment:     Courtney Engle is a 87 y.o. female admitted with a medical diagnosis of Diabetes mellitus with ketoacidosis and lactic acidosis but without coma .  Pt tolerated today's therapy fairly secondary to increase knee pain with ambulation. Pt was able to completed therex with no issues and limited w/c training with fatigue. Pt continues to benefit from therapy to achieve highest level of independence prior to discharge.    Performance deficits affecting function:  weakness, impaired endurance, impaired self care skills, impaired functional mobilty, gait instability, impaired balance, decreased lower extremity function, impaired cardiopulmonary response to activity .    Rehab Potential is good    Activity Tolerance: Good    Plan:     Patient to be seen 6 x/week to address the above listed problems via gait training, therapeutic activities, therapeutic exercises, neuromuscular re-education, wheelchair management/training    · Plan of Care Expires: 04/23/22  · Plan of Care Reviewed with: patient    Subjective     "My [left] knee only hurts when I put weight on it, it's fine with i'm sitting".     Pain/Comfort:  · Pain Rating 1: 0/10 (pain with ambulation)  · Location - Side 1: Left  · Location - Orientation 1: generalized  · Location 1: knee  · Pain Addressed 1: Reposition, Distraction  · Pain Rating Post-Intervention 1: 0/10    Patient's cultural, spiritual, Islam conflicts " given the current situation:  · no    Objective:     Communicated with HARJINDER prior to session.  Patient found up in chair with  (no lines) upon PT entry to room.     Therapeutic Activities and Exercises:    Seated BLE therex x15 reps: toe raises, LAQ, marching, and hip abd/add    Mini elliptical x15 mins  Patient educated on role of therapy, goals of session, and benefits of out of bed mobility.   Instructed on use of call button and importance of calling nursing staff for assistance with mobility   Questions/concerns addressed within PTA scope of practice    Functional Mobility:  · Transfers:     · Sit to Stand:  stand by assistance with rolling walker  · Gait: pt ambulated 25ft +30ft with RW and CGA-SBA and w/c following. Distance limited by increase knee pain. Pt demonstrated mostly steady gait with L knee buckling.  · Wheelchair Propulsion:  Pt propelled Standard wheelchair x 50 feet on Level tile with  Bilateral upper extremity with Supervision or Set-up Assistance. Distance limited by BUE fatigue    AM-PAC 6 CLICK MOBILITY  20    Patient left up in chair with call button in reach.    GOALS:   Multidisciplinary Problems     Physical Therapy Goals        Problem: Physical Therapy    Goal Priority Disciplines Outcome Goal Variances Interventions   Physical Therapy Goal     PT, PT/OT Ongoing, Progressing     Description: PT goals until 4/13/22    1. Pt supine to sit with mod independent-not met  2. Pt sit to supine with mod independent-not met  3. Pt sit to stand with RW with mod independent-not met  4. Pt to perform gait 100ft with RW with supervision.-not met  5. Pt to go up/down curb step with RW with supervision.-not met  6. Pt to up/down 5 steps with B UE rail with supervision.-not met  7. Pt to perform B LE exs in sitting or supine x 15 reps to strengthen B LE to improve functional mobility.-not met  8. Pt to propel w/c 50ft with B UE on level surface with supervision- met  9. Pt will be able to   object off the ground with the reacher with RW support with set up assist.-not met                     Time Tracking:     PT Received On: 04/08/22  PT Total Time (min):   40    Billable Minutes: Gait Training 10, Therapeutic Activity 12 and Therapeutic Exercise 18    Treatment Type: Treatment  PT/PTA: PTA     PTA Visit Number: 5     04/08/2022

## 2022-04-09 NOTE — PLAN OF CARE
Problem: Adult Inpatient Plan of Care  Goal: Plan of Care Review  Outcome: Ongoing, Progressing  Goal: Patient-Specific Goal (Individualized)  Outcome: Ongoing, Progressing  Goal: Absence of Hospital-Acquired Illness or Injury  Outcome: Ongoing, Progressing  Goal: Optimal Comfort and Wellbeing  Outcome: Ongoing, Progressing     Problem: Diabetes Comorbidity  Goal: Blood Glucose Level Within Targeted Range  Outcome: Ongoing, Progressing     Problem: Fluid and Electrolyte Imbalance (Acute Kidney Injury/Impairment)  Goal: Fluid and Electrolyte Balance  Outcome: Ongoing, Progressing     Problem: Fall Injury Risk  Goal: Absence of Fall and Fall-Related Injury  Outcome: Ongoing, Progressing     Problem: Coping Ineffective  Goal: Effective Coping  Outcome: Ongoing, Progressing     Problem: Skin Injury Risk Increased  Goal: Skin Health and Integrity  Outcome: Ongoing, Progressing

## 2022-04-09 NOTE — PLAN OF CARE
Problem: Adult Inpatient Plan of Care  Goal: Plan of Care Review  Outcome: Ongoing, Progressing  Flowsheets (Taken 4/9/2022 0326)  Plan of Care Reviewed With: patient  Goal: Patient-Specific Goal (Individualized)  Outcome: Ongoing, Progressing  Goal: Absence of Hospital-Acquired Illness or Injury  Outcome: Ongoing, Progressing  Intervention: Identify and Manage Fall Risk  Flowsheets (Taken 4/9/2022 0326)  Safety Promotion/Fall Prevention:   assistive device/personal item within reach   diversional activities provided   Fall Risk reviewed with patient/family   Fall Risk signage in place   lighting adjusted   medications reviewed   nonskid shoes/socks when out of bed   side rails raised x 2   instructed to call staff for mobility  Goal: Optimal Comfort and Wellbeing  Outcome: Ongoing, Progressing  Intervention: Provide Person-Centered Care  Flowsheets (Taken 4/9/2022 0326)  Trust Relationship/Rapport:   thoughts/feelings acknowledged   empathic listening provided   emotional support provided   reassurance provided   questions encouraged   choices provided   care explained   questions answered     Problem: Diabetes Comorbidity  Goal: Blood Glucose Level Within Targeted Range  Outcome: Ongoing, Progressing  Intervention: Monitor and Manage Glycemia  Flowsheets (Taken 4/9/2022 0326)  Glycemic Management: blood glucose monitored     Problem: Fluid and Electrolyte Imbalance (Acute Kidney Injury/Impairment)  Goal: Fluid and Electrolyte Balance  Outcome: Ongoing, Progressing  Intervention: Monitor and Manage Fluid and Electrolyte Balance  Flowsheets (Taken 4/9/2022 0326)  Fluid/Electrolyte Management: fluids adjusted     Problem: Oral Intake Inadequate (Acute Kidney Injury/Impairment)  Goal: Optimal Nutrition Intake  Outcome: Ongoing, Progressing  Intervention: Promote and Optimize Nutrition  Flowsheets (Taken 4/9/2022 0326)  Oral Nutrition Promotion: rest periods promoted     Problem: Renal Function Impairment (Acute  Kidney Injury/Impairment)  Goal: Effective Renal Function  Outcome: Ongoing, Progressing  Intervention: Monitor and Support Renal Function  Flowsheets (Taken 4/9/2022 0328)  Medication Review/Management: medications reviewed     Problem: Fall Injury Risk  Goal: Absence of Fall and Fall-Related Injury  Outcome: Ongoing, Progressing  Intervention: Identify and Manage Contributors  Flowsheets (Taken 4/9/2022 0328)  Self-Care Promotion:   independence encouraged   BADL personal objects within reach   safe use of adaptive equipment encouraged   BADL personal routines maintained  Medication Review/Management: medications reviewed     Problem: Coping Ineffective  Goal: Effective Coping  Outcome: Ongoing, Progressing  Intervention: Support and Enhance Coping Strategies  Flowsheets (Taken 4/9/2022 0328)  Supportive Measures:   verbalization of feelings encouraged   active listening utilized  Family/Support System Care: self-care encouraged  Environmental Support:   calm environment promoted   caregiver consistency promoted   rest periods encouraged

## 2022-04-10 NOTE — PT/OT/SLP PROGRESS
"Physical Therapy Treatment and Discharge Summary    Patient Name:  Courtney Engle   MRN:  [id number]  Admit Date: 3/23/2022  Admitting Diagnosis: Diabetes mellitus with ketoacidosis and lactic acidosis but without coma  Recent Surgeries: * No surgery found *      General Precautions: Standard, fall   Orthopedic Precautions:N/A   Braces: N/A     Recommendations:     Discharge Recommendations:  home with home health   Level of Assistance Recommended at Discharge: Intermittent assistance   Discharge Equipment Recommendations: bedside commode, shower chair, walker, rolling   Barriers to discharge: Decreased caregiver support, patient lives alone, patient has 5 steps to enter home    Assessment:     Courtney Engle is a 87 y.o. female admitted with a medical diagnosis of Diabetes mellitus with ketoacidosis and lactic acidosis but without coma . Courtney Engle 5 out of 9 physical therapy goals. PT did not assess patient's BLE exercise and bed mobility goals today. Patient had been independent with bed mobility during skilled nursing stay. Patient did not meet her stair goal. She required minimal assistance due to her left knee buckling on the first step. Patient ambulated 196 ft with modified independence with rolling walker. She ascended/descended 6" curb with rolling walker and supervision. PT recommends that patient discharge home with intermittent assistance and home health services. Patient will benefit form bedside commode, shower chair, and rolling walker at discharge.     Performance deficits affecting function:  weakness, impaired functional mobilty, impaired endurance, gait instability, pain, impaired balance, impaired self care skills, decreased lower extremity function .      Plan:     Patient is discharged from skilled physical therapy services at this skilled nursing facility.     Subjective     Patient's subjective: ""I'm going home tomorrow!"    Pain/Comfort:  Pain Rating 1: 0/10  Location - Side 1: " "Left  Location 1: knee  Pain Addressed 1: Reposition, Distraction, Cessation of Activity  Pain Rating Post-Intervention 1: 5/10    Patient's cultural, spiritual, Roman Catholic conflicts given the current situation:  no    Objective:     Patient found sitting edge of bed with no active lines upon PT entry to room. Pt is agreeable to physical therapy treatment session.    Functional Mobility:    Transfers:    · Sit to Stand:  modified independence  with rolling walker  · Stand to Sit: modified independence  with rolling walker    Wheelchair Mobility:    Pt propelled manual wheelchair x 98 ft and 120 ft feet on Level tile with  Bilateral upper extremities with Supervision. She requires a rest break due to impaired cardiorespiratory endurance.     Gait:   · Patient ambulates 196 ft and 56 ft on even, indoor esther with rolling walker and modified independent.   · Gait Deviation(s): decreased thaddeus  · Impairments due to: pain and decreased endurance  · Comments: Patient ambulates steadily without loss of balance. Patient's gait distance limited by left knee pain and decreased cardiorespiratory endurance.     · Patient ambulates 2 reps of 14 feet on uneven blue mat with rolling walker and standy by assistance  · Comments: Patient ambulates without loss of balance. She demonstrates slower gait vs walking on wood floor.     Curb/Stairs:     Pt ascends/descends 4" curb step with Rolling Walker and Supervision.   Pt ascends/descends 4 steps laterally with left hand rail and minimal assistance due to one episode of her left knee buckling. Patient ascends/descends laterally.    Patient ascends/descends 3 steps backwards with left hand rail while carrying up rolling walker with right hand. PT provides close supervision. Patient reports that she feels more comfortable ascending/descending stairs backwards.      Picking Up Object:   While standing with rolling walker, patient picks up marker from the floor with modified " independence with adaptive reacher.      04/10/22 0957   Prior Functioning: Everyday Activities   Self Care Independent   Indoor Mobility (Ambulation) Independent   Stairs Independent   Functional Cognition Independent   Prior Device Use None of the given options   Sit to Stand   Assistance Needed Independent;Adaptive equipment   Comment   (RW)   CARE Score - Sit to Stand 6   Chair/Bed-to-Chair Transfer   Assistance Needed Independent;Adaptive equipment   Comment   (RW)   CARE Score - Chair/Bed-to-Chair Transfer 6   Car Transfer   Reason if not Attempted Environmental limitations   CARE Score - Car Transfer 10   Walk 10 Feet   Assistance Needed Independent;Adaptive equipment   Comment   (RW)   CARE Score - Walk 10 Feet 6   Walk 50 Feet with Two Turns   Assistance Needed Independent;Adaptive equipment   Comment   (RW)   CARE Score - Walk 50 Feet with Two Turns 6   Walk 150 Feet   Assistance Needed Independent;Adaptive equipment   Comment   (RW)   CARE Score - Walk 150 Feet 6   Walking 10 Feet on Uneven Surfaces   Assistance Needed Supervision;Adaptive equipment   Comment   (RW)   CARE Score - Walking 10 Feet on Uneven Surfaces 4   1 Step (Curb)   Assistance Needed Supervision;Adaptive equipment   Comment   (RW)   CARE Score - 1 Step (Curb) 4   4 Steps   Assistance Needed Adaptive equipment;Physical assistance   Physical Assistance Level 25% or less   Comment   (left hand rail)   CARE Score - 4 Steps 3   12 Steps   Comment   (unable to perform due to left knee pain and LLE weakness)   Reason if not Attempted Safety concerns   CARE Score - 12 Steps 88   Picking Up Object   Assistance Needed Independent;Adaptive equipment   Comment   (reacher)   CARE Score - Picking Up Object 6   Wheel 50 Feet with Two Turns   Assistance Needed Supervision   CARE Score - Wheel 50 Feet with Two Turns 4   Type of Wheelchair/Scooter Manual   Wheel 150 Feet   Assistance Needed Physical assistance   Physical Assistance Level 25% or less    Reason if not Attempted   (Patient propels 120 ft and then fatigues. PT pushes patient the rest of the way)   CARE Score - Wheel 150 Feet 3   Type of Wheelchair/Scooter Manual         Education:    Patient educated on safety with carrying items up and down stairs and nonpharmalogical pain relievers such as ice.    White board updated to include patient's safest level of mobility with staff assistance   Patient educated on assistive device use, Fall risk, home safety, plan of care and stair training by explanation.  Patient was receptive to education and verbalizes understanding.       Patient left sitting in wheelchair. PT performed direct handoff with JOSELUIS Islas.      GOALS:   Multidisciplinary Problems     Physical Therapy Goals        Problem: Physical Therapy    Goal Priority Disciplines Outcome Goal Variances Interventions   Physical Therapy Goal     PT, PT/OT Ongoing, Progressing     Description: PT goals until 4/13/22    1. Pt supine to sit with mod independent-not met  2. Pt sit to supine with mod independent-not met  3. Pt sit to stand with RW with mod independent-not met  4. Pt to perform gait 100ft with RW with supervision.-not met  5. Pt to go up/down curb step with RW with supervision.-not met  6. Pt to up/down 5 steps with B UE rail with supervision.-not met  7. Pt to perform B LE exs in sitting or supine x 15 reps to strengthen B LE to improve functional mobility.-not met  8. Pt to propel w/c 50ft with B UE on level surface with supervision- met  9. Pt will be able to  object off the ground with the reacher with RW support with set up assist.-not met                     Time Tracking:     PT Received On: 04/10/22  PT Total Time (min):   34 mins    Billable Minutes: Gait Training 20 mins and Therapeutic Activity 14 mins    Treatment Type: Treatment  PT/PTA: PT     PTA Visit Number: 0     04/10/2022

## 2022-04-10 NOTE — PT/OT/SLP PROGRESS
Occupational Therapy   Treatment/Discharge Summary    Name: Courtney Engle  MRN: [id number]  Admit Date: 3/23/2022  Admitting Diagnosis:  Diabetes mellitus with ketoacidosis and lactic acidosis but without coma    General Precautions: Standard, fall   Orthopedic Precautions:N/A   Braces:       Recommendations:     Discharge Recommendations: home with home health  Level of Assistance Recommended at Discharge: Intermittent assistance for ADL's and homemaking tasks  Discharge Equipment Recommendations:  bedside commode, shower chair, walker, rolling  Barriers to discharge:  Decreased caregiver support    Assessment:     Courtney Engle is a 87 y.o. female with a medical diagnosis of Diabetes mellitus with ketoacidosis and lactic acidosis but without coma.  She presents with  Performance deficits affecting function are weakness, impaired endurance, impaired self care skills, impaired functional mobility, gait instability, impaired balance, decreased lower extremity function, impaired coordination.      Pt. Was cooperative and participated well with session on this day. Pt  continues to demonstrate levels of physical deficits with  functional indep with daily management activities tasks, selfcare skills with balance,  functional mobility, UB strength and endurance. Pt. Will continue to benefit from continued OT to progress towards goals  With next level of care     Rehab Potential is fair    Activity tolerance:  Fair    Plan:     Patient to be seen 6 x/week to address the above listed problems via self-care/home management, therapeutic activities, therapeutic exercises    · Plan of Care Expires: 04/24/22  · Plan of Care Reviewed with: patient    Subjective     Communicated with: nsg and Pt. prior to session. I am doing well today. Am I still going to activity   Pain/Comfort:  Pain Rating 1: 0/10  Pain Rating Post-Intervention 1: 0/10    Patient's cultural, spiritual, Evangelical conflicts given the current  situation:  no    Objective:     Patient found up in chair in gym with PT  upon OT entry to room    Functional Mobility/Transfers:  · Patient completed Sit <> Stand Transfer with supervision and stand by assistance  with  rolling walker   · Patient completed Toilet Transfer Step Transfer technique with supervision and stand by assistance with  rolling walker   · Pt. With fxl mobility from bed side chair to in room bath with RW for balance and cues for safety      Activities of Daily Living:  · Grooming:modifed independence with grooming needs standing at sink level  · Lower Body Dressing: Supervision/Modifed independence  to doff/romi pants seated and to manage over hips instance with RW for bal    · Toileting: modified independence with cleaning and clothing management     [facility] 6 Click ADL: 23    OT Exercises: UE Ergometer 10 min    Treatment & Education:  Pt edu on role of OT, POC, safety when performing self care tasks , benefit of performing OOB activity, and safety when performing functional transfers and mobility management for preparation with goals to progress towards next level of care    Patient left up in chair with all lines intact and call button in reachEducation:      GOALS:   Multidisciplinary Problems     Occupational Therapy Goals        Problem: Occupational Therapy    Goal Priority Disciplines Outcome Interventions   Occupational Therapy Goal     OT, PT/OT Ongoing, Progressing    Description: Goals to be met by: 4/11/2022    Patient will increase functional independence with ADLs by performing:    UE Dressing with [place]. MET  LE Dressing with Modified [place]. MET   Grooming while standing at sink with Modified [place].-MET  Toileting from toilet with Modified [place] for hygiene and clothing management. MET   Bathing from shower on shower chair/bench with Supervision Assistance.-MET  Step transfer with Modified [place] using AD. -Not MET  Toilet transfer to toilet  with Modified [place].Not MET  Upper extremity exercise program per handout, with supervision. MET  Pt will complete functional reaching task in standing x10 minutes with Supervision assistance for improved safety and independence in standing ADLs/IADLS.-MET  Caregiver will be educated on level of assist required to safely perform self care tasks and functional transfers. Not met family declined                     Time Tracking:     OT Date of Treatment: OT Date of Treatment: 04/10/22  OT Total Time (min):      Billable Minutes:Therapeutic Activity 39    4/10/2022   A client care conference was performed between the CANDY and JOSELUIS, prior to treatment to discuss the patient's status, treatment plan and established goals. .

## 2022-04-10 NOTE — PLAN OF CARE
Problem: Physical Therapy  Goal: Physical Therapy Goal  Description: PT goals until 4/13/22    1. Pt supine to sit with mod independent-not met  2. Pt sit to supine with mod independent-not met  3. Pt sit to stand with RW with mod independent- Met on 4/10/2022  4. Pt to perform gait 100ft with RW with supervision.- Met on 4/10/2022  5. Pt to go up/down curb step with RW with supervision.- Met on 4/10/2022  6. Pt to up/down 5 steps with B UE rail with supervision.-not met  7. Pt to perform B LE exs in sitting or supine x 15 reps to strengthen B LE to improve functional mobility.-not met  8. Pt to propel w/c 50ft with B UE on level surface with supervision- met  9. Pt will be able to  object off the ground with the reacher with RW support with set up assist.- Met on 4/10/2022    Outcome: Adequate for Care Transition     Courtney Engle 5 out of 9 physical therapy goals. PT did not assess patient's BLE exercise and bed mobility goals today. Patient had been independent with bed mobility during skilled nursing stay. Patient did not meet her stair goal. She required minimal assistance due to her left knee buckling on the first step. PT recommends that patient discharge home with intermittent assistance and home health services. Patient will benefit form bedside commode, shower chair, and rolling walker at discharge.

## 2022-04-10 NOTE — PLAN OF CARE
Problem: Adult Inpatient Plan of Care  Goal: Plan of Care Review  Outcome: Ongoing, Progressing  Goal: Patient-Specific Goal (Individualized)  Outcome: Ongoing, Progressing  Goal: Absence of Hospital-Acquired Illness or Injury  Outcome: Ongoing, Progressing  Goal: Optimal Comfort and Wellbeing  Outcome: Ongoing, Progressing     Problem: Diabetes Comorbidity  Goal: Blood Glucose Level Within Targeted Range  Outcome: Ongoing, Progressing     Problem: Fluid and Electrolyte Imbalance (Acute Kidney Injury/Impairment)  Goal: Fluid and Electrolyte Balance  Outcome: Ongoing, Progressing     Problem: Renal Function Impairment (Acute Kidney Injury/Impairment)  Goal: Effective Renal Function  Outcome: Ongoing, Progressing     Problem: Fall Injury Risk  Goal: Absence of Fall and Fall-Related Injury  Outcome: Ongoing, Progressing

## 2022-04-11 NOTE — HOSPITAL COURSE
Patient progressed well with PT and OT- last PT note states that patient ambulated***. Patient had no significant events during their stay at SNF. Home health was set up. DME was ordered if needed. Follow up appointment to be made by patient within one week. All prescriptions and discharge instructions were ordered to be given to the patient prior to discharge.     PEx  Constitutional: Patient appears debilitated.  No distress noted  HENT:  Poor dentition  Head: Normocephalic and atraumatic.   Eyes: Pupils are equal, round  Neck: Normal range of motion. Neck supple.   Cardiovascular: Normal rate, regular rhythm and normal heart sounds.    Pulmonary/Chest: Effort normal and breath sounds are clear  Abdominal: Soft. Bowel sounds are normal.   Musculoskeletal: Normal range of motion.   Neurological: Alert and oriented to person, place, and time.   Psychiatric: Normal mood and affect. Behavior is normal.   Skin: Skin is warm and dry.

## 2022-04-11 NOTE — PLAN OF CARE
Problem: Adult Inpatient Plan of Care  Goal: Plan of Care Review  Outcome: Met  Goal: Patient-Specific Goal (Individualized)  Outcome: Met  Goal: Absence of Hospital-Acquired Illness or Injury  Outcome: Met  Goal: Optimal Comfort and Wellbeing  Outcome: Met  Goal: Readiness for Transition of Care  Outcome: Met     Problem: Diabetes Comorbidity  Goal: Blood Glucose Level Within Targeted Range  Outcome: Met     Problem: Fluid and Electrolyte Imbalance (Acute Kidney Injury/Impairment)  Goal: Fluid and Electrolyte Balance  Outcome: Met     Problem: Oral Intake Inadequate (Acute Kidney Injury/Impairment)  Goal: Optimal Nutrition Intake  Outcome: Met     Problem: Renal Function Impairment (Acute Kidney Injury/Impairment)  Goal: Effective Renal Function  Outcome: Met     Problem: Fall Injury Risk  Goal: Absence of Fall and Fall-Related Injury  Outcome: Met     Problem: Coping Ineffective  Goal: Effective Coping  Outcome: Met     Problem: Skin Injury Risk Increased  Goal: Skin Health and Integrity  Outcome: Met

## 2022-04-11 NOTE — PROGRESS NOTES
Discharge instructions reviewed with patient and patient daughter. Patient and daughter verbalized understanding of instruction. Patient was also instructed to call SNF if she had any questions and to call PCP for further instructions. Patient left SNF with daughter.

## 2022-04-11 NOTE — DISCHARGE SUMMARY
"[facility] Medicine  Discharge Summary      Patient Name: Courtney Engle  MRN: [id number]  Patient Class: IP- SNF  Admission Date: 3/23/2022  Hospital Length of Stay: 19 days  Discharge Date and Time:  04/11/2022 2:43 PM  Attending Physician: No att. providers found   Discharging Provider: Marilynn Alanis NP  Primary Care Provider: Vishal Ulloa MD      HPI:   Mrs. Engle is a 86 year old female with PMHx of IDDM, HTN, HLD, diastolic dysfunction (most recent echo 2018 normal), CKD 3, hypothyroidism and GERD who presents to SNF following hospitalization for DKA, encephalopathy, new AFib.  Admission to SNF for secondary weakness debility.      Patient originally presented to [facility] ED on 03/17 complaining of altered mental status. "HPI obtained from the daughter at bedside. Daughter states that for the past few weeks, patient has woken up with slurred speech that has improved as the day progressed. Last week the patient told the daughter that she "didn't feel well" but did not elaborate on specific symptoms. Earlier this week, the patient started complaining of increased thirst (for which she was requesting soda to be brought to her) and increased urination with dysuria (though patient attributed dysuria to a scratch on her vagina). She also complained of "kidney issues" though did not elaborate on specific symptoms. The daughter is unable to confirm whether or not the patient has been compliant with medications, including insulin, however she suspects that the patient has not been compliant as the patient's other daughter reported "several boxes of medication" in the patient's refrigerator (daughter at bedside does not know which medications they were). The patient had been resistant to calling her PCP or seeking medical care for the past two weeks, however was finally able to be brought to the ED today by her daughter. At present the patient is encephalopathic, but only complaint is of " "epigastric pain. Daughter reports episode of N/V within the past few days. Work up in the ED revealed a leukocytosis of 15, TAMARA with hyperkalemia (creatinine 2.3, baseline 0.8), lactic of 3.8 and labs consistent with DKA (pH 7.1, bhb 5.3, bicarb 9 w/ glucose 800). Additionally, the patient was found to be in new onset a fib RVR (rates up to 160 in the ED). Critical Care Medicine has been consulted for DKA & A fib RVR.  Patient admitted to [facility] evening of 3/17 for DKA and new onset a fib RVR. Started on insulin per DKA protocol. Better rate control achieved with initiation of metoprolol. Encephalopathy improved; gap closed. Pt transitioned to SQ insulin.    Pt stepped down to medicine 3/18.  Glycemic control improved.  Metoprolol also up titrated with better rate controlled.  Echo unremarkable.  Risks and benefits of anticoagulation in the setting of atrial fibrillation to prevent thromboembolism discussed with patient and daughter.  Patient would like to defer anticoagulation.  TAMARA improved to baseline.  Lisinopril started.  Per PT OT evaluation patient to benefit from SNF."     Today, patient is doing well.  She reports her shortness of breath is at her baseline.  She endorses difficulty sleeping last night.     Patient will be treated at [facility] with PT and OT to improve functional status and ability to perform ADLs.        Hospital Course:   Patient progressed well with PT and OT- last PT note states that patient [id number] ft and 56 ft on even, indoor esther with rolling walker and modified independent. Patient had no significant events during their stay at SNF.  Patient educated on multiple occasions regarding her diabetes regimen, importance of compliance and proper nutrition.  She has a follow-up with Endocrinology tomorrow.  Patient's BP noted to be elevated this morning, I was not notified by SNF staff.  Patient has scheduled follow-ups to adjust BP medication.  Home health was set up. DME was " "ordered if needed. Follow up appointment to be made by patient within one week. All prescriptions and discharge instructions were ordered to be given to the patient prior to discharge.      PEx  Constitutional: Patient appears debilitated.  No distress noted  HENT:  Poor dentition  Head: Normocephalic and atraumatic.   Eyes: Pupils are equal, round  Neck: Normal range of motion. Neck supple.   Cardiovascular: Normal rate, regular rhythm and normal heart sounds.    Pulmonary/Chest: Effort normal and breath sounds are clear  Abdominal: Soft. Bowel sounds are normal.   Musculoskeletal: Normal range of motion.   Neurological: Alert and oriented to person, place, and time.   Psychiatric: Normal mood and affect. Behavior is normal.   Skin: Skin is warm and dry.         Goals of Care Treatment Preferences:  Code Status: Full Code    Health care agent: Jeff Chen" [facility] care agent number: [phone]          What is most important right now is to focus on spending time at home, avoiding the hospital, remaining as independent as possible, quality of life, even if it means sacrificing a little time.  Accordingly, we have decided that the best plan to meet the patient's goals includes continuing with treatment.      Consults:   Consults (From admission, onward)        Status Ordering Provider     Inpatient consult to Palliative Care  Once        Provider:  (Not yet assigned)    Completed BRANDEE RAMACHANDRAN     Inpatient consult to Registered Dietitian/Nutritionist  Once        Provider:  (Not yet assigned)    Completed CAPRICE MEDINA     IP consult to case management  Once        Provider:  (Not yet assigned)    Completed CAPRICE MEDINA     Inpatient consult to Registered Dietitian/Nutritionist  Once        Provider:  (Not yet assigned)    Completed CAPRICE MEDINA     Inpatient consult to Registered Dietitian/Nutritionist  Once        Provider:  (Not yet assigned)    Completed CAPRICE MEDINA " "         No new Assessment & Plan notes have been filed under this hospital service since the last note was generated.  Service: Hospital Medicine    Final Active Diagnoses:    Diagnosis Date Noted POA    PRINCIPAL PROBLEM:  Diabetes mellitus with ketoacidosis and lactic acidosis but without coma [E11.10] 03/17/2022 Yes    Debility [R53.81] 04/01/2022 Yes    Palliative care encounter [Z51.5] 04/01/2022 Not Applicable    Acute renal failure superimposed on stage 3 chronic kidney disease [N17.9, N18.30] 03/18/2022 Yes    Paroxysmal atrial fibrillation [I48.0] 03/18/2022 Yes    Pure hypercholesterolemia [E78.00] 01/29/2018 Yes    Chronic diastolic heart failure [I50.32] 01/09/2018 Yes    GERD (gastroesophageal reflux disease) [K21.9] 08/25/2017 Yes    CKD stage 3 due to type 2 diabetes mellitus [E11.22, N18.30] 02/16/2017 Yes     Chronic    Anxiety [F41.9] 12/08/2016 Yes    Moderate recurrent major depression [F33.1] 12/08/2016 Yes    Acquired hypothyroidism [E03.9] 04/25/2014 Yes     Chronic    Type 2 diabetes mellitus without complication, with long-term current use of insulin [E11.9, Z79.4]  Not Applicable     Chronic    Essential hypertension [I10]  Yes     Chronic      Problems Resolved During this Admission:       Discharged Condition: good    Disposition: Home-Health Care [facility]    Follow Up:    Patient Instructions:      WALKER FOR HOME USE     Order Specific Question Answer Comments   Type of Walker: Daniel (4'4"-5'6")    With wheels? Yes    Height: 4' 10" (1.473 m)    Weight: 81.8 kg (180 lb 5.4 oz)    Length of need (1-99 months): 99    Does patient have medical equipment at home? cane, quad    Does patient have medical equipment at home? shower chair    Does patient have medical equipment at home? grab bar    Please check all that apply: Patient's condition impairs ambulation.    Please check all that apply: Walker will be used for gait training.    Please check all that apply: Patient is unable " "to safely ambulate without equipment.      BATH/SHOWER CHAIR FOR HOME USE     Order Specific Question Answer Comments   Height: 4' 10" (1.473 m)    Weight: 81.8 kg (180 lb 5.4 oz)    Does patient have medical equipment at home? cane, quad    Does patient have medical equipment at home? shower chair    Does patient have medical equipment at home? grab bar    Length of need (1-99 months): 99    Type: With back      3 IN 1 COMMODE FOR HOME USE     Order Specific Question Answer Comments   Type: Standard    Height: 4' 10" (1.473 m)    Weight: 81.8 kg (180 lb 5.4 oz)    Does patient have medical equipment at home? cane, quad    Does patient have medical equipment at home? shower chair    Does patient have medical equipment at home? grab bar    Length of need (1-99 months): 99      No driving until:   Order Comments: Cleared by PCP     Notify your health care provider if you experience any of the following:  temperature >100.4     Notify your health care provider if you experience any of the following:  persistent nausea and vomiting or diarrhea     Notify your health care provider if you experience any of the following:  severe uncontrolled pain     Notify your health care provider if you experience any of the following:  redness, tenderness, or signs of infection (pain, swelling, redness, odor or green/yellow discharge around incision site)     Notify your health care provider if you experience any of the following:  difficulty breathing or increased cough     Notify your health care provider if you experience any of the following:  severe persistent headache     Notify your health care provider if you experience any of the following:  worsening rash     Notify your health care provider if you experience any of the following:  persistent dizziness, light-headedness, or visual disturbances     Notify your health care provider if you experience any of the following:  increased confusion or weakness     Activity as " tolerated       Significant Diagnostic Studies: Labs: BMP: No results for input(s): GLU, NA, K, CL, CO2, BUN, CREATININE, CALCIUM, MG in the last 48 hours. and CBC No results for input(s): WBC, HGB, HCT, PLT in the last 48 hours.    Pending Diagnostic Studies:     None         Medications:  Reconciled Home Medications:      Medication List      START taking these medications    apixaban 5 mg Tab  Commonly known as: ELIQUIS  Take 1 tablet (5 mg total) by mouth 2 (two) times daily.     melatonin 3 mg tablet  Commonly known as: MELATIN  Take 3 tablets (9 mg total) by mouth nightly as needed for Insomnia.     senna-docusate 8.6-50 mg 8.6-50 mg per tablet  Commonly known as: PERICOLACE  Take 1 tablet by mouth 2 (two) times daily as needed for Constipation.        CHANGE how you take these medications    diclofenac sodium 1 % Gel  Commonly known as: VOLTAREN  Apply 4 g topically 2 (two) times daily.  What changed:   · how much to take  · when to take this     famotidine 40 MG tablet  Commonly known as: PEPCID  TAKE 1 TABLET(40 MG) BY MOUTH EVERY DAY  What changed: Another medication with the same name was removed. Continue taking this medication, and follow the directions you see here.     insulin aspart U-100 100 unit/mL (3 mL) Inpn pen  Commonly known as: NovoLOG  Inject 10 Units into the skin 3 (three) times daily.  What changed:   · how much to take  · when to take this  · reasons to take this  · additional instructions  · Another medication with the same name was removed. Continue taking this medication, and follow the directions you see here.     lisinopriL 20 MG tablet  Commonly known as: PRINIVIL,ZESTRIL  Take 1 tablet (20 mg total) by mouth once daily.  What changed: how much to take     metoprolol tartrate 25 MG tablet  Commonly known as: LOPRESSOR  Take 1 tablet (25 mg total) by mouth 2 (two) times daily.  What changed:   · medication strength  · how much to take        CONTINUE taking these medications   "  ACCU-CHEK SMARTVIEW TEST STRIP Strp  Generic drug: blood sugar diagnostic  TEST BLOOD GLUCOSE LEVELS FOUR TIMES DAILY     acetaminophen 500 MG tablet  Commonly known as: TYLENOL  Take 1 tablet (500 mg total) by mouth every 6 (six) hours as needed for Pain.     alcohol swabs Padm  Commonly known as: BD ALCOHOL SWABS  Apply 1 each topically as needed.     alendronate 70 MG tablet  Commonly known as: FOSAMAX  Take 1 tablet (70 mg total) by mouth every 7 days.     aspirin 81 MG EC tablet  Commonly known as: ECOTRIN  Take 1 tablet (81 mg total) by mouth once daily.     BD INSULIN SYRINGE ULTRA-FINE 0.5 mL 31 gauge x 5/16" Syrg  Generic drug: insulin syringe-needle U-100  USE AS DIRECTED 4 TIMES DAILY.     blood glucose control, low Soln  Test 4 times daily     blood-glucose meter kit  To check BG 4 times daily, to use with insurance preferred meter     erythromycin ophthalmic ointment  Commonly known as: ROMYCIN  Place into both eyes every evening. Apply to itchy eye lid at night as needed     EScitalopram oxalate 10 MG tablet  Commonly known as: LEXAPRO  Take 1 tablet (10 mg total) by mouth once daily.     fluticasone propionate 50 mcg/actuation nasal spray  Commonly known as: FLONASE  SHAKE WELL AND INSTILL 2 SPRAYS IN EACH NOSTRIL EVERY DAY     lancets Misc  To check BG 4 times daily, to use with insurance preferred meter     LANTUS U-100 INSULIN 100 unit/mL injection  Generic drug: insulin glargine  Inject 12 Units into the skin 2 (two) times daily.     levothyroxine 75 MCG tablet  Commonly known as: SYNTHROID  TAKE 1 TABLET(75 MCG) BY MOUTH EVERY DAY        STOP taking these medications    atenoloL 25 MG tablet  Commonly known as: TENORMIN     benzonatate 100 MG capsule  Commonly known as: TESSALON     butalbital-acetaminophen-caffeine -40 mg -40 mg per tablet  Commonly known as: FIORICET, ESGIC     clindamycin 150 MG capsule  Commonly known as: CLEOCIN     furosemide 20 MG tablet  Commonly known as: " LASIX     gabapentin 100 MG capsule  Commonly known as: NEURONTIN     LIDOcaine 5 %  Commonly known as: LIDODERM     mupirocin 2 % ointment  Commonly known as: BACTROBAN     nystatin powder  Commonly known as: NYSTOP     omeprazole 40 MG capsule  Commonly known as: PRILOSEC     predniSONE 50 MG Tab  Commonly known as: DELTASONE     triamcinolone acetonide 0.1% 0.1 % cream  Commonly known as: KENALOG            Indwelling Lines/Drains at time of discharge:   Lines/Drains/Airways     None                 Time spent on the discharge of patient: 38 minutes         Marilynn Alanis NP  Department of [facility] Medicine  [facility] - Skilled Nursing

## 2022-04-11 NOTE — PROGRESS NOTES
Health Maintenance Due   Topic Date Due    COVID-19 Vaccine (1) Never done    Pneumococcal Vaccines (Age 65+) (1 - PCV) Never done    TETANUS VACCINE  Never done    Shingles Vaccine (1 of 2) Never done    Diabetes Urine Screening  02/16/2018    DEXA Scan  12/13/2018    Lipid Panel  08/09/2020    Foot Exam  09/04/2020    Eye Exam  02/03/2021    Influenza Vaccine (1) Never done     Updates were requested from care everywhere.  Chart was reviewed for overdue Proactive [facility] Encounters (CHIP) topics (CRS, Breast Cancer Screening, Eye exam)  Health Maintenance has been updated.  LINKS immunization registry triggered.  Immunizations were reconciled.

## 2022-04-12 PROBLEM — E11.10: Status: RESOLVED | Noted: 2022-01-01 | Resolved: 2022-01-01

## 2022-04-12 NOTE — ASSESSMENT & PLAN NOTE
Creatinine back to baseline. For now, will avoid metformin and SGLT-2i given recent fluctuations in GFR.

## 2022-04-12 NOTE — PLAN OF CARE
[facility] - Skilled Nursing  Discharge Final Note    Patient d/c as planned to her home she shares with her daughter, Patito. Daughter transported patient and will continue to provide care. HME was delivered to bedside. Home Health has been authorized with [facility] via [employer].  Patient has follow-up appointment with pcp on 4/22.    Maria Luisa Rincon [facility]  Case Management Department  [facility]  [email or web address]        Primary Care Provider: Vishal Ulloa MD    Expected Discharge Date: 4/11/2022    Final Discharge Note (most recent)     Final Note - 04/12/22 1110        Final Note    Assessment Type Final Discharge Note     Anticipated Discharge Disposition Home-Health Care [facility]     Hospital Resources/Appts/Education Provided Provided patient/caregiver with written discharge plan information;Appointments scheduled by Navigator/Coordinator;Appointments scheduled and added to AVS        Post-Acute Status    Post-Acute Authorization Home Health;HME     HME Status Set-up Complete/Auth obtained     Home Health Status Set-up Complete/Auth obtained     Discharge Delays None known at this time                 Important Message from Medicare  Important Message from Medicare regarding Discharge Appeal Rights: Given to patient/caregiver, Explained to patient/caregiver, Signed/date by patient/caregiver     Date IMM was signed: 04/07/22  Time IMM was signed: [id number]

## 2022-04-12 NOTE — ASSESSMENT & PLAN NOTE
Most recent TSH was low, but was in the setting of acute illness.  We will recheck in three months with the next labs.  Continue levothyroxine 75 mcg daily for now.

## 2022-04-12 NOTE — PROGRESS NOTES
NEW PATIENT VISIT    Subjective:      Chief Complaint: Diabetes      HPI: Courtney Engle is a 87 y.o. female who is here for an initial evaluation for type 2 diabetes       Past Medical History:   Diagnosis Date    Acquired hypothyroidism 4/25/2014    Anxiety     Aortic calcification 12/8/2016    X-Ray Chest-5/08/2014    Arthritis     Bilateral carotid artery disease 12/8/2016    US Carotid Bilateral-1/24/2013    CKD stage 3 due to type 2 diabetes mellitus 2/16/2017    Depression     Essential hypertension     Fever blister     GERD (gastroesophageal reflux disease)     Glaucoma 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 robledo)     Hyperlipidemia LDL goal <70 2/16/2017    Keloid cicatrix     Mixed stress and urge urinary incontinence 2/16/2017    Morbid obesity with BMI of 40.0-44.9, adult 12/8/2016    Ocular migraine 1/24/2013    Type 2 diabetes mellitus with hyperglycemia, with long-term current use of insulin      She is accompanied today by her daughter who assists with providing history.    With regards to the type 2 diabetes:    The patient was initially diagnosed with Type 2 diabetes mellitus:  >15 years ago.    Most recent a1c was >14.0 on 03/17/2022    Recent hospitalization on 3/17-3/23 for encephalopathy due to DKA.  She was apparently not taking any of her insulin and also eating very poorly.  She began to develop polyuria and polydipsia and was drinking copious amounts of soft drinks.  She was transferred to SNF on 3/23 and discharged from SNF on 4/11.     No results found for: CPEPTIDE, GLUTAMICACID      Current symptoms/problems include:  Hyperglycemia     Known diabetic complications: nephropathy (CKD Stage 3) and hyperglycemia  Cardiovascular risk factors: advanced age (older than 55 for men, 65 for women), diabetes mellitus, dyslipidemia, hypertension, obesity (BMI >= 30 kg/m2) and sedentary lifestyle    She has no history of pancreatitis or other pancreatic disorders.  No  family history of MEN syndrome.    Medical Therapy:  Current diabetic medications include:   · Lantus 12 units BID  · Novolog 8 units TID with meals  · She was actually not taking off the inner sheath of the pen needle yesterday, so she wasn't actually getting any insulin.      Other medications tried:   Novolog 70/30 --- discontinued due to alternate therapy   Glipizide --- discontinued due to side-effects - hives      Lifestyle:    Last visit with Diabetes Educator: Last Education Visit: Not Found      Current diet:  Admits to frequent dietary indiscretions. She is going to start eating better.      Current exercise:  None      BP Readings from Last 10 Encounters:   04/12/22 120/72   04/11/22 (!) 186/86   03/30/22 118/66   03/23/22 138/65   06/25/21 136/64   08/06/20 126/80   12/20/19 (!) 156/56   12/11/19 134/78   09/04/19 125/64   08/30/19 (!) 160/80       Wt Readings from Last 10 Encounters:   04/12/22 85 kg (187 lb 6.3 oz)   03/23/22 81.8 kg (180 lb 5.4 oz)   03/30/22 81.8 kg (180 lb 5.4 oz)   03/21/22 88 kg (194 lb)   06/25/21 87.7 kg (193 lb 5.5 oz)   01/14/20 89.9 kg (198 lb 3.1 oz)   12/20/19 91.6 kg (202 lb)   12/11/19 92.2 kg (203 lb 4.2 oz)   09/04/19 93 kg (205 lb)   08/30/19 94.6 kg (208 lb 8 oz)         Blood sugars:  Current monitoring regimen: Fingersticks 4 times daily    She's only been out of SNF for 1 day, so we don't have any BG data to review. I did review her numbers while she was at the SNF and her control was decent on doses similar to above        Any episodes of hypoglycemia? no      Diabetes Management Status    Statin: Not taking - statin intolerant  ACE/ARB: Taking    Hypertension Medications             lisinopriL (PRINIVIL,ZESTRIL) 20 MG tablet Take 1 tablet (20 mg total) by mouth once daily.    metoprolol tartrate (LOPRESSOR) 25 MG tablet Take 1 tablet (25 mg total) by mouth 2 (two) times daily.          Screening or Prevention Patient's value Goal Complete/Controlled?   HgA1C  Testing and Control   Lab Results   Component Value Date    HGBA1C >14.0 (H) 03/17/2022      Annually/Less than 8% Yes   Lipid profile : 08/09/2019 Annually No   LDL control Lab Results   Component Value Date    LDLCALC 169.0 (H) 08/09/2019    Annually/Less than 100 mg/dl  No   Nephropathy screening Lab Results   Component Value Date    LABMICR 27.0 02/16/2017     Lab Results   Component Value Date    PROTEINUA Negative 03/17/2022    Annually Yes   Blood pressure BP Readings from Last 1 Encounters:   04/12/22 120/72    Less than 140/90 No   Dilated retinal exam : 02/03/2020 Annually No   Foot exam   : 09/04/2019 Annually No        Lab Results   Component Value Date    MICALBCREAT 9.0 02/16/2017    MICALBCREAT 5.1 01/13/2015    MICALBCREAT 6.4 04/25/2014         Lab Results   Component Value Date    HGBA1C >14.0 (H) 03/17/2022    HGBA1C 9.2 (H) 12/09/2019    HGBA1C 9.5 (H) 08/09/2019    HGBA1C 7.7 (H) 01/10/2018    HGBA1C 7.7 (H) 08/25/2017    HGBA1C 7.7 (H) 08/25/2017    HGBA1C 8.2 (H) 04/21/2017    HGBA1C 8.1 (H) 01/26/2017    HGBA1C 8.1 (H) 12/08/2016    HGBA1C 7.8 (H) 03/14/2016     With regards to acquired hypothyroidism:      Current medication:  Generic levothyroxine 75 mcg daily    Takes thyroid medication properly without food first thing in the morning       Lab Results   Component Value Date/Time    TSH 0.147 (L) 03/17/2022 05:03 PM    TSH 0.432 12/11/2019 05:44 PM    TSH 1.872 08/09/2019 08:13 AM    FREET4 0.95 03/17/2022 05:03 PM    FREET4 1.45 06/19/2008 09:12 AM    FREET4 1.08 10/19/2004 09:01 AM    THYROPEROXID 2,440 (H) 01/07/2004 12:00 PM             Reviewed past medical, family, social history and updated as appropriate.    Objective:     Vitals:    04/12/22 0833   BP: 120/72   Pulse: 66   Resp: 18         BP Readings from Last 5 Encounters:   04/12/22 120/72   04/11/22 (!) 186/86   03/30/22 118/66   03/23/22 138/65   06/25/21 136/64         Physical Exam  Vitals and nursing note reviewed.    Constitutional:       General: She is not in acute distress.     Appearance: She is well-developed. She is obese. She is not ill-appearing.   HENT:      Head: Normocephalic and atraumatic.   Eyes:      General:         Right eye: No discharge.         Left eye: No discharge.      Conjunctiva/sclera: Conjunctivae normal.   Neck:      Thyroid: No thyromegaly.      Trachea: No tracheal deviation.   Pulmonary:      Effort: Pulmonary effort is normal. No respiratory distress.   Musculoskeletal:      Comments: No digital clubbing or extremity cyanosis   Skin:     Comments: Injection sites okay   Neurological:      Mental Status: She is alert and oriented to person, place, and time.      Coordination: Coordination normal.   Psychiatric:         Behavior: Behavior normal.           Wt Readings from Last 30 Encounters:   04/12/22 0833 85 kg (187 lb 6.3 oz)   03/23/22 1800 81.8 kg (180 lb 5.4 oz)   03/30/22 1308 81.8 kg (180 lb 5.4 oz)   03/21/22 0722 88 kg (194 lb)   03/18/22 1726 88 kg (194 lb 0.1 oz)   03/17/22 2006 88 kg (194 lb 0.1 oz)   06/25/21 1646 87.7 kg (193 lb 5.5 oz)   01/14/20 0908 89.9 kg (198 lb 3.1 oz)   12/20/19 1054 91.6 kg (202 lb)   12/11/19 1643 92.2 kg (203 lb 4.2 oz)   09/04/19 1426 93 kg (205 lb)   08/30/19 1509 94.6 kg (208 lb 8 oz)   08/29/19 1351 93 kg (205 lb)   08/15/19 1541 93 kg (205 lb 0.4 oz)   08/01/19 1517 93.8 kg (206 lb 12.7 oz)   07/31/19 1310 93.1 kg (205 lb 4 oz)   07/13/18 1547 94.1 kg (207 lb 7.3 oz)   07/02/18 1515 95.5 kg (210 lb 8.6 oz)   01/29/18 0833 92.9 kg (204 lb 12.9 oz)   01/22/18 1513 93.9 kg (207 lb)   01/14/18 2337 93.9 kg (207 lb)   01/13/18 2321 93.9 kg (207 lb)   01/12/18 2045 93.9 kg (207 lb)   01/12/18 0400 93.9 kg (207 lb)   01/11/18 0400 93.4 kg (205 lb 12.8 oz)   01/10/18 0745 94.1 kg (207 lb 8 oz)   01/09/18 2343 94.1 kg (207 lb 8 oz)   01/09/18 1815 93.9 kg (207 lb)   01/09/18 1533 94.2 kg (207 lb 10.8 oz)   11/01/17 1507 93 kg (205 lb)   08/25/17 1300 94.3  kg (207 lb 12.8 oz)   08/22/17 1130 93 kg (205 lb 0.4 oz)   07/25/17 1426 94.3 kg (207 lb 14.3 oz)   06/13/17 1439 97 kg (213 lb 13.5 oz)   05/23/17 1547 95.2 kg (209 lb 14.1 oz)   05/09/17 0810 95.1 kg (209 lb 10.5 oz)   04/25/17 0500 97.4 kg (214 lb 11.7 oz)   04/24/17 0501 97.7 kg (215 lb 6.2 oz)   04/23/17 1054 97.5 kg (214 lb 15.2 oz)   04/23/17 1053 97.5 kg (214 lb 15.2 oz)   04/23/17 1052 97.5 kg (214 lb 15.2 oz)   04/23/17 1010 97.5 kg (214 lb 15.2 oz)   04/23/17 1006 97.5 kg (214 lb 15.2 oz)   04/23/17 0501 97.5 kg (214 lb 15.2 oz)   04/22/17 0600 96.4 kg (212 lb 8.4 oz)   04/21/17 0700 98.6 kg (217 lb 6 oz)   04/21/17 0123 98.9 kg (218 lb)   04/20/17 1626 98.9 kg (218 lb)   04/03/17 1457 99 kg (218 lb 4.1 oz)   02/21/17 1335 99.9 kg (220 lb 3.8 oz)       Lab Results   Component Value Date    HGBA1C >14.0 (H) 03/17/2022     Lab Results   Component Value Date    CHOL 281 (H) 08/09/2019    HDL 39 (L) 08/09/2019    LDLCALC 169.0 (H) 08/09/2019    TRIG 365 (H) 08/09/2019    CHOLHDL 13.9 (L) 08/09/2019     Lab Results   Component Value Date     04/08/2022    K 4.0 04/08/2022     04/08/2022    CO2 25 04/08/2022     (H) 04/08/2022    BUN 11 04/08/2022    CREATININE 0.7 04/08/2022    CALCIUM 8.5 (L) 04/08/2022    PROT 5.9 (L) 03/23/2022    ALBUMIN 2.3 (L) 03/23/2022    BILITOT 0.5 03/23/2022    ALKPHOS 64 03/23/2022    AST 11 03/23/2022    ALT 6 (L) 03/23/2022    ANIONGAP 9 04/08/2022    ESTGFRAFRICA >60.0 04/08/2022    EGFRNONAA >60.0 04/08/2022    TSH 0.147 (L) 03/17/2022      Lab Results   Component Value Date    MICALBCREAT 9.0 02/16/2017       Assessment/Plan:     Acute renal failure superimposed on stage 3 chronic kidney disease  Creatinine back to baseline. For now, will avoid metformin and SGLT-2i given recent fluctuations in GFR.    Type 2 diabetes mellitus with hyperglycemia, with long-term current use of insulin  Reviewed goals of therapy are to get the best control we can without  hypoglycemia.    Currently meeting glycemic target: no    Referral sent for diabetes education to be scheduled in two weeks    Medication changes:   Stop:   N/a  Start:    Trulicity 0.75 mg weekly  Continue:     Lantus-change to 24 units once daily to decrease injection burden   Novolog -8 units t.i.d. with meals plus low-dose correction scale       I explained that there is a possible association with the use of incretin based drugs with development acute pancreatitis, as well as medullary thyroid cancer (with Victoza in animal studies). The direct relationship (causality) between theses medications and the above complications has not been proven and there are risks and benefits with every treatment. We will monitor clinically for complications and if any concerns arise we can stop it. She has no family history of MTC, MEN, or pancreatic cancer. Discussed more common GI side-effects related to Trulicity and mitigation strategies (smaller meals, eating slower). Advised to let me know if side-effects become unbearable.    Reviewed patient's current insulin regimen. Clarified proper insulin dose and timing in relation to meals, etc. Insulin injection sites and proper rotation instructed.  I instructed her on the proper technique to inject insulin.  She was not aware that she needed to remove the second sheath over the needle.    Advised frequent self blood glucose monitoring. Patient encouraged to document glucose results and bring them to every clinic visit.    Hypoglycemia precautions discussed.    Close adherence to lifestyle changes recommended.      We spent most of the visit discussing the management of diabetes.  We did not address health maintenance topics in detail.  I will bring her back in six weeks to perform a foot exam and also discussed other health maintenance topics at that time.    Lab Results   Component Value Date    HGBA1C >14.0 (H) 03/17/2022    HGBA1C 9.2 (H) 12/09/2019    HGBA1C 9.5 (H)  08/09/2019         Acquired hypothyroidism  Most recent TSH was low, but was in the setting of acute illness.  We will recheck in three months with the next labs.  Continue levothyroxine 75 mcg daily for now.    Morbid obesity with BMI of 40.0-44.9, adult  Starting Trulicity to promote weight loss.  Nutrition consulted. Most recent weight and BMI monitored-       Statin intolerance  She is intolerant to statins.  Will recheck LDL once blood sugar control is better.  She would be a good candidate for a PCSK9 inhibitor.      Follow up in about 6 weeks (around 5/24/2022).     I spent 65 minutes with the patient. Over half of the visit was spent face-to-face with the patient, on counseling and/or coordinating the care of the patient.    Counseling includes:  Diagnostic results, impressions, recommendations   Prognosis   Risk and benefits of management/treatment options   Instructions for management treatment and or follow-up   Importance of compliance with management   Risk factor reduction   Patient education

## 2022-04-12 NOTE — ASSESSMENT & PLAN NOTE
Starting Trulicity to promote weight loss.  Nutrition consulted. Most recent weight and BMI monitored-

## 2022-04-12 NOTE — ASSESSMENT & PLAN NOTE
Reviewed goals of therapy are to get the best control we can without hypoglycemia.    Currently meeting glycemic target: no    Referral sent for diabetes education to be scheduled in two weeks    Medication changes:   Stop:   N/a  Start:    Trulicity 0.75 mg weekly  Continue:     Lantus-change to 24 units once daily to decrease injection burden   Novolog -8 units t.i.d. with meals plus low-dose correction scale       I explained that there is a possible association with the use of incretin based drugs with development acute pancreatitis, as well as medullary thyroid cancer (with Victoza in animal studies). The direct relationship (causality) between theses medications and the above complications has not been proven and there are risks and benefits with every treatment. We will monitor clinically for complications and if any concerns arise we can stop it. She has no family history of MTC, MEN, or pancreatic cancer. Discussed more common GI side-effects related to Trulicity and mitigation strategies (smaller meals, eating slower). Advised to let me know if side-effects become unbearable.    Reviewed patient's current insulin regimen. Clarified proper insulin dose and timing in relation to meals, etc. Insulin injection sites and proper rotation instructed.  I instructed her on the proper technique to inject insulin.  She was not aware that she needed to remove the second sheath over the needle.    Advised frequent self blood glucose monitoring. Patient encouraged to document glucose results and bring them to every clinic visit.    Hypoglycemia precautions discussed.    Close adherence to lifestyle changes recommended.      We spent most of the visit discussing the management of diabetes.  We did not address health maintenance topics in detail.  I will bring her back in six weeks to perform a foot exam and also discussed other health maintenance topics at that time.    Lab Results   Component Value Date    HGBA1C  >14.0 (H) 03/17/2022    HGBA1C 9.2 (H) 12/09/2019    HGBA1C 9.5 (H) 08/09/2019

## 2022-04-12 NOTE — ASSESSMENT & PLAN NOTE
She is intolerant to statins.  Will recheck LDL once blood sugar control is better.  She would be a good candidate for a PCSK9 inhibitor.

## 2022-04-21 NOTE — PROGRESS NOTES
"Subjective:       Patient ID: Courtney Engle is a 87 y.o. female.    Chief Complaint: Follow-up (Hospital for DKA, Fluid overload and afib. )    HPI: Pt attended by her daughter, here for F/U for sugar above 800, afib and fluid overload. See below for the summary. Admitted and treated. Has seen endocrine and meds adjusted. Only long and short acting insulin but has not taken Trulicity, although she picked it up.   We reviewed her medication in great detail and went through her list.  We went through her upcoming appointments.  We went through her diabetic diet in great detail as well and instructed her on things to reduce and eliminate.  Summary of hospitalization  Mrs. Engle is a 86 year old female with PMHx of IDDM, HTN, HLD, diastolic dysfunction (most recent echo 2018 normal), CKD 3, hypothyroidism and GERD who presents to SNF following hospitalization for DKA, encephalopathy, new AFib.  Admission to SNF for secondary weakness debility.      Patient originally presented to [facility] ED on 03/17 complaining of altered mental status. "HPI obtained from the daughter at bedside. Daughter states that for the past few weeks, patient has woken up with slurred speech that has improved as the day progressed. Last week the patient told the daughter that she "didn't feel well" but did not elaborate on specific symptoms. Earlier this week, the patient started complaining of increased thirst (for which she was requesting soda to be brought to her) and increased urination with dysuria (though patient attributed dysuria to a scratch on her vagina). She also complained of "kidney issues" though did not elaborate on specific symptoms. The daughter is unable to confirm whether or not the patient has been compliant with medications, including insulin, however she suspects that the patient has not been compliant as the patient's other daughter reported "several boxes of medication" in the patient's refrigerator (daughter at bedside " "does not know which medications they were). The patient had been resistant to calling her PCP or seeking medical care for the past two weeks, however was finally able to be brought to the ED today by her daughter. At present the patient is encephalopathic, but only complaint is of epigastric pain. Daughter reports episode of N/V within the past few days. Work up in the ED revealed a leukocytosis of 15, TAMARA with hyperkalemia (creatinine 2.3, baseline 0.8), lactic of 3.8 and labs consistent with DKA (pH 7.1, bhb 5.3, bicarb 9 w/ glucose 800). Additionally, the patient was found to be in new onset a fib RVR (rates up to 160 in the ED). Critical Care Medicine has been consulted for DKA & A fib RVR.  Patient admitted to [facility] evening of 3/17 for DKA and new onset a fib RVR. Started on insulin per DKA protocol. Better rate control achieved with initiation of metoprolol. Encephalopathy improved; gap closed. Pt transitioned to SQ insulin.    Pt stepped down to medicine 3/18.  Glycemic control improved.  Metoprolol also up titrated with better rate controlled.  Echo unremarkable.  Risks and benefits of anticoagulation in the setting of atrial fibrillation to prevent thromboembolism discussed with patient and daughter.  Patient would like to defer anticoagulation.  TAMARA improved to baseline.  Lisinopril started.  Per PT OT evaluation patient to benefit from SNF."     Today, patient is doing well.  She reports her shortness of breath is at her baseline.  She endorses difficulty sleeping last night.     Patient will be treated at [facility] with PT and OT to improve functional status and ability to perform ADLs.         Hospital Course:   Patient progressed well with PT and OT- last PT note states that patient [id number] ft and 56 ft on even, indoor esther with rolling walker and modified independent. Patient had no significant events during their stay at SNF.  Patient educated on multiple occasions regarding her diabetes " regimen, importance of compliance and proper nutrition.  She has a follow-up with Endocrinology tomorrow.  Patient's BP noted to be elevated this morning, I was not notified by SNF staff.  Patient has scheduled follow-ups to adjust BP medication.  Home health was set up. DME was ordered if needed. Follow up appointment to be made by patient within one week. All prescriptions and discharge instructions were ordered to be given to the patient prior to discharge.      Review of Systems   Constitutional: Positive for fatigue. Negative for appetite change, chills and fever.   HENT: Negative for nosebleeds and sore throat.    Eyes: Negative for pain and visual disturbance.   Respiratory: Negative for cough, shortness of breath and wheezing.    Cardiovascular: Negative for chest pain and leg swelling.   Gastrointestinal: Negative for abdominal pain, constipation and diarrhea.   Endocrine: Negative for polyuria.   Genitourinary: Negative for difficulty urinating, hematuria and vaginal bleeding.   Musculoskeletal: Negative for arthralgias, back pain, gait problem and neck pain.   Integumentary:  Negative for pallor and rash.   Neurological: Positive for memory loss. Negative for tremors, seizures and headaches.   Hematological: Does not bruise/bleed easily.   Psychiatric/Behavioral: Negative for dysphoric mood. The patient is not nervous/anxious.            Past Medical History:   Diagnosis Date    Acquired hypothyroidism 4/25/2014    Anxiety     Aortic calcification 12/8/2016    X-Ray Chest-5/08/2014    Arthritis     Bilateral carotid artery disease 12/8/2016    US Carotid Bilateral-1/24/2013    CKD stage 3 due to type 2 diabetes mellitus 2/16/2017    Depression     Essential hypertension     Fever blister     GERD (gastroesophageal reflux disease)     Glaucoma 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 robledo)     Hyperlipidemia LDL goal <70 2/16/2017    Keloid cicatrix     Mixed stress and urge urinary  incontinence 2/16/2017    Morbid obesity with BMI of 40.0-44.9, adult 12/8/2016    Ocular migraine 1/24/2013    Type 2 diabetes mellitus with hyperglycemia, with long-term current use of insulin      Past Surgical History:   Procedure Laterality Date    CATARACT EXTRACTION W/  INTRAOCULAR LENS IMPLANT Right 05/30/2012        CATARACT EXTRACTION W/  INTRAOCULAR LENS IMPLANT Left 05/26/2010        CHOLECYSTECTOMY      COLONOSCOPY N/A 12/20/2019    Procedure: COLONOSCOPY;  Surgeon: Higinio Gilbert MD;  Location: [address]);  Service: Endoscopy;  Laterality: N/A;  patient to call back to schedule Colonoscopy once she schedules Cardiology Clearance appt-BB  8/30/19 Low CV risk for colonospcopy per   patient requesting  but can't wait for his next available due to rectal bleeding    COLONOSCOPY W/ POLYPECTOMY  04/20/2015    ESOPHAGOGASTRODUODENOSCOPY  04/20/2015    HYSTERECTOMY      Partial    JOINT REPLACEMENT      knee replacement right      TONSILLECTOMY, ADENOIDECTOMY      tonsillectomy only      Patient Active Problem List   Diagnosis    Type 2 diabetes mellitus with hyperglycemia, with long-term current use of insulin    Arthritis    Essential hypertension    Pseudophakia - Both Eyes    Posterior vitreous detachment    Eyelid inflammation - Both Eyes    Transient vision disturbance - Left Eye    Open angle with borderline findings and low glaucoma risk in both eyes - Both Eyes    PCO (posterior capsular opacification) - Both Eyes    Cystitis cystica    MGD (meibomian gland dysfunction) - Both Eyes    Acquired hypothyroidism    Family history of malignant neoplasm of gastrointestinal tract    Anxiety    Moderate recurrent major depression    Morbid obesity with BMI of 40.0-44.9, adult    Aortic calcification    Bilateral carotid artery disease    CKD stage 3 due to type 2 diabetes mellitus    Mixed stress and urge urinary  incontinence    Hyperlipidemia LDL goal <70    Oropharyngeal dysphagia    Insomnia due to medical condition    Left knee pain    GERD (gastroesophageal reflux disease)    Fatty liver    Diastolic dysfunction    Neuropathy    Edema    Chronic diastolic heart failure    Weakness    Pure hypercholesterolemia    Shortness of breath    History of right knee joint replacement    Statin intolerance    Hematochezia    Family history of colon cancer requiring screening colonoscopy    Paroxysmal atrial fibrillation    Acute renal failure superimposed on stage 3 chronic kidney disease    Debility    Palliative care encounter        Objective:      Physical Exam  Constitutional:       General: She is not in acute distress.     Appearance: She is well-developed. She is obese.   HENT:      Head: Normocephalic and atraumatic.      Right Ear: Tympanic membrane, ear canal and external ear normal.      Left Ear: Tympanic membrane, ear canal and external ear normal.      Mouth/Throat:      Pharynx: No oropharyngeal exudate or posterior oropharyngeal erythema.   Eyes:      General: No scleral icterus.     Conjunctiva/sclera: Conjunctivae normal.      Pupils: Pupils are equal, round, and reactive to light.   Neck:      Thyroid: No thyromegaly.   Cardiovascular:      Rate and Rhythm: Normal rate and regular rhythm.      Pulses: Normal pulses.      Heart sounds: No murmur heard.  Pulmonary:      Effort: Pulmonary effort is normal.      Breath sounds: Normal breath sounds. No wheezing.   Abdominal:      General: Bowel sounds are normal. There is no distension.      Palpations: Abdomen is soft.      Tenderness: There is no abdominal tenderness.   Musculoskeletal:         General: No tenderness.      Cervical back: Normal range of motion and neck supple.      Right lower leg: No edema.      Left lower leg: No edema.   Lymphadenopathy:      Cervical: No cervical adenopathy.   Skin:     Coloration: Skin is not jaundiced.       Findings: No rash.   Neurological:      General: No focal deficit present.      Mental Status: She is alert and oriented to person, place, and time.   Psychiatric:         Mood and Affect: Mood normal.         Behavior: Behavior normal.         Assessment:       Problem List Items Addressed This Visit        Cardiac/Vascular    Essential hypertension (Chronic)    Diastolic dysfunction       Renal/    CKD stage 3 due to type 2 diabetes mellitus (Chronic)       Endocrine    Type 2 diabetes mellitus with hyperglycemia, with long-term current use of insulin (Chronic)       GI    GERD (gastroesophageal reflux disease)       Orthopedic    Arthritis      Other Visit Diagnoses     PAF (paroxysmal atrial fibrillation)    -  Primary          Plan:         Courtney was seen today for follow-up.    Diagnoses and all orders for this visit:    PAF (paroxysmal atrial fibrillation)    Type 2 diabetes mellitus with hyperglycemia, with long-term current use of insulin    Arthritis    Essential hypertension    CKD stage 3 due to type 2 diabetes mellitus    Gastroesophageal reflux disease, unspecified whether esophagitis present    Diastolic dysfunction    Other orders  -     levothyroxine (SYNTHROID) 75 MCG tablet; Take 1 tablet (75 mcg total) by mouth once daily.     OK to hold Trulicity until she sees Endocrine.   Labs reviewed.   FOllow up with me in a few weeks.   She asked if it is ok to take the Levemir which she was taking 12 units twice daily as 24 units one daily and I said yes.

## 2022-04-26 NOTE — TELEPHONE ENCOUNTER
----- Message from Samantha Tang sent at 4/26/2022  2:39 PM CDT -----  Contact: Daughter/Brenna /[phone]  Pt's daughter(Brenna) said that she is calling in regards to needing to speak with the nurse she stated that pt is very confused and she thinks that the patient is not taking her medication like she is supposed to. Please advise

## 2022-04-26 NOTE — Clinical Note
Met with pt and daughter today and they asked if I could reach out to you for FMLA form that (daughter says) you have?   Thanks!

## 2022-04-26 NOTE — TELEPHONE ENCOUNTER
If you get a chance can you please call the patient's daughter to see what the specific questions or about the insulin?  I went over this at length at a recent office visit with them but happy to try to answer it.

## 2022-04-26 NOTE — TELEPHONE ENCOUNTER
----- Message from Shana Linares RD, CDE sent at 4/26/2022 11:13 AM CDT -----  Met with pt and daughter today and they asked if I could reach out to you for FMLA form that (daughter says) you have?     Thanks!

## 2022-04-26 NOTE — TELEPHONE ENCOUNTER
Head CT scan when she was admitted did not show evidence of a stroke but if something has changed or evolved since then, certainly we are happy to see her to re-evaluate.

## 2022-04-26 NOTE — PROGRESS NOTES
Diabetes Care Specialist Progress Note  Author: Shana Linares RD, CDE  Date: 4/26/2022    Program Intake  Reason for Diabetes Program Visit:: Initial Diabetes Assessment  Type of Intervention:: Individual  Individual: Education    Education: Self-Management Skill Review    Current diabetes risk level:: high    In the last 12 months, have you:: been admitted to a hospital  Was the ER or hospital admission related to diabetes?: Yes   -   r/t DKA (March 2022)    Permission to speak with others about care:: yes   -  daughter present at visit today      Lab Results   Component Value Date    HGBA1C >14.0 (H) 03/17/2022         Clinical  Problem Review  Reviewed Problem List with Patient: yes  Active comorbidities affecting diabetes self-care.: no  Reviewed health maintenance: yes    Clinical Assessment  Current Diabetes Treatment: Insulin, Injectable  Trulicity  Lantus   novolog      Have you ever experienced hypoglycemia (low blood sugar)?: no  Have you ever experienced hyperglycemia (high blood sugar)?: yes  In the last month, how often have you experienced high blood sugar?: once a day        Medication Information  How many days a week do you miss your medications?: 3 or more  Medication adherence impacting ability to self-manage diabetes?: Yes    Labs  Do you have regular lab work to monitor your medications?: Yes  Lab Compliance Barriers: No          Nutritional Status  Diet: Regular  Meal Plan 24 Hour Recall:  (Drinks:  regular coke)  Current nutritional status an area of need that is impacting patient's ability to self-manage diabetes?: No            Additional Social History  Support  Does anyone support you with your diabetes care?: yes  Who supports you?: son/daughter, self  Who takes you to your medical appointments?: son/daughter, self  Does the current support meet the patient's needs?: Yes  Is Support an area impacting ability to self-manage diabetes?: No    Access to Mass Media & Technology  Media or  technology needs impacting ability to self-manage diabetes?: No    Cognitive/Behavioral Health  Alert and Oriented: Yes  Difficulty Thinking: Yes (memory issues; poor historian)  Requires Prompting: No  Requires assistance for routine expression?: No  Cognitive or behavioral barriers impacting ability to self-manage diabetes?: No    Culture/Adventism  Culture or Jain beliefs that may impact ability to access healthcare: No    Communication  Language preference: English  Hearing Problems: No  Vision Problems: No  Communication needs impacting ability to self-manage diabetes?: No    Health Literacy  Preferred Learning Method: Face to Face, Demonstration, Hands On  Health literacy needs impacting ability to self-manage diabetes?: No            Diabetes Self-Management Skills Assessment  Diabetes Disease Process/Treatment Options  Patient/caregiver able to state what happens when someone has diabetes.: yes  Patient/caregiver knows what type of diabetes they have.: yes  Diabetes Type : Type II  Patient/caregiver able to identify at least three signs and symptoms of diabetes.: yes  Diabetes Disease Process/Treatment Options: Skills Assessment Completed: Yes  Assessment indicates:: Adequate understanding  Area of need?: No    Nutrition/Healthy Eating  Challenges to healthy eating::  (drinking regular soft drinks)  Nutrition/Healthy Eating Skills Assessment Completed:: Yes  Assessment indicates:: Instruction Needed  Area of need?: Yes    Physical Activity/Exercise  Patient formally exercises outside of work.: no  Reasons for not exercising:: physically unable to exercise currently  Physical Activity/Exercise Skills Assessment Completed: : Yes  Area of need?: No    Medications  Patient is able to describe current diabetes management routine.: no  Patient is able to identify current diabetes medications, dosages, and appropriate timing of medications.: no  Patient understands the purpose of the medications taken for  diabetes.: yes  Patient reports problems or concerns with current medication regimen.: yes  Medication regimen problems/concerns::  (doesn't trust insulin pen)  Medication Skills Assessment Completed:: Yes  Assessment indicates:: Instruction Needed  Area of need?: Yes    Home Blood Glucose Monitoring  Patient states that blood sugar is checked at home daily.: yes  Monitoring Method:: home glucometer  Blood glucose logs:: no  Home Blood Glucose Monitoring Skills Assessment Completed: : Yes  Assessment indicates:: Instruction Needed  Area of need?: Yes    Acute Complications  Patient is able to identify types of acute complications: Yes  Patient Identified:: Hypoglycemia, Hyperglycemia  Acute Complications Skills Assessment Completed: : Yes  Assessment indicates:: Instruction Needed  Area of need?: Yes    Chronic Complications  Patient can identify major chronic complications of diabetes.: yes  Chronic Complications Skills Assessment Completed: : Yes  Area of need?: No    Psychosocial/Coping  Patient can identify ways of coping with chronic disease.: yes  Area of need?: No            Diabetes Self Support Plan     Assessment Summary and Plan    Based on today's diabetes care assessment, the following areas of need were identified:      Social 4/26/2022   Support No   Access to Mass Media/Tech No   Cognitive/Behavioral Health No   Culture/Zoroastrian No   Communication No   Health Literacy No        Clinical 4/26/2022   Medication Adherence Yes   Lab Compliance No   Nutritional Status No        Diabetes Self-Management Skills 4/26/2022   Diabetes Disease Process/Treatment Options No   Nutrition/Healthy Eating Yes  Discussed need to stop drinking SSB.   Discussed SF drink alternatives       Physical Activity/Exercise No   Medication Yes  See Care Plan       Home Blood Glucose Monitoring Yes  Discussed goal BGs for different times of day and in relation to meals.     Unsure how often she is actually testing her sugars and  she does not have written logs today and she cannot accurately report # of times a day testing or recall any recent BG values.     BG in clinic today 459 mg/dL.        Acute Complications Yes    - Discussed hypoglycemia vs hyperglycemia symptoms and discussed appropriate treatments for each.   - Reviewed that pt is at risk of hypo with current medication regimen.   - Discussed general vs severe hyperglycemia and risk of DKA/HHS.    Recent hospital admit for DKA.     Discussed risk of DKA 2/2 missing insulin doses.          Chronic Complications No   Psychosocial/Coping No      Today's interventions were provided through individual discussion, instruction, and written materials were provided.      Patient verbalized understanding of instruction and written materials.  Pt was able to return back demonstration of instructions today. Patient understood key points, needs reinforcement and further instruction.               Diabetes Self-Management Care Plan:    Today's Diabetes Self-Management Care Plan was developed with Courtney's input. Courtney has agreed to work toward the following goal(s) to improve his/her overall diabetes control.      Care Plan: Diabetes Management   Updates made since 3/27/2022 12:00 AM      Problem: Medications       Goal: Patient will take insulin injections as discussed today    Start Date: 4/26/2022   Expected End Date: 4/26/2023   Priority: High   Barriers: Cognitive Deficits   Note:    Long time spent today discussed insulin injections and importance of adhering to insulin regimen.     She was recently admitted to hospital, then SNF, 2/2 DKA, CHF, AMS and debilitation.     Daughter is with her today and reports her memory and cognitive ability has declined noticeably in the last 1-2 months.     Pt lives alone. She is unable to accurately recall her BGs or when/how often she gives insulin dosing.   Switched from vial/syringe to insulin pen during hospital admit.     Pt does not like the insulin  pens. She wants to switch back to vial and syringe. She does not trust the insulin pen is actually giving her any insulin because, unlike the syringe, the pen does not empty itself of insulin after every injection. She likes to visualize the plunger completely emptying the injection of all insulin.     Discussed insulin pen is a safer mechanism, but we can discuss syringe at next visit if okay per MD. I had her draw up an example 24 units via syringe today, which she did accurately.   Also had her demonstrate how she is using the insulin pen - which she did accurately.     I think the primary issue is complete omission of doses. This seems to be a relatively new issue (since recent memory issues), as A1c had been controlled in the past.     She is not changing her pen needle with every shot. C/o bent needles - gave her plenty of sample pen needles today so she would not run out of pen needles.       We discussed ways to help her remember her injections. Daughter has created a binder for her with BG logs so that pt can record BG and number of insulin units given. Did not bring binder with her today.   Discussed writing doses down, or at a minimum, putting a check silke in the box after each dose is given.     Also discussed getting a second pill box that she can fill with 4 new pen needles daily to remind herself of each injection needed.     She will f/u next week to review injection adherence and BG logs.          Task: Reviewed with patient all current diabetes medications and provided basic review of the purpose, dosage, frequency, side effects, and storage of both oral and injectable diabetes medications. Completed 4/26/2022      Task: Reviewed how to use insulin pen and how to appropriately administer insulin injections Completed 4/26/2022      Task: Discussed guidelines for preventing, detecting and treating hypoglycemia and hyperglycemia and reviewed the importance of meal and medication timing with diabetes  mediations for prevention of hypoglycemia and maximum drug benefit. Completed 4/26/2022                  Follow Up Plan     F/u with me next week        Today's care plan and follow up schedule was discussed with patient.  Courtney verbalized understanding of the care plan, goals, and agrees to follow up plan.        The patient was encouraged to communicate with his/her health care provider/physician and care team regarding his/her condition(s) and treatment.  I provided the patient with my contact information today and encouraged to contact me via phone or [facility] Patient Portal as needed.             Length of Visit   Total Time: 120 Minutes

## 2022-04-26 NOTE — TELEPHONE ENCOUNTER
Spoke to pt's daughter and she provided a number to fax FMLA form. FMLA faxed.      Pt's daughter is concerned that pt is not taking insulin correctly. Pt saw Shana Linares today and she is concerned pt may have had stroke and it was missed.

## 2022-04-27 NOTE — DISCHARGE INSTRUCTIONS
Follow up with your regular doctor in the next 1-2 days for re-evaluation.     Your former insulin medications are being sent to your pharmacy for you to fill and take as directed. They are being sent by your endocrinologist, Dr. Montero. Call his office for any questions related to the prescription.     Return to the Emergency Department for continued elevated sugars with inability to control with home medications with vomiting, shortness of breath or other concerns.

## 2022-04-27 NOTE — ED NOTES
"MD Cano instructed me to discharge patient and stop fluids once discharge papers were printed. At approximately 12:40, I secured chat MD Cano to change her dispo to discharge. The message was marked "seen" with no reply and the second POCT glucose order was discontinued at the same time. No further orders were received at this time. MD Perry aware of situation.   "

## 2022-04-27 NOTE — ED NOTES
Arrives by EMS who state pt has been having trouble with insulin pens, was recently switched from needles. Daughter, who was at home and called EMS, states pt is having trouble with pen use and is having consistently high BG levels. Pt denies symptoms at this time.

## 2022-04-27 NOTE — ED NOTES
Pt care assumed. Pt AAOx3. Pt appears to resting comfortably in bed. No needs verbalized at this time. Side rails up x2, call light within reach. Family at bedside

## 2022-04-27 NOTE — TELEPHONE ENCOUNTER
----- Message from Corinne Draper sent at 4/27/2022 12:01 PM CDT -----  Regarding: Medication Inquiry  Pt daughter called about pts medication insulin glargine (LANTUS) 100 unit/mL injection, needing to know when pt should start medication tonight after leaving ER today?            Requesting Call back number:[phone] pt

## 2022-04-27 NOTE — ED PROVIDER NOTES
"Encounter Date: 4/27/2022       History     Chief Complaint   Patient presents with    Hyperglycemia     Arrived to ER via EMS EJ 20, from home for high blood sugar. Cbg per ems 598. Arrived with piv 20g left hand and has received 300cc NS. C/o excessive thirst and urinary frequency      Mrs. Engle is a 86 year old female with PMHx of IDDM, HTN, HLD, diastolic dysfunction (most recent echo 2018 normal), CKD 3, hypothyroidism, GERD, DKA, encephalopathy, Afib presents to the ED for hyperglycemia.  She has been home about a week from SNF. States she was changed from "needle" to "pen" and she does not feel she is administering insulin when she tries to give it through the pen. She was seen by primary care team yesterday and was instructed to continue using the pen.     The history is provided by the patient and medical records. No  was used.     Review of patient's allergies indicates:   Allergen Reactions    Contac 12 hour allergy Anaphylaxis and Hives    Diphenhydramine hcl Shortness Of Breath     Other reaction(s): Shortness of breath    Ciprofloxacin (bulk) Nausea And Vomiting    (d)-limonene flavor Hives     Other reaction(s): Shortness of breath    Anti-hyst Other (See Comments)    Antihistamines - alkylamine Hives    Ciprocinonide      Other reaction(s): Stomach upset    Codeine      bad reaction    Contact metal agent      Other reaction(s): Hives    Glipizide Hives    Hydroxyzine      Unknown    Iodinated contrast media Other (See Comments)    Nitrofuran analogues     Penicillin      Other reaction(s): Rash    Propoxyphene Itching    Statins-hmg-coa reductase inhibitors      Nausea to all statins    Doxycycline Rash    Penicillins Rash    Sulfa (sulfonamide antibiotics) Rash    Sulfacetamide sodium-urea Rash     Past Medical History:   Diagnosis Date    Acquired hypothyroidism 4/25/2014    Anxiety     Aortic calcification 12/8/2016    X-Ray Chest-5/08/2014    " Arthritis     Bilateral carotid artery disease 12/8/2016     Carotid Bilateral-1/24/2013    CKD stage 3 due to type 2 diabetes mellitus 2/16/2017    Depression     Essential hypertension     Fever blister     GERD (gastroesophageal reflux disease)     Glaucoma 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 robledo)     Hyperlipidemia LDL goal <70 2/16/2017    Keloid cicatrix     Mixed stress and urge urinary incontinence 2/16/2017    Morbid obesity with BMI of 40.0-44.9, adult 12/8/2016    Ocular migraine 1/24/2013    Type 2 diabetes mellitus with hyperglycemia, with long-term current use of insulin      Past Surgical History:   Procedure Laterality Date    CATARACT EXTRACTION W/  INTRAOCULAR LENS IMPLANT Right 05/30/2012        CATARACT EXTRACTION W/  INTRAOCULAR LENS IMPLANT Left 05/26/2010        CHOLECYSTECTOMY      COLONOSCOPY N/A 12/20/2019    Procedure: COLONOSCOPY;  Surgeon: Higinio Gilbert MD;  Location: [facility] ([address] [place] [address]);  Service: Endoscopy;  Laterality: N/A;  patient to call back to schedule Colonoscopy once she schedules Cardiology Clearance appt-BB  8/30/19 Low CV risk for colonospcopy per   patient requesting  but can't wait for his next available due to rectal bleeding    COLONOSCOPY W/ POLYPECTOMY  04/20/2015    ESOPHAGOGASTRODUODENOSCOPY  04/20/2015    HYSTERECTOMY      Partial    JOINT REPLACEMENT      knee replacement right      TONSILLECTOMY, ADENOIDECTOMY      tonsillectomy only     Family History   Problem Relation Age of Onset    Glaucoma Father     Stroke Father     Heart attack Father 93    Nephrolithiasis Father     Colon cancer Mother     Cancer Mother         colon ca    Heart disease Mother     Uterine cancer Daughter         uterine sarcoma    Hematuria Brother     Heart disease Maternal Grandmother     Hypertension Maternal Grandmother     Heart attack Maternal Grandmother     No Known Problems  Daughter     Amblyopia Neg Hx     Blindness Neg Hx     Cataracts Neg Hx     Macular degeneration Neg Hx     Retinal detachment Neg Hx     Strabismus Neg Hx      Social History     Tobacco Use    Smoking status: Never Smoker    Smokeless tobacco: Never Used   Substance Use Topics    Alcohol use: No    Drug use: No     Review of Systems   Constitutional: Negative for chills and fever.   HENT: Negative for rhinorrhea and sore throat.    Respiratory: Negative for cough and shortness of breath.    Cardiovascular: Negative for chest pain and leg swelling.   Gastrointestinal: Negative for abdominal pain, diarrhea, nausea and vomiting.   Endocrine: Positive for polydipsia, polyphagia and polyuria.   Genitourinary: Negative for dysuria and hematuria.   Musculoskeletal: Negative for back pain and neck pain.   Skin: Negative for rash and wound.   Neurological: Negative for seizures, weakness and headaches.   Hematological: Does not bruise/bleed easily.   Psychiatric/Behavioral: Negative for agitation and behavioral problems.       Physical Exam     Initial Vitals [04/27/22 0821]   BP Pulse Resp Temp SpO2   139/63 72 18 97.7 °F (36.5 °C) 95 %      MAP       --         Physical Exam    Nursing note and vitals reviewed.  Constitutional: She appears well-developed and well-nourished.   HENT:   Head: Normocephalic and atraumatic.   Dry MM   Eyes: Conjunctivae and EOM are normal.   Neck: Neck supple.   Normal range of motion.  Cardiovascular: Normal rate, regular rhythm, normal heart sounds and intact distal pulses.   Pulmonary/Chest: No respiratory distress. She has no wheezes. She has no rhonchi. She has no rales.   Abdominal: Abdomen is soft. Bowel sounds are normal. She exhibits no distension. There is no abdominal tenderness. There is no rebound and no guarding.   Musculoskeletal:         General: No tenderness or edema. Normal range of motion.      Cervical back: Normal range of motion and neck supple.      Neurological: She is alert and oriented to person, place, and time. She has normal strength.   Skin: Skin is warm and dry.   Psychiatric: She has a normal mood and affect. Thought content normal.         ED Course   Procedures  Labs Reviewed   CBC W/ AUTO DIFFERENTIAL - Abnormal; Notable for the following components:       Result Value    MCH 26.6 (*)     MCHC 31.1 (*)     Platelets 595 (*)     All other components within normal limits   COMPREHENSIVE METABOLIC PANEL - Abnormal; Notable for the following components:    Sodium 132 (*)     Chloride 93 (*)     Glucose 496 (*)     BUN 28 (*)     Albumin 3.1 (*)     ALT 8 (*)     eGFR if  43.2 (*)     eGFR if non  37.5 (*)     All other components within normal limits   URINALYSIS, REFLEX TO URINE CULTURE - Abnormal; Notable for the following components:    Glucose, UA 3+ (*)     All other components within normal limits    Narrative:     Specimen Source->Urine   POCT GLUCOSE - Abnormal; Notable for the following components:    POCT Glucose 414 (*)     All other components within normal limits   BETA - HYDROXYBUTYRATE, SERUM   URINALYSIS MICROSCOPIC    Narrative:     Specimen Source->Urine   POCT GLUCOSE MONITORING CONTINUOUS     EKG Readings: (Independently Interpreted)   Initial Reading: No STEMI. Rhythm: Normal Sinus Rhythm. Heart Rate: 79. Ectopy: No Ectopy. Clinical Impression: Normal Sinus Rhythm     ECG Results          EKG 12-lead (In process)  Result time 04/27/22 11:39:45    In process by Interface, Lab In [facility] (04/27/22 11:39:45)                 Narrative:    Test Reason : R73.9,    Vent. Rate : 079 BPM     Atrial Rate : 079 BPM     P-R Int : 166 ms          QRS Dur : 070 ms      QT Int : 394 ms       P-R-T Axes : 016 -30 056 degrees     QTc Int : 451 ms    Sinus rhythm with Premature atrial complexes  Left axis deviation  Low voltage QRS  Cannot rule out Anteroseptal infarct (cited on or before 12-JAN-2018)  Abnormal  ECG  When compared with ECG of 30-MAR-2022 14:44,  Significant changes have occurred    Referred By: [email or web address]   SELF           Confirmed By:                             Imaging Results          X-Ray Chest PA And Lateral (Final result)  Result time 04/27/22 10:45:09    Final result by Silas Mosher MD (04/27/22 10:45:09)                 Impression:      No acute cardiopulmonary disease      Electronically signed by: Silas Mosher MD  Date:    04/27/2022  Time:    10:45             Narrative:    EXAMINATION:  XR CHEST PA AND LATERAL    CLINICAL HISTORY:  Hyperglycemia;    TECHNIQUE:  PA and lateral views of the chest were performed.    COMPARISON:  04/01/2022    FINDINGS:  Heart size is normal.  Mediastinum shows aortic atherosclerosis.  Lungs are expanded and clear.  No pleural fluid detected.  Skeletal structures are intact.                                 Medications   0.9%  NaCl infusion (0 mL/hr Intravenous Stopped 4/27/22 1206)   insulin aspart U-100 pen 5 Units (5 Units Subcutaneous Given 4/27/22 1127)     Medical Decision Making:   History:   Old Medical Records: I decided to obtain old medical records.  Initial Assessment:   Mrs. Engle is a 86 year old female with PMHx of IDDM, HTN, HLD, diastolic dysfunction (most recent echo 2018 normal), CKD 3, hypothyroidism and GERD who was recently admitted to SNF following hospitalization for DKA, encephalopathy, Afib presents to the ED for hyperglycemia. Glucose in 400s. Vitals WNL on arrival. Labs ordered.   Differential Diagnosis:   Including, but not limited to: DKA, HHS, hyperglycemia, medication non adherence   Independently Interpreted Test(s):   I have ordered and independently interpreted EKG Reading(s) - see prior notes  Clinical Tests:   Lab Tests: Ordered and Reviewed       <> Summary of Lab: No anion gap.  No significant elevation in beta hydroxybutyrate.  Radiological Study: Ordered and Reviewed  Medical Tests: Ordered and Reviewed  ED  Management:  Patient is not in DKA.  Discussed with patient's endocrinologist who will be prescribing her former regimen of insulin.  This has already been sent to the pharmacy.  Given 5 units aspart in the ED prior to Dc. POC glucose obtained prior to administration of insulin. Patient was dc'd by RN prior to recheck of glucose but called patient at home check was 374 (decreased from initial). Instructed to follow up with endocrinologist as they are sending Rx to pharmacy regarding long-term glucose control. Discussed strict return precautions with patient and daughter.  Stable for discharge and close outpatient follow-up.  Other:   I have discussed this case with another health care provider.            Attending Attestation:   Physician Attestation Statement for Resident:  As the supervising MD   Physician Attestation Statement: I have personally seen and examined this patient.   I agree with the above history. -: 87-year-old female presenting with hyperglycemia.  No chest pain.  Explains difficulty with insulin pen and preference to go back to needles.    As the supervising MD I agree with the above PE.   -:   Afebrile, well-appearing, no acute distress  Nontender abdomen  RRR, lungs clear  Nonfocal neurologic exam   As the supervising MD I agree with the above treatment, course, plan, and disposition.   -:   No evidence of ischemia on EKG.  UA negative for infection, no fever, doubt infectious pathology.  Favor medication non-adherence as etiology of hyperglycemia.   Discussed case with patient's endocrinologist, Dr. Montero, in agreement with plan, prescribed new insulin regimen.  Patient understands and agrees with this plan.  Advised close follow-up and monitoring of blood sugars at home.    I have reviewed and agree with the residents interpretation of the following: lab data and EKG.  I have reviewed the following: old records at this facility.                ED Course as of 04/27/22 1320   Wed Apr 27,  2022   1017 Beta-Hydroxybutyrate: 0.1 [AB]   1017 Ketones, UA: Negative  No DKA  [AB]   1036 Glucose(!!): 496  Hyperglycemia  [AB]   1246 Patient was discharged before POCT glucose after insulin was obtained. Patient called at home. She rechecked her glucose and pt was unable to find accu check. No complaints over the phone. Will call back shortly to see if she found accu check.  [KL]   1313 Patient called back. Glucose was checked 374 at home just now. Instructed to call endocrinologist for close follow up. She expressed understanding and appreciation.  [KL]      ED Course User Index  [AB] Charanjit Perry MD  [KL] Michelle Cano MD             Clinical Impression:   Final diagnoses:  [R73.9] Hyperglycemia          ED Disposition Condition    Discharge Stable        ED Prescriptions     None        Follow-up Information     Follow up With Specialties Details Why Contact Info    Vishal Ulloa MD Internal Medicine In 1 week  [address]  [place] [address]  [phone]      [facility] - Emergency Dept Emergency Medicine  As needed, If symptoms worsen [address] [facility] [id number] [phone]    Darrin Montero MD Endocrinology In 1 week  [address] [facility] [address]  [phone]             Michelle Cano MD  Resident  04/27/22 1144       Michelle Cano MD  Resident  04/27/22 1321       Charanjit Perry MD  04/27/22 [id number]

## 2022-04-27 NOTE — TELEPHONE ENCOUNTER
Spoke with patient daughter Patito regarding message, patient currently in [facility] ER, daughter reports patient is not taking insulin as prescribed, patient blood glucose over 600 this am

## 2022-04-27 NOTE — TELEPHONE ENCOUNTER
Pt's daughter is on the line, EMS is on scene because pt's blood glucose is >500 this am.  She was recently changed from drawing up her own insulin to an insulin pen, and she is having difficulty using her insulin pen.  Her blood glucose this am was 585, and approximately 15 minutes ago, she attempted to give herself 11 units, but is unsure if it actually injected, and she and the daughter are unsure what to do now. EMS rechecked her blood glucose and it is now 592, and they are encouraging her to go in to the ED.  I explained that she needed to go in to the ED with a blood glucose of that level, so that they can get her blood glucose level down quickly and teach her to use her insulin pen.  Pt's daughter verbalized understanding.  Reason for Disposition   Sounds like a life-threatening emergency to the triager    Protocols used: DIABETES - HIGH BLOOD SUGAR-A-

## 2022-04-29 NOTE — PROGRESS NOTES
Spoke with pt's daughter concerning most recent BGs.     Pt is still very confused about all of her doses - confusion is new per daughter.     Pt does not appear to be remembering to give her doses on her own. She lives alone.   Daughter has been trying to help her remember her doses over the phone.     BG's are improving to low 200s when she takes her doses appropriately. When she misses doses, BGs are upper 400s.       Pt still prefers to use vial and syringe over insulin pen. Discussed with daughter that jeremy MORENO is okay with her using syringes if she prefers that.

## 2022-05-04 NOTE — Clinical Note
NATALIYAI - seems to be doing a little better.    Her daughter requested that you call pt to tell her personally that if her sugar is over 500, she has to call EMS and ER - apparently she won't listen to daughter    Jaiden

## 2022-05-04 NOTE — PROGRESS NOTES
Daughter called - requesting to cancel today's appt - pt is very tired, and tired of coming in to so many doctors appts.     Daughter does report that pt has been doing much better - FBG today was 110 mg/dL    Daughter states, per pt report, that she HAS been taking her insulin appropriately since visit last week and she has been recording her numbers on logs.   Pt reports she is also drinking coke ZERO and not regular coke.     Discussed okay to cancel appt today, as long as BGs stay controlled all under 250 mg/dL, especially fastings, and NO hypoglycemia.     Will have f/u with jeremy MORENO in 3 weeks.

## 2022-05-31 NOTE — TELEPHONE ENCOUNTER
Spoke with patient informed patient nothing available. Patient is to call back tomorrow and I can get patient scheduled in October and put on waiting list. Patient verbalized understanding.

## 2022-06-01 NOTE — TELEPHONE ENCOUNTER
JOSE I scheduled appointment. Patient scheduled October 14, @10 am. Also mailed letter as a reminder.

## 2022-07-12 NOTE — TELEPHONE ENCOUNTER
Please let her know her A1c is still high, but better than last time at 10.9%. Her cholesterol is also high, so we will discuss that at the next visit. Thyroid levels look fine and kidney function is stable from last time.    Can we see if any other providers (NP or fellows) have any closer follow-up to see her once until she can see me in October? If not, please schedule diabetes education in 2-3 weeks with the instructions to bring in blood glucose numbers to the visit.

## 2022-07-27 NOTE — PROGRESS NOTES
Diabetes Care Specialist Progress Note  Author: Liana Bro RN, CDE  Date: 7/26/2022    Program Intake  Reason for Diabetes Program Visit:: Intervention  Type of Intervention:: Individual  Individual: Medication Training  Medication Training: Vial and Syringe    Lab Results   Component Value Date    HGBA1C 10.9 (H) 07/12/2022       Clinical  Problem Review  Reviewed Problem List with Patient: yes  Active comorbidities affecting diabetes self-care.: no  Reviewed health maintenance: yes    Clinical Assessment  Have you ever experienced hypoglycemia (low blood sugar)?: no  Have you ever experienced hyperglycemia (high blood sugar)?: yes  In the last month, how often have you experienced high blood sugar?: once a day    Nutritional Status  Meal Plan 24 Hour Recall:  (Drinks:  regular coke)    Additional Social History    Support  Does anyone support you with your diabetes care?: yes    Health Literacy  Preferred Learning Method: Face to Face, Demonstration, Hands On  How often do you need to have someone help you read instructions, pamphlets, or written material from your doctor or pharmacy?: Sometimes      Diabetes Self-Management Skills Assessment    Diabetes Disease Process/Treatment Options  Patient/caregiver able to state what happens when someone has diabetes.: yes  Patient/caregiver knows what type of diabetes they have.: yes  Diabetes Type : Type II  Patient/caregiver able to identify at least three signs and symptoms of diabetes.: yes  Assessment indicates:: Adequate understanding  Area of need?: No    Nutrition/Healthy Eating  Challenges to healthy eating::  (drinking regular soft drinks)  Assessment indicates:: Instruction Needed  Area of need?: Yes    Physical Activity/Exercise  Patient formally exercises outside of work.: no  Reasons for not exercising:: physically unable to exercise currently  Area of need?: No    Medications  Patient is able to describe current diabetes management routine.: no  Patient  is able to identify current diabetes medications, dosages, and appropriate timing of medications.: no  Patient understands the purpose of the medications taken for diabetes.: yes  Patient reports problems or concerns with current medication regimen.: yes  Medication regimen problems/concerns::  (doesn't trust insulin pen)  Assessment indicates:: Instruction Needed  Area of need?: Yes    Home Blood Glucose Monitoring  Patient states that blood sugar is checked at home daily.: yes  Monitoring Method:: home glucometer  Home glucometer meter type:: Insurance preferred meter  When you check what is your typical blood sugar range? : see attached  Blood glucose logs:: no  Blood glucose logs reviewed today?: yes  Assessment indicates:: Instruction Needed  Area of need?: Yes    Acute Complications  Assessment indicates:: Instruction Needed  Area of need?: Yes    Chronic Complications  Patient can identify major chronic complications of diabetes.: yes  Area of need?: No    Psychosocial/Coping  Patient can identify ways of coping with chronic disease.: yes  Area of need?: No      Assessment Summary and Plan    Based on today's diabetes care assessment, the following areas of need were identified:      Social 4/26/2022   Support No   Access to Mass Media/Tech No   Cognitive/Behavioral Health No   Culture/Caodaism No   Communication No   Health Literacy No        Clinical 4/26/2022   Medication Adherence Yes   Lab Compliance No   Nutritional Status No        Diabetes Self-Management Skills 7/26/2022   Diabetes Disease Process/Treatment Options No   Nutrition/Healthy Eating Yes, Covered in previous assessment   Physical Activity/Exercise No   Medication Yes, see care planning   Home Blood Glucose Monitoring Yes, suggested checking a bedtime glucose   Acute Complications Yes - Reviewed blood glucose goals, prevention, detection, signs and symptoms, and treatment of hypoglycemia and hyperglycemia, and when to contact the clinic  "  Chronic Complications No   Psychosocial/Coping No          Today's interventions were provided through individual discussion, instruction, and written materials were provided.      Patient verbalized understanding of instruction and written materials.  Pt was able to return back demonstration of instructions today. Patient understood key points, needs reinforcement and further instruction.     Diabetes Self-Management Care Plan:    Today's Diabetes Self-Management Care Plan was developed with Courtney's input. Courtney has agreed to work toward the following goal(s) to improve his/her overall diabetes control.      Care Plan: Diabetes Management   Updates made since 6/27/2022 12:00 AM      Problem: Medications       Goal: Patient will take insulin injections as discussed today    Start Date: 4/26/2022   Expected End Date: 10/14/2022   Priority: High   Barriers: Cognitive Deficits   Note:    MDI    Update to care planning 7/26/2022: Blood glucose reviewed. Patient is using Lantus vials and Novolog vials.  She states she is more comfortable because she can "see" the insulin going into the skin.  We reviewed safety when using syringes. Correctly verbalized doses using syringe in the office. Patient is insisting on using the vials.  She marks her logs with a checkmark when she gives insulin to document the dose. Helps her to remember that she gave the dose. States maybe some missed doses. BG logs WNL for her age and comorbilities.  We reviewed hypoglycemia, signs and symptoms and how to treat. Understanding was verbalized.          Follow Up Plan     Will f/u up Endo.    Today's care plan and follow up schedule was discussed with patient.  Courtney verbalized understanding of the care plan, goals, and agrees to follow up plan.        The patient was encouraged to communicate with his/her health care provider/physician and care team regarding his/her condition(s) and treatment.  I provided the patient with my contact " information today and encouraged to contact me via phone or [facility] Patient Portal as needed.     Length of Visit   Total Time: 60 Minutes

## 2022-08-07 NOTE — PLAN OF CARE
Problem: Adult Inpatient Plan of Care  Goal: Plan of Care Review  Outcome: Ongoing, Progressing  Goal: Patient-Specific Goal (Individualized)  Outcome: Ongoing, Progressing  Goal: Absence of Hospital-Acquired Illness or Injury  Outcome: Ongoing, Progressing  Goal: Optimal Comfort and Wellbeing  Outcome: Ongoing, Progressing      RN attempted Fetal Heart Tones with external doppler and was unable to get them.        Padmini Cervantes RN  08/07/22 [id number]

## 2022-09-26 NOTE — TELEPHONE ENCOUNTER
----- Message from Lisha Cerrato sent at 9/26/2022  2:38 PM CDT -----  Contact: self [phone]  Pt want to know should she continue take medication metoprolol tartrate (LOPRESSOR) 25 MG tablet if so she need refill   [id number] - [place] [address] AT [facility] & [address]  [place] [address]  Phone: [phone] Fax: [phone]      Please call and advise, Thanks

## 2022-10-07 NOTE — TELEPHONE ENCOUNTER
No new care gaps identified.  [facility] Embedded Care Gaps. Reference number: [id number]. 10/07/2022   12:58:16 PM CDT

## 2022-10-08 NOTE — TELEPHONE ENCOUNTER
Refill Decision Note   Courtney Engle  is requesting a refill authorization.  Brief Assessment and Rationale for Refill:  Approve     Medication Therapy Plan:       Medication Reconciliation Completed: No   Comments:     No Care Gaps recommended.     Note composed:10:52 PM 10/07/2022

## 2022-10-14 PROBLEM — R60.9 EDEMA: Status: RESOLVED | Noted: 2017-08-25 | Resolved: 2022-01-01

## 2022-10-14 PROBLEM — Z91.119 DIETARY NONCOMPLIANCE: Status: ACTIVE | Noted: 2022-01-01

## 2022-10-14 PROBLEM — R06.02 SHORTNESS OF BREATH: Status: RESOLVED | Noted: 2018-07-02 | Resolved: 2022-01-01

## 2022-10-14 PROBLEM — Z51.5 PALLIATIVE CARE ENCOUNTER: Status: RESOLVED | Noted: 2022-01-01 | Resolved: 2022-01-01

## 2022-10-14 PROBLEM — R53.1 WEAKNESS: Status: RESOLVED | Noted: 2018-01-11 | Resolved: 2022-01-01

## 2022-10-14 PROBLEM — N18.30 ACUTE RENAL FAILURE SUPERIMPOSED ON STAGE 3 CHRONIC KIDNEY DISEASE: Status: RESOLVED | Noted: 2022-01-01 | Resolved: 2022-01-01

## 2022-10-14 PROBLEM — G47.01 INSOMNIA DUE TO MEDICAL CONDITION: Status: RESOLVED | Noted: 2017-05-09 | Resolved: 2022-01-01

## 2022-10-14 PROBLEM — N17.9 ACUTE RENAL FAILURE SUPERIMPOSED ON STAGE 3 CHRONIC KIDNEY DISEASE: Status: RESOLVED | Noted: 2022-01-01 | Resolved: 2022-01-01

## 2022-10-14 NOTE — ASSESSMENT & PLAN NOTE
Reviewed goals of therapy are to get the best control we can without hypoglycemia.    Currently meeting glycemic target: no     Referral sent for diabetes education; she has very poor insight into her diabetes. We had a prolonged discussion on why hydrating with regular sodas is going to lead to more hyperglycemia and increased urination/thirst. Advised that she needs to cut out all regular sodas and drink either flavored water or diet soft drinks (she does not like drinking regular water). Discussed that it will be impossible to control her blood sugar if she's drinking regular sodas all day.    Medication changes:   Stop:   N/a  Start:    Trulicity 0.75 mg weekly  Continue:     Lantus 24 units once daily   Novolog 8 units t.i.d. with meals plus low-dose correction scale       I explained that there is a possible association with the use of incretin based drugs with development acute pancreatitis, as well as medullary thyroid cancer (with Victoza in animal studies). The direct relationship (causality) between theses medications and the above complications has not been proven and there are risks and benefits with every treatment. We will monitor clinically for complications and if any concerns arise we can stop it. She has no family history of MTC, MEN, or pancreatic cancer. Discussed more common GI side-effects related to Trulicity and mitigation strategies (smaller meals, eating slower). Advised to let me know if side-effects become unbearable.    Reviewed patient's current insulin regimen. Clarified proper insulin dose and timing in relation to meals, etc. Insulin injection sites and proper rotation instructed.  I instructed her on the proper technique to inject insulin.  She was not aware that she needed to remove the second sheath over the needle.    Advised frequent self blood glucose monitoring. Patient encouraged to document glucose results and bring them to every clinic visit.    Hypoglycemia  precautions discussed.    Close adherence to lifestyle changes recommended.      We spent most of the visit discussing the management of diabetes.  We did not address health maintenance topics in detail.  I will bring her back in six weeks to perform a foot exam and also discussed other health maintenance topics at that time.    Lab Results   Component Value Date    HGBA1C 12.4 (H) 10/14/2022    HGBA1C 10.9 (H) 07/12/2022    HGBA1C >14.0 (H) 03/17/2022

## 2022-10-14 NOTE — PROGRESS NOTES
FOLLOW-UP PATIENT VISIT    Subjective:      Chief Complaint: Diabetes follow-up    HPI: Courtney Engle is a 87 y.o. female who is here for a follow-up evaluation for type 2 diabetes    Past Medical History:   Diagnosis Date    Acquired hypothyroidism 4/25/2014    Anxiety     Aortic calcification 12/8/2016    X-Ray Chest-5/08/2014    Arthritis     Bilateral carotid artery disease 12/8/2016    US Carotid Bilateral-1/24/2013    CKD stage 3 due to type 2 diabetes mellitus 2/16/2017    Depression     Essential hypertension     Fever blister     GERD (gastroesophageal reflux disease)     Glaucoma 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 robledo)     Hyperlipidemia LDL goal <70 2/16/2017    Keloid cicatrix     Mixed stress and urge urinary incontinence 2/16/2017    Morbid obesity with BMI of 40.0-44.9, adult 12/8/2016    Ocular migraine 1/24/2013    Type 2 diabetes mellitus with hyperglycemia, with long-term current use of insulin      Last seen by me 4/2022. She was scheduled to see me in 5/2022, but was a no show to the visit.      With regards to the type 2 diabetes:    The patient was initially diagnosed with Type 2 diabetes mellitus:  >15 years ago.    Most recent a1c was 12.4 on 10/14/2022    Since our last visit, her blood sugars have remained extremely erratic. Mostly high (up to 500s) but she has had low sugars down to 40s a few times since our last visit also. She is thirsty all the time and drinks regular soft drinks to hydrate (mostly Sprite and Barqs' root beer). She drinks at least 3 of these per day and likely more. She says her daughter fusses at her not to do this but she can't help it because she's just so thirsty. She's been counseled not to do this in the past, but unfortunately has poor understanding of her diabetes.    No results found for: CPEPTIDE, GLUTAMICACID    Current symptoms/problems include:  Hyperglycemia     Known diabetic complications: nephropathy (CKD Stage 3) and  hyperglycemia  Cardiovascular risk factors: advanced age (older than 55 for men, 65 for women), diabetes mellitus, dyslipidemia, hypertension, obesity (BMI >= 30 kg/m2) and sedentary lifestyle    She has no history of pancreatitis or other pancreatic disorders.  No family history of MEN syndrome.    Medical Therapy:  Current diabetic medications include:   Lantus 24 units qhs  Novolog 8 units TID with meals + correction    She did not start Trulicity because she says her PCP told her not to. This may be related to some nausea and abdominal pain she was reporting at the time, but she is not sure why she was told not to start it.    She prefers vial and syringe over pens.    Other medications tried:  Novolog 70/30 --- discontinued due to alternate therapy  Glipizide --- discontinued due to side-effects - hives      Lifestyle:    Last visit with Diabetes Educator: : 07/26/2022    Current diet:  Admits to frequent dietary indiscretions. Drinking sodas throughout the day despite previous counseling to not do that.     Current exercise:  None      BP Readings from Last 10 Encounters:   10/14/22 120/60   04/27/22 (!) 125/58   04/21/22 (!) 98/40   04/12/22 120/72   04/11/22 (!) 186/86   03/30/22 118/66   03/23/22 138/65   06/25/21 136/64   08/06/20 126/80   12/20/19 (!) 156/56       Wt Readings from Last 10 Encounters:   10/14/22 83 kg (182 lb 15.7 oz)   04/27/22 80.3 kg (177 lb)   04/21/22 80.4 kg (177 lb 4 oz)   04/12/22 85 kg (187 lb 6.3 oz)   03/23/22 81.8 kg (180 lb 5.4 oz)   03/30/22 81.8 kg (180 lb 5.4 oz)   03/21/22 88 kg (194 lb)   06/25/21 87.7 kg (193 lb 5.5 oz)   01/14/20 89.9 kg (198 lb 3.1 oz)   12/20/19 91.6 kg (202 lb)         Blood sugars:  Current monitoring regimen: Fingersticks 2-3 times daily. Some checks before and others after food.  BG logs were reviewed and scanned into media.  BG ranging from low to over 500 without a clearly discernable pattern.    Any episodes of hypoglycemia? no      Diabetes  Management Status    Statin: Not taking - statin intolerant  ACE/ARB: Taking    Hypertension Medications               lisinopriL (PRINIVIL,ZESTRIL) 20 MG tablet Take 1 tablet (20 mg total) by mouth once daily.    metoprolol tartrate (LOPRESSOR) 25 MG tablet Take 1 tablet (25 mg total) by mouth 2 (two) times daily.          Diabetes Management Status    Statin: Not taking  ACE/ARB: Taking    Screening or Prevention Patient's value Goal Complete/Controlled?   HgA1C Testing and Control   Lab Results   Component Value Date    HGBA1C 12.4 (H) 10/14/2022      Annually/Less than 8% No     Lipid profile : 07/12/2022 Annually Yes     LDL control Lab Results   Component Value Date    LDLCALC 169.4 (H) 07/12/2022    Annually/Less than 100 mg/dl  No     Nephropathy screening Lab Results   Component Value Date    LABMICR 35.0 07/12/2022     Lab Results   Component Value Date    PROTEINUA Negative 04/27/2022     No results found for: UTPCR   Annually Yes     Blood pressure BP Readings from Last 1 Encounters:   10/14/22 120/60    Less than 140/90 Yes     Dilated retinal exam : 02/03/2020 Annually Referred     Foot exam   Most Recent Foot Exam Date: Not Found Annually Referred           Lab Results   Component Value Date    MICALBCREAT 11.9 07/12/2022    MICALBCREAT 9.0 02/16/2017    MICALBCREAT 5.1 01/13/2015         Lab Results   Component Value Date    HGBA1C 12.4 (H) 10/14/2022    HGBA1C 10.9 (H) 07/12/2022    HGBA1C >14.0 (H) 03/17/2022    HGBA1C 9.2 (H) 12/09/2019    HGBA1C 9.5 (H) 08/09/2019    HGBA1C 7.7 (H) 01/10/2018    HGBA1C 7.7 (H) 08/25/2017    HGBA1C 7.7 (H) 08/25/2017    HGBA1C 8.2 (H) 04/21/2017    HGBA1C 8.1 (H) 01/26/2017     With regards to acquired hypothyroidism:      Current medication:  Generic levothyroxine 75 mcg daily    Takes thyroid medication properly without food first thing in the morning       Lab Results   Component Value Date/Time    TSH 1.429 07/12/2022 10:03 AM    TSH 0.147 (L) 03/17/2022 05:03  PM    TSH 0.432 12/11/2019 05:44 PM    FREET4 1.03 07/12/2022 10:03 AM    FREET4 0.95 03/17/2022 05:03 PM    FREET4 1.45 06/19/2008 09:12 AM    THYROPEROXID 2,440 (H) 01/07/2004 12:00 PM         Reviewed past medical, family, social history and updated as appropriate.    Objective:     Vitals:    10/14/22 0949   BP: 120/60   Pulse: 63         BP Readings from Last 5 Encounters:   10/14/22 120/60   04/27/22 (!) 125/58   04/21/22 (!) 98/40   04/12/22 120/72   04/11/22 (!) 186/86         Physical Exam  Vitals and nursing note reviewed.   Constitutional:       General: She is not in acute distress.     Appearance: She is well-developed. She is obese. She is not ill-appearing.   HENT:      Head: Normocephalic and atraumatic.   Eyes:      General:         Right eye: No discharge.         Left eye: No discharge.      Conjunctiva/sclera: Conjunctivae normal.   Neck:      Thyroid: No thyromegaly.      Trachea: No tracheal deviation.   Pulmonary:      Effort: Pulmonary effort is normal. No respiratory distress.   Musculoskeletal:      Comments: No digital clubbing or extremity cyanosis   Skin:     Comments: Injection sites okay   Neurological:      Mental Status: She is alert and oriented to person, place, and time.      Coordination: Coordination normal.   Psychiatric:         Behavior: Behavior normal.         Wt Readings from Last 30 Encounters:   10/14/22 0949 83 kg (182 lb 15.7 oz)   04/27/22 0821 80.3 kg (177 lb)   04/21/22 0953 80.4 kg (177 lb 4 oz)   04/12/22 0833 85 kg (187 lb 6.3 oz)   03/23/22 1800 81.8 kg (180 lb 5.4 oz)   03/30/22 1308 81.8 kg (180 lb 5.4 oz)   03/21/22 0722 88 kg (194 lb)   03/18/22 1726 88 kg (194 lb 0.1 oz)   03/17/22 2006 88 kg (194 lb 0.1 oz)   06/25/21 1646 87.7 kg (193 lb 5.5 oz)   01/14/20 0908 89.9 kg (198 lb 3.1 oz)   12/20/19 1054 91.6 kg (202 lb)   12/11/19 1643 92.2 kg (203 lb 4.2 oz)   09/04/19 1426 93 kg (205 lb)   08/30/19 1509 94.6 kg (208 lb 8 oz)   08/29/19 1351 93 kg (205 lb)    08/15/19 1541 93 kg (205 lb 0.4 oz)   08/01/19 1517 93.8 kg (206 lb 12.7 oz)   07/31/19 1310 93.1 kg (205 lb 4 oz)   07/13/18 1547 94.1 kg (207 lb 7.3 oz)   07/02/18 1515 95.5 kg (210 lb 8.6 oz)   01/29/18 0833 92.9 kg (204 lb 12.9 oz)   01/22/18 1513 93.9 kg (207 lb)   01/14/18 2337 93.9 kg (207 lb)   01/13/18 2321 93.9 kg (207 lb)   01/12/18 2045 93.9 kg (207 lb)   01/12/18 0400 93.9 kg (207 lb)   01/11/18 0400 93.4 kg (205 lb 12.8 oz)   01/10/18 0745 94.1 kg (207 lb 8 oz)   01/09/18 2343 94.1 kg (207 lb 8 oz)   01/09/18 1815 93.9 kg (207 lb)   01/09/18 1533 94.2 kg (207 lb 10.8 oz)   11/01/17 1507 93 kg (205 lb)   08/25/17 1300 94.3 kg (207 lb 12.8 oz)   08/22/17 1130 93 kg (205 lb 0.4 oz)   07/25/17 1426 94.3 kg (207 lb 14.3 oz)   06/13/17 1439 97 kg (213 lb 13.5 oz)   05/23/17 1547 95.2 kg (209 lb 14.1 oz)   05/09/17 0810 95.1 kg (209 lb 10.5 oz)       Lab Results   Component Value Date    HGBA1C 12.4 (H) 10/14/2022     Lab Results   Component Value Date    CHOL 252 (H) 07/12/2022    HDL 51 07/12/2022    LDLCALC 169.4 (H) 07/12/2022    TRIG 158 (H) 07/12/2022    CHOLHDL 20.2 07/12/2022     Lab Results   Component Value Date     (L) 10/14/2022    K 5.1 10/14/2022    CL 94 (L) 10/14/2022    CO2 26 10/14/2022     (HH) 10/14/2022    BUN 25 (H) 10/14/2022    CREATININE 1.6 (H) 10/14/2022    CALCIUM 9.4 10/14/2022    PROT 7.5 10/14/2022    ALBUMIN 3.3 (L) 10/14/2022    BILITOT 0.2 10/14/2022    ALKPHOS 86 10/14/2022    AST 10 10/14/2022    ALT 9 (L) 10/14/2022    ANIONGAP 10 10/14/2022    ESTGFRAFRICA 47.6 (A) 07/12/2022    EGFRNONAA 41.3 (A) 07/12/2022    TSH 1.429 07/12/2022      Lab Results   Component Value Date    MICALBCREAT 11.9 07/12/2022       Assessment/Plan:     Type 2 diabetes mellitus with hyperglycemia, with long-term current use of insulin  Reviewed goals of therapy are to get the best control we can without hypoglycemia.    Currently meeting glycemic target: no     Referral sent for  diabetes education; she has very poor insight into her diabetes. We had a prolonged discussion on why hydrating with regular sodas is going to lead to more hyperglycemia and increased urination/thirst. Advised that she needs to cut out all regular sodas and drink either flavored water or diet soft drinks (she does not like drinking regular water). Discussed that it will be impossible to control her blood sugar if she's drinking regular sodas all day.    Medication changes:   Stop:  N/a  Start:   Trulicity 0.75 mg weekly  Continue:    Lantus 24 units once daily  Novolog 8 units t.i.d. with meals plus low-dose correction scale       I explained that there is a possible association with the use of incretin based drugs with development acute pancreatitis, as well as medullary thyroid cancer (with Victoza in animal studies). The direct relationship (causality) between theses medications and the above complications has not been proven and there are risks and benefits with every treatment. We will monitor clinically for complications and if any concerns arise we can stop it. She has no family history of MTC, MEN, or pancreatic cancer. Discussed more common GI side-effects related to Trulicity and mitigation strategies (smaller meals, eating slower). Advised to let me know if side-effects become unbearable.    Reviewed patient's current insulin regimen. Clarified proper insulin dose and timing in relation to meals, etc. Insulin injection sites and proper rotation instructed.  I instructed her on the proper technique to inject insulin.  She was not aware that she needed to remove the second sheath over the needle.    Advised frequent self blood glucose monitoring. Patient encouraged to document glucose results and bring them to every clinic visit.    Hypoglycemia precautions discussed.    Close adherence to lifestyle changes recommended.      We spent most of the visit discussing the management of diabetes.  We did not  address health maintenance topics in detail.  I will bring her back in six weeks to perform a foot exam and also discussed other health maintenance topics at that time.    Lab Results   Component Value Date    HGBA1C 12.4 (H) 10/14/2022    HGBA1C 10.9 (H) 07/12/2022    HGBA1C >14.0 (H) 03/17/2022       CKD stage 3 due to type 2 diabetes mellitus  Will recheck labs today to monitor GFR.  Avoid insulin stacking.    Acquired hypothyroidism  Most recent TSH was normal on 75 mcg daily. Check yearly.    Morbid obesity with BMI of 40.0-44.9, adult  Starting Trulicity to promote weight loss. DM education for nutrition counseling. Most recent weight and BMI monitored     Dietary noncompliance  See above. It will be near impossible to control her diabetes if she maintains the current diet.      Follow up in about 3 months (around 1/14/2023).

## 2022-10-14 NOTE — TELEPHONE ENCOUNTER
I just want to make sure is ok for me to tell her is ok to take the new medication since is was given to her by the diabetic doctor.

## 2022-10-14 NOTE — ASSESSMENT & PLAN NOTE
Starting Trulicity to promote weight loss. DM education for nutrition counseling. Most recent weight and BMI monitored

## 2022-10-14 NOTE — TELEPHONE ENCOUNTER
----- Message from Angelica Chambers sent at 10/14/2022  4:27 PM CDT -----  Regarding: advice  Contact: patient [phone]  Patient was given a different medication(Trulicity) and would like to discuss if it  is alright for her to take it.  Please call to discuss.

## 2022-10-17 NOTE — TELEPHONE ENCOUNTER
I called to check on her to make sure she was able to fill Trulicity and hydrating well in light of the worsening kidney function on labs. The phone rings indefinitely with no voicemail set up.

## 2022-10-19 PROBLEM — E11.42 DIABETIC POLYNEUROPATHY ASSOCIATED WITH TYPE 2 DIABETES MELLITUS: Status: ACTIVE | Noted: 2022-01-01

## 2023-01-17 ENCOUNTER — TELEPHONE (OUTPATIENT)
Dept: PODIATRY | Facility: CLINIC | Age: 88
End: 2023-01-17

## 2023-01-17 NOTE — TELEPHONE ENCOUNTER
Left message on daughter voicemail to please call the office to reschedule appointment with Dr. Mukherjee

## 2023-01-18 ENCOUNTER — TELEPHONE (OUTPATIENT)
Dept: ADMINISTRATIVE | Facility: HOSPITAL | Age: 88
End: 2023-01-18

## 2023-01-20 ENCOUNTER — TELEPHONE (OUTPATIENT)
Dept: ADMINISTRATIVE | Facility: HOSPITAL | Age: 88
End: 2023-01-20

## 2023-05-14 NOTE — TELEPHONE ENCOUNTER
Care Due:                  Date            Visit Type   Department     Provider  --------------------------------------------------------------------------------                                [facility] INTERNAL  Last Visit: 04-      FOLLOW UP    MEDICINE       Vishal Ulloa  Next Visit: None Scheduled  None         None Found                                                            Last  Test          Frequency    Reason                     Performed    Due Date  --------------------------------------------------------------------------------    Office Visit  12 months..  EScitalopram,              04- 04-                             levothyroxine,                             lisinopriL, metoprolol...    TSH.........  12 months..  levothyroxine............  07- 07-    [employer] Embedded Care Due Messages. Reference number: [id number].   5/14/2023 5:30:46 AM CDT

## 2023-05-15 RX ORDER — FAMOTIDINE 40 MG/1
TABLET, FILM COATED ORAL
Qty: 30 TABLET | Refills: 11 | Status: SHIPPED | OUTPATIENT
Start: 2023-05-15

## 2023-05-15 NOTE — TELEPHONE ENCOUNTER
Refill Routing Note   Medication(s) are not appropriate for processing by [facility] for the following reason(s):      Patient seen in ED/Hospital since LOV with PCP  Required labs abnormal    [facility] action(s):  Defer Care Due:  Appointment due  Labs due          Appointments  past 12m or future 3m with PCP    Date Provider   Last Visit   4/21/2022 Vishal Ulloa MD   Next Visit   Visit date not found Vishal Ulloa MD   ED visits in past 90 days: 0        Note composed:8:53 AM 05/15/2023

## 2024-04-11 NOTE — PT/OT/SLP PROGRESS
"Physical Therapy Treatment    Patient Name:  Courtney Engle   MRN:  [id number]  Admit Date: 3/23/2022  Admitting Diagnosis: Diabetes mellitus with ketoacidosis and lactic acidosis but without coma  Recent Surgeries:     General Precautions: Standard, fall   Orthopedic Precautions:N/A   Braces:       Recommendations:     Discharge Recommendations:  home with home health   Level of Assistance Recommended at Discharge: Intermittent supervision  Discharge Equipment Recommendations: bedside commode, shower chair, walker, rolling   Barriers to discharge:      Assessment:     Courtney Engle is a 87 y.o. female admitted with a medical diagnosis of Diabetes mellitus with ketoacidosis and lactic acidosis but without coma . Pt tolerated well, no buckling today with L knee during gait, pt would continue to benefit from skilled PT services to improve overall functional mobility, strength and endurance.  .      Performance deficits affecting function:  weakness, impaired endurance, impaired self care skills, impaired functional mobilty, gait instability, impaired balance, pain, decreased ROM .    Rehab Potential is good    Activity Tolerance: Fair    Plan:     Patient to be seen 6 x/week to address the above listed problems via gait training, therapeutic activities, therapeutic exercises, neuromuscular re-education, wheelchair management/training    · Plan of Care Expires: 04/23/22  · Plan of Care Reviewed with: patient    Subjective     "just tired, don't sleep good" agreeable to therapy.     Pain/Comfort:  · Pain Rating 1: 0/10  · Pain Rating Post-Intervention 1: 0/10    Patient's cultural, spiritual, Jain conflicts given the current situation:  · no    Objective:      Patient found  with  (in BSC) upon PT entry to room.     Therapeutic Activities and Exercises: mini elliptical x 10 min 1 rest break  2x10 reps AP,LAQ,hip flex vcs for slower pace  Rows/press/bicep curls 2x10 reps YTB #1 dowel    Functional " Surgery consent for TURBT w/maday procedure to be performed by Wilian Eric MD at [facility] on 5/2/24 signed per patient and MD.  Consent then sent to scanning.  Orders entered into Epic.  Surgery/precert form completed and faxed to [facility] OR.  Patient understands to refrain from blood thinners one week prior to the procedure.  Patient also understands they will be contacted by [facility] Preadmit Dept 1-2 days prior to the procedure regarding arrival time, procedure time, when to stop eating and drinking, and what meds they can/cannot take that morning.  Preop H&P scheduled with Dr. Banerjee on 4/18/24.    Mobility:  · Transfers:     · Sit to Stand:  stand by assistance with rolling walker  · Bed to Chair: stand by assistance and contact guard assistance with  rolling walker  using  Stand Pivot and ~ 5 ft in room x 2 BSC<>WC  · Gait: amb with RW CGA ~ 85 ft and 95 ft seated rest break vcs for taking her time for safety  · Balance: pt stood at [facility] to pull up shorts and to take off at end of session, thread/unthread while sitting    AM-PAC 6 CLICK MOBILITY  20    Patient left up in chair with call button in reach and belonging sin reach.    GOALS:   Multidisciplinary Problems     Physical Therapy Goals        Problem: Physical Therapy    Goal Priority Disciplines Outcome Goal Variances Interventions   Physical Therapy Goal     PT, PT/OT Ongoing, Progressing     Description: PT goals until 4/13/22    1. Pt supine to sit with mod independent-not met  2. Pt sit to supine with mod independent-not met  3. Pt sit to stand with RW with mod independent-not met  4. Pt to perform gait 100ft with RW with supervision.-not met  5. Pt to go up/down curb step with RW with supervision.-not met  6. Pt to up/down 5 steps with B UE rail with supervision.-not met  7. Pt to perform B LE exs in sitting or supine x 15 reps to strengthen B LE to improve functional mobility.-not met  8. Pt to propel w/c 50ft with B UE on level surface with supervision-not met  9. Pt will be able to  object off the ground with the reacher with RW support with set up assist.-not met                     Time Tracking:     PT Received On: 03/31/22  PT Total Time (min):       Billable Minutes: Gait Training 15, Therapeutic Activity 14 and Therapeutic Exercise 15    Treatment Type: Treatment  PT/PTA: PTA     PTA Visit Number: 5 03/31/2022